# Patient Record
Sex: FEMALE | Race: WHITE | NOT HISPANIC OR LATINO
[De-identification: names, ages, dates, MRNs, and addresses within clinical notes are randomized per-mention and may not be internally consistent; named-entity substitution may affect disease eponyms.]

---

## 2017-01-04 ENCOUNTER — APPOINTMENT (OUTPATIENT)
Dept: OBGYN | Facility: CLINIC | Age: 37
End: 2017-01-04

## 2017-01-09 ENCOUNTER — APPOINTMENT (OUTPATIENT)
Age: 37
End: 2017-01-09

## 2017-01-12 ENCOUNTER — APPOINTMENT (OUTPATIENT)
Dept: OBGYN | Facility: CLINIC | Age: 37
End: 2017-01-12

## 2017-02-01 ENCOUNTER — APPOINTMENT (OUTPATIENT)
Dept: OBGYN | Facility: CLINIC | Age: 37
End: 2017-02-01

## 2017-02-15 ENCOUNTER — APPOINTMENT (OUTPATIENT)
Dept: OBGYN | Facility: CLINIC | Age: 37
End: 2017-02-15

## 2017-02-22 ENCOUNTER — RESULT CHARGE (OUTPATIENT)
Age: 37
End: 2017-02-22

## 2017-02-22 ENCOUNTER — APPOINTMENT (OUTPATIENT)
Dept: OBGYN | Facility: CLINIC | Age: 37
End: 2017-02-22

## 2017-02-22 VITALS — DIASTOLIC BLOOD PRESSURE: 60 MMHG | WEIGHT: 144 LBS | BODY MASS INDEX: 24.72 KG/M2 | SYSTOLIC BLOOD PRESSURE: 90 MMHG

## 2017-02-22 LAB
BILIRUB UR QL STRIP: NORMAL
CLARITY UR: CLEAR
COLLECTION METHOD: NORMAL
GLUCOSE UR-MCNC: NORMAL
HCG UR QL: 0.2 EU/DL
HGB UR QL STRIP.AUTO: NORMAL
KETONES UR-MCNC: NORMAL
LEUKOCYTE ESTERASE UR QL STRIP: NORMAL
NITRITE UR QL STRIP: NORMAL
PH UR STRIP: 6
PROT UR STRIP-MCNC: NORMAL
SP GR UR STRIP: 1.01

## 2017-03-09 ENCOUNTER — APPOINTMENT (OUTPATIENT)
Dept: ANTEPARTUM | Facility: CLINIC | Age: 37
End: 2017-03-09

## 2017-03-27 ENCOUNTER — APPOINTMENT (OUTPATIENT)
Dept: ANTEPARTUM | Facility: CLINIC | Age: 37
End: 2017-03-27

## 2017-03-27 VITALS — DIASTOLIC BLOOD PRESSURE: 68 MMHG | SYSTOLIC BLOOD PRESSURE: 110 MMHG | WEIGHT: 146 LBS | BODY MASS INDEX: 25.06 KG/M2

## 2017-03-27 VITALS — HEART RATE: 92 BPM | OXYGEN SATURATION: 100 % | TEMPERATURE: 97 F

## 2017-03-27 LAB
BILIRUB UR QL STRIP: NEGATIVE
CLARITY UR: CLEAR
COLLECTION METHOD: NORMAL
GLUCOSE UR-MCNC: NEGATIVE
HCG UR QL: 0.2 EU/DL
HGB UR QL STRIP.AUTO: NORMAL
KETONES UR-MCNC: NEGATIVE
LEUKOCYTE ESTERASE UR QL STRIP: 0.2
NITRITE UR QL STRIP: NEGATIVE
PH UR STRIP: 7.5
PROT UR STRIP-MCNC: NEGATIVE
SP GR UR STRIP: 1

## 2017-03-27 RX ORDER — BUPRENORPHINE HYDROCHLORIDE 8 MG/1
8 TABLET SUBLINGUAL
Refills: 0 | Status: ACTIVE | COMMUNITY
Start: 2017-03-27

## 2017-04-04 ENCOUNTER — APPOINTMENT (OUTPATIENT)
Dept: ANTEPARTUM | Facility: CLINIC | Age: 37
End: 2017-04-04

## 2017-04-06 ENCOUNTER — APPOINTMENT (OUTPATIENT)
Dept: OBGYN | Facility: CLINIC | Age: 37
End: 2017-04-06

## 2017-04-24 ENCOUNTER — APPOINTMENT (OUTPATIENT)
Dept: ANTEPARTUM | Facility: CLINIC | Age: 37
End: 2017-04-24

## 2017-04-24 VITALS — OXYGEN SATURATION: 100 % | TEMPERATURE: 98 F | HEART RATE: 98 BPM

## 2017-04-24 VITALS — BODY MASS INDEX: 23 KG/M2 | DIASTOLIC BLOOD PRESSURE: 70 MMHG | SYSTOLIC BLOOD PRESSURE: 114 MMHG | WEIGHT: 134 LBS

## 2017-04-24 DIAGNOSIS — Z33.1 PREGNANT STATE, INCIDENTAL: ICD-10-CM

## 2017-04-24 DIAGNOSIS — O42.92: ICD-10-CM

## 2017-04-24 DIAGNOSIS — F19.10 OTHER PSYCHOACTIVE SUBSTANCE ABUSE, UNCOMPLICATED: ICD-10-CM

## 2017-04-24 LAB
BILIRUB UR QL STRIP: NEGATIVE
CLARITY UR: NORMAL
GLUCOSE UR-MCNC: NEGATIVE
HCG UR QL: 0.2 EU/DL
HGB UR QL STRIP.AUTO: NORMAL
KETONES UR-MCNC: NEGATIVE
LEUKOCYTE ESTERASE UR QL STRIP: NEGATIVE
NITRITE UR QL STRIP: NEGATIVE
PH UR STRIP: 8
PROT UR STRIP-MCNC: NORMAL
SP GR UR STRIP: 1.03

## 2017-05-04 ENCOUNTER — APPOINTMENT (OUTPATIENT)
Dept: OBGYN | Facility: CLINIC | Age: 37
End: 2017-05-04

## 2017-06-07 ENCOUNTER — OTHER (OUTPATIENT)
Age: 37
End: 2017-06-07

## 2017-06-07 ENCOUNTER — OUTPATIENT (OUTPATIENT)
Dept: OUTPATIENT SERVICES | Facility: HOSPITAL | Age: 37
LOS: 1 days | Discharge: HOME | End: 2017-06-07

## 2017-06-07 ENCOUNTER — APPOINTMENT (OUTPATIENT)
Dept: OBGYN | Facility: CLINIC | Age: 37
End: 2017-06-07

## 2017-06-07 DIAGNOSIS — F41.9 ANXIETY DISORDER, UNSPECIFIED: ICD-10-CM

## 2017-06-07 DIAGNOSIS — F31.9 BIPOLAR DISORDER, UNSPECIFIED: ICD-10-CM

## 2017-06-07 DIAGNOSIS — F14.20 COCAINE DEPENDENCE, UNCOMPLICATED: ICD-10-CM

## 2017-06-07 DIAGNOSIS — F17.200 NICOTINE DEPENDENCE, UNSPECIFIED, UNCOMPLICATED: ICD-10-CM

## 2017-06-07 DIAGNOSIS — F11.20 OPIOID DEPENDENCE, UNCOMPLICATED: ICD-10-CM

## 2017-06-07 DIAGNOSIS — Z87.898 PERSONAL HISTORY OF OTHER SPECIFIED CONDITIONS: ICD-10-CM

## 2017-06-07 DIAGNOSIS — F12.10 CANNABIS ABUSE, UNCOMPLICATED: ICD-10-CM

## 2017-06-19 ENCOUNTER — APPOINTMENT (OUTPATIENT)
Dept: OBGYN | Facility: CLINIC | Age: 37
End: 2017-06-19

## 2017-06-28 DIAGNOSIS — Z00.8 ENCOUNTER FOR OTHER GENERAL EXAMINATION: ICD-10-CM

## 2017-07-10 ENCOUNTER — APPOINTMENT (OUTPATIENT)
Dept: OBGYN | Facility: CLINIC | Age: 37
End: 2017-07-10

## 2017-07-20 ENCOUNTER — APPOINTMENT (OUTPATIENT)
Dept: OBGYN | Facility: CLINIC | Age: 37
End: 2017-07-20

## 2017-07-30 ENCOUNTER — EMERGENCY (EMERGENCY)
Facility: HOSPITAL | Age: 37
LOS: 1 days | Discharge: LEFT BEFORE DISCHARGE | End: 2017-07-30

## 2017-07-30 DIAGNOSIS — F14.20 COCAINE DEPENDENCE, UNCOMPLICATED: ICD-10-CM

## 2017-07-30 DIAGNOSIS — F11.20 OPIOID DEPENDENCE, UNCOMPLICATED: ICD-10-CM

## 2017-07-30 DIAGNOSIS — R41.0 DISORIENTATION, UNSPECIFIED: ICD-10-CM

## 2017-07-30 DIAGNOSIS — Z79.899 OTHER LONG TERM (CURRENT) DRUG THERAPY: ICD-10-CM

## 2017-07-30 DIAGNOSIS — F17.200 NICOTINE DEPENDENCE, UNSPECIFIED, UNCOMPLICATED: ICD-10-CM

## 2017-07-30 DIAGNOSIS — F41.9 ANXIETY DISORDER, UNSPECIFIED: ICD-10-CM

## 2017-07-30 DIAGNOSIS — Z87.898 PERSONAL HISTORY OF OTHER SPECIFIED CONDITIONS: ICD-10-CM

## 2017-07-30 DIAGNOSIS — F31.9 BIPOLAR DISORDER, UNSPECIFIED: ICD-10-CM

## 2017-07-30 DIAGNOSIS — F12.10 CANNABIS ABUSE, UNCOMPLICATED: ICD-10-CM

## 2017-07-30 DIAGNOSIS — F32.9 MAJOR DEPRESSIVE DISORDER, SINGLE EPISODE, UNSPECIFIED: ICD-10-CM

## 2017-10-06 ENCOUNTER — EMERGENCY (EMERGENCY)
Facility: HOSPITAL | Age: 37
LOS: 0 days | Discharge: HOME | End: 2017-10-06

## 2017-10-06 DIAGNOSIS — Z87.898 PERSONAL HISTORY OF OTHER SPECIFIED CONDITIONS: ICD-10-CM

## 2017-10-06 DIAGNOSIS — F41.9 ANXIETY DISORDER, UNSPECIFIED: ICD-10-CM

## 2017-10-06 DIAGNOSIS — Z79.899 OTHER LONG TERM (CURRENT) DRUG THERAPY: ICD-10-CM

## 2017-10-06 DIAGNOSIS — F17.200 NICOTINE DEPENDENCE, UNSPECIFIED, UNCOMPLICATED: ICD-10-CM

## 2017-10-06 DIAGNOSIS — F14.20 COCAINE DEPENDENCE, UNCOMPLICATED: ICD-10-CM

## 2017-10-06 DIAGNOSIS — F11.90 OPIOID USE, UNSPECIFIED, UNCOMPLICATED: ICD-10-CM

## 2017-10-06 DIAGNOSIS — L98.9 DISORDER OF THE SKIN AND SUBCUTANEOUS TISSUE, UNSPECIFIED: ICD-10-CM

## 2017-10-06 DIAGNOSIS — F12.10 CANNABIS ABUSE, UNCOMPLICATED: ICD-10-CM

## 2017-10-06 DIAGNOSIS — F11.20 OPIOID DEPENDENCE, UNCOMPLICATED: ICD-10-CM

## 2017-10-06 DIAGNOSIS — F41.8 OTHER SPECIFIED ANXIETY DISORDERS: ICD-10-CM

## 2017-10-06 DIAGNOSIS — F31.9 BIPOLAR DISORDER, UNSPECIFIED: ICD-10-CM

## 2017-10-06 DIAGNOSIS — Z87.891 PERSONAL HISTORY OF NICOTINE DEPENDENCE: ICD-10-CM

## 2017-10-26 ENCOUNTER — INPATIENT (INPATIENT)
Facility: HOSPITAL | Age: 37
LOS: 0 days | Discharge: LEFT BEFORE DISCHARGE | End: 2017-10-26
Attending: INTERNAL MEDICINE

## 2017-10-26 DIAGNOSIS — F41.9 ANXIETY DISORDER, UNSPECIFIED: ICD-10-CM

## 2017-10-26 DIAGNOSIS — F12.10 CANNABIS ABUSE, UNCOMPLICATED: ICD-10-CM

## 2017-10-26 DIAGNOSIS — F10.20 ALCOHOL DEPENDENCE, UNCOMPLICATED: ICD-10-CM

## 2017-10-26 DIAGNOSIS — F11.20 OPIOID DEPENDENCE, UNCOMPLICATED: ICD-10-CM

## 2017-10-26 DIAGNOSIS — Z87.898 PERSONAL HISTORY OF OTHER SPECIFIED CONDITIONS: ICD-10-CM

## 2017-10-26 DIAGNOSIS — F14.20 COCAINE DEPENDENCE, UNCOMPLICATED: ICD-10-CM

## 2017-10-26 DIAGNOSIS — F31.9 BIPOLAR DISORDER, UNSPECIFIED: ICD-10-CM

## 2017-10-26 DIAGNOSIS — F17.200 NICOTINE DEPENDENCE, UNSPECIFIED, UNCOMPLICATED: ICD-10-CM

## 2017-10-30 ENCOUNTER — EMERGENCY (EMERGENCY)
Facility: HOSPITAL | Age: 37
LOS: 0 days | Discharge: LEFT BEFORE DISCHARGE | End: 2017-10-30

## 2017-10-30 DIAGNOSIS — Z79.899 OTHER LONG TERM (CURRENT) DRUG THERAPY: ICD-10-CM

## 2017-10-30 DIAGNOSIS — F41.9 ANXIETY DISORDER, UNSPECIFIED: ICD-10-CM

## 2017-10-30 DIAGNOSIS — R10.33 PERIUMBILICAL PAIN: ICD-10-CM

## 2017-10-30 DIAGNOSIS — F31.9 BIPOLAR DISORDER, UNSPECIFIED: ICD-10-CM

## 2017-10-30 DIAGNOSIS — Z87.891 PERSONAL HISTORY OF NICOTINE DEPENDENCE: ICD-10-CM

## 2017-10-30 DIAGNOSIS — F17.200 NICOTINE DEPENDENCE, UNSPECIFIED, UNCOMPLICATED: ICD-10-CM

## 2017-10-30 DIAGNOSIS — Z87.898 PERSONAL HISTORY OF OTHER SPECIFIED CONDITIONS: ICD-10-CM

## 2017-10-30 DIAGNOSIS — F41.8 OTHER SPECIFIED ANXIETY DISORDERS: ICD-10-CM

## 2017-10-30 DIAGNOSIS — F14.20 COCAINE DEPENDENCE, UNCOMPLICATED: ICD-10-CM

## 2017-10-30 DIAGNOSIS — F12.10 CANNABIS ABUSE, UNCOMPLICATED: ICD-10-CM

## 2017-10-30 DIAGNOSIS — F11.20 OPIOID DEPENDENCE, UNCOMPLICATED: ICD-10-CM

## 2017-11-01 DIAGNOSIS — F11.20 OPIOID DEPENDENCE, UNCOMPLICATED: ICD-10-CM

## 2017-11-01 DIAGNOSIS — F17.200 NICOTINE DEPENDENCE, UNSPECIFIED, UNCOMPLICATED: ICD-10-CM

## 2017-11-01 DIAGNOSIS — F41.9 ANXIETY DISORDER, UNSPECIFIED: ICD-10-CM

## 2017-11-01 DIAGNOSIS — F14.20 COCAINE DEPENDENCE, UNCOMPLICATED: ICD-10-CM

## 2017-11-01 DIAGNOSIS — Z51.89 ENCOUNTER FOR OTHER SPECIFIED AFTERCARE: ICD-10-CM

## 2017-11-01 DIAGNOSIS — F31.9 BIPOLAR DISORDER, UNSPECIFIED: ICD-10-CM

## 2017-11-04 ENCOUNTER — INPATIENT (INPATIENT)
Facility: HOSPITAL | Age: 37
LOS: 3 days | Discharge: HOME | End: 2017-11-08
Attending: PSYCHIATRY & NEUROLOGY

## 2017-11-04 DIAGNOSIS — F31.9 BIPOLAR DISORDER, UNSPECIFIED: ICD-10-CM

## 2017-11-04 DIAGNOSIS — F14.20 COCAINE DEPENDENCE, UNCOMPLICATED: ICD-10-CM

## 2017-11-04 DIAGNOSIS — F12.10 CANNABIS ABUSE, UNCOMPLICATED: ICD-10-CM

## 2017-11-04 DIAGNOSIS — F41.9 ANXIETY DISORDER, UNSPECIFIED: ICD-10-CM

## 2017-11-04 DIAGNOSIS — Z87.898 PERSONAL HISTORY OF OTHER SPECIFIED CONDITIONS: ICD-10-CM

## 2017-11-04 DIAGNOSIS — F17.200 NICOTINE DEPENDENCE, UNSPECIFIED, UNCOMPLICATED: ICD-10-CM

## 2017-11-04 DIAGNOSIS — F11.20 OPIOID DEPENDENCE, UNCOMPLICATED: ICD-10-CM

## 2017-11-10 DIAGNOSIS — Z91.19 PATIENT'S NONCOMPLIANCE WITH OTHER MEDICAL TREATMENT AND REGIMEN: ICD-10-CM

## 2017-11-10 DIAGNOSIS — Z56.0 UNEMPLOYMENT, UNSPECIFIED: ICD-10-CM

## 2017-11-10 DIAGNOSIS — F19.959 OTHER PSYCHOACTIVE SUBSTANCE USE, UNSPECIFIED WITH PSYCHOACTIVE SUBSTANCE-INDUCED PSYCHOTIC DISORDER, UNSPECIFIED: ICD-10-CM

## 2017-11-10 DIAGNOSIS — F42.9 OBSESSIVE-COMPULSIVE DISORDER, UNSPECIFIED: ICD-10-CM

## 2017-11-10 DIAGNOSIS — F12.10 CANNABIS ABUSE, UNCOMPLICATED: ICD-10-CM

## 2017-11-10 DIAGNOSIS — F31.9 BIPOLAR DISORDER, UNSPECIFIED: ICD-10-CM

## 2017-11-10 DIAGNOSIS — F10.10 ALCOHOL ABUSE, UNCOMPLICATED: ICD-10-CM

## 2017-11-10 DIAGNOSIS — F14.10 COCAINE ABUSE, UNCOMPLICATED: ICD-10-CM

## 2017-11-10 DIAGNOSIS — F17.200 NICOTINE DEPENDENCE, UNSPECIFIED, UNCOMPLICATED: ICD-10-CM

## 2017-11-10 DIAGNOSIS — F13.10 SEDATIVE, HYPNOTIC OR ANXIOLYTIC ABUSE, UNCOMPLICATED: ICD-10-CM

## 2017-11-10 DIAGNOSIS — F11.20 OPIOID DEPENDENCE, UNCOMPLICATED: ICD-10-CM

## 2017-11-10 SDOH — ECONOMIC STABILITY - INCOME SECURITY: UNEMPLOYMENT, UNSPECIFIED: Z56.0

## 2017-11-15 ENCOUNTER — EMERGENCY (EMERGENCY)
Facility: HOSPITAL | Age: 37
LOS: 0 days | Discharge: HOME | End: 2017-11-15

## 2017-11-15 DIAGNOSIS — F17.200 NICOTINE DEPENDENCE, UNSPECIFIED, UNCOMPLICATED: ICD-10-CM
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
## 2017-11-15 DIAGNOSIS — F12.10 CANNABIS ABUSE, UNCOMPLICATED: ICD-10-CM

## 2017-11-15 DIAGNOSIS — Z79.899 OTHER LONG TERM (CURRENT) DRUG THERAPY: ICD-10-CM

## 2017-11-15 DIAGNOSIS — T78.40XA ALLERGY, UNSPECIFIED, INITIAL ENCOUNTER: ICD-10-CM
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
## 2017-11-15 DIAGNOSIS — F14.20 COCAINE DEPENDENCE, UNCOMPLICATED: ICD-10-CM

## 2017-11-15 DIAGNOSIS — F11.20 OPIOID DEPENDENCE, UNCOMPLICATED: ICD-10-CM

## 2017-11-15 DIAGNOSIS — L53.9 ERYTHEMATOUS CONDITION, UNSPECIFIED: ICD-10-CM

## 2017-11-15 DIAGNOSIS — F41.9 ANXIETY DISORDER, UNSPECIFIED: ICD-10-CM

## 2017-11-15 DIAGNOSIS — Z87.898 PERSONAL HISTORY OF OTHER SPECIFIED CONDITIONS: ICD-10-CM

## 2017-11-15 DIAGNOSIS — F32.9 MAJOR DEPRESSIVE DISORDER, SINGLE EPISODE, UNSPECIFIED: ICD-10-CM
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
## 2017-11-15 DIAGNOSIS — F31.9 BIPOLAR DISORDER, UNSPECIFIED: ICD-10-CM

## 2017-11-17 ENCOUNTER — OUTPATIENT (OUTPATIENT)
Dept: OUTPATIENT SERVICES | Facility: HOSPITAL | Age: 37
LOS: 1 days | Discharge: HOME | End: 2017-11-17

## 2017-11-17 ENCOUNTER — EMERGENCY (EMERGENCY)
Facility: HOSPITAL | Age: 37
LOS: 0 days | Discharge: HOME | End: 2017-11-17

## 2017-11-17 DIAGNOSIS — F10.20 ALCOHOL DEPENDENCE, UNCOMPLICATED: ICD-10-CM

## 2017-11-17 DIAGNOSIS — F11.20 OPIOID DEPENDENCE, UNCOMPLICATED: ICD-10-CM

## 2017-11-17 DIAGNOSIS — F14.20 COCAINE DEPENDENCE, UNCOMPLICATED: ICD-10-CM

## 2017-11-17 DIAGNOSIS — F31.4 BIPOLAR DISORDER, CURRENT EPISODE DEPRESSED, SEVERE, WITHOUT PSYCHOTIC FEATURES: ICD-10-CM

## 2017-11-17 DIAGNOSIS — G47.8 OTHER SLEEP DISORDERS: ICD-10-CM

## 2017-11-17 DIAGNOSIS — F12.10 CANNABIS ABUSE, UNCOMPLICATED: ICD-10-CM

## 2017-11-17 DIAGNOSIS — Z79.899 OTHER LONG TERM (CURRENT) DRUG THERAPY: ICD-10-CM

## 2017-11-17 DIAGNOSIS — F41.9 ANXIETY DISORDER, UNSPECIFIED: ICD-10-CM

## 2017-11-17 DIAGNOSIS — Z87.891 PERSONAL HISTORY OF NICOTINE DEPENDENCE: ICD-10-CM

## 2017-11-17 DIAGNOSIS — Z87.898 PERSONAL HISTORY OF OTHER SPECIFIED CONDITIONS: ICD-10-CM

## 2017-11-17 DIAGNOSIS — R10.9 UNSPECIFIED ABDOMINAL PAIN: ICD-10-CM

## 2017-11-17 DIAGNOSIS — F17.200 NICOTINE DEPENDENCE, UNSPECIFIED, UNCOMPLICATED: ICD-10-CM

## 2017-11-17 DIAGNOSIS — F31.9 BIPOLAR DISORDER, UNSPECIFIED: ICD-10-CM

## 2017-11-23 ENCOUNTER — EMERGENCY (EMERGENCY)
Facility: HOSPITAL | Age: 37
LOS: 0 days | Discharge: HOME | End: 2017-11-23

## 2017-11-23 DIAGNOSIS — F17.200 NICOTINE DEPENDENCE, UNSPECIFIED, UNCOMPLICATED: ICD-10-CM

## 2017-11-23 DIAGNOSIS — F12.10 CANNABIS ABUSE, UNCOMPLICATED: ICD-10-CM

## 2017-11-23 DIAGNOSIS — Z87.898 PERSONAL HISTORY OF OTHER SPECIFIED CONDITIONS: ICD-10-CM

## 2017-11-23 DIAGNOSIS — F11.20 OPIOID DEPENDENCE, UNCOMPLICATED: ICD-10-CM

## 2017-11-23 DIAGNOSIS — Z87.891 PERSONAL HISTORY OF NICOTINE DEPENDENCE: ICD-10-CM

## 2017-11-23 DIAGNOSIS — F14.20 COCAINE DEPENDENCE, UNCOMPLICATED: ICD-10-CM

## 2017-11-23 DIAGNOSIS — R32 UNSPECIFIED URINARY INCONTINENCE: ICD-10-CM

## 2017-11-23 DIAGNOSIS — Z79.899 OTHER LONG TERM (CURRENT) DRUG THERAPY: ICD-10-CM

## 2017-11-23 DIAGNOSIS — F41.9 ANXIETY DISORDER, UNSPECIFIED: ICD-10-CM

## 2017-11-23 DIAGNOSIS — R42 DIZZINESS AND GIDDINESS: ICD-10-CM

## 2017-11-23 DIAGNOSIS — F31.9 BIPOLAR DISORDER, UNSPECIFIED: ICD-10-CM

## 2017-11-23 DIAGNOSIS — F32.9 MAJOR DEPRESSIVE DISORDER, SINGLE EPISODE, UNSPECIFIED: ICD-10-CM

## 2017-11-28 ENCOUNTER — EMERGENCY (EMERGENCY)
Facility: HOSPITAL | Age: 37
LOS: 0 days | Discharge: LEFT BEFORE DISCHARGE | End: 2017-11-28

## 2017-11-28 DIAGNOSIS — F17.200 NICOTINE DEPENDENCE, UNSPECIFIED, UNCOMPLICATED: ICD-10-CM

## 2017-11-28 DIAGNOSIS — F41.9 ANXIETY DISORDER, UNSPECIFIED: ICD-10-CM

## 2017-11-28 DIAGNOSIS — F12.10 CANNABIS ABUSE, UNCOMPLICATED: ICD-10-CM

## 2017-11-28 DIAGNOSIS — F11.20 OPIOID DEPENDENCE, UNCOMPLICATED: ICD-10-CM

## 2017-11-28 DIAGNOSIS — F43.9 REACTION TO SEVERE STRESS, UNSPECIFIED: ICD-10-CM

## 2017-11-28 DIAGNOSIS — Z87.898 PERSONAL HISTORY OF OTHER SPECIFIED CONDITIONS: ICD-10-CM

## 2017-11-28 DIAGNOSIS — F14.20 COCAINE DEPENDENCE, UNCOMPLICATED: ICD-10-CM

## 2017-11-28 DIAGNOSIS — Z79.891 LONG TERM (CURRENT) USE OF OPIATE ANALGESIC: ICD-10-CM

## 2017-11-28 DIAGNOSIS — Z87.891 PERSONAL HISTORY OF NICOTINE DEPENDENCE: ICD-10-CM

## 2017-11-28 DIAGNOSIS — F31.9 BIPOLAR DISORDER, UNSPECIFIED: ICD-10-CM

## 2017-11-28 DIAGNOSIS — Z79.899 OTHER LONG TERM (CURRENT) DRUG THERAPY: ICD-10-CM

## 2017-12-05 ENCOUNTER — EMERGENCY (EMERGENCY)
Facility: HOSPITAL | Age: 37
LOS: 0 days | Discharge: HOME | End: 2017-12-05

## 2017-12-05 DIAGNOSIS — F11.10 OPIOID ABUSE, UNCOMPLICATED: ICD-10-CM

## 2017-12-05 DIAGNOSIS — F17.200 NICOTINE DEPENDENCE, UNSPECIFIED, UNCOMPLICATED: ICD-10-CM

## 2017-12-05 DIAGNOSIS — F41.8 OTHER SPECIFIED ANXIETY DISORDERS: ICD-10-CM

## 2017-12-05 DIAGNOSIS — F11.20 OPIOID DEPENDENCE, UNCOMPLICATED: ICD-10-CM

## 2017-12-05 DIAGNOSIS — Z87.898 PERSONAL HISTORY OF OTHER SPECIFIED CONDITIONS: ICD-10-CM

## 2017-12-05 DIAGNOSIS — Z79.899 OTHER LONG TERM (CURRENT) DRUG THERAPY: ICD-10-CM

## 2017-12-05 DIAGNOSIS — Z87.891 PERSONAL HISTORY OF NICOTINE DEPENDENCE: ICD-10-CM

## 2017-12-05 DIAGNOSIS — F31.9 BIPOLAR DISORDER, UNSPECIFIED: ICD-10-CM

## 2017-12-05 DIAGNOSIS — F14.20 COCAINE DEPENDENCE, UNCOMPLICATED: ICD-10-CM

## 2017-12-05 DIAGNOSIS — F41.9 ANXIETY DISORDER, UNSPECIFIED: ICD-10-CM

## 2017-12-05 DIAGNOSIS — F12.10 CANNABIS ABUSE, UNCOMPLICATED: ICD-10-CM

## 2017-12-06 ENCOUNTER — INPATIENT (INPATIENT)
Facility: HOSPITAL | Age: 37
LOS: 0 days | Discharge: LEFT BEFORE DISCHARGE | End: 2017-12-06
Attending: INTERNAL MEDICINE

## 2017-12-06 DIAGNOSIS — F41.9 ANXIETY DISORDER, UNSPECIFIED: ICD-10-CM

## 2017-12-06 DIAGNOSIS — Z87.898 PERSONAL HISTORY OF OTHER SPECIFIED CONDITIONS: ICD-10-CM

## 2017-12-06 DIAGNOSIS — F17.200 NICOTINE DEPENDENCE, UNSPECIFIED, UNCOMPLICATED: ICD-10-CM

## 2017-12-06 DIAGNOSIS — F12.10 CANNABIS ABUSE, UNCOMPLICATED: ICD-10-CM

## 2017-12-06 DIAGNOSIS — F14.20 COCAINE DEPENDENCE, UNCOMPLICATED: ICD-10-CM

## 2017-12-06 DIAGNOSIS — F11.20 OPIOID DEPENDENCE, UNCOMPLICATED: ICD-10-CM

## 2017-12-06 DIAGNOSIS — F31.9 BIPOLAR DISORDER, UNSPECIFIED: ICD-10-CM

## 2017-12-06 DIAGNOSIS — F41.1 GENERALIZED ANXIETY DISORDER: ICD-10-CM

## 2017-12-06 DIAGNOSIS — F13.10 SEDATIVE, HYPNOTIC OR ANXIOLYTIC ABUSE, UNCOMPLICATED: ICD-10-CM

## 2017-12-06 DIAGNOSIS — F33.0 MAJOR DEPRESSIVE DISORDER, RECURRENT, MILD: ICD-10-CM

## 2017-12-11 ENCOUNTER — INPATIENT (INPATIENT)
Facility: HOSPITAL | Age: 37
LOS: 0 days | Discharge: LEFT BEFORE DISCHARGE | End: 2017-12-12
Attending: INTERNAL MEDICINE

## 2017-12-11 DIAGNOSIS — F31.9 BIPOLAR DISORDER, UNSPECIFIED: ICD-10-CM

## 2017-12-11 DIAGNOSIS — F12.10 CANNABIS ABUSE, UNCOMPLICATED: ICD-10-CM

## 2017-12-11 DIAGNOSIS — Z87.898 PERSONAL HISTORY OF OTHER SPECIFIED CONDITIONS: ICD-10-CM

## 2017-12-11 DIAGNOSIS — F11.20 OPIOID DEPENDENCE, UNCOMPLICATED: ICD-10-CM

## 2017-12-11 DIAGNOSIS — F17.200 NICOTINE DEPENDENCE, UNSPECIFIED, UNCOMPLICATED: ICD-10-CM

## 2017-12-11 DIAGNOSIS — F14.20 COCAINE DEPENDENCE, UNCOMPLICATED: ICD-10-CM

## 2017-12-11 DIAGNOSIS — F41.9 ANXIETY DISORDER, UNSPECIFIED: ICD-10-CM

## 2017-12-13 ENCOUNTER — EMERGENCY (EMERGENCY)
Facility: HOSPITAL | Age: 37
LOS: 1 days | Discharge: LEFT BEFORE DISCHARGE | End: 2017-12-13

## 2017-12-13 DIAGNOSIS — F41.9 ANXIETY DISORDER, UNSPECIFIED: ICD-10-CM

## 2017-12-13 DIAGNOSIS — F11.20 OPIOID DEPENDENCE, UNCOMPLICATED: ICD-10-CM

## 2017-12-13 DIAGNOSIS — F14.10 COCAINE ABUSE, UNCOMPLICATED: ICD-10-CM

## 2017-12-13 DIAGNOSIS — Z51.89 ENCOUNTER FOR OTHER SPECIFIED AFTERCARE: ICD-10-CM

## 2017-12-13 DIAGNOSIS — Z53.21 PROCEDURE AND TREATMENT NOT CARRIED OUT DUE TO PATIENT LEAVING PRIOR TO BEING SEEN BY HEALTH CARE PROVIDER: ICD-10-CM

## 2017-12-13 DIAGNOSIS — F41.8 OTHER SPECIFIED ANXIETY DISORDERS: ICD-10-CM

## 2017-12-13 DIAGNOSIS — Z79.899 OTHER LONG TERM (CURRENT) DRUG THERAPY: ICD-10-CM

## 2017-12-13 DIAGNOSIS — F17.210 NICOTINE DEPENDENCE, CIGARETTES, UNCOMPLICATED: ICD-10-CM

## 2017-12-13 DIAGNOSIS — F32.9 MAJOR DEPRESSIVE DISORDER, SINGLE EPISODE, UNSPECIFIED: ICD-10-CM

## 2017-12-19 DIAGNOSIS — F41.1 GENERALIZED ANXIETY DISORDER: ICD-10-CM

## 2017-12-19 DIAGNOSIS — F17.210 NICOTINE DEPENDENCE, CIGARETTES, UNCOMPLICATED: ICD-10-CM

## 2017-12-19 DIAGNOSIS — F32.9 MAJOR DEPRESSIVE DISORDER, SINGLE EPISODE, UNSPECIFIED: ICD-10-CM

## 2017-12-19 DIAGNOSIS — F11.20 OPIOID DEPENDENCE, UNCOMPLICATED: ICD-10-CM

## 2018-01-30 DIAGNOSIS — F41.9 ANXIETY DISORDER, UNSPECIFIED: ICD-10-CM

## 2018-01-30 DIAGNOSIS — F14.20 COCAINE DEPENDENCE, UNCOMPLICATED: ICD-10-CM

## 2018-01-30 DIAGNOSIS — F31.9 BIPOLAR DISORDER, UNSPECIFIED: ICD-10-CM

## 2018-01-30 DIAGNOSIS — F11.20 OPIOID DEPENDENCE, UNCOMPLICATED: ICD-10-CM

## 2018-01-30 DIAGNOSIS — Z87.898 PERSONAL HISTORY OF OTHER SPECIFIED CONDITIONS: ICD-10-CM

## 2018-01-30 DIAGNOSIS — F12.10 CANNABIS ABUSE, UNCOMPLICATED: ICD-10-CM

## 2018-01-30 DIAGNOSIS — F17.200 NICOTINE DEPENDENCE, UNSPECIFIED, UNCOMPLICATED: ICD-10-CM

## 2018-01-31 ENCOUNTER — EMERGENCY (EMERGENCY)
Facility: HOSPITAL | Age: 38
LOS: 0 days | Discharge: LEFT BEFORE DISCHARGE | End: 2018-01-31

## 2018-01-31 DIAGNOSIS — F11.20 OPIOID DEPENDENCE, UNCOMPLICATED: ICD-10-CM

## 2018-01-31 DIAGNOSIS — Z87.898 PERSONAL HISTORY OF OTHER SPECIFIED CONDITIONS: ICD-10-CM

## 2018-01-31 DIAGNOSIS — F17.200 NICOTINE DEPENDENCE, UNSPECIFIED, UNCOMPLICATED: ICD-10-CM

## 2018-01-31 DIAGNOSIS — R23.8 OTHER SKIN CHANGES: ICD-10-CM

## 2018-01-31 DIAGNOSIS — Z79.899 OTHER LONG TERM (CURRENT) DRUG THERAPY: ICD-10-CM

## 2018-01-31 DIAGNOSIS — F31.9 BIPOLAR DISORDER, UNSPECIFIED: ICD-10-CM

## 2018-01-31 DIAGNOSIS — F12.10 CANNABIS ABUSE, UNCOMPLICATED: ICD-10-CM

## 2018-01-31 DIAGNOSIS — Z79.891 LONG TERM (CURRENT) USE OF OPIATE ANALGESIC: ICD-10-CM

## 2018-01-31 DIAGNOSIS — F32.9 MAJOR DEPRESSIVE DISORDER, SINGLE EPISODE, UNSPECIFIED: ICD-10-CM

## 2018-01-31 DIAGNOSIS — F14.20 COCAINE DEPENDENCE, UNCOMPLICATED: ICD-10-CM

## 2018-01-31 DIAGNOSIS — F41.9 ANXIETY DISORDER, UNSPECIFIED: ICD-10-CM

## 2018-02-02 ENCOUNTER — EMERGENCY (EMERGENCY)
Facility: HOSPITAL | Age: 38
LOS: 1 days | Discharge: HOME | End: 2018-02-02

## 2018-02-02 DIAGNOSIS — Z87.891 PERSONAL HISTORY OF NICOTINE DEPENDENCE: ICD-10-CM

## 2018-02-02 DIAGNOSIS — Z79.891 LONG TERM (CURRENT) USE OF OPIATE ANALGESIC: ICD-10-CM
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
## 2018-02-02 DIAGNOSIS — R25.1 TREMOR, UNSPECIFIED: ICD-10-CM

## 2018-02-02 DIAGNOSIS — F41.8 OTHER SPECIFIED ANXIETY DISORDERS: ICD-10-CM

## 2018-02-02 DIAGNOSIS — F10.10 ALCOHOL ABUSE, UNCOMPLICATED: ICD-10-CM

## 2018-02-02 DIAGNOSIS — R00.0 TACHYCARDIA, UNSPECIFIED: ICD-10-CM

## 2018-02-02 DIAGNOSIS — F41.9 ANXIETY DISORDER, UNSPECIFIED: ICD-10-CM

## 2018-02-02 DIAGNOSIS — Z79.899 OTHER LONG TERM (CURRENT) DRUG THERAPY: ICD-10-CM

## 2018-02-03 ENCOUNTER — EMERGENCY (EMERGENCY)
Facility: HOSPITAL | Age: 38
LOS: 0 days | Discharge: HOME | End: 2018-02-03

## 2018-02-03 DIAGNOSIS — F10.10 ALCOHOL ABUSE, UNCOMPLICATED: ICD-10-CM

## 2018-02-03 DIAGNOSIS — Z79.899 OTHER LONG TERM (CURRENT) DRUG THERAPY: ICD-10-CM

## 2018-02-03 DIAGNOSIS — F31.9 BIPOLAR DISORDER, UNSPECIFIED: ICD-10-CM

## 2018-02-03 DIAGNOSIS — Z79.810 LONG TERM (CURRENT) USE OF SELECTIVE ESTROGEN RECEPTOR MODULATORS (SERMS): ICD-10-CM

## 2018-02-03 DIAGNOSIS — Z79.891 LONG TERM (CURRENT) USE OF OPIATE ANALGESIC: ICD-10-CM

## 2018-02-04 ENCOUNTER — EMERGENCY (EMERGENCY)
Facility: HOSPITAL | Age: 38
LOS: 1 days | End: 2018-02-04
Attending: EMERGENCY MEDICINE

## 2018-02-04 VITALS
DIASTOLIC BLOOD PRESSURE: 82 MMHG | WEIGHT: 126.99 LBS | RESPIRATION RATE: 24 BRPM | SYSTOLIC BLOOD PRESSURE: 141 MMHG | HEIGHT: 67 IN | TEMPERATURE: 97 F | HEART RATE: 135 BPM | OXYGEN SATURATION: 100 %

## 2018-02-04 VITALS
HEART RATE: 121 BPM | OXYGEN SATURATION: 100 % | DIASTOLIC BLOOD PRESSURE: 71 MMHG | SYSTOLIC BLOOD PRESSURE: 115 MMHG | RESPIRATION RATE: 22 BRPM | TEMPERATURE: 98 F

## 2018-02-04 DIAGNOSIS — F41.9 ANXIETY DISORDER, UNSPECIFIED: ICD-10-CM

## 2018-02-04 DIAGNOSIS — F12.10 CANNABIS ABUSE, UNCOMPLICATED: ICD-10-CM

## 2018-02-04 DIAGNOSIS — F14.20 COCAINE DEPENDENCE, UNCOMPLICATED: ICD-10-CM

## 2018-02-04 DIAGNOSIS — F17.200 NICOTINE DEPENDENCE, UNSPECIFIED, UNCOMPLICATED: ICD-10-CM

## 2018-02-04 DIAGNOSIS — F10.10 ALCOHOL ABUSE, UNCOMPLICATED: ICD-10-CM

## 2018-02-04 DIAGNOSIS — F11.20 OPIOID DEPENDENCE, UNCOMPLICATED: ICD-10-CM

## 2018-02-04 DIAGNOSIS — Z88.5 ALLERGY STATUS TO NARCOTIC AGENT: ICD-10-CM

## 2018-02-04 DIAGNOSIS — Z87.898 PERSONAL HISTORY OF OTHER SPECIFIED CONDITIONS: ICD-10-CM

## 2018-02-04 DIAGNOSIS — F31.9 BIPOLAR DISORDER, UNSPECIFIED: ICD-10-CM

## 2018-02-04 RX ADMIN — Medication 50 MILLIGRAM(S): at 17:33

## 2018-02-04 NOTE — ED PROVIDER NOTE - MEDICAL DECISION MAKING DETAILS
I personally evaluated the patient. I reviewed the Resident’s or Physician Assistant’s note (as assigned above), and agree with the findings and plan except as documented in my note.  36yo F comes to the ED c/o anxiety. Pt states that she has a h/o anxiety and states that today she began to have a panic attack and feels overwhelmed. Pt denies any SI/HI, no fevers, chills, CP, SOB. PT denies any drug use but states that she has been drinking and her last drink was yesterday.  On exam: NCAT. PERRLA, EOMI. OP clear. Lungs CTAB. RRR, S1S2 noted. Radial pulses 2+ and equal, pedal pulses 2+ and equal. Abdomen soft, NT/ND, no rebound or guarding. FROM x4 extremities. No focal neuro deficits. Plan will admit to detox

## 2018-02-04 NOTE — ED PROVIDER NOTE - PHYSICAL EXAMINATION
AOx4, Non toxic appearing, appears anxious. Skin  warm and dry, no acute rash. PERRLA/EOM, conjunctiva and sclera clear. MM moist, no nasal discharge.  Pharynx and TM's unremarkable. Neck supple nt, no meningeal signs. Heart RRR s1s2 nl, no rub/murmur. Lungs- BS equal, CTAB. Abdomen soft ntnd no r/g. Extremities- moves all, +equal distal pulses, sensation wnl, normal ROM. No LE edema, calves nttp b/l.

## 2018-02-04 NOTE — ED ADULT TRIAGE NOTE - CHIEF COMPLAINT QUOTE
pt states "I'm having panic attacks", pt was dropped of by significant other saying "shes been using drugs."

## 2018-02-04 NOTE — ED PROVIDER NOTE - OBJECTIVE STATEMENT
38 yo F admitted yesterday to psych then signed out ama presents for recurrent symptoms of anxiety, depression, and tremors. denies si/hi/ams. last drink yesterday before visit. denies any other interval events or symptoms.

## 2018-02-04 NOTE — ED PROVIDER NOTE - NS ED ROS FT
Pt denied fvr/chills, HA, visual changes, dizziness, light headedness, presyncope, LOC, CP, REARDON, PND, SOB, cough, hemoptysis, abd pain, n/v/d, changes in bowel or bladder habits, LE edema, numbness, weakness, changes in gait.  The pt also denied recent travel outside of the country, recent illnesses, sick contacts, recent airplane trips or periods of immobility/bedbound.

## 2018-02-04 NOTE — ED PROVIDER NOTE - NS_ ATTENDINGSCRIBEDETAILS _ED_A_ED_FT
I personally evaluated the patient. I reviewed the Resident’s or Physician Assistant’s note (as assigned above), and agree with the findings and plan except as documented in my note.  38yo F comes to the ED c/o anxiety. Pt states that she has a h/o anxiety and states that today she began to have a panic attack and feels overwhelmed. Pt denies any SI/HI, no fevers, chills, CP, SOB. PT denies any drug use but states that she has been drinking and her last drink was yesterday.  On exam: NCAT. PERRLA, EOMI. OP clear. Lungs CTAB. RRR, S1S2 noted. Radial pulses 2+ and equal, pedal pulses 2+ and equal. Abdomen soft, NT/ND, no rebound or guarding. FROM x4 extremities. No focal neuro deficits. Plan will admit to detox

## 2018-02-24 ENCOUNTER — EMERGENCY (EMERGENCY)
Facility: HOSPITAL | Age: 38
LOS: 0 days | Discharge: HOME | End: 2018-02-24
Attending: EMERGENCY MEDICINE

## 2018-02-24 VITALS
DIASTOLIC BLOOD PRESSURE: 80 MMHG | HEIGHT: 64 IN | TEMPERATURE: 100 F | RESPIRATION RATE: 20 BRPM | HEART RATE: 129 BPM | WEIGHT: 126.99 LBS | OXYGEN SATURATION: 95 % | SYSTOLIC BLOOD PRESSURE: 151 MMHG

## 2018-02-24 VITALS — HEART RATE: 107 BPM

## 2018-02-24 DIAGNOSIS — F41.9 ANXIETY DISORDER, UNSPECIFIED: ICD-10-CM

## 2018-02-24 DIAGNOSIS — F32.9 MAJOR DEPRESSIVE DISORDER, SINGLE EPISODE, UNSPECIFIED: ICD-10-CM

## 2018-02-24 DIAGNOSIS — R00.0 TACHYCARDIA, UNSPECIFIED: ICD-10-CM
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
## 2018-02-25 ENCOUNTER — EMERGENCY (EMERGENCY)
Facility: HOSPITAL | Age: 38
LOS: 0 days | Discharge: HOME | End: 2018-02-25
Attending: EMERGENCY MEDICINE

## 2018-02-25 VITALS
SYSTOLIC BLOOD PRESSURE: 132 MMHG | RESPIRATION RATE: 17 BRPM | HEART RATE: 67 BPM | DIASTOLIC BLOOD PRESSURE: 67 MMHG | WEIGHT: 134.04 LBS | HEIGHT: 65 IN | TEMPERATURE: 97 F | OXYGEN SATURATION: 98 %

## 2018-02-25 VITALS
OXYGEN SATURATION: 98 % | HEART RATE: 72 BPM | TEMPERATURE: 97 F | DIASTOLIC BLOOD PRESSURE: 64 MMHG | SYSTOLIC BLOOD PRESSURE: 118 MMHG | RESPIRATION RATE: 18 BRPM

## 2018-02-25 DIAGNOSIS — Z79.899 OTHER LONG TERM (CURRENT) DRUG THERAPY: ICD-10-CM

## 2018-02-25 DIAGNOSIS — R00.0 TACHYCARDIA, UNSPECIFIED: ICD-10-CM

## 2018-02-25 DIAGNOSIS — F41.9 ANXIETY DISORDER, UNSPECIFIED: ICD-10-CM

## 2018-02-25 DIAGNOSIS — F43.20 ADJUSTMENT DISORDER, UNSPECIFIED: ICD-10-CM

## 2018-02-25 DIAGNOSIS — F43.22 ADJUSTMENT DISORDER WITH ANXIETY: ICD-10-CM

## 2018-02-25 DIAGNOSIS — F17.210 NICOTINE DEPENDENCE, CIGARETTES, UNCOMPLICATED: ICD-10-CM

## 2018-02-25 DIAGNOSIS — F32.9 MAJOR DEPRESSIVE DISORDER, SINGLE EPISODE, UNSPECIFIED: ICD-10-CM

## 2018-02-25 LAB
ALBUMIN SERPL ELPH-MCNC: 4.1 G/DL — SIGNIFICANT CHANGE UP (ref 3–5.5)
ALP SERPL-CCNC: 58 U/L — SIGNIFICANT CHANGE UP (ref 30–115)
ALT FLD-CCNC: 19 U/L — SIGNIFICANT CHANGE UP (ref 0–41)
ANION GAP SERPL CALC-SCNC: 7 MMOL/L — SIGNIFICANT CHANGE UP (ref 7–14)
APAP SERPL-MCNC: <10 UG/ML — SIGNIFICANT CHANGE UP (ref 10–30)
AST SERPL-CCNC: 19 U/L — SIGNIFICANT CHANGE UP (ref 0–41)
BASOPHILS # BLD AUTO: 0.08 K/UL — SIGNIFICANT CHANGE UP (ref 0–0.2)
BASOPHILS NFR BLD AUTO: 1.2 % — HIGH (ref 0–1)
BILIRUB SERPL-MCNC: 0.4 MG/DL — SIGNIFICANT CHANGE UP (ref 0.2–1.2)
BUN SERPL-MCNC: 5 MG/DL — LOW (ref 10–20)
CALCIUM SERPL-MCNC: 9 MG/DL — SIGNIFICANT CHANGE UP (ref 8.5–10.1)
CHLORIDE SERPL-SCNC: 101 MMOL/L — SIGNIFICANT CHANGE UP (ref 98–110)
CO2 SERPL-SCNC: 26 MMOL/L — SIGNIFICANT CHANGE UP (ref 17–32)
CREAT SERPL-MCNC: 0.7 MG/DL — SIGNIFICANT CHANGE UP (ref 0.7–1.5)
EOSINOPHIL # BLD AUTO: 0.25 K/UL — SIGNIFICANT CHANGE UP (ref 0–0.7)
EOSINOPHIL NFR BLD AUTO: 3.7 % — SIGNIFICANT CHANGE UP (ref 0–8)
ETHANOL SERPL-MCNC: 18 MG/DL — HIGH
GLUCOSE SERPL-MCNC: 89 MG/DL — SIGNIFICANT CHANGE UP (ref 70–110)
HCT VFR BLD CALC: 42.5 % — SIGNIFICANT CHANGE UP (ref 37–47)
HGB BLD-MCNC: 14 G/DL — SIGNIFICANT CHANGE UP (ref 14–18)
IMM GRANULOCYTES NFR BLD AUTO: 0.4 % — HIGH (ref 0.1–0.3)
LYMPHOCYTES # BLD AUTO: 1.6 K/UL — SIGNIFICANT CHANGE UP (ref 1.2–3.4)
LYMPHOCYTES # BLD AUTO: 23.7 % — SIGNIFICANT CHANGE UP (ref 20.5–51.1)
MCHC RBC-ENTMCNC: 29.2 PG — SIGNIFICANT CHANGE UP (ref 27–31)
MCHC RBC-ENTMCNC: 32.9 G/DL — SIGNIFICANT CHANGE UP (ref 32–37)
MCV RBC AUTO: 88.7 FL — SIGNIFICANT CHANGE UP (ref 81–91)
MONOCYTES # BLD AUTO: 0.84 K/UL — HIGH (ref 0.1–0.6)
MONOCYTES NFR BLD AUTO: 12.4 % — HIGH (ref 1.7–9.3)
NEUTROPHILS # BLD AUTO: 3.96 K/UL — SIGNIFICANT CHANGE UP (ref 1.4–6.5)
NEUTROPHILS NFR BLD AUTO: 58.6 % — SIGNIFICANT CHANGE UP (ref 42.2–75.2)
NRBC # BLD: 0 /100 WBCS — SIGNIFICANT CHANGE UP (ref 0–0)
PLATELET # BLD AUTO: 307 K/UL — SIGNIFICANT CHANGE UP (ref 130–400)
POTASSIUM SERPL-MCNC: 4.2 MMOL/L — SIGNIFICANT CHANGE UP (ref 3.5–5)
POTASSIUM SERPL-SCNC: 4.2 MMOL/L — SIGNIFICANT CHANGE UP (ref 3.5–5)
PROT SERPL-MCNC: 7.4 G/DL — SIGNIFICANT CHANGE UP (ref 6–8)
RBC # BLD: 4.79 M/UL — SIGNIFICANT CHANGE UP (ref 4.2–5.4)
RBC # FLD: 13.3 % — SIGNIFICANT CHANGE UP (ref 11.5–14.5)
SALICYLATES SERPL-MCNC: <4 MG/DL — SIGNIFICANT CHANGE UP (ref 4–30)
SODIUM SERPL-SCNC: 134 MMOL/L — LOW (ref 135–146)
VALPROATE SERPL-MCNC: 97.7 UG/ML — SIGNIFICANT CHANGE UP (ref 50–100)
WBC # BLD: 6.76 K/UL — SIGNIFICANT CHANGE UP (ref 4.8–10.8)
WBC # FLD AUTO: 6.76 K/UL — SIGNIFICANT CHANGE UP (ref 4.8–10.8)

## 2018-02-25 NOTE — ED PROVIDER NOTE - ATTENDING CONTRIBUTION TO CARE
A 38 y/o f/ w/ pmhx of anxiety and depressions, reports on Depakote and gabapentin, reports Depakote was prescribed to her here a few weeks ago, presents because eloise brought pt in as she had an anxiety attach and was worried she was suicidal. pt denies any si/hi, visual or auditory hallucinations, and that she feels better not, although anxious appearing in ed. denies any illicit or over the counter drug abuse, no etoh abuse, reports rank one beer today, drinks only occasionally. No fever, chills, n/v, cp, sob, pleuritic cp, palpitations, diaphoresis, cough, ha/lh/dizziness, numbness/tingling, neck pain/ stiffness, abd pain, diarrhea, constipation, melena/brbpr, urinary symptoms, trauma, weakness, edema, calf pain/swelling/erythema, sick contacts, recent travel or rash. Vital Signs: I have reviewed the initial vital signs. Constitutional: Anxious appearing. Integumentary: No rash. ENT: MMM NECK: Supple, non-tender, no meningeal signs. Cardiovascular: Tachycardiac, radial pulses 2/4 b/l. No JVD. Respiratory: BS present b/l, ctabl, no wheezing or crackles, good resp effort and excursion, good air exchange,  no accessory muscle use, no stridor. Gastrointestinal: BS present throughout all 4 quadrants, soft, nd, nt, no rebound tenderness or guarding, no cvat. Musculoskeletal: FROM, no edema, no calf pain/swelling/erythema. Neurologic: AAOx3, motor 5/5 and sensation intact throughout upper and lower ext, CN II-XII intact, No facial droop or slurring of speech. No focal deficits.

## 2018-02-25 NOTE — ED PROVIDER NOTE - OBJECTIVE STATEMENT
Pt comes in c/o anxiety attack but states that it has since resolved. Pt denies any SI/HI. Pt has no medical complaints at this time. Pt states that she was seen yesterday in the ER and was given water and felt better. Pt describes the episode as a dry mouth and clenching.. Pt is on Depakote and Gabapentin and is compliant.

## 2018-02-25 NOTE — ED PROVIDER NOTE - PROGRESS NOTE DETAILS
PSYCH AWARE OF PT, WILL COME EVALUATE. multiple calls to psych as pt reporting she wants to leave, third call still nto call back will try again. psych at bedside. psych at bedside. will dc home . patient cleared by psych

## 2018-02-25 NOTE — ED BEHAVIORAL HEALTH ASSESSMENT NOTE - OTHER
pt had a verbal argument with boyfriend that precipitated anxiety She states that her reason to live is her 10 month old daughter pt's anxiety precipitated by verbal argument with lived-in boyfriend. Return to ER PRN

## 2018-02-25 NOTE — ED BEHAVIORAL HEALTH ASSESSMENT NOTE - RISK ASSESSMENT
Pt denies any suicidal or homicidal ideation, plan or intent to die and denies any suicidal or homicidal risk behavior in the past or present.

## 2018-02-25 NOTE — ED PROVIDER NOTE - MEDICAL DECISION MAKING DETAILS
psych cleared pt to go home, reports no si.hi, no visual or auditory hallucinations, aware of signs and symptoms to return for, friend at bedside vouched that pt ws not sucidial, given number for outpt follow up, reports will follow up.

## 2018-02-25 NOTE — ED BEHAVIORAL HEALTH ASSESSMENT NOTE - OTHER PAST PSYCHIATRIC HISTORY (INCLUDE DETAILS REGARDING ONSET, COURSE OF ILLNESS, INPATIENT/OUTPATIENT TREATMENT)
Pt has history of anxiety with one past psychiatric hospitalization more or less than a year ago at [facility] due to anxiety where she was treated for anxiety with Gabapentin and was discharged improved after a week and referred for followed-up at [facility].
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
## 2018-02-25 NOTE — ED BEHAVIORAL HEALTH ASSESSMENT NOTE - DESCRIPTION
denies any medical problem. pt is single never  currently with a 10 month daughter with live-in boyfriend. She lives with boyfriend and daughter currently unemployed.  She reported that she has no siblings and  parents are both . Pt was seen and evaluated by ER MD and upon medical clearance was referred for psychiatric consultation.  Pt did not require medication and was able to calm down and was cooperative. Pt states that she and her boyfriend has been having increasingly verbal argument due to stress from financial difficulties and having a 10 month old daughter.  Tonight they had a heated verbal argument and her boyfriend brought her to ER and she agrees because she claims that he threatened to call the  and report her suicidal if she doesn't . She was dropped off by her boyfriend who was planning to make a false report that she is suicidal so she became anxious and she decided to come to ER by herself.  Pt has been cooperative and calm since her arrival at ER up to present time.  She denies any severe depression, hopelessness or helplessness and denies any suicidal or homicidal ideation, plan or intent to die.  She reported that her reason to live is her 10 month old daughter.  Pt refused to have her boyfriend called to gather collateral information as per her right and instead she called a close friend who knows her for 20 years, Law Chou tel. no. [phone] who came to ER and has reported that he knows pt for the past 20 years as a very close friend and he could confirmed that pt has never been suicidal and currently not suicidal and she is safe to go home.  Pt feels safe to go home but she would stay with a friend tonight to avoid another argument with her boyfriend.  Pt does not pose any substantial risk of harm to self or others at this time and is psychiatrically cleared for discharge and refer back to [facility] for continuing follow-up care.
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
## 2018-02-25 NOTE — ED BEHAVIORAL HEALTH ASSESSMENT NOTE - AXIS IV
Problems with primary support/Problem related to social environment/Other psychosocial and environmental problems

## 2018-02-25 NOTE — ED BEHAVIORAL HEALTH ASSESSMENT NOTE - PATIENT'S CHIEF COMPLAINT
Pt states, "She feels anxious because her boyfriend is making a false report that she is suicidal but she is not."

## 2018-02-25 NOTE — ED PROVIDER NOTE - CARE PLAN
Assessment and plan of treatment:	Plan: EKG, Depakote level, psych cx, will continue to monitor and reassess. Principal Discharge DX:	Adjustment disorder  Assessment and plan of treatment:	Plan: EKG, Depakote level, psych cx, will continue to monitor and reassess.

## 2018-02-25 NOTE — ED BEHAVIORAL HEALTH ASSESSMENT NOTE - SUICIDE PROTECTIVE FACTORS
Identifies reasons for living/Future oriented/Other/Supportive social network or family/Responsibility to family and others

## 2018-03-03 ENCOUNTER — EMERGENCY (EMERGENCY)
Facility: HOSPITAL | Age: 38
LOS: 0 days | Discharge: HOME | End: 2018-03-03
Attending: EMERGENCY MEDICINE

## 2018-03-03 ENCOUNTER — EMERGENCY (EMERGENCY)
Facility: HOSPITAL | Age: 38
LOS: 0 days | Discharge: LEFT BEFORE DISCHARGE | End: 2018-03-03
Attending: EMERGENCY MEDICINE

## 2018-03-03 VITALS — SYSTOLIC BLOOD PRESSURE: 121 MMHG | RESPIRATION RATE: 18 BRPM | HEART RATE: 89 BPM | DIASTOLIC BLOOD PRESSURE: 92 MMHG

## 2018-03-03 VITALS
DIASTOLIC BLOOD PRESSURE: 80 MMHG | HEIGHT: 64 IN | RESPIRATION RATE: 18 BRPM | HEART RATE: 80 BPM | WEIGHT: 123.02 LBS | OXYGEN SATURATION: 99 % | TEMPERATURE: 96 F | SYSTOLIC BLOOD PRESSURE: 106 MMHG

## 2018-03-03 VITALS
HEIGHT: 64 IN | SYSTOLIC BLOOD PRESSURE: 131 MMHG | TEMPERATURE: 98 F | RESPIRATION RATE: 18 BRPM | OXYGEN SATURATION: 100 % | WEIGHT: 126.99 LBS | DIASTOLIC BLOOD PRESSURE: 73 MMHG | HEART RATE: 80 BPM

## 2018-03-03 DIAGNOSIS — F41.9 ANXIETY DISORDER, UNSPECIFIED: ICD-10-CM

## 2018-03-03 DIAGNOSIS — F10.10 ALCOHOL ABUSE, UNCOMPLICATED: ICD-10-CM

## 2018-03-03 DIAGNOSIS — Z79.899 OTHER LONG TERM (CURRENT) DRUG THERAPY: ICD-10-CM

## 2018-03-03 DIAGNOSIS — F17.200 NICOTINE DEPENDENCE, UNSPECIFIED, UNCOMPLICATED: ICD-10-CM

## 2018-03-03 DIAGNOSIS — F41.8 OTHER SPECIFIED ANXIETY DISORDERS: ICD-10-CM

## 2018-03-03 LAB
ALBUMIN SERPL ELPH-MCNC: 4.6 G/DL — SIGNIFICANT CHANGE UP (ref 3–5.5)
ALP SERPL-CCNC: 66 U/L — SIGNIFICANT CHANGE UP (ref 30–115)
ALT FLD-CCNC: 15 U/L — SIGNIFICANT CHANGE UP (ref 0–41)
ANION GAP SERPL CALC-SCNC: 12 MMOL/L — SIGNIFICANT CHANGE UP (ref 7–14)
APAP SERPL-MCNC: <10 UG/ML — SIGNIFICANT CHANGE UP (ref 10–30)
APPEARANCE UR: CLEAR — SIGNIFICANT CHANGE UP
AST SERPL-CCNC: 19 U/L — SIGNIFICANT CHANGE UP (ref 0–41)
BASOPHILS # BLD AUTO: 0.08 K/UL — SIGNIFICANT CHANGE UP (ref 0–0.2)
BASOPHILS NFR BLD AUTO: 0.7 % — SIGNIFICANT CHANGE UP (ref 0–1)
BILIRUB SERPL-MCNC: 0.9 MG/DL — SIGNIFICANT CHANGE UP (ref 0.2–1.2)
BILIRUB UR-MCNC: NEGATIVE — SIGNIFICANT CHANGE UP
BUN SERPL-MCNC: 8 MG/DL — LOW (ref 10–20)
CALCIUM SERPL-MCNC: 9.1 MG/DL — SIGNIFICANT CHANGE UP (ref 8.5–10.1)
CHLORIDE SERPL-SCNC: 101 MMOL/L — SIGNIFICANT CHANGE UP (ref 98–110)
CO2 SERPL-SCNC: 26 MMOL/L — SIGNIFICANT CHANGE UP (ref 17–32)
COLOR SPEC: YELLOW — SIGNIFICANT CHANGE UP
CREAT SERPL-MCNC: 1.2 MG/DL — SIGNIFICANT CHANGE UP (ref 0.7–1.5)
DIFF PNL FLD: NEGATIVE — SIGNIFICANT CHANGE UP
EOSINOPHIL # BLD AUTO: 0.01 K/UL — SIGNIFICANT CHANGE UP (ref 0–0.7)
EOSINOPHIL NFR BLD AUTO: 0.1 % — SIGNIFICANT CHANGE UP (ref 0–8)
ETHANOL SERPL-MCNC: <5 MG/DL — SIGNIFICANT CHANGE UP
GLUCOSE SERPL-MCNC: 109 MG/DL — SIGNIFICANT CHANGE UP (ref 70–110)
GLUCOSE UR QL: NEGATIVE MG/DL — SIGNIFICANT CHANGE UP
HCT VFR BLD CALC: 45.2 % — SIGNIFICANT CHANGE UP (ref 37–47)
HGB BLD-MCNC: 15.3 G/DL — SIGNIFICANT CHANGE UP (ref 12–16)
IMM GRANULOCYTES NFR BLD AUTO: 0.4 % — HIGH (ref 0.1–0.3)
KETONES UR-MCNC: NEGATIVE — SIGNIFICANT CHANGE UP
LEUKOCYTE ESTERASE UR-ACNC: NEGATIVE — SIGNIFICANT CHANGE UP
LYMPHOCYTES # BLD AUTO: 19.1 % — LOW (ref 20.5–51.1)
LYMPHOCYTES # BLD AUTO: 2.04 K/UL — SIGNIFICANT CHANGE UP (ref 1.2–3.4)
MCHC RBC-ENTMCNC: 29.8 PG — SIGNIFICANT CHANGE UP (ref 27–31)
MCHC RBC-ENTMCNC: 33.8 G/DL — SIGNIFICANT CHANGE UP (ref 32–37)
MCV RBC AUTO: 87.9 FL — SIGNIFICANT CHANGE UP (ref 81–99)
MONOCYTES # BLD AUTO: 0.7 K/UL — HIGH (ref 0.1–0.6)
MONOCYTES NFR BLD AUTO: 6.6 % — SIGNIFICANT CHANGE UP (ref 1.7–9.3)
NEUTROPHILS # BLD AUTO: 7.8 K/UL — HIGH (ref 1.4–6.5)
NEUTROPHILS NFR BLD AUTO: 73.1 % — SIGNIFICANT CHANGE UP (ref 42.2–75.2)
NITRITE UR-MCNC: NEGATIVE — SIGNIFICANT CHANGE UP
NRBC # BLD: 0 /100 WBCS — SIGNIFICANT CHANGE UP (ref 0–0)
PH UR: 6.5 — SIGNIFICANT CHANGE UP (ref 5–8)
PLATELET # BLD AUTO: 409 K/UL — HIGH (ref 130–400)
POTASSIUM SERPL-MCNC: 3.7 MMOL/L — SIGNIFICANT CHANGE UP (ref 3.5–5)
POTASSIUM SERPL-SCNC: 3.7 MMOL/L — SIGNIFICANT CHANGE UP (ref 3.5–5)
PROT SERPL-MCNC: 7.8 G/DL — SIGNIFICANT CHANGE UP (ref 6–8)
PROT UR-MCNC: NEGATIVE MG/DL — SIGNIFICANT CHANGE UP
RBC # BLD: 5.14 M/UL — SIGNIFICANT CHANGE UP (ref 4.2–5.4)
RBC # FLD: 13.3 % — SIGNIFICANT CHANGE UP (ref 11.5–14.5)
SALICYLATES SERPL-MCNC: <4 MG/DL — SIGNIFICANT CHANGE UP (ref 4–30)
SODIUM SERPL-SCNC: 139 MMOL/L — SIGNIFICANT CHANGE UP (ref 135–146)
SP GR SPEC: 1.02 — SIGNIFICANT CHANGE UP (ref 1.01–1.03)
UROBILINOGEN FLD QL: 0.2 MG/DL — SIGNIFICANT CHANGE UP (ref 0.2–0.2)
WBC # BLD: 10.67 K/UL — SIGNIFICANT CHANGE UP (ref 4.8–10.8)
WBC # FLD AUTO: 10.67 K/UL — SIGNIFICANT CHANGE UP (ref 4.8–10.8)

## 2018-03-03 RX ORDER — HYDROXYZINE HCL 10 MG
25 TABLET ORAL ONCE
Qty: 0 | Refills: 0 | Status: COMPLETED | OUTPATIENT
Start: 2018-03-03 | End: 2018-03-03

## 2018-03-03 RX ADMIN — Medication 25 MILLIGRAM(S): at 11:45

## 2018-03-03 RX ADMIN — Medication 2 MILLIGRAM(S): at 13:04

## 2018-03-03 NOTE — ED BEHAVIORAL HEALTH ASSESSMENT NOTE - HPI (INCLUDE ILLNESS QUALITY, SEVERITY, DURATION, TIMING, CONTEXT, MODIFYING FACTORS, ASSOCIATED SIGNS AND SYMPTOMS)
37 year old single  female  who  was recently discharged from IPP at [facility]  , stopped taking her medications , Depakote ,Gabapentin  ,  and never followed up by  the psychiatrist,  has had a few ER visits  since then. Today , pt came to ER by her  self , c/o severe  anxiety associated with  panic attacks.  Pt states that she feels anxious , depress at times , is a poor historian ,  guarded , evasive , denies  hearing  voices ,  denies suicidal /homicidal ideations intent or plan . No overt manic symptoms. denies  sleep appetite problem.   she denies  using drugs  or Alcohol.

## 2018-03-03 NOTE — ED ADULT NURSE NOTE - NSELOPED_ED_ALL_ED
Patient and/or family announced that they are leaving. They were advised to stay, advised to return if worse./Physician notified

## 2018-03-03 NOTE — ED BEHAVIORAL HEALTH ASSESSMENT NOTE - SUMMARY
37 year old  single female with h/o anxiety , recently  d/c form IPP , non compliancy with medications and after care follow up , presents with  anxiety and panic attacks  . No overt psychosis, and stefan .  She denies suicidal  /homicidal ideations . She agreed to  follow up with psychiatrist

## 2018-03-03 NOTE — ED PROVIDER NOTE - CARE PLAN
Principal Discharge DX:	Anxiety  Secondary Diagnosis:	Depression  Secondary Diagnosis:	Substance abuse

## 2018-03-03 NOTE — ED PROVIDER NOTE - OBJECTIVE STATEMENT
38y/o F, h/o anxiety and depression, presents requesting detox. Pt was seen at the ED pta for anxiety. Pt notes relief with ativan given in the ED. Pt reports her last drink was one beer yesterday. pt states she typically drinks 1-2 beers a day. pt reports this is the first time she requesting detox. Denies CP, SOB, HI, SI, hallucinations.

## 2018-03-03 NOTE — ED PROVIDER NOTE - PROGRESS NOTE DETAILS
spoke with Dr. Morris (psych). aware of patient. will come to evaluate. spoke with Dr. Morris (psych). aware of patient. will come to evaluate.feels  better after receiving Atarax Spoke with Dr. Troy. states pt is very anxious-requesting to give 2mg ativan. pt refusing admission, states she will follow up outpatient. 38 yo female, PMH of CHACHO and depression, presents to the ED for anxiety attack. Patient reports she did not have specific trigger for anxiety attack, so decided to come to ED. Reports at this time she is feeling a bit better. Exam: Well appearing, NAD, S1S2, RRR, lungs CTA b/l, abdomen is soft NT/ND, A&Ox3, strength and sensation is intact throughout. Plan: Psych c/s, Vistaril. PA fellow note reviewed and agree, Patient evaluated by psych and cleared

## 2018-03-03 NOTE — ED BEHAVIORAL HEALTH ASSESSMENT NOTE - DESCRIPTION
cooperative. Ativan 2 mg po  given to address the anxiety none single ,  unemployed ,  living with her friend

## 2018-03-03 NOTE — ED PROVIDER NOTE - PROGRESS NOTE DETAILS
38 yo female, PMH of CHACHO, presents to the ED requesting in-patient psychiatry. Patient was seen today in ED for anxiety attack, during that visit she expressed she felt well enough to be discharged. Patient also stating she would want detox from EtOH as well, states she drinks a few beers per day. No other requests at this time. Exam: S1S2, RRR, lungs CTA b/l, abdomen soft NT/ND, A&Ox3. Plan: Psych c/s. spoke with Dr. Troy (psych). requesting blood work

## 2018-03-05 ENCOUNTER — EMERGENCY (EMERGENCY)
Facility: HOSPITAL | Age: 38
LOS: 0 days | Discharge: HOME | End: 2018-03-05
Attending: EMERGENCY MEDICINE

## 2018-03-05 DIAGNOSIS — Z02.9 ENCOUNTER FOR ADMINISTRATIVE EXAMINATIONS, UNSPECIFIED: ICD-10-CM

## 2018-03-06 ENCOUNTER — EMERGENCY (EMERGENCY)
Facility: HOSPITAL | Age: 38
LOS: 1 days | Discharge: HOME | End: 2018-03-06

## 2018-03-06 ENCOUNTER — EMERGENCY (EMERGENCY)
Facility: HOSPITAL | Age: 38
LOS: 0 days | Discharge: HOME | End: 2018-03-07
Attending: EMERGENCY MEDICINE

## 2018-03-06 VITALS
SYSTOLIC BLOOD PRESSURE: 113 MMHG | RESPIRATION RATE: 20 BRPM | TEMPERATURE: 98 F | DIASTOLIC BLOOD PRESSURE: 80 MMHG | HEIGHT: 64 IN | OXYGEN SATURATION: 99 % | WEIGHT: 123.9 LBS | HEART RATE: 110 BPM

## 2018-03-06 VITALS — WEIGHT: 149.91 LBS | HEIGHT: 69 IN

## 2018-03-06 DIAGNOSIS — R45.1 RESTLESSNESS AND AGITATION: ICD-10-CM

## 2018-03-06 DIAGNOSIS — Z79.899 OTHER LONG TERM (CURRENT) DRUG THERAPY: ICD-10-CM

## 2018-03-06 DIAGNOSIS — R46.2 STRANGE AND INEXPLICABLE BEHAVIOR: ICD-10-CM

## 2018-03-06 DIAGNOSIS — F17.200 NICOTINE DEPENDENCE, UNSPECIFIED, UNCOMPLICATED: ICD-10-CM

## 2018-03-06 DIAGNOSIS — F41.9 ANXIETY DISORDER, UNSPECIFIED: ICD-10-CM

## 2018-03-06 DIAGNOSIS — Z02.9 ENCOUNTER FOR ADMINISTRATIVE EXAMINATIONS, UNSPECIFIED: ICD-10-CM

## 2018-03-06 LAB
ANION GAP SERPL CALC-SCNC: 9 MMOL/L — SIGNIFICANT CHANGE UP (ref 7–14)
APAP SERPL-MCNC: <10 UG/ML — SIGNIFICANT CHANGE UP (ref 10–30)
BASOPHILS # BLD AUTO: 0.1 K/UL — SIGNIFICANT CHANGE UP (ref 0–0.2)
BASOPHILS NFR BLD AUTO: 0.9 % — SIGNIFICANT CHANGE UP (ref 0–1)
BUN SERPL-MCNC: 8 MG/DL — LOW (ref 10–20)
CALCIUM SERPL-MCNC: 9.5 MG/DL — SIGNIFICANT CHANGE UP (ref 8.5–10.1)
CHLORIDE SERPL-SCNC: 108 MMOL/L — SIGNIFICANT CHANGE UP (ref 98–110)
CHOLEST SERPL-MCNC: 165 MG/DL — SIGNIFICANT CHANGE UP (ref 100–200)
CO2 SERPL-SCNC: 26 MMOL/L — SIGNIFICANT CHANGE UP (ref 17–32)
CREAT SERPL-MCNC: 0.7 MG/DL — SIGNIFICANT CHANGE UP (ref 0.7–1.5)
EOSINOPHIL # BLD AUTO: 0.3 K/UL — SIGNIFICANT CHANGE UP (ref 0–0.7)
EOSINOPHIL NFR BLD AUTO: 2.7 % — SIGNIFICANT CHANGE UP (ref 0–8)
ETHANOL SERPL-MCNC: 177 MG/DL — HIGH
GLUCOSE SERPL-MCNC: 92 MG/DL — SIGNIFICANT CHANGE UP (ref 70–110)
HCT VFR BLD CALC: 47.3 % — HIGH (ref 37–47)
HDLC SERPL-MCNC: 59 MG/DL — SIGNIFICANT CHANGE UP (ref 40–60)
HGB BLD-MCNC: 15.7 G/DL — SIGNIFICANT CHANGE UP (ref 12–16)
IMM GRANULOCYTES NFR BLD AUTO: 0.6 % — HIGH (ref 0.1–0.3)
LIPID PNL WITH DIRECT LDL SERPL: 85 MG/DL — SIGNIFICANT CHANGE UP (ref 50–100)
LYMPHOCYTES # BLD AUTO: 19.2 % — LOW (ref 20.5–51.1)
LYMPHOCYTES # BLD AUTO: 2.17 K/UL — SIGNIFICANT CHANGE UP (ref 1.2–3.4)
MCHC RBC-ENTMCNC: 29.7 PG — SIGNIFICANT CHANGE UP (ref 27–31)
MCHC RBC-ENTMCNC: 33.2 G/DL — SIGNIFICANT CHANGE UP (ref 32–37)
MCV RBC AUTO: 89.4 FL — SIGNIFICANT CHANGE UP (ref 81–99)
MONOCYTES # BLD AUTO: 1.18 K/UL — HIGH (ref 0.1–0.6)
MONOCYTES NFR BLD AUTO: 10.5 % — HIGH (ref 1.7–9.3)
NEUTROPHILS # BLD AUTO: 7.46 K/UL — HIGH (ref 1.4–6.5)
NEUTROPHILS NFR BLD AUTO: 66.1 % — SIGNIFICANT CHANGE UP (ref 42.2–75.2)
NRBC # BLD: 0 /100 WBCS — SIGNIFICANT CHANGE UP (ref 0–0)
PLATELET # BLD AUTO: 367 K/UL — SIGNIFICANT CHANGE UP (ref 130–400)
POTASSIUM SERPL-MCNC: 4.6 MMOL/L — SIGNIFICANT CHANGE UP (ref 3.5–5)
POTASSIUM SERPL-SCNC: 4.6 MMOL/L — SIGNIFICANT CHANGE UP (ref 3.5–5)
RBC # BLD: 5.29 M/UL — SIGNIFICANT CHANGE UP (ref 4.2–5.4)
RBC # FLD: 14 % — SIGNIFICANT CHANGE UP (ref 11.5–14.5)
SALICYLATES SERPL-MCNC: <4 MG/DL — SIGNIFICANT CHANGE UP (ref 4–30)
SODIUM SERPL-SCNC: 143 MMOL/L — SIGNIFICANT CHANGE UP (ref 135–146)
TOTAL CHOLESTEROL/HDL RATIO MEASUREMENT: 2.8 RATIO — LOW (ref 4–5.5)
TRIGL SERPL-MCNC: 115 MG/DL — SIGNIFICANT CHANGE UP (ref 40–150)
WBC # BLD: 11.28 K/UL — HIGH (ref 4.8–10.8)
WBC # FLD AUTO: 11.28 K/UL — HIGH (ref 4.8–10.8)

## 2018-03-06 RX ORDER — DIPHENHYDRAMINE HCL 50 MG
50 CAPSULE ORAL ONCE
Qty: 0 | Refills: 0 | Status: COMPLETED | OUTPATIENT
Start: 2018-03-06 | End: 2018-03-06

## 2018-03-06 RX ORDER — HALOPERIDOL DECANOATE 100 MG/ML
5 INJECTION INTRAMUSCULAR ONCE
Qty: 0 | Refills: 0 | Status: COMPLETED | OUTPATIENT
Start: 2018-03-06 | End: 2018-03-06

## 2018-03-06 RX ORDER — HALOPERIDOL DECANOATE 100 MG/ML
5 INJECTION INTRAMUSCULAR ONCE
Qty: 0 | Refills: 0 | Status: DISCONTINUED | OUTPATIENT
Start: 2018-03-06 | End: 2018-03-07

## 2018-03-06 RX ADMIN — HALOPERIDOL DECANOATE 5 MILLIGRAM(S): 100 INJECTION INTRAMUSCULAR at 21:10

## 2018-03-06 RX ADMIN — Medication 50 MILLIGRAM(S): at 21:09

## 2018-03-06 RX ADMIN — Medication 2 MILLIGRAM(S): at 21:10

## 2018-03-06 NOTE — ED PROVIDER NOTE - MEDICAL DECISION MAKING DETAILS
Patient seen and evaluated   She was given iv sedation   I will continue to monitor at this time   signed out to Dr. Matta upon shift change

## 2018-03-06 NOTE — ED PROVIDER NOTE - ATTENDING CONTRIBUTION TO CARE
Curlyt known to ED, extensive psychiatric and polysubstance abuse history, here for assessment by RD as an EDP -- patient was found in public intoxicated. Will require sedation in order to obtain labs, complete assessment.

## 2018-03-06 NOTE — ED PROVIDER NOTE - MUSCULOSKELETAL, MLM
Spine appears normal, range of motion is not limited, no muscle or joint tenderness No external signs of trauma. FROM throughout.

## 2018-03-06 NOTE — ED PROVIDER NOTE - PROGRESS NOTE DETAILS
Patient brought in by EMS/[employer] extremely agitated, aggressive, spitting, intoxicated -- will sedate, check labs, when more awake will consult psych Patient seen by psych but is still to intoxicated/sedated, will continue to observe Patient occasionally wakes up but is not ready for psych eval. Of note, labs without significant abnormalities. Dr. Granados again attempted to speak with patient, she is not providing him any information, he did not wish to continue and will sign the case out to AM psych team for reassessment. Case endorsed to Dr. Matta Patient sleeping, awaiting psych eval Patient cleared for discharge by Dr. Delgado. Patient alert, walkign with steady gait. Will discharge

## 2018-03-06 NOTE — ED ADULT NURSE NOTE - CHIEF COMPLAINT QUOTE
mental health eval.  BIBA accompanied by RD.  Pt was screaming in long-term cell and speaking incoherently

## 2018-03-06 NOTE — ED ADULT TRIAGE NOTE - CHIEF COMPLAINT QUOTE
mental health eval.  BIBA accompanied by RD.  Pt was screaming in correction cell and speaking incoherently

## 2018-03-06 NOTE — ED PROVIDER NOTE - CONSTITUTIONAL, MLM
normal... Well appearing, well nourished, awake, alert, oriented to person, place, time/situation and in no apparent distress.  female, aggressive, agitated, AOB.

## 2018-03-07 VITALS — HEART RATE: 86 BPM | DIASTOLIC BLOOD PRESSURE: 81 MMHG | TEMPERATURE: 97 F | SYSTOLIC BLOOD PRESSURE: 127 MMHG

## 2018-03-07 LAB — HCG SERPL-ACNC: <0.6 MIU/ML — SIGNIFICANT CHANGE UP (ref 0–5)

## 2018-03-07 NOTE — CONSULT NOTE ADULT - ASSESSMENT
alcohol intoxication and poly substance dependence     no need for ipp  cleared to be released to [facility] custody upon medical discharge.  recommend f/u by correctional health services for addiction problem.
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
## 2018-03-07 NOTE — ED ADULT NURSE REASSESSMENT NOTE - NS ED NURSE REASSESS COMMENT FT1
Pt asleep at this time w/ intermittent periods of agitation/ aggression/ combativeness. Pt made comfortable; pants/ shoes removed; gown/socks applied. Pt offered bedpan/ juice/ sandwich. Restraints in place to upper/lower extremities; skin intact; adequate spacing provided to restraint placement. 1:1 OBSERVATION and P.O present at bedside. Will continue to monitor.

## 2018-03-08 ENCOUNTER — EMERGENCY (EMERGENCY)
Facility: HOSPITAL | Age: 38
LOS: 0 days | Discharge: HOME | End: 2018-03-08

## 2018-03-08 DIAGNOSIS — Z02.9 ENCOUNTER FOR ADMINISTRATIVE EXAMINATIONS, UNSPECIFIED: ICD-10-CM

## 2018-03-11 ENCOUNTER — EMERGENCY (EMERGENCY)
Facility: HOSPITAL | Age: 38
LOS: 0 days | Discharge: HOME | End: 2018-03-11

## 2018-03-11 VITALS
TEMPERATURE: 97 F | OXYGEN SATURATION: 99 % | HEART RATE: 110 BPM | RESPIRATION RATE: 18 BRPM | DIASTOLIC BLOOD PRESSURE: 78 MMHG | SYSTOLIC BLOOD PRESSURE: 127 MMHG

## 2018-03-11 DIAGNOSIS — M54.5 LOW BACK PAIN: ICD-10-CM

## 2018-03-11 DIAGNOSIS — F41.8 OTHER SPECIFIED ANXIETY DISORDERS: ICD-10-CM

## 2018-03-11 DIAGNOSIS — Z79.899 OTHER LONG TERM (CURRENT) DRUG THERAPY: ICD-10-CM

## 2018-03-11 DIAGNOSIS — R00.0 TACHYCARDIA, UNSPECIFIED: ICD-10-CM

## 2018-03-11 DIAGNOSIS — F11.23 OPIOID DEPENDENCE WITH WITHDRAWAL: ICD-10-CM

## 2018-03-11 DIAGNOSIS — F17.200 NICOTINE DEPENDENCE, UNSPECIFIED, UNCOMPLICATED: ICD-10-CM

## 2018-03-11 LAB
HAV IGM SER-ACNC: SIGNIFICANT CHANGE UP
HBV CORE AB SER-ACNC: SIGNIFICANT CHANGE UP
HBV CORE IGM SER-ACNC: SIGNIFICANT CHANGE UP
HBV SURFACE AB SER-ACNC: <3 MIU/ML — LOW
HBV SURFACE AG SER-ACNC: SIGNIFICANT CHANGE UP
HBV SURFACE AG SER-ACNC: SIGNIFICANT CHANGE UP
HCV AB S/CO SERPL IA: 0.12 S/CO — SIGNIFICANT CHANGE UP
HCV AB SERPL-IMP: SIGNIFICANT CHANGE UP
T PALLIDUM AB TITR SER: NEGATIVE — SIGNIFICANT CHANGE UP

## 2018-03-11 RX ORDER — KETOROLAC TROMETHAMINE 30 MG/ML
30 SYRINGE (ML) INJECTION ONCE
Qty: 0 | Refills: 0 | Status: DISCONTINUED | OUTPATIENT
Start: 2018-03-11 | End: 2018-03-11

## 2018-03-11 RX ORDER — METHOCARBAMOL 500 MG/1
1000 TABLET, FILM COATED ORAL ONCE
Qty: 0 | Refills: 0 | Status: COMPLETED | OUTPATIENT
Start: 2018-03-11 | End: 2018-03-11

## 2018-03-11 RX ADMIN — Medication 30 MILLIGRAM(S): at 04:07

## 2018-03-11 RX ADMIN — METHOCARBAMOL 1000 MILLIGRAM(S): 500 TABLET, FILM COATED ORAL at 04:07

## 2018-03-11 NOTE — ED PROVIDER NOTE - OBJECTIVE STATEMENT
38 yo female hx of substance abuse/Depression and Anxiety present c/o low back pain. reports back pain for few months. felt pain tonight so she comes to ED for evaluation.   Denies radiation pain/incontinence/numbness/saddle parathesia/legs weakness and difficulty on ambulation. denies fever/chill/HA/dizziness/chest pain/sob/abd pain/n/v/d/ urinary sxs   as per patient's record, she was seen in [facility] over the past 10 days for alcohol detox and psych evaluation, didn't mention about back pain. patient also has hx of opioid abuse.

## 2018-03-11 NOTE — ED PROVIDER NOTE - CARDIAC, MLM
+ mild tachycardia. Normal rate, regular rhythm.  Heart sounds S1, S2.  No murmurs, rubs or gallops.

## 2018-03-11 NOTE — ED PROVIDER NOTE - PROGRESS NOTE DETAILS
patient denies drug use and denies withdrawal. request stronger pain med. patient's sxs are likely related to opioid withdrawal.

## 2018-03-11 NOTE — ED PROVIDER NOTE - MUSCULOSKELETAL, MLM
no midline tenderness. Spine appears normal, range of motion is not limited, no muscle or joint tenderness

## 2018-03-12 ENCOUNTER — EMERGENCY (EMERGENCY)
Facility: HOSPITAL | Age: 38
LOS: 0 days | Discharge: HOME | End: 2018-03-12

## 2018-03-12 ENCOUNTER — EMERGENCY (EMERGENCY)
Facility: HOSPITAL | Age: 38
LOS: 0 days | Discharge: HOME | End: 2018-03-12
Attending: EMERGENCY MEDICINE

## 2018-03-12 ENCOUNTER — EMERGENCY (EMERGENCY)
Facility: HOSPITAL | Age: 38
LOS: 0 days | Discharge: LEFT BEFORE DISCHARGE | End: 2018-03-12
Attending: EMERGENCY MEDICINE

## 2018-03-12 VITALS
OXYGEN SATURATION: 100 % | TEMPERATURE: 98 F | DIASTOLIC BLOOD PRESSURE: 84 MMHG | HEART RATE: 84 BPM | SYSTOLIC BLOOD PRESSURE: 128 MMHG | RESPIRATION RATE: 18 BRPM

## 2018-03-12 VITALS
OXYGEN SATURATION: 98 % | SYSTOLIC BLOOD PRESSURE: 137 MMHG | HEIGHT: 64 IN | TEMPERATURE: 98 F | HEART RATE: 106 BPM | DIASTOLIC BLOOD PRESSURE: 85 MMHG | WEIGHT: 123.02 LBS | RESPIRATION RATE: 18 BRPM

## 2018-03-12 VITALS
DIASTOLIC BLOOD PRESSURE: 81 MMHG | TEMPERATURE: 97 F | SYSTOLIC BLOOD PRESSURE: 139 MMHG | HEIGHT: 64 IN | RESPIRATION RATE: 20 BRPM | HEART RATE: 103 BPM | WEIGHT: 123.02 LBS | OXYGEN SATURATION: 99 %

## 2018-03-12 DIAGNOSIS — F32.9 MAJOR DEPRESSIVE DISORDER, SINGLE EPISODE, UNSPECIFIED: ICD-10-CM

## 2018-03-12 DIAGNOSIS — F41.9 ANXIETY DISORDER, UNSPECIFIED: ICD-10-CM

## 2018-03-12 DIAGNOSIS — Z79.899 OTHER LONG TERM (CURRENT) DRUG THERAPY: ICD-10-CM

## 2018-03-12 DIAGNOSIS — F17.200 NICOTINE DEPENDENCE, UNSPECIFIED, UNCOMPLICATED: ICD-10-CM

## 2018-03-12 DIAGNOSIS — M79.672 PAIN IN LEFT FOOT: ICD-10-CM

## 2018-03-12 DIAGNOSIS — B35.3 TINEA PEDIS: ICD-10-CM

## 2018-03-12 DIAGNOSIS — M79.671 PAIN IN RIGHT FOOT: ICD-10-CM

## 2018-03-12 DIAGNOSIS — F10.129 ALCOHOL ABUSE WITH INTOXICATION, UNSPECIFIED: ICD-10-CM

## 2018-03-12 DIAGNOSIS — F41.8 OTHER SPECIFIED ANXIETY DISORDERS: ICD-10-CM

## 2018-03-12 LAB
ALBUMIN SERPL ELPH-MCNC: 4 G/DL — SIGNIFICANT CHANGE UP (ref 3–5.5)
ALP SERPL-CCNC: 72 U/L — SIGNIFICANT CHANGE UP (ref 30–115)
ALT FLD-CCNC: 20 U/L — SIGNIFICANT CHANGE UP (ref 0–41)
AMMONIA BLD-MCNC: 26 MMOL/L — SIGNIFICANT CHANGE UP (ref 11–35)
AMYLASE P1 CFR SERPL: 60 U/L — SIGNIFICANT CHANGE UP (ref 25–115)
ANION GAP SERPL CALC-SCNC: 9 MMOL/L — SIGNIFICANT CHANGE UP (ref 7–14)
APAP SERPL-MCNC: <10 UG/ML — SIGNIFICANT CHANGE UP (ref 10–30)
APTT BLD: 29.3 SEC — SIGNIFICANT CHANGE UP (ref 27–39.2)
AST SERPL-CCNC: 17 U/L — SIGNIFICANT CHANGE UP (ref 0–41)
BILIRUB SERPL-MCNC: 0.5 MG/DL — SIGNIFICANT CHANGE UP (ref 0.2–1.2)
BUN SERPL-MCNC: 10 MG/DL — SIGNIFICANT CHANGE UP (ref 10–20)
CALCIUM SERPL-MCNC: 9.5 MG/DL — SIGNIFICANT CHANGE UP (ref 8.5–10.1)
CHLORIDE SERPL-SCNC: 103 MMOL/L — SIGNIFICANT CHANGE UP (ref 98–110)
CO2 SERPL-SCNC: 25 MMOL/L — SIGNIFICANT CHANGE UP (ref 17–32)
CREAT SERPL-MCNC: 0.7 MG/DL — SIGNIFICANT CHANGE UP (ref 0.7–1.5)
ETHANOL SERPL-MCNC: <5 MG/DL — HIGH
GLUCOSE SERPL-MCNC: 94 MG/DL — SIGNIFICANT CHANGE UP (ref 70–110)
HCT VFR BLD CALC: 45.9 % — SIGNIFICANT CHANGE UP (ref 37–47)
HGB BLD-MCNC: 15.3 G/DL — SIGNIFICANT CHANGE UP (ref 12–16)
INR BLD: 0.91 RATIO — SIGNIFICANT CHANGE UP (ref 0.65–1.3)
MAGNESIUM SERPL-MCNC: 2.3 MG/DL — SIGNIFICANT CHANGE UP (ref 1.8–2.4)
MCHC RBC-ENTMCNC: 30.1 PG — SIGNIFICANT CHANGE UP (ref 27–31)
MCHC RBC-ENTMCNC: 33.3 G/DL — SIGNIFICANT CHANGE UP (ref 32–37)
MCV RBC AUTO: 90.4 FL — SIGNIFICANT CHANGE UP (ref 81–99)
NRBC # BLD: 0 /100 WBCS — SIGNIFICANT CHANGE UP (ref 0–0)
PLATELET # BLD AUTO: 381 K/UL — SIGNIFICANT CHANGE UP (ref 130–400)
POTASSIUM SERPL-MCNC: 4.6 MMOL/L — SIGNIFICANT CHANGE UP (ref 3.5–5)
POTASSIUM SERPL-SCNC: 4.6 MMOL/L — SIGNIFICANT CHANGE UP (ref 3.5–5)
PROT SERPL-MCNC: 7.1 G/DL — SIGNIFICANT CHANGE UP (ref 6–8)
PROTHROM AB SERPL-ACNC: 9.8 SEC — LOW (ref 9.95–12.87)
RBC # BLD: 5.08 M/UL — SIGNIFICANT CHANGE UP (ref 4.2–5.4)
RBC # FLD: 14.1 % — SIGNIFICANT CHANGE UP (ref 11.5–14.5)
SALICYLATES SERPL-MCNC: <4 MG/DL — SIGNIFICANT CHANGE UP (ref 4–30)
SODIUM SERPL-SCNC: 137 MMOL/L — SIGNIFICANT CHANGE UP (ref 135–146)
WBC # BLD: 11.77 K/UL — HIGH (ref 4.8–10.8)
WBC # FLD AUTO: 11.77 K/UL — HIGH (ref 4.8–10.8)

## 2018-03-12 RX ORDER — IBUPROFEN 200 MG
600 TABLET ORAL ONCE
Qty: 0 | Refills: 0 | Status: COMPLETED | OUTPATIENT
Start: 2018-03-12 | End: 2018-03-12

## 2018-03-12 RX ADMIN — Medication 600 MILLIGRAM(S): at 06:21

## 2018-03-12 NOTE — ED PROVIDER NOTE - ATTENDING CONTRIBUTION TO CARE
request detox from alcohol.  co use of Ativan.  c/o anxiety, but denies other medical c/o.  VS noted.  will admit to [facility].

## 2018-03-12 NOTE — ED PROVIDER NOTE - SKIN, MLM
+ scatter erythema noted over the bony prominence of b/l feet. no bleeding and abrasion. + tinea pedis noted.

## 2018-03-12 NOTE — ED PROVIDER NOTE - MEDICAL DECISION MAKING DETAILS
Pt left without being seen.  I went to the patient location multiple times but pt is not in the ED.  I spoke to RN who states pt walked out of the ED.  Pt is NOT in her assigned locations.  Pt left without being seen.

## 2018-03-12 NOTE — ED ADULT NURSE NOTE - NSELOPED_ED_ALL_ED
Patient's chart was referred to charge nurse/supervisor for disposition of prescription and/or discharge instructions./Patient and/or family announced that they are leaving. They were advised to stay, advised to return if worse./Physician notified

## 2018-03-12 NOTE — ED PROVIDER NOTE - OBJECTIVE STATEMENT
36 yo female hx of anxiety/depression and substance abuse 2/2 b/l foot pain. denies injury and trauma. reports she is walking around without socks in her leather boot. walking/movement worsen the pain. denies bleeding.   denies weakness and numbness to feet.

## 2018-03-12 NOTE — ED PROVIDER NOTE - NS ED ROS FT
Review of Systems    Constitutional: (-) fever  Eyes/ENT: (-) blurry vision, (-) epistaxis  Cardiovascular: (-) chest pain, (-) syncope  Respiratory: (-) cough, (-) shortness of breath  Gastrointestinal: (-) vomiting, (-) diarrhea  Musculoskeletal: (-) neck pain, (-) back pain  Integumentary: (-) rash, (-) edema  Neurological: (-) headache, (-) altered mental status  Psychiatric: (-) hallucinations

## 2018-03-12 NOTE — ED PROVIDER NOTE - PHYSICAL EXAMINATION
PHYSICAL EXAM:    GENERAL: Alert, appears stated age, well appearing, non-toxic  SKIN: Warm, pink and dry. MMM.   EYE: Normal lids/conjunctiva  ENT: Normal hearing, patent oropharynx  NECK: +supple. No meningismus  Pulm: Bilateral BS, normal resp effort, no wheezes, stridor, or retractions  CV: RRR, no M/R/G, 2+ pulses throughout  Abd: soft, non-tender, non-distended  Mskel: no erythema, cyanosis, edema  Neuro: AAOx3, no sensory/motor deficits,  No speech slurring, pronator drift, facial asymmetry. b/l. 5/5 strength throughout. normal gait.

## 2018-03-12 NOTE — ED PROVIDER NOTE - PROGRESS NOTE DETAILS
will admit to detox. pt now says she does not want detox. requests to be discharged. Counseled on red flags and to return for them. Counseled on importance of follow up. Patient repeats back instructions. Patient advised that they or their doctor may call [phone] to follow up on the specific results of the tests performed today in the emergency department.   Patient appears well on discharge.

## 2018-03-12 NOTE — ED ADULT TRIAGE NOTE - CHIEF COMPLAINT QUOTE
Patient states "I am having anxiety, I need something to calm down". Patient denies suicidal/homicidal ideations.

## 2018-03-12 NOTE — ED PROVIDER NOTE - CONDUCTED A DETAILED DISCUSSION WITH PATIENT AND/OR GUARDIAN REGARDING, MDM
need to admit return to ED if symptoms worsen, persist or questions arise/lab results/need for outpatient follow-up

## 2018-03-13 LAB
HAV IGM SER-ACNC: SIGNIFICANT CHANGE UP
HBV CORE IGM SER-ACNC: SIGNIFICANT CHANGE UP
HBV SURFACE AG SER-ACNC: SIGNIFICANT CHANGE UP
HCV AB S/CO SERPL IA: 0.13 S/CO — SIGNIFICANT CHANGE UP
HCV AB SERPL-IMP: SIGNIFICANT CHANGE UP
T PALLIDUM AB TITR SER: NEGATIVE — SIGNIFICANT CHANGE UP

## 2018-03-14 ENCOUNTER — EMERGENCY (EMERGENCY)
Facility: HOSPITAL | Age: 38
LOS: 0 days | Discharge: HOME | End: 2018-03-15
Attending: EMERGENCY MEDICINE

## 2018-03-14 VITALS
SYSTOLIC BLOOD PRESSURE: 134 MMHG | WEIGHT: 123.02 LBS | DIASTOLIC BLOOD PRESSURE: 90 MMHG | TEMPERATURE: 98 F | HEIGHT: 64 IN | OXYGEN SATURATION: 98 % | RESPIRATION RATE: 20 BRPM | HEART RATE: 105 BPM

## 2018-03-14 DIAGNOSIS — R53.83 OTHER FATIGUE: ICD-10-CM

## 2018-03-14 DIAGNOSIS — R45.4 IRRITABILITY AND ANGER: ICD-10-CM

## 2018-03-14 DIAGNOSIS — F32.9 MAJOR DEPRESSIVE DISORDER, SINGLE EPISODE, UNSPECIFIED: ICD-10-CM

## 2018-03-14 DIAGNOSIS — Z79.899 OTHER LONG TERM (CURRENT) DRUG THERAPY: ICD-10-CM

## 2018-03-14 DIAGNOSIS — F19.10 OTHER PSYCHOACTIVE SUBSTANCE ABUSE, UNCOMPLICATED: ICD-10-CM

## 2018-03-14 DIAGNOSIS — F17.200 NICOTINE DEPENDENCE, UNSPECIFIED, UNCOMPLICATED: ICD-10-CM

## 2018-03-15 VITALS
HEART RATE: 90 BPM | SYSTOLIC BLOOD PRESSURE: 125 MMHG | OXYGEN SATURATION: 99 % | RESPIRATION RATE: 20 BRPM | TEMPERATURE: 98 F | DIASTOLIC BLOOD PRESSURE: 65 MMHG

## 2018-03-15 NOTE — ED PROVIDER NOTE - NS ED ROS FT
Constitutional: (-) fever  Eyes/ENT: (-) blurry vision, (-) epistaxis  Cardiovascular: (-) chest pain, (-) syncope  Respiratory: (-) cough, (-) shortness of breath  Gastrointestinal: (-) vomiting, (-) diarrhea  Musculoskeletal: (-) neck pain, (-) back pain, (-) joint pain  Integumentary: (-) rash, (-) edema  Neurological: (-) headache, (-) altered mental status  Psychiatric: (-) hallucinations  Allergic/Immunologic: (-) pruritus

## 2018-03-15 NOTE — ED PROVIDER NOTE - ATTENDING CONTRIBUTION TO CARE
I personally evaluated the patient. I reviewed the Resident’s or Physician Assistant’s note (as assigned above), and agree with the findings

## 2018-03-15 NOTE — ED PROVIDER NOTE - OBJECTIVE STATEMENT
37 year old female past medical history of opiod and etoh abuse. patient is homeless. patient states she is very tired and hungry and requesting food and bed to sleep in. patient denies chest pain, shortness of breath, fever, chills, nausea, vomiting, diarrhea, abd pain and SI/HI.

## 2018-03-17 ENCOUNTER — EMERGENCY (EMERGENCY)
Facility: HOSPITAL | Age: 38
LOS: 0 days | Discharge: HOME | End: 2018-03-17
Attending: EMERGENCY MEDICINE

## 2018-03-17 VITALS
SYSTOLIC BLOOD PRESSURE: 147 MMHG | OXYGEN SATURATION: 99 % | RESPIRATION RATE: 18 BRPM | DIASTOLIC BLOOD PRESSURE: 93 MMHG | HEART RATE: 130 BPM | HEIGHT: 64 IN | TEMPERATURE: 96 F | WEIGHT: 123.02 LBS

## 2018-03-17 VITALS — DIASTOLIC BLOOD PRESSURE: 89 MMHG | SYSTOLIC BLOOD PRESSURE: 145 MMHG | HEART RATE: 98 BPM

## 2018-03-17 DIAGNOSIS — F19.10 OTHER PSYCHOACTIVE SUBSTANCE ABUSE, UNCOMPLICATED: ICD-10-CM

## 2018-03-17 DIAGNOSIS — F17.200 NICOTINE DEPENDENCE, UNSPECIFIED, UNCOMPLICATED: ICD-10-CM
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
## 2018-03-17 DIAGNOSIS — R53.81 OTHER MALAISE: ICD-10-CM

## 2018-03-17 DIAGNOSIS — Z79.899 OTHER LONG TERM (CURRENT) DRUG THERAPY: ICD-10-CM

## 2018-03-17 DIAGNOSIS — F41.9 ANXIETY DISORDER, UNSPECIFIED: ICD-10-CM
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
## 2018-03-17 DIAGNOSIS — Z59.0 HOMELESSNESS: ICD-10-CM
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
## 2018-03-17 DIAGNOSIS — T73.0XXA STARVATION, INITIAL ENCOUNTER: ICD-10-CM

## 2018-03-17 SDOH — ECONOMIC STABILITY - HOUSING INSECURITY: HOMELESSNESS: Z59.0

## 2018-03-17 NOTE — ED PROVIDER NOTE - ATTENDING CONTRIBUTION TO CARE
I personally evaluated the patient. I reviewed the Resident’s or Physician Assistant’s note (as assigned above), and agree with the findings and plan except as documented in my note.     39 female here for eval of malaise.     PE: female in no distress. NEURO: AAO 3 no focal deficits. speech gait memory intact. PSYCH: mood normal. SKIN: no diaphoresis    Impression: malaise    Plan: medical screening exam, return & Follow up instructions

## 2018-03-17 NOTE — ED ADULT TRIAGE NOTE - CHIEF COMPLAINT QUOTE
As Per EMS: "She approached us and told us she needs help." Pt. is noted to be very anxious and shaking. Denies suicidal ideations.

## 2018-03-17 NOTE — ED PROVIDER NOTE - OBJECTIVE STATEMENT
Pt states that she is not feeling well. she is homeless and is requesting food and blanket. Pt denies chest pain, shortness of breath, headache, dizziness, nausea/vomiting/diarrhea, head injury, recent trauma. Denies current thoughts of Suicidal and Homicidal Ideations.

## 2018-03-18 ENCOUNTER — EMERGENCY (EMERGENCY)
Facility: HOSPITAL | Age: 38
LOS: 0 days | Discharge: LEFT BEFORE DISCHARGE | End: 2018-03-18
Attending: EMERGENCY MEDICINE

## 2018-03-18 VITALS
HEIGHT: 60 IN | WEIGHT: 123.02 LBS | RESPIRATION RATE: 18 BRPM | OXYGEN SATURATION: 98 % | SYSTOLIC BLOOD PRESSURE: 112 MMHG | HEART RATE: 100 BPM | TEMPERATURE: 98 F | DIASTOLIC BLOOD PRESSURE: 86 MMHG

## 2018-03-18 DIAGNOSIS — Z53.21 PROCEDURE AND TREATMENT NOT CARRIED OUT DUE TO PATIENT LEAVING PRIOR TO BEING SEEN BY HEALTH CARE PROVIDER: ICD-10-CM

## 2018-03-18 DIAGNOSIS — Z79.899 OTHER LONG TERM (CURRENT) DRUG THERAPY: ICD-10-CM

## 2018-03-18 DIAGNOSIS — F41.8 OTHER SPECIFIED ANXIETY DISORDERS: ICD-10-CM

## 2018-03-18 NOTE — ED PROVIDER NOTE - PROGRESS NOTE DETAILS
Attempted to see patient x 3 attempts not in area. as per housekeeping staff she walked out of ER. Walked to waiting room and looked outside ER not found

## 2018-03-19 ENCOUNTER — INPATIENT (INPATIENT)
Facility: HOSPITAL | Age: 38
LOS: 0 days | Discharge: LEFT BEFORE DISCHARGE | End: 2018-03-20
Attending: INTERNAL MEDICINE

## 2018-03-19 VITALS
HEART RATE: 106 BPM | TEMPERATURE: 97 F | WEIGHT: 123.02 LBS | SYSTOLIC BLOOD PRESSURE: 130 MMHG | DIASTOLIC BLOOD PRESSURE: 78 MMHG | OXYGEN SATURATION: 99 % | HEIGHT: 64 IN | RESPIRATION RATE: 18 BRPM

## 2018-03-19 LAB
ALBUMIN SERPL ELPH-MCNC: 4.3 G/DL — SIGNIFICANT CHANGE UP (ref 3.5–5.2)
ALP SERPL-CCNC: 88 U/L — SIGNIFICANT CHANGE UP (ref 30–115)
ALT FLD-CCNC: 14 U/L — SIGNIFICANT CHANGE UP (ref 0–41)
AMMONIA BLD-MCNC: 32 MMOL/L — SIGNIFICANT CHANGE UP (ref 11–55)
APAP SERPL-MCNC: <5 UG/ML — LOW (ref 10–30)
APPEARANCE UR: (no result)
APTT BLD: 30.5 SEC — SIGNIFICANT CHANGE UP (ref 27–39.2)
AST SERPL-CCNC: 16 U/L — SIGNIFICANT CHANGE UP (ref 0–41)
BASOPHILS # BLD AUTO: 0.08 K/UL — SIGNIFICANT CHANGE UP (ref 0–0.2)
BASOPHILS NFR BLD AUTO: 1.1 % — HIGH (ref 0–1)
BILIRUB SERPL-MCNC: <0.2 MG/DL — SIGNIFICANT CHANGE UP (ref 0.2–1.2)
BILIRUB UR-MCNC: NEGATIVE — SIGNIFICANT CHANGE UP
BUN SERPL-MCNC: 9 MG/DL — LOW (ref 10–20)
CALCIUM SERPL-MCNC: 8.3 MG/DL — LOW (ref 8.5–10.1)
CHLORIDE SERPL-SCNC: 103 MMOL/L — SIGNIFICANT CHANGE UP (ref 98–110)
CO2 SERPL-SCNC: 25 MMOL/L — SIGNIFICANT CHANGE UP (ref 17–32)
COLOR SPEC: YELLOW — SIGNIFICANT CHANGE UP
COMMENT - URINE: SIGNIFICANT CHANGE UP
CREAT SERPL-MCNC: 0.8 MG/DL — SIGNIFICANT CHANGE UP (ref 0.7–1.5)
DIFF PNL FLD: (no result)
EOSINOPHIL # BLD AUTO: 0.34 K/UL — SIGNIFICANT CHANGE UP (ref 0–0.7)
EOSINOPHIL NFR BLD AUTO: 4.5 % — SIGNIFICANT CHANGE UP (ref 0–8)
EPI CELLS # UR: (no result) /HPF
ETHANOL SERPL-MCNC: 142 MG/DL — HIGH
GLUCOSE SERPL-MCNC: 86 MG/DL — SIGNIFICANT CHANGE UP (ref 70–110)
GLUCOSE UR QL: NEGATIVE MG/DL — SIGNIFICANT CHANGE UP
HCG UR QL: NEGATIVE — SIGNIFICANT CHANGE UP
HCT VFR BLD CALC: 41.2 % — SIGNIFICANT CHANGE UP (ref 37–47)
HGB BLD-MCNC: 13.7 G/DL — SIGNIFICANT CHANGE UP (ref 12–16)
IMM GRANULOCYTES NFR BLD AUTO: 0.5 % — HIGH (ref 0.1–0.3)
INR BLD: 0.91 RATIO — SIGNIFICANT CHANGE UP (ref 0.65–1.3)
KETONES UR-MCNC: NEGATIVE — SIGNIFICANT CHANGE UP
LEUKOCYTE ESTERASE UR-ACNC: (no result)
LIDOCAIN IGE QN: 68 U/L — HIGH (ref 7–60)
LYMPHOCYTES # BLD AUTO: 1.67 K/UL — SIGNIFICANT CHANGE UP (ref 1.2–3.4)
LYMPHOCYTES # BLD AUTO: 22.2 % — SIGNIFICANT CHANGE UP (ref 20.5–51.1)
MAGNESIUM SERPL-MCNC: 2.3 MG/DL — SIGNIFICANT CHANGE UP (ref 1.8–2.4)
MCHC RBC-ENTMCNC: 30.2 PG — SIGNIFICANT CHANGE UP (ref 27–31)
MCHC RBC-ENTMCNC: 33.3 G/DL — SIGNIFICANT CHANGE UP (ref 32–37)
MCV RBC AUTO: 90.7 FL — SIGNIFICANT CHANGE UP (ref 81–99)
MONOCYTES # BLD AUTO: 0.65 K/UL — HIGH (ref 0.1–0.6)
MONOCYTES NFR BLD AUTO: 8.6 % — SIGNIFICANT CHANGE UP (ref 1.7–9.3)
NEUTROPHILS # BLD AUTO: 4.74 K/UL — SIGNIFICANT CHANGE UP (ref 1.4–6.5)
NEUTROPHILS NFR BLD AUTO: 63.1 % — SIGNIFICANT CHANGE UP (ref 42.2–75.2)
NITRITE UR-MCNC: NEGATIVE — SIGNIFICANT CHANGE UP
NRBC # BLD: 0 /100 WBCS — SIGNIFICANT CHANGE UP (ref 0–0)
PH UR: 7 — SIGNIFICANT CHANGE UP (ref 5–8)
PLATELET # BLD AUTO: 317 K/UL — SIGNIFICANT CHANGE UP (ref 130–400)
POTASSIUM SERPL-MCNC: 4 MMOL/L — SIGNIFICANT CHANGE UP (ref 3.5–5)
POTASSIUM SERPL-SCNC: 4 MMOL/L — SIGNIFICANT CHANGE UP (ref 3.5–5)
PROT SERPL-MCNC: 6.6 G/DL — SIGNIFICANT CHANGE UP (ref 6–8)
PROT UR-MCNC: NEGATIVE MG/DL — SIGNIFICANT CHANGE UP
PROTHROM AB SERPL-ACNC: 9.8 SEC — LOW (ref 9.95–12.87)
RBC # BLD: 4.54 M/UL — SIGNIFICANT CHANGE UP (ref 4.2–5.4)
RBC # FLD: 13.9 % — SIGNIFICANT CHANGE UP (ref 11.5–14.5)
SALICYLATES SERPL-MCNC: <0.3 MG/DL — LOW (ref 4–30)
SODIUM SERPL-SCNC: 142 MMOL/L — SIGNIFICANT CHANGE UP (ref 135–146)
SP GR SPEC: 1.02 — SIGNIFICANT CHANGE UP (ref 1.01–1.03)
T PALLIDUM AB TITR SER: NEGATIVE — SIGNIFICANT CHANGE UP
UROBILINOGEN FLD QL: 0.2 MG/DL — SIGNIFICANT CHANGE UP (ref 0.2–0.2)
WBC # BLD: 7.52 K/UL — SIGNIFICANT CHANGE UP (ref 4.8–10.8)
WBC # FLD AUTO: 7.52 K/UL — SIGNIFICANT CHANGE UP (ref 4.8–10.8)
WBC UR QL: SIGNIFICANT CHANGE UP /HPF

## 2018-03-19 RX ORDER — ACETAMINOPHEN 500 MG
650 TABLET ORAL EVERY 6 HOURS
Qty: 0 | Refills: 0 | Status: DISCONTINUED | OUTPATIENT
Start: 2018-03-19 | End: 2018-03-20

## 2018-03-19 RX ORDER — IBUPROFEN 200 MG
400 TABLET ORAL EVERY 4 HOURS
Qty: 0 | Refills: 0 | Status: DISCONTINUED | OUTPATIENT
Start: 2018-03-19 | End: 2018-03-20

## 2018-03-19 RX ORDER — THIAMINE MONONITRATE (VIT B1) 100 MG
100 TABLET ORAL DAILY
Qty: 0 | Refills: 0 | Status: DISCONTINUED | OUTPATIENT
Start: 2018-03-19 | End: 2018-03-20

## 2018-03-19 RX ORDER — TUBERCULIN PURIFIED PROTEIN DERIVATIVE 5 [IU]/.1ML
5 INJECTION, SOLUTION INTRADERMAL ONCE
Qty: 0 | Refills: 0 | Status: COMPLETED | OUTPATIENT
Start: 2018-03-19 | End: 2018-03-19

## 2018-03-19 RX ORDER — HYDROXYZINE HCL 10 MG
100 TABLET ORAL AT BEDTIME
Qty: 0 | Refills: 0 | Status: DISCONTINUED | OUTPATIENT
Start: 2018-03-19 | End: 2018-03-20

## 2018-03-19 RX ORDER — HYDROXYZINE HCL 10 MG
50 TABLET ORAL EVERY 6 HOURS
Qty: 0 | Refills: 0 | Status: DISCONTINUED | OUTPATIENT
Start: 2018-03-19 | End: 2018-03-20

## 2018-03-19 RX ORDER — NICOTINE POLACRILEX 2 MG
1 GUM BUCCAL DAILY
Qty: 0 | Refills: 0 | Status: DISCONTINUED | OUTPATIENT
Start: 2018-03-19 | End: 2018-03-20

## 2018-03-19 RX ORDER — MAGNESIUM HYDROXIDE 400 MG/1
30 TABLET, CHEWABLE ORAL DAILY
Qty: 0 | Refills: 0 | Status: DISCONTINUED | OUTPATIENT
Start: 2018-03-19 | End: 2018-03-20

## 2018-03-19 RX ORDER — FOLIC ACID 0.8 MG
1 TABLET ORAL DAILY
Qty: 0 | Refills: 0 | Status: DISCONTINUED | OUTPATIENT
Start: 2018-03-19 | End: 2018-03-20

## 2018-03-19 RX ADMIN — Medication 25 MILLIGRAM(S): at 14:03

## 2018-03-19 RX ADMIN — Medication 1 PATCH: at 09:06

## 2018-03-19 RX ADMIN — Medication 100 MILLIGRAM(S): at 09:06

## 2018-03-19 RX ADMIN — Medication 1 TABLET(S): at 09:06

## 2018-03-19 RX ADMIN — Medication 100 MILLIGRAM(S): at 20:41

## 2018-03-19 RX ADMIN — Medication 50 MILLIGRAM(S): at 14:03

## 2018-03-19 RX ADMIN — Medication 1 MILLIGRAM(S): at 09:06

## 2018-03-19 RX ADMIN — Medication 50 MILLIGRAM(S): at 02:46

## 2018-03-19 RX ADMIN — Medication 25 MILLIGRAM(S): at 20:41

## 2018-03-19 RX ADMIN — Medication 30 MILLILITER(S): at 20:41

## 2018-03-19 NOTE — ED ADULT TRIAGE NOTE - CHIEF COMPLAINT QUOTE
pt requesting detox from ETOH, states she drinks a couple of beers per day. last drink was 25 mins ago. pt also states she has pain all over her body.

## 2018-03-19 NOTE — ED PROVIDER NOTE - MEDICAL DECISION MAKING DETAILS
I personally evaluated the patient. I reviewed the Resident’s or Physician Assistant’s note (as assigned above), and agree with the findings and plan except as documented in my note.  Chart reviewed. Requesting detox from alcohol. Exam unremarkable. Will order labs, EKG and admit to detox.

## 2018-03-19 NOTE — ED PROVIDER NOTE - PHYSICAL EXAMINATION
PHYSICAL EXAM:    GENERAL: Alert, appears stated age, well appearing, non-toxic  SKIN: Warm, pink and dry. MMM.   EYE: Normal lids/conjunctiva, PERRL, EOMI  ENT: Normal hearing, patent oropharynx without erythema or exudate  NECK: +supple. No meningismus  Pulm: Bilateral BS, normal resp effort, no wheezes, stridor, or retractions  CV: RRR, no M/R/G, 2+ pulses throughout  Abd: soft, non-tender, non-distended, no hepatosplenomegaly. no CVA tenderness.   Mskel: no erythema, cyanosis, edema. no calf tenderness   Neuro: AAOx3, no sensory/motor deficits. No speech slurring, pronator drift, facial asymmetry. 5/5 strength throughout.

## 2018-03-19 NOTE — ED PROVIDER NOTE - OBJECTIVE STATEMENT
36 y/o F with PMH anxiety, depression, substance abuse including IVDA, ETOH abuse presents for detox evaluation.  She reports last drink was 2 beers 25 min ago. She denies other recent drug use. She reports she drinks 2-3 beers/day and 1/2 pint of different liquors/day. Denies CP, palpitations, SOB, back pain, abdominal pain, n/v/d, black or bloody stools, fevers, sweats, chills, HA, vision changes, sore throat/difficulty swallowing, confusion, trauma, fall, cough, recent travel, recent illness, sick contacts, leg pain/swelling, urinary symptoms, rash. She also denies SI/HI/ hallucinations.

## 2018-03-20 VITALS
DIASTOLIC BLOOD PRESSURE: 53 MMHG | TEMPERATURE: 98 F | HEART RATE: 94 BPM | SYSTOLIC BLOOD PRESSURE: 156 MMHG | RESPIRATION RATE: 16 BRPM

## 2018-03-20 LAB
AMPHET UR-MCNC: NEGATIVE — SIGNIFICANT CHANGE UP
BARBITURATES UR SCN-MCNC: NEGATIVE — SIGNIFICANT CHANGE UP
BENZODIAZ UR-MCNC: POSITIVE
COCAINE METAB.OTHER UR-MCNC: POSITIVE
DRUG SCREEN 1, URINE RESULT: SIGNIFICANT CHANGE UP
METHADONE UR-MCNC: NEGATIVE — SIGNIFICANT CHANGE UP
OPIATES UR-MCNC: NEGATIVE — SIGNIFICANT CHANGE UP
PCP UR-MCNC: NEGATIVE — SIGNIFICANT CHANGE UP
PROPOXYPHENE QUALITATIVE URINE RESULT: NEGATIVE — SIGNIFICANT CHANGE UP
THC UR QL: NEGATIVE — SIGNIFICANT CHANGE UP

## 2018-03-20 RX ORDER — PSEUDOEPHEDRINE HCL 30 MG
30 TABLET ORAL
Qty: 0 | Refills: 0 | Status: DISCONTINUED | OUTPATIENT
Start: 2018-03-20 | End: 2018-03-20

## 2018-03-20 RX ADMIN — Medication 1 PATCH: at 09:04

## 2018-03-20 RX ADMIN — Medication 100 MILLIGRAM(S): at 09:02

## 2018-03-20 RX ADMIN — Medication 1 MILLIGRAM(S): at 09:02

## 2018-03-20 RX ADMIN — Medication 400 MILLIGRAM(S): at 17:13

## 2018-03-20 RX ADMIN — Medication 400 MILLIGRAM(S): at 09:03

## 2018-03-20 RX ADMIN — Medication 1 PATCH: at 09:02

## 2018-03-20 RX ADMIN — Medication 650 MILLIGRAM(S): at 16:34

## 2018-03-20 RX ADMIN — Medication 50 MILLIGRAM(S): at 09:03

## 2018-03-20 RX ADMIN — Medication 50 MILLIGRAM(S): at 17:15

## 2018-03-20 RX ADMIN — Medication 1 TABLET(S): at 09:02

## 2018-03-20 NOTE — CHART NOTE - NSCHARTNOTEFT_GEN_A_CORE
Allergies:  No Known Allergies      Diet: Regular    Activity: as tolerated    Follow up with    1. PMD in 2 weeks    2.     3.    Follow up for abnormal labs/tests    1.    Extra Instructions:      Flu Vaccine given  Yes_____         No______      Diagnosis:  Chemical Dependency   Maintain sobriety  refrain from all use      Patient Signature___________________________________________  Date_________________      Nurse Signature_____________________________________________Date_________________

## 2018-03-22 ENCOUNTER — EMERGENCY (EMERGENCY)
Facility: HOSPITAL | Age: 38
LOS: 0 days | Discharge: HOME | End: 2018-03-22
Attending: EMERGENCY MEDICINE

## 2018-03-22 ENCOUNTER — INPATIENT (INPATIENT)
Facility: HOSPITAL | Age: 38
LOS: 0 days | Discharge: LEFT BEFORE DISCHARGE | End: 2018-03-22
Attending: INTERNAL MEDICINE

## 2018-03-22 VITALS
HEART RATE: 120 BPM | WEIGHT: 123.02 LBS | TEMPERATURE: 98 F | DIASTOLIC BLOOD PRESSURE: 79 MMHG | OXYGEN SATURATION: 99 % | RESPIRATION RATE: 18 BRPM | HEIGHT: 64 IN | SYSTOLIC BLOOD PRESSURE: 141 MMHG

## 2018-03-22 VITALS
DIASTOLIC BLOOD PRESSURE: 66 MMHG | SYSTOLIC BLOOD PRESSURE: 127 MMHG | HEART RATE: 99 BPM | OXYGEN SATURATION: 99 % | HEIGHT: 64 IN | TEMPERATURE: 98 F | RESPIRATION RATE: 18 BRPM | WEIGHT: 123.02 LBS

## 2018-03-22 VITALS
RESPIRATION RATE: 16 BRPM | DIASTOLIC BLOOD PRESSURE: 57 MMHG | WEIGHT: 123.02 LBS | HEIGHT: 64 IN | TEMPERATURE: 96 F | SYSTOLIC BLOOD PRESSURE: 118 MMHG | HEART RATE: 91 BPM

## 2018-03-22 VITALS — DIASTOLIC BLOOD PRESSURE: 55 MMHG | HEART RATE: 115 BPM | SYSTOLIC BLOOD PRESSURE: 107 MMHG

## 2018-03-22 DIAGNOSIS — Z79.899 OTHER LONG TERM (CURRENT) DRUG THERAPY: ICD-10-CM

## 2018-03-22 DIAGNOSIS — F41.9 ANXIETY DISORDER, UNSPECIFIED: ICD-10-CM

## 2018-03-22 DIAGNOSIS — F17.200 NICOTINE DEPENDENCE, UNSPECIFIED, UNCOMPLICATED: ICD-10-CM

## 2018-03-22 DIAGNOSIS — Z02.9 ENCOUNTER FOR ADMINISTRATIVE EXAMINATIONS, UNSPECIFIED: ICD-10-CM

## 2018-03-22 DIAGNOSIS — F10.20 ALCOHOL DEPENDENCE, UNCOMPLICATED: ICD-10-CM

## 2018-03-22 DIAGNOSIS — F10.10 ALCOHOL ABUSE, UNCOMPLICATED: ICD-10-CM

## 2018-03-22 DIAGNOSIS — F19.10 OTHER PSYCHOACTIVE SUBSTANCE ABUSE, UNCOMPLICATED: ICD-10-CM

## 2018-03-22 DIAGNOSIS — R25.1 TREMOR, UNSPECIFIED: ICD-10-CM

## 2018-03-22 DIAGNOSIS — Z79.891 LONG TERM (CURRENT) USE OF OPIATE ANALGESIC: ICD-10-CM

## 2018-03-22 LAB
APPEARANCE UR: CLEAR — SIGNIFICANT CHANGE UP
BACTERIA # UR AUTO: (no result)
BILIRUB UR-MCNC: NEGATIVE — SIGNIFICANT CHANGE UP
COD CRY URNS QL: NEGATIVE — SIGNIFICANT CHANGE UP
COLOR SPEC: YELLOW — SIGNIFICANT CHANGE UP
DIFF PNL FLD: NEGATIVE — SIGNIFICANT CHANGE UP
EPI CELLS # UR: (no result) /HPF
GLUCOSE UR QL: NEGATIVE MG/DL — SIGNIFICANT CHANGE UP
GRAN CASTS # UR COMP ASSIST: NEGATIVE — SIGNIFICANT CHANGE UP
HCG UR QL: NEGATIVE — SIGNIFICANT CHANGE UP
HYALINE CASTS # UR AUTO: NEGATIVE — SIGNIFICANT CHANGE UP
KETONES UR-MCNC: NEGATIVE — SIGNIFICANT CHANGE UP
LEUKOCYTE ESTERASE UR-ACNC: (no result)
NITRITE UR-MCNC: NEGATIVE — SIGNIFICANT CHANGE UP
PH UR: 7 — SIGNIFICANT CHANGE UP (ref 5–8)
PROT UR-MCNC: NEGATIVE MG/DL — SIGNIFICANT CHANGE UP
RBC CASTS # UR COMP ASSIST: (no result) /HPF
SP GR SPEC: 1.01 — SIGNIFICANT CHANGE UP (ref 1.01–1.03)
TRI-PHOS CRY UR QL COMP ASSIST: NEGATIVE — SIGNIFICANT CHANGE UP
URATE CRY FLD QL MICRO: NEGATIVE — SIGNIFICANT CHANGE UP
UROBILINOGEN FLD QL: 0.2 MG/DL — SIGNIFICANT CHANGE UP (ref 0.2–0.2)
WBC UR QL: SIGNIFICANT CHANGE UP /HPF

## 2018-03-22 RX ORDER — HYDROXYZINE HCL 10 MG
50 TABLET ORAL EVERY 6 HOURS
Qty: 0 | Refills: 0 | Status: DISCONTINUED | OUTPATIENT
Start: 2018-03-22 | End: 2018-03-22

## 2018-03-22 RX ORDER — DIVALPROEX SODIUM 500 MG/1
0 TABLET, DELAYED RELEASE ORAL
Qty: 0 | Refills: 0 | COMMUNITY

## 2018-03-22 RX ORDER — IBUPROFEN 200 MG
400 TABLET ORAL EVERY 6 HOURS
Qty: 0 | Refills: 0 | Status: DISCONTINUED | OUTPATIENT
Start: 2018-03-22 | End: 2018-03-22

## 2018-03-22 RX ORDER — MICONAZOLE NITRATE 2 %
1 CREAM (GRAM) TOPICAL
Qty: 0 | Refills: 0 | Status: DISCONTINUED | OUTPATIENT
Start: 2018-03-22 | End: 2018-03-22

## 2018-03-22 RX ORDER — NICOTINE POLACRILEX 2 MG
1 GUM BUCCAL DAILY
Qty: 0 | Refills: 0 | Status: DISCONTINUED | OUTPATIENT
Start: 2018-03-22 | End: 2018-03-22

## 2018-03-22 RX ORDER — CLONAZEPAM 1 MG
1 TABLET ORAL ONCE
Qty: 0 | Refills: 0 | Status: DISCONTINUED | OUTPATIENT
Start: 2018-03-22 | End: 2018-03-22

## 2018-03-22 RX ORDER — ACETAMINOPHEN 500 MG
650 TABLET ORAL EVERY 6 HOURS
Qty: 0 | Refills: 0 | Status: DISCONTINUED | OUTPATIENT
Start: 2018-03-22 | End: 2018-03-22

## 2018-03-22 RX ORDER — GABAPENTIN 400 MG/1
0 CAPSULE ORAL
Qty: 0 | Refills: 0 | COMMUNITY

## 2018-03-22 RX ORDER — TRAZODONE HCL 50 MG
100 TABLET ORAL AT BEDTIME
Qty: 0 | Refills: 0 | Status: DISCONTINUED | OUTPATIENT
Start: 2018-03-22 | End: 2018-03-22

## 2018-03-22 RX ADMIN — Medication 25 MILLIGRAM(S): at 06:53

## 2018-03-22 RX ADMIN — Medication 1 MILLIGRAM(S): at 08:41

## 2018-03-22 RX ADMIN — Medication 50 MILLIGRAM(S): at 18:07

## 2018-03-22 NOTE — CONSULT NOTE ADULT - ASSESSMENT
37 yrs single unemployed W/F admitted for detox/rehab because of alcohol abuse referred for psychiatric evaluation for anxiety or agitation. She denies symptoms suggestive of mood or psychotic disorder; also denies history/ current violent or suicidal behavior. Denies psychiatric history or treatment/hospitalization. During the interview, Pt was pleasant and friendly; she did not appear anxious and she was not agitated.    Dx: Alcohol Induced Anxiety Disorder    Recommendation: Continue Hydroxyzine 50 mg every six hrs prn for anxiety.

## 2018-03-22 NOTE — CONSULT NOTE ADULT - PROBLEM SELECTOR RECOMMENDATION 2
Hydroxyzine 50 mg every six hrs prn for anxiety.   individual therapy to learn/ improve coping skills and manage anxiety symptoms.

## 2018-03-22 NOTE — CONSULT NOTE ADULT - SUBJECTIVE AND OBJECTIVE BOX
Pt was admitted today for alcohol detox/rehab referred for psychiatric evaluation for anxiety. Pt indicated that she does not need to see a psychiatrist. She denies past psychiatric hospitalization or treatment. She admitted that she was discharged AMA yesterday from the detox unit but requested admission to the rehab unit as she believe that she can get the help for her alcohol problem. She reported that she drinks four 12 oz of beer daily for the past two months; last drink was yesterday. She also admit to occasional use of cocaine; last used was two days ago. She denies using other drugs including marijuana. She denies history or current depressive symptoms; no history of suicidal or violent behavior. She also denies signs and symptoms suggestive of psychotic disorder. She denies anxiety during the interview although her record indicates that she prescribed Ativan and Clonazepam in her ED visits.   She denies allergy to medication. Denies medical problem. LMP was three days ago.   She is single but claim that she has eleven month old daughter who is living with her fiancee. She denies family history of mental illness or substance abuse.   She was born and raise in [place]; quit 8th grade but has GED. She is unemployed and claim that she gets financial support from her fiancee. She also claim that she is presently staying with her fiancee prior to her admission. She report no problem in her childhood.   MSE: Pt is 37 yrs old female looks stated age, appropriately groomed and dressed, cooperative with good eye contact. She was pleassant and friendly; her affect is appropriate. Her speech is coherent and goal-directed. No delusion or a/v hallucination. Not suicidal or homicidal. Alert, oriented x 3 with good memory. Her judgment is not impaired however her insight appear poor.

## 2018-03-22 NOTE — ED PROVIDER NOTE - NS ED ROS FT
Constitutional: see HPI  Cardiac: No chest pain, SOB or edema.  Respiratory: No cough or respiratory distress  GI: No nausea, vomiting, diarrhea or abdominal pain.  : No dysuria, frequency, urgency or hematuria  MS: no pain to back or extremities, no loss of ROM, no weakness  Neuro: No headache or weakness. No LOC.  Skin: No skin rash.  Except as documented in the HPI, all other systems are negative.

## 2018-03-22 NOTE — ED PROVIDER NOTE - PHYSICAL EXAMINATION
VITAL SIGNS: I have reviewed nursing notes and confirm.  CONSTITUTIONAL: Well-developed; well-nourished; in no acute distress. no tremor  SKIN: Skin exam is warm and dry, no acute rash.  HEAD: Normocephalic; atraumatic.  EYES: PERRL, EOM intact; conjunctiva and sclera clear.  ENT: No nasal discharge; airway clear.   NECK: Supple; non tender.  CARD: Regular rate and rhythm.  RESP: No wheezes, rales or rhonchi.  ABD: Normal bowel sounds; soft; non-distended; non-tender  EXT: Normal ROM. no tremor, no tongue fasciculations  NEURO: Alert, oriented. Grossly unremarkable. No focal deficits.  PSYCH: Cooperative, appropriate. patient has clear speech, steady gait, no intox

## 2018-03-22 NOTE — ED PROVIDER NOTE - OBJECTIVE STATEMENT
Pt is a 36 y/o female with hx of alcohol abuse, anxiety, with very frequent visits to this ED, AMA yesterday from inpatient detox, also seen and discharged form this ED one hour ago with + alcohol intake since discharge, presents to ED for anxiety. No chest pain, SOB, fever, abd pain, n/v. No SI/HI.

## 2018-03-22 NOTE — ED PROVIDER NOTE - ATTENDING CONTRIBUTION TO CARE
I personally evaluated the patient. I reviewed the Resident’s or Physician Assistant’s note (as assigned above), and agree with the findings and plan except as documented in my note.  alcoholic well known to ED staff just discharged returns anxious, no SI/HI/hallucinations, no detox beds available, tachycardic though not tremulous, will medicate and discharge

## 2018-03-22 NOTE — ED ADULT NURSE NOTE - PMH
Spoke with pt's niece (Nighat) at [phone] who states she will come get him.    Anxiety    Depression    Substance abuse

## 2018-03-22 NOTE — ED PROVIDER NOTE - MEDICAL DECISION MAKING DETAILS
Patient here requesting return to detox after leaving. no beds available, no signs of withdrawal, no acute intox, no SI or HI. Will give dose of librium and dc with outpatient detox follow up.

## 2018-03-22 NOTE — ED PROVIDER NOTE - OBJECTIVE STATEMENT
38 yo F with history of anxiety, alcohol abuse, left AMA from detox yesterday, here requesting repeat admission. States she feels unwell, shaky and anxious but no SI or HI, not hearing voices. No CP or SOB.

## 2018-03-22 NOTE — ED PROVIDER NOTE - NS ED ROS FT
Constitutional: No fever, chills.  Eyes: No visual changes.  ENT: No hearing changes. No sore throat.  Neck: No neck pain or stiffness.  Cardiovascular: No chest pain, palpitations, edema.  Pulmonary: No SOB, cough. No hemoptysis.  Abdominal: No abdominal pain, nausea, vomiting, diarrhea.  Neuro: No headache, syncope, dizziness.  MS: No back pain. No calf pain/swelling.  Psych: + anxiety. No suicidal ideations.

## 2018-03-22 NOTE — ED PROVIDER NOTE - PHYSICAL EXAMINATION
Constitutional: Well developed, well nourished. NAD.  Head: Normocephalic, atraumatic.  Eyes: PERRL. EOMI.  ENT: No nasal discharge. Mucous membranes moist.  Neck: Supple. Painless ROM.  Cardiovascular: Normal S1, S2. Tachycardic rate and regular rhythm. No murmurs, rubs, or gallops.  Pulmonary: Normal respiratory rate and effort. Lungs clear to auscultation bilaterally. No wheezing, rales, or rhonchi.  Abdominal: Soft. Nondistended. Nontender. No rebound, guarding, rigidity.  Extremities. Pelvis stable. No lower extremity edema, symmetric calves.  Skin: No rashes, cyanosis.  Neuro: AAOx3. No focal neurological deficits.  Psych: Normal mood. Normal affect.

## 2018-03-22 NOTE — ED PROVIDER NOTE - PROGRESS NOTE DETAILS
HR improved. Will discharge pt home with psychiatry clinic followup. Patient left without paperwork.

## 2018-03-23 ENCOUNTER — EMERGENCY (EMERGENCY)
Facility: HOSPITAL | Age: 38
LOS: 0 days | Discharge: HOME | End: 2018-03-24
Attending: EMERGENCY MEDICINE

## 2018-03-23 VITALS — HEART RATE: 98 BPM

## 2018-03-23 VITALS
HEIGHT: 64 IN | WEIGHT: 123.02 LBS | TEMPERATURE: 97 F | RESPIRATION RATE: 20 BRPM | OXYGEN SATURATION: 97 % | SYSTOLIC BLOOD PRESSURE: 124 MMHG | HEART RATE: 117 BPM | DIASTOLIC BLOOD PRESSURE: 72 MMHG

## 2018-03-23 DIAGNOSIS — F32.9 MAJOR DEPRESSIVE DISORDER, SINGLE EPISODE, UNSPECIFIED: ICD-10-CM

## 2018-03-23 DIAGNOSIS — Z02.9 ENCOUNTER FOR ADMINISTRATIVE EXAMINATIONS, UNSPECIFIED: ICD-10-CM

## 2018-03-24 NOTE — ED PROVIDER NOTE - PROGRESS NOTE DETAILS
pt dw Dr Chin os psych, knows pt well, here freq for same thing. has outpt psych set up. does not need admission.

## 2018-03-24 NOTE — ED PROVIDER NOTE - PHYSICAL EXAMINATION
VITAL SIGNS: I have reviewed nursing notes and confirm.  CONSTITUTIONAL: Well-developed; well-nourished; in no acute distress.  SKIN: Skin exam is warm and dry, no acute rash.  HEAD: Normocephalic; atraumatic.  EYES: PERRL, EOM intact; conjunctiva and sclera clear.  ENT: No nasal discharge; airway clear.   NECK: Supple; non tender.  CARD:+ S1, S2   RESP: No wheezes, rales or rhonchi.  ABD: Normal bowel sounds; soft; non-distended; non-tender;  EXT: Normal ROM. No cyanosis or edema.  LYMPH: No acute adenopathy.  NEURO: Alert. Grossly unremarkable. No focal deficits.  PSYCH: Cooperative, appropriate.

## 2018-03-24 NOTE — ED PROVIDER NOTE - OBJECTIVE STATEMENT
pt sts shes upset and feeling depressed. has been here before for this.  upset about not being able to see her child. has no SI or plan. no HI. no halluc.

## 2018-03-27 DIAGNOSIS — F10.20 ALCOHOL DEPENDENCE, UNCOMPLICATED: ICD-10-CM

## 2018-03-27 DIAGNOSIS — F17.210 NICOTINE DEPENDENCE, CIGARETTES, UNCOMPLICATED: ICD-10-CM

## 2018-03-30 DIAGNOSIS — F14.10 COCAINE ABUSE, UNCOMPLICATED: ICD-10-CM

## 2018-03-30 DIAGNOSIS — F41.9 ANXIETY DISORDER, UNSPECIFIED: ICD-10-CM

## 2018-03-30 DIAGNOSIS — F12.10 CANNABIS ABUSE, UNCOMPLICATED: ICD-10-CM

## 2018-03-30 DIAGNOSIS — F10.10 ALCOHOL ABUSE, UNCOMPLICATED: ICD-10-CM

## 2018-03-30 DIAGNOSIS — F17.200 NICOTINE DEPENDENCE, UNSPECIFIED, UNCOMPLICATED: ICD-10-CM

## 2018-03-30 DIAGNOSIS — F19.10 OTHER PSYCHOACTIVE SUBSTANCE ABUSE, UNCOMPLICATED: ICD-10-CM

## 2018-03-30 DIAGNOSIS — Z51.89 ENCOUNTER FOR OTHER SPECIFIED AFTERCARE: ICD-10-CM

## 2018-04-06 ENCOUNTER — EMERGENCY (EMERGENCY)
Facility: HOSPITAL | Age: 38
LOS: 0 days | Discharge: HOME | End: 2018-04-07
Attending: EMERGENCY MEDICINE

## 2018-04-06 VITALS
TEMPERATURE: 98 F | DIASTOLIC BLOOD PRESSURE: 83 MMHG | HEART RATE: 98 BPM | OXYGEN SATURATION: 98 % | RESPIRATION RATE: 18 BRPM | SYSTOLIC BLOOD PRESSURE: 104 MMHG

## 2018-04-06 DIAGNOSIS — F10.129 ALCOHOL ABUSE WITH INTOXICATION, UNSPECIFIED: ICD-10-CM

## 2018-04-06 DIAGNOSIS — F19.10 OTHER PSYCHOACTIVE SUBSTANCE ABUSE, UNCOMPLICATED: ICD-10-CM

## 2018-04-06 DIAGNOSIS — R45.1 RESTLESSNESS AND AGITATION: ICD-10-CM

## 2018-04-06 DIAGNOSIS — F32.9 MAJOR DEPRESSIVE DISORDER, SINGLE EPISODE, UNSPECIFIED: ICD-10-CM
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
## 2018-04-06 NOTE — CONSULT NOTE ADULT - ASSESSMENT
alcohol intoxication     doesnot require ipp  release to [facility] custody upon medical discharge.

## 2018-04-06 NOTE — ED PROVIDER NOTE - NS ED ROS FT
Review of Systems:  	•	CONSTITUTIONAL - no fever, no diaphoresis, no chills  	•	SKIN - no rash  	•	HEMATOLOGIC - no bleeding, no bruising  	•	EYES - no eye pain, no blurry vision  	•	RESPIRATORY - no shortness of breath, no cough  	•	CARDIAC - no chest pain, no palpitations  	•	GI - no abd pain, no nausea, no vomiting, no diarrhea  	•	MUSCULOSKELETAL - no joint paint, no swelling, no redness  	•	NEUROLOGIC - no weakness, no headache, no paresthesias, no LOC  	•	PSYCH - no anxiety, non suicidal, non homicidal, no hallucination
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
## 2018-04-06 NOTE — ED PROVIDER NOTE - ATTENDING CONTRIBUTION TO CARE
37 year old female, pmhx of substance abuse, comes in with nypd, + under arrest, + in custody, with complaint of intox, patient denies drug use, patient denies alcohol use, patient was found in the street walking barefoot. Patient under arrest for violating an order of protection. Patient denies cp/sob, no n/v/d. No trauma    CONSTITUTIONAL: Well-developed; well-nourished; in no acute distress. Sitting up and providing appropriate history and physical examination. Patient is mildly agitated and yelling at staff  SKIN: skin exam is warm and dry, no acute rash.  HEAD: Normocephalic; atraumatic.  EYES: PERRL, 3 mm bilateral, no nystagmus, EOM intact; conjunctiva and sclera clear.  ENT: No nasal discharge; airway clear.  NECK: Supple; non tender.+ full passive ROM in all directions. No JVD  CARD: S1, S2 normal; no murmurs, gallops, or rubs. Regular rate and rhythm. + Symmetric Strong Pulses  RESP: No wheezes, rales or rhonchi. Good air movement bilaterally  ABD: soft; non-distended; non-tender. No Rebound, No Gaurding, No signs of peritnitis, No CVA tenderness  EXT: Normal ROM. No clubbing, cyanosis or edema. Dp and Pt Pulses intact. Cap refill less than 3 seconds  NEURO: CN 2-12 intact, normal finger to nose, normal romberg, stable gait, no sensory or motor deficits, Alert, oriented, grossly unremarkable. No Focal deficits. GCS 15. NIH 0  PSYCH: Cooperative, + agitated and yelling at staff, no si or hi, no a/v hallucinations    Patient has been cleared by psychiatry Dr. Ledesma

## 2018-04-06 NOTE — ED PROVIDER NOTE - MEDICAL DECISION MAKING DETAILS
I personally evaluated the patient. I reviewed the Resident’s or Physician Assistant’s note (as assigned above), and agree with the findings and plan except as documented in my note. 37f brought in under police custody admits to EtOH intoxication. Pt observed until clinically sober w stable gait. no evidence of withdrawal at time of d/c into police custody. Pt advised regarding symptomatic/supportive care, importance of PMD & detox f/u, and symptoms to prompt ED return.

## 2018-04-06 NOTE — ED PROVIDER NOTE - PHYSICAL EXAMINATION
VITAL SIGNS: I have reviewed nursing notes and confirm.  CONSTITUTIONAL: Well-developed; well-nourished; in no acute distress.  SKIN: Skin exam is warm and dry, no acute rash.  HEAD: Normocephalic; atraumatic.  EYES: PERRL, EOM intact; conjunctiva and sclera clear.  ENT: No nasal discharge; airway clear.   NECK: Supple; non tender.  CARD: S1, S2 normal; no murmurs, gallops, or rubs. Regular rate and rhythm.  RESP: Clear to auscultation bilaterally. No wheezes, rales or rhonchi.  ABD: Normal bowel sounds; soft; non-distended; non-tender.   EXT: Normal ROM.   NEURO: Alert, oriented. Grossly unremarkable. No focal deficits.  PSYCH: Not cooperative. No suicidal or homicidal ideations. No psychosis. No hallucinations.
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
## 2018-04-06 NOTE — ED PROVIDER NOTE - CARE PLAN
Principal Discharge DX:	Alcohol intoxication  Assessment and plan of treatment:	I personally evaluated the patient. I reviewed the Resident’s or Physician Assistant’s note (as assigned above), and agree with the findings and plan except as documented in my note.  Secondary Diagnosis:	Substance abuse

## 2018-04-07 VITALS
RESPIRATION RATE: 18 BRPM | TEMPERATURE: 97 F | OXYGEN SATURATION: 97 % | SYSTOLIC BLOOD PRESSURE: 122 MMHG | HEART RATE: 95 BPM | DIASTOLIC BLOOD PRESSURE: 67 MMHG

## 2018-04-07 RX ORDER — ACETAMINOPHEN 500 MG
650 TABLET ORAL ONCE
Qty: 0 | Refills: 0 | Status: DISCONTINUED | OUTPATIENT
Start: 2018-04-07 | End: 2018-04-07

## 2018-04-19 ENCOUNTER — EMERGENCY (EMERGENCY)
Facility: HOSPITAL | Age: 38
LOS: 0 days | Discharge: HOME | End: 2018-04-19
Attending: EMERGENCY MEDICINE | Admitting: EMERGENCY MEDICINE

## 2018-04-19 VITALS
RESPIRATION RATE: 20 BRPM | HEIGHT: 70 IN | HEART RATE: 109 BPM | WEIGHT: 110.01 LBS | SYSTOLIC BLOOD PRESSURE: 112 MMHG | TEMPERATURE: 99 F | OXYGEN SATURATION: 98 % | DIASTOLIC BLOOD PRESSURE: 75 MMHG
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
## 2018-04-19 DIAGNOSIS — T74.11XA ADULT PHYSICAL ABUSE, CONFIRMED, INITIAL ENCOUNTER: ICD-10-CM
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
## 2018-04-19 DIAGNOSIS — S09.90XA UNSPECIFIED INJURY OF HEAD, INITIAL ENCOUNTER: ICD-10-CM

## 2018-04-19 DIAGNOSIS — F41.8 OTHER SPECIFIED ANXIETY DISORDERS: ICD-10-CM
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
## 2018-04-19 RX ORDER — ACETAMINOPHEN 500 MG
650 TABLET ORAL ONCE
Qty: 0 | Refills: 0 | Status: DISCONTINUED | OUTPATIENT
Start: 2018-04-19 | End: 2018-04-19

## 2018-04-19 NOTE — ED PROVIDER NOTE - PROGRESS NOTE DETAILS
Counseled on red flags and to return for them. Counseled on importance of follow up. Patient repeats back instructions. Patient advised that they or their doctor may call [phone] to follow up on the specific results of the tests performed today in the emergency department.   Patient appears well on discharge.

## 2018-04-19 NOTE — ED PROVIDER NOTE - NS ED ROS FT
Review of Systems    Constitutional: (-) fever  Eyes/ENT: (-) blurry vision  Cardiovascular: (-) chest pain, (-) syncope  Respiratory: (-) cough, (-) shortness of breath  Gastrointestinal: (-) vomiting, (-) diarrhea  Musculoskeletal: (-) neck pain, (-) back pain  Integumentary: (-) rash, (-) edema  Neurological: (-) headache, (-) altered mental status  Psychiatric: (-) hallucinations

## 2018-04-19 NOTE — ED PROVIDER NOTE - ATTENDING CONTRIBUTION TO CARE
I personally evaluated the patient. I reviewed the Resident’s or Physician Assistant’s note (as assigned above), and agree with the findings and plan except as documented in my note.  ETOH use + CHI - ct  negative  -   concussion intructions discussed

## 2018-04-19 NOTE — ED PROVIDER NOTE - PHYSICAL EXAMINATION
PHYSICAL EXAM:    GENERAL: Alert, appears stated age, well appearing, non-toxic  SKIN: Warm, pink and dry. MMM. above L eyebrow is slight swelling with surrounding ecchymosis. no step offs. no TTP.   HEAD: NC, AT, no step offs.   EYE: Normal lids/conjunctiva, PERRL, EOMI. OU OS OD 20/20  ENT: Normal hearing, patent oropharynx. no hemotympanum no septal hematoma   NECK: +supple. no tenderness/step offs.   Pulm: Bilateral BS, normal resp effort, no wheezes, stridor, or retractions  CV: RRR, no M/R/G, 2+ pulses throughout  Abd: soft, non-tender, non-distended  Mskel: no erythema, cyanosis, edema. no spinal TTP. no step offs. FROM throughout.   Neuro: AAOx3, no sensory/motor deficits, CN 2-12 intact. No speech slurring, pronator drift, facial asymmetry. normal finger-to-nose b/l. 5/5 strength throughout. normal gait. negative romberg.
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
## 2018-04-20 ENCOUNTER — EMERGENCY (EMERGENCY)
Facility: HOSPITAL | Age: 38
LOS: 1 days | End: 2018-04-20
Attending: EMERGENCY MEDICINE | Admitting: EMERGENCY MEDICINE

## 2018-04-20 VITALS
RESPIRATION RATE: 18 BRPM | TEMPERATURE: 97 F | HEART RATE: 78 BPM | DIASTOLIC BLOOD PRESSURE: 86 MMHG | WEIGHT: 119.93 LBS | OXYGEN SATURATION: 99 % | HEIGHT: 64 IN | SYSTOLIC BLOOD PRESSURE: 144 MMHG

## 2018-04-20 DIAGNOSIS — F32.9 MAJOR DEPRESSIVE DISORDER, SINGLE EPISODE, UNSPECIFIED: ICD-10-CM

## 2018-04-20 DIAGNOSIS — F10.129 ALCOHOL ABUSE WITH INTOXICATION, UNSPECIFIED: ICD-10-CM

## 2018-04-20 DIAGNOSIS — F45.1 UNDIFFERENTIATED SOMATOFORM DISORDER: ICD-10-CM

## 2018-04-20 NOTE — ED PROVIDER NOTE - MEDICAL DECISION MAKING DETAILS
37f w EtOH intoxication who did not want any form of eval. No trauma, stable gait. Pt denied SI/HI. Pt eloped from ED

## 2018-04-20 NOTE — ED PEDIATRIC NURSE NOTE - NSELOPED_ED_ALL_ED
Patient's chart was referred to charge nurse/supervisor for disposition of prescription and/or discharge instructions./Physician notified

## 2018-04-20 NOTE — ED PROVIDER NOTE - CHIEF COMPLAINT
The patient is a 37y Female complaining of agiitation. The patient is a 37y Female complaining of agitation.

## 2018-04-20 NOTE — ED PROVIDER NOTE - OBJECTIVE STATEMENT
36 yo F pmh of substance abuse was brought in by ems for intoxication. was found at a [employer] agitated and intoxicated was brought to the ed for evaluation. does not want evaluation at this time. patient A&OX3, ambulating unassisted. refusing a physical exam at this time.

## 2018-04-20 NOTE — ED ADULT NURSE REASSESSMENT NOTE - NS ED NURSE REASSESS COMMENT FT1
pt eloped without accouning she was leaving. pt intended to be on constant observation. adriana pt eloped without announcing she was leaving. pt intended to be on constant observation. prior to elopement pt stated she was going to the bathroom, when nurse went to check on pt, pt was nowhere to be found. Waiting room and ED pt eloped without announcing she was leaving. pt intended to be on constant observation. prior to elopement pt stated she was going to the bathroom, 1:1 to be assigned, when nurse went to check on pt, pt was nowhere to be found. Waiting room and ED was searched. AND Niels And MD Arevalo made aware. pt alert and oriented, ambulating steady, pt speaking clearly.

## 2018-04-20 NOTE — ED PROVIDER NOTE - PHYSICAL EXAMINATION
patient refused physical exam.    CONSTITUTIONAL: Well-developed; well-nourished;   SKIN: warm, dry  HEAD: Normocephalic;   EYES: EOMI, no conjunctival erythema  ENT: MMM  EXT: Normal ROM.    NEURO: Alert, oriented, grossly unremarkable  PSYCH: Cooperative, appropriate.

## 2018-04-20 NOTE — ED PROVIDER NOTE - ATTENDING CONTRIBUTION TO CARE
37f w hx of polysubstance and EtOH abuse well-known to ED. Pt BIB EMS for eval of intoxication and agitation. Pt was drinking EtOH in a [employer] when EMS called. Pt reports that she is feeling well at this time and that she does not want to be in ED or evaluated. Pt denies any trauma, denies any other substance abuse/misuse tonight, denies any self-harm attempt or OD. Pt denies any symptoms at this time.    Review of Systems  Constitutional:  No fever or chills.   Eyes:  Negative.   ENMT:  No nasal congestion, discharge, or throat pain.   Cardiac:  No chest pain, syncope, or edema.  Respiratory:  No dyspnea, orthopnea, stridor, wheezing, or cough. No hemoptysis.  GI:  No vomiting, diarrhea, or abdominal pain. No melena or hematochezia.  :  No dysuria or hematuria.   Musculoskeletal:  No gait abnormality, joint swelling, joint pain, or back pain.  Skin:  No skin rash, pruritis, jaundice, or lesions. No lacerations, abrasions, or ecchymosis.   Neuro:  No headache, loss of sensation, or focal weakness.  +EtOH intoxication.    Physical Exam - Pt belligerent and refusing to examine her.  General: Awake, alert, NAD, WDWN, NCAT  Eyes: PERRL, EOMI, no icterus, lids and conjunctivae are normal  ENT: External inspection normal, pink/moist membranes, pharnyx normal  CV: no JVD, warm/well-perfused  Respiratory: Normal respiratory rate/effort, no respiratory distress, normal voice, speaking full sentences  Musculoskeletal: FROM all 4 extremities, N/V intact, stable gait  Integumentary: Color normal for race, warm and dry, no visible laceration/abrasion/ecchymosis  Neuro: Alert/interactive, intoxicated, belligerent, CN 2-12 grossly intact, normal motor, normal sensory, normal gait  Psych: Intoxicated, belligerent, denies SI/HI, denies AV hallucinations.    37f w EtOH intoxication, belligerent, no trauma, stable gait. --Will check FS and observer until clinically sober.

## 2018-04-20 NOTE — ED PROVIDER NOTE - PROGRESS NOTE DETAILS
notified by RN that patient went to bathroom prior to 1:1 being assigned. and when RN went to check the bathroom, patient not found. waiting room and rest of ED checked. James velarde

## 2018-04-20 NOTE — ED PROVIDER NOTE - CARE PLAN
Principal Discharge DX:	Alcohol intoxication  Secondary Diagnosis:	Alcohol abuse  Secondary Diagnosis:	Substance abuse

## 2018-05-04 ENCOUNTER — EMERGENCY (EMERGENCY)
Facility: HOSPITAL | Age: 38
LOS: 0 days | Discharge: HOME | End: 2018-05-04
Attending: EMERGENCY MEDICINE | Admitting: EMERGENCY MEDICINE

## 2018-05-04 VITALS
HEIGHT: 64 IN | RESPIRATION RATE: 18 BRPM | OXYGEN SATURATION: 99 % | TEMPERATURE: 98 F | DIASTOLIC BLOOD PRESSURE: 68 MMHG | HEART RATE: 83 BPM | SYSTOLIC BLOOD PRESSURE: 128 MMHG | WEIGHT: 123.02 LBS

## 2018-05-04 DIAGNOSIS — F17.200 NICOTINE DEPENDENCE, UNSPECIFIED, UNCOMPLICATED: ICD-10-CM

## 2018-05-04 DIAGNOSIS — F10.129 ALCOHOL ABUSE WITH INTOXICATION, UNSPECIFIED: ICD-10-CM

## 2018-05-04 DIAGNOSIS — F32.9 MAJOR DEPRESSIVE DISORDER, SINGLE EPISODE, UNSPECIFIED: ICD-10-CM

## 2018-05-04 NOTE — ED PROVIDER NOTE - PROGRESS NOTE DETAILS
patient w stable gait, no evidence of withdrawal. being discharged into care of boyfriend who will take her home

## 2018-05-04 NOTE — ED PROVIDER NOTE - CARE PLAN
Principal Discharge DX:	Alcohol intoxication  Secondary Diagnosis:	Alcohol abuse Principal Discharge DX:	Alcohol intoxication  Assessment and plan of treatment:	37f w mild EtOH intoxication, no trauma, n/v intact.  Secondary Diagnosis:	Alcohol abuse

## 2018-05-04 NOTE — ED PROVIDER NOTE - OBJECTIVE STATEMENT
37f w a hx of anxiety, depression, & polysubstance abuse. Pt BIB EMS from [place] for EtOH intoxication. Pt admits to drinking EtOH tonight and not sure why she is in the ED. Pt reports no symptoms at this time. Denies any trauma. Pt denies any SI/HI, denies any self-harm attempt or OD, denies any substance abuse/misuse. 37f w a hx of anxiety, depression, & polysubstance abuse. Pt BIB EMS from bar for EtOH intoxication. Pt admits to drinking EtOH tonight and not sure why she is in the ED. Pt reports no symptoms at this time. Denies any trauma. Pt denies any SI/HI, denies any self-harm attempt or OD, denies any other substance abuse/misuse.

## 2018-05-04 NOTE — ED PROVIDER NOTE - NS ED ROS FT
Review of Systems  Constitutional:  No fever or chills.   Eyes:  Negative.   ENMT:  No nasal congestion, discharge, or throat pain.   Cardiac:  No chest pain, syncope, or edema.  Respiratory:  No dyspnea, wheezing, or cough. No hemoptysis.  GI:  No vomiting, diarrhea, or abdominal pain. No melena or hematochezia.  :  No dysuria or hematuria.  Musculoskeletal:  No gait abnormality, joint swelling, joint pain, or back pain.  Skin:  No lacerations, abrasions, or ecchymosis.   Neuro:  No headache, loss of sensation, or focal weakness. +EtOH intoxication

## 2018-05-04 NOTE — ED PROVIDER NOTE - PHYSICAL EXAMINATION
Physical Exam  General: Awake, alert, NAD, WDWN, NCAT, no skull/facial tender, no step-offs, no raccoon/calles, non-toxic appearing  Eyes: PERRL, EOMI, no icterus, lids and conjunctivae are normal  ENT: External inspection normal, pink/moist membranes, pharynx normal  CV: S1S2, regular rate and rhythm, no murmur/gallops/rubs, no JVD, 2+ pulses b/l, no edema/cords/homans, warm/well-perfused  Respiratory: Normal respiratory rate/effort, no respiratory distress, normal voice, speaking full sentences, lungs clear to auscultation b/l, no wheezing/rales/rhonchi, no retractions, no stridor  Abdomen: Soft abdomen, no tender/distended/guarding/rebound, no CVA tender  Musculoskeletal: FROM all 4 extremities, N/V intact, pelvis stable, no TLS spinal tender/deform/step-offs, no judith tender/deform, stable gait  Neck: FROM neck, supple, no meningismus, trachea midline, no JVD, no cspine tender/step-offs  Integumentary: Color normal for race, warm and dry, no rash. No lacerations, abrasions, or ecchymosis.  Neuro: Oriented x3, CN 2-12 intact, normal motor, normal sensory, normal gait, normal cerebellar, +slurred speech, mild EtOH intoxication  Psych: Oriented x3, mood normal, cooperative, denies SI/HI, denies AV hallucinations

## 2018-05-04 NOTE — ED PROVIDER NOTE - MEDICAL DECISION MAKING DETAILS
37f w mild EtOH intoxication, no trauma, n/v intact. Pt w stable gait and no evidence of withdrawal. Pt discharged into care of boyfriend. Pt advised regarding symptomatic/supportive care, importance of PMD & outpatient Detox f/u, and symptoms to prompt ED return.

## 2018-05-17 ENCOUNTER — EMERGENCY (EMERGENCY)
Facility: HOSPITAL | Age: 38
LOS: 0 days | Discharge: HOME | End: 2018-05-17
Attending: EMERGENCY MEDICINE | Admitting: EMERGENCY MEDICINE

## 2018-05-17 VITALS
OXYGEN SATURATION: 100 % | RESPIRATION RATE: 18 BRPM | SYSTOLIC BLOOD PRESSURE: 140 MMHG | DIASTOLIC BLOOD PRESSURE: 80 MMHG | HEART RATE: 98 BPM | WEIGHT: 139.99 LBS | HEIGHT: 65 IN | TEMPERATURE: 98 F

## 2018-05-17 DIAGNOSIS — Z53.21 PROCEDURE AND TREATMENT NOT CARRIED OUT DUE TO PATIENT LEAVING PRIOR TO BEING SEEN BY HEALTH CARE PROVIDER: ICD-10-CM

## 2018-05-18 DIAGNOSIS — Z02.9 ENCOUNTER FOR ADMINISTRATIVE EXAMINATIONS, UNSPECIFIED: ICD-10-CM

## 2018-05-27 ENCOUNTER — INPATIENT (INPATIENT)
Facility: HOSPITAL | Age: 38
LOS: 0 days | Discharge: HOME | End: 2018-05-28
Attending: INTERNAL MEDICINE | Admitting: INTERNAL MEDICINE

## 2018-05-27 VITALS
HEIGHT: 68 IN | TEMPERATURE: 99 F | OXYGEN SATURATION: 96 % | HEART RATE: 89 BPM | DIASTOLIC BLOOD PRESSURE: 87 MMHG | RESPIRATION RATE: 18 BRPM | SYSTOLIC BLOOD PRESSURE: 133 MMHG | WEIGHT: 154.98 LBS

## 2018-05-27 DIAGNOSIS — F10.230 ALCOHOL DEPENDENCE WITH WITHDRAWAL, UNCOMPLICATED: ICD-10-CM

## 2018-05-27 DIAGNOSIS — F19.10 OTHER PSYCHOACTIVE SUBSTANCE ABUSE, UNCOMPLICATED: ICD-10-CM

## 2018-05-27 DIAGNOSIS — E87.6 HYPOKALEMIA: ICD-10-CM

## 2018-05-27 DIAGNOSIS — F41.9 ANXIETY DISORDER, UNSPECIFIED: ICD-10-CM

## 2018-05-27 DIAGNOSIS — K52.9 NONINFECTIVE GASTROENTERITIS AND COLITIS, UNSPECIFIED: ICD-10-CM

## 2018-05-27 LAB
ALBUMIN SERPL ELPH-MCNC: 4.3 G/DL — SIGNIFICANT CHANGE UP (ref 3.5–5.2)
ALP SERPL-CCNC: 81 U/L — SIGNIFICANT CHANGE UP (ref 30–115)
ALT FLD-CCNC: 17 U/L — SIGNIFICANT CHANGE UP (ref 0–41)
ANION GAP SERPL CALC-SCNC: 14 MMOL/L — SIGNIFICANT CHANGE UP (ref 7–14)
ANION GAP SERPL CALC-SCNC: 14 MMOL/L — SIGNIFICANT CHANGE UP (ref 7–14)
APAP SERPL-MCNC: <5 UG/ML — LOW (ref 10–30)
APPEARANCE UR: CLEAR — SIGNIFICANT CHANGE UP
AST SERPL-CCNC: 18 U/L — SIGNIFICANT CHANGE UP (ref 0–41)
BASOPHILS # BLD AUTO: 0.06 K/UL — SIGNIFICANT CHANGE UP (ref 0–0.2)
BASOPHILS NFR BLD AUTO: 1.2 % — HIGH (ref 0–1)
BILIRUB DIRECT SERPL-MCNC: <0.2 MG/DL — SIGNIFICANT CHANGE UP (ref 0–0.2)
BILIRUB INDIRECT FLD-MCNC: >0.2 MG/DL — SIGNIFICANT CHANGE UP (ref 0.2–1.2)
BILIRUB SERPL-MCNC: 0.4 MG/DL — SIGNIFICANT CHANGE UP (ref 0.2–1.2)
BILIRUB UR-MCNC: NEGATIVE — SIGNIFICANT CHANGE UP
BUN SERPL-MCNC: 5 MG/DL — LOW (ref 10–20)
BUN SERPL-MCNC: 7 MG/DL — LOW (ref 10–20)
CALCIUM SERPL-MCNC: 8.7 MG/DL — SIGNIFICANT CHANGE UP (ref 8.5–10.1)
CALCIUM SERPL-MCNC: 9 MG/DL — SIGNIFICANT CHANGE UP (ref 8.5–10.1)
CHLORIDE SERPL-SCNC: 104 MMOL/L — SIGNIFICANT CHANGE UP (ref 98–110)
CHLORIDE SERPL-SCNC: 105 MMOL/L — SIGNIFICANT CHANGE UP (ref 98–110)
CO2 SERPL-SCNC: 24 MMOL/L — SIGNIFICANT CHANGE UP (ref 17–32)
CO2 SERPL-SCNC: 25 MMOL/L — SIGNIFICANT CHANGE UP (ref 17–32)
COLOR SPEC: YELLOW — SIGNIFICANT CHANGE UP
CREAT SERPL-MCNC: 0.6 MG/DL — LOW (ref 0.7–1.5)
CREAT SERPL-MCNC: 0.7 MG/DL — SIGNIFICANT CHANGE UP (ref 0.7–1.5)
DIFF PNL FLD: NEGATIVE — SIGNIFICANT CHANGE UP
EOSINOPHIL # BLD AUTO: 0.01 K/UL — SIGNIFICANT CHANGE UP (ref 0–0.7)
EOSINOPHIL NFR BLD AUTO: 0.2 % — SIGNIFICANT CHANGE UP (ref 0–8)
ETHANOL SERPL-MCNC: 62 MG/DL — HIGH
GLUCOSE SERPL-MCNC: 110 MG/DL — HIGH (ref 70–99)
GLUCOSE SERPL-MCNC: 91 MG/DL — SIGNIFICANT CHANGE UP (ref 70–99)
GLUCOSE UR QL: NEGATIVE MG/DL — SIGNIFICANT CHANGE UP
HCT VFR BLD CALC: 43.6 % — SIGNIFICANT CHANGE UP (ref 37–47)
HGB BLD-MCNC: 14.7 G/DL — SIGNIFICANT CHANGE UP (ref 12–16)
IMM GRANULOCYTES NFR BLD AUTO: 0.2 % — SIGNIFICANT CHANGE UP (ref 0.1–0.3)
KETONES UR-MCNC: NEGATIVE — SIGNIFICANT CHANGE UP
LEUKOCYTE ESTERASE UR-ACNC: NEGATIVE — SIGNIFICANT CHANGE UP
LYMPHOCYTES # BLD AUTO: 1.23 K/UL — SIGNIFICANT CHANGE UP (ref 1.2–3.4)
LYMPHOCYTES # BLD AUTO: 23.9 % — SIGNIFICANT CHANGE UP (ref 20.5–51.1)
MAGNESIUM SERPL-MCNC: 2 MG/DL — SIGNIFICANT CHANGE UP (ref 1.8–2.4)
MCHC RBC-ENTMCNC: 30.2 PG — SIGNIFICANT CHANGE UP (ref 27–31)
MCHC RBC-ENTMCNC: 33.7 G/DL — SIGNIFICANT CHANGE UP (ref 32–37)
MCV RBC AUTO: 89.7 FL — SIGNIFICANT CHANGE UP (ref 81–99)
MONOCYTES # BLD AUTO: 0.55 K/UL — SIGNIFICANT CHANGE UP (ref 0.1–0.6)
MONOCYTES NFR BLD AUTO: 10.7 % — HIGH (ref 1.7–9.3)
NEUTROPHILS # BLD AUTO: 3.28 K/UL — SIGNIFICANT CHANGE UP (ref 1.4–6.5)
NEUTROPHILS NFR BLD AUTO: 63.8 % — SIGNIFICANT CHANGE UP (ref 42.2–75.2)
NITRITE UR-MCNC: NEGATIVE — SIGNIFICANT CHANGE UP
PH UR: 7.5 — SIGNIFICANT CHANGE UP (ref 5–8)
PHOSPHATE SERPL-MCNC: 3.3 MG/DL — SIGNIFICANT CHANGE UP (ref 2.1–4.9)
PLATELET # BLD AUTO: 282 K/UL — SIGNIFICANT CHANGE UP (ref 130–400)
POTASSIUM SERPL-MCNC: 3.2 MMOL/L — LOW (ref 3.5–5)
POTASSIUM SERPL-MCNC: 4.2 MMOL/L — SIGNIFICANT CHANGE UP (ref 3.5–5)
POTASSIUM SERPL-SCNC: 3.2 MMOL/L — LOW (ref 3.5–5)
POTASSIUM SERPL-SCNC: 4.2 MMOL/L — SIGNIFICANT CHANGE UP (ref 3.5–5)
PROT SERPL-MCNC: 6.7 G/DL — SIGNIFICANT CHANGE UP (ref 6–8)
PROT UR-MCNC: NEGATIVE MG/DL — SIGNIFICANT CHANGE UP
RBC # BLD: 4.86 M/UL — SIGNIFICANT CHANGE UP (ref 4.2–5.4)
RBC # FLD: 12.9 % — SIGNIFICANT CHANGE UP (ref 11.5–14.5)
SALICYLATES SERPL-MCNC: <0.3 MG/DL — LOW (ref 4–30)
SODIUM SERPL-SCNC: 143 MMOL/L — SIGNIFICANT CHANGE UP (ref 135–146)
SODIUM SERPL-SCNC: 143 MMOL/L — SIGNIFICANT CHANGE UP (ref 135–146)
SP GR SPEC: 1.01 — SIGNIFICANT CHANGE UP (ref 1.01–1.03)
UROBILINOGEN FLD QL: 0.2 MG/DL — SIGNIFICANT CHANGE UP (ref 0.2–0.2)
WBC # BLD: 5.14 K/UL — SIGNIFICANT CHANGE UP (ref 4.8–10.8)
WBC # FLD AUTO: 5.14 K/UL — SIGNIFICANT CHANGE UP (ref 4.8–10.8)

## 2018-05-27 RX ORDER — NICOTINE POLACRILEX 2 MG
1 GUM BUCCAL DAILY
Qty: 0 | Refills: 0 | Status: DISCONTINUED | OUTPATIENT
Start: 2018-05-27 | End: 2018-05-28

## 2018-05-27 RX ORDER — HYDROXYZINE HCL 10 MG
10 TABLET ORAL ONCE
Qty: 0 | Refills: 0 | Status: COMPLETED | OUTPATIENT
Start: 2018-05-27 | End: 2018-05-27

## 2018-05-27 RX ORDER — POTASSIUM CHLORIDE 20 MEQ
40 PACKET (EA) ORAL ONCE
Qty: 0 | Refills: 0 | Status: COMPLETED | OUTPATIENT
Start: 2018-05-27 | End: 2018-05-27

## 2018-05-27 RX ORDER — NICOTINE POLACRILEX 2 MG
2 GUM BUCCAL
Qty: 0 | Refills: 0 | Status: DISCONTINUED | OUTPATIENT
Start: 2018-05-27 | End: 2018-05-28

## 2018-05-27 RX ADMIN — Medication 2 MILLIGRAM(S): at 21:15

## 2018-05-27 RX ADMIN — Medication 40 MILLIEQUIVALENT(S): at 17:34

## 2018-05-27 RX ADMIN — Medication 1 MILLIGRAM(S): at 23:12

## 2018-05-27 RX ADMIN — Medication 1 MILLIGRAM(S): at 10:55

## 2018-05-27 RX ADMIN — Medication 1 MILLIGRAM(S): at 17:34

## 2018-05-27 RX ADMIN — Medication 1 MILLIGRAM(S): at 12:13

## 2018-05-27 RX ADMIN — Medication 1 MILLIGRAM(S): at 21:11

## 2018-05-27 RX ADMIN — Medication 1 MILLIGRAM(S): at 16:10

## 2018-05-27 NOTE — H&P ADULT - NSHPLABSRESULTS_GEN_ALL_CORE
T(F): , Max: 98.7 (05-27-18 @ 10:19)  HR: 181 (05-27-18 @ 16:58) (89 - 181)  BP: 137/67 (05-27-18 @ 16:58)  RR: 22 (05-27-18 @ 16:58)  SpO2: 100% (05-27-18 @ 15:27)70.3  General: No apparent distress  Cardiovascular: S1, S2  Gastrointestinal: Soft, Non-tender, Non-distended  Respiratory: Good air entry bilaterally  Musculoskeletal: Moves all extremities  Lymphatic: No edema  Neurologic: No gross motor deficit  Dermatologic: Skin dry                          14.7   5.14  )-----------( 282      ( 27 May 2018 10:48 )             43.6     05-27    143  |  104  |  7<L>  ----------------------------<  110<H>  3.2<L>   |  25  |  0.7    Ca    8.7      27 May 2018 10:48

## 2018-05-27 NOTE — PROVIDER CONTACT NOTE (OTHER) - SITUATION
Pt appears to be in a euphoric state. As per psych recommendation visitors are being restricted. MD Schumacher in agreeance. Visitor passed cigarettes to pt while in room. MD Schumacher aware Pt appears to be in a euphoric state. As per psych recommendation visitors are being restricted. MD Schumacher in agreeance. Visitor passed contraband to pt while in room. MD Schumacher aware Pt appears in euphoric state As per psych recommendation visitors are being restricted MD Schumacher in agreeance Visitor passed contraband to pt while in room MD Schumacher aware AND Bindu aware Security aware

## 2018-05-27 NOTE — ED PROVIDER NOTE - ATTENDING CONTRIBUTION TO CARE
38 y/o F pmh polysubstance abuse, psyche d/o, p/w agitation. pt states needs help, but is poor historian, has pressured speech, needs frequent redirecting.  Pt has no somatic complaints, denies HI, SI, hallucination. Pt denies recent drug use.  Exam is atraumatic and non focal.  Pt calmed w/ ativan.  IMP: psychosis vs delirium.  P: labs, ekg, cxr, ucg, ivf, psyche consult, reassess.

## 2018-05-27 NOTE — ED PROVIDER NOTE - NS ED ROS FT
Eyes:  No visual changes  ENMT:  No neck pain or stiffness.  Cardiac:  No chest pain, SOB  Respiratory:  No cough  GI:  No nausea, vomiting, diarrhea or abdominal pain.  :  No dysuria, frequency or burning.  MS:  No back pain.  Neuro:  No headache  No LOC.  Skin:  No skin rash.   Endocrine: No history of thyroid disease or diabetes.

## 2018-05-27 NOTE — H&P ADULT - HISTORY OF PRESENT ILLNESS
38 Y/O f p/w 1 year or uneasy feeling gradually worsening in last year since her baby was born. Pt states that she occasionally drinks a beer or two a day in order to take the edge off but denies daily drinking. Pt also admitts to 1 month of loose stools. No fevers, abd pain or vomitting

## 2018-05-27 NOTE — ED PROVIDER NOTE - OBJECTIVE STATEMENT
36 yo f ho substance abuse presents to ED agitated. Poor historian, not forth coming with information here in ED. States she did not drink etoh, denies drug use. States she feels unwell here, but unable to elaborate.

## 2018-05-27 NOTE — ED PROVIDER NOTE - MEDICAL DECISION MAKING DETAILS
Pt required addition ativan to calm her.  Labs + hypokalemia.  Pt seen by psyche Dr. Osuna who knows her well, feels pt is off baseline and does not think this is primary psyche, rather, more c/w delirium.  Recc admission to medicine.  Pt admitted for hypoK, concern for impending delirium for electrolyte repletion, close observation and reassessment, specialist consult.

## 2018-05-27 NOTE — ED PROVIDER NOTE - PHYSICAL EXAMINATION
CONSTITUTIONAL: Well-developed; well-nourished.   SKIN: warm, dry  HEAD: Normocephalic; atraumatic.  EYES: no conj injection  ENT: No nasal discharge; airway clear.  NECK: Supple; non tender.  CARD: S1, S2 normal; no murmurs, gallops, or rubs. Regular rate and rhythm.   RESP: No wheezes, rales or rhonchi.  ABD: soft ntnd  EXT: Normal ROM.   NEURO: Alert, oriented, grossly unremarkable  PSYCH: Agitated, but cooperative

## 2018-05-27 NOTE — CHART NOTE - NSCHARTNOTEFT_GEN_A_CORE
Asked to see Pt b/c Pt requested to leave AMA. At 9-31pm Pt presents with labile euphoric affect, likely intoxicated. Pt had visitors on the unit  who could bring Pt contraband. As per unit staff, Pt admitted using "Cecilia" and other unknown drug PTA. In my opinion, Pt is still lacking capacity to sign AMA. Reassess tomorrow in AM. Recommend to restrict visitation.

## 2018-05-27 NOTE — ED BEHAVIORAL HEALTH NOTE - BEHAVIORAL HEALTH NOTE
CC: "anxiety"    HPI: 38yo homeless WW known to me from past tx at [facility], with long h/o polysubstance use, chronically non-compliant with any type of aftercare, frequent ED visits, brought to ED by a "friend", presented extremely agitated with pressured speech, perseverative t/p, -111, received 2 doses of Ativan 1mg at 10-23am and 12-11pm for agitation, and was found to have BAL of 62. When I saw Pt she was extremely poor historian, barely able to communicate, and providing extremely inconsistent unreliable history. She denied any recent use of cocaine or benzodiazepines despite the fact of multiple recent UDSs positive for both, and reported drinking only one can of beer a day despite relatively high recent alcohol levels. She appeared mildly confused, reporting today's date as " or ". She was unable or unwilling to provide much Hx, denying having any collaterals, and stating that her mother  2 moths ago. Pt denied SI/Hi, no overt sx of psychosis.    PPH: h/o multiple ED visits, several detox and at least one IPP admission for "bipolar d/o". Non-compliant with follow-up.    PMH: none    Current meds: none    FH: unknown    SH: homeless, unemployed    MSE: A, Ox2 (not to time), restless in bed, poorly related, poor eye contact, first tries to engage, then turns to the wall, mood anxious, affect anxious, t/p perseverative, t/c no si/hi/ah/vh, no delusions, i/j poor.     A/P 38yo W with opioid, cocaine, sedative, alcohol use d/o, presented agitated and anxious, mildly confuse, with decreased attention span, perseverative t/p, with -111. She provides completely unreliable Hx, and no collaterals can be obtained at this time. In my opinion, Pt should be admitted to medicine for monitoring due to risk of impending substance-induced delirium. Pt should be on 1:1 observation for elopement risk, currently she has no capacity to sign out AMA.   Start on Librium taper, consult detox.  Reevaluate tomorrow in AM    Dx sedative, etoh and cocaine use d/o, substance induced delirium

## 2018-05-27 NOTE — PROVIDER CONTACT NOTE (OTHER) - SITUATION
Pt is anxious and admitted for alcohol withdrawals. Pt is requesting to leave AMA. Pt is on 1:1 observation. Pt has made multiple attempts to flee.

## 2018-05-28 ENCOUNTER — TRANSCRIPTION ENCOUNTER (OUTPATIENT)
Age: 38
End: 2018-05-28

## 2018-05-28 VITALS
TEMPERATURE: 98 F | HEART RATE: 76 BPM | DIASTOLIC BLOOD PRESSURE: 58 MMHG | RESPIRATION RATE: 18 BRPM | SYSTOLIC BLOOD PRESSURE: 101 MMHG

## 2018-05-28 DIAGNOSIS — F19.20 OTHER PSYCHOACTIVE SUBSTANCE DEPENDENCE, UNCOMPLICATED: ICD-10-CM

## 2018-05-28 RX ORDER — NICOTINE POLACRILEX 2 MG
1 GUM BUCCAL
Qty: 0 | Refills: 0 | COMMUNITY
Start: 2018-05-28

## 2018-05-28 RX ADMIN — Medication 1 MILLIGRAM(S): at 02:46

## 2018-05-28 RX ADMIN — Medication 1 MILLIGRAM(S): at 09:05

## 2018-05-28 NOTE — DISCHARGE NOTE ADULT - PATIENT PORTAL LINK FT
You can access the [facility] Patient Portal, offered by [facility], by registering with the following website: [email or web address]

## 2018-05-28 NOTE — DISCHARGE NOTE ADULT - CARE PLAN
Principal Discharge DX:	Substance abuse  Goal:	To prevent further recurrence  Assessment and plan of treatment:	Avoid drugs and alcohol. Quit smoking. F/U Psychiatry as outpatient.

## 2018-05-28 NOTE — PROGRESS NOTE BEHAVIORAL HEALTH - NSBHFUPINTERVALHXFT_PSY_A_CORE
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 Pt presented 5/27/18 with agitation, evidence of intoxication, confusion and admitted to using "cal". Pt adm to medicine given confusion preventing her from having capacity to leave AMA as she was requesting. Pt adm to medicine. This am, pt encountered in bed, resting, eating breakfast, calm and cooperative with assessment, able to engage appropriately. Pt alert but re orientation, initially stated date as June 2, 2018 but oriented re place, person. Pt denies substance use as is typical for presentation, despite past positive UDS. Pt not receptive to info re outpt/inpt substance use treatment options but was polite in declining. Pt expressing desire to be discharged.

## 2018-05-28 NOTE — DISCHARGE NOTE ADULT - MEDICATION SUMMARY - MEDICATIONS TO TAKE
I will START or STAY ON the medications listed below when I get home from the hospital:    nicotine  -- 1  by mouth every 4 hours, As Needed  -- Indication: For Substance abuse

## 2018-05-28 NOTE — PROGRESS NOTE ADULT - SUBJECTIVE AND OBJECTIVE BOX
Pt seen and examined. Pt feels better today. Denies thoughts of hurting herself or others.    T(F): , Max: 98 (05-28-18 @ 06:13)  HR: 76 (05-28-18 @ 06:13) (76 - 181)  BP: 101/58 (05-28-18 @ 06:13)  RR: 18 (05-28-18 @ 06:13)  SpO2: 100% (05-27-18 @ 15:27)  General: No apparent distress  Cardiovascular: S1, S2  Gastrointestinal: Soft, Non-tender, Non-distended  Respiratory: Good air entry bilaterally  Musculoskeletal: Moves all extremities  Lymphatic: No edema  Neurologic: No gross motor deficit  Dermatologic: Skin dry                          14.7   5.14  )-----------( 282      ( 27 May 2018 10:48 )             43.6     05-27    143  |  105  |  5<L>  ----------------------------<  91  4.2   |  24  |  0.6<L>    Ca    9.0      27 May 2018 19:04  Phos  3.3     05-27  Mg     2.0     05-27    TPro  6.7  /  Alb  4.3  /  TBili  0.4  /  DBili  <0.2  /  AST  18  /  ALT  17  /  AlkPhos  81  05-27

## 2018-05-28 NOTE — PROGRESS NOTE BEHAVIORAL HEALTH - CASE SUMMARY
36 yo WF, presented with agitation, likely substance induced, now calm cooperative and denying SI. Cleared for psych dc

## 2018-05-28 NOTE — DISCHARGE NOTE ADULT - HOSPITAL COURSE
Pt admitted with severe agitation. Pt has had numerous visits to hospital with multiple positive drug screens. Pt was found to be hypokalemic. Pt potassium was repleted, was seen by Psychiatry and was recmmended to follow with psychiatry as outpt.

## 2018-06-01 DIAGNOSIS — E87.6 HYPOKALEMIA: ICD-10-CM

## 2018-06-01 DIAGNOSIS — F19.10 OTHER PSYCHOACTIVE SUBSTANCE ABUSE, UNCOMPLICATED: ICD-10-CM

## 2018-06-01 DIAGNOSIS — K52.9 NONINFECTIVE GASTROENTERITIS AND COLITIS, UNSPECIFIED: ICD-10-CM

## 2018-06-01 DIAGNOSIS — F10.239 ALCOHOL DEPENDENCE WITH WITHDRAWAL, UNSPECIFIED: ICD-10-CM

## 2018-06-01 DIAGNOSIS — F41.9 ANXIETY DISORDER, UNSPECIFIED: ICD-10-CM

## 2018-06-01 DIAGNOSIS — Z91.19 PATIENT'S NONCOMPLIANCE WITH OTHER MEDICAL TREATMENT AND REGIMEN: ICD-10-CM

## 2018-06-22 ENCOUNTER — EMERGENCY (EMERGENCY)
Facility: HOSPITAL | Age: 38
LOS: 0 days | Discharge: HOME | End: 2018-06-22
Attending: EMERGENCY MEDICINE | Admitting: EMERGENCY MEDICINE

## 2018-06-22 VITALS
HEART RATE: 100 BPM | RESPIRATION RATE: 19 BRPM | OXYGEN SATURATION: 98 % | TEMPERATURE: 99 F | SYSTOLIC BLOOD PRESSURE: 123 MMHG | DIASTOLIC BLOOD PRESSURE: 81 MMHG

## 2018-06-22 VITALS
RESPIRATION RATE: 20 BRPM | WEIGHT: 134.92 LBS | DIASTOLIC BLOOD PRESSURE: 88 MMHG | TEMPERATURE: 97 F | OXYGEN SATURATION: 99 % | HEART RATE: 96 BPM | SYSTOLIC BLOOD PRESSURE: 125 MMHG | HEIGHT: 64 IN

## 2018-06-22 DIAGNOSIS — R05 COUGH: ICD-10-CM

## 2018-06-22 DIAGNOSIS — F41.9 ANXIETY DISORDER, UNSPECIFIED: ICD-10-CM

## 2018-06-22 DIAGNOSIS — F32.9 MAJOR DEPRESSIVE DISORDER, SINGLE EPISODE, UNSPECIFIED: ICD-10-CM

## 2018-06-22 DIAGNOSIS — R50.9 FEVER, UNSPECIFIED: ICD-10-CM

## 2018-06-22 DIAGNOSIS — Z79.899 OTHER LONG TERM (CURRENT) DRUG THERAPY: ICD-10-CM

## 2018-06-22 RX ORDER — IBUPROFEN 200 MG
600 TABLET ORAL ONCE
Qty: 0 | Refills: 0 | Status: COMPLETED | OUTPATIENT
Start: 2018-06-22 | End: 2018-06-22

## 2018-06-22 RX ADMIN — Medication 600 MILLIGRAM(S): at 07:39

## 2018-06-22 NOTE — ED PROVIDER NOTE - OBJECTIVE STATEMENT
38 yo F with pmhx of anxiety, depression, substance abuse presenting with cough for a few days associated with subjective fever and productive yellow sputum. No cp, sob, fever, chills, abdominal pain, nausea, vomiting, diarrhea, back pain, urinary symptoms, headache, dizziness, paresthesias, or weakness.

## 2018-06-22 NOTE — ED PROVIDER NOTE - NS ED ROS FT
Review of Systems:  	•	CONSTITUTIONAL - no fever, no diaphoresis, no chills  	•	SKIN - no rash  	•	ENT - + congestion  	•	RESPIRATORY - no shortness of breath, + cough  	•	CARDIAC - no chest pain, no palpitations  	•	GI - no abd pain, no nausea, no vomiting, no diarrhea  	•	GENITO-URINARY - no dysuria  	•	MUSCULOSKELETAL - no joint paint, no swelling, no redness  	•	NEUROLOGIC - no weakness, no headache, no paresthesias, no LOC  	•	PSYCH - + anxiety, + depression
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
## 2018-06-23 ENCOUNTER — EMERGENCY (EMERGENCY)
Facility: HOSPITAL | Age: 38
LOS: 0 days | Discharge: HOME | End: 2018-06-23
Admitting: EMERGENCY MEDICINE

## 2018-06-23 VITALS
RESPIRATION RATE: 18 BRPM | HEART RATE: 111 BPM | SYSTOLIC BLOOD PRESSURE: 140 MMHG | TEMPERATURE: 97 F | OXYGEN SATURATION: 97 % | DIASTOLIC BLOOD PRESSURE: 65 MMHG

## 2018-06-23 VITALS — SYSTOLIC BLOOD PRESSURE: 119 MMHG | HEART RATE: 86 BPM | DIASTOLIC BLOOD PRESSURE: 82 MMHG | OXYGEN SATURATION: 98 %

## 2018-06-23 DIAGNOSIS — R05 COUGH: ICD-10-CM

## 2018-06-23 RX ORDER — AZITHROMYCIN 500 MG/1
1 TABLET, FILM COATED ORAL
Qty: 6 | Refills: 0 | OUTPATIENT
Start: 2018-06-23 | End: 2018-06-27

## 2018-06-23 NOTE — ED ADULT NURSE NOTE - OBJECTIVE STATEMENT
pt presents to the ED with c/o cough and fever for 1 week now and night sweats. pt denies cp/sob, n/v/d.

## 2018-06-23 NOTE — ED PROVIDER NOTE - PROGRESS NOTE DETAILS
pt went out to smoke twice in 1 hr  requesting zpak from ER  pt has good aeration. full sentences. looks well.   limits of zpak dw pt who agrees return if worse.    pt agrees to ER if any worse.  options of abx, waiting, inhaler, steroids, OTC rx all considered and dw pt.  dw pt caution advised with medication's that make you drowsy Pt extensively counseled - warning si/sxs d/w pt. Case d/w pt at length including risks vs benefits of different management/treatment options. Pt instructed to return to ed for re-evaluation or f/u with PCP should sxs persist. Pt verbalizes an understanding & agreement with the plan as discussed

## 2018-06-23 NOTE — ED PROVIDER NOTE - OBJECTIVE STATEMENT
pt here again for cough  productive of yellow-green sputum  was seen in ED yesterday, cxr wnl and was not given abx, but presents today insisting she gets this every year and it does not get better without abx  requesting zpak  subjective fever, no abd pain, cp, sob  no recent travel or sick contacts

## 2018-07-04 NOTE — ED ADULT NURSE NOTE - CHIEF COMPLAINT
Patient Seen in: [id number] [facility]    History   Patient presents with:  Rash    Stated Complaint: facial rash    HPI    5year-old female brought in by her mother today with chief complaints of a lesion on the right cheek area that was noti Back: no tenderness to percussion or palpation  Lungs: clear to auscultation bilaterally  Heart: S1, S2 normal, no murmur, click, rub or gallop, regular rate and rhythm  Abdomen: soft, non-tender; bowel sounds normal; no masses,  no organomegaly  Extremiti The patient is a 37y Female complaining of anxiety.

## 2018-07-12 ENCOUNTER — EMERGENCY (EMERGENCY)
Facility: HOSPITAL | Age: 38
LOS: 0 days | Discharge: LEFT BEFORE DISCHARGE | End: 2018-07-13
Attending: EMERGENCY MEDICINE | Admitting: EMERGENCY MEDICINE

## 2018-07-12 VITALS
DIASTOLIC BLOOD PRESSURE: 84 MMHG | HEART RATE: 70 BPM | WEIGHT: 128.09 LBS | RESPIRATION RATE: 20 BRPM | TEMPERATURE: 99 F | OXYGEN SATURATION: 100 % | SYSTOLIC BLOOD PRESSURE: 126 MMHG

## 2018-07-12 DIAGNOSIS — Z79.899 OTHER LONG TERM (CURRENT) DRUG THERAPY: ICD-10-CM

## 2018-07-12 DIAGNOSIS — F41.8 OTHER SPECIFIED ANXIETY DISORDERS: ICD-10-CM

## 2018-07-12 DIAGNOSIS — R22.0 LOCALIZED SWELLING, MASS AND LUMP, HEAD: ICD-10-CM

## 2018-07-12 DIAGNOSIS — R14.0 ABDOMINAL DISTENSION (GASEOUS): ICD-10-CM

## 2018-07-12 DIAGNOSIS — Z79.2 LONG TERM (CURRENT) USE OF ANTIBIOTICS: ICD-10-CM

## 2018-07-12 NOTE — ED PROVIDER NOTE - PROGRESS NOTE DETAILS
Pt not at bedside at this time patient still not at bedside. rest of ER, bathrooms, & waiting room checked. patient eloped prior to eval

## 2018-07-13 ENCOUNTER — EMERGENCY (EMERGENCY)
Facility: HOSPITAL | Age: 38
LOS: 0 days | Discharge: HOME | End: 2018-07-13
Attending: EMERGENCY MEDICINE | Admitting: EMERGENCY MEDICINE

## 2018-07-13 VITALS
OXYGEN SATURATION: 100 % | DIASTOLIC BLOOD PRESSURE: 58 MMHG | TEMPERATURE: 97 F | SYSTOLIC BLOOD PRESSURE: 113 MMHG | RESPIRATION RATE: 18 BRPM | HEART RATE: 80 BPM

## 2018-07-13 VITALS
HEIGHT: 64 IN | HEART RATE: 84 BPM | DIASTOLIC BLOOD PRESSURE: 65 MMHG | SYSTOLIC BLOOD PRESSURE: 126 MMHG | RESPIRATION RATE: 18 BRPM | TEMPERATURE: 98 F | OXYGEN SATURATION: 98 % | WEIGHT: 126.1 LBS

## 2018-07-13 VITALS
OXYGEN SATURATION: 100 % | HEART RATE: 83 BPM | DIASTOLIC BLOOD PRESSURE: 69 MMHG | RESPIRATION RATE: 18 BRPM | SYSTOLIC BLOOD PRESSURE: 115 MMHG

## 2018-07-13 VITALS
HEART RATE: 104 BPM | OXYGEN SATURATION: 100 % | DIASTOLIC BLOOD PRESSURE: 72 MMHG | RESPIRATION RATE: 18 BRPM | TEMPERATURE: 96 F | SYSTOLIC BLOOD PRESSURE: 146 MMHG

## 2018-07-13 DIAGNOSIS — F41.9 ANXIETY DISORDER, UNSPECIFIED: ICD-10-CM

## 2018-07-13 DIAGNOSIS — Z79.899 OTHER LONG TERM (CURRENT) DRUG THERAPY: ICD-10-CM

## 2018-07-13 DIAGNOSIS — F10.129 ALCOHOL ABUSE WITH INTOXICATION, UNSPECIFIED: ICD-10-CM

## 2018-07-13 DIAGNOSIS — Z79.2 LONG TERM (CURRENT) USE OF ANTIBIOTICS: ICD-10-CM

## 2018-07-13 DIAGNOSIS — R30.0 DYSURIA: ICD-10-CM

## 2018-07-13 DIAGNOSIS — F41.8 OTHER SPECIFIED ANXIETY DISORDERS: ICD-10-CM

## 2018-07-13 LAB
APPEARANCE UR: CLEAR — SIGNIFICANT CHANGE UP
BILIRUB UR-MCNC: (no result)
COLOR SPEC: YELLOW — SIGNIFICANT CHANGE UP
DIFF PNL FLD: NEGATIVE — SIGNIFICANT CHANGE UP
GLUCOSE UR QL: NEGATIVE — SIGNIFICANT CHANGE UP
KETONES UR-MCNC: 40 — SIGNIFICANT CHANGE UP
LEUKOCYTE ESTERASE UR-ACNC: NEGATIVE — SIGNIFICANT CHANGE UP
NITRITE UR-MCNC: NEGATIVE — SIGNIFICANT CHANGE UP
PH UR: 6 — SIGNIFICANT CHANGE UP (ref 5–8)
PROT UR-MCNC: NEGATIVE — SIGNIFICANT CHANGE UP
SP GR SPEC: 1.02 — SIGNIFICANT CHANGE UP (ref 1.01–1.03)
UROBILINOGEN FLD QL: 1 (ref 0.2–0.2)

## 2018-07-13 RX ORDER — HYDROXYZINE HCL 10 MG
25 TABLET ORAL ONCE
Qty: 0 | Refills: 0 | Status: COMPLETED | OUTPATIENT
Start: 2018-07-13 | End: 2018-07-13

## 2018-07-13 RX ADMIN — Medication 25 MILLIGRAM(S): at 03:07

## 2018-07-13 NOTE — ED PROVIDER NOTE - PLAN OF CARE
37f w hx of EtOH abuse and anxiety presents reporting anxiety. patient mildly intoxicated and sleeping intermittently during ED observation. --Will give hydroxyzine and advise to f/u Outpatient Mental Health & Detox

## 2018-07-13 NOTE — ED PROVIDER NOTE - OBJECTIVE STATEMENT
37f w a hx of anxiety, depression, & polysubstance abuse. Pt presents reporting drinking EtOH tonight and feeling anxious. Symptoms are constant, moderate, no exacerbating/alleviating. Patient denies SI/HI, denies any self-harm attempt or OD, denies any other substance abuse/misuse, and denies AV hallucinations.

## 2018-07-13 NOTE — ED PROVIDER NOTE - CARE PLAN
Principal Discharge DX:	Anxiety  Assessment and plan of treatment:	37f w hx of EtOH abuse and anxiety presents reporting anxiety. patient mildly intoxicated and sleeping intermittently during ED observation. --Will give hydroxyzine and advise to f/u Outpatient Mental Health & Detox  Secondary Diagnosis:	Alcohol intoxication

## 2018-07-13 NOTE — ED PROVIDER NOTE - MEDICAL DECISION MAKING DETAILS
37f w hx of EtOH abuse and anxiety presents reporting anxiety. patient mildly intoxicated and sleeping intermittently during ED observation. Pt feeling better after hydroxyzine. Pt advised regarding symptomatic/supportive care, importance of PMD/Psych f/u, and symptoms to prompt ED return.

## 2018-07-13 NOTE — ED PROVIDER NOTE - OBJECTIVE STATEMENT
36 y/o F p/w dysuria for one hour, was seen in ED last night for ETOH intox. 38 y/o F p/w dysuria for one hour, was seen in ED twice last night for ETOH intox and other complaints.

## 2018-07-13 NOTE — ED PROVIDER NOTE - PHYSICAL EXAMINATION
AOx4, disheveled appearing, NAD, speaking in full sentences. Skin  warm and dry, no acute rash. Head normocephalic, atraumatic. Conjunctiva and sclera clear. MM moist, no nasal discharge.  Heart RRR s1s2 nl, no rub/murmur. Lungs- No retractions, BS equal, CTAB. Abdomen soft ntnd no r/g. Extremities- moves all.

## 2018-07-13 NOTE — ED PROVIDER NOTE - NS ED ROS FT
Constitutional: See HPI.  Eyes: No visual changes, eye pain or discharge.  ENMT: No hearing changes, pain, discharge or infections. No neck pain or stiffness.  Cardiac: No chest pain, SOB or edema. No chest pain with exertion.  Respiratory: No cough or respiratory distress.   GI: No nausea, vomiting, diarrhea or abdominal pain.  : +dysuria  MS: No myalgia, muscle weakness, joint pain or back pain.  Neuro: No headache or weakness. No LOC.  Skin: No skin rash.

## 2018-07-13 NOTE — ED PROVIDER NOTE - PHYSICAL EXAMINATION
----- Message from Nargis Padron sent at 9/18/2017  2:35 PM CDT -----  Contact: [phone]  Pt is requesting a call back in regards to a letter for a procedure to see if it is medically necessary to [employer] Thanks !  
Is keloid removal medically necessary? Please advise, thank you  
Physical Exam  General: Awake, alert, NAD, WDWN, NCAT, no skull/facial tender, no step-offs, no raccoon/calles, non-toxic appearing  Eyes: PERRL, EOMI, no icterus, lids and conjunctivae are normal  ENT: External inspection normal, pink/moist membranes, pharynx normal  CV: S1S2, regular rate and rhythm, no murmur/gallops/rubs, no JVD, 2+ pulses b/l, no edema/cords/homans, warm/well-perfused  Respiratory: Normal respiratory rate/effort, no respiratory distress, normal voice, speaking full sentences, lungs clear to auscultation b/l, no wheezing/rales/rhonchi, no retractions, no stridor  Abdomen: Soft abdomen, no tender/distended/guarding/rebound, no CVA tender  Musculoskeletal: FROM all 4 extremities, N/V intact, pelvis stable, no TLS spinal tender/deform/step-offs, no judith tender/deform, stable gait  Neck: FROM neck, supple, no meningismus, trachea midline, no JVD, no cspine tender/step-offs  Integumentary: Color normal for race, warm and dry, no rash. No lacerations, abrasions, or ecchymosis.  Neuro: Oriented x3, CN 2-12 intact, normal motor, normal sensory, normal gait, normal cerebellar, +slurred speech, no tremors, mild EtOH intoxication  Psych: Oriented x3, mood normal, cooperative, denies SI/HI, denies AV hallucinations

## 2018-07-14 LAB
CULTURE RESULTS: SIGNIFICANT CHANGE UP
SPECIMEN SOURCE: SIGNIFICANT CHANGE UP

## 2018-07-20 ENCOUNTER — EMERGENCY (EMERGENCY)
Facility: HOSPITAL | Age: 38
LOS: 0 days | Discharge: HOME | End: 2018-07-20
Attending: EMERGENCY MEDICINE | Admitting: EMERGENCY MEDICINE

## 2018-07-20 VITALS
HEIGHT: 64 IN | TEMPERATURE: 97 F | OXYGEN SATURATION: 100 % | WEIGHT: 125 LBS | RESPIRATION RATE: 18 BRPM | DIASTOLIC BLOOD PRESSURE: 75 MMHG | HEART RATE: 94 BPM | SYSTOLIC BLOOD PRESSURE: 139 MMHG

## 2018-07-20 DIAGNOSIS — F10.120 ALCOHOL ABUSE WITH INTOXICATION, UNCOMPLICATED: ICD-10-CM

## 2018-07-20 DIAGNOSIS — F41.9 ANXIETY DISORDER, UNSPECIFIED: ICD-10-CM

## 2018-07-20 DIAGNOSIS — Z79.899 OTHER LONG TERM (CURRENT) DRUG THERAPY: ICD-10-CM

## 2018-07-20 DIAGNOSIS — Z79.2 LONG TERM (CURRENT) USE OF ANTIBIOTICS: ICD-10-CM

## 2018-07-20 DIAGNOSIS — F41.8 OTHER SPECIFIED ANXIETY DISORDERS: ICD-10-CM

## 2018-07-20 DIAGNOSIS — R41.0 DISORIENTATION, UNSPECIFIED: ICD-10-CM

## 2018-07-20 PROBLEM — F19.10 OTHER PSYCHOACTIVE SUBSTANCE ABUSE, UNCOMPLICATED: Chronic | Status: ACTIVE | Noted: 2018-03-11

## 2018-07-20 PROBLEM — F32.9 MAJOR DEPRESSIVE DISORDER, SINGLE EPISODE, UNSPECIFIED: Chronic | Status: ACTIVE | Noted: 2018-02-24
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
## 2018-07-20 NOTE — ED PROVIDER NOTE - OBJECTIVE STATEMENT
36 yo F with PMHx of hx of anxiety, depression, polysubstance and ETOH abuse, brought in bby officers s/p anonymous caller stating patient held a razor toward them demanding money. Upon arrival, officers found patient attempting to walk away from them and confused. Patient appears anxious and confused as to why she is in the hospital. Patient denies drinking or illicit drug abuse, states she was just at the park feeding the birds. 38 yo F with PMHx of hx of anxiety, depression, polysubstance and ETOH abuse, brought in by officers s/p anonymous caller stating patient held a razor toward them demanding money. Upon arrival, officers found patient attempting to walk away from them and confused. Patient appears anxious and confused as to why she is in the hospital. Patient denies drinking or illicit drug abuse, states she was just at the park feeding the birds.

## 2018-07-20 NOTE — ED ADULT TRIAGE NOTE - CHIEF COMPLAINT QUOTE
Pt BIBA and RD from street.  Per RD "We got a call that she is suicidal". Pt BIBA and RD from street.  Per RD "We got a call that she is suicidal".  Pt denies suicidal thoughts.

## 2018-07-20 NOTE — ED PROVIDER NOTE - PHYSICAL EXAMINATION
VITAL SIGNS: I have reviewed nursing notes and confirm.  CONSTITUTIONAL: patient appears anxious and disheveled   SKIN: Skin exam is warm and dry  HEAD: Normocephalic; atraumatic.  CARD: S1, S2 normal; no murmurs, gallops, or rubs. Regular rate and rhythm.  RESP: No wheezes, rales or rhonchi.  NEURO: Alert, oriented x3  PSYCH: anxious but cooperative, appropriate

## 2018-07-20 NOTE — ED PROVIDER NOTE - NS ED ROS FT
Review of Systems:  	•	CONSTITUTIONAL - no fever, no diaphoresis  	•	RESPIRATORY - no shortness of breath, no cough  	•	CARDIAC - no chest pain, no palpitations  	•	PSYCH - no anxiety, non suicidal, non homicidal, no hallucination, no depression

## 2018-07-25 ENCOUNTER — EMERGENCY (EMERGENCY)
Facility: HOSPITAL | Age: 38
LOS: 0 days | Discharge: HOME | End: 2018-07-25
Attending: EMERGENCY MEDICINE | Admitting: EMERGENCY MEDICINE

## 2018-07-25 VITALS
SYSTOLIC BLOOD PRESSURE: 141 MMHG | OXYGEN SATURATION: 100 % | DIASTOLIC BLOOD PRESSURE: 90 MMHG | RESPIRATION RATE: 18 BRPM | TEMPERATURE: 97 F | HEART RATE: 75 BPM

## 2018-07-25 VITALS
HEART RATE: 91 BPM | HEIGHT: 63 IN | DIASTOLIC BLOOD PRESSURE: 95 MMHG | TEMPERATURE: 97 F | SYSTOLIC BLOOD PRESSURE: 126 MMHG | OXYGEN SATURATION: 99 % | RESPIRATION RATE: 20 BRPM

## 2018-07-25 VITALS
SYSTOLIC BLOOD PRESSURE: 133 MMHG | HEIGHT: 64 IN | RESPIRATION RATE: 18 BRPM | OXYGEN SATURATION: 99 % | DIASTOLIC BLOOD PRESSURE: 90 MMHG | WEIGHT: 123.02 LBS | HEART RATE: 86 BPM | TEMPERATURE: 98 F

## 2018-07-25 VITALS
DIASTOLIC BLOOD PRESSURE: 61 MMHG | WEIGHT: 123.02 LBS | OXYGEN SATURATION: 100 % | TEMPERATURE: 96 F | HEIGHT: 64 IN | RESPIRATION RATE: 18 BRPM | HEART RATE: 80 BPM | SYSTOLIC BLOOD PRESSURE: 123 MMHG

## 2018-07-25 DIAGNOSIS — F10.129 ALCOHOL ABUSE WITH INTOXICATION, UNSPECIFIED: ICD-10-CM

## 2018-07-25 DIAGNOSIS — Z79.2 LONG TERM (CURRENT) USE OF ANTIBIOTICS: ICD-10-CM

## 2018-07-25 DIAGNOSIS — F10.10 ALCOHOL ABUSE, UNCOMPLICATED: ICD-10-CM

## 2018-07-25 DIAGNOSIS — F17.200 NICOTINE DEPENDENCE, UNSPECIFIED, UNCOMPLICATED: ICD-10-CM

## 2018-07-25 DIAGNOSIS — F32.9 MAJOR DEPRESSIVE DISORDER, SINGLE EPISODE, UNSPECIFIED: ICD-10-CM

## 2018-07-25 DIAGNOSIS — Z03.89 ENCOUNTER FOR OBSERVATION FOR OTHER SUSPECTED DISEASES AND CONDITIONS RULED OUT: ICD-10-CM

## 2018-07-25 DIAGNOSIS — Z79.899 OTHER LONG TERM (CURRENT) DRUG THERAPY: ICD-10-CM

## 2018-07-25 DIAGNOSIS — F41.9 ANXIETY DISORDER, UNSPECIFIED: ICD-10-CM

## 2018-07-25 LAB
AMPHET UR-MCNC: NEGATIVE — SIGNIFICANT CHANGE UP
APPEARANCE UR: CLEAR — SIGNIFICANT CHANGE UP
BACTERIA # UR AUTO: ABNORMAL
BARBITURATES UR SCN-MCNC: NEGATIVE — SIGNIFICANT CHANGE UP
BENZODIAZ UR-MCNC: NEGATIVE — SIGNIFICANT CHANGE UP
BILIRUB UR-MCNC: NEGATIVE — SIGNIFICANT CHANGE UP
COCAINE METAB.OTHER UR-MCNC: POSITIVE
COLOR SPEC: YELLOW — SIGNIFICANT CHANGE UP
DIFF PNL FLD: NEGATIVE — SIGNIFICANT CHANGE UP
EPI CELLS # UR: ABNORMAL /HPF
GLUCOSE UR QL: NEGATIVE MG/DL — SIGNIFICANT CHANGE UP
KETONES UR-MCNC: NEGATIVE — SIGNIFICANT CHANGE UP
LEUKOCYTE ESTERASE UR-ACNC: ABNORMAL
METHADONE UR-MCNC: NEGATIVE — SIGNIFICANT CHANGE UP
NITRITE UR-MCNC: NEGATIVE — SIGNIFICANT CHANGE UP
OPIATES UR-MCNC: NEGATIVE — SIGNIFICANT CHANGE UP
PCP SPEC-MCNC: SIGNIFICANT CHANGE UP
PH UR: 6.5 — SIGNIFICANT CHANGE UP (ref 5–8)
PROPOXYPHENE QUALITATIVE URINE RESULT: NEGATIVE — SIGNIFICANT CHANGE UP
PROT UR-MCNC: NEGATIVE MG/DL — SIGNIFICANT CHANGE UP
RBC CASTS # UR COMP ASSIST: SIGNIFICANT CHANGE UP /HPF
SP GR SPEC: <=1.005 — SIGNIFICANT CHANGE UP (ref 1.01–1.03)
UROBILINOGEN FLD QL: 0.2 MG/DL — SIGNIFICANT CHANGE UP (ref 0.2–0.2)
WBC UR QL: SIGNIFICANT CHANGE UP /HPF

## 2018-07-25 NOTE — ED PROVIDER NOTE - PHYSICAL EXAMINATION
Vital Signs: I have reviewed the initial vital signs.  Constitutional: well-nourished, appears stated age, no acute distress  Head: atraumatic and normocephalic  Eyes:PERRLA, EOMI, no nystagmus, clear conjunctiva  ENT: TM b/l clear, no nasal congestion, MMM  Cardiovascular: regular rate, regular rhythm, well-perfused extremities  Respiratory: unlabored respiratory effort, clear to auscultation bilaterally  Gastrointestinal: soft, non-tender abdomen, no pulsatile mass  Neuro: awake, alert, follows commands, oriented, no focal deficits, GCS 15  ;

## 2018-07-25 NOTE — ED PROVIDER NOTE - OBJECTIVE STATEMENT
Pt presents today for ETOH and drug detox. Pt history of prior detox. pt has no current complaints. Pt requesting food and blanket. Pt denies chest pain, shortness of breath, headache, dizziness, nausea/vomiting/diarrhea, head injury. Denies current thoughts of Suicidal and Homicidal Ideations.

## 2018-07-25 NOTE — ED PROVIDER NOTE - MEDICAL DECISION MAKING DETAILS
I have personally performed a history and physical exam on this patient and personally directed the management of the patient. Patient presents for evaluation for detox she admits to alcohol use but denies any coingestion, She is not homicidal or suicidal, the patient notes her last drink was several hours prior to arrival she denies any shaking or tremors. ON physical exam she is nc/at perrla eomi oropharynx clear cta b/l, she is able to ambulate well. I will admit to detox for further evaluation

## 2018-07-25 NOTE — ED PROVIDER NOTE - MEDICAL DECISION MAKING DETAILS
I have personally performed a history and physical exam on this patient and personally directed the management of the patient. Patient presents for evaluation of alcohol intoxication she is able to speak in full sentences with no slurred speech gcs is 15, she denies any homicidal or suicidal ideation.  On physical the patient Is nc/at perrla eomi oropharynx clear cta b/l, rrr s12 noted abd-soft nt nd bs +e xtf rom with no focal deficits she is ambulating with a steady gait I will discharge at this time

## 2018-07-25 NOTE — ED PROVIDER NOTE - OBJECTIVE STATEMENT
Pt BIBA after being found on the street. Pt states she was just discharged and was walking home when the ambulance stopped and took her back to the ER. Pt denies any medical complaints. Pt denies any SI/HI/AVH.

## 2018-07-25 NOTE — ED ADULT TRIAGE NOTE - CHIEF COMPLAINT QUOTE
pt's friend states "I want her to go to detox". pt states "I don't need detox. I was sleeping in the street and I shit myself"

## 2018-07-25 NOTE — ED PROVIDER NOTE - PROGRESS NOTE DETAILS
patient refusing to have blood drawn for detox, Explained she cannot be admitted with blood work. patient states she will go home, refusing detox, Patient's speech is clear, mood calm, sober for discharge.

## 2018-07-28 ENCOUNTER — EMERGENCY (EMERGENCY)
Facility: HOSPITAL | Age: 38
LOS: 0 days | Discharge: LEFT BEFORE DISCHARGE | End: 2018-07-29
Attending: EMERGENCY MEDICINE | Admitting: EMERGENCY MEDICINE

## 2018-07-28 VITALS
DIASTOLIC BLOOD PRESSURE: 89 MMHG | OXYGEN SATURATION: 96 % | SYSTOLIC BLOOD PRESSURE: 136 MMHG | TEMPERATURE: 97 F | RESPIRATION RATE: 18 BRPM | HEART RATE: 101 BPM

## 2018-07-28 VITALS — WEIGHT: 123.02 LBS

## 2018-07-28 DIAGNOSIS — F10.129 ALCOHOL ABUSE WITH INTOXICATION, UNSPECIFIED: ICD-10-CM

## 2018-07-28 DIAGNOSIS — F41.8 OTHER SPECIFIED ANXIETY DISORDERS: ICD-10-CM

## 2018-07-28 NOTE — ED ADULT NURSE NOTE - OBJECTIVE STATEMENT
Patient states that everything hurts her. she is anxious. States she drinks daily. last drink 24 hours ago

## 2018-07-28 NOTE — ED ADULT TRIAGE NOTE - CHIEF COMPLAINT QUOTE
Patient presents to ED stating "everything just hurts me, my body, my head." Patient stating she feels anxious but is unable to give any more information. Patient is denying any suicidal ideation or thoughts of harm to self or others. Patient states she drinks daily and last drink was 24 hours ago. Patient presents to ED stating "everything just hurts me, my body, my head." Patient stating she feels anxious but is unable to give any more information. Patient is denying any suicidal ideation or thoughts of harm to self or others. Patient states she drinks daily and last drink was 24 hours ago. Patient denying any drug use.

## 2018-07-28 NOTE — ED ADULT NURSE NOTE - CHIEF COMPLAINT QUOTE
Patient presents to ED stating "everything just hurts me, my body, my head." Patient stating she feels anxious but is unable to give any more information. Patient is denying any suicidal ideation or thoughts of harm to self or others. Patient states she drinks daily and last drink was 24 hours ago. Patient denying any drug use.

## 2018-07-29 ENCOUNTER — EMERGENCY (EMERGENCY)
Facility: HOSPITAL | Age: 38
LOS: 0 days | Discharge: HOME | End: 2018-07-29
Attending: EMERGENCY MEDICINE | Admitting: EMERGENCY MEDICINE

## 2018-07-29 VITALS
SYSTOLIC BLOOD PRESSURE: 135 MMHG | DIASTOLIC BLOOD PRESSURE: 72 MMHG | RESPIRATION RATE: 20 BRPM | TEMPERATURE: 98 F | OXYGEN SATURATION: 99 % | HEART RATE: 89 BPM

## 2018-07-29 VITALS
DIASTOLIC BLOOD PRESSURE: 75 MMHG | SYSTOLIC BLOOD PRESSURE: 132 MMHG | RESPIRATION RATE: 18 BRPM | HEART RATE: 85 BPM | TEMPERATURE: 98 F | OXYGEN SATURATION: 100 %

## 2018-07-29 DIAGNOSIS — F41.9 ANXIETY DISORDER, UNSPECIFIED: ICD-10-CM

## 2018-07-29 RX ADMIN — Medication 0.5 MILLIGRAM(S): at 02:53

## 2018-07-29 NOTE — ED PROVIDER NOTE - PHYSICAL EXAMINATION
CONSTITUTIONAL: WA / WN / NAD  HEAD: NCAT  EYES: PERRL; EOMI;   NECK: Supple; no meningeal signs  CARD: RRR; nl S1/S2; no M/R/G.   RESP: Respiratory rate and effort are normal; breath sounds clear and equal bilaterally.  ABD: Soft, NT ND  MSK/EXT: No gross deformities; full range of motion.  SKIN: Warm and dry;

## 2018-07-29 NOTE — ED PROVIDER NOTE - NS ED ROS FT
Constitutional:  See HPI.  Cardiac:  No chest pain, SOB   Respiratory:  No cough or respiratory distress.   GI:  No nausea, vomiting, diarrhea or abdominal pain.  MS:  No myalgia, muscle weakness, joint pain or back pain.  Skin:  No skin rash.

## 2018-07-29 NOTE — ED PROVIDER NOTE - ATTENDING CONTRIBUTION TO CARE
I have personally performed a history and physical exam on this patient and personally directed the management of the patient. Patient presents for evaluation of increasing anxiety, she denies any homicidal or suicidal ideation.  She denies any fevers or chills.  she denies any back pain.  She denies any headache, visual changes, chest pain, shortness of breath, abdominal pain, back pain exttremity pain. She denies any rashes. On physical exam nc/at perrla eomi oropharynx clear cta b/l, rrr s1s2 noted radial pulses 2 +=, abd-soft nt nd bs +, ext from with no guarding no rebound. patient has no focal deficits. I will continue to monitor at this time.

## 2018-07-29 NOTE — ED ADULT TRIAGE NOTE - CHIEF COMPLAINT QUOTE
Patient recently seen in ED a few hours ago presenting again with complaints of "pain everywhere, all over."  Patient denying any drug or alcohol use. Patient appears anxious, constantly looking over her shoulder, stating she came here with a friend however patient arrived alone.

## 2018-07-29 NOTE — ED PROVIDER NOTE - OBJECTIVE STATEMENT
Patient is a 36 yo f who presents for evaluation of alcohol intoxication, she denies any trauma she is not homicidal or suicidal at this tiem she denies any fevers or chills.  she denies any chest pain or shortness of breath.    On physical exam the patient is nc/at perrla eomi oropharynx clear cta b/l, rrr s1s2 noted abd-soft nt nd no guarding no rebound noted patient is able to ambulate well.

## 2018-07-29 NOTE — ED PROVIDER NOTE - OBJECTIVE STATEMENT
37 year old female with a pmh of anxiety depression presents here c/o anxiety. Patient admits to having 1 penny earlier in the day. She denies any fever chills n/v, abdominal pain si/hi

## 2018-08-01 NOTE — ED ADULT NURSE NOTE - RN DISCHARGE SIGNATURE
Summary:  Chart review. Pt went to ED on 7/29 for SI. Pt was PED'd and discharged to [facility] for continued care.     Intervention:  None    Plan for next encounter:  Follow-up after hospital discharge     19-Apr-2018

## 2018-08-02 ENCOUNTER — EMERGENCY (EMERGENCY)
Facility: HOSPITAL | Age: 38
LOS: 0 days | Discharge: LEFT BEFORE DISCHARGE | End: 2018-08-02
Attending: EMERGENCY MEDICINE | Admitting: EMERGENCY MEDICINE

## 2018-08-02 VITALS
OXYGEN SATURATION: 99 % | TEMPERATURE: 97 F | SYSTOLIC BLOOD PRESSURE: 128 MMHG | WEIGHT: 123.02 LBS | HEIGHT: 64 IN | DIASTOLIC BLOOD PRESSURE: 92 MMHG | HEART RATE: 108 BPM
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
## 2018-08-02 DIAGNOSIS — S90.822A BLISTER (NONTHERMAL), LEFT FOOT, INITIAL ENCOUNTER: ICD-10-CM
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
## 2018-08-02 DIAGNOSIS — M79.673 PAIN IN UNSPECIFIED FOOT: ICD-10-CM

## 2018-08-02 RX ORDER — ACETAMINOPHEN 500 MG
650 TABLET ORAL ONCE
Qty: 0 | Refills: 0 | Status: COMPLETED | OUTPATIENT
Start: 2018-08-02 | End: 2018-08-02

## 2018-08-02 RX ADMIN — Medication 650 MILLIGRAM(S): at 02:50

## 2018-08-02 NOTE — ED PROVIDER NOTE - ATTENDING CONTRIBUTION TO CARE
37y f h/o etoh abuse p/w L foot pain x 1d. States she was walking in sandals all day, formed blister on bottom of L foot. No other complaints. No trauma. NO f/c. PE: young f nad, ncat, neck supple, rrr nl s1s2, ctab, abd soft ntnd, back non-tender, L foot +sm blister to heel, no erythema, no drainage, dpi, cr<2s, remainder of ext exam nl. a/p: SEE MDM

## 2018-08-02 NOTE — ED PROVIDER NOTE - PHYSICAL EXAMINATION
CONSTITUTIONAL: Well-developed; well-nourished; in no acute distress.   SKIN: warm, dry. No erythema, warmth, tenderness. No lymphangitis.  EXT: + blister to the bottom of the left foot. Normal ROM.  No clubbing, cyanosis or edema.   LYMPH: No acute cervical adenopathy.  NEURO: Alert, oriented, grossly unremarkable.  PSYCH: Cooperative.

## 2018-08-02 NOTE — ED PROVIDER NOTE - MEDICAL DECISION MAKING DETAILS
L foot blister from shoe, no signs of cellulitis - pt eloped prior to tylenol & receipt of discharge papers

## 2018-08-02 NOTE — ED ADULT NURSE NOTE - LOCATION
How Severe Is Your Rash?: moderate
Is This A New Presentation, Or A Follow-Up?: Follow Up Rash
Additional History: Pt reports clobetasol helped but then rash recurred; went back to derm MD at [employer] where he works and she was unsure - scraping was negative he believes.  Given ivermectin this week and one more dose to be done next week (permethrin topical given prior) without much improvement thus far though itching less.  Pt here for f/u.
foot

## 2018-08-02 NOTE — ED PROVIDER NOTE - NS ED ROS FT
Constitutional: See HPI.  Cardiac: No chest pain, SOB or edema. No chest pain with exertion.  Respiratory: No cough or respiratory distress.   GI: No nausea, vomiting, diarrhea or abdominal pain.  MS: No myalgia, muscle weakness, joint pain or back pain aside from foot pain as per hpi  Neuro: No headache or weakness. No LOC.  Skin: No skin rash.

## 2018-08-02 NOTE — ED PROVIDER NOTE - OBJECTIVE STATEMENT
36 y/o female with no PMH, history of EtOH abuse who presents to ED for left foot pain. Patient states she was walking in flip flops all day causing a blister to the bottom of her left foot. Pt took no medication for pain PTA. No injury ot the foot. No fever or chills.

## 2018-08-09 ENCOUNTER — EMERGENCY (EMERGENCY)
Facility: HOSPITAL | Age: 38
LOS: 0 days | Discharge: HOME | End: 2018-08-09
Attending: EMERGENCY MEDICINE | Admitting: EMERGENCY MEDICINE

## 2018-08-09 VITALS
DIASTOLIC BLOOD PRESSURE: 66 MMHG | TEMPERATURE: 97 F | RESPIRATION RATE: 18 BRPM | OXYGEN SATURATION: 96 % | HEIGHT: 64 IN | WEIGHT: 123.02 LBS | SYSTOLIC BLOOD PRESSURE: 131 MMHG | HEART RATE: 100 BPM

## 2018-08-09 DIAGNOSIS — H57.13 OCULAR PAIN, BILATERAL: ICD-10-CM

## 2018-08-09 DIAGNOSIS — J02.9 ACUTE PHARYNGITIS, UNSPECIFIED: ICD-10-CM

## 2018-08-09 DIAGNOSIS — F32.9 MAJOR DEPRESSIVE DISORDER, SINGLE EPISODE, UNSPECIFIED: ICD-10-CM

## 2018-08-09 DIAGNOSIS — F17.200 NICOTINE DEPENDENCE, UNSPECIFIED, UNCOMPLICATED: ICD-10-CM

## 2018-08-09 DIAGNOSIS — H10.9 UNSPECIFIED CONJUNCTIVITIS: ICD-10-CM

## 2018-08-09 RX ORDER — CIPROFLOXACIN HCL 0.3 %
2 DROPS OPHTHALMIC (EYE)
Qty: 30 | Refills: 0 | OUTPATIENT
Start: 2018-08-09 | End: 2018-08-13

## 2018-08-09 RX ORDER — IBUPROFEN 200 MG
600 TABLET ORAL ONCE
Qty: 0 | Refills: 0 | Status: COMPLETED | OUTPATIENT
Start: 2018-08-09 | End: 2018-08-09

## 2018-08-09 RX ADMIN — Medication 600 MILLIGRAM(S): at 11:23

## 2018-08-09 NOTE — ED PROVIDER NOTE - OBJECTIVE STATEMENT
Patient is a 36 yo F w/ no relevant PMH, contact lens user p/w conjunctival erythema and discharge bilaterally x 2 days. Patient developed erythema and discharge of the right eye yesterday, today spread to the left eye as well. Endorses sore throat, rhinorrhea, headache. Patient denies changes in vision, fevers, eye pain.

## 2018-08-09 NOTE — ED PROVIDER NOTE - PHYSICAL EXAMINATION
CONSTITUTIONAL: Well-developed; well-nourished; in no acute distress.   SKIN: warm, dry  HEAD: Normocephalic; atraumatic.  EYES: PERRL, EOMI, + conjunctival erythema. discharge in the medial canthi bilaterally.   ENT: No nasal discharge; airway clear. Mild erythema of the oropharynx; no exudates.  NECK: Supple; non tender.  CARD: S1, S2 normal; no murmurs, gallops, or rubs. Regular rate and rhythm.   RESP: No wheezes, rales or rhonchi.  NEURO: Alert, oriented, grossly unremarkable  PSYCH: Cooperative, appropriate. CONSTITUTIONAL: Well-developed; well-nourished; in no acute distress.   SKIN: warm, dry  HEAD: Normocephalic; atraumatic.  EYES: PERRL, EOMI, + conjunctival erythema. discharge in the medial canthi bilaterally.         Visual Acuity: both eyes: 20/20                            left eye: 20/25;    right eye: 20/25.  ENT: No nasal discharge; airway clear. Mild erythema of the oropharynx; no exudates.  NECK: Supple; non tender.  CARD: S1, S2 normal; no murmurs, gallops, or rubs. Regular rate and rhythm.   RESP: No wheezes, rales or rhonchi.  NEURO: Alert, oriented, grossly unremarkable  PSYCH: Cooperative, appropriate.

## 2018-08-09 NOTE — ED ADULT NURSE NOTE - TEMPLATE
EENMT W Plasty Text: The lesion was extirpated to the level of the fat with a #15 scalpel blade.  Given the location of the defect, shape of the defect and the proximity to free margins a W-plasty was deemed most appropriate for repair.  Using a sterile surgical marker, the appropriate transposition arms of the W-plasty were drawn incorporating the defect and placing the expected incisions within the relaxed skin tension lines where possible.    The area thus outlined was incised deep to adipose tissue with a #15 scalpel blade.  The skin margins were undermined to an appropriate distance in all directions utilizing iris scissors.  The opposing transposition arms were then transposed into place in opposite direction and anchored with interrupted buried subcutaneous sutures.

## 2018-08-09 NOTE — ED ADULT NURSE NOTE - NSIMPLEMENTINTERV_GEN_ALL_ED
Implemented All Universal Safety Interventions:  [place] to call system. Call bell, personal items and telephone within reach. Instruct patient to call for assistance. Room bathroom lighting operational. Non-slip footwear when patient is off stretcher. Physically safe environment: no spills, clutter or unnecessary equipment. Stretcher in lowest position, wheels locked, appropriate side rails in place.

## 2018-08-09 NOTE — ED PROVIDER NOTE - MEDICAL DECISION MAKING DETAILS
I have personally performed a history and physical exam on this patient and personally directed the management of the patient. Patient presents for evaluation of b/l redness and itching of her eyes with yellow discharge right greater than left, she denies any fevers or chills but notes increasing congestion, she denies any headache, she denies any visual changes or sore throat.   she denies any chest pain or shortness of breath. On physical exam nc/at perrla eomi with no pain she has b/l injected conjunctiva, visual acuity normal at this time, the patient is a contact lens user, given flouroquinolone eye drops I will discharge with follow up to optho in am we discussed indications to return

## 2018-08-09 NOTE — ED PROVIDER NOTE - NS ED ROS FT
Eyes:  erythema and discharge from eyes bilaterally.   ENMT:  runny nose. sore throat.   Cardiac:  No chest pain  Respiratory:  No cough or respiratory distress.  Neuro:  Headache.   Skin:  No skin rash.   Endocrine: No history of thyroid disease or diabetes.

## 2018-09-03 ENCOUNTER — EMERGENCY (EMERGENCY)
Facility: HOSPITAL | Age: 38
LOS: 1 days | Discharge: LEFT BEFORE DISCHARGE | End: 2018-09-03
Attending: EMERGENCY MEDICINE

## 2018-09-03 VITALS
SYSTOLIC BLOOD PRESSURE: 121 MMHG | HEIGHT: 66 IN | RESPIRATION RATE: 18 BRPM | OXYGEN SATURATION: 100 % | HEART RATE: 60 BPM | WEIGHT: 149.91 LBS | DIASTOLIC BLOOD PRESSURE: 81 MMHG | TEMPERATURE: 98 F

## 2018-09-03 DIAGNOSIS — F10.129 ALCOHOL ABUSE WITH INTOXICATION, UNSPECIFIED: ICD-10-CM

## 2018-09-03 DIAGNOSIS — Z79.899 OTHER LONG TERM (CURRENT) DRUG THERAPY: ICD-10-CM

## 2018-09-03 NOTE — ED ADULT NURSE REASSESSMENT NOTE - NS ED NURSE REASSESS COMMENT FT1
Pt arrived to ED via [employer] went directly to bathroom and left the ED.  ED and hospital checked for pt.

## 2018-09-09 ENCOUNTER — EMERGENCY (EMERGENCY)
Facility: HOSPITAL | Age: 38
LOS: 0 days | Discharge: HOME | End: 2018-09-09
Attending: EMERGENCY MEDICINE | Admitting: EMERGENCY MEDICINE

## 2018-09-09 VITALS
HEIGHT: 66 IN | DIASTOLIC BLOOD PRESSURE: 94 MMHG | SYSTOLIC BLOOD PRESSURE: 126 MMHG | RESPIRATION RATE: 20 BRPM | TEMPERATURE: 96 F | HEART RATE: 84 BPM | WEIGHT: 125 LBS | OXYGEN SATURATION: 98 %

## 2018-09-09 VITALS
SYSTOLIC BLOOD PRESSURE: 118 MMHG | HEART RATE: 88 BPM | RESPIRATION RATE: 20 BRPM | TEMPERATURE: 96 F | DIASTOLIC BLOOD PRESSURE: 80 MMHG | OXYGEN SATURATION: 99 %

## 2018-09-09 DIAGNOSIS — F17.200 NICOTINE DEPENDENCE, UNSPECIFIED, UNCOMPLICATED: ICD-10-CM

## 2018-09-09 DIAGNOSIS — F32.9 MAJOR DEPRESSIVE DISORDER, SINGLE EPISODE, UNSPECIFIED: ICD-10-CM

## 2018-09-09 DIAGNOSIS — F10.129 ALCOHOL ABUSE WITH INTOXICATION, UNSPECIFIED: ICD-10-CM

## 2018-09-09 DIAGNOSIS — Z79.899 OTHER LONG TERM (CURRENT) DRUG THERAPY: ICD-10-CM

## 2018-09-09 DIAGNOSIS — F41.9 ANXIETY DISORDER, UNSPECIFIED: ICD-10-CM

## 2018-09-09 RX ADMIN — Medication 2 MILLIGRAM(S): at 08:19

## 2018-09-09 RX ADMIN — Medication 2 MILLIGRAM(S): at 06:49

## 2018-09-09 NOTE — ED PROVIDER NOTE - PROGRESS NOTE DETAILS
Patient states wants to go to [facility] with PD. She is speaking full sentences, [employer] ready to transfer.

## 2018-09-09 NOTE — ED PROVIDER NOTE - MEDICAL DECISION MAKING DETAILS
I have personally performed a history and physical exam on this patient and personally directed the management of the patient. Patient presents or evaluation of agitation she has been using "crack" as well as drinking she violated an order of protection and is under arrest in police custody at this time.  She denies any headache, neck pain, chest pain, shortness of breath, abdominal pain, back pain ext from.  The The patient does admit to increasing anxiety.  she was given ativan here in the ed she is not homicidal or suicidal at this time.  she is speaking in full sentences caml, and wants to proceed to treatment.  I will discharge to police custody at this time

## 2018-09-09 NOTE — ED ADULT TRIAGE NOTE - CHIEF COMPLAINT QUOTE
BIBA accompanied by RD.  Pt states she drank a "4 loco" drink.  Pt. brought in for intoxication and further evaluation.   She is under arrest at this time for violation of restraining order.

## 2018-09-09 NOTE — ED PROVIDER NOTE - PHYSICAL EXAMINATION
CONSTITUTIONAL: agitated and aggressive behavior   EYES: PERRL; EOM intact. pupils 3mm b/l   CARDIOVASCULAR: Normal S1, S2; no murmurs, rubs, or gallops.   RESPIRATORY: Normal chest excursion with respiration; breath sounds clear and equal bilaterally; no wheezes, rhonchi, or rales.  GI/: Normal bowel sounds; non-distended; non-tender; no palpable organomegaly.   MS: No evidence of trauma or deformity to extremities   SKIN: Normal for age and race; warm; dry; good turgor; no apparent lesions or exudate.   NEURO/PSYCH: A & O x 3; agitated and aggressive behavior

## 2018-09-09 NOTE — ED PROVIDER NOTE - OBJECTIVE STATEMENT
38 yo female hx of anxiety/depression/substance abuse BIBA with [employer] 2/2 intoxication. patient admitted she used crack last night. She is arrested after she violated an order of protection against her 4 months old daughter who is living with her father. As per [place], patient went to her daughter home and was banging on door and window. [employer] brought her to ED because of violent and aggressive behavior. patient denies medical complaints.

## 2018-09-09 NOTE — ED ADULT NURSE REASSESSMENT NOTE - NS ED NURSE REASSESS COMMENT FT1
Pt has been alert, oriented and steady on her feet. Pt has been cooperative and has since been discharged into police custody. Pt has left ED.

## 2018-09-22 ENCOUNTER — INPATIENT (INPATIENT)
Facility: HOSPITAL | Age: 38
LOS: 0 days | Discharge: LEFT BEFORE DISCHARGE | End: 2018-09-23
Attending: INTERNAL MEDICINE | Admitting: INTERNAL MEDICINE

## 2018-09-22 VITALS
HEART RATE: 106 BPM | RESPIRATION RATE: 19 BRPM | HEIGHT: 64 IN | OXYGEN SATURATION: 98 % | DIASTOLIC BLOOD PRESSURE: 93 MMHG | TEMPERATURE: 98 F | SYSTOLIC BLOOD PRESSURE: 119 MMHG | WEIGHT: 151.02 LBS

## 2018-09-22 DIAGNOSIS — F19.10 OTHER PSYCHOACTIVE SUBSTANCE ABUSE, UNCOMPLICATED: ICD-10-CM

## 2018-09-22 DIAGNOSIS — F32.9 MAJOR DEPRESSIVE DISORDER, SINGLE EPISODE, UNSPECIFIED: ICD-10-CM

## 2018-09-22 LAB
ALBUMIN SERPL ELPH-MCNC: 5.1 G/DL — SIGNIFICANT CHANGE UP (ref 3.5–5.2)
ALP SERPL-CCNC: 81 U/L — SIGNIFICANT CHANGE UP (ref 30–115)
ALT FLD-CCNC: 23 U/L — SIGNIFICANT CHANGE UP (ref 0–41)
ANION GAP SERPL CALC-SCNC: 16 MMOL/L — HIGH (ref 7–14)
APAP SERPL-MCNC: <5 UG/ML — LOW (ref 10–30)
APPEARANCE UR: CLEAR — SIGNIFICANT CHANGE UP
APTT BLD: 27.9 SEC — SIGNIFICANT CHANGE UP (ref 27–39.2)
AST SERPL-CCNC: 22 U/L — SIGNIFICANT CHANGE UP (ref 0–41)
BACTERIA # UR AUTO: ABNORMAL
BASOPHILS # BLD AUTO: 0.09 K/UL — SIGNIFICANT CHANGE UP (ref 0–0.2)
BASOPHILS NFR BLD AUTO: 1.3 % — HIGH (ref 0–1)
BILIRUB SERPL-MCNC: <0.2 MG/DL — SIGNIFICANT CHANGE UP (ref 0.2–1.2)
BILIRUB UR-MCNC: NEGATIVE — SIGNIFICANT CHANGE UP
BUN SERPL-MCNC: 4 MG/DL — LOW (ref 10–20)
CALCIUM SERPL-MCNC: 9.4 MG/DL — SIGNIFICANT CHANGE UP (ref 8.5–10.1)
CHLORIDE SERPL-SCNC: 101 MMOL/L — SIGNIFICANT CHANGE UP (ref 98–110)
CO2 SERPL-SCNC: 25 MMOL/L — SIGNIFICANT CHANGE UP (ref 17–32)
COLOR SPEC: YELLOW — SIGNIFICANT CHANGE UP
CREAT SERPL-MCNC: 0.7 MG/DL — SIGNIFICANT CHANGE UP (ref 0.7–1.5)
DIFF PNL FLD: NEGATIVE — SIGNIFICANT CHANGE UP
EOSINOPHIL # BLD AUTO: 0.05 K/UL — SIGNIFICANT CHANGE UP (ref 0–0.7)
EOSINOPHIL NFR BLD AUTO: 0.7 % — SIGNIFICANT CHANGE UP (ref 0–8)
EPI CELLS # UR: ABNORMAL /HPF
ETHANOL SERPL-MCNC: 137 MG/DL — HIGH
GLUCOSE SERPL-MCNC: 111 MG/DL — HIGH (ref 70–99)
GLUCOSE UR QL: NEGATIVE MG/DL — SIGNIFICANT CHANGE UP
HCG UR QL: NEGATIVE — SIGNIFICANT CHANGE UP
HCT VFR BLD CALC: 46.8 % — SIGNIFICANT CHANGE UP (ref 37–47)
HGB BLD-MCNC: 15.9 G/DL — SIGNIFICANT CHANGE UP (ref 12–16)
IMM GRANULOCYTES NFR BLD AUTO: 0.3 % — SIGNIFICANT CHANGE UP (ref 0.1–0.3)
INR BLD: 1.03 RATIO — SIGNIFICANT CHANGE UP (ref 0.65–1.3)
KETONES UR-MCNC: NEGATIVE — SIGNIFICANT CHANGE UP
LEUKOCYTE ESTERASE UR-ACNC: ABNORMAL
LYMPHOCYTES # BLD AUTO: 1.75 K/UL — SIGNIFICANT CHANGE UP (ref 1.2–3.4)
LYMPHOCYTES # BLD AUTO: 25.3 % — SIGNIFICANT CHANGE UP (ref 20.5–51.1)
MCHC RBC-ENTMCNC: 31 PG — SIGNIFICANT CHANGE UP (ref 27–31)
MCHC RBC-ENTMCNC: 34 G/DL — SIGNIFICANT CHANGE UP (ref 32–37)
MCV RBC AUTO: 91.2 FL — SIGNIFICANT CHANGE UP (ref 81–99)
MONOCYTES # BLD AUTO: 0.75 K/UL — HIGH (ref 0.1–0.6)
MONOCYTES NFR BLD AUTO: 10.8 % — HIGH (ref 1.7–9.3)
NEUTROPHILS # BLD AUTO: 4.26 K/UL — SIGNIFICANT CHANGE UP (ref 1.4–6.5)
NEUTROPHILS NFR BLD AUTO: 61.6 % — SIGNIFICANT CHANGE UP (ref 42.2–75.2)
NITRITE UR-MCNC: NEGATIVE — SIGNIFICANT CHANGE UP
NRBC # BLD: 0 /100 WBCS — SIGNIFICANT CHANGE UP (ref 0–0)
PH UR: 7 — SIGNIFICANT CHANGE UP (ref 5–8)
PLATELET # BLD AUTO: 358 K/UL — SIGNIFICANT CHANGE UP (ref 130–400)
POTASSIUM SERPL-MCNC: 3.4 MMOL/L — LOW (ref 3.5–5)
POTASSIUM SERPL-SCNC: 3.4 MMOL/L — LOW (ref 3.5–5)
PROT SERPL-MCNC: 7.7 G/DL — SIGNIFICANT CHANGE UP (ref 6–8)
PROT UR-MCNC: NEGATIVE MG/DL — SIGNIFICANT CHANGE UP
PROTHROM AB SERPL-ACNC: 11.1 SEC — SIGNIFICANT CHANGE UP (ref 9.95–12.87)
RBC # BLD: 5.13 M/UL — SIGNIFICANT CHANGE UP (ref 4.2–5.4)
RBC # FLD: 12.6 % — SIGNIFICANT CHANGE UP (ref 11.5–14.5)
SALICYLATES SERPL-MCNC: <0.3 MG/DL — LOW (ref 4–30)
SODIUM SERPL-SCNC: 142 MMOL/L — SIGNIFICANT CHANGE UP (ref 135–146)
SP GR SPEC: 1.01 — SIGNIFICANT CHANGE UP (ref 1.01–1.03)
UROBILINOGEN FLD QL: 0.2 MG/DL — SIGNIFICANT CHANGE UP (ref 0.2–0.2)
WBC # BLD: 6.92 K/UL — SIGNIFICANT CHANGE UP (ref 4.8–10.8)
WBC # FLD AUTO: 6.92 K/UL — SIGNIFICANT CHANGE UP (ref 4.8–10.8)
WBC UR QL: ABNORMAL /HPF

## 2018-09-22 RX ORDER — ACETAMINOPHEN 500 MG
650 TABLET ORAL EVERY 4 HOURS
Qty: 0 | Refills: 0 | Status: DISCONTINUED | OUTPATIENT
Start: 2018-09-22 | End: 2018-09-23

## 2018-09-22 RX ORDER — NICOTINE POLACRILEX 2 MG
1 GUM BUCCAL DAILY
Qty: 0 | Refills: 0 | Status: DISCONTINUED | OUTPATIENT
Start: 2018-09-22 | End: 2018-09-23

## 2018-09-22 RX ORDER — PSEUDOEPHEDRINE HCL 30 MG
60 TABLET ORAL EVERY 6 HOURS
Qty: 0 | Refills: 0 | Status: DISCONTINUED | OUTPATIENT
Start: 2018-09-22 | End: 2018-09-23

## 2018-09-22 RX ORDER — HYDROXYZINE HCL 10 MG
100 TABLET ORAL AT BEDTIME
Qty: 0 | Refills: 0 | Status: DISCONTINUED | OUTPATIENT
Start: 2018-09-22 | End: 2018-09-23

## 2018-09-22 RX ORDER — METHOCARBAMOL 500 MG/1
500 TABLET, FILM COATED ORAL EVERY 6 HOURS
Qty: 0 | Refills: 0 | Status: DISCONTINUED | OUTPATIENT
Start: 2018-09-22 | End: 2018-09-23

## 2018-09-22 RX ORDER — MAGNESIUM HYDROXIDE 400 MG/1
30 TABLET, CHEWABLE ORAL ONCE
Qty: 0 | Refills: 0 | Status: DISCONTINUED | OUTPATIENT
Start: 2018-09-22 | End: 2018-09-23

## 2018-09-22 RX ORDER — IBUPROFEN 200 MG
400 TABLET ORAL EVERY 4 HOURS
Qty: 0 | Refills: 0 | Status: DISCONTINUED | OUTPATIENT
Start: 2018-09-22 | End: 2018-09-23

## 2018-09-22 RX ORDER — MULTIVIT-MIN/FERROUS GLUCONATE 9 MG/15 ML
1 LIQUID (ML) ORAL DAILY
Qty: 0 | Refills: 0 | Status: DISCONTINUED | OUTPATIENT
Start: 2018-09-22 | End: 2018-09-23

## 2018-09-22 RX ORDER — HYDROXYZINE HCL 10 MG
50 TABLET ORAL EVERY 6 HOURS
Qty: 0 | Refills: 0 | Status: DISCONTINUED | OUTPATIENT
Start: 2018-09-22 | End: 2018-09-23

## 2018-09-22 RX ADMIN — Medication 1 MILLIGRAM(S): at 21:23

## 2018-09-22 RX ADMIN — Medication 100 MILLIGRAM(S): at 23:27

## 2018-09-22 NOTE — H&P ADULT - ATTENDING COMMENTS
Patient interviewed and examined.    Chart reviewed.    PA's H&P noted and modified, as appropriate.    Case discussed on team rounds    Following is my summary of the case.    Admitted for detox: from __x__ED, ___Intake, ____Med/Surg Floor    Alcohol_x___   Opioid_____  Benzo___ Other_____    Substance amount, duration of use, last usage, and prior attempts at detox or rehabs, are outlined above in the H&P and discussed with patient.    Associated withdrawal symptoms presents.  Comorbid conditions noted. Chronic and Stable.    Past Medical Hx, Psych Hx, family Hx, Social Hx from H&P reviewed and NO changes.    Old medical record and medication Hx. Reviewed    Following items reviewed and addressed:  1. labs  2. EKG  3. Imaging from PACs module    Examination: no change from PA's exam.    Place on following protocol  _____Medically Managed  __X__Medically Supervised    Ciwa__x___Librium taper____Ativan taper___Methadone taper___ Phenobarb taper____ Suboxone Induction____MMTP____    Narcan Kit Offered    Psych Consult __X__N/A  ___Ordered    Physical Therapy  ___X N/A   ___  Ordered    Aftercare disposition to be addressed by counselors.    Estimated length of stay 3-5 days.

## 2018-09-22 NOTE — H&P ADULT - HISTORY OF PRESENT ILLNESS
· HPI Objective Statement: 38 yo female hx of Alcohol abuse and substance abuse present request alcohol detox.  patient was in detox few months ago and started drinking again after detox. states she wanted to stop drinking. He admits to drinking liquor  one  pint,  2 x  6 pk beer daily and  crack cocaine  $300 daily. last drink 1 hour PTA. denies fall and injury. Deneis SI/HI.  Denies fever/chill/HA/dizziness/chest pain/palpitation/sob/abd pain/n/v/d/ black stool/bloody stool/urinary sxs	    HIV:    HIV Status:  · Offered: Declined	    PAST MEDICAL/SURGICAL/FAMILY/SOCIAL HISTORY:    Past Medical History:  Anxiety    Depression    Postpartum depression    Substance abuse.     Past Surgical History:  No significant past surgical history.     Family History:  No pertinent family history in first degree relatives.     Substance Use History:  · Substance Use	street drug/inhalant/medication abuse	  · Street Drug/Medication/Inhalant Use	cocaine	     Alcohol Use History:  · Have you ever consumed alcohol	yes...	  · Alcohol Type	beer	  · Alcohol Frequency	daily	     Tobacco Usage:  · Tobacco Usage	Current every day smoker	    ALLERGIES AND HOME MEDICATIONS:   Allergies:        Allergies:  	No Known Allergies:     Home Medications:   * Patient Currently Takes Medications as of 09-Aug-2018 11:03 documented in Structured Notes  · 	Ciloxan 0.3% ophthalmic solution: 2 drop(s) in each affected eye every 4 hours       PHYSICAL EXAM:   · Physical Examination: CONSTITUTIONAL: Well-appearing; well-nourished; in no apparent distress.   EYES: PERRL; EOM intact.   CARDIOVASCULAR: Normal S1, S2; no murmurs, rubs, or gallops.   RESPIRATORY: Normal chest excursion with respiration; breath sounds clear and equal bilaterally; no wheezes, rhonchi, or rales.  GI/: Normal bowel sounds; non-distended; non-tender; no palpable organomegaly.   MS: No evidence of trauma or deformity. Non-tender to palpation. No scoliosis. No CVA tenderness. Normal ROM in all four extremities; non-tender to palpation; distal pulses are normal.   SKIN: Normal for age and race; warm; dry; good turgor; no apparent lesions or exudate.  NEURO/PSYCH: A & O x 4; grossly unremarkable. calmn and cooperative. mood and manner are appropriate. Grooming and personal hygiene are appropriate. No apparent thoughts of harm to self or others.

## 2018-09-22 NOTE — H&P ADULT - NSHPLABSRESULTS_GEN_ALL_CORE
15.9   6.92  )-----------( 358      ( 22 Sep 2018 19:30 )             46.8   09-22    142  |  101  |  4<L>  ----------------------------<  111<H>  3.4<L>   |  25  |  0.7    Ca    9.4      22 Sep 2018 19:30    TPro  7.7  /  Alb  5.1  /  TBili  <0.2  /  DBili  x   /  AST  22  /  ALT  23  /  AlkPhos  81  09-22

## 2018-09-22 NOTE — ED PROVIDER NOTE - OBJECTIVE STATEMENT
36 yo female hx of Alcohol abuse and substance abuse present request alcohol detox.  patient was in detox few months ago and started drinking again after detox. states she wanted to stop drinking. He admits to drinking beer daily. last drink 1 hour PTA. denies fall and injury. Deneis SI/HI.   Denies fever/chill/HA/dizziness/chest pain/palpitation/sob/abd pain/n/v/d/ black stool/bloody stool/urinary sxs

## 2018-09-22 NOTE — ED PROVIDER NOTE - MEDICAL DECISION MAKING DETAILS
I personally evaluated the patient. I reviewed the Resident’s or Physician Assistant’s note (as assigned above), and agree with the findings and plan except as documented in my note.  Chart reviewed. Requesting alcohol detox. +cocaine use. Exam unremarkable. Will order detox protocol and admit.

## 2018-09-22 NOTE — H&P ADULT - NSHPREVIEWOFSYSTEMS_GEN_ALL_CORE
REVIEW OF SYSTEMS:    Review of Systems:  · Review of Systems: Constitutional: no fever, chills, no recent weight loss, change in appetite or malaise  	Eyes: no redness/discharge/pain/vision changes  	ENT: no rhinorrhea/ear pain/sore throat  	Cardiac: No chest pain, SOB or edema.  	Respiratory: No cough or respiratory distress  	GI: No nausea, vomiting, diarrhea or abdominal pain.  	: No dysuria, frequency, urgency or hematuria  	MS: no pain to back or extremities, no loss of ROM, no weakness  	Neuro: No headache or weakness. No LOC.  	Skin: No skin rash.  	Endocrine: No history of thyroid disease or diabetes.  Except as documented in the HPI, all other systems are negative.

## 2018-09-22 NOTE — ED ADULT NURSE NOTE - NSIMPLEMENTINTERV_GEN_ALL_ED
Implemented All Universal Safety Interventions:  [place] to call system. Call bell, personal items and telephone within reach. Instruct patient to call for assistance. Room bathroom lighting operational. Non-slip footwear when patient is off stretcher. Physically safe environment: no spills, clutter or unnecessary equipment. Stretcher in lowest position, wheels locked, appropriate side rails in place.

## 2018-09-22 NOTE — ED PROVIDER NOTE - PHYSICAL EXAMINATION
CONSTITUTIONAL: Well-appearing; well-nourished; in no apparent distress.   EYES: PERRL; EOM intact.   CARDIOVASCULAR: Normal S1, S2; no murmurs, rubs, or gallops.   RESPIRATORY: Normal chest excursion with respiration; breath sounds clear and equal bilaterally; no wheezes, rhonchi, or rales.  GI/: Normal bowel sounds; non-distended; non-tender; no palpable organomegaly.   MS: No evidence of trauma or deformity. Non-tender to palpation. No scoliosis. No CVA tenderness. Normal ROM in all four extremities; non-tender to palpation; distal pulses are normal.   SKIN: Normal for age and race; warm; dry; good turgor; no apparent lesions or exudate.   NEURO/PSYCH: A & O x 4; grossly unremarkable. calmn and cooperative. mood and manner are appropriate. Grooming and personal hygiene are appropriate. No apparent thoughts of harm to self or others.

## 2018-09-23 VITALS
RESPIRATION RATE: 16 BRPM | TEMPERATURE: 98 F | DIASTOLIC BLOOD PRESSURE: 55 MMHG | SYSTOLIC BLOOD PRESSURE: 117 MMHG | HEART RATE: 102 BPM

## 2018-09-23 LAB
HAV IGM SER-ACNC: SIGNIFICANT CHANGE UP
HBV CORE IGM SER-ACNC: SIGNIFICANT CHANGE UP
HBV SURFACE AG SER-ACNC: SIGNIFICANT CHANGE UP
HCV AB S/CO SERPL IA: 0.12 S/CO — SIGNIFICANT CHANGE UP
HCV AB SERPL-IMP: SIGNIFICANT CHANGE UP

## 2018-09-23 RX ADMIN — Medication 25 MILLIGRAM(S): at 03:23

## 2018-09-23 RX ADMIN — METHOCARBAMOL 500 MILLIGRAM(S): 500 TABLET, FILM COATED ORAL at 06:41

## 2018-09-23 RX ADMIN — Medication 50 MILLIGRAM(S): at 03:23

## 2018-09-23 RX ADMIN — Medication 25 MILLIGRAM(S): at 06:42

## 2018-09-23 RX ADMIN — Medication 400 MILLIGRAM(S): at 00:04

## 2018-09-23 RX ADMIN — Medication 400 MILLIGRAM(S): at 00:50

## 2018-09-23 RX ADMIN — Medication 25 MILLIGRAM(S): at 00:04

## 2018-09-23 NOTE — CHART NOTE - NSCHARTNOTEFT_GEN_A_CORE
The patient was admitted to the inpt detox unit CDU, for   ETOH__x__ Opioid___  Benzo____Polysubstance _____ Dependency.    Pt was admitted from ED__x__, Intake____, Med/surg Floor_______.    Details are present in the preceding History & Physical section and follow up chart notes.  patient was evaluated on daily detox team  rounds.  Withdrawal symptoms and signs were reviewed on a daily basis, and the protocols were adjusted accordingly.    Labs and imaging results were reviewed and discussed with the patient.    All questions from the patient were addressed.  The patient was seen by the Chemical dependency counselors, and different options for after care were discussed.  The patient attended groups, meetings and 1:1 sessions with the counselors.  Narcane Kit was offered and instructions given prior to discharge.    Psychiatry consultation reviewed______, N/A__x____    Physical therapy evaluation reviewed_____, N/A_x___    Pt was given copies of labs and imaging reports, if applicable.    Prescriptions if needed, were sent through [facility] system to the pharmacy amnd are noted in the discharge instruction sheet.    After care was arranged by counselors and pt was discharged to:    Home___, Outpt. Program___, Rehab ___, Long term____ [facility] ____ [facility]____ SNF____, AMA_x__, Admin Discharge____    Principal Diagnosis: Alcohol Dependency__x__ Opioid Dependency___ Benzo Dependency____ Polysubstance Dependency____

## 2018-09-24 LAB
AMPHET UR-MCNC: POSITIVE
BARBITURATES UR SCN-MCNC: NEGATIVE — SIGNIFICANT CHANGE UP
BENZODIAZ UR-MCNC: NEGATIVE — SIGNIFICANT CHANGE UP
COCAINE METAB.OTHER UR-MCNC: POSITIVE
DRUG SCREEN 1, URINE RESULT: SIGNIFICANT CHANGE UP
METHADONE UR-MCNC: NEGATIVE — SIGNIFICANT CHANGE UP
OPIATES UR-MCNC: NEGATIVE — SIGNIFICANT CHANGE UP
PCP UR-MCNC: NEGATIVE — SIGNIFICANT CHANGE UP
PROPOXYPHENE QUALITATIVE URINE RESULT: NEGATIVE — SIGNIFICANT CHANGE UP
T PALLIDUM AB TITR SER: NEGATIVE — SIGNIFICANT CHANGE UP
THC UR QL: NEGATIVE — SIGNIFICANT CHANGE UP

## 2018-09-26 DIAGNOSIS — F41.9 ANXIETY DISORDER, UNSPECIFIED: ICD-10-CM

## 2018-09-26 DIAGNOSIS — F10.20 ALCOHOL DEPENDENCE, UNCOMPLICATED: ICD-10-CM

## 2018-09-26 DIAGNOSIS — F14.20 COCAINE DEPENDENCE, UNCOMPLICATED: ICD-10-CM
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
## 2018-09-26 DIAGNOSIS — F32.9 MAJOR DEPRESSIVE DISORDER, SINGLE EPISODE, UNSPECIFIED: ICD-10-CM

## 2018-09-26 DIAGNOSIS — F17.210 NICOTINE DEPENDENCE, CIGARETTES, UNCOMPLICATED: ICD-10-CM

## 2018-09-30 LAB
AMPHET UR-MCNC: SIGNIFICANT CHANGE UP
BARBITURATES, URINE.: NEGATIVE — SIGNIFICANT CHANGE UP
BENZODIAZ UR-MCNC: NEGATIVE — SIGNIFICANT CHANGE UP
COCAINE METAB.OTHER UR-MCNC: SIGNIFICANT CHANGE UP
CREATININE, URINE THERAPEUTIC: 78.8 MG/DL — SIGNIFICANT CHANGE UP
METHADONE UR-MCNC: NEGATIVE — SIGNIFICANT CHANGE UP
METHAQUALONE UR QL: NEGATIVE — SIGNIFICANT CHANGE UP
METHAQUALONE UR-MCNC: NEGATIVE — SIGNIFICANT CHANGE UP
OPIATES UR-MCNC: NEGATIVE — SIGNIFICANT CHANGE UP
PCP UR-MCNC: NEGATIVE — SIGNIFICANT CHANGE UP
PROPOXYPH UR QL: NEGATIVE — SIGNIFICANT CHANGE UP
THC UR QL: NEGATIVE — SIGNIFICANT CHANGE UP

## 2018-10-05 ENCOUNTER — EMERGENCY (EMERGENCY)
Facility: HOSPITAL | Age: 38
LOS: 0 days | Discharge: HOME | End: 2018-10-06
Attending: EMERGENCY MEDICINE | Admitting: EMERGENCY MEDICINE

## 2018-10-05 VITALS
HEART RATE: 85 BPM | TEMPERATURE: 97 F | OXYGEN SATURATION: 97 % | SYSTOLIC BLOOD PRESSURE: 121 MMHG | RESPIRATION RATE: 18 BRPM | DIASTOLIC BLOOD PRESSURE: 75 MMHG | WEIGHT: 154.98 LBS | HEIGHT: 64 IN

## 2018-10-05 DIAGNOSIS — F10.129 ALCOHOL ABUSE WITH INTOXICATION, UNSPECIFIED: ICD-10-CM

## 2018-10-05 DIAGNOSIS — F32.9 MAJOR DEPRESSIVE DISORDER, SINGLE EPISODE, UNSPECIFIED: ICD-10-CM

## 2018-10-05 DIAGNOSIS — R45.1 RESTLESSNESS AND AGITATION: ICD-10-CM

## 2018-10-06 RX ORDER — HALOPERIDOL DECANOATE 100 MG/ML
5 INJECTION INTRAMUSCULAR ONCE
Qty: 0 | Refills: 0 | Status: COMPLETED | OUTPATIENT
Start: 2018-10-06 | End: 2018-10-06

## 2018-10-06 RX ADMIN — HALOPERIDOL DECANOATE 5 MILLIGRAM(S): 100 INJECTION INTRAMUSCULAR at 00:00

## 2018-10-06 RX ADMIN — Medication 2 MILLIGRAM(S): at 00:00

## 2018-10-06 NOTE — ED PROVIDER NOTE - PHYSICAL EXAMINATION
VITAL SIGNS: I have reviewed nursing notes and confirm.  CONSTITUTIONAL: Well-developed; well-nourished; in no acute distress but agitated   SKIN: Skin exam is warm and dry, no acute rash.  HEAD: Normocephalic; atraumatic.  EYES: PERRL, EOM intact; conjunctiva and sclera clear.  ENT: No nasal discharge; airway clear.   NECK: Supple; non tender.  CARD: S1, S2 normal; no murmurs, gallops, or rubs. Regular rate and rhythm.  RESP: No wheezes, rales or rhonchi. Speaking in full sentences.   ABD: Normal bowel sounds; soft; non-distended; non-tender; No rebound or guarding.   EXT: Normal ROM. No clubbing, cyanosis or edema.  NEURO: Alert, intoxicated. Grossly unremarkable. No focal deficits. Gait steady.   PSYCH: Uncooperative, agitated.

## 2018-10-06 NOTE — ED PROVIDER NOTE - OBJECTIVE STATEMENT
37 yo F with PMHx of anxiety, depression and substance abuse presents to the ED BIB EMS after being found agitated and intoxicated. Pt admits to drinking alcohol tonight but otherwise is poor historian 2/2 agitation and intoxication.

## 2018-10-06 NOTE — ED PROVIDER NOTE - CARE PLAN
Principal Discharge DX:	Alcohol intoxication  Secondary Diagnosis:	Alcohol abuse  Secondary Diagnosis:	Agitation

## 2018-10-06 NOTE — ED PROVIDER NOTE - MEDICAL DECISION MAKING DETAILS
37f w EtOH intoxication, agitated, no evidence of trauma. patient required sedation for safety. Pt observed until clinically sober w stable gait. No evidence of withdrawal at time of dc. Pt advised regarding symptomatic/supportive care, importance of PMD/Detox f/u, and symptoms to prompt ED return.

## 2018-10-06 NOTE — ED PROVIDER NOTE - ATTENDING CONTRIBUTION TO CARE
37f w a hx of anxiety, depression, & polysubstance abuse well known to ED. Pt BIB EMS after being found agitated and intoxicated. Pt admits to drinking EtOH tonight. Pt very poor historian due to intoxication and agitation.     Review of Systems  Unable to assess due to agitation    Physical Exam (partially performed after sedation)  General: Awake, alert, agitated, WDWN, NCAT, no skull/facial tender, no step-offs, no raccoon/calles, non-toxic appearing  Eyes: PERRL 3mm, EOMI, no icterus, lids and conjunctivae are normal  ENT: External inspection normal, pink/moist membranes, pharynx normal  CV: S1S2, regular rate and rhythm, no murmur/gallops/rubs, no JVD, 2+ pulses b/l, no edema/cords/homans, warm/well-perfused  Respiratory: Normal respiratory rate/effort, no respiratory distress, normal voice, speaking full sentences, lungs clear to auscultation b/l, no wheezing/rales/rhonchi, no retractions, no stridor  Abdomen: Soft abdomen, no tender/distended/guarding/rebound, no CVA tender  Musculoskeletal: FROM all 4 extremities, N/V intact, pelvis stable, no TLS spinal tender/deform/step-offs, no judith tender/deform, stable gait  Neck: FROM neck, supple, no meningismus, trachea midline, no JVD, no cspine tender/step-offs  Integumentary: Color normal for race, warm and dry, no rash. No lacerations, abrasions, or ecchymosis.   Neuro: Alert/interactive, agitated, intoxicated, CN 2-12 grossly intact, normal motor, normal sensory, normal gait, no clonus/rigidity/hyper-reflexia.  Psych: Intoxicated, agitated    37f w EtOH intoxication, agitated, no evidence of trauma. patient required sedation for safety. --FS, observed, until clinically sober, refer for outpatient detox. 37f w a hx of anxiety, depression, & polysubstance abuse well known to ED. Pt BIB EMS after being found agitated and intoxicated. Pt admits to drinking EtOH tonight. Pt very poor historian due to intoxication and agitation.     Review of Systems  Unable to assess due to agitation    Physical Exam (partially performed after sedation)  General: Awake, alert, agitated, WDWN, NCAT, no skull/facial tender, no step-offs, no raccoon/calles, non-toxic appearing  Eyes: PERRL 3mm, EOMI, no icterus, lids and conjunctivae are normal  ENT: External inspection normal, pink/moist membranes, pharynx normal  CV: S1S2, regular rate and rhythm, no murmur/gallops/rubs, no JVD, 2+ pulses b/l, no edema/cords/homans, warm/well-perfused  Respiratory: Normal respiratory rate/effort, no respiratory distress, normal voice, speaking full sentences, lungs clear to auscultation b/l, no wheezing/rales/rhonchi, no retractions, no stridor  Abdomen: Soft abdomen, no tender/distended/guarding/rebound, no CVA tender  Musculoskeletal: FROM all 4 extremities, N/V intact, pelvis stable, no TLS spinal tender/deform/step-offs, no judith tender/deform, stable gait  Neck: FROM neck, supple, no meningismus, trachea midline, no JVD, no cspine tender/step-offs  Integumentary: Color normal for race, warm and dry, no rash. No lacerations, abrasions, or ecchymosis.   Neuro: Alert/interactive, agitated, intoxicated, CN 2-12 grossly intact, normal motor, normal sensory, normal gait, no clonus/rigidity/hyper-reflexia.  Psych: Intoxicated, agitated    37f w EtOH intoxication, agitated, no evidence of trauma. patient required sedation for safety. --FS, observe until clinically sober, refer for outpatient detox.

## 2018-10-09 ENCOUNTER — EMERGENCY (EMERGENCY)
Facility: HOSPITAL | Age: 38
LOS: 0 days | Discharge: HOME | End: 2018-10-09
Attending: EMERGENCY MEDICINE | Admitting: EMERGENCY MEDICINE
Payer: COMMERCIAL

## 2018-10-09 VITALS
DIASTOLIC BLOOD PRESSURE: 73 MMHG | RESPIRATION RATE: 19 BRPM | SYSTOLIC BLOOD PRESSURE: 143 MMHG | HEART RATE: 85 BPM | OXYGEN SATURATION: 98 % | TEMPERATURE: 99 F

## 2018-10-09 VITALS
TEMPERATURE: 99 F | RESPIRATION RATE: 18 BRPM | DIASTOLIC BLOOD PRESSURE: 63 MMHG | SYSTOLIC BLOOD PRESSURE: 116 MMHG | OXYGEN SATURATION: 98 % | HEART RATE: 88 BPM

## 2018-10-09 DIAGNOSIS — M54.2 CERVICALGIA: ICD-10-CM

## 2018-10-09 DIAGNOSIS — M25.512 PAIN IN LEFT SHOULDER: ICD-10-CM

## 2018-10-09 DIAGNOSIS — S70.212A ABRASION, LEFT HIP, INITIAL ENCOUNTER: ICD-10-CM
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
## 2018-10-09 DIAGNOSIS — R51 HEADACHE: ICD-10-CM
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
## 2018-10-09 DIAGNOSIS — S70.312A ABRASION, LEFT THIGH, INITIAL ENCOUNTER: ICD-10-CM

## 2018-10-09 DIAGNOSIS — F17.200 NICOTINE DEPENDENCE, UNSPECIFIED, UNCOMPLICATED: ICD-10-CM

## 2018-10-09 DIAGNOSIS — F32.9 MAJOR DEPRESSIVE DISORDER, SINGLE EPISODE, UNSPECIFIED: ICD-10-CM
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
## 2018-10-09 LAB
ALBUMIN SERPL ELPH-MCNC: 4.1 G/DL — SIGNIFICANT CHANGE UP (ref 3.5–5.2)
ALP SERPL-CCNC: 84 U/L — SIGNIFICANT CHANGE UP (ref 30–115)
ALT FLD-CCNC: 23 U/L — SIGNIFICANT CHANGE UP (ref 0–41)
ANION GAP SERPL CALC-SCNC: 17 MMOL/L — HIGH (ref 7–14)
APTT BLD: 28.6 SEC — SIGNIFICANT CHANGE UP (ref 27–39.2)
AST SERPL-CCNC: 27 U/L — SIGNIFICANT CHANGE UP (ref 0–41)
BASOPHILS # BLD AUTO: 0.08 K/UL — SIGNIFICANT CHANGE UP (ref 0–0.2)
BASOPHILS NFR BLD AUTO: 0.9 % — SIGNIFICANT CHANGE UP (ref 0–1)
BILIRUB SERPL-MCNC: 0.2 MG/DL — SIGNIFICANT CHANGE UP (ref 0.2–1.2)
BUN SERPL-MCNC: 9 MG/DL — LOW (ref 10–20)
CALCIUM SERPL-MCNC: 8.2 MG/DL — LOW (ref 8.5–10.1)
CHLORIDE SERPL-SCNC: 103 MMOL/L — SIGNIFICANT CHANGE UP (ref 98–110)
CO2 SERPL-SCNC: 21 MMOL/L — SIGNIFICANT CHANGE UP (ref 17–32)
CREAT SERPL-MCNC: 0.7 MG/DL — SIGNIFICANT CHANGE UP (ref 0.7–1.5)
EOSINOPHIL # BLD AUTO: 0.44 K/UL — SIGNIFICANT CHANGE UP (ref 0–0.7)
EOSINOPHIL NFR BLD AUTO: 5 % — SIGNIFICANT CHANGE UP (ref 0–8)
ETHANOL SERPL-MCNC: 165 MG/DL — HIGH
GLUCOSE SERPL-MCNC: 88 MG/DL — SIGNIFICANT CHANGE UP (ref 70–99)
HCG SERPL QL: NEGATIVE — SIGNIFICANT CHANGE UP
HCT VFR BLD CALC: 42 % — SIGNIFICANT CHANGE UP (ref 37–47)
HGB BLD-MCNC: 14.4 G/DL — SIGNIFICANT CHANGE UP (ref 12–16)
IMM GRANULOCYTES NFR BLD AUTO: 0.2 % — SIGNIFICANT CHANGE UP (ref 0.1–0.3)
INR BLD: 0.93 RATIO — SIGNIFICANT CHANGE UP (ref 0.65–1.3)
LACTATE SERPL-SCNC: 1.7 MMOL/L — SIGNIFICANT CHANGE UP (ref 0.5–2.2)
LIDOCAIN IGE QN: 72 U/L — HIGH (ref 7–60)
LYMPHOCYTES # BLD AUTO: 2.75 K/UL — SIGNIFICANT CHANGE UP (ref 1.2–3.4)
LYMPHOCYTES # BLD AUTO: 31.5 % — SIGNIFICANT CHANGE UP (ref 20.5–51.1)
MCHC RBC-ENTMCNC: 31.1 PG — HIGH (ref 27–31)
MCHC RBC-ENTMCNC: 34.3 G/DL — SIGNIFICANT CHANGE UP (ref 32–37)
MCV RBC AUTO: 90.7 FL — SIGNIFICANT CHANGE UP (ref 81–99)
MONOCYTES # BLD AUTO: 0.88 K/UL — HIGH (ref 0.1–0.6)
MONOCYTES NFR BLD AUTO: 10.1 % — HIGH (ref 1.7–9.3)
NEUTROPHILS # BLD AUTO: 4.56 K/UL — SIGNIFICANT CHANGE UP (ref 1.4–6.5)
NEUTROPHILS NFR BLD AUTO: 52.3 % — SIGNIFICANT CHANGE UP (ref 42.2–75.2)
PLATELET # BLD AUTO: 238 K/UL — SIGNIFICANT CHANGE UP (ref 130–400)
POTASSIUM SERPL-MCNC: 3.9 MMOL/L — SIGNIFICANT CHANGE UP (ref 3.5–5)
POTASSIUM SERPL-SCNC: 3.9 MMOL/L — SIGNIFICANT CHANGE UP (ref 3.5–5)
PROT SERPL-MCNC: 6.7 G/DL — SIGNIFICANT CHANGE UP (ref 6–8)
PROTHROM AB SERPL-ACNC: 10 SEC — SIGNIFICANT CHANGE UP (ref 9.95–12.87)
RBC # BLD: 4.63 M/UL — SIGNIFICANT CHANGE UP (ref 4.2–5.4)
RBC # FLD: 12.7 % — SIGNIFICANT CHANGE UP (ref 11.5–14.5)
SODIUM SERPL-SCNC: 141 MMOL/L — SIGNIFICANT CHANGE UP (ref 135–146)
TYPE + AB SCN PNL BLD: SIGNIFICANT CHANGE UP
WBC # BLD: 8.73 K/UL — SIGNIFICANT CHANGE UP (ref 4.8–10.8)
WBC # FLD AUTO: 8.73 K/UL — SIGNIFICANT CHANGE UP (ref 4.8–10.8)

## 2018-10-09 PROCEDURE — 99283 EMERGENCY DEPT VISIT LOW MDM: CPT

## 2018-10-09 RX ORDER — KETOROLAC TROMETHAMINE 30 MG/ML
15 SYRINGE (ML) INJECTION ONCE
Qty: 0 | Refills: 0 | Status: DISCONTINUED | OUTPATIENT
Start: 2018-10-09 | End: 2018-10-09

## 2018-10-09 RX ORDER — SODIUM CHLORIDE 9 MG/ML
1000 INJECTION INTRAMUSCULAR; INTRAVENOUS; SUBCUTANEOUS ONCE
Qty: 0 | Refills: 0 | Status: COMPLETED | OUTPATIENT
Start: 2018-10-09 | End: 2018-10-09

## 2018-10-09 RX ORDER — ACETAMINOPHEN 500 MG
650 TABLET ORAL ONCE
Qty: 0 | Refills: 0 | Status: COMPLETED | OUTPATIENT
Start: 2018-10-09 | End: 2018-10-09

## 2018-10-09 RX ADMIN — Medication 15 MILLIGRAM(S): at 11:13

## 2018-10-09 RX ADMIN — SODIUM CHLORIDE 2000 MILLILITER(S): 9 INJECTION INTRAMUSCULAR; INTRAVENOUS; SUBCUTANEOUS at 06:58

## 2018-10-09 RX ADMIN — Medication 650 MILLIGRAM(S): at 06:57

## 2018-10-09 NOTE — ED PROVIDER NOTE - NSFOLLOWUPCLINICS_GEN_ALL_ED_FT
[facility]  Medicine  [address], [place]   Phone: [phone]  Fax:   Follow Up Time: 1-3 Days
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
## 2018-10-09 NOTE — ED PROVIDER NOTE - PROGRESS NOTE DETAILS
signed out to Dr. Rayo. Serum preg negative. Called CT, will take.  Gave bedside. Spoke with Dr. Hurt patient is cleared from trauma
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
## 2018-10-09 NOTE — ED PROVIDER NOTE - PHYSICAL EXAMINATION
AOx4, Non toxic appearing, NAD, speaking in full sentences. GCS 15.  Skin multple areas of old healing abrasions. Head normocephalic, atraumatic. PERRLA/EOMI, conjunctiva and sclera clear. MM moist, no nasal discharge.  Pharynx and TM's unremarkable.  No mastoid or temporal ttp. Neck supple nt, no meningeal signs. Heart RRR s1s2 nl, no rub/murmur. Lungs- No retractions, BS equal, CTAB. Abdomen soft ntnd no r/g. Extremities- moves all, +equal distal pulses, brisk cap refill, sensation wnl, normal ROM. No LE edema, calves nttp b/l. CN 2-12 grossly intact. +5/5 strength and sensation wnl in all extremities. no ataxia, normal gait.
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
## 2018-10-16 ENCOUNTER — EMERGENCY (EMERGENCY)
Facility: HOSPITAL | Age: 38
LOS: 0 days | Discharge: LEFT BEFORE DISCHARGE | End: 2018-10-16
Attending: EMERGENCY MEDICINE | Admitting: EMERGENCY MEDICINE

## 2018-10-16 VITALS
DIASTOLIC BLOOD PRESSURE: 78 MMHG | WEIGHT: 184.97 LBS | SYSTOLIC BLOOD PRESSURE: 138 MMHG | OXYGEN SATURATION: 99 % | RESPIRATION RATE: 20 BRPM | TEMPERATURE: 96 F | HEART RATE: 91 BPM

## 2018-10-16 DIAGNOSIS — F17.200 NICOTINE DEPENDENCE, UNSPECIFIED, UNCOMPLICATED: ICD-10-CM

## 2018-10-16 DIAGNOSIS — F10.129 ALCOHOL ABUSE WITH INTOXICATION, UNSPECIFIED: ICD-10-CM

## 2018-10-16 DIAGNOSIS — F41.8 OTHER SPECIFIED ANXIETY DISORDERS: ICD-10-CM

## 2018-10-16 NOTE — ED ADULT NURSE NOTE - NS ED NURSE ELOPE COMMENTS
patient without telling anyone, patient a\o x3. patient ambulated with a steady gait and calm prior to leaving.

## 2018-10-16 NOTE — ED ADULT NURSE NOTE - NSIMPLEMENTINTERV_GEN_ALL_ED
Implemented All Fall Risk Interventions:  Tucker to call system. Call bell, personal items and telephone within reach. Instruct patient to call for assistance. Room bathroom lighting operational. Non-slip footwear when patient is off stretcher. Physically safe environment: no spills, clutter or unnecessary equipment. Stretcher in lowest position, wheels locked, appropriate side rails in place. Provide visual cue, wrist band, yellow gown, etc. Monitor gait and stability. Monitor for mental status changes and reorient to person, place, and time. Review medications for side effects contributing to fall risk. Reinforce activity limits and safety measures with patient and family.
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
## 2018-10-16 NOTE — ED PROVIDER NOTE - PHYSICAL EXAMINATION
CONSTITUTIONAL: WA / WN / NAD  HEAD: NCAT  EYES: PERRL; EOMI;   ENT: Normal pharynx; mucous membranes pink/moist, no erythema.  NECK: Supple; no meningeal signs  CARD: RRR;  RESP: Respiratory rate and effort are normal; breath sounds clear and equal bilaterally.  ABD: Soft, NT ND   MSK/EXT: No gross deformities; full range of motion.  SKIN: Warm and dry;   NEURO: AAOx3,
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
## 2018-10-16 NOTE — ED PROVIDER NOTE - MEDICAL DECISION MAKING DETAILS
I personally saw and evaluated patient.  I reviewed prior chart and ED visits if available.  VS reviewed. Patient has no signs of life threatening withdrawal. Patient became clinically sober as evidenced by clear speech and stable gait. She was given juice and walked to the bathroom without issue. Patient denies any homicidal or suicidal ideations and is answering questions appropriately.  Patient does not require emergent psychiatric evaluation. She did not require a 1:1 sit and is not under arrest.  Patient eloped from the ED.

## 2018-10-24 NOTE — ED BEHAVIORAL HEALTH ASSESSMENT NOTE - AXIS IV
Partially impaired: cannot see medication labels or newsprint, but can see obstacles in path, and the surrounding layout; can count fingers at arm's length
Problem related to social environment/Other psychosocial and environmental problems/Problems with primary support

## 2018-12-08 NOTE — ED ADULT NURSE NOTE - EXTENSIONS OF SELF_ADULT
Telephone Encounter by Tita Cox at 06/28/17 09:54 AM     Author:  Tita Cox Service:  (none) Author Type:  Patient      Filed:  06/28/17 09:54 AM Encounter Date:  6/28/2017 Status:  Signed     :  Tita Cox (Patient )              KENISHA TROY    Patient Age: 16 year old   Refill request by:[CP1.1T] Pharmacy phone[CP1.1M]  Refill to be:[CP1.1T] ePrescribed to[CP1.1M]   Pharmacy     [facility]/PHARMACY [facility] [address] [id number] [facility] [address]    Phone: [phone]          Medication requested to be refilled:   Requested Prescriptions     Pending Prescriptions Disp Refills   • benztropine (COGENTIN) 0.5 MG tablet [Pharmacy Med Name: BENZTROPINE MES 0.5 MG TAB] 90 Tab 0     Sig: TAKE 1 TAB BY MOUTH NIGHTLY AS NEEDED.       Next Appointment Scheduled:[CP1.1T] 8/7/17[CP1.1M]      Next and Last Visit with Provider and Department  Next visit with WALTER LAM is on 08/07/2017 at  4:00 PM in [facility]  Next visit with PSYCHIATRY is on 08/07/2017 at  4:00 PM in [facility]   Last visit with WALTER LAM was on 03/31/2017 at  3:30 PM in [facility]  Last visit with PSYCHIATRY was on 03/31/2017 at  3:30 PM in [facility]      WEIGHT AND HEIGHT: As of 03/31/2017 weight is 208 lbs.(94.348 kg). Height is 5' 10\"(1.778 m).   BMI is 29.84 kg/(m^2) calculated from:     Height 5' 10\" (1.778 m) as of 3/31/17     Weight 208 lb (94.348 kg) as of 3/31/17      No Known Allergies  Current outpatient prescriptions       Medication  Sig Dispense Refill   • ziprasidone (GEODON) 20 MG Cap Take 2 Caps by mouth every evening. 180 Cap 0   • sertraline (ZOLOFT) 100 MG tablet Take 1.5 pills by mouth daily 135 Tab 0   • ziprasidone (GEODON) 40 MG Cap Take 1 Cap by mouth every morning and 2 Caps every evening 270 Cap 0   • benztropine (COGENTIN) 0.5 MG tablet Take 1 Tab by mouth nightly as needed. 90 Tab 0   • polyethylene glycol  (MIRALAX) powder Take 17 g by mouth daily. Mix in juice or water as directed.          ROUTING:[CP1.1T] Patient's physician/staff[CP1.1M]        PCP: No primary care provider on file.         INS: Payor: [employer] / Plan: *No Plan* / Product Type: *No Product type* / Note: This is the primary coverage, but no account was found for this location or the patient's primary location.   ADDRESS:  [address] [place] [id number][CP1.1T]           Revision History        User Key Date/Time User Provider Type Action    > CP1.1 06/28/17 09:54 AM Tita Cox Patient  Sign    M - Manual, T - Template             None

## 2018-12-16 NOTE — PATIENT PROFILE ADULT. - NSCAGESTDRUGANNOY_GEN_A_CORE_SD
4yo F with no PMH presented fever, cough and congestion for 2 days, most likely due to viral URI. Patient is well appearing, with benign physical exam. Will discharge home with supportive care. no

## 2018-12-20 ENCOUNTER — INPATIENT (INPATIENT)
Facility: HOSPITAL | Age: 38
LOS: 1 days | Discharge: LEFT BEFORE DISCHARGE | End: 2018-12-22
Attending: INTERNAL MEDICINE | Admitting: INTERNAL MEDICINE

## 2018-12-20 VITALS
OXYGEN SATURATION: 98 % | WEIGHT: 160.06 LBS | SYSTOLIC BLOOD PRESSURE: 134 MMHG | HEIGHT: 64 IN | TEMPERATURE: 97 F | RESPIRATION RATE: 18 BRPM | HEART RATE: 87 BPM | DIASTOLIC BLOOD PRESSURE: 83 MMHG

## 2018-12-20 LAB
ALBUMIN SERPL ELPH-MCNC: 4.5 G/DL — SIGNIFICANT CHANGE UP (ref 3.5–5.2)
ALP SERPL-CCNC: 104 U/L — SIGNIFICANT CHANGE UP (ref 30–115)
ALT FLD-CCNC: 143 U/L — HIGH (ref 0–41)
AMMONIA BLD-MCNC: 27 UMOL/L — SIGNIFICANT CHANGE UP (ref 11–55)
AMYLASE P1 CFR SERPL: 37 U/L — SIGNIFICANT CHANGE UP (ref 25–115)
ANION GAP SERPL CALC-SCNC: 15 MMOL/L — HIGH (ref 7–14)
APAP SERPL-MCNC: 5 UG/ML — LOW (ref 10–30)
APPEARANCE UR: CLEAR — SIGNIFICANT CHANGE UP
AST SERPL-CCNC: 150 U/L — HIGH (ref 0–41)
BASOPHILS # BLD AUTO: 0.06 K/UL — SIGNIFICANT CHANGE UP (ref 0–0.2)
BASOPHILS NFR BLD AUTO: 0.8 % — SIGNIFICANT CHANGE UP (ref 0–1)
BILIRUB SERPL-MCNC: 0.5 MG/DL — SIGNIFICANT CHANGE UP (ref 0.2–1.2)
BILIRUB UR-MCNC: NEGATIVE — SIGNIFICANT CHANGE UP
BUN SERPL-MCNC: 5 MG/DL — LOW (ref 10–20)
CALCIUM SERPL-MCNC: 9.3 MG/DL — SIGNIFICANT CHANGE UP (ref 8.5–10.1)
CHLORIDE SERPL-SCNC: 99 MMOL/L — SIGNIFICANT CHANGE UP (ref 98–110)
CO2 SERPL-SCNC: 26 MMOL/L — SIGNIFICANT CHANGE UP (ref 17–32)
COLOR SPEC: YELLOW — SIGNIFICANT CHANGE UP
CREAT SERPL-MCNC: 0.7 MG/DL — SIGNIFICANT CHANGE UP (ref 0.7–1.5)
DIFF PNL FLD: ABNORMAL
EOSINOPHIL # BLD AUTO: 0.11 K/UL — SIGNIFICANT CHANGE UP (ref 0–0.7)
EOSINOPHIL NFR BLD AUTO: 1.5 % — SIGNIFICANT CHANGE UP (ref 0–8)
EPI CELLS # UR: ABNORMAL /HPF
ETHANOL SERPL-MCNC: <10 MG/DL — HIGH
GLUCOSE SERPL-MCNC: 91 MG/DL — SIGNIFICANT CHANGE UP (ref 70–99)
GLUCOSE UR QL: NEGATIVE MG/DL — SIGNIFICANT CHANGE UP
HCT VFR BLD CALC: 45 % — SIGNIFICANT CHANGE UP (ref 37–47)
HGB BLD-MCNC: 15.2 G/DL — SIGNIFICANT CHANGE UP (ref 12–16)
IMM GRANULOCYTES NFR BLD AUTO: 0.3 % — SIGNIFICANT CHANGE UP (ref 0.1–0.3)
KETONES UR-MCNC: 15
LEUKOCYTE ESTERASE UR-ACNC: NEGATIVE — SIGNIFICANT CHANGE UP
LYMPHOCYTES # BLD AUTO: 1.67 K/UL — SIGNIFICANT CHANGE UP (ref 1.2–3.4)
LYMPHOCYTES # BLD AUTO: 23.5 % — SIGNIFICANT CHANGE UP (ref 20.5–51.1)
MCHC RBC-ENTMCNC: 32.3 PG — HIGH (ref 27–31)
MCHC RBC-ENTMCNC: 33.8 G/DL — SIGNIFICANT CHANGE UP (ref 32–37)
MCV RBC AUTO: 95.7 FL — SIGNIFICANT CHANGE UP (ref 81–99)
MONOCYTES # BLD AUTO: 0.85 K/UL — HIGH (ref 0.1–0.6)
MONOCYTES NFR BLD AUTO: 11.9 % — HIGH (ref 1.7–9.3)
NEUTROPHILS # BLD AUTO: 4.41 K/UL — SIGNIFICANT CHANGE UP (ref 1.4–6.5)
NEUTROPHILS NFR BLD AUTO: 62 % — SIGNIFICANT CHANGE UP (ref 42.2–75.2)
NITRITE UR-MCNC: NEGATIVE — SIGNIFICANT CHANGE UP
NRBC # BLD: 0 /100 WBCS — SIGNIFICANT CHANGE UP (ref 0–0)
PH UR: 7 — SIGNIFICANT CHANGE UP (ref 5–8)
PLATELET # BLD AUTO: 236 K/UL — SIGNIFICANT CHANGE UP (ref 130–400)
POTASSIUM SERPL-MCNC: 4.4 MMOL/L — SIGNIFICANT CHANGE UP (ref 3.5–5)
POTASSIUM SERPL-SCNC: 4.4 MMOL/L — SIGNIFICANT CHANGE UP (ref 3.5–5)
PROT SERPL-MCNC: 7.2 G/DL — SIGNIFICANT CHANGE UP (ref 6–8)
PROT UR-MCNC: NEGATIVE MG/DL — SIGNIFICANT CHANGE UP
RBC # BLD: 4.7 M/UL — SIGNIFICANT CHANGE UP (ref 4.2–5.4)
RBC # FLD: 13.1 % — SIGNIFICANT CHANGE UP (ref 11.5–14.5)
RBC CASTS # UR COMP ASSIST: SIGNIFICANT CHANGE UP /HPF
SALICYLATES SERPL-MCNC: <0.3 MG/DL — LOW (ref 4–30)
SODIUM SERPL-SCNC: 140 MMOL/L — SIGNIFICANT CHANGE UP (ref 135–146)
SP GR SPEC: 1.01 — SIGNIFICANT CHANGE UP (ref 1.01–1.03)
UROBILINOGEN FLD QL: 0.2 MG/DL — SIGNIFICANT CHANGE UP (ref 0.2–0.2)
WBC # BLD: 7.12 K/UL — SIGNIFICANT CHANGE UP (ref 4.8–10.8)
WBC # FLD AUTO: 7.12 K/UL — SIGNIFICANT CHANGE UP (ref 4.8–10.8)
WBC UR QL: SIGNIFICANT CHANGE UP /HPF

## 2018-12-20 RX ORDER — METHADONE HYDROCHLORIDE 40 MG/1
TABLET ORAL
Qty: 0 | Refills: 0 | Status: DISCONTINUED | OUTPATIENT
Start: 2018-12-20 | End: 2018-12-22

## 2018-12-20 RX ORDER — ACETAMINOPHEN 500 MG
650 TABLET ORAL EVERY 6 HOURS
Qty: 0 | Refills: 0 | Status: DISCONTINUED | OUTPATIENT
Start: 2018-12-20 | End: 2018-12-22

## 2018-12-20 RX ORDER — PSEUDOEPHEDRINE HCL 30 MG
60 TABLET ORAL EVERY 6 HOURS
Qty: 0 | Refills: 0 | Status: DISCONTINUED | OUTPATIENT
Start: 2018-12-20 | End: 2018-12-22

## 2018-12-20 RX ORDER — METHADONE HYDROCHLORIDE 40 MG/1
10 TABLET ORAL EVERY 12 HOURS
Qty: 0 | Refills: 0 | Status: DISCONTINUED | OUTPATIENT
Start: 2018-12-20 | End: 2018-12-21

## 2018-12-20 RX ORDER — IBUPROFEN 200 MG
400 TABLET ORAL EVERY 6 HOURS
Qty: 0 | Refills: 0 | Status: DISCONTINUED | OUTPATIENT
Start: 2018-12-20 | End: 2018-12-22

## 2018-12-20 RX ORDER — MULTIVIT-MIN/FERROUS GLUCONATE 9 MG/15 ML
1 LIQUID (ML) ORAL DAILY
Qty: 0 | Refills: 0 | Status: DISCONTINUED | OUTPATIENT
Start: 2018-12-20 | End: 2018-12-22

## 2018-12-20 RX ORDER — TUBERCULIN PURIFIED PROTEIN DERIVATIVE 5 [IU]/.1ML
5 INJECTION, SOLUTION INTRADERMAL ONCE
Qty: 0 | Refills: 0 | Status: COMPLETED | OUTPATIENT
Start: 2018-12-20 | End: 2018-12-21

## 2018-12-20 RX ORDER — THIAMINE MONONITRATE (VIT B1) 100 MG
100 TABLET ORAL DAILY
Qty: 0 | Refills: 0 | Status: DISCONTINUED | OUTPATIENT
Start: 2018-12-20 | End: 2018-12-22

## 2018-12-20 RX ORDER — HYDROXYZINE HCL 10 MG
50 TABLET ORAL EVERY 6 HOURS
Qty: 0 | Refills: 0 | Status: DISCONTINUED | OUTPATIENT
Start: 2018-12-20 | End: 2018-12-22

## 2018-12-20 RX ORDER — MAGNESIUM HYDROXIDE 400 MG/1
30 TABLET, CHEWABLE ORAL ONCE
Qty: 0 | Refills: 0 | Status: DISCONTINUED | OUTPATIENT
Start: 2018-12-20 | End: 2018-12-22

## 2018-12-20 RX ORDER — METHOCARBAMOL 500 MG/1
500 TABLET, FILM COATED ORAL EVERY 6 HOURS
Qty: 0 | Refills: 0 | Status: DISCONTINUED | OUTPATIENT
Start: 2018-12-20 | End: 2018-12-22

## 2018-12-20 RX ORDER — NICOTINE POLACRILEX 2 MG
1 GUM BUCCAL DAILY
Qty: 0 | Refills: 0 | Status: DISCONTINUED | OUTPATIENT
Start: 2018-12-20 | End: 2018-12-22

## 2018-12-20 RX ORDER — METHADONE HYDROCHLORIDE 40 MG/1
5 TABLET ORAL EVERY 12 HOURS
Qty: 0 | Refills: 0 | Status: DISCONTINUED | OUTPATIENT
Start: 2018-12-21 | End: 2018-12-22

## 2018-12-20 RX ORDER — HYDROXYZINE HCL 10 MG
100 TABLET ORAL AT BEDTIME
Qty: 0 | Refills: 0 | Status: DISCONTINUED | OUTPATIENT
Start: 2018-12-20 | End: 2018-12-22

## 2018-12-20 RX ORDER — METHADONE HYDROCHLORIDE 40 MG/1
10 TABLET ORAL ONCE
Qty: 0 | Refills: 0 | Status: COMPLETED | OUTPATIENT
Start: 2018-12-20 | End: 2018-12-20

## 2018-12-20 RX ADMIN — Medication 2 MILLIGRAM(S): at 21:30

## 2018-12-20 RX ADMIN — Medication 2 MILLIGRAM(S): at 23:38

## 2018-12-20 RX ADMIN — METHADONE HYDROCHLORIDE 10 MILLIGRAM(S): 40 TABLET ORAL at 21:29

## 2018-12-20 NOTE — ED PROVIDER NOTE - OBJECTIVE STATEMENT
Patient is a 38 year old female presenting to ED requesting ETOH detox. Patient states that she drank one beer and one shot today. Drinks every day. Pateint states that she feels shakey when she dosent drink, but not feeling shakey at this time. Patient also admits to using percocet the past 2 days. States that she took three 15 mg percocet today. Pateint has no other complaints. Denies other symptoms.

## 2018-12-20 NOTE — ED PROVIDER NOTE - ATTENDING CONTRIBUTION TO CARE
I personally evaluated the patient. I reviewed the Resident’s or Physician Assistant’s note (as assigned above), and agree with the findings and plan except as documented in my note.     38 female here for alcohol detox is a known polysubstance abuser.     PE: female in no distress. HEENT: no fasciculations no ecchymosis. AOB noted. NEURO: awake, no focal deficits. no tremor.     Impression: substance abuse    Plan: medical clearance for detox admission

## 2018-12-20 NOTE — ED PROVIDER NOTE - NS ED ROS FT
Review of Systems  Constitutional:  No fever or chills. No malaise, generalized weakness, or weight changes.  Eyes:  Negative. No visual changes, eye pain, or discharge.  ENMT:  No nasal congestion, discharge, or throat pain. No hearing changes, pain, or discharge. No nasal congestion, discharge, or bleeding. No throat pain, swelling, or difficulty swallowing.  Cardiac:  No chest pain, palpitations, syncope, or edema.  Respiratory:  No dyspnea, orthopnea, stridor, wheezing, or cough. No hemoptysis.  GI:  No vomiting, diarrhea, or abdominal pain. No melena or hematochezia.  :  No dysuria or hematuria. No frequency, or burning. Normal urine output.   Musculoskeletal:  No myalgia, muscle weakness, gait abnormality, joint swelling, joint pain, or back pain.  Skin:  No skin rash, pruritis, jaundice, or lesions.  Neuro:  No headache, dizziness, loss of sensation, or focal weakness.  No change in mental status.

## 2018-12-20 NOTE — H&P ADULT - NSHPPHYSICALEXAM_GEN_ALL_CORE
Vital Signs Last 24 Hrs  T(C): 36.3 (20 Dec 2018 18:59), Max: 36.3 (20 Dec 2018 18:59)  T(F): 97.4 (20 Dec 2018 18:59), Max: 97.4 (20 Dec 2018 18:59)  HR: 99 (20 Dec 2018 18:59) (87 - 99)  BP: 134/80 (20 Dec 2018 18:59) (134/80 - 134/83)  BP(mean): --  RR: 18 (20 Dec 2018 18:59) (18 - 18)  SpO2: 99% (20 Dec 2018 18:59) (98% - 99%)        Constitutional: NAD, A&O x3    Eyes: PERRLA, no conjuctivitis    Neck: no lymphadenopathy    Respiratory: +air entry, no rales, no rhonchi, no wheezes    Cardiovascular: +S1 and S2, regular rate and rhythm    Gastrointestinal: +BS, soft, non-tender, not distended    Extremities:  no edema, no calf tenderness    Vascular: +dorsal pedis and radial pulses, no extremity cyanosis    Neurological: sensation intact, ROM equal B/L, CN II-XII intact    Skin: no rashes, normal turgor    Lymph Nodes: no lymphadenopathy

## 2018-12-20 NOTE — ED PROVIDER NOTE - PHYSICAL EXAMINATION
Physical Exam  General: Awake, alert, NAD, WDWN, non-toxic appearing, NCAT  Eyes: PERRL, EOMI, no icterus, lids and conjunctivae are normal  ENT: External inspection normal, pink/moist membranes, pharynx normal  CV: S1S2, regular rate and rhythm, no murmur/gallops/rubs  Respiratory: Normal respiratory rate/effort, no respiratory distress, normal voice, speaking full sentences, lungs clear to auscultation b/l, no wheezing/rales/rhonchi, no retractions, no stridor  Abdomen: Soft abdomen, no tender/distended/guarding/rebound  Musculoskeletal: FROM all 4 extremities, N/V intact, stable gait  Neck: FROM neck, supple, no meningismus, trachea midline, no JVD  Integumentary: Color normal for race, warm and dry, no rash  Neuro: Oriented x3, CN 2-12 grossly intact, normal motor, normal sensory, normal gait, normal cerebellar  Psych: Oriented x3, mood normal, affect normal

## 2018-12-20 NOTE — H&P ADULT - ATTENDING COMMENTS
Patient interviewed and examined.    Chart reviewed.    PA's H&P noted and modified, as appropriate.    Case discussed on team rounds    Following is my summary of the case.    Admitted for detox: from ____ED, __x_Intake, ____Med/Surg Floor    Alcohol_x___   Opioid___x__  Benzo___ Other_____    Substance amount, duration of use, last usage, and prior attempts at detox or rehabs, are outlined above in the H&P and discussed with patient.    Associated withdrawal symptoms presents.  Comorbid conditions noted. Chronic and Stable.    Past Medical Hx, Psych Hx, family Hx, Social Hx from H&P reviewed and NO changes.    Old medical record and medication Hx. Reviewed    Following items reviewed and addressed:  1. labs  2. EKG  3. Imaging from PACs module    Examination: no change from PA's exam.    Place on following protocol  _____Medically Managed  __X__Medically Supervised    Ciwa__x___Librium taper____Ativan taper___Methadone taper_x__ Phenobarb taper____ Suboxone Induction____MMTP____    Narcan Kit Offered    Psych Consult __X__N/A  ___Ordered    Physical Therapy  ___X N/A   ___  Ordered    Aftercare disposition to be addressed by counselors.    Estimated length of stay 3-5 days.

## 2018-12-20 NOTE — ED ADULT TRIAGE NOTE - CHIEF COMPLAINT QUOTE
pt requesting detox, drinks alcohol everyday, started using percocet last week, today pt had 1 beer and 1 shot of liquor, took 2 15mg percoet.

## 2018-12-20 NOTE — H&P ADULT - NSHPLABSRESULTS_GEN_ALL_CORE
15.2   7.12  )-----------( 236      ( 20 Dec 2018 18:00 )             45.0     12-20    140  |  99  |  5<L>  ----------------------------<  91  4.4   |  26  |  0.7    Ca    9.3      20 Dec 2018 18:00    TPro  7.2  /  Alb  4.5  /  TBili  0.5  /  DBili  x   /  AST  150<H>  /  ALT  143<H>  /  AlkPhos  104  12-20          Urinalysis Basic - ( 20 Dec 2018 18:24 )    Color: Yellow / Appearance: Clear / S.015 / pH: x  Gluc: x / Ketone: 15  / Bili: Negative / Urobili: 0.2 mg/dL   Blood: x / Protein: Negative mg/dL / Nitrite: Negative   Leuk Esterase: Negative / RBC: 1-2 /HPF / WBC 1-2 /HPF   Sq Epi: x / Non Sq Epi: Moderate /HPF / Bacteria: x            CAPILLARY BLOOD GLUCOSE

## 2018-12-20 NOTE — H&P ADULT - HISTORY OF PRESENT ILLNESS
38 year old female presenting to ED requesting for polysubstance detox. Last detox was 9/2018 but could not complete due to personal issues. Denies SI/HI, denies seizures with alcohol withdrawal.     Etoh: 2 to 4 pints of vodka/dayx4 months. Last drink was 4 hrs ago.  Percocet: 75mg po daily x 1 month. Last percocet was this am, (75mg)    HIV (-) 2018  HEP C(-) 2018  PPD (-) 2018

## 2018-12-20 NOTE — H&P ADULT - PROBLEM SELECTOR PLAN 1
-Pain mgmt  -Methadone protocol  -Ativan CIWA  -Atarax 100mg qhs prn insomnia  -Atarax 50mg q6hrs prn anxiety

## 2018-12-21 LAB
AMPHET UR-MCNC: NEGATIVE — SIGNIFICANT CHANGE UP
BARBITURATES UR SCN-MCNC: NEGATIVE — SIGNIFICANT CHANGE UP
BENZODIAZ UR-MCNC: POSITIVE
COCAINE METAB.OTHER UR-MCNC: NEGATIVE — SIGNIFICANT CHANGE UP
METHADONE UR-MCNC: NEGATIVE — SIGNIFICANT CHANGE UP
OPIATES UR-MCNC: NEGATIVE — SIGNIFICANT CHANGE UP
PCP SPEC-MCNC: SIGNIFICANT CHANGE UP
PROPOXYPHENE QUALITATIVE URINE RESULT: NEGATIVE — SIGNIFICANT CHANGE UP
T PALLIDUM AB TITR SER: NEGATIVE — SIGNIFICANT CHANGE UP

## 2018-12-21 RX ADMIN — Medication 1 PATCH: at 09:05

## 2018-12-21 RX ADMIN — Medication 1 TABLET(S): at 09:05

## 2018-12-21 RX ADMIN — Medication 100 MILLIGRAM(S): at 21:05

## 2018-12-21 RX ADMIN — Medication 2 MILLIGRAM(S): at 02:33

## 2018-12-21 RX ADMIN — Medication 2 MILLIGRAM(S): at 11:26

## 2018-12-21 RX ADMIN — Medication 2 MILLIGRAM(S): at 14:10

## 2018-12-21 RX ADMIN — Medication 2 MILLIGRAM(S): at 16:19

## 2018-12-21 RX ADMIN — TUBERCULIN PURIFIED PROTEIN DERIVATIVE 5 UNIT(S): 5 INJECTION, SOLUTION INTRADERMAL at 12:48

## 2018-12-21 RX ADMIN — Medication 2 MILLIGRAM(S): at 17:51

## 2018-12-21 RX ADMIN — Medication 100 MILLIGRAM(S): at 09:05

## 2018-12-21 RX ADMIN — Medication 2 MILLIGRAM(S): at 20:22

## 2018-12-21 RX ADMIN — METHADONE HYDROCHLORIDE 10 MILLIGRAM(S): 40 TABLET ORAL at 09:05

## 2018-12-21 RX ADMIN — METHADONE HYDROCHLORIDE 5 MILLIGRAM(S): 40 TABLET ORAL at 21:04

## 2018-12-21 RX ADMIN — Medication 1 PATCH: at 10:23

## 2018-12-22 VITALS
DIASTOLIC BLOOD PRESSURE: 58 MMHG | RESPIRATION RATE: 14 BRPM | HEART RATE: 90 BPM | TEMPERATURE: 97 F | SYSTOLIC BLOOD PRESSURE: 114 MMHG

## 2018-12-22 RX ORDER — DIAZEPAM 5 MG
1 TABLET ORAL
Qty: 0 | Refills: 0 | COMMUNITY

## 2018-12-22 RX ORDER — OXYCODONE HYDROCHLORIDE 5 MG/1
1 TABLET ORAL
Qty: 0 | Refills: 0 | COMMUNITY

## 2018-12-22 RX ADMIN — Medication 1 TABLET(S): at 09:31

## 2018-12-22 RX ADMIN — Medication 2 MILLIGRAM(S): at 06:42

## 2018-12-22 RX ADMIN — Medication 1 PATCH: at 09:31

## 2018-12-22 RX ADMIN — Medication 1 PATCH: at 10:28

## 2018-12-22 RX ADMIN — Medication 1 PATCH: at 06:19

## 2018-12-22 RX ADMIN — Medication 100 MILLIGRAM(S): at 09:31

## 2018-12-22 RX ADMIN — METHADONE HYDROCHLORIDE 5 MILLIGRAM(S): 40 TABLET ORAL at 09:31

## 2018-12-22 RX ADMIN — Medication 2 MILLIGRAM(S): at 02:02

## 2018-12-22 NOTE — CHART NOTE - NSCHARTNOTEFT_GEN_A_CORE
Pt wants to leave AMA. Would not give reason. Understands all risks including but not limited to Seizure, coma, brain damage and death.    AMA

## 2018-12-22 NOTE — CHART NOTE - NSCHARTNOTEFT_GEN_A_CORE
Subsequent Inpatient Encounter                                       Detox Unit    ADRIAN PRUETT   38y   Female      Chief Complaint:    Follow up for Polysubstance  Dependency    HPI:     I reviewed previous notes. No Change, except if noted below.             Detail:_    ROS:   I reviewed with patient.  No changes from previous notes except if noted below.             Detail: _    PFSH I reviewed with patient. No changes from previous notes except if noted below.             Detail_    Medication reconciliation performed.    MEDICATIONS  (STANDING):  methadone    Tablet 5 milliGRAM(s) Oral every 12 hours  methadone    Tablet   Oral   multivitamin/minerals 1 Tablet(s) Oral daily  nicotine - 21 mG/24Hr(s) Patch 1 Patch Transdermal daily  thiamine 100 milliGRAM(s) Oral daily      MEDICATIONS  (PRN):  acetaminophen   Tablet .. 650 milliGRAM(s) Oral every 6 hours PRN Temp greater or equal to 38C (100.4F), Mild Pain (1 - 3)  aluminum hydroxide/magnesium hydroxide/simethicone Suspension 30 milliLiter(s) Oral every 6 hours PRN Heartburn  bismuth subsalicylate Liquid 30 milliLiter(s) Oral every 6 hours PRN Diarrhea  cloNIDine 0.1 milliGRAM(s) Oral every 6 hours PRN Blood Pressure GREATER THAN 140/90 mmHG  guaiFENesin/dextromethorphan  Syrup 5 milliLiter(s) Oral every 4 hours PRN Cough  hydrOXYzine hydrochloride 50 milliGRAM(s) Oral every 6 hours PRN Anxiety  hydrOXYzine hydrochloride 100 milliGRAM(s) Oral at bedtime PRN insomnia  ibuprofen  Tablet. 400 milliGRAM(s) Oral every 6 hours PRN Mild Pain (1 - 3)  LORazepam     Tablet 2 milliGRAM(s) Oral every 2 hours PRN CIWA-Ar score  8 - 15  LORazepam     Tablet 4 milliGRAM(s) Oral every 1 hour PRN CIWA-Ar score GREATER THAN 15  magnesium hydroxide Suspension 30 milliLiter(s) Oral once PRN Constipation  methocarbamol 500 milliGRAM(s) Oral every 6 hours PRN muscle pain  pseudoephedrine 60 milliGRAM(s) Oral every 6 hours PRN Rhinitis  trimethobenzamide 300 milliGRAM(s) Oral every 6 hours PRN Nausea and/or Vomiting  trimethobenzamide Injectable 200 milliGRAM(s) IntraMuscular every 6 hours PRN Nausea and/or Vomiting      T(C): 35.9 (18 @ 06:40), Max: 36.4 (18 @ 20:00)  HR: 98 (18 @ 06:40) (83 - 113)  BP: 118/57 (18 @ 06:40) (101/56 - 146/56)  RR: 16 (18 @ 06:40) (14 - 18)  SpO2: --    PHYSICAL EXAM:      Constitutional: NAD, A&O x3    Eyes: PERRLA, no conjuctivitis    Neck: no lymphadenopathy    Respiratory: +air entry, no rales, no rhonchi, no wheezes    Cardiovascular: +S1 and S2, regular rate and rhythm    Gastrointestinal: +BS, soft, non-tender, not distended    Extremities:  no edema, no calf tenderness    Skin: no rashes, normal turgor                            15.2   7.12  )-----------( 236      ( 20 Dec 2018 18:00 )             45.0       140  |  99  |  5<L>  ----------------------------<  91  4.4   |  26  |  0.7    Ca    9.3      20 Dec 2018 18:00    TPro  7.2  /  Alb  4.5  /  TBili  0.5  /  DBili  x   /  AST  150<H>  /  ALT  143<H>  /  AlkPhos  104      Ammonia, Serum: 27 umol/L (18 @ 18:00)  Amylase, Serum Total: 37 U/L (18 @ 18:00)  Treponema Pallidum Antibody Interpretation: Negative (18 @ 18:00)        Urinalysis Basic - ( 20 Dec 2018 18:24 )    Color: Yellow / Appearance: Clear / S.015 / pH: x  Gluc: x / Ketone: 15  / Bili: Negative / Urobili: 0.2 mg/dL   Blood: x / Protein: Negative mg/dL / Nitrite: Negative   Leuk Esterase: Negative / RBC: 1-2 /HPF / WBC 1-2 /HPF   Sq Epi: x / Non Sq Epi: Moderate /HPF / Bacteria: x          Impression and Plan:    Primary Diagnosis:  Benzo/Opiate Dependency                                Medication: Pheno/Methadone Protocol    Secondary Diagnosis:                                                                                Medication:    Tertiary Diagnosis:                                                                                     Medication:      Continue Detox Protocols. Use of PRNS as needed for withdrawal and comfort.    Adjustments to protocols: INTENT TO DC IN AM IF STABLE    Labs/ Tests reviewed.    Tests ordered:     Likely Disposition: _x__Home       ___Rehab       ___Outpatient Program    ___Self Help     _____Other    Estimated Length of stay:__4__

## 2018-12-22 NOTE — CHART NOTE - NSCHARTNOTEFT_GEN_A_CORE
Allergies:  No Known Allergies      Diet: Regular    Activity: as tolerated    Follow up with    1. PMD in 2 weeks    2.     3.    Follow up for abnormal labs/tests    1. LFTs    Extra Instructions:      Flu Vaccine given  Yes_____         No______      Diagnosis:  Chemical Dependency   Maintain sobriety  refrain from all use      Patient Signature___________________________________________  Date_________________      Nurse Signature_____________________________________________Date_________________

## 2018-12-24 ENCOUNTER — EMERGENCY (EMERGENCY)
Facility: HOSPITAL | Age: 38
LOS: 0 days | Discharge: LEFT BEFORE DISCHARGE | End: 2018-12-25
Attending: EMERGENCY MEDICINE | Admitting: EMERGENCY MEDICINE

## 2018-12-24 DIAGNOSIS — F17.200 NICOTINE DEPENDENCE, UNSPECIFIED, UNCOMPLICATED: ICD-10-CM

## 2018-12-24 DIAGNOSIS — R11.2 NAUSEA WITH VOMITING, UNSPECIFIED: ICD-10-CM

## 2018-12-24 DIAGNOSIS — R19.7 DIARRHEA, UNSPECIFIED: ICD-10-CM

## 2018-12-25 VITALS
HEART RATE: 99 BPM | OXYGEN SATURATION: 97 % | RESPIRATION RATE: 18 BRPM | DIASTOLIC BLOOD PRESSURE: 81 MMHG | TEMPERATURE: 96 F | SYSTOLIC BLOOD PRESSURE: 133 MMHG

## 2018-12-25 PROBLEM — F10.10 ALCOHOL ABUSE, UNCOMPLICATED: Chronic | Status: ACTIVE | Noted: 2018-12-20

## 2018-12-25 NOTE — ED PROVIDER NOTE - NS ED ROS FT
Review of Systems:  · CONSTITUTIONAL: no fever and no chills.  · EYES: no discharge, no irritation, no pain, no redness, and no visual changes.  · ENMT: Ears: no ear pain and no hearing problems. Nose: no nasal congestion and no nasal drainage. Mouth/Throat: no dysphagia, no hoarseness and no throat pain.  · CARDIOVASCULAR: no chest pain  · RESPIRATORY: no cough, and no shortness of breath.  · GASTROINTESTINAL: no abdominal pain, no diarrhea, + nausea and vomiting.  · GENITOURINARY: no dysuria  · MUSCULOSKELETAL: no back pain, no musculoskeletal pain, no weakness.  · SKIN: no rashes.  · NEURO: no loss of consciousness, no headache.  · ROS STATEMENT: all other ROS negative except as per HPI

## 2018-12-25 NOTE — ED PROVIDER NOTE - MEDICAL DECISION MAKING DETAILS
Pt eloped prior to receiving paperwork.  Was going to give zofran and check uhcg, but Pt refused and left.  Pt aware that she can come back if she sxs worsen.

## 2018-12-27 ENCOUNTER — EMERGENCY (EMERGENCY)
Facility: HOSPITAL | Age: 38
LOS: 0 days | Discharge: LEFT BEFORE DISCHARGE | End: 2018-12-27
Attending: EMERGENCY MEDICINE | Admitting: EMERGENCY MEDICINE

## 2018-12-27 VITALS
RESPIRATION RATE: 20 BRPM | SYSTOLIC BLOOD PRESSURE: 153 MMHG | DIASTOLIC BLOOD PRESSURE: 91 MMHG | OXYGEN SATURATION: 100 % | TEMPERATURE: 97 F | HEART RATE: 115 BPM

## 2018-12-27 VITALS — HEIGHT: 64 IN | WEIGHT: 160.06 LBS

## 2018-12-27 DIAGNOSIS — R42 DIZZINESS AND GIDDINESS: ICD-10-CM

## 2018-12-27 DIAGNOSIS — F11.20 OPIOID DEPENDENCE, UNCOMPLICATED: ICD-10-CM

## 2018-12-27 DIAGNOSIS — F10.10 ALCOHOL ABUSE, UNCOMPLICATED: ICD-10-CM

## 2018-12-27 DIAGNOSIS — F32.9 MAJOR DEPRESSIVE DISORDER, SINGLE EPISODE, UNSPECIFIED: ICD-10-CM

## 2018-12-27 DIAGNOSIS — F17.210 NICOTINE DEPENDENCE, CIGARETTES, UNCOMPLICATED: ICD-10-CM

## 2018-12-27 DIAGNOSIS — F13.20 SEDATIVE, HYPNOTIC OR ANXIOLYTIC DEPENDENCE, UNCOMPLICATED: ICD-10-CM

## 2018-12-27 DIAGNOSIS — F41.9 ANXIETY DISORDER, UNSPECIFIED: ICD-10-CM
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
## 2018-12-27 LAB — DRUG SCREEN, SERUM: SIGNIFICANT CHANGE UP

## 2018-12-27 RX ORDER — HYDROXYZINE HCL 10 MG
50 TABLET ORAL ONCE
Qty: 0 | Refills: 0 | Status: COMPLETED | OUTPATIENT
Start: 2018-12-27 | End: 2018-12-27

## 2018-12-27 RX ORDER — METOCLOPRAMIDE HCL 10 MG
10 TABLET ORAL ONCE
Qty: 0 | Refills: 0 | Status: COMPLETED | OUTPATIENT
Start: 2018-12-27 | End: 2018-12-27

## 2018-12-27 RX ORDER — SODIUM CHLORIDE 9 MG/ML
1000 INJECTION INTRAMUSCULAR; INTRAVENOUS; SUBCUTANEOUS ONCE
Qty: 0 | Refills: 0 | Status: COMPLETED | OUTPATIENT
Start: 2018-12-27 | End: 2018-12-27

## 2018-12-27 RX ADMIN — Medication 104 MILLIGRAM(S): at 19:58

## 2018-12-27 RX ADMIN — SODIUM CHLORIDE 1000 MILLILITER(S): 9 INJECTION INTRAMUSCULAR; INTRAVENOUS; SUBCUTANEOUS at 19:58

## 2018-12-27 RX ADMIN — Medication 50 MILLIGRAM(S): at 20:13

## 2018-12-27 NOTE — ED ADULT NURSE NOTE - OBJECTIVE STATEMENT
patient states has back and general pain with dizziness. states sitting on couch and had bad back pain, states has cough for couple months, denies n/v/d/denies recent travel. states had dizziness in middle of day and was doing nothing at that time. states no recent trauma , patient denies being on menstrual cycle. denies hematuria denies dysuria

## 2018-12-27 NOTE — ED PROVIDER NOTE - PROGRESS NOTE DETAILS
Patient states she is feeling anxious, will order medication Nurse Mirza states patient pulled the IV out, and walked out of the ED

## 2018-12-27 NOTE — ED ADULT NURSE NOTE - CHIEF COMPLAINT QUOTE
dizziness described as room spinning patient reports sore throat and body aches . patient drinks every day last drink 1 hr pta

## 2018-12-27 NOTE — ED PROVIDER NOTE - ATTENDING CONTRIBUTION TO CARE
39 yo female PMH as noted c/o dizziness and feeling  anxious, wants Ativan.  Exam as noted.  Eloped from ED.

## 2018-12-27 NOTE — ED PROVIDER NOTE - EYES, MLM
Clear bilaterally, pupils equal, round and reactive to light. Clear bilaterally, pupils equal, round and reactive to light, EOMI.

## 2018-12-27 NOTE — ED ADULT NURSE NOTE - NS ED NURSE ELOPE COMMENTS
patient pulled out IV access and eloped without telling anyone. Patient stable condition and nad.  Patient A&Ox3 and ambulates with steady gait. Bathrooms chesked and surrounding area checked and patient still not at bedside. MD made aware and Charge

## 2018-12-27 NOTE — ED PROVIDER NOTE - NEURO NEGATIVE STATEMENT, MLM
+ dizziness, no loss of consciousness, no gait abnormality, no headache, no sensory deficits, and no weakness.

## 2018-12-27 NOTE — ED PROVIDER NOTE - NEUROLOGICAL, MLM
Alert and oriented x 3, 5/5 UE and LE strength, 5/5  strength, negative rhomberg, negative pronator drift, able to ambulate without difficulty, no focal deficits, no motor or sensory deficits.

## 2018-12-27 NOTE — ED PROVIDER NOTE - OBJECTIVE STATEMENT
37 yo F with hx of alcohol abuse, presents for evaluation of dizziness, described as feeling off balance, onset today, with no associated symptoms. NO chest pain, no back pain, no abdominal pain, no fever, no chills, no n/v/d, no neck pain, no SOB. Patient states that she last had a drink today, she drank a pint of vodka. She reports that she drinks every day. She does not have any symptoms of withdrawal. Patient denies any other symptoms.

## 2019-01-03 ENCOUNTER — TRANSCRIPTION ENCOUNTER (OUTPATIENT)
Age: 39
End: 2019-01-03

## 2019-02-04 ENCOUNTER — EMERGENCY (EMERGENCY)
Facility: HOSPITAL | Age: 39
LOS: 0 days | Discharge: HOME | End: 2019-02-04
Attending: EMERGENCY MEDICINE | Admitting: EMERGENCY MEDICINE

## 2019-02-04 VITALS
HEART RATE: 81 BPM | RESPIRATION RATE: 17 BRPM | OXYGEN SATURATION: 98 % | SYSTOLIC BLOOD PRESSURE: 116 MMHG | DIASTOLIC BLOOD PRESSURE: 79 MMHG

## 2019-02-04 VITALS
TEMPERATURE: 99 F | RESPIRATION RATE: 18 BRPM | WEIGHT: 149.91 LBS | HEART RATE: 97 BPM | DIASTOLIC BLOOD PRESSURE: 85 MMHG | HEIGHT: 64 IN | OXYGEN SATURATION: 98 % | SYSTOLIC BLOOD PRESSURE: 123 MMHG

## 2019-02-04 DIAGNOSIS — F41.8 OTHER SPECIFIED ANXIETY DISORDERS: ICD-10-CM

## 2019-02-04 DIAGNOSIS — F41.9 ANXIETY DISORDER, UNSPECIFIED: ICD-10-CM

## 2019-02-04 DIAGNOSIS — R25.1 TREMOR, UNSPECIFIED: ICD-10-CM

## 2019-02-04 DIAGNOSIS — R10.9 UNSPECIFIED ABDOMINAL PAIN: ICD-10-CM

## 2019-02-04 DIAGNOSIS — R10.84 GENERALIZED ABDOMINAL PAIN: ICD-10-CM

## 2019-02-04 DIAGNOSIS — F17.210 NICOTINE DEPENDENCE, CIGARETTES, UNCOMPLICATED: ICD-10-CM

## 2019-02-04 DIAGNOSIS — R11.2 NAUSEA WITH VOMITING, UNSPECIFIED: ICD-10-CM

## 2019-02-04 LAB
ALBUMIN SERPL ELPH-MCNC: 4.8 G/DL — SIGNIFICANT CHANGE UP (ref 3.5–5.2)
ALP SERPL-CCNC: 117 U/L — HIGH (ref 30–115)
ALT FLD-CCNC: 132 U/L — HIGH (ref 0–41)
ANION GAP SERPL CALC-SCNC: 19 MMOL/L — HIGH (ref 7–14)
APPEARANCE UR: CLEAR — SIGNIFICANT CHANGE UP
AST SERPL-CCNC: 129 U/L — HIGH (ref 0–41)
BACTERIA # UR AUTO: ABNORMAL
BASE EXCESS BLDV CALC-SCNC: -1.3 MMOL/L — SIGNIFICANT CHANGE UP (ref -2–2)
BASOPHILS # BLD AUTO: 0.09 K/UL — SIGNIFICANT CHANGE UP (ref 0–0.2)
BASOPHILS NFR BLD AUTO: 1 % — SIGNIFICANT CHANGE UP (ref 0–1)
BILIRUB SERPL-MCNC: 1.1 MG/DL — SIGNIFICANT CHANGE UP (ref 0.2–1.2)
BILIRUB UR-MCNC: NEGATIVE — SIGNIFICANT CHANGE UP
BUN SERPL-MCNC: 4 MG/DL — LOW (ref 10–20)
CA-I SERPL-SCNC: 1.17 MMOL/L — SIGNIFICANT CHANGE UP (ref 1.12–1.3)
CALCIUM SERPL-MCNC: 9.7 MG/DL — SIGNIFICANT CHANGE UP (ref 8.5–10.1)
CHLORIDE SERPL-SCNC: 101 MMOL/L — SIGNIFICANT CHANGE UP (ref 98–110)
CO2 SERPL-SCNC: 20 MMOL/L — SIGNIFICANT CHANGE UP (ref 17–32)
COLOR SPEC: YELLOW — SIGNIFICANT CHANGE UP
CREAT SERPL-MCNC: 0.7 MG/DL — SIGNIFICANT CHANGE UP (ref 0.7–1.5)
DIFF PNL FLD: NEGATIVE — SIGNIFICANT CHANGE UP
EOSINOPHIL # BLD AUTO: 0.02 K/UL — SIGNIFICANT CHANGE UP (ref 0–0.7)
EOSINOPHIL NFR BLD AUTO: 0.2 % — SIGNIFICANT CHANGE UP (ref 0–8)
EPI CELLS # UR: ABNORMAL /HPF
GAS PNL BLDV: 139 MMOL/L — SIGNIFICANT CHANGE UP (ref 136–145)
GAS PNL BLDV: SIGNIFICANT CHANGE UP
GLUCOSE SERPL-MCNC: 90 MG/DL — SIGNIFICANT CHANGE UP (ref 70–99)
GLUCOSE UR QL: NEGATIVE MG/DL — SIGNIFICANT CHANGE UP
HCO3 BLDV-SCNC: 23 MMOL/L — SIGNIFICANT CHANGE UP (ref 22–29)
HCT VFR BLD CALC: 46.4 % — SIGNIFICANT CHANGE UP (ref 37–47)
HCT VFR BLDA CALC: 47.2 % — HIGH (ref 34–44)
HGB BLD CALC-MCNC: 15.4 G/DL — SIGNIFICANT CHANGE UP (ref 14–18)
HGB BLD-MCNC: 16.1 G/DL — HIGH (ref 12–16)
HOROWITZ INDEX BLDV+IHG-RTO: 21 — SIGNIFICANT CHANGE UP
IMM GRANULOCYTES NFR BLD AUTO: 0.3 % — SIGNIFICANT CHANGE UP (ref 0.1–0.3)
KETONES UR-MCNC: NEGATIVE — SIGNIFICANT CHANGE UP
LACTATE BLDV-MCNC: 1.3 MMOL/L — SIGNIFICANT CHANGE UP (ref 0.5–1.6)
LACTATE SERPL-SCNC: 2.9 MMOL/L — HIGH (ref 0.5–2.2)
LEUKOCYTE ESTERASE UR-ACNC: ABNORMAL
LIDOCAIN IGE QN: 38 U/L — SIGNIFICANT CHANGE UP (ref 7–60)
LYMPHOCYTES # BLD AUTO: 1.24 K/UL — SIGNIFICANT CHANGE UP (ref 1.2–3.4)
LYMPHOCYTES # BLD AUTO: 14.4 % — LOW (ref 20.5–51.1)
MCHC RBC-ENTMCNC: 32.1 PG — HIGH (ref 27–31)
MCHC RBC-ENTMCNC: 34.7 G/DL — SIGNIFICANT CHANGE UP (ref 32–37)
MCV RBC AUTO: 92.6 FL — SIGNIFICANT CHANGE UP (ref 81–99)
MONOCYTES # BLD AUTO: 0.91 K/UL — HIGH (ref 0.1–0.6)
MONOCYTES NFR BLD AUTO: 10.6 % — HIGH (ref 1.7–9.3)
NEUTROPHILS # BLD AUTO: 6.31 K/UL — SIGNIFICANT CHANGE UP (ref 1.4–6.5)
NEUTROPHILS NFR BLD AUTO: 73.5 % — SIGNIFICANT CHANGE UP (ref 42.2–75.2)
NITRITE UR-MCNC: NEGATIVE — SIGNIFICANT CHANGE UP
NRBC # BLD: 0 /100 WBCS — SIGNIFICANT CHANGE UP (ref 0–0)
PCO2 BLDV: 36 MMHG — LOW (ref 41–51)
PH BLDV: 7.41 — SIGNIFICANT CHANGE UP (ref 7.26–7.43)
PH UR: 7 — SIGNIFICANT CHANGE UP (ref 5–8)
PLATELET # BLD AUTO: 272 K/UL — SIGNIFICANT CHANGE UP (ref 130–400)
PO2 BLDV: 48 MMHG — HIGH (ref 20–40)
POTASSIUM BLDV-SCNC: 3.4 MMOL/L — SIGNIFICANT CHANGE UP (ref 3.3–5.6)
POTASSIUM SERPL-MCNC: 3.8 MMOL/L — SIGNIFICANT CHANGE UP (ref 3.5–5)
POTASSIUM SERPL-SCNC: 3.8 MMOL/L — SIGNIFICANT CHANGE UP (ref 3.5–5)
PROT SERPL-MCNC: 7.6 G/DL — SIGNIFICANT CHANGE UP (ref 6–8)
PROT UR-MCNC: NEGATIVE MG/DL — SIGNIFICANT CHANGE UP
RBC # BLD: 5.01 M/UL — SIGNIFICANT CHANGE UP (ref 4.2–5.4)
RBC # FLD: 12.2 % — SIGNIFICANT CHANGE UP (ref 11.5–14.5)
SAO2 % BLDV: 84 % — SIGNIFICANT CHANGE UP
SODIUM SERPL-SCNC: 140 MMOL/L — SIGNIFICANT CHANGE UP (ref 135–146)
SP GR SPEC: 1.01 — SIGNIFICANT CHANGE UP (ref 1.01–1.03)
UROBILINOGEN FLD QL: 0.2 MG/DL — SIGNIFICANT CHANGE UP (ref 0.2–0.2)
WBC # BLD: 8.6 K/UL — SIGNIFICANT CHANGE UP (ref 4.8–10.8)
WBC # FLD AUTO: 8.6 K/UL — SIGNIFICANT CHANGE UP (ref 4.8–10.8)
WBC UR QL: SIGNIFICANT CHANGE UP /HPF

## 2019-02-04 RX ORDER — SODIUM CHLORIDE 9 MG/ML
1000 INJECTION, SOLUTION INTRAVENOUS ONCE
Qty: 0 | Refills: 0 | Status: DISCONTINUED | OUTPATIENT
Start: 2019-02-04 | End: 2019-02-04

## 2019-02-04 RX ORDER — ONDANSETRON 8 MG/1
4 TABLET, FILM COATED ORAL ONCE
Qty: 0 | Refills: 0 | Status: COMPLETED | OUTPATIENT
Start: 2019-02-04 | End: 2019-02-04

## 2019-02-04 RX ORDER — SODIUM CHLORIDE 9 MG/ML
1000 INJECTION INTRAMUSCULAR; INTRAVENOUS; SUBCUTANEOUS ONCE
Qty: 0 | Refills: 0 | Status: COMPLETED | OUTPATIENT
Start: 2019-02-04 | End: 2019-02-04

## 2019-02-04 RX ORDER — ONDANSETRON 8 MG/1
1 TABLET, FILM COATED ORAL
Qty: 9 | Refills: 0 | OUTPATIENT
Start: 2019-02-04 | End: 2019-02-06

## 2019-02-04 RX ORDER — THIAMINE MONONITRATE (VIT B1) 100 MG
100 TABLET ORAL ONCE
Qty: 0 | Refills: 0 | Status: COMPLETED | OUTPATIENT
Start: 2019-02-04 | End: 2019-02-04

## 2019-02-04 RX ORDER — SODIUM CHLORIDE 9 MG/ML
1000 INJECTION, SOLUTION INTRAVENOUS
Qty: 0 | Refills: 0 | Status: DISCONTINUED | OUTPATIENT
Start: 2019-02-04 | End: 2019-02-04

## 2019-02-04 RX ADMIN — ONDANSETRON 4 MILLIGRAM(S): 8 TABLET, FILM COATED ORAL at 17:01

## 2019-02-04 RX ADMIN — Medication 100 MILLIGRAM(S): at 17:08

## 2019-02-04 RX ADMIN — Medication 1 MILLIGRAM(S): at 19:11

## 2019-02-04 RX ADMIN — SODIUM CHLORIDE 2000 MILLILITER(S): 9 INJECTION INTRAMUSCULAR; INTRAVENOUS; SUBCUTANEOUS at 17:01

## 2019-02-04 NOTE — ED PROVIDER NOTE - OBJECTIVE STATEMENT
37yo F presents c/o epigastric abdominal pain that began yesterday with associated dry heaves and nausea. Pt denies any drug use today but states that she did drank 1 can of beer this morning, pt had a few shots of whiskey last night also. Pt denies any CP, SOB, urinary symptoms, diarrhea. 39yo F with PMH of alcohol abuse, depression, anxiety presents c/o mid-epigastric abdominal pain that began yesterday with associated dry heaves and nausea. Patient states that she did not eat anything today and admits to drinking 1 can of beer this morning and about 5 shots of whiskey last night. Pt denies any fever, chills, CP, SOB, dysuria, diarrhea, tremors, back pain, or SI/HI. 39yo F with PMH of alcohol abuse, depression, anxiety presents c/o mid-epigastric abdominal pain that began yesterday with associated dry heaves and nausea. Patient states that she did not eat anything today and admits to drinking 1 can of beer this morning and about 5 shots of whiskey last night. Pt denies any fever, chills, CP, SOB, dysuria, diarrhea, tremors, recent travel, back pain, or SI/HI.

## 2019-02-04 NOTE — ED PROVIDER NOTE - MEDICAL DECISION MAKING DETAILS
Chart finished.  37 yo woman with anxiety and substance use disorder, initially pretended to have seizures followed by forcing herself to vomit.  Symptoms resolved on their own with time and distraction.  However, we did  treat her.  Stable for discharge and outpatient psych and substance abuse clinic.

## 2019-02-04 NOTE — ED PROVIDER NOTE - NS ED ROS FT
Review of Systems    Constitutional: (-) fever/ chills (-) weight loss  Eyes/ENT: (-) blurry vision, (-) epistaxis (-) sore throat (-) ear pain  Cardiovascular: (-) chest pain, (-) syncope (-) palpitations  Respiratory: (-) cough, (-) shortness of breath  Gastrointestinal: (-) vomiting, (-) diarrhea (+) abdominal pain  Musculoskeletal: (-) neck pain, (-) back pain, (-) joint pain (-) pedal edema   Integumentary: (-) rash, (-) swelling  Neurological: (-) headache, (-) altered mental status  Psychiatric: (-) hallucinations or depression   Allergic/Immunologic: (-) pruritus Constitutional: (-) fever/ chills (-) weight loss  Eyes/ENT: (-) blurry vision, (-) epistaxis (-) sore throat (-) ear pain  Cardiovascular: (-) chest pain, (-) syncope (-) palpitations  Respiratory: (-) cough, (-) shortness of breath  Gastrointestinal: (+) abdominal pain (+) nausea (-) vomiting, (-) diarrhea   Musculoskeletal: (-) neck pain, (-) back pain  Integumentary: (-) rash, (-) swelling  Neurological: (-) headache (-) altered mental status  Psychiatric: (-) hallucinations or depression   Allergic/Immunologic: (-) pruritus  LMP: Jan 5th

## 2019-02-04 NOTE — ED PROVIDER NOTE - PHYSICAL EXAMINATION
VITAL SIGNS: I have reviewed nursing notes and confirm.  CONSTITUTIONAL: Well-developed; well-nourished; in no acute distress.  SKIN: Skin exam is warm and dry, no acute rash.  HEAD: Normocephalic; atraumatic.  EYES: PERRL, EOM intact; conjunctiva and sclera clear.  ENT: No nasal discharge; airway clear. TMs clear.  NECK: Supple; non tender.  CARD: S1, S2 normal; no murmurs, gallops, or rubs. Regular rate and rhythm.  RESP: No wheezes, rales or rhonchi.  ABD: Normal bowel sounds; soft; non-distended; non-tender; no hepatosplenomegaly.  EXT: Normal ROM. No clubbing, cyanosis or edema.  LYMPH: No acute cervical adenopathy.  NEURO: Alert, oriented. Grossly unremarkable. No focal deficits.  PSYCH: Cooperative, appropriate. VITAL SIGNS: I have reviewed nursing notes and confirm.  CONSTITUTIONAL: Well-developed; in no acute distress. Anxious behavior. Dry retching in the ED.  SKIN: Skin exam is warm and dry, no acute rash.  HEAD: Normocephalic; atraumatic.  EYES: PERRL, EOM intact; conjunctiva and sclera clear.  ENT: Dry mucous membranes. No tongue fasciculations. No nasal discharge; airway clear. TMs clear.   NECK: Supple; non tender.  CARD: No reproducible chest wall tenderness. S1, S2 normal; no murmurs, gallops, or rubs. Regular rate and rhythm.  RESP: No use of accessory muscles. Lungs clear to auscultation B/L. No wheezes, rales or rhonchi.  ABD: + diffuse TTP to abdomen. Normal bowel sounds; soft; non-distended; no hepatosplenomegaly. No CVAT B/L.  EXT: Normal ROM. No clubbing, cyanosis or edema.  LYMPH: No acute cervical adenopathy.  NEURO: Alert, oriented. Grossly unremarkable. No focal deficits. No tremors. Speaking in full sentences. No slurring of speech. Gait within normal limits. Sensation and strength intact.  PSYCH: Cooperative, answering questions appropriately. Anxious, emotional behavior.

## 2019-02-04 NOTE — ED PROVIDER NOTE - ATTENDING CONTRIBUTION TO CARE
I personally evaluated the patient. I reviewed the Resident’s or Physician Assistant’s note (as assigned above), and agree with the findings and plan except as documented in my note.  37 yo woman with h/o depression, anxiety and alcohol abuse presents with shaking, vomiting and feeling anxious.  Patient actually was rolled in from outside ED shaking uncontrollably pretending to have a seizure.  In ED, unimpressive examination and all symptoms resolved without intervention especially once she was distracted by the commotion around her.  Sad person who need a lot of psych help but is not suicidal or homicidal.  Referred for psych and substance use disorder.  Stable for discharge.

## 2019-02-04 NOTE — ED ADULT TRIAGE NOTE - CHIEF COMPLAINT QUOTE
c/o abdominal pain since this morning and then started to vomit on arrival in ED. pt is vomiting small amt of sliva

## 2019-02-04 NOTE — ED ADULT NURSE NOTE - NSIMPLEMENTINTERV_GEN_ALL_ED
Implemented All Universal Safety Interventions:  [employer] to call system. Call bell, personal items and telephone within reach. Instruct patient to call for assistance. Room bathroom lighting operational. Non-slip footwear when patient is off stretcher. Physically safe environment: no spills, clutter or unnecessary equipment. Stretcher in lowest position, wheels locked, appropriate side rails in place.

## 2019-02-04 NOTE — ED PROVIDER NOTE - NSFOLLOWUPCLINICS_GEN_ALL_ED_FT
[facility]  Detox Mgmt  [address] [place] [address]  Phone: [phone]  Fax:   Follow Up Time:     [facility]  Medicine  [address] [place], [place]   Phone: [phone]  Fax:   Follow Up Time:

## 2019-02-06 LAB
-  AMIKACIN: SIGNIFICANT CHANGE UP
-  AMPICILLIN/SULBACTAM: SIGNIFICANT CHANGE UP
-  AMPICILLIN: SIGNIFICANT CHANGE UP
-  AZTREONAM: SIGNIFICANT CHANGE UP
-  CEFAZOLIN: SIGNIFICANT CHANGE UP
-  CEFEPIME: SIGNIFICANT CHANGE UP
-  CEFOXITIN: SIGNIFICANT CHANGE UP
-  CEFTRIAXONE: SIGNIFICANT CHANGE UP
-  CIPROFLOXACIN: SIGNIFICANT CHANGE UP
-  ERTAPENEM: SIGNIFICANT CHANGE UP
-  GENTAMICIN: SIGNIFICANT CHANGE UP
-  IMIPENEM: SIGNIFICANT CHANGE UP
-  LEVOFLOXACIN: SIGNIFICANT CHANGE UP
-  MEROPENEM: SIGNIFICANT CHANGE UP
-  NITROFURANTOIN: SIGNIFICANT CHANGE UP
-  PIPERACILLIN/TAZOBACTAM: SIGNIFICANT CHANGE UP
-  TIGECYCLINE: SIGNIFICANT CHANGE UP
-  TOBRAMYCIN: SIGNIFICANT CHANGE UP
-  TRIMETHOPRIM/SULFAMETHOXAZOLE: SIGNIFICANT CHANGE UP
METHOD TYPE: SIGNIFICANT CHANGE UP

## 2019-02-09 LAB
CULTURE RESULTS: SIGNIFICANT CHANGE UP
ORGANISM # SPEC MICROSCOPIC CNT: SIGNIFICANT CHANGE UP
ORGANISM # SPEC MICROSCOPIC CNT: SIGNIFICANT CHANGE UP
SPECIMEN SOURCE: SIGNIFICANT CHANGE UP

## 2019-03-01 ENCOUNTER — OUTPATIENT (OUTPATIENT)
Dept: OUTPATIENT SERVICES | Facility: HOSPITAL | Age: 39
LOS: 1 days | End: 2019-03-01
Payer: MEDICAID

## 2019-03-01 PROCEDURE — G9001: CPT

## 2019-03-04 ENCOUNTER — INPATIENT (INPATIENT)
Facility: HOSPITAL | Age: 39
LOS: 1 days | Discharge: LEFT BEFORE DISCHARGE | End: 2019-03-06
Attending: INTERNAL MEDICINE | Admitting: INTERNAL MEDICINE

## 2019-03-04 ENCOUNTER — EMERGENCY (EMERGENCY)
Facility: HOSPITAL | Age: 39
LOS: 0 days | Discharge: HOME | End: 2019-03-04
Attending: EMERGENCY MEDICINE | Admitting: INTERNAL MEDICINE

## 2019-03-04 VITALS
TEMPERATURE: 97 F | SYSTOLIC BLOOD PRESSURE: 125 MMHG | HEART RATE: 90 BPM | RESPIRATION RATE: 20 BRPM | OXYGEN SATURATION: 98 % | DIASTOLIC BLOOD PRESSURE: 76 MMHG

## 2019-03-04 VITALS
OXYGEN SATURATION: 98 % | HEART RATE: 84 BPM | TEMPERATURE: 96 F | DIASTOLIC BLOOD PRESSURE: 68 MMHG | SYSTOLIC BLOOD PRESSURE: 134 MMHG | RESPIRATION RATE: 20 BRPM | WEIGHT: 154.98 LBS

## 2019-03-04 VITALS — WEIGHT: 153 LBS

## 2019-03-04 DIAGNOSIS — F19.10 OTHER PSYCHOACTIVE SUBSTANCE ABUSE, UNCOMPLICATED: ICD-10-CM

## 2019-03-04 DIAGNOSIS — N39.0 URINARY TRACT INFECTION, SITE NOT SPECIFIED: ICD-10-CM

## 2019-03-04 LAB
ALBUMIN SERPL ELPH-MCNC: 4.2 G/DL — SIGNIFICANT CHANGE UP (ref 3.5–5.2)
ALP SERPL-CCNC: 94 U/L — SIGNIFICANT CHANGE UP (ref 30–115)
ALT FLD-CCNC: 140 U/L — HIGH (ref 0–41)
ANION GAP SERPL CALC-SCNC: 15 MMOL/L — HIGH (ref 7–14)
APAP SERPL-MCNC: 6 UG/ML — LOW (ref 10–30)
APPEARANCE UR: CLEAR — SIGNIFICANT CHANGE UP
APTT BLD: 27.4 SEC — SIGNIFICANT CHANGE UP (ref 27–39.2)
AST SERPL-CCNC: 167 U/L — HIGH (ref 0–41)
BACTERIA # UR AUTO: ABNORMAL /HPF
BASOPHILS # BLD AUTO: 0.07 K/UL — SIGNIFICANT CHANGE UP (ref 0–0.2)
BASOPHILS NFR BLD AUTO: 1.2 % — HIGH (ref 0–1)
BILIRUB SERPL-MCNC: 0.7 MG/DL — SIGNIFICANT CHANGE UP (ref 0.2–1.2)
BILIRUB UR-MCNC: NEGATIVE — SIGNIFICANT CHANGE UP
BLD GP AB SCN SERPL QL: SIGNIFICANT CHANGE UP
BUN SERPL-MCNC: 4 MG/DL — LOW (ref 10–20)
CALCIUM SERPL-MCNC: 8.5 MG/DL — SIGNIFICANT CHANGE UP (ref 8.5–10.1)
CHLORIDE SERPL-SCNC: 105 MMOL/L — SIGNIFICANT CHANGE UP (ref 98–110)
CO2 SERPL-SCNC: 23 MMOL/L — SIGNIFICANT CHANGE UP (ref 17–32)
COLOR SPEC: YELLOW — SIGNIFICANT CHANGE UP
CREAT SERPL-MCNC: 0.6 MG/DL — LOW (ref 0.7–1.5)
DIFF PNL FLD: NEGATIVE — SIGNIFICANT CHANGE UP
EOSINOPHIL # BLD AUTO: 0.09 K/UL — SIGNIFICANT CHANGE UP (ref 0–0.7)
EOSINOPHIL NFR BLD AUTO: 1.6 % — SIGNIFICANT CHANGE UP (ref 0–8)
EPI CELLS # UR: ABNORMAL /HPF
ETHANOL SERPL-MCNC: 336 MG/DL — HIGH
GLUCOSE SERPL-MCNC: 78 MG/DL — SIGNIFICANT CHANGE UP (ref 70–99)
GLUCOSE UR QL: NEGATIVE MG/DL — SIGNIFICANT CHANGE UP
HCG SERPL QL: NEGATIVE — SIGNIFICANT CHANGE UP
HCT VFR BLD CALC: 40.4 % — SIGNIFICANT CHANGE UP (ref 37–47)
HGB BLD-MCNC: 13.9 G/DL — SIGNIFICANT CHANGE UP (ref 12–16)
IMM GRANULOCYTES NFR BLD AUTO: 0.4 % — HIGH (ref 0.1–0.3)
INR BLD: 0.92 RATIO — SIGNIFICANT CHANGE UP (ref 0.65–1.3)
KETONES UR-MCNC: NEGATIVE — SIGNIFICANT CHANGE UP
LEUKOCYTE ESTERASE UR-ACNC: ABNORMAL
LIDOCAIN IGE QN: 88 U/L — HIGH (ref 7–60)
LYMPHOCYTES # BLD AUTO: 1.84 K/UL — SIGNIFICANT CHANGE UP (ref 1.2–3.4)
LYMPHOCYTES # BLD AUTO: 32.7 % — SIGNIFICANT CHANGE UP (ref 20.5–51.1)
MCHC RBC-ENTMCNC: 31.7 PG — HIGH (ref 27–31)
MCHC RBC-ENTMCNC: 34.4 G/DL — SIGNIFICANT CHANGE UP (ref 32–37)
MCV RBC AUTO: 92 FL — SIGNIFICANT CHANGE UP (ref 81–99)
MONOCYTES # BLD AUTO: 0.86 K/UL — HIGH (ref 0.1–0.6)
MONOCYTES NFR BLD AUTO: 15.3 % — HIGH (ref 1.7–9.3)
NEUTROPHILS # BLD AUTO: 2.75 K/UL — SIGNIFICANT CHANGE UP (ref 1.4–6.5)
NEUTROPHILS NFR BLD AUTO: 48.8 % — SIGNIFICANT CHANGE UP (ref 42.2–75.2)
NITRITE UR-MCNC: POSITIVE
NRBC # BLD: 0 /100 WBCS — SIGNIFICANT CHANGE UP (ref 0–0)
PH UR: 7 — SIGNIFICANT CHANGE UP (ref 5–8)
PLATELET # BLD AUTO: 209 K/UL — SIGNIFICANT CHANGE UP (ref 130–400)
POTASSIUM SERPL-MCNC: 3.7 MMOL/L — SIGNIFICANT CHANGE UP (ref 3.5–5)
POTASSIUM SERPL-SCNC: 3.7 MMOL/L — SIGNIFICANT CHANGE UP (ref 3.5–5)
PROT SERPL-MCNC: 6.3 G/DL — SIGNIFICANT CHANGE UP (ref 6–8)
PROT UR-MCNC: 30 MG/DL
PROTHROM AB SERPL-ACNC: 10.6 SEC — SIGNIFICANT CHANGE UP (ref 9.95–12.87)
RBC # BLD: 4.39 M/UL — SIGNIFICANT CHANGE UP (ref 4.2–5.4)
RBC # FLD: 13.2 % — SIGNIFICANT CHANGE UP (ref 11.5–14.5)
SALICYLATES SERPL-MCNC: <0.3 MG/DL — LOW (ref 4–30)
SODIUM SERPL-SCNC: 143 MMOL/L — SIGNIFICANT CHANGE UP (ref 135–146)
SP GR SPEC: 1.02 — SIGNIFICANT CHANGE UP (ref 1.01–1.03)
TYPE + AB SCN PNL BLD: SIGNIFICANT CHANGE UP
UROBILINOGEN FLD QL: 1 MG/DL (ref 0.2–0.2)
WBC # BLD: 5.63 K/UL — SIGNIFICANT CHANGE UP (ref 4.8–10.8)
WBC # FLD AUTO: 5.63 K/UL — SIGNIFICANT CHANGE UP (ref 4.8–10.8)
WBC UR QL: ABNORMAL /HPF

## 2019-03-04 RX ORDER — ACETAMINOPHEN 500 MG
650 TABLET ORAL EVERY 4 HOURS
Qty: 0 | Refills: 0 | Status: DISCONTINUED | OUTPATIENT
Start: 2019-03-04 | End: 2019-03-06

## 2019-03-04 RX ORDER — METHOCARBAMOL 500 MG/1
500 TABLET, FILM COATED ORAL EVERY 6 HOURS
Qty: 0 | Refills: 0 | Status: DISCONTINUED | OUTPATIENT
Start: 2019-03-04 | End: 2019-03-06

## 2019-03-04 RX ORDER — PSEUDOEPHEDRINE HCL 30 MG
60 TABLET ORAL EVERY 6 HOURS
Qty: 0 | Refills: 0 | Status: DISCONTINUED | OUTPATIENT
Start: 2019-03-04 | End: 2019-03-06

## 2019-03-04 RX ORDER — SODIUM CHLORIDE 9 MG/ML
1000 INJECTION, SOLUTION INTRAVENOUS ONCE
Qty: 0 | Refills: 0 | Status: COMPLETED | OUTPATIENT
Start: 2019-03-04 | End: 2019-03-04

## 2019-03-04 RX ORDER — MULTIVIT-MIN/FERROUS GLUCONATE 9 MG/15 ML
1 LIQUID (ML) ORAL DAILY
Qty: 0 | Refills: 0 | Status: DISCONTINUED | OUTPATIENT
Start: 2019-03-04 | End: 2019-03-06

## 2019-03-04 RX ORDER — FOLIC ACID 0.8 MG
1 TABLET ORAL DAILY
Qty: 0 | Refills: 0 | Status: DISCONTINUED | OUTPATIENT
Start: 2019-03-04 | End: 2019-03-06

## 2019-03-04 RX ORDER — THIAMINE MONONITRATE (VIT B1) 100 MG
100 TABLET ORAL DAILY
Qty: 0 | Refills: 0 | Status: DISCONTINUED | OUTPATIENT
Start: 2019-03-04 | End: 2019-03-06

## 2019-03-04 RX ORDER — TUBERCULIN PURIFIED PROTEIN DERIVATIVE 5 [IU]/.1ML
5 INJECTION, SOLUTION INTRADERMAL ONCE
Qty: 0 | Refills: 0 | Status: COMPLETED | OUTPATIENT
Start: 2019-03-04 | End: 2019-03-04

## 2019-03-04 RX ORDER — ALPRAZOLAM 0.25 MG
1 TABLET ORAL ONCE
Qty: 0 | Refills: 0 | Status: DISCONTINUED | OUTPATIENT
Start: 2019-03-04 | End: 2019-03-04

## 2019-03-04 RX ORDER — NITROFURANTOIN MACROCRYSTAL 50 MG
100 CAPSULE ORAL
Qty: 0 | Refills: 0 | Status: DISCONTINUED | OUTPATIENT
Start: 2019-03-04 | End: 2019-03-05

## 2019-03-04 RX ORDER — IBUPROFEN 200 MG
400 TABLET ORAL EVERY 6 HOURS
Qty: 0 | Refills: 0 | Status: DISCONTINUED | OUTPATIENT
Start: 2019-03-04 | End: 2019-03-06

## 2019-03-04 RX ORDER — HYDROXYZINE HCL 10 MG
100 TABLET ORAL AT BEDTIME
Qty: 0 | Refills: 0 | Status: DISCONTINUED | OUTPATIENT
Start: 2019-03-04 | End: 2019-03-06

## 2019-03-04 RX ORDER — MAGNESIUM HYDROXIDE 400 MG/1
30 TABLET, CHEWABLE ORAL ONCE
Qty: 0 | Refills: 0 | Status: DISCONTINUED | OUTPATIENT
Start: 2019-03-04 | End: 2019-03-06

## 2019-03-04 RX ORDER — HYDROXYZINE HCL 10 MG
50 TABLET ORAL EVERY 6 HOURS
Qty: 0 | Refills: 0 | Status: DISCONTINUED | OUTPATIENT
Start: 2019-03-04 | End: 2019-03-06

## 2019-03-04 RX ORDER — NITROFURANTOIN MACROCRYSTAL 50 MG
100 CAPSULE ORAL ONCE
Qty: 0 | Refills: 0 | Status: COMPLETED | OUTPATIENT
Start: 2019-03-04 | End: 2019-03-04

## 2019-03-04 RX ORDER — NICOTINE POLACRILEX 2 MG
1 GUM BUCCAL DAILY
Qty: 0 | Refills: 0 | Status: DISCONTINUED | OUTPATIENT
Start: 2019-03-04 | End: 2019-03-06

## 2019-03-04 RX ORDER — ONDANSETRON 8 MG/1
4 TABLET, FILM COATED ORAL ONCE
Qty: 0 | Refills: 0 | Status: COMPLETED | OUTPATIENT
Start: 2019-03-04 | End: 2019-03-04

## 2019-03-04 RX ADMIN — Medication 30 MILLILITER(S): at 21:51

## 2019-03-04 RX ADMIN — ONDANSETRON 4 MILLIGRAM(S): 8 TABLET, FILM COATED ORAL at 08:40

## 2019-03-04 RX ADMIN — Medication 50 MILLIGRAM(S): at 21:50

## 2019-03-04 RX ADMIN — Medication 650 MILLIGRAM(S): at 22:08

## 2019-03-04 RX ADMIN — Medication 1 MILLIGRAM(S): at 15:16

## 2019-03-04 RX ADMIN — SODIUM CHLORIDE 1000 MILLILITER(S): 9 INJECTION, SOLUTION INTRAVENOUS at 08:41

## 2019-03-04 RX ADMIN — Medication 100 MILLIGRAM(S): at 15:16

## 2019-03-04 NOTE — ED PROVIDER NOTE - CLINICAL SUMMARY MEDICAL DECISION MAKING FREE TEXT BOX
Patient presented with episodes of vomiting s/p drinking ETOH. Otherwise non-tender on exam, ambulatory, HD stable, afebrile. Labs unremarkable except for elevated LFTs (patient with elevations at baseline), mildly elevated lipase. After work up patient with normal Hb, felt better after tx in ED. Tolerates PO without difficulty, wants to go home. Patient's friend Shakila at bedside is patient's caregiver and agrees to drive patient home. Patient given strict return precautions for any new or worsening symptoms.

## 2019-03-04 NOTE — ED ADULT NURSE NOTE - NSIMPLEMENTINTERV_GEN_ALL_ED
Implemented All Fall Risk Interventions:  [place] to call system. Call bell, personal items and telephone within reach. Instruct patient to call for assistance. Room bathroom lighting operational. Non-slip footwear when patient is off stretcher. Physically safe environment: no spills, clutter or unnecessary equipment. Stretcher in lowest position, wheels locked, appropriate side rails in place. Provide visual cue, wrist band, yellow gown, etc. Monitor gait and stability. Monitor for mental status changes and reorient to person, place, and time. Review medications for side effects contributing to fall risk. Reinforce activity limits and safety measures with patient and family.

## 2019-03-04 NOTE — ED PROVIDER NOTE - NSFOLLOWUPINSTRUCTIONS_ED_ALL_ED_FT
Gastritis    Gastritis is soreness and swelling (inflammation) of the lining of the stomach. Gastritis can develop as a sudden onset (acute) or long-term (chronic) condition. If gastritis is not treated, it can lead to stomach bleeding and ulcers. Causes include viral and bacterial infections, excessive alcohol consumption, tobacco use, or certain medications. Symptoms include abdominal pain or burning especially after eating, nausea, vomiting. Avoid foods or drinks that make your symptoms worse such as caffeine, chocolate, spicy foods, acidic foods, alcohol.    SEEK IMMEDIATE MEDICAL CARE IF YOU HAVE THE FOLLOWING SYMPTOMS: Worsening black or bloody stools, worsening or persistent blood or coffee-ground-colored vomitus, worsening abdominal pain, fever, or inability to keep fluids down.

## 2019-03-04 NOTE — ED PROVIDER NOTE - SECONDARY DIAGNOSIS.
Generalized abdominal pain Non-intractable vomiting with nausea, unspecified vomiting type Transaminitis Elevated lipase

## 2019-03-04 NOTE — ED PROVIDER NOTE - OBJECTIVE STATEMENT
39yo F with PMHx substance abuse (ETOH, cocaine), anxiety, depression, comes to ED for detox. Initially visited the [facility] ED today with c/o vomiting with "coffee grounds" for past week. Evaluated, hemodynamically stable, labwork showing slightly elevated LFTs and lipase with Hb 13.9. No vomiting during that ED stay. Patient's friend Shakila who brought her to ED interested in detox for patient [place] Pt initially presented to detox intake but transferred to [facility] ED for psych eval prior to admission. Pt a limited historian 2/2 ETOH intoxication.

## 2019-03-04 NOTE — ED BEHAVIORAL HEALTH NOTE - BEHAVIORAL HEALTH NOTE
pt is accompanied by mother. she is observed to be intoxicated. as per mother she wants to go to detox to stop drinking alcohol and cocaine. she denies any hallucinations or delusional thinking. denies any s/h ideations.  pt reports that she drank last night. but mother reports she drank whiskey and smoked cocaine 1 hr ago and is intoxicated at this time.     h/o alcohol and cocaine dependence, detox and rehab. also has h/o bipolar disorder h/o ipp non compliant with treatment.    pt is currently intoxicated and has medical complaints.    alcohol dependence and intoxication  cocaine dependence    plan: medical w/u and stabilization.  pt can be admitted to inpatient detox after sobering up and psych f/u in the program.  reconsult if necessary.

## 2019-03-04 NOTE — ED PROVIDER NOTE - ATTENDING CONTRIBUTION TO CARE
38 year old female, pmhx ETOH abuse, cocaine abuse, anxiety, presenting with diffuse abdominal pain and multiple episodes of vomiting since this morning. Patient states initially it was just colorless and then it turned to dark brown. Also admits to diarrhea x 3 months intermittently that is non-bloody. Friend at bedside states patient was drinking all night and then again this morning, which she usually does. Last drink was immediately prior to arrival. Patient otherwise denies fevers, headache, vision changes, weakness/numbness, confusion, URI symptoms, neck pain, chest pain, back pain, dyspnea, cough, palpitations, constipation, blood in stool/dark stools, urinary symptoms, vaginal bleeding/discharge, leg swelling, rash, recent travel or sick contacts.    Vital Signs: I have reviewed the initial vital signs.  Constitutional: NAD, well-nourished, appears stated age, no acute distress.  HEENT: Airway patent, moist MM, no erythema/swelling/deformity of oral structures. EOMI, PERRLA.  CV: regular rate, regular rhythm, well-perfused extremities, 2+ b/l DP and radial pulses equal.  Lungs: BCTA, no increased WOB.  ABD: NTND, no guarding or rebound, no pulsatile mass, no hernias.   MSK: Neck supple, nontender, nl ROM, no stepoff. Chest nontender. Back nontender in TLS spine or to b/l bony structures or flanks. Ext nontender, nl rom, no deformity.   INTEG: Skin warm, dry, no rash.  NEURO: A&Ox3, normal strength, nl sensation throughout, normal speech.   PSYCH: Calm, cooperative, normal affect and interaction.    Abdomen completely non-tender. Will check labs, IVF, symptomatic control, re-eval. Patient otherwise HD stable, well appearing, [place] in ED. Will also await clinical sobriety.

## 2019-03-04 NOTE — ED PROVIDER NOTE - PHYSICAL EXAMINATION
Vital signs reviewed  GENERAL: Patient nontoxic appearing, NAD. Sitting in bed, ETOH on breath  HEAD: NCAT  EYES: Anicteric  ENT: MMM  NECK: Supple, non tender  RESPIRATORY: Normal respiratory effort. CTA B/L. No wheezing, rales, rhonchi  CARDIOVASCULAR: Regular rate and rhythm  ABDOMEN: Soft. Nondistended. Nontender. No guarding or rebound. No CVA tenderness.  MUSCULOSKELETAL/EXTREMITIES: Brisk cap refill. 2+ radial pulses. No leg edema.  SKIN:  Warm and dry  NEURO: Awake, alert. Speech coherent. Answering basic questions. Moving all extremities.   PSYCHIATRIC: Intoxicated

## 2019-03-04 NOTE — ED PROVIDER NOTE - NS ED ROS FT
Constitutional: No fever, chills, weight loss.   Eyes:  No visual changes, eye pain or discharge.  ENMT:  No hearing changes, pain, no sore throat or runny nose, no difficulty swallowing  Cardiac:  No chest pain, SOB or edema. No chest pain with exertion.  Respiratory:  No cough or respiratory distress. No hemoptysis. No history of asthma or RAD.  GI:  Nausea. Hematemsis coffee ground colored, last emesis earlier this AM. Diarrhea. Diffuse abdominal pain.   :  No dysuria, frequency or burning.  MS:  No myalgia, muscle weakness, joint pain or back pain.  Neuro:  No headache or weakness.  No LOC.  Skin:  No skin rash.   Endocrine: No history of thyroid disease or diabetes.

## 2019-03-04 NOTE — ED PROVIDER NOTE - NS ED ROS FT
Constitutional: No fever  Eyes:  No visual changes  ENMT:  No neck pain  Cardiac:  No chest pain  Respiratory:  No cough, SOB  GI:  +vomiting. No diarrhea, abdominal pain.  :  No dysuria  MS:  No back pain  Neuro:  No headache or lightheadedness  Skin:  No skin rash  Endocrine: No history of thyroid disease or diabetes.  Except as documented in the HPI,  all other systems are negative.

## 2019-03-04 NOTE — ED PROVIDER NOTE - ATTENDING CONTRIBUTION TO CARE
Pt is requesting detox, Sent from Detox  for psych eval. Pt states that she has been drinking alcohol and was doing cocaine last night. Pt states she is vomiting blood and coffee ground. Pt was seen at the [facility] and was d/c after lab work with stable transaminitis. Will obtain JIMI for melena and psych consult as well as UA. Will clear if workup unremarkable. Pt denies any abdominal pain, no CP, no SOB. On Exam: pt inebriated. Exam atraumatic, moving all extremities, normal tone. Plan: UA, JIMI and Psych

## 2019-03-04 NOTE — ED PROVIDER NOTE - PHYSICAL EXAMINATION
Constitutional: ETOH odor, intoxicated. NAD.   Head: Atraumatic.  Eyes: PERRLA. EOMI without discomfort.   ENT: No nasal discharge. Mucous membranes moist.  Neck: Supple. Painless ROM.  Cardiovascular: Regular rhythm. Regular rate. Normal S1 and S2. No murmurs. 2+ pulses in all extremities.   Pulmonary: Normal respiratory rate and effort. Lungs clear to auscultation bilaterally. No wheezing, rales, or rhonchi. Bilateral, equal lung expansion.   Abdominal: Soft. Nondistended. Nontender. No rebound or guarding.   Extremities. Pelvis stable. No lower extremity edema. Symmetric calves.  Skin: No rashes.   Neuro: AAOx3. No focal neurological deficits.  Psych: Intoxicated, anxious.

## 2019-03-04 NOTE — H&P ADULT - NSHPLABSRESULTS_GEN_ALL_CORE
13.9   5.63  )-----------( 209      ( 04 Mar 2019 07:12 )             40.4   03-04    143  |  105  |  4<L>  ----------------------------<  78  3.7   |  23  |  0.6<L>    Ca    8.5      04 Mar 2019 07:12    TPro  6.3  /  Alb  4.2  /  TBili  0.7  /  DBili  x   /  AST  167<H>  /  ALT  140<H>  /  AlkPhos  94  03-04

## 2019-03-04 NOTE — ED ADULT NURSE NOTE - CHIEF COMPLAINT QUOTE
detox recommended psych evaluation before detox admission.  pt went to [facility] stating she was vomiting blood.

## 2019-03-04 NOTE — ED ADULT NURSE NOTE - NSIMPLEMENTINTERV_GEN_ALL_ED
Implemented All Fall with Harm Risk Interventions:  [place] to call system. Call bell, personal items and telephone within reach. Instruct patient to call for assistance. Room bathroom lighting operational. Non-slip footwear when patient is off stretcher. Physically safe environment: no spills, clutter or unnecessary equipment. Stretcher in lowest position, wheels locked, appropriate side rails in place. Provide visual cue, wrist band, yellow gown, etc. Monitor gait and stability. Monitor for mental status changes and reorient to person, place, and time. Review medications for side effects contributing to fall risk. Reinforce activity limits and safety measures with patient and family. Provide visual clues: red socks.

## 2019-03-04 NOTE — ED PROVIDER NOTE - CARE PLAN
Principal Discharge DX:	Gastritis Principal Discharge DX:	Alcoholic intoxication without complication  Secondary Diagnosis:	Generalized abdominal pain  Secondary Diagnosis:	Non-intractable vomiting with nausea, unspecified vomiting type  Secondary Diagnosis:	Transaminitis  Secondary Diagnosis:	Elevated lipase

## 2019-03-04 NOTE — H&P ADULT - HISTORY OF PRESENT ILLNESS
· HPI Objective Statement: 37yo F with PMHx substance abuse (ETOH, cocaine),drinks whiskey 1-2 pints daily, cocaine ocassionally,  anxiety, depression,admitted  for detox Evaluated, hemodynamically stable, labwork showing slightly elevated LFTs and lipase with Hb 13.9. No vomiting during that ED stay. 	    HIV:    HIV Status:  · Offered: Declined	    PAST MEDICAL/SURGICAL/FAMILY/SOCIAL HISTORY:    Past Medical History:  Anxiety    Depression    ETOH abuse    Postpartum depression    Substance abuse.     Past Surgical History:  No significant past surgical history.     Tobacco Usage:  · Tobacco Usage	Unknown if ever smoked	    ALLERGIES AND HOME MEDICATIONS:   Allergies:        Allergies:  	No Known Allergies:     Home Medications:   * No Current Medications as of 04-Mar-2019 12:22 documented in Structured Notes    REVIEW OF SYSTEMS:    Review of Systems:  · Review of Systems: Constitutional: No fever  Eyes:  No visual changes  ENMT:  No neck pain  Cardiac:  No chest pain  Respiratory:  No cough, SOB  GI:  +vomiting. No diarrhea, abdominal pain.  :  No dysuria  MS:  No back pain  Neuro:  No headache or lightheadedness  Skin:  No skin rash  Endocrine: No history of thyroid disease or diabetes. Except as documented in the HPI,  all other systems are negative.	    PHYSICAL EXAM:   · Physical Examination: Vital signs reviewed  GENERAL: Patient nontoxic appearing, NAD. Sitting in bed, ETOH on breath  HEAD: NCAT  EYES: Anicteric  ENT: MMM  NECK: Supple, non tender  RESPIRATORY: Normal respiratory effort. CTA B/L. No wheezing, rales, rhonchi  CARDIOVASCULAR: Regular rate and rhythm  ABDOMEN: Soft. Nondistended. Nontender. No guarding or rebound. No CVA tenderness.  MUSCULOSKELETAL/EXTREMITIES: Brisk cap refill. 2+ radial pulses. No leg edema.  SKIN:  Warm and dry  NEURO: Awake, alert. Speech coherent. Answering basic questions. Moving all extremities.  PSYCHIATRIC: Intoxicated

## 2019-03-04 NOTE — ED PROVIDER NOTE - OBJECTIVE STATEMENT
39 y/o female h/o ETOH abuse, cocaine abuse, anxiety p/w abdominal pain and hematemesis. Vomiting for last week, 3X per day, states that emesis appears coffee ground. Admits to daily diarrhea for past 3 months. LMP 2/6, has taken multiple home and one serum hcg test which were all negative. Not on any blood thinners, no h/o trauma, has not had any endoscopy or colonoscopy in the past. Last drink was in the parking lot before coming in to the ED.

## 2019-03-04 NOTE — H&P ADULT - ATTENDING COMMENTS
Patient interviewed and examined.    Chart reviewed.    PA's H&P noted and modified, as appropriate.    Case discussed on team rounds    Following is my summary of the case.    Admitted for detox: from _x___ED, ___Intake, ____Med/Surg Floor    Alcohol__x__   Opioid_____  Benzo___ Other_____    Substance amount, duration of use, last usage, and prior attempts at detox or rehabs, are outlined above in the H&P and discussed with patient.    Associated withdrawal symptoms presents.  Comorbid conditions noted. Chronic and Stable.    Past Medical Hx, Psych Hx, family Hx, Social Hx from H&P reviewed and NO changes.    Old medical record and medication Hx. Reviewed    Following items reviewed and addressed:  1. labs  2. EKG  3. Imaging from PACs module    Examination: no change from PA's exam.  UTI (+)  Place on following protocol  _____Medically Managed  __X__Medically Supervised    Ciwa__x___Librium taper____Ativan taper___Methadone taper___ Phenobarb taper____ Suboxone Induction____MMTP____  start PO bactrim,  rocephin 1gm IM x 1  Narcan Kit Offered    Psych Consult __X__N/A  ___Ordered    Physical Therapy  ___X N/A   ___  Ordered    Aftercare disposition to be addressed by counselors.    Estimated length of stay 3-5 days.

## 2019-03-04 NOTE — ED PROVIDER NOTE - PROGRESS NOTE DETAILS
Basic labs, reassess. normal labs, will dc. Friend Shakila at bedside will drive her home. Pt and friend given strict return precautions including worsening s/s, lightheadedness, syncope, inability to tolerate po.

## 2019-03-05 VITALS — HEART RATE: 100 BPM | TEMPERATURE: 96 F | DIASTOLIC BLOOD PRESSURE: 94 MMHG | SYSTOLIC BLOOD PRESSURE: 118 MMHG

## 2019-03-05 DIAGNOSIS — Z71.89 OTHER SPECIFIED COUNSELING: ICD-10-CM

## 2019-03-05 DIAGNOSIS — Z02.9 ENCOUNTER FOR ADMINISTRATIVE EXAMINATIONS, UNSPECIFIED: ICD-10-CM

## 2019-03-05 LAB
AMPHET UR-MCNC: NEGATIVE — SIGNIFICANT CHANGE UP
BARBITURATES UR SCN-MCNC: NEGATIVE — SIGNIFICANT CHANGE UP
BENZODIAZ UR-MCNC: NEGATIVE — SIGNIFICANT CHANGE UP
COCAINE METAB.OTHER UR-MCNC: POSITIVE
DRUG SCREEN 1, URINE RESULT: SIGNIFICANT CHANGE UP
HAV IGM SER-ACNC: SIGNIFICANT CHANGE UP
HBV CORE IGM SER-ACNC: SIGNIFICANT CHANGE UP
HBV SURFACE AG SER-ACNC: SIGNIFICANT CHANGE UP
HCV AB S/CO SERPL IA: 0.11 S/CO — SIGNIFICANT CHANGE UP (ref 0–0.79)
HCV AB SERPL-IMP: SIGNIFICANT CHANGE UP
METHADONE UR-MCNC: NEGATIVE — SIGNIFICANT CHANGE UP
OPIATES UR-MCNC: NEGATIVE — SIGNIFICANT CHANGE UP
PCP UR-MCNC: NEGATIVE — SIGNIFICANT CHANGE UP
PROPOXYPHENE QUALITATIVE URINE RESULT: NEGATIVE — SIGNIFICANT CHANGE UP
THC UR QL: NEGATIVE — SIGNIFICANT CHANGE UP

## 2019-03-05 RX ORDER — CEFTRIAXONE 500 MG/1
1000 INJECTION, POWDER, FOR SOLUTION INTRAMUSCULAR; INTRAVENOUS ONCE
Qty: 0 | Refills: 0 | Status: COMPLETED | OUTPATIENT
Start: 2019-03-05 | End: 2019-03-05

## 2019-03-05 RX ORDER — IBUPROFEN 200 MG
600 TABLET ORAL EVERY 6 HOURS
Qty: 0 | Refills: 0 | Status: DISCONTINUED | OUTPATIENT
Start: 2019-03-05 | End: 2019-03-06

## 2019-03-05 RX ADMIN — Medication 600 MILLIGRAM(S): at 18:05

## 2019-03-05 RX ADMIN — Medication 100 MILLIGRAM(S): at 00:10

## 2019-03-05 RX ADMIN — CEFTRIAXONE 1000 MILLIGRAM(S): 500 INJECTION, POWDER, FOR SOLUTION INTRAMUSCULAR; INTRAVENOUS at 13:38

## 2019-03-05 RX ADMIN — METHOCARBAMOL 500 MILLIGRAM(S): 500 TABLET, FILM COATED ORAL at 18:04

## 2019-03-05 RX ADMIN — Medication 600 MILLIGRAM(S): at 13:40

## 2019-03-05 RX ADMIN — Medication 1 PATCH: at 09:48

## 2019-03-05 RX ADMIN — Medication 1 MILLIGRAM(S): at 09:46

## 2019-03-05 RX ADMIN — Medication 600 MILLIGRAM(S): at 14:12

## 2019-03-05 RX ADMIN — Medication 1 TABLET(S): at 09:49

## 2019-03-05 RX ADMIN — Medication 100 MILLIGRAM(S): at 09:49

## 2019-03-05 RX ADMIN — METHOCARBAMOL 500 MILLIGRAM(S): 500 TABLET, FILM COATED ORAL at 09:47

## 2019-03-05 NOTE — CHART NOTE - NSCHARTNOTEFT_GEN_A_CORE
Subsequent Inpatient Encounter                                       Detox Unit AMA discharge    ADRIAN PRUETT   38y   Female pt understands risk to leave AMA, nevertheless pt is requesting to leave now      Chief Complaint:    Follow up for Alcohol  Dependency    HPI:     I reviewed previous notes. No Change, except if noted below.             Detail:_      Estimated Length of Allergies:  No Known Allergies      Diet: Regular    Activity: as tolerated    Follow up with    1. PMD in 2 weeks    2. Psych in 2 weeks    3.    Follow up for abnormal labs/tests    1.    Extra Instructions:      Flu Vaccine given  Yes_____         No______      Diagnosis:  Chemical Dependency   Maintain sobriety  refrain from all use      Patient Signature___________________________________________  Date_________________      Nurse Signature_____________________________________________Date_________________

## 2019-03-11 ENCOUNTER — TRANSCRIPTION ENCOUNTER (OUTPATIENT)
Age: 39
End: 2019-03-11

## 2019-03-12 DIAGNOSIS — N39.0 URINARY TRACT INFECTION, SITE NOT SPECIFIED: ICD-10-CM

## 2019-03-12 DIAGNOSIS — F14.20 COCAINE DEPENDENCE, UNCOMPLICATED: ICD-10-CM

## 2019-03-12 DIAGNOSIS — F10.239 ALCOHOL DEPENDENCE WITH WITHDRAWAL, UNSPECIFIED: ICD-10-CM

## 2019-03-12 DIAGNOSIS — F41.9 ANXIETY DISORDER, UNSPECIFIED: ICD-10-CM

## 2019-03-12 DIAGNOSIS — F10.229 ALCOHOL DEPENDENCE WITH INTOXICATION, UNSPECIFIED: ICD-10-CM

## 2019-03-12 DIAGNOSIS — F32.9 MAJOR DEPRESSIVE DISORDER, SINGLE EPISODE, UNSPECIFIED: ICD-10-CM

## 2019-03-12 DIAGNOSIS — R31.9 HEMATURIA, UNSPECIFIED: ICD-10-CM

## 2019-04-02 NOTE — ED PROVIDER NOTE - PROGRESS NOTE DETAILS
History of Present Illness:  Patient is a 33 year old female here today for   Chief Complaint   Patient presents with   • Establish Care     Previous patient of Dr. Catia Biggs.   • ADHD     Treated with Adderall XR 30 daily.  Hasn't taken since last July.   She is currently taking care of her children at home. She has 3 children ages 3, 8, 12. Her plan is to go back to work once her youngest is in school  Past Medical History:   Diagnosis Date   • Allergy    • Attention deficit hyperactivity disorder (ADHD) 11/16/2015   • Depressive disorder     as a child   • Smoker        I have reviewed the patient's medications and allergies, past medical, surgical, social and family history with patient, updating these as appropriate.  See Histories section of the EMR for a display of this information.    Review of Systems:  All other ROS negative except as documented in the HPI.    Visit Vitals  /74   Pulse 100   Ht 5' 5.5\" (1.664 m)   Wt 83.9 kg   BMI 30.32 kg/m²       Physical Exam:  She's alert oriented carries on normal conversation gives good history    Assessment and Plan:  1.  ADHD. We'll restart her Adderall  2.  Discussed other nonmedication treatment help with her attention deficit  3.  Encourage in quitting smoking      Saleem Rapp MA  4/1/2019  3:46 PM  Signed  Vaccine Information Statement(s) for was given today. This has been reviewed, questions answered, and verbal consent given by Patient for injection(s) and administration of Influenza (Inactivated).    1. Does the patient have a moderate to severe fever?  No  2. Has the patient had a serious reaction to a flu shot before?   No  3. Has the patient ever had Guillian Cerulean Syndrome within 6 weeks of a previous flu shot?  No  4. Does the patient have a serious allergy to eggs?  No  5. Is the patient less that 6 months of age?  No    Patient is eligible to receive the vaccine based on all questions being answered as 'No'.        Patient  tolerated without incident. See immunization grid for documentation.      Sharon Skinner, RN  4/1/2019  3:14 PM  Signed  Health Maintenance Due   Topic Date Due   • Cervical Cancer Screening HPV CO-Testing  03/02/2018   • Influenza Vaccine (1) 09/01/2018       Patient is due for the topics as listed above and would like flu vaccine today.  Informed she is due for PAP and plans to schedule with with OB/GYN.          Orders Placed This Encounter   • INFLUENZA QUADRIVALENT SPLIT PRES FREE 0.5 ML VACC, IM      Pt evaluated by psych, believes pt likely in etoh withdrawal and recommending medicine admission.

## 2019-04-30 NOTE — ED PROVIDER NOTE - ATTENDING CONTRIBUTION TO CARE
39 y/o F PMH ETOH Abuse and Dependence, Anxiety, currently homeless, brought in as an EDP, [place] [employer] after causing a ruckus in 7/11. Patient well known to ED staff and I have personally cared for Leslee multiple times in the past couple of weeks.     Patient upset that police brought her here and is demanding that we supeona for camera footage from the store.  + AOB. Patient denies SI or HI. Patient in view of Critical Care ED staff and will monitor and allow patient to metabolize.  Will follow up work up and reassess. 39 y/o F PMH ETOH Abuse and Dependence, Anxiety, currently homeless, brought in as an EDP, intoxicated by [employer] after causing a ruckus in 7/11. Patient well known to ED staff and I have personally cared for Leslee multiple times in the past couple of weeks.     Patient upset that police brought her here and is demanding that we supeona for camera footage from the store.  + AOB. Patient denies SI or HI. Patient in view of Critical Care ED staff and will monitor and allow patient to metabolize.  She became more calm when [employer] left and situation was improved. Will follow up work up and reassess. normal...

## 2019-05-04 ENCOUNTER — EMERGENCY (EMERGENCY)
Facility: HOSPITAL | Age: 39
LOS: 0 days | Discharge: LEFT BEFORE DISCHARGE | End: 2019-05-04
Attending: EMERGENCY MEDICINE | Admitting: EMERGENCY MEDICINE
Payer: MEDICAID

## 2019-05-04 VITALS
OXYGEN SATURATION: 100 % | DIASTOLIC BLOOD PRESSURE: 92 MMHG | WEIGHT: 154.1 LBS | RESPIRATION RATE: 20 BRPM | HEIGHT: 64 IN | HEART RATE: 144 BPM | SYSTOLIC BLOOD PRESSURE: 158 MMHG | TEMPERATURE: 97 F

## 2019-05-04 VITALS
OXYGEN SATURATION: 100 % | SYSTOLIC BLOOD PRESSURE: 137 MMHG | HEART RATE: 97 BPM | DIASTOLIC BLOOD PRESSURE: 88 MMHG | RESPIRATION RATE: 18 BRPM

## 2019-05-04 DIAGNOSIS — M79.601 PAIN IN RIGHT ARM: ICD-10-CM

## 2019-05-04 DIAGNOSIS — R00.2 PALPITATIONS: ICD-10-CM

## 2019-05-04 DIAGNOSIS — F41.8 OTHER SPECIFIED ANXIETY DISORDERS: ICD-10-CM

## 2019-05-04 DIAGNOSIS — R07.89 OTHER CHEST PAIN: ICD-10-CM

## 2019-05-04 DIAGNOSIS — Z79.899 OTHER LONG TERM (CURRENT) DRUG THERAPY: ICD-10-CM

## 2019-05-04 DIAGNOSIS — F17.200 NICOTINE DEPENDENCE, UNSPECIFIED, UNCOMPLICATED: ICD-10-CM

## 2019-05-04 DIAGNOSIS — F10.10 ALCOHOL ABUSE, UNCOMPLICATED: ICD-10-CM

## 2019-05-04 DIAGNOSIS — R00.0 TACHYCARDIA, UNSPECIFIED: ICD-10-CM

## 2019-05-04 LAB
ALBUMIN SERPL ELPH-MCNC: 4.3 G/DL — SIGNIFICANT CHANGE UP (ref 3.5–5.2)
ALP SERPL-CCNC: 176 U/L — HIGH (ref 30–115)
ALT FLD-CCNC: 210 U/L — HIGH (ref 0–41)
ANION GAP SERPL CALC-SCNC: 19 MMOL/L — HIGH (ref 7–14)
AST SERPL-CCNC: 272 U/L — HIGH (ref 0–41)
BASOPHILS # BLD AUTO: 0.08 K/UL — SIGNIFICANT CHANGE UP (ref 0–0.2)
BASOPHILS NFR BLD AUTO: 1 % — SIGNIFICANT CHANGE UP (ref 0–1)
BILIRUB SERPL-MCNC: 1 MG/DL — SIGNIFICANT CHANGE UP (ref 0.2–1.2)
BUN SERPL-MCNC: 6 MG/DL — LOW (ref 10–20)
CALCIUM SERPL-MCNC: 9.5 MG/DL — SIGNIFICANT CHANGE UP (ref 8.5–10.1)
CHLORIDE SERPL-SCNC: 97 MMOL/L — LOW (ref 98–110)
CO2 SERPL-SCNC: 21 MMOL/L — SIGNIFICANT CHANGE UP (ref 17–32)
CREAT SERPL-MCNC: 0.7 MG/DL — SIGNIFICANT CHANGE UP (ref 0.7–1.5)
D DIMER BLD IA.RAPID-MCNC: 154 NG/ML DDU — SIGNIFICANT CHANGE UP (ref 0–230)
EOSINOPHIL # BLD AUTO: 0.04 K/UL — SIGNIFICANT CHANGE UP (ref 0–0.7)
EOSINOPHIL NFR BLD AUTO: 0.5 % — SIGNIFICANT CHANGE UP (ref 0–8)
GLUCOSE SERPL-MCNC: 134 MG/DL — HIGH (ref 70–99)
HCG SERPL QL: NEGATIVE — SIGNIFICANT CHANGE UP
HCT VFR BLD CALC: 41.1 % — SIGNIFICANT CHANGE UP (ref 37–47)
HGB BLD-MCNC: 14.2 G/DL — SIGNIFICANT CHANGE UP (ref 12–16)
IMM GRANULOCYTES NFR BLD AUTO: 0.4 % — HIGH (ref 0.1–0.3)
LYMPHOCYTES # BLD AUTO: 0.94 K/UL — LOW (ref 1.2–3.4)
LYMPHOCYTES # BLD AUTO: 11.7 % — LOW (ref 20.5–51.1)
MAGNESIUM SERPL-MCNC: 1.7 MG/DL — LOW (ref 1.8–2.4)
MCHC RBC-ENTMCNC: 33.1 PG — HIGH (ref 27–31)
MCHC RBC-ENTMCNC: 34.5 G/DL — SIGNIFICANT CHANGE UP (ref 32–37)
MCV RBC AUTO: 95.8 FL — SIGNIFICANT CHANGE UP (ref 81–99)
MONOCYTES # BLD AUTO: 0.83 K/UL — HIGH (ref 0.1–0.6)
MONOCYTES NFR BLD AUTO: 10.3 % — HIGH (ref 1.7–9.3)
NEUTROPHILS # BLD AUTO: 6.14 K/UL — SIGNIFICANT CHANGE UP (ref 1.4–6.5)
NEUTROPHILS NFR BLD AUTO: 76.1 % — HIGH (ref 42.2–75.2)
NRBC # BLD: 0 /100 WBCS — SIGNIFICANT CHANGE UP (ref 0–0)
NT-PROBNP SERPL-SCNC: 18 PG/ML — SIGNIFICANT CHANGE UP (ref 0–300)
PLATELET # BLD AUTO: 222 K/UL — SIGNIFICANT CHANGE UP (ref 130–400)
POTASSIUM SERPL-MCNC: 5 MMOL/L — SIGNIFICANT CHANGE UP (ref 3.5–5)
POTASSIUM SERPL-SCNC: 5 MMOL/L — SIGNIFICANT CHANGE UP (ref 3.5–5)
PROT SERPL-MCNC: 7.5 G/DL — SIGNIFICANT CHANGE UP (ref 6–8)
RBC # BLD: 4.29 M/UL — SIGNIFICANT CHANGE UP (ref 4.2–5.4)
RBC # FLD: 13.2 % — SIGNIFICANT CHANGE UP (ref 11.5–14.5)
SODIUM SERPL-SCNC: 137 MMOL/L — SIGNIFICANT CHANGE UP (ref 135–146)
TROPONIN T SERPL-MCNC: <0.01 NG/ML — SIGNIFICANT CHANGE UP
WBC # BLD: 8.06 K/UL — SIGNIFICANT CHANGE UP (ref 4.8–10.8)
WBC # FLD AUTO: 8.06 K/UL — SIGNIFICANT CHANGE UP (ref 4.8–10.8)

## 2019-05-04 PROCEDURE — 71046 X-RAY EXAM CHEST 2 VIEWS: CPT | Mod: 26

## 2019-05-04 PROCEDURE — 99285 EMERGENCY DEPT VISIT HI MDM: CPT | Mod: 25

## 2019-05-04 RX ORDER — HYDROXYZINE HCL 10 MG
50 TABLET ORAL ONCE
Qty: 0 | Refills: 0 | Status: COMPLETED | OUTPATIENT
Start: 2019-05-04 | End: 2019-05-04

## 2019-05-04 RX ORDER — HYDROXYZINE HCL 10 MG
50 TABLET ORAL ONCE
Qty: 0 | Refills: 0 | Status: DISCONTINUED | OUTPATIENT
Start: 2019-05-04 | End: 2019-05-04

## 2019-05-04 RX ORDER — ASPIRIN/CALCIUM CARB/MAGNESIUM 324 MG
324 TABLET ORAL ONCE
Qty: 0 | Refills: 0 | Status: COMPLETED | OUTPATIENT
Start: 2019-05-04 | End: 2019-05-04

## 2019-05-04 RX ORDER — SODIUM CHLORIDE 9 MG/ML
1000 INJECTION INTRAMUSCULAR; INTRAVENOUS; SUBCUTANEOUS ONCE
Qty: 0 | Refills: 0 | Status: COMPLETED | OUTPATIENT
Start: 2019-05-04 | End: 2019-05-04

## 2019-05-04 RX ADMIN — SODIUM CHLORIDE 1000 MILLILITER(S): 9 INJECTION INTRAMUSCULAR; INTRAVENOUS; SUBCUTANEOUS at 02:50

## 2019-05-04 RX ADMIN — Medication 50 MILLIGRAM(S): at 02:48

## 2019-05-04 NOTE — ED PROVIDER NOTE - PHYSICAL EXAMINATION
Constitutional: Well appearing. No acute distress. Non toxic.   Eyes: PERRLA. Extraocular movements intact, no entrapment. Conjunctiva normal.   ENT: No nasal discharge. Moist mucus membranes.  Neck: Supple, non tender, full range of motion.  CV: regular tachycardic no murmurs, rubs, or gallops. +S1S2.   Pulm: Clear to auscultation bilaterally. Normal work of breathing.  Abd: soft NT ND +BS.   Ext: Warm and well perfused x4, moving all extremities, no edema.   Psy: Cooperative, appropriate.   Skin: Warm, dry, no rash  Neuro: CN2-12 grossly intact no sensory or motor deficits throughout, no drift, no ataxia

## 2019-05-04 NOTE — ED ADULT NURSE NOTE - NSIMPLEMENTINTERV_GEN_ALL_ED
Implemented All Universal Safety Interventions:  [place] to call system. Call bell, personal items and telephone within reach. Instruct patient to call for assistance. Room bathroom lighting operational. Non-slip footwear when patient is off stretcher. Physically safe environment: no spills, clutter or unnecessary equipment. Stretcher in lowest position, wheels locked, appropriate side rails in place.

## 2019-05-04 NOTE — ED PROVIDER NOTE - OBJECTIVE STATEMENT
Health Maintenance Summary     Topic Due On Due Status Completed On    IMMUNIZATION - DTaP/Tdap/Td Oct 13, 2021 Not Due Oct 13, 2011    Immunization-Influenza  Completed Sep 27, 2017    Depression Screening Nov 16, 2018 Not Due Nov 16, 2017          Patient is up to date, no discussion needed .             37yo F history of ETOH abuse, anxiety on xanax, presenting with chest pain, sOB, palpitations, RUE pain since this AM getting progressively worse- no trauma, never had sx like this before, doesn't feel like it's 2/2 her anxiety- chest pain R sided non radiating, intermittent, moderate, no other exacerbating or relieving factors. +daily smoker. No dyspnea on exertion, orthopnea, weight gain, lower extremity edema. No history DVT/PE, no lower extremity swelling/pain, no recent travel/trauma, no exogenous hormone use, no known active malignancy.

## 2019-05-04 NOTE — ED PROVIDER NOTE - ATTENDING CONTRIBUTION TO CARE
38 y.o. F, PMH of ETOH abuse, anxiety on xanax, c/o palpitations, SOB, chest discomfort, RUE pain. Symptoms started this morning. No trauma. Pain is intermittent, moderate. No history of DVT/PE, no leg pain/swelling. No fever/chills. On exam, pt in NAD, AAOx3, head NC/AT, CN II-XII intact, lungs CTA B/L, CV S1S2 regular/tachy, abdomen soft/NT/ND/(+)BS, ext (-) edema. Will do labs, CXR, EKG and reevaluate.

## 2019-05-04 NOTE — ED PROVIDER NOTE - CARE PLAN
Principal Discharge DX:	Palpitation  Secondary Diagnosis:	ETOH abuse  Secondary Diagnosis:	Anxiety  Secondary Diagnosis:	Tachycardia

## 2019-05-06 NOTE — ED ADULT TRIAGE NOTE - BSA (M2)
Initial Anesthesia Post-op Note    Patient: Mariela Porras  Procedure(s) Performed: COLONOSCOPY  Anesthesia type: Monitor Anesthesia Care    Vitals Value Taken Time     Last 24 I/O:     Intake/Output Summary (Last 24 hours) at 5/6/2019 1247  Last data filed at 5/6/2019 1242  Gross per 24 hour   Intake 200 ml   Output --   Net 200 ml       PATIENT LOCATION: Day Surgery  POST-OP VITAL SIGNS: stable  LEVEL OF CONSCIOUSNESS: participates in exam, awake, oriented and answers questions appropriately  RESPIRATORY STATUS: spontaneous ventilation  CARDIOVASCULAR: stable  HYDRATION: euvolemic    PAIN MANAGEMENT: adequately controlled  NAUSEA: None  AIRWAY PATENCY: patent  POST-OP ASSESSMENT: no complications, patient tolerated procedure well with no complications and sufficiently recovered from acute administration of anesthesia effects and able to participate in evaluation  COMPLICATIONS: none  HANDOFF:  Handoff to receiving nurse was performed and questions were answered      
1.59

## 2019-05-09 ENCOUNTER — TRANSCRIPTION ENCOUNTER (OUTPATIENT)
Age: 39
End: 2019-05-09

## 2019-05-15 ENCOUNTER — TRANSCRIPTION ENCOUNTER (OUTPATIENT)
Age: 39
End: 2019-05-15

## 2019-06-02 ENCOUNTER — TRANSCRIPTION ENCOUNTER (OUTPATIENT)
Age: 39
End: 2019-06-02

## 2019-06-09 ENCOUNTER — INPATIENT (INPATIENT)
Facility: HOSPITAL | Age: 39
LOS: 0 days | Discharge: LEFT BEFORE DISCHARGE | End: 2019-06-10
Attending: INTERNAL MEDICINE | Admitting: INTERNAL MEDICINE
Payer: MEDICAID

## 2019-06-09 VITALS
WEIGHT: 145.06 LBS | SYSTOLIC BLOOD PRESSURE: 148 MMHG | OXYGEN SATURATION: 98 % | TEMPERATURE: 99 F | HEIGHT: 64 IN | DIASTOLIC BLOOD PRESSURE: 90 MMHG | HEART RATE: 120 BPM | RESPIRATION RATE: 20 BRPM

## 2019-06-09 PROCEDURE — 99291 CRITICAL CARE FIRST HOUR: CPT

## 2019-06-09 PROCEDURE — 99292 CRITICAL CARE ADDL 30 MIN: CPT

## 2019-06-09 RX ORDER — SODIUM CHLORIDE 9 MG/ML
2000 INJECTION, SOLUTION INTRAVENOUS ONCE
Refills: 0 | Status: COMPLETED | OUTPATIENT
Start: 2019-06-09 | End: 2019-06-09

## 2019-06-09 RX ORDER — METOCLOPRAMIDE HCL 10 MG
10 TABLET ORAL ONCE
Refills: 0 | Status: COMPLETED | OUTPATIENT
Start: 2019-06-09 | End: 2019-06-09

## 2019-06-09 RX ORDER — PANTOPRAZOLE SODIUM 20 MG/1
80 TABLET, DELAYED RELEASE ORAL ONCE
Refills: 0 | Status: COMPLETED | OUTPATIENT
Start: 2019-06-09 | End: 2019-06-09

## 2019-06-09 RX ORDER — MORPHINE SULFATE 50 MG/1
4 CAPSULE, EXTENDED RELEASE ORAL ONCE
Refills: 0 | Status: DISCONTINUED | OUTPATIENT
Start: 2019-06-09 | End: 2019-06-09

## 2019-06-09 NOTE — ED PROVIDER NOTE - PROGRESS NOTE DETAILS
Rectal exam: no melena or brbpr. pt vomitied multiple times in ed, blood with small clots, meds being given, 18 guage in place, second IV being placed, being moved to critical care area of the ED. patient upgraded to critical care area. On a monitor. Plan for CT abdomen, labs, GI. discussed with GI fellow who states to keep her NPO and to start a protonix drip. He will see her in the morning. vitals noted, pt repots had drink few hours pta atoh negative x 2, ativan given, icu aware of pt approced for icu, icu resident aware of pt.

## 2019-06-09 NOTE — ED PROVIDER NOTE - PHYSICAL EXAMINATION
VITAL SIGNS: I have reviewed nursing notes and confirm.  CONSTITUTIONAL: Well-developed; well-nourished;  SKIN: Skin exam is warm and dry, <2 sec cap refill  HEAD: Normocephalic; atraumatic.  EYES: PERRL, EOM intact; normal conjunctiva.  ENT: dry MM; airway clear.   NECK: Supple; non tender.  CARD: RRR, 2+ dp pulses  RESP: No wheezes, rales or rhonchi, speaking in full sentences  ABD: soft, TTP to epigastrum  RECTAL: negative   EXT: Normal ROM. No edema.  NEURO: Alert, oriented. Grossly unremarkable. No focal deficits.  PSYCH: anxious on exam

## 2019-06-09 NOTE — ED PROVIDER NOTE - NS ED ROS FT
Review of Systems:  CONSTITUTIONAL: no fever  EYES: no photophobia, no blurred vision  ENT: no ear pain, no nasal discharge  RESPIRATORY: no shortness of breath, no cough  CARDIAC: no chest pain, no palpitations  GI: +abd pain, +hematemesis, no melena  : no dysuria; no hematuria,   MUSCULOSKELETAL: no joint paint  NEUROLOGIC: no headache,   PSYCH: +anxiety, non suicidal, non homicidal, no hallucination, no depression

## 2019-06-09 NOTE — ED PROVIDER NOTE - OBJECTIVE STATEMENT
39 y/o F, h/o tobacco use, alcohol abuse and previous history of cocaine abuse, anxiety (seen in the ED multiple times for anxiety) presents with epigastric pain onset today associated with projectile vomiting with "some" blood in it onset earlier today. pt admits to drinking wine coolers earlier. no history of similar episodes in the past. pt states she has never seen a gastroenterologist before. no anticoagulant use. no known exacerbating symptoms in the ED. denies melena, BRBPR, cp, sob, urinary symptoms, dizziness, syncope, HA 37 y/o F, h/o tobacco use, alcohol abuse and previous history of cocaine abuse, anxiety presents with epigastric pain onset today associated with projectile vomiting with "some" blood in it onset earlier today. pt admits to drinking wine coolers earlier. no history of similar episodes in the past. pt states she has never seen a gastroenterologist before. no anticoagulant use. no known exacerbating symptoms in the ED. denies melena, BRBPR, cp, sob, urinary symptoms, dizziness, syncope, HA

## 2019-06-09 NOTE — ED PROVIDER NOTE - CLINICAL SUMMARY MEDICAL DECISION MAKING FREE TEXT BOX
pt aware of all labs and imaging, treated for potential upper gi, Protonix bolus and rip given, ceftriaxone, anti emetics, results reviewed, ativan given as well, g aware of pt and following, report npo for now, wo 18 gauges in place, pt no longer vomiting, icu team aware of pt, approved for icu.

## 2019-06-09 NOTE — ED PROVIDER NOTE - ATTENDING CONTRIBUTION TO CARE
I personally evaluated the patient. I reviewed the Resident’s or Physician Assistant’s note (as assigned above), and agree with the findings and plan except as documented in my note.  A 37 y/o f w/  pmhx of substance abuse (ETOH, cocaine- reports she has not used any cocaine recently and has only being drinking a few times a week- wine coolers), anxiety, depression, elevated LFTs and lipase oin the past (march) presents for epigastric abd pain that started today associated with nausea and vomiting x 1, nb, reports noticed pieces of blood in it, projectile. pain worsened so came to ed. report last drink was earlier today. (+) smoker, denies ivda. no si/hi, no visual or auditory hallucinations. PT also reports chest pain where she get chest tightness and palpitations, intermittently, non-radiating. No fever, chills, pleuritic cp, sob, palpitations, diaphoresis, cough, ha/lh/dizziness, numbness/tingling, neck pain/ stiffness,  diarrhea, constipation, melena/brbpr, urinary symptoms, trauma, weakness, edema, calf pain/swelling/erythema, sick contacts, recent travel or rash.  Vital Signs: I have reviewed the initial vital signs. Constitutional: Anxious appearing female laying on stretcher speaking full sentences. Integumentary: No rash. Peeling skin - reports laied out in sun. No jaundice. ENT: Dry MM . EYES: No sclera icterus. NECK: Supple, non-tender, no meningeal signs. Cardiovascular: Tachycardiac, radial pulses 2/4 b/l. No JVD. Respiratory: BS present b/l, ctabl, no wheezing or crackles,  no accessory muscle use, no stridor. Gastrointestinal: BS present throughout all 4 quadrants, soft, nd, generalized tenderness to palpation worse to epigastric and llq, no rebound tenderness or guarding, no cvat. Musculoskeletal: FROM, no edema, no calf pain/swelling/erythema. Neurologic: AAOx3, motor 5/5 and sensation intact throughout upper and lower ext, CN II-XII intact, No facial droop or slurring of speech. No focal deficits.

## 2019-06-09 NOTE — ED PROVIDER NOTE - CARE PLAN
Assessment and plan of treatment:	Plan: EKG, CXR, labs, ivf, reglan, u preg, urine, imaging, reassess. Principal Discharge DX:	Hematemesis, presence of nausea not specified  Assessment and plan of treatment:	Plan: EKG, CXR, labs, ivf, reglan, u preg, urine, imaging, reassess.  Secondary Diagnosis:	Elevated liver enzymes  Secondary Diagnosis:	Sinus tachycardia Principal Discharge DX:	Hematemesis, presence of nausea not specified  Assessment and plan of treatment:	Plan: EKG, CXR, labs, ivf, reglan, u preg, urine, imaging, reassess.  Secondary Diagnosis:	Elevated liver enzymes  Secondary Diagnosis:	Sinus tachycardia  Secondary Diagnosis:	Alcohol abuse

## 2019-06-10 VITALS — HEART RATE: 106 BPM | RESPIRATION RATE: 38 BRPM | OXYGEN SATURATION: 98 %

## 2019-06-10 DIAGNOSIS — F10.20 ALCOHOL DEPENDENCE, UNCOMPLICATED: ICD-10-CM

## 2019-06-10 LAB
ALBUMIN SERPL ELPH-MCNC: 4.1 G/DL — SIGNIFICANT CHANGE UP (ref 3.5–5.2)
ALP SERPL-CCNC: 278 U/L — HIGH (ref 30–115)
ALT FLD-CCNC: 218 U/L — HIGH (ref 0–41)
AMPHET UR-MCNC: POSITIVE
ANION GAP SERPL CALC-SCNC: 21 MMOL/L — HIGH (ref 7–14)
APAP SERPL-MCNC: <5 UG/ML — LOW (ref 10–30)
APPEARANCE UR: ABNORMAL
APTT BLD: 27.7 SEC — SIGNIFICANT CHANGE UP (ref 27–39.2)
AST SERPL-CCNC: 465 U/L — HIGH (ref 0–41)
BARBITURATES UR SCN-MCNC: NEGATIVE — SIGNIFICANT CHANGE UP
BASOPHILS # BLD AUTO: 0.13 K/UL — SIGNIFICANT CHANGE UP (ref 0–0.2)
BASOPHILS NFR BLD AUTO: 1.3 % — HIGH (ref 0–1)
BENZODIAZ UR-MCNC: NEGATIVE — SIGNIFICANT CHANGE UP
BILIRUB DIRECT SERPL-MCNC: 2.1 MG/DL — HIGH (ref 0–0.2)
BILIRUB INDIRECT FLD-MCNC: 1 MG/DL — SIGNIFICANT CHANGE UP (ref 0.2–1.2)
BILIRUB SERPL-MCNC: 3.1 MG/DL — HIGH (ref 0.2–1.2)
BILIRUB UR-MCNC: NEGATIVE — SIGNIFICANT CHANGE UP
BLD GP AB SCN SERPL QL: SIGNIFICANT CHANGE UP
BUN SERPL-MCNC: 5 MG/DL — LOW (ref 10–20)
CALCIUM SERPL-MCNC: 9.2 MG/DL — SIGNIFICANT CHANGE UP (ref 8.5–10.1)
CHLORIDE SERPL-SCNC: 94 MMOL/L — LOW (ref 98–110)
CO2 SERPL-SCNC: 22 MMOL/L — SIGNIFICANT CHANGE UP (ref 17–32)
COCAINE METAB.OTHER UR-MCNC: NEGATIVE — SIGNIFICANT CHANGE UP
COLOR SPEC: YELLOW — SIGNIFICANT CHANGE UP
CREAT SERPL-MCNC: 0.8 MG/DL — SIGNIFICANT CHANGE UP (ref 0.7–1.5)
DIFF PNL FLD: NEGATIVE — SIGNIFICANT CHANGE UP
DRUG SCREEN 1, URINE RESULT: SIGNIFICANT CHANGE UP
EOSINOPHIL # BLD AUTO: 0.04 K/UL — SIGNIFICANT CHANGE UP (ref 0–0.7)
EOSINOPHIL NFR BLD AUTO: 0.4 % — SIGNIFICANT CHANGE UP (ref 0–8)
EPI CELLS # UR: ABNORMAL /HPF
ETHANOL SERPL-MCNC: <10 MG/DL — SIGNIFICANT CHANGE UP
ETHANOL SERPL-MCNC: <10 MG/DL — SIGNIFICANT CHANGE UP
GLUCOSE SERPL-MCNC: 117 MG/DL — HIGH (ref 70–99)
GLUCOSE UR QL: NEGATIVE MG/DL — SIGNIFICANT CHANGE UP
HCG SERPL QL: NEGATIVE — SIGNIFICANT CHANGE UP
HCG UR QL: NEGATIVE — SIGNIFICANT CHANGE UP
HCT VFR BLD CALC: 37.2 % — SIGNIFICANT CHANGE UP (ref 37–47)
HCT VFR BLD CALC: 38.9 % — SIGNIFICANT CHANGE UP (ref 37–47)
HGB BLD-MCNC: 12.7 G/DL — SIGNIFICANT CHANGE UP (ref 12–16)
HGB BLD-MCNC: 13.4 G/DL — SIGNIFICANT CHANGE UP (ref 12–16)
IMM GRANULOCYTES NFR BLD AUTO: 0.4 % — HIGH (ref 0.1–0.3)
INR BLD: 0.96 RATIO — SIGNIFICANT CHANGE UP (ref 0.65–1.3)
KETONES UR-MCNC: NEGATIVE — SIGNIFICANT CHANGE UP
LEUKOCYTE ESTERASE UR-ACNC: ABNORMAL
LIDOCAIN IGE QN: 18 U/L — SIGNIFICANT CHANGE UP (ref 7–60)
LIDOCAIN IGE QN: 32 U/L — SIGNIFICANT CHANGE UP (ref 7–60)
LYMPHOCYTES # BLD AUTO: 0.75 K/UL — LOW (ref 1.2–3.4)
LYMPHOCYTES # BLD AUTO: 7.5 % — LOW (ref 20.5–51.1)
MCHC RBC-ENTMCNC: 33.4 PG — HIGH (ref 27–31)
MCHC RBC-ENTMCNC: 33.6 PG — HIGH (ref 27–31)
MCHC RBC-ENTMCNC: 34.1 G/DL — SIGNIFICANT CHANGE UP (ref 32–37)
MCHC RBC-ENTMCNC: 34.4 G/DL — SIGNIFICANT CHANGE UP (ref 32–37)
MCV RBC AUTO: 97 FL — SIGNIFICANT CHANGE UP (ref 81–99)
MCV RBC AUTO: 98.4 FL — SIGNIFICANT CHANGE UP (ref 81–99)
METHADONE UR-MCNC: NEGATIVE — SIGNIFICANT CHANGE UP
MONOCYTES # BLD AUTO: 0.99 K/UL — HIGH (ref 0.1–0.6)
MONOCYTES NFR BLD AUTO: 9.8 % — HIGH (ref 1.7–9.3)
NEUTROPHILS # BLD AUTO: 8.11 K/UL — HIGH (ref 1.4–6.5)
NEUTROPHILS NFR BLD AUTO: 80.6 % — HIGH (ref 42.2–75.2)
NITRITE UR-MCNC: NEGATIVE — SIGNIFICANT CHANGE UP
NRBC # BLD: 0 /100 WBCS — SIGNIFICANT CHANGE UP (ref 0–0)
NRBC # BLD: 0 /100 WBCS — SIGNIFICANT CHANGE UP (ref 0–0)
OPIATES UR-MCNC: POSITIVE
PCP UR-MCNC: NEGATIVE — SIGNIFICANT CHANGE UP
PH UR: 7 — SIGNIFICANT CHANGE UP (ref 5–8)
PLATELET # BLD AUTO: 170 K/UL — SIGNIFICANT CHANGE UP (ref 130–400)
PLATELET # BLD AUTO: 215 K/UL — SIGNIFICANT CHANGE UP (ref 130–400)
POTASSIUM SERPL-MCNC: 3.7 MMOL/L — SIGNIFICANT CHANGE UP (ref 3.5–5)
POTASSIUM SERPL-SCNC: 3.7 MMOL/L — SIGNIFICANT CHANGE UP (ref 3.5–5)
PROPOXYPHENE QUALITATIVE URINE RESULT: NEGATIVE — SIGNIFICANT CHANGE UP
PROT SERPL-MCNC: 7 G/DL — SIGNIFICANT CHANGE UP (ref 6–8)
PROT UR-MCNC: NEGATIVE MG/DL — SIGNIFICANT CHANGE UP
PROTHROM AB SERPL-ACNC: 11.1 SEC — SIGNIFICANT CHANGE UP (ref 9.95–12.87)
RBC # BLD: 3.78 M/UL — LOW (ref 4.2–5.4)
RBC # BLD: 4.01 M/UL — LOW (ref 4.2–5.4)
RBC # FLD: 13 % — SIGNIFICANT CHANGE UP (ref 11.5–14.5)
RBC # FLD: 13.3 % — SIGNIFICANT CHANGE UP (ref 11.5–14.5)
SALICYLATES SERPL-MCNC: <0.3 MG/DL — LOW (ref 4–30)
SODIUM SERPL-SCNC: 137 MMOL/L — SIGNIFICANT CHANGE UP (ref 135–146)
SP GR SPEC: <=1.005 — SIGNIFICANT CHANGE UP (ref 1.01–1.03)
THC UR QL: NEGATIVE — SIGNIFICANT CHANGE UP
TROPONIN T SERPL-MCNC: <0.01 NG/ML — SIGNIFICANT CHANGE UP
UROBILINOGEN FLD QL: 1 MG/DL (ref 0.2–0.2)
WBC # BLD: 10.06 K/UL — SIGNIFICANT CHANGE UP (ref 4.8–10.8)
WBC # BLD: 3.57 K/UL — LOW (ref 4.8–10.8)
WBC # FLD AUTO: 10.06 K/UL — SIGNIFICANT CHANGE UP (ref 4.8–10.8)
WBC # FLD AUTO: 3.57 K/UL — LOW (ref 4.8–10.8)
WBC UR QL: SIGNIFICANT CHANGE UP /HPF

## 2019-06-10 PROCEDURE — 71045 X-RAY EXAM CHEST 1 VIEW: CPT | Mod: 26

## 2019-06-10 PROCEDURE — 74177 CT ABD & PELVIS W/CONTRAST: CPT | Mod: 26

## 2019-06-10 PROCEDURE — 99222 1ST HOSP IP/OBS MODERATE 55: CPT

## 2019-06-10 PROCEDURE — 74018 RADEX ABDOMEN 1 VIEW: CPT | Mod: 26

## 2019-06-10 PROCEDURE — 93010 ELECTROCARDIOGRAM REPORT: CPT | Mod: 76

## 2019-06-10 PROCEDURE — 76705 ECHO EXAM OF ABDOMEN: CPT | Mod: 26

## 2019-06-10 RX ORDER — CEFTRIAXONE 500 MG/1
1 INJECTION, POWDER, FOR SOLUTION INTRAMUSCULAR; INTRAVENOUS ONCE
Refills: 0 | Status: COMPLETED | OUTPATIENT
Start: 2019-06-10 | End: 2019-06-10

## 2019-06-10 RX ORDER — OCTREOTIDE ACETATE 200 UG/ML
50 INJECTION, SOLUTION INTRAVENOUS; SUBCUTANEOUS ONCE
Refills: 0 | Status: COMPLETED | OUTPATIENT
Start: 2019-06-10 | End: 2019-06-10

## 2019-06-10 RX ORDER — PANTOPRAZOLE SODIUM 20 MG/1
8 TABLET, DELAYED RELEASE ORAL
Qty: 80 | Refills: 0 | Status: DISCONTINUED | OUTPATIENT
Start: 2019-06-10 | End: 2019-06-10

## 2019-06-10 RX ORDER — ALPRAZOLAM 0.25 MG
1 TABLET ORAL ONCE
Refills: 0 | Status: DISCONTINUED | OUTPATIENT
Start: 2019-06-10 | End: 2019-06-10

## 2019-06-10 RX ORDER — MORPHINE SULFATE 50 MG/1
1 CAPSULE, EXTENDED RELEASE ORAL ONCE
Refills: 0 | Status: DISCONTINUED | OUTPATIENT
Start: 2019-06-10 | End: 2019-06-10

## 2019-06-10 RX ORDER — FOLIC ACID 0.8 MG
1 TABLET ORAL DAILY
Refills: 0 | Status: DISCONTINUED | OUTPATIENT
Start: 2019-06-10 | End: 2019-06-10

## 2019-06-10 RX ORDER — OCTREOTIDE ACETATE 200 UG/ML
25 INJECTION, SOLUTION INTRAVENOUS; SUBCUTANEOUS
Qty: 500 | Refills: 0 | Status: DISCONTINUED | OUTPATIENT
Start: 2019-06-10 | End: 2019-06-10

## 2019-06-10 RX ORDER — HYDROXYZINE HCL 10 MG
50 TABLET ORAL ONCE
Refills: 0 | Status: DISCONTINUED | OUTPATIENT
Start: 2019-06-10 | End: 2019-06-10

## 2019-06-10 RX ORDER — THIAMINE MONONITRATE (VIT B1) 100 MG
100 TABLET ORAL DAILY
Refills: 0 | Status: DISCONTINUED | OUTPATIENT
Start: 2019-06-10 | End: 2019-06-10

## 2019-06-10 RX ORDER — ONDANSETRON 8 MG/1
4 TABLET, FILM COATED ORAL ONCE
Refills: 0 | Status: COMPLETED | OUTPATIENT
Start: 2019-06-10 | End: 2019-06-10

## 2019-06-10 RX ORDER — SODIUM CHLORIDE 9 MG/ML
1000 INJECTION INTRAMUSCULAR; INTRAVENOUS; SUBCUTANEOUS ONCE
Refills: 0 | Status: COMPLETED | OUTPATIENT
Start: 2019-06-10 | End: 2019-06-10

## 2019-06-10 RX ORDER — CHLORHEXIDINE GLUCONATE 213 G/1000ML
1 SOLUTION TOPICAL
Refills: 0 | Status: DISCONTINUED | OUTPATIENT
Start: 2019-06-10 | End: 2019-06-10

## 2019-06-10 RX ORDER — SODIUM CHLORIDE 9 MG/ML
1000 INJECTION, SOLUTION INTRAVENOUS
Refills: 0 | Status: DISCONTINUED | OUTPATIENT
Start: 2019-06-10 | End: 2019-06-10

## 2019-06-10 RX ORDER — OCTREOTIDE ACETATE 200 UG/ML
50 INJECTION, SOLUTION INTRAVENOUS; SUBCUTANEOUS ONCE
Refills: 0 | Status: DISCONTINUED | OUTPATIENT
Start: 2019-06-10 | End: 2019-06-10

## 2019-06-10 RX ADMIN — OCTREOTIDE ACETATE 50 MICROGRAM(S): 200 INJECTION, SOLUTION INTRAVENOUS; SUBCUTANEOUS at 05:44

## 2019-06-10 RX ADMIN — Medication 0.5 MILLIGRAM(S): at 00:47

## 2019-06-10 RX ADMIN — Medication 1 MILLIGRAM(S): at 05:36

## 2019-06-10 RX ADMIN — Medication 10 MILLIGRAM(S): at 00:34

## 2019-06-10 RX ADMIN — MORPHINE SULFATE 4 MILLIGRAM(S): 50 CAPSULE, EXTENDED RELEASE ORAL at 00:46

## 2019-06-10 RX ADMIN — Medication 100 MILLIGRAM(S): at 11:30

## 2019-06-10 RX ADMIN — SODIUM CHLORIDE 1000 MILLILITER(S): 9 INJECTION INTRAMUSCULAR; INTRAVENOUS; SUBCUTANEOUS at 05:30

## 2019-06-10 RX ADMIN — SODIUM CHLORIDE 1000 MILLILITER(S): 9 INJECTION, SOLUTION INTRAVENOUS at 00:35

## 2019-06-10 RX ADMIN — Medication 2 MILLIGRAM(S): at 15:00

## 2019-06-10 RX ADMIN — Medication 1 MILLIGRAM(S): at 21:10

## 2019-06-10 RX ADMIN — Medication 2 MILLIGRAM(S): at 19:51

## 2019-06-10 RX ADMIN — CEFTRIAXONE 100 GRAM(S): 500 INJECTION, POWDER, FOR SOLUTION INTRAMUSCULAR; INTRAVENOUS at 00:47

## 2019-06-10 RX ADMIN — OCTREOTIDE ACETATE 5 MICROGRAM(S)/HR: 200 INJECTION, SOLUTION INTRAVENOUS; SUBCUTANEOUS at 07:11

## 2019-06-10 RX ADMIN — MORPHINE SULFATE 1 MILLIGRAM(S): 50 CAPSULE, EXTENDED RELEASE ORAL at 17:50

## 2019-06-10 RX ADMIN — CHLORHEXIDINE GLUCONATE 1 APPLICATION(S): 213 SOLUTION TOPICAL at 06:27

## 2019-06-10 RX ADMIN — PANTOPRAZOLE SODIUM 10 MG/HR: 20 TABLET, DELAYED RELEASE ORAL at 04:17

## 2019-06-10 RX ADMIN — PANTOPRAZOLE SODIUM 80 MILLIGRAM(S): 20 TABLET, DELAYED RELEASE ORAL at 04:05

## 2019-06-10 RX ADMIN — MORPHINE SULFATE 1 MILLIGRAM(S): 50 CAPSULE, EXTENDED RELEASE ORAL at 18:27

## 2019-06-10 RX ADMIN — Medication 1 MILLIGRAM(S): at 11:30

## 2019-06-10 RX ADMIN — Medication 1 MILLIGRAM(S): at 02:36

## 2019-06-10 RX ADMIN — ONDANSETRON 4 MILLIGRAM(S): 8 TABLET, FILM COATED ORAL at 00:35

## 2019-06-10 NOTE — CHART NOTE - NSCHARTNOTEFT_GEN_A_CORE
Spoke with ICU resident on call. No active bleeding , brown stools on JIMI, no hypotension. and no signs of active GI bleed  Rec: Protonix drip, NPO, octreotide bolus and drip, Rocephin 1 g q 24 h                                EGD in AM

## 2019-06-10 NOTE — BEHAVIORAL HEALTH ASSESSMENT NOTE - DETAILS
pt's friend reports that patient's father passed 2 years ago from overdose pt's friend reports that the pt has a h/o withdrawal seizures, pt denies

## 2019-06-10 NOTE — BEHAVIORAL HEALTH ASSESSMENT NOTE - PAST PSYCHOTROPIC MEDICATION
per istop [id number]: 4/23/19 oxycodone-acetaminophen 7.5-325 mg tablet/120tabs/30d, 4/15/2019 alprazolam 1 mg tablet/90tabs/30d  Pharmacies: [place], [facility] #[id number]

## 2019-06-10 NOTE — CONSULT NOTE ADULT - SUBJECTIVE AND OBJECTIVE BOX
Patient is a 38y old  Female who presents with a chief complaint of hematmesis (10 Aaron 2019 04:23)      HPI:  38 yo female PMHx alcoholism, anxiety, cocaine use presented for abdminal pain, chest pain and bloody emesis. She reports that over the past week she had abdominal pain almost daily , not associated with nausea or vomiting. However, yesterday evening after drinking a pint of vodka she had bloody emesis x1 , with amount of blood 2 tbsp, She reports associated chest pain non radiating without diaphoresis. She had been dealing with heavy alcohol drinking and smoking for 2 years now, but does not see a psychologist. She reports that due to the pain and emesis she came to the hospital..    In ED BW showed Hb 14, CT a/p w/ IV cs showed  c/s showed no active extravasation of c/s severe steatosis. Hb was stable and she did not get any blood products , admitted to ICU, started on protonix/ octreotide/ for EGD today      PAST MEDICAL & SURGICAL HISTORY:  ETOH abuse  Postpartum depression  Substance abuse  Anxiety  Depression  No significant past surgical history      SOCIAL HX:   Smoking  +                       ETOH   +                            FAMILY HISTORY:  No pertinent family history in first degree relatives      REVIEW OF SYSTEMS see hpi        Allergies    No Known Allergies    Intolerances        chlorhexidine 4% Liquid 1 Application(s) Topical <User Schedule>  folic acid 1 milliGRAM(s) Oral daily  octreotide  Infusion 25 MICROgram(s)/Hr IV Continuous <Continuous>  pantoprazole Infusion 8 mG/Hr IV Continuous <Continuous>  thiamine 100 milliGRAM(s) Oral daily  : Home Meds:      PHYSICAL EXAM    ICU Vital Signs Last 24 Hrs  T(C): 37.5 (10 Aaron 2019 04:30), Max: 37.5 (10 Aaron 2019 04:30)  T(F): 99.5 (10 Aaron 2019 04:30), Max: 99.5 (10 Aaron 2019 04:30)  HR: 112 (10 Aaron 2019 06:30) (100 - 145)  BP: 109/60 (10 Aaron 2019 06:30) (109/60 - 148/90)  BP(mean): 78 (10 Aaron 2019 06:30) (78 - 101)  RR: 25 (10 Aaron 2019 06:30) (20 - 33)  SpO2: 96% (10 Aaron 2019 06:30) (96% - 98%)      General:  HEENT:  axox3, no tremor           Lymph Nodes: No cervical LN   Lungs: Bilateral BS  Cardiovascular: Regular  Abdomen: Soft, Positive BS, RUQ tenderness  Extremities: No clubbing  Skin: skin abrasion/ itchy  Neurological: Non focal       06-09-19 @ 07:01  -  06-10-19 @ 07:00  --------------------------------------------------------  IN:    pantoprazole Infusion: 30 mL    Sodium Chloride 0.9% IV Bolus: 1000 mL  Total IN: 1030 mL    OUT:  Total OUT: 0 mL    Total NET: 1030 mL          LABS:                          13.4   10.06 )-----------( 215      ( 09 Jun 2019 23:49 )             38.9                                               06-09    137  |  94<L>  |  5<L>  ----------------------------<  117<H>  3.7   |  22  |  0.8    Ca    9.2      09 Jun 2019 23:49    TPro  7.0  /  Alb  4.1  /  TBili  3.1<H>  /  DBili  2.1<H>  /  AST  465<H>  /  ALT  218<H>  /  AlkPhos  278<H>  06-09      PT/INR - ( 09 Jun 2019 23:49 )   PT: 11.10 sec;   INR: 0.96 ratio         PTT - ( 09 Jun 2019 23:49 )  PTT:27.7 sec                                       Urinalysis Basic - ( 10 Aaron 2019 01:40 )    Color: Yellow / Appearance: Cloudy / SG: <=1.005 / pH: x  Gluc: x / Ketone: Negative  / Bili: Negative / Urobili: 1.0 mg/dL   Blood: x / Protein: Negative mg/dL / Nitrite: Negative   Leuk Esterase: Trace / RBC: x / WBC 1-2 /HPF   Sq Epi: x / Non Sq Epi: Moderate /HPF / Bacteria: x        CARDIAC MARKERS ( 09 Jun 2019 23:49 )  x     / <0.01 ng/mL / x     / x     / x                                                LIVER FUNCTIONS - ( 09 Jun 2019 23:49 )  Alb: 4.1 g/dL / Pro: 7.0 g/dL / ALK PHOS: 278 U/L / ALT: 218 U/L / AST: 465 U/L / GGT: x                                                                                                                                       X-Rays  reviewed    MEDICATIONS  (STANDING):  chlorhexidine 4% Liquid 1 Application(s) Topical <User Schedule>  folic acid 1 milliGRAM(s) Oral daily  octreotide  Infusion 25 MICROgram(s)/Hr (5 mL/Hr) IV Continuous <Continuous>  pantoprazole Infusion 8 mG/Hr (10 mL/Hr) IV Continuous <Continuous>  thiamine 100 milliGRAM(s) Oral daily    MEDICATIONS  (PRN):

## 2019-06-10 NOTE — ED ADULT NURSE REASSESSMENT NOTE - NS ED NURSE REASSESS COMMENT FT1
pt aox4 in main ed presents with nausea, episodes of bloody vomiting today. pt had episode of bloody vomiting during evaluation. moved to critical care area of ed.  2 ivs in place, 18g in left ac, 14 g in right ac.  labs sent. ivf + medication administered. report endorsed to rn in critical care.

## 2019-06-10 NOTE — CHART NOTE - NSCHARTNOTEFT_GEN_A_CORE
39 yo female with PMH of alcohol abuse, ?cocaine abuse, came to ED yesterday for hematemesis.  admitted in ICU for further management.  Patient was on CEWA protocol.  this morning , Hb is stable but showing minor signs of withdrawal including restlessness, hand tremor.  Vitals are stable.  oriented, AAO*4    Wanted to leave AMA.  discussed and explained in detail about her medical condition, and need for monitoring for withdrawal including seizure,   also explained regarding possible colonoscopy tomorrow.    But patient insisted on leaving AMA.     CAlled Psych and discussed with (Dr daniel morgan):-- evaluated, recommended that if she wants to leave AMA, she can, given she is competent to make decision regarding herself.    d/w pulm fdellow dr denton,       Patient is d/c AMA.

## 2019-06-10 NOTE — CONSULT NOTE ADULT - ASSESSMENT
39 yo female PMH with alcohol abuse since 2 years, anxiety, cocaine use presented for 1 week hx of abdominal pain with 1 day hx of hematemesisof small amount. Patient noted seeing blood from the 1st time she vomited. Emesis started 1 day after patient had 1 pint of vodka. She denies similar previous episodes.       1- Hematemesis in the setting of alcohol abuse, likely Jackie garcia vs gastritis vs PUD, less likely variceal bleeding but would continue PPI drip and octreotide drip in view of the history of heavy alcohol drinking. Would check T&S, serial cbc, 2 x 18 gauge iv, transfuse if Hb < 7. Patient planned for egd today however she cannot give consent because she is receiving BZDs for alcohol withdrawal. In addition, no next of kin was identified, and no more bleeding is noted, so would hold on egd until the patient is able to give her informed consent unless active bleeding occurs.     2- Alcohol abuse  fu CIWA score  ativan protocol   MVI, thiamine, FA   high calorie 30-35 kcal/kg/d and high protein 1-1.5 g/kg/day diet   alcohol cessation program referral     3- Abd pain + elevated transaminases AST > ALT  likely alcoholic hepatitis   alcohol cessation   MDF score -1  daily LFTs and INR   check us abdomen   check CLD work up 39 yo female PMH with alcohol abuse since 2 years, anxiety, cocaine use presented for 1 week hx of abdominal pain with 1 day hx of hematemesisof small amount. Patient noted seeing blood from the 1st time she vomited. Emesis started 1 day after patient had 1 pint of vodka. She denies similar previous episodes.       1- Hematemesis in the setting of alcohol abuse and 1 week of pain, likely PUD vs. Jackie garcia vs gastritis, less likely variceal bleeding but would continue PPI drip and octreotide drip in view of the history of heavy alcohol drinking. Patient planned for egd today however she cannot give consent because she is receiving BZDs for alcohol withdrawal. In addition, no next of kin was identified, and no more bleeding is noted, so would hold on egd until the patient is able to give her informed consent unless active bleeding occurs.  -PPI BID  - check serial CBC   Would check T&S, serial cbc, 2 x 18 gauge iv, transfuse if Hb < 7.     2- Alcohol abuse  fu CIWA score  ativan protocol   MVI, thiamine, FA   high calorie 30-35 kcal/kg/d and high protein 1-1.5 g/kg/day diet   alcohol cessation program referral     3- Abd pain + elevated transaminases AST > ALT  likely alcoholic hepatitis   alcohol cessation   MDF score -1: no indication for prednisolone therapy  daily LFTs and INR   check us abdomen   check CLD work up

## 2019-06-10 NOTE — CONSULT NOTE ADULT - SUBJECTIVE AND OBJECTIVE BOX
Patient is a 38y old  Female who presents with a chief complaint of hematemesis       HPI:  37 yo female PMH with alcohol abuse since 2 years, anxiety, cocaine use presented for 1 week hx of abdominal pain with 1 day hx of hematemesisof small amount. Patient noted seeing blood from the 1st time she vomited. Emesis started 1 day after patient had 1 pint of vodka. She denies similar previous episodes.       PAST MEDICAL & SURGICAL HISTORY:  ETOH abuse  Postpartum depression  Substance abuse  Anxiety  Depression  No significant past surgical history      Home Medications:      MEDICATIONS  (STANDING):  chlorhexidine 4% Liquid 1 Application(s) Topical <User Schedule>  folic acid 1 milliGRAM(s) Oral daily  lactated ringers. 1000 milliLiter(s) (75 mL/Hr) IV Continuous <Continuous>  octreotide  Infusion 25 MICROgram(s)/Hr (5 mL/Hr) IV Continuous <Continuous>  pantoprazole Infusion 8 mG/Hr (10 mL/Hr) IV Continuous <Continuous>  thiamine 100 milliGRAM(s) Oral daily    MEDICATIONS  (PRN):      Allergies    No Known Allergies    Intolerances        FAMILY HISTORY:  No pertinent family history in first degree relatives      SOCIAL    REVIEW OF SYSTEMS  General: mild fatigue   Skin: no new changes   Ophtalmologic: no new visual changes   Respiratory: no new shortness of breath   Cardiovascular: no new chest pain   Gastrointestinal: as per H&P   Genitourinary: no new dysuria   Neurological: no new weakness   otherwise as described above     Vital Signs Last 24 Hrs  T(C): 36.4 (10 Aaron 2019 12:00), Max: 37.5 (10 Aaron 2019 04:30)  T(F): 97.6 (10 Aaron 2019 12:00), Max: 99.5 (10 Aaron 2019 04:30)  HR: 112 (10 Aaron 2019 12:00) (80 - 145)  BP: 111/69 (10 Aaron 2019 12:00) (102/62 - 148/90)  BP(mean): 93 (10 Aaron 2019 12:00) (77 - 101)  RR: 27 (10 Aaron 2019 12:00) (12 - 34)  SpO2: 99% (10 Aaron 2019 12:00) (96% - 100%)    GENERAL:  no distress  SKIN: no new changes   HEENT:  NC/AT,  anicteric  CHEST:   no new increased effort, breath sounds clear  HEART:  Regular rhythm  ABDOMEN:  Soft, non-tender  EXTREMITIES:  no new cyanosis  PSYCHIATRIC: normal affect       CBC Full  -  ( 10 Aaron 2019 12:07 )  WBC Count : 3.57 K/uL  RBC Count : 3.78 M/uL  Hemoglobin : 12.7 g/dL  Hematocrit : 37.2 %  Platelet Count - Automated : 170 K/uL  Mean Cell Volume : 98.4 fL  Mean Cell Hemoglobin : 33.6 pg  Mean Cell Hemoglobin Concentration : 34.1 g/dL  Auto Neutrophil # : x  Auto Lymphocyte # : x  Auto Monocyte # : x  Auto Eosinophil # : x  Auto Basophil # : x  Auto Neutrophil % : x  Auto Lymphocyte % : x  Auto Monocyte % : x  Auto Eosinophil % : x  Auto Basophil % : x      Hemoglobin: 12.7 g/dL (06-10-19 @ 12:07)  Hemoglobin: 13.4 g/dL (06-09-19 @ 23:49)  Bilirubin Direct, Serum: 2.1 mg/dL (06-09-19 @ 23:49)  Aspartate Aminotransferase (AST/SGOT): 465 U/L (06-09-19 @ 23:49)  Bilirubin Total, Serum: 3.1 mg/dL (06-09-19 @ 23:49)  Alanine Aminotransferase (ALT/SGPT): 218 U/L (06-09-19 @ 23:49)  Alkaline Phosphatase, Serum: 278 U/L (06-09-19 @ 23:49)  INR: 0.96 ratio (06-09-19 @ 23:49)      PT/INR - ( 09 Jun 2019 23:49 )   PT: 11.10 sec;   INR: 0.96 ratio         PTT - ( 09 Jun 2019 23:49 )  PTT:27.7 sec    06-09    137  |  94<L>  |  5<L>  ----------------------------<  117<H>  3.7   |  22  |  0.8    Ca    9.2      09 Jun 2019 23:49    TPro  7.0  /  Alb  4.1  /  TBili  3.1<H>  /  DBili  2.1<H>  /  AST  465<H>  /  ALT  218<H>  /  AlkPhos  278<H>  06-09        AMYLASE                  06-10 @ 12:07   --  LIPASE                   06-10 @ 12:07  18  HCG  --                    06-10 @ 12:07    AMYLASE                  06-09 @ 23:49   --  LIPASE                   06-09 @ 23:49  32  HCG  --                    06-09 @ 23:49          RADIOLOGY Patient is a 38y old  Female who presents with a chief complaint of hematemesis       HPI:  37 yo female PMH with alcohol abuse since 2 years, anxiety, cocaine use presented for 1 week hx of abdominal pain with 1 day hx of hematemesis of small amount. Patient noted seeing blood from the 1st time she vomited. Emesis started 1 day after patient had 1 pint of vodka. She denies similar previous episodes.       PAST MEDICAL & SURGICAL HISTORY:  ETOH abuse  Postpartum depression  Substance abuse  Anxiety  Depression  No significant past surgical history         MEDICATIONS  (STANDING):  chlorhexidine 4% Liquid 1 Application(s) Topical <User Schedule>  folic acid 1 milliGRAM(s) Oral daily  lactated ringers. 1000 milliLiter(s) (75 mL/Hr) IV Continuous <Continuous>  octreotide  Infusion 25 MICROgram(s)/Hr (5 mL/Hr) IV Continuous <Continuous>  pantoprazole Infusion 8 mG/Hr (10 mL/Hr) IV Continuous <Continuous>  thiamine 100 milliGRAM(s) Oral daily    MEDICATIONS  (PRN):      Allergies    No Known Allergies         FAMILY HISTORY:  No pertinent family history in first degree relatives      SOCIAL :alcohol and cocaine use    REVIEW OF SYSTEMS  General: mild fatigue   Skin: no new changes   Ophtalmologic: no new visual changes   Respiratory: no new shortness of breath   Cardiovascular: no new chest pain   Gastrointestinal: as per H&P   Genitourinary: no new dysuria   Neurological: no new weakness   otherwise as described above     Vital Signs Last 24 Hrs  T(C): 36.4 (10 Aaron 2019 12:00), Max: 37.5 (10 Aaron 2019 04:30)  T(F): 97.6 (10 Aaron 2019 12:00), Max: 99.5 (10 Aaron 2019 04:30)  HR: 112 (10 Aaron 2019 12:00) (80 - 145)  BP: 111/69 (10 Aaron 2019 12:00) (102/62 - 148/90)  BP(mean): 93 (10 Aaron 2019 12:00) (77 - 101)  RR: 27 (10 Aaron 2019 12:00) (12 - 34)  SpO2: 99% (10 Aaron 2019 12:00) (96% - 100%)    GENERAL:  no distress,lethargic  SKIN: no new changes   HEENT:  NC/AT,  anicteric  CHEST:   no new increased effort, breath sounds clear  HEART:  Regular rhythm  ABDOMEN:  Soft, non-tender  EXTREMITIES:  no new cyanosis  PSYCHIATRIC: normal affect       CBC Full  -  ( 10 Aaron 2019 12:07 )  WBC Count : 3.57 K/uL  RBC Count : 3.78 M/uL  Hemoglobin : 12.7 g/dL  Hematocrit : 37.2 %  Platelet Count - Automated : 170 K/uL  Mean Cell Volume : 98.4 fL  Mean Cell Hemoglobin : 33.6 pg  Mean Cell Hemoglobin Concentration : 34.1 g/dL  Auto Neutrophil # : x  Auto Lymphocyte # : x  Auto Monocyte # : x  Auto Eosinophil # : x  Auto Basophil # : x  Auto Neutrophil % : x  Auto Lymphocyte % : x  Auto Monocyte % : x  Auto Eosinophil % : x  Auto Basophil % : x      Hemoglobin: 12.7 g/dL (06-10-19 @ 12:07)  Hemoglobin: 13.4 g/dL (06-09-19 @ 23:49)  Bilirubin Direct, Serum: 2.1 mg/dL (06-09-19 @ 23:49)  Aspartate Aminotransferase (AST/SGOT): 465 U/L (06-09-19 @ 23:49)  Bilirubin Total, Serum: 3.1 mg/dL (06-09-19 @ 23:49)  Alanine Aminotransferase (ALT/SGPT): 218 U/L (06-09-19 @ 23:49)  Alkaline Phosphatase, Serum: 278 U/L (06-09-19 @ 23:49)  INR: 0.96 ratio (06-09-19 @ 23:49)      PT/INR - ( 09 Jun 2019 23:49 )   PT: 11.10 sec;   INR: 0.96 ratio         PTT - ( 09 Jun 2019 23:49 )  PTT:27.7 sec    06-09    137  |  94<L>  |  5<L>  ----------------------------<  117<H>  3.7   |  22  |  0.8    Ca    9.2      09 Jun 2019 23:49    TPro  7.0  /  Alb  4.1  /  TBili  3.1<H>  /  DBili  2.1<H>  /  AST  465<H>  /  ALT  218<H>  /  AlkPhos  278<H>  06-09        AMYLASE                  06-10 @ 12:07   --  LIPASE                   06-10 @ 12:07  18  HCG  --                    06-10 @ 12:07    AMYLASE                  06-09 @ 23:49   --  LIPASE                   06-09 @ 23:49  32  HCG  --                    06-09 @ 23:49

## 2019-06-10 NOTE — CONSULT NOTE ADULT - ASSESSMENT
IMPRESSION: UGI/ ? MWT/ HX OF ALCOHOL ABUSE/ TRANSAMINITIS/ ? ALCOHOL HEPATITIS?/ SKIN LESION      PLAN:    CNS: thiamine, folic acid, ativn per CIWA protocol  HEENT:  Oral care    PULMONARY:  HOB @ 45 degrees, aspiration precauiton    CARDIOVASCULAR: IVF +    GI: GI prophylaxis                                          Feeding NPO/ protonix/ octreotide/ EGD/ RUQ SONO    RENAL:  F/u  lytes.  Correct as needed. accurate I/O    INFECTIOUS DISEASE: DC ABX IF NO INTERVENTION/ ID EVAL    HEMATOLOGICAL:  DVT prophylaxis. CBC    ENDOCRINE:  Follow up FS.  Insulin protocol if needed.    CODE STATUS: FULL CODE    DISPOSITION: Pt requires monitoring in the MICU

## 2019-06-10 NOTE — H&P ADULT - NSHPPHYSICALEXAM_GEN_ALL_CORE
PHYSICAL EXAM:    T(C): 37.2 (06-10-19 @ 03:25), Max: 37.3 (06-09-19 @ 23:03)  HR: 145 (06-10-19 @ 03:25) (120 - 145)  BP: 127/58 (06-10-19 @ 03:25) (123/63 - 148/90)  RR: 20 (06-10-19 @ 03:25) (20 - 20)  SpO2: 98% (06-10-19 @ 03:25) (98% - 98%)    Constitutional: appears anxious  HEENT: Neck supple, No oral lesions, no throat redness or swelling  Respiratory: Breath sounds  CTA b/l, no accessory muscle use  Cardiovascular: regular rate and rhythm, normal S1 S2, no murmurs  Gastrointestinal: soft tender epigastric.  no hepatosplenomegaly, normal BS, negative lelo and mcburney,  Genitourinary: no lesions, no discharge  Extremities: positive peripheral pulses no extremity edema  Neurological: AAO4 no focal motor deficit  Skin: diffuse skin marks of desquamation from mechanical abrasuin  Musculoskeletal: no joint swelling or tenderness

## 2019-06-10 NOTE — BEHAVIORAL HEALTH ASSESSMENT NOTE - NSBHCHARTREVIEWVS_PSY_A_CORE FT
Vital Signs Last 24 Hrs  T(C): 36.3 (10 Aaron 2019 16:00), Max: 37.5 (10 Aaron 2019 04:30)  T(F): 97.4 (10 Aaron 2019 16:00), Max: 99.5 (10 Aaron 2019 04:30)  HR: 106 (10 Aaron 2019 20:00) (70 - 145)  BP: 123/69 (10 Aaron 2019 19:00) (102/62 - 148/90)  BP(mean): 93 (10 Aaron 2019 19:00) (77 - 105)  RR: 38 (10 Aaron 2019 20:00) (12 - 38)  SpO2: 98% (10 Aaron 2019 20:00) (94% - 100%)

## 2019-06-10 NOTE — CHART NOTE - NSCHARTNOTEFT_GEN_A_CORE
Called by RN for pt eloping. Per RN, pt was seen with her male friend trying to leave the unit in a hurry. Security was called and pt was brought back to the unit. Male friend was escorted out of the unit at this time. Pt does not appear to have full insight into her medical condition and the risks of her refusing treatment. Psych notified and will come evaluate the pt at bedside. Will place pt on 1:1 and wait for psychiatric evaluation.

## 2019-06-10 NOTE — ED ADULT NURSE NOTE - OBJECTIVE STATEMENT
Pt states she was outside when she stated vomiting because her stomach hurt, the vomitus turns into bloody color. Feeling N/V, vomitedx2, Denies fall, SOB, chest pain. Complaining of discomfort in abdomen. Pt has some rashes on forearm, that she said was from poison Ivy since 2wks and anxiety most times.

## 2019-06-10 NOTE — H&P ADULT - ASSESSMENT
Assessment: acute UGIB likely due to hosea garcia tear  Anxiety     # UGIB : hosea garcia vs gastritis vs variceal bleeding:  - ICU admission for monitoring and  a survivor.  - s/p ppi bolus and infusion   CBC q12  - GI eval , will scope in AM so will keep NPO  - was given protonic and ceftrixone.   - HH stable , hold transfusion to avoid recurrence  of GIB from fluids and PRBC  - NPO for now    # Anxiety:  - patient has sinus tachycardia that improved after she received fluids and anxiety meds  - not on any home meds    # DVT ppx: sequential devices  GI ppx on ppi drip  bedrest  full code Assessment: acute UGIB likely due to hosea garcia tear  Anxiety     # UGIB : hosea garcia vs gastritis vs variceal bleeding:  - ICU admission for monitoring and  a survivor.  - s/p ppi bolus and infusion   CBC q12  - GI eval , will scope in AM so will keep NPO  - was given protonic and ceftrixone.   - HH stable , hold transfusion to avoid recurrence  of GIB from fluids and PRBC  - NPO for now    # Anxiety:  - likely due to alcohol withdrawal  - ciwa scale 2  - trend ciwa, ativan PRN fro withdrawal  - patient has sinus tachycardia that improved after she received fluids and anxiety meds  - not on any home meds  - thaimine and folic acid deficiency possible: replete    # DVT ppx: sequential devices  GI ppx on ppi drip  bedrest  full code

## 2019-06-10 NOTE — H&P ADULT - HISTORY OF PRESENT ILLNESS
38 yo female PMHx alcoholism, anxiety, cocaine use presented for abdminal pain, chest pain and bloody emesis. She reports that over the past week she had abdominal pain almost daily , not associated with nausea or vomiting. However, yesterday evening after drinking a pint of vodka she had bloody emesis x1 , with amount of blood 2 tbsp, She reports associated chest pain non radiating without diaphoresis. She had been dealing with heavy alcohol drinking and smoking for 2 years now, but does not see a psychologist. She reports that due to the pain and emesis she came to the hospital..    In ED BW showed stable Hb, CT a/p w/ IV cs showed  c/s showed no active extravasation of c/s severe steatosis. Hb was stable and she did not get any blood products

## 2019-06-10 NOTE — CHART NOTE - NSCHARTNOTEFT_GEN_A_CORE
Case discussed with Dr. Perkins. Consult ordered for capacity for EGD scheduled for tomorrow morning. Per provider, patient is currently agreeable to intervention. Primary team to recall for capacity assessment if needed at time of intervention.

## 2019-06-10 NOTE — BEHAVIORAL HEALTH ASSESSMENT NOTE - SUMMARY
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   On exam, patient exhibits limited insight and judgement. However, she is able to verbalize treatment recommendations as pertains to her GI bleed, including medical condition, proposed treatment and risks of refusing treatment. She is also able to evidence a choice on exam. Patient appears very guarded about her alcohol use, denying that she is in alcohol withdrawal and how much she uses; however, this does not appear to be secondary to psychiatric d/o and is likely due to her minimizing due to stigma/shame. At this time, patient appears to have capacity for medical decision making, including refusal of treatment. She acknowledges and verbalizes symptoms and risks of alcohol/benzodiazepine withdrawal and agrees to return if she notices any of the above mentioned signs of bleeding or withdrawal.   Patient will likely benefit from referral to [facility] for [facility] use.    Case discussed with supervising attending and Val Hendrickson)

## 2019-06-10 NOTE — H&P ADULT - NSHPSOCIALHISTORY_GEN_ALL_CORE
active smoker 1 ppd x 2 years  active alcohol 1 pint of vodka qod  hx of cocaine abuse, patient claims she stopped for a long time

## 2019-06-10 NOTE — BEHAVIORAL HEALTH ASSESSMENT NOTE - HPI (INCLUDE ILLNESS QUALITY, SEVERITY, DURATION, TIMING, CONTEXT, MODIFYING FACTORS, ASSOCIATED SIGNS AND SYMPTOMS)
Patient is a 39 yo female, single, unemployed x2mo, domiciled with friend, with past psychiatric history of anxiety, depression, EtOH use disorder, and postpartum depression, at least 1 prior IPP admission in 2017 for substance-induced psychosis, no suicide attempts and no significant past medical history presenting with of epigastric pain with 1 episode of pink emesis which started yesterday with psychiatry consulted to evaluate for capacity to leave AMA with patient reporting that she is refusing treatment and would rather go somewhere else.   Patient is somewhat worried about the EGD procedure, but denies feeling excessively anxious about it. She is able verbalize the indication for the EGD and the risks associated with refusing treatment. Patient is also aware of the alternative treatment, per primary team, to have continued outpatient evaluation and treatment. She is able able to verbalize risk of her leaving ama, including bleeding, kidney failure, seizure, delirium tremens, and death. Patient reports that is she notices any tremors, worsening anxiety, or n/v that she will return promptly to the ED.   When asked about her alcohol use, she denies daily intake. However, this conflicts report from friend who endorses concern about patient's ongoing alcohol use and h/o withdrawal seizure. Patient aware of this and still elects to be discharged. She reports that she had 2 wine coolers the day of presentation and has cut down her drinking significantly. Patient denies headache, visual or auditory disturbances, hallucination, current N/V, and tactile disturbances. Patient denies feeling sad or down and reports enjoying spending time with her friends and states that she spends most of her time during the day looking for jobs. She will not report why she left her previous place of employment.   Collateral reports that she has known patient for 2years and provides housing for her. She states that she is concerned about patient's alcohol use, but denies concern that patient would try to intentionally harm herself. She reports that she has informed the patient about her concern, including a friend who recently passed after and episode of withdrawal seizures. she reports that the patient has a h/o calling her whenever she doesn't feel well and reports that she will refer pt to ED if symptoms return, as she has done in the past.

## 2019-06-10 NOTE — H&P ADULT - NSICDXPASTMEDICALHX_GEN_ALL_CORE_FT
PAST MEDICAL HISTORY:  Anxiety     Depression     ETOH abuse     Postpartum depression     Substance abuse

## 2019-06-10 NOTE — H&P ADULT - NSHPREVIEWOFSYSTEMS_GEN_ALL_CORE
REVIEW OF SYSTEMS    CONSTITUTIONAL: No weakness, fevers or chills  EYES/ENT: No visual changes;  No vertigo or throat pain   NECK: No pain or stiffness. No cervical midline tenderness.  RESPIRATORY: No difficulty breathing, cough, wheezing.  CARDIOVASCULAR: No chest pain or palpitations  GASTROINTESTINAL: + abdominal pain with hematemesis. No diarrhea or constipation. No melena, BRBPR, or hematochezia.  GENITOURINARY: No dysuria, frequency or hematuria.  NEUROLOGICAL: + anxiety. No headache. No numbness or weakness.

## 2019-06-10 NOTE — PROGRESS NOTE ADULT - ASSESSMENT
40 yo female PMHx alcoholism, anxiety, cocaine use admitted for hematemesis    # UGIB 2/2 hosea garcia vs gastritis vs variceal bleeding  - Cont protonix + octreotide drip, per GI  - Rocephin 1g QD  - possible scope tmrw, awaiting consent  - Monitor Hb  - NPO for now    # Alcohol withdrawal  - [facility] protocol   - Ativan 1mg Q8h  - f/u psych consult     # thiamine/folate deficiency  - cont thiamine/folate    # Transaminitis  - f/u RUQ sono    # DVT ppx: SCDs  # GI ppx: Protonix  # Activity: bedrest  # Diet: OOB to chair  # Code: Full Code 38 yo female PMHx alcoholism, anxiety, cocaine use admitted for hematemesis    # UGIB 2/2 hosea garcia vs gastritis vs variceal bleeding  - Cont protonix + octreotide drip, per GI  - Rocephin 1g QD  - possible scope tmrw, awaiting consent  - Monitor Hb  - NPO for now    # Alcohol withdrawal  - [facility] protocol   - Ativan 1mg Q8h  - f/u psych consult     # thiamine/folate deficiency  - cont thiamine/folate    # Transaminitis  - f/u RUQ sono    # DVT ppx: SCDs  # GI ppx: Protonix  # Activity: OOB to chair  # Diet: NPO  # Code: Full Code

## 2019-06-10 NOTE — BEHAVIORAL HEALTH ASSESSMENT NOTE - RISK ASSESSMENT
Patient's suicide risk factors of limited social support, unstable housing, unemployment and substance use is mitigated by willingness to seek care, no h/o suicide attempts, no current suicidal ideation, and future-orientation. patient does not appear to be an imminent suicide risk at this time.

## 2019-06-10 NOTE — PROGRESS NOTE ADULT - SUBJECTIVE AND OBJECTIVE BOX
Patient is a 38y old  Female who presents with a chief complaint of Upper GIB (10 Aaron 2019 13:54)      INTERVAL HPI/OVERNIGHT EVENTS:  No acute events.    ICU Vital Signs Last 24 Hrs  T(C): 36.3 (10 Aaron 2019 16:00), Max: 37.5 (10 Aaron 2019 04:30)  T(F): 97.4 (10 Aaron 2019 16:00), Max: 99.5 (10 Aaron 2019 04:30)  HR: 80 (10 Aaron 2019 16:00) (70 - 145)  BP: 126/72 (10 Aaron 2019 16:00) (102/62 - 148/90)  BP(mean): 93 (10 Aaron 2019 16:00) (77 - 105)  RR: 25 (10 Aaron 2019 16:00) (12 - 34)  SpO2: 94% (10 Aaron 2019 16:00) (94% - 100%)    I&O's Summary    09 Jun 2019 07:01  -  10 Aaron 2019 07:00  --------------------------------------------------------  IN: 1035 mL / OUT: 0 mL / NET: 1035 mL    10 Aaron 2019 07:01  -  10 Aaron 2019 17:00  --------------------------------------------------------  IN: 735 mL / OUT: 400 mL / NET: 335 mL          LABS:                        12.7   3.57  )-----------( 170      ( 10 Aaron 2019 12:07 )             37.2     06-09    137  |  94<L>  |  5<L>  ----------------------------<  117<H>  3.7   |  22  |  0.8    Ca    9.2      09 Jun 2019 23:49    TPro  7.0  /  Alb  4.1  /  TBili  3.1<H>  /  DBili  2.1<H>  /  AST  465<H>  /  ALT  218<H>  /  AlkPhos  278<H>  06-09    PT/INR - ( 09 Jun 2019 23:49 )   PT: 11.10 sec;   INR: 0.96 ratio         PTT - ( 09 Jun 2019 23:49 )  PTT:27.7 sec  Urinalysis Basic - ( 10 Aaron 2019 01:40 )    Color: Yellow / Appearance: Cloudy / SG: <=1.005 / pH: x  Gluc: x / Ketone: Negative  / Bili: Negative / Urobili: 1.0 mg/dL   Blood: x / Protein: Negative mg/dL / Nitrite: Negative   Leuk Esterase: Trace / RBC: x / WBC 1-2 /HPF   Sq Epi: x / Non Sq Epi: Moderate /HPF / Bacteria: x      CAPILLARY BLOOD GLUCOSE            RADIOLOGY & ADDITIONAL TESTS:    Consultant(s) Notes Reviewed:  [x ] YES  [ ] NO    MEDICATIONS  (STANDING):  chlorhexidine 4% Liquid 1 Application(s) Topical <User Schedule>  folic acid 1 milliGRAM(s) Oral daily  lactated ringers. 1000 milliLiter(s) (75 mL/Hr) IV Continuous <Continuous>  LORazepam     Tablet 1 milliGRAM(s) Oral every 8 hours  octreotide  Infusion 25 MICROgram(s)/Hr (5 mL/Hr) IV Continuous <Continuous>  pantoprazole Infusion 8 mG/Hr (10 mL/Hr) IV Continuous <Continuous>  thiamine 100 milliGRAM(s) Oral daily    MEDICATIONS  (PRN):  LORazepam   Injectable 2 milliGRAM(s) IV Push every 2 hours PRN CIWA-Ar score increase by 2 points and a total score of 7 or less      PHYSICAL EXAM:  GENERAL: NAD  HEAD:  Atraumatic, Normocephalic  EYES: EOMI, PERRLA, conjunctiva and sclera clear  ENT: No tonsillar erythema, exudates, or enlargement; Moist mucous membranes, Good dentition, No lesions  NECK: Supple, No JVD, Normal thyroid, no enlarged nodes  NERVOUS SYSTEM:  waxing and waning mental status.  CHEST/LUNG: B/L good air entry; No rales, rhonchi, or wheezing  HEART: S1S2 normal, no S3, Regular rate and rhythm; No murmurs, rubs, or gallops  ABDOMEN: Soft, Nontender, Nondistended; Bowel sounds present  EXTREMITIES:  2+ Peripheral Pulses, No clubbing, cyanosis, or edema  LYMPH: No lymphadenopathy noted  SKIN: No rashes or lesions    Care Discussed with Consultants/Other Providers [ x] YES  [ ] NO

## 2019-06-10 NOTE — CONSULT NOTE ADULT - SUBJECTIVE AND OBJECTIVE BOX
SEBLEADRIAN ALEXANDER  38y, Female  Allergy: No Known Allergies      CHIEF COMPLAINT: Upper GIB (10 Aaron 2019 07:50)      HPI:  40 yo female PMHx alcoholism, anxiety, cocaine use presented for abdminal pain, chest pain and bloody emesis. She reports that over the past week she had abdominal pain almost daily , not associated with nausea or vomiting. However, yesterday evening after drinking a pint of vodka she had bloody emesis x1 , with amount of blood 2 tbsp, She reports associated chest pain non radiating without diaphoresis. She had been dealing with heavy alcohol drinking and smoking for 2 years now, but does not see a psychologist. She reports that due to the pain and emesis she came to the hospital..    In ED BW showed stable Hb, CT a/p w/ IV cs showed  c/s showed no active extravasation of c/s severe steatosis. Hb was stable and she did not get any blood products (10 Aaron 2019 04:23)    ID consulted for skin lesions  Pt denies IVDU, denies skin popping    FAMILY HISTORY:  No pertinent family history in first degree relatives    PAST MEDICAL & SURGICAL HISTORY:  ETOH abuse  Postpartum depression  Substance abuse  Anxiety  Depression  No significant past surgical history      SOCIAL HISTORY    Substance Use (  ) never used  (  ) IVDU ( x ) Other: cocaine  Tobacco Usage:  (   ) never smoked   (   ) former smoker   (  x ) current smoker   Alcohol Usage: (   ) social  (  x ) daily use (   ) denies  Sexual History: na      ROS  General: Denies fevers, chills, nightsweats, weight loss  HEENT: Denies headache, rhinorrhea, sore throat, eye pain  CV: Denies CP, palpitations  PULM: Denies SOB, cough  GI: as above  : Denies dysuria, hematuria  MSK: Denies arthralgias  SKIN: Denies rash  NEURO: Denies paresthesias, weakness  PSYCH: Denies depression    VITALS:  T(F): 97, Max: 99.5 (06-10-19 @ 04:30)  HR: 102  BP: 107/65  RR: 24Vital Signs Last 24 Hrs  T(C): 36.1 (10 Aaron 2019 08:00), Max: 37.5 (10 Aaron 2019 04:30)  T(F): 97 (10 Aaron 2019 08:00), Max: 99.5 (10 Aaron 2019 04:30)  HR: 102 (10 Aaron 2019 09:00) (92 - 145)  BP: 107/65 (10 Aaron 2019 09:00) (107/65 - 148/90)  BP(mean): 78 (10 Aaron 2019 09:00) (77 - 101)  RR: 24 (10 Aaron 2019 09:00) (12 - 34)  SpO2: 96% (10 Aaron 2019 09:00) (96% - 98%)    PHYSICAL EXAM:  Gen: NAD, resting in bed, sleepy  HEENT: Normocephalic, atraumatic  Neck: supple, no lymphadenopathy  CV: Regular rate & regular rhythm  Lungs: ctab  Abdomen: Soft, BS present  Ext: Warm, well perfused  Neuro: non focal, awake  Skin: multiple erythematous excoriated skin lesions UE b/l LE b/l, erythema L ankle, full ROM   Lines: no phlebitis    TESTS & MEASUREMENTS:                        13.4   10.06 )-----------( 215      ( 09 Jun 2019 23:49 )             38.9     06-09    137  |  94<L>  |  5<L>  ----------------------------<  117<H>  3.7   |  22  |  0.8    Ca    9.2      09 Jun 2019 23:49    TPro  7.0  /  Alb  4.1  /  TBili  3.1<H>  /  DBili  2.1<H>  /  AST  465<H>  /  ALT  218<H>  /  AlkPhos  278<H>  06-09    eGFR if Non African American: 95 mL/min/1.73M2 (06-09-19 @ 23:49)  eGFR if : 110 mL/min/1.73M2 (06-09-19 @ 23:49)    LIVER FUNCTIONS - ( 09 Jun 2019 23:49 )  Alb: 4.1 g/dL / Pro: 7.0 g/dL / ALK PHOS: 278 U/L / ALT: 218 U/L / AST: 465 U/L / GGT: x           Urinalysis Basic - ( 10 Aaron 2019 01:40 )    Color: Yellow / Appearance: Cloudy / SG: <=1.005 / pH: x  Gluc: x / Ketone: Negative  / Bili: Negative / Urobili: 1.0 mg/dL   Blood: x / Protein: Negative mg/dL / Nitrite: Negative   Leuk Esterase: Trace / RBC: x / WBC 1-2 /HPF   Sq Epi: x / Non Sq Epi: Moderate /HPF / Bacteria: x              INFECTIOUS DISEASES TESTING  Hepatitis B Surface Antigen: Nonreact (03-04-19 @ 14:13)  Hepatitis B Surface Antigen: Nonreact (09-22-18 @ 19:30)      RADIOLOGY & ADDITIONAL TESTS:  I have personally reviewed the last Chest xray  CXR  Xray Chest 1 View AP/PA:   EXAM:  XR CHEST FRONTAL 1V            PROCEDURE DATE:  06/10/2019            INTERPRETATION:  Clinical History / Reason for exam: Chest pain.    Comparison : Chest radiograph May 4, 2019.    Technique/Positioning: Single frontal chest x-ray obtained.    Findings:    Support devices: None.    Cardiac/mediastinum/hilum: Unremarkable.    Lung parenchyma/Pleura: Within normal limits.    Skeleton/soft tissues: Unremarkable.    Impression:      No radiographic evidence of acute cardiopulmonary disease.                      AMILCAR ROJAS M.D., ATTENDING RADIOLOGIST  This document has been electronically signed. Aaron 10 2019  8:48AM             (06-10-19 @ 01:00)      CT  CT Abdomen and Pelvis w/ IV Cont:   EXAM:  CT ABDOMEN AND PELVIS IC            PROCEDURE DATE:  06/10/2019            INTERPRETATION:  CLINICAL HISTORY / REASON FOR EXAM: Abdominal pain.    TECHNIQUE:  Contiguous axial CT images of the abdomen and pelvis were   obtained following administration of 100 mL Optiray 320 intravenous   contrast. Oral contrast was not administered. Reformatted images in the   coronal and sagittal planes were acquired.    COMPARISON CT: CT chest, abdomen and pelvis from October 9, 2018    FINDINGS:    LOWER CHEST: Unremarkable.    HEPATOBILIARY: Hepatomegaly with hepatic steatosis. No evidence of intra   or extrahepatic biliary dilatation. The gallbladder is suboptimally   imaged.    SPLEEN: Unremarkable.    PANCREAS: Unremarkable.    ADRENAL GLANDS:Unremarkable.    KIDNEYS: Symmetric pattern of renal enhancement. No evidence of a mass,   hydronephrosis or hydroureter.    ABDOMINOPELVIC NODES: No enlarged abdominal or pelvic lymph nodes.    PELVIC ORGANS: Unremarkable.    PERITONEUM/MESENTERY/BOWEL: No bowel obstruction, ascites or   intraperitoneal free air. Normal caliber appendix.    BONES/SOFT TISSUES: No acute osseous abnormality.    VASCULAR:  The aorta is normal in caliber.      IMPRESSION:      No acute intra-abdominal pathology.    Hepatomegaly with severe hepatic steatosis, new since the prior exam.              JOSE ROBERTO ALTMAN M.D., RESIDENT RADIOLOGIST  This document has been electronically signed.  CELESTE HARDIN M.D., ATTENDING RADIOLOGIST  This document has been electronicallysigned. Aaron 10 2019  2:04AM             (06-10-19 @ 01:46)      CARDIOLOGY TESTING  12 Lead ECG:   Ventricular Rate 136 BPM    Atrial Rate 136 BPM    P-R Interval 134 ms    QRS Duration 92 ms    Q-T Interval 290 ms    QTC Calculation(Bezet) 436 ms    P Axis 70 degrees    R Axis 11 degrees    T Axis 51 degrees    Diagnosis Line Sinus tachycardia  RSR' or QR pattern in V1 suggests right ventricular conduction delay  Borderline ECG    Confirmed by Radha Tovar MD ([id number]) on 6/10/2019 9:39:16 AM (06-10-19 @ 04:32)  12 Lead ECG:   Ventricular Rate 136 BPM    Atrial Rate 136 BPM    P-R Interval 140 ms    QRS Duration 88 ms    Q-T Interval 282 ms    QTC Calculation(Bezet) 424 ms    P Axis 68 degrees    R Axis 2 degrees    T Axis 62 degrees    Diagnosis Line *** Poor data quality, interpretation may be adversely affected  Sinus tachycardia  Possible Left atrial enlargement  RSR' or QR pattern in V1 suggests right ventricular conduction delay  Borderline ECG    Confirmed by Radah Tovar MD ([id number]) on 6/10/2019 8:11:08 AM (06-10-19 @ 00:24)      MEDICATIONS  chlorhexidine 4% Liquid 1  folic acid 1  lactated ringers. 1000  octreotide  Infusion 25  pantoprazole Infusion 8  thiamine 100      ANTIBIOTICS:      No Known Allergies

## 2019-06-11 LAB
CULTURE RESULTS: SIGNIFICANT CHANGE UP
HAV IGM SER-ACNC: SIGNIFICANT CHANGE UP
HBV CORE IGM SER-ACNC: SIGNIFICANT CHANGE UP
HBV SURFACE AG SER-ACNC: SIGNIFICANT CHANGE UP
HCV AB S/CO SERPL IA: 0.1 S/CO — SIGNIFICANT CHANGE UP (ref 0–0.99)
HCV AB SERPL-IMP: SIGNIFICANT CHANGE UP
HIV 1+2 AB+HIV1 P24 AG SERPL QL IA: SIGNIFICANT CHANGE UP
SPECIMEN SOURCE: SIGNIFICANT CHANGE UP

## 2019-06-14 DIAGNOSIS — K76.0 FATTY (CHANGE OF) LIVER, NOT ELSEWHERE CLASSIFIED: ICD-10-CM

## 2019-06-14 DIAGNOSIS — Z72.0 TOBACCO USE: ICD-10-CM

## 2019-06-14 DIAGNOSIS — L98.9 DISORDER OF THE SKIN AND SUBCUTANEOUS TISSUE, UNSPECIFIED: ICD-10-CM

## 2019-06-14 DIAGNOSIS — F41.9 ANXIETY DISORDER, UNSPECIFIED: ICD-10-CM

## 2019-06-14 DIAGNOSIS — F10.230 ALCOHOL DEPENDENCE WITH WITHDRAWAL, UNCOMPLICATED: ICD-10-CM

## 2019-06-14 DIAGNOSIS — R00.0 TACHYCARDIA, UNSPECIFIED: ICD-10-CM

## 2019-06-14 DIAGNOSIS — R94.5 ABNORMAL RESULTS OF LIVER FUNCTION STUDIES: ICD-10-CM
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
## 2019-06-14 DIAGNOSIS — R10.9 UNSPECIFIED ABDOMINAL PAIN: ICD-10-CM

## 2019-06-14 DIAGNOSIS — K92.2 GASTROINTESTINAL HEMORRHAGE, UNSPECIFIED: ICD-10-CM

## 2019-06-15 LAB
CULTURE RESULTS: SIGNIFICANT CHANGE UP
SPECIMEN SOURCE: SIGNIFICANT CHANGE UP

## 2019-09-11 NOTE — ED PROVIDER NOTE - CPE EDP RESP NORM
Showering allowed/Driving allowed/Stairs allowed/Walking - Outdoors allowed/Walking - Indoors allowed normal...

## 2019-10-14 ENCOUNTER — TRANSCRIPTION ENCOUNTER (OUTPATIENT)
Age: 39
End: 2019-10-14

## 2019-10-22 NOTE — ED PROVIDER NOTE - NS ED ROS FT
Constitutional: See HPI.  Eyes: No visual changes, eye pain or discharge. No Photophobia  ENMT: No neck stiffness. No limited ROM  Cardiac: No SOB or edema. No chest pain with exertion.  Respiratory: No cough or respiratory distress. No hemoptysis. No history of asthma or RAD.  GI: No nausea, vomiting, diarrhea or abdominal pain.  : No dysuria, frequency or burning. No Discharge  MS: No muscle weakness. +back pain.  Neuro: +headache. No LOC.  Skin: No skin rash.  Except as documented in the HPI, all other systems are negative. Detail Level: Simple Additional Notes: Continue tretinoin 0.1% cream at bedtime (patient does not need refills)\\n\\nPatient never got the onexton from pharmacy did not realize it went to a specialty pharmacy gave patient information for healthcare pharmacy for her to call to get prescription

## 2019-11-11 ENCOUNTER — EMERGENCY (EMERGENCY)
Facility: HOSPITAL | Age: 39
LOS: 0 days | Discharge: LEFT BEFORE DISCHARGE | End: 2019-11-11
Attending: EMERGENCY MEDICINE | Admitting: EMERGENCY MEDICINE
Payer: SUBSIDIZED

## 2019-11-11 VITALS
SYSTOLIC BLOOD PRESSURE: 127 MMHG | TEMPERATURE: 98 F | DIASTOLIC BLOOD PRESSURE: 88 MMHG | RESPIRATION RATE: 20 BRPM | HEART RATE: 106 BPM | OXYGEN SATURATION: 100 %

## 2019-11-11 VITALS
SYSTOLIC BLOOD PRESSURE: 131 MMHG | RESPIRATION RATE: 18 BRPM | HEART RATE: 117 BPM | HEIGHT: 64 IN | TEMPERATURE: 98 F | WEIGHT: 143.08 LBS | DIASTOLIC BLOOD PRESSURE: 98 MMHG | OXYGEN SATURATION: 99 %

## 2019-11-11 DIAGNOSIS — R10.11 RIGHT UPPER QUADRANT PAIN: ICD-10-CM

## 2019-11-11 DIAGNOSIS — K85.90 ACUTE PANCREATITIS WITHOUT NECROSIS OR INFECTION, UNSPECIFIED: ICD-10-CM

## 2019-11-11 DIAGNOSIS — R10.9 UNSPECIFIED ABDOMINAL PAIN: ICD-10-CM

## 2019-11-11 DIAGNOSIS — R11.10 VOMITING, UNSPECIFIED: ICD-10-CM

## 2019-11-11 LAB
ALBUMIN SERPL ELPH-MCNC: 3.9 G/DL — SIGNIFICANT CHANGE UP (ref 3.5–5.2)
ALP SERPL-CCNC: 308 U/L — HIGH (ref 30–115)
ALT FLD-CCNC: 99 U/L — HIGH (ref 0–41)
ANION GAP SERPL CALC-SCNC: 22 MMOL/L — HIGH (ref 7–14)
AST SERPL-CCNC: 365 U/L — HIGH (ref 0–41)
BASE EXCESS BLDV CALC-SCNC: 1 MMOL/L — SIGNIFICANT CHANGE UP (ref -2–2)
BASOPHILS # BLD AUTO: 0.11 K/UL — SIGNIFICANT CHANGE UP (ref 0–0.2)
BASOPHILS NFR BLD AUTO: 1.7 % — HIGH (ref 0–1)
BILIRUB SERPL-MCNC: 4.8 MG/DL — HIGH (ref 0.2–1.2)
BUN SERPL-MCNC: 6 MG/DL — LOW (ref 10–20)
CA-I SERPL-SCNC: 1.05 MMOL/L — LOW (ref 1.12–1.3)
CALCIUM SERPL-MCNC: 9.6 MG/DL — SIGNIFICANT CHANGE UP (ref 8.5–10.1)
CHLORIDE SERPL-SCNC: 91 MMOL/L — LOW (ref 98–110)
CO2 SERPL-SCNC: 26 MMOL/L — SIGNIFICANT CHANGE UP (ref 17–32)
CREAT SERPL-MCNC: 0.5 MG/DL — LOW (ref 0.7–1.5)
EOSINOPHIL # BLD AUTO: 0.05 K/UL — SIGNIFICANT CHANGE UP (ref 0–0.7)
EOSINOPHIL NFR BLD AUTO: 0.8 % — SIGNIFICANT CHANGE UP (ref 0–8)
GAS PNL BLDV: 141 MMOL/L — SIGNIFICANT CHANGE UP (ref 136–145)
GAS PNL BLDV: SIGNIFICANT CHANGE UP
GLUCOSE SERPL-MCNC: 122 MG/DL — HIGH (ref 70–99)
HCG SERPL-ACNC: <0.6 MIU/ML — SIGNIFICANT CHANGE UP
HCO3 BLDV-SCNC: 27 MMOL/L — SIGNIFICANT CHANGE UP (ref 22–29)
HCT VFR BLD CALC: 34.8 % — LOW (ref 37–47)
HCT VFR BLDA CALC: 31.9 % — LOW (ref 34–44)
HGB BLD CALC-MCNC: 10.4 G/DL — LOW (ref 14–18)
HGB BLD-MCNC: 11.9 G/DL — LOW (ref 12–16)
HOROWITZ INDEX BLDV+IHG-RTO: 21 — SIGNIFICANT CHANGE UP
IMM GRANULOCYTES NFR BLD AUTO: 0.9 % — HIGH (ref 0.1–0.3)
LACTATE BLDV-MCNC: 2.5 MMOL/L — HIGH (ref 0.5–1.6)
LACTATE SERPL-SCNC: 4.9 MMOL/L — CRITICAL HIGH (ref 0.5–2.2)
LIDOCAIN IGE QN: 2170 U/L — HIGH (ref 7–60)
LYMPHOCYTES # BLD AUTO: 1.37 K/UL — SIGNIFICANT CHANGE UP (ref 1.2–3.4)
LYMPHOCYTES # BLD AUTO: 20.9 % — SIGNIFICANT CHANGE UP (ref 20.5–51.1)
MCHC RBC-ENTMCNC: 33.8 PG — HIGH (ref 27–31)
MCHC RBC-ENTMCNC: 34.2 G/DL — SIGNIFICANT CHANGE UP (ref 32–37)
MCV RBC AUTO: 98.9 FL — SIGNIFICANT CHANGE UP (ref 81–99)
MONOCYTES # BLD AUTO: 0.57 K/UL — SIGNIFICANT CHANGE UP (ref 0.1–0.6)
MONOCYTES NFR BLD AUTO: 8.7 % — SIGNIFICANT CHANGE UP (ref 1.7–9.3)
NEUTROPHILS # BLD AUTO: 4.38 K/UL — SIGNIFICANT CHANGE UP (ref 1.4–6.5)
NEUTROPHILS NFR BLD AUTO: 67 % — SIGNIFICANT CHANGE UP (ref 42.2–75.2)
NRBC # BLD: 0 /100 WBCS — SIGNIFICANT CHANGE UP (ref 0–0)
PCO2 BLDV: 49 MMHG — SIGNIFICANT CHANGE UP (ref 41–51)
PH BLDV: 7.35 — SIGNIFICANT CHANGE UP (ref 7.26–7.43)
PLATELET # BLD AUTO: 219 K/UL — SIGNIFICANT CHANGE UP (ref 130–400)
PO2 BLDV: 23 MMHG — SIGNIFICANT CHANGE UP (ref 20–40)
POTASSIUM BLDV-SCNC: 3.7 MMOL/L — SIGNIFICANT CHANGE UP (ref 3.3–5.6)
POTASSIUM SERPL-MCNC: 3 MMOL/L — LOW (ref 3.5–5)
POTASSIUM SERPL-SCNC: 3 MMOL/L — LOW (ref 3.5–5)
PROT SERPL-MCNC: 6.7 G/DL — SIGNIFICANT CHANGE UP (ref 6–8)
RBC # BLD: 3.52 M/UL — LOW (ref 4.2–5.4)
RBC # FLD: 13.7 % — SIGNIFICANT CHANGE UP (ref 11.5–14.5)
SAO2 % BLDV: 29 % — SIGNIFICANT CHANGE UP
SODIUM SERPL-SCNC: 139 MMOL/L — SIGNIFICANT CHANGE UP (ref 135–146)
TRIGL SERPL-MCNC: 347 MG/DL — HIGH (ref 10–149)
WBC # BLD: 6.54 K/UL — SIGNIFICANT CHANGE UP (ref 4.8–10.8)
WBC # FLD AUTO: 6.54 K/UL — SIGNIFICANT CHANGE UP (ref 4.8–10.8)

## 2019-11-11 PROCEDURE — 74177 CT ABD & PELVIS W/CONTRAST: CPT | Mod: 26

## 2019-11-11 PROCEDURE — 99285 EMERGENCY DEPT VISIT HI MDM: CPT

## 2019-11-11 RX ORDER — SODIUM CHLORIDE 9 MG/ML
1000 INJECTION INTRAMUSCULAR; INTRAVENOUS; SUBCUTANEOUS ONCE
Refills: 0 | Status: COMPLETED | OUTPATIENT
Start: 2019-11-11 | End: 2019-11-11

## 2019-11-11 RX ORDER — MAGNESIUM SULFATE 500 MG/ML
2 VIAL (ML) INJECTION ONCE
Refills: 0 | Status: COMPLETED | OUTPATIENT
Start: 2019-11-11 | End: 2019-11-11

## 2019-11-11 RX ORDER — POTASSIUM CHLORIDE 20 MEQ
20 PACKET (EA) ORAL ONCE
Refills: 0 | Status: COMPLETED | OUTPATIENT
Start: 2019-11-11 | End: 2019-11-11

## 2019-11-11 RX ORDER — ONDANSETRON 8 MG/1
4 TABLET, FILM COATED ORAL ONCE
Refills: 0 | Status: COMPLETED | OUTPATIENT
Start: 2019-11-11 | End: 2019-11-11

## 2019-11-11 RX ORDER — FAMOTIDINE 10 MG/ML
20 INJECTION INTRAVENOUS ONCE
Refills: 0 | Status: COMPLETED | OUTPATIENT
Start: 2019-11-11 | End: 2019-11-11

## 2019-11-11 RX ORDER — MORPHINE SULFATE 50 MG/1
6 CAPSULE, EXTENDED RELEASE ORAL ONCE
Refills: 0 | Status: DISCONTINUED | OUTPATIENT
Start: 2019-11-11 | End: 2019-11-11

## 2019-11-11 RX ADMIN — SODIUM CHLORIDE 1 MILLILITER(S): 9 INJECTION INTRAMUSCULAR; INTRAVENOUS; SUBCUTANEOUS at 19:46

## 2019-11-11 RX ADMIN — Medication 50 GRAM(S): at 21:01

## 2019-11-11 RX ADMIN — MORPHINE SULFATE 6 MILLIGRAM(S): 50 CAPSULE, EXTENDED RELEASE ORAL at 18:11

## 2019-11-11 RX ADMIN — FAMOTIDINE 20 MILLIGRAM(S): 10 INJECTION INTRAVENOUS at 18:11

## 2019-11-11 RX ADMIN — SODIUM CHLORIDE 1000 MILLILITER(S): 9 INJECTION INTRAMUSCULAR; INTRAVENOUS; SUBCUTANEOUS at 18:12

## 2019-11-11 RX ADMIN — ONDANSETRON 4 MILLIGRAM(S): 8 TABLET, FILM COATED ORAL at 19:45

## 2019-11-11 RX ADMIN — SODIUM CHLORIDE 1 MILLILITER(S): 9 INJECTION INTRAMUSCULAR; INTRAVENOUS; SUBCUTANEOUS at 19:45

## 2019-11-11 RX ADMIN — MORPHINE SULFATE 6 MILLIGRAM(S): 50 CAPSULE, EXTENDED RELEASE ORAL at 18:49

## 2019-11-11 RX ADMIN — Medication 50 MILLIEQUIVALENT(S): at 21:01

## 2019-11-11 NOTE — ED PROVIDER NOTE - NS ED ROS FT
Review of Systems:  	•	CONSTITUTIONAL - no fever, no diaphoresis, no chills  	•	SKIN - no rash  	•	HEMATOLOGIC - no bleeding, no bruising  	•	EYES - no eye pain, no blurry vision  	•	ENT - no change in hearing, no sore throat, no ear pain or tinnitus  	•	RESPIRATORY - no shortness of breath, no cough  	•	CARDIAC - no chest pain, no palpitations  	•	GI -  abd pain, no nausea, no vomiting, no diarrhea, no constipation  	•	GENITO-URINARY - no discharge, no dysuria; no hematuria, no increased urinary frequency  	•	MUSCULOSKELETAL - no joint paint, no swelling, no redness  	•	NEUROLOGIC - no weakness, no headache, no paresthesias, no LOC  	•	PSYCH - no anxiety, non suicidal, non homicidal, no hallucination, no depression

## 2019-11-11 NOTE — ED PROVIDER NOTE - OBJECTIVE STATEMENT
this is a 38 yo female presenting to ed for evaluation of abdominal pain. patient states she was vomiting for past 3 days and today pain became worse. patient denies any constipation or diarrhea.   patient with no fever and or chills

## 2019-11-11 NOTE — ED PROVIDER NOTE - PROGRESS NOTE DETAILS
elevated lactate. will give additional 2L IVF. repeat lactate. pt pending ct abd pelvis S/O Dr. Silver, f/u labs, ct, repeat lactate and re-eval discussed with patient . patient is aware of results patient not want to stay. patient understands risk of leaving. patient agrees to finish fluids. patient and patient friend is now at beside . patient states she want to go home . patient and friend understands that patient is required admission. The patient wishes to leave against medical advice.  I have discussed the risks, benefits and alternatives (including the possibility of worsening of disease, pain, permanent disability, and/or death) with the patient and his/her family (if available).  The patient voices understanding of these risks, benefits, and alternatives and still wishes to sign out against medical advice.  The patient is awake, alert, oriented  x 3 and has demonstrated capacity to refuse/direct care.  I have advised the patient that they can and should return immediately should they develop any worse/different/additional symptoms, or if they change their mind and want to continue their care. patient ripped her iv heplock out as per nurse.   patient seen getting dressed. patient signed ama form.

## 2019-11-11 NOTE — ED PROVIDER NOTE - ATTENDING CONTRIBUTION TO CARE
39 yr old female  here for eval of sharp, severe epigastric pain X 3 days. associated with nausea, no vomiting, fever, chills. on exam pt screaming in pain, s1s2 ctab, moderate epigastric ttp w/o r/g.

## 2019-11-11 NOTE — ED ADULT NURSE NOTE - EXPLANATION OF PATIENT'S REASON FOR LEAVING
Patient states "I don't want to stay in the hospital". Patient educated on risks of leaving AMA, patient verbalized understanding.

## 2019-11-11 NOTE — ED PROVIDER NOTE - PHYSICAL EXAMINATION
--EXAM--  VITAL SIGNS: I have reviewed vs documented at present.  CONSTITUTIONAL: Well-developed; well-nourished; in no acute distress.   SKIN: Warm and dry, no acute rash.   HEAD: Normocephalic; atraumatic.  EYES: PERRL, EOM intact; conjunctiva and sclera clear. No nystagmus.  ENT: No nasal discharge; airway clear.  NECK: Supple; non tender.  CARD: S1, S2, Regular rate and rhythm.   RESP: No wheezes, rales or rhonchi.  ABD: Normal bowel sounds; soft; non-distended; tender epigastric  EXT: Normal ROM.   NEURO: Alert, oriented, grossly unremarkable. Strength 5/5 in all extremities. Sensation intact throughout.  PSYCH: Cooperative, appropriate.

## 2019-11-29 ENCOUNTER — EMERGENCY (EMERGENCY)
Facility: HOSPITAL | Age: 39
LOS: 0 days | Discharge: HOME | End: 2019-11-30
Attending: EMERGENCY MEDICINE | Admitting: INTERNAL MEDICINE
Payer: MEDICAID

## 2019-11-29 VITALS
HEART RATE: 117 BPM | SYSTOLIC BLOOD PRESSURE: 135 MMHG | OXYGEN SATURATION: 100 % | WEIGHT: 136.03 LBS | HEIGHT: 64 IN | RESPIRATION RATE: 20 BRPM | TEMPERATURE: 97 F | DIASTOLIC BLOOD PRESSURE: 92 MMHG

## 2019-11-29 DIAGNOSIS — K85.20 ALCOHOL INDUCED ACUTE PANCREATITIS WITHOUT NECROSIS OR INFECTION: ICD-10-CM

## 2019-11-29 DIAGNOSIS — F41.9 ANXIETY DISORDER, UNSPECIFIED: ICD-10-CM

## 2019-11-29 DIAGNOSIS — R10.9 UNSPECIFIED ABDOMINAL PAIN: ICD-10-CM

## 2019-11-29 DIAGNOSIS — F17.210 NICOTINE DEPENDENCE, CIGARETTES, UNCOMPLICATED: ICD-10-CM

## 2019-11-29 DIAGNOSIS — R11.2 NAUSEA WITH VOMITING, UNSPECIFIED: ICD-10-CM

## 2019-11-29 LAB
ALBUMIN SERPL ELPH-MCNC: 3.1 G/DL — LOW (ref 3.5–5.2)
ALP SERPL-CCNC: 316 U/L — HIGH (ref 30–115)
ALT FLD-CCNC: 89 U/L — HIGH (ref 0–41)
ANION GAP SERPL CALC-SCNC: 21 MMOL/L — HIGH (ref 7–14)
APPEARANCE UR: ABNORMAL
AST SERPL-CCNC: 457 U/L — HIGH (ref 0–41)
BACTERIA # UR AUTO: ABNORMAL
BASE EXCESS BLDV CALC-SCNC: 4.9 MMOL/L — HIGH (ref -2–2)
BASE EXCESS BLDV CALC-SCNC: 6.2 MMOL/L — HIGH (ref -2–2)
BASOPHILS # BLD AUTO: 0.17 K/UL — SIGNIFICANT CHANGE UP (ref 0–0.2)
BASOPHILS NFR BLD AUTO: 1.8 % — HIGH (ref 0–1)
BILIRUB DIRECT SERPL-MCNC: 1 MG/DL — HIGH (ref 0–0.2)
BILIRUB INDIRECT FLD-MCNC: 1.4 MG/DL — HIGH (ref 0.2–1.2)
BILIRUB SERPL-MCNC: 2.4 MG/DL — HIGH (ref 0.2–1.2)
BILIRUB UR-MCNC: ABNORMAL
BUN SERPL-MCNC: 5 MG/DL — LOW (ref 10–20)
CA-I SERPL-SCNC: 1.1 MMOL/L — LOW (ref 1.12–1.3)
CA-I SERPL-SCNC: 1.13 MMOL/L — SIGNIFICANT CHANGE UP (ref 1.12–1.3)
CALCIUM SERPL-MCNC: 8.7 MG/DL — SIGNIFICANT CHANGE UP (ref 8.5–10.1)
CHLORIDE SERPL-SCNC: 96 MMOL/L — LOW (ref 98–110)
CHOLEST SERPL-MCNC: 292 MG/DL — HIGH (ref 100–200)
CK SERPL-CCNC: 67 U/L — SIGNIFICANT CHANGE UP (ref 0–225)
CO2 SERPL-SCNC: 25 MMOL/L — SIGNIFICANT CHANGE UP (ref 17–32)
COLOR SPEC: ABNORMAL
CREAT SERPL-MCNC: 0.5 MG/DL — LOW (ref 0.7–1.5)
DIFF PNL FLD: NEGATIVE — SIGNIFICANT CHANGE UP
EOSINOPHIL # BLD AUTO: 0.18 K/UL — SIGNIFICANT CHANGE UP (ref 0–0.7)
EOSINOPHIL NFR BLD AUTO: 1.9 % — SIGNIFICANT CHANGE UP (ref 0–8)
EPI CELLS # UR: 27 /HPF — HIGH (ref 0–5)
ETHANOL SERPL-MCNC: 156 MG/DL — HIGH
GAS PNL BLDV: 146 MMOL/L — HIGH (ref 136–145)
GAS PNL BLDV: 147 MMOL/L — HIGH (ref 136–145)
GAS PNL BLDV: SIGNIFICANT CHANGE UP
GAS PNL BLDV: SIGNIFICANT CHANGE UP
GLUCOSE SERPL-MCNC: 109 MG/DL — HIGH (ref 70–99)
GLUCOSE UR QL: NEGATIVE — SIGNIFICANT CHANGE UP
HCO3 BLDV-SCNC: 30 MMOL/L — HIGH (ref 22–29)
HCO3 BLDV-SCNC: 31 MMOL/L — HIGH (ref 22–29)
HCT VFR BLD CALC: 32.5 % — LOW (ref 37–47)
HCT VFR BLDA CALC: 32.7 % — LOW (ref 34–44)
HCT VFR BLDA CALC: 38.1 % — SIGNIFICANT CHANGE UP (ref 34–44)
HDLC SERPL-MCNC: 17 MG/DL — LOW
HGB BLD CALC-MCNC: 10.7 G/DL — LOW (ref 14–18)
HGB BLD CALC-MCNC: 12.4 G/DL — LOW (ref 14–18)
HGB BLD-MCNC: 10.3 G/DL — LOW (ref 12–16)
HYALINE CASTS # UR AUTO: 45 /LPF — HIGH (ref 0–7)
IMM GRANULOCYTES NFR BLD AUTO: 0.3 % — SIGNIFICANT CHANGE UP (ref 0.1–0.3)
KETONES UR-MCNC: SIGNIFICANT CHANGE UP
LACTATE BLDV-MCNC: 5.1 MMOL/L — HIGH (ref 0.5–1.6)
LACTATE BLDV-MCNC: 5.7 MMOL/L — HIGH (ref 0.5–1.6)
LEUKOCYTE ESTERASE UR-ACNC: NEGATIVE — SIGNIFICANT CHANGE UP
LIDOCAIN IGE QN: 370 U/L — HIGH (ref 7–60)
LIPID PNL WITH DIRECT LDL SERPL: 178 MG/DL — HIGH (ref 4–129)
LYMPHOCYTES # BLD AUTO: 1.32 K/UL — SIGNIFICANT CHANGE UP (ref 1.2–3.4)
LYMPHOCYTES # BLD AUTO: 13.9 % — LOW (ref 20.5–51.1)
MAGNESIUM SERPL-MCNC: 2 MG/DL — SIGNIFICANT CHANGE UP (ref 1.8–2.4)
MCHC RBC-ENTMCNC: 31.7 G/DL — LOW (ref 32–37)
MCHC RBC-ENTMCNC: 33.1 PG — HIGH (ref 27–31)
MCV RBC AUTO: 104.5 FL — HIGH (ref 81–99)
MONOCYTES # BLD AUTO: 0.84 K/UL — HIGH (ref 0.1–0.6)
MONOCYTES NFR BLD AUTO: 8.9 % — SIGNIFICANT CHANGE UP (ref 1.7–9.3)
NEUTROPHILS # BLD AUTO: 6.94 K/UL — HIGH (ref 1.4–6.5)
NEUTROPHILS NFR BLD AUTO: 73.2 % — SIGNIFICANT CHANGE UP (ref 42.2–75.2)
NITRITE UR-MCNC: NEGATIVE — SIGNIFICANT CHANGE UP
NRBC # BLD: 0 /100 WBCS — SIGNIFICANT CHANGE UP (ref 0–0)
PCO2 BLDV: 44 MMHG — SIGNIFICANT CHANGE UP (ref 41–51)
PCO2 BLDV: 48 MMHG — SIGNIFICANT CHANGE UP (ref 41–51)
PH BLDV: 7.41 — SIGNIFICANT CHANGE UP (ref 7.26–7.43)
PH BLDV: 7.46 — HIGH (ref 7.26–7.43)
PH UR: 6.5 — SIGNIFICANT CHANGE UP (ref 5–8)
PLATELET # BLD AUTO: 371 K/UL — SIGNIFICANT CHANGE UP (ref 130–400)
PO2 BLDV: 11 MMHG — LOW (ref 20–40)
PO2 BLDV: 22 MMHG — SIGNIFICANT CHANGE UP (ref 20–40)
POTASSIUM BLDV-SCNC: 3 MMOL/L — LOW (ref 3.3–5.6)
POTASSIUM BLDV-SCNC: 3.4 MMOL/L — SIGNIFICANT CHANGE UP (ref 3.3–5.6)
POTASSIUM SERPL-MCNC: 5.2 MMOL/L — HIGH (ref 3.5–5)
POTASSIUM SERPL-SCNC: 5.2 MMOL/L — HIGH (ref 3.5–5)
PROT SERPL-MCNC: 6.6 G/DL — SIGNIFICANT CHANGE UP (ref 6–8)
PROT UR-MCNC: ABNORMAL
RBC # BLD: 3.11 M/UL — LOW (ref 4.2–5.4)
RBC # FLD: 16.8 % — HIGH (ref 11.5–14.5)
RBC CASTS # UR COMP ASSIST: 14 /HPF — HIGH (ref 0–4)
SAO2 % BLDV: 11 % — SIGNIFICANT CHANGE UP
SAO2 % BLDV: 30 % — SIGNIFICANT CHANGE UP
SODIUM SERPL-SCNC: 142 MMOL/L — SIGNIFICANT CHANGE UP (ref 135–146)
SP GR SPEC: 1.03 — HIGH (ref 1.01–1.02)
TOTAL CHOLESTEROL/HDL RATIO MEASUREMENT: 17.2 RATIO — HIGH (ref 4–5.5)
TRIGL SERPL-MCNC: 266 MG/DL — HIGH (ref 10–149)
UROBILINOGEN FLD QL: ABNORMAL
WBC # BLD: 9.48 K/UL — SIGNIFICANT CHANGE UP (ref 4.8–10.8)
WBC # FLD AUTO: 9.48 K/UL — SIGNIFICANT CHANGE UP (ref 4.8–10.8)
WBC UR QL: 8 /HPF — HIGH (ref 0–5)

## 2019-11-29 PROCEDURE — 74177 CT ABD & PELVIS W/CONTRAST: CPT | Mod: 26

## 2019-11-29 PROCEDURE — 93010 ELECTROCARDIOGRAM REPORT: CPT

## 2019-11-29 PROCEDURE — 99221 1ST HOSP IP/OBS SF/LOW 40: CPT

## 2019-11-29 PROCEDURE — 71046 X-RAY EXAM CHEST 2 VIEWS: CPT | Mod: 26

## 2019-11-29 PROCEDURE — 99285 EMERGENCY DEPT VISIT HI MDM: CPT

## 2019-11-29 RX ORDER — DIPHENHYDRAMINE HYDROCHLORIDE AND LIDOCAINE HYDROCHLORIDE AND ALUMINUM HYDROXIDE AND MAGNESIUM HYDRO
30 KIT ONCE
Refills: 0 | Status: COMPLETED | OUTPATIENT
Start: 2019-11-29 | End: 2019-11-29

## 2019-11-29 RX ORDER — MORPHINE SULFATE 50 MG/1
6 CAPSULE, EXTENDED RELEASE ORAL ONCE
Refills: 0 | Status: DISCONTINUED | OUTPATIENT
Start: 2019-11-29 | End: 2019-11-29

## 2019-11-29 RX ORDER — SODIUM CHLORIDE 9 MG/ML
1000 INJECTION, SOLUTION INTRAVENOUS
Refills: 0 | Status: DISCONTINUED | OUTPATIENT
Start: 2019-11-29 | End: 2019-11-30

## 2019-11-29 RX ORDER — SODIUM CHLORIDE 9 MG/ML
1000 INJECTION, SOLUTION INTRAVENOUS ONCE
Refills: 0 | Status: COMPLETED | OUTPATIENT
Start: 2019-11-29 | End: 2019-11-29

## 2019-11-29 RX ORDER — SODIUM CHLORIDE 9 MG/ML
2000 INJECTION, SOLUTION INTRAVENOUS ONCE
Refills: 0 | Status: COMPLETED | OUTPATIENT
Start: 2019-11-29 | End: 2019-11-29

## 2019-11-29 RX ORDER — ONDANSETRON 8 MG/1
4 TABLET, FILM COATED ORAL ONCE
Refills: 0 | Status: COMPLETED | OUTPATIENT
Start: 2019-11-29 | End: 2019-11-29

## 2019-11-29 RX ORDER — MORPHINE SULFATE 50 MG/1
4 CAPSULE, EXTENDED RELEASE ORAL ONCE
Refills: 0 | Status: DISCONTINUED | OUTPATIENT
Start: 2019-11-29 | End: 2019-11-29

## 2019-11-29 RX ORDER — PANTOPRAZOLE SODIUM 20 MG/1
80 TABLET, DELAYED RELEASE ORAL ONCE
Refills: 0 | Status: COMPLETED | OUTPATIENT
Start: 2019-11-29 | End: 2019-11-29

## 2019-11-29 RX ADMIN — DIPHENHYDRAMINE HYDROCHLORIDE AND LIDOCAINE HYDROCHLORIDE AND ALUMINUM HYDROXIDE AND MAGNESIUM HYDRO 30 MILLILITER(S): KIT at 22:52

## 2019-11-29 RX ADMIN — MORPHINE SULFATE 6 MILLIGRAM(S): 50 CAPSULE, EXTENDED RELEASE ORAL at 21:00

## 2019-11-29 RX ADMIN — SODIUM CHLORIDE 4000 MILLILITER(S): 9 INJECTION, SOLUTION INTRAVENOUS at 20:03

## 2019-11-29 RX ADMIN — PANTOPRAZOLE SODIUM 80 MILLIGRAM(S): 20 TABLET, DELAYED RELEASE ORAL at 21:00

## 2019-11-29 RX ADMIN — ONDANSETRON 4 MILLIGRAM(S): 8 TABLET, FILM COATED ORAL at 20:02

## 2019-11-29 RX ADMIN — MORPHINE SULFATE 4 MILLIGRAM(S): 50 CAPSULE, EXTENDED RELEASE ORAL at 20:02

## 2019-11-29 NOTE — ED PROVIDER NOTE - OBJECTIVE STATEMENT
40 y/o female with PMH of alcoholism, anxiety, cocaine abuse who presents to ED c/o 1 day of increased abdominal distention, nausea, NBNB vomiting. Pt was recently in ED and as found to have elevated lipase however signed out AMA. Pt presents to ED intoxicated and is a poor historian. Per record review pt with hx of hepatic steatosis with upper GI bleed in the past.

## 2019-11-29 NOTE — ED PROVIDER NOTE - ATTENDING CONTRIBUTION TO CARE
38 yo f with pmh of etoh abuse, pancreatitis, presents with c/o abd pain.  pt says has been /o female with PMH of alcoholism, anxiety, cocaine abuse who presents to ED c/o 1 day of increased abdominal distention, nausea, NBNB vomiting. Pt was recently in ED and as found to have elevated lipase however signed out AMA. Pt presents to ED intoxicated and is a poor historian. Per record review pt with hx of hepatic steatosis with upper GI bleed in the past. 38 yo f with pmh of etoh abuse, pancreatitis, presents with c/o abd pain.  pt says has been having abd pain and distention for months and was diagnosed with pancreatitis on earlier this month, but AMA'd.  pt continued to drink etoh and says the pain became severe today.  says has had no BM x 3 weeks.  no abd surgeries in the past.  no n/v.  exam: nad, ncat, perrl, eomi, mmm, rrr, ctab, abd soft, mildly distended, diffusely ttp imp: pt with abd pain, recent pancreatitis who AMA'd before treatment, will check labs, CT a/p, likely admission

## 2019-11-29 NOTE — ED PROVIDER NOTE - CLINICAL SUMMARY MEDICAL DECISION MAKING FREE TEXT BOX
Pt with etoh abuse, here for recurrence of epigastric pain, had recent pancreatitis.  imaging and labs still show pancreatitis, less severe than a few days ago.  will admit for further management

## 2019-11-29 NOTE — H&P ADULT - ASSESSMENT
40 yo F with PMH of alcohol abuse, cocaine use, anxiety, and pancreatitis presented to ED complaining of abdominal pain. In ED, lipase was elevated at (370), CT findings consistent with pancreatitis, blood alcohol level=156.    #) Pancreatitis likely 2/2 alcohol abuse  - lipase = 370, CT findings = pancreatitis  - LFTs also elevated - AST>ALT suggesting alcohol related though mixed hepatocellular + cholestatic picture   - no gallstones on CT  - will order RUQ US  - NPO for now, LR @ 200 cc/hr  - pain control + anti-emetic PRN  - consider GI eval    #) Alcohol abuse - will keep on CIWA protocol for now + start thiamine + folate + MVI    DVT ppx: Lovenox subQ  Diet: NPO for now  Activity: AAT  Code status: FULL  Dispo: anticipate home when pain improves

## 2019-11-29 NOTE — H&P ADULT - NSHPPHYSICALEXAM_GEN_ALL_CORE
GEN: NAD, comfortable  CARDIO: RRR, no m/r/g  RESP: CTAB, no w/r/r  ABD: diffusely tender to palpation, no guarding or rigidity  EXT: no edema, pp b/l  NEURO: AAOx3, grossly normal

## 2019-11-29 NOTE — H&P ADULT - HISTORY OF PRESENT ILLNESS
38 yo F with PMH of alcohol abuse, cocaine use, anxiety, and pancreatitis presented to ED complaining of abdominal pain. Patient was recently seen in [facility] ED 2 weeks PTP, was diagnosed with pancreatitis, but left AMA. Now coming in complaining of persistent abdominal pain as well as abdominal distension, nausea, and multiple episodes of NBNB emesis. At time of exam, patient is intoxicated and a poor historian.    In ED, lipase was elevated at (370).      : 38 yo f with pmh of etoh abuse, pancreatitis, presents with c/o abd pain.  pt says has been having abd pain and distention for months and was diagnosed with pancreatitis on earlier this month, but AMA'd.  pt continued to drink etoh and says the pain became severe today.  says has had no BM x 3 weeks.  no abd surgeries in the past.  no n/v.  exam: nad, ncat, perrl, eomi, mmm, rrr, ctab, abd soft, mildly distended, diffusely ttp imp: pt with abd pain, recent pancreatitis who AMA'd before treatment, will check labs, CT a/p, likely admission. 40 yo F with PMH of alcohol abuse, cocaine use, anxiety, and pancreatitis presented to ED complaining of abdominal pain. Patient was recently seen in [facility] ED 2 weeks PTP, was diagnosed with pancreatitis, but left AMA. Now coming in complaining of persistent abdominal pain as well as abdominal distension, nausea, and multiple episodes of NBNB emesis. At time of exam, patient is intoxicated and a poor historian.    In ED, lipase was elevated at (370), CT findings consistent with pancreatitis, blood alcohol level=156. 38 yo F with PMH of alcohol abuse, cocaine use, anxiety, and pancreatitis presented to ED complaining of abdominal pain. Patient was recently seen in [facility] ED 2 weeks PTP, was diagnosed with pancreatitis, but left AMA. Now coming in complaining of persistent abdominal pain as well as abdominal distension, nausea, and multiple episodes of NBNB emesis.    In ED, lipase was elevated at (370), CT findings consistent with pancreatitis, blood alcohol level=156.

## 2019-11-29 NOTE — ED ADULT NURSE NOTE - OBJECTIVE STATEMENT
patient complaints of extreme abdominal pain. PAtient denies n/v. Patient deneis chest pain and no SOB noted.

## 2019-11-29 NOTE — ED PROVIDER NOTE - PHYSICAL EXAMINATION
CONSTITUTIONAL: Well-developed; well-nourished;   SKIN: warm, dry  HEAD: Normocephalic; atraumatic.  EYES: no conjunctival injection. PERRL.   ENT: No nasal discharge; airway clear.  NECK: Supple; non tender.  CARD: S1, S2 normal; no murmurs, gallops, or rubs. Regular rate and rhythm.   RESP: No wheezes, rales or rhonchi.  ABD: soft moderate diffuse ttp  EXT: Normal ROM.  No clubbing, cyanosis or edema.   LYMPH: No acute cervical adenopathy.  NEURO: Alert, oriented, grossly unremarkable  PSYCH: Cooperative, appropriate.

## 2019-11-29 NOTE — H&P ADULT - ATTENDING COMMENTS
40 yo F with PMH of alcohol abuse, cocaine use, anxiety, and pancreatitis presented to ED complaining of abdominal pain. Patient was recently seen in [facility] ED 2 weeks PTP, was diagnosed with pancreatitis, but left AMA. Now coming in complaining of persistent abdominal pain as well as abdominal distension, nausea, and multiple episodes of NBNB emesis.    In ED, lipase was elevated at (370), CT findings consistent with pancreatitis, blood alcohol level=156.    REVIEW OF SYSTEMS: see cc/HPI  CONSTITUTIONAL: No weakness, fevers or chills  EYES/ENT: No visual changes;  No vertigo or throat pain   NECK: No pain or stiffness  RESPIRATORY: No cough, wheezing, hemoptysis; No shortness of breath  CARDIOVASCULAR: No chest pain or palpitations  GASTROINTESTINAL: (+) abdominal pain worse in the epigastric area, No nausea, vomiting, or hematemesis; No diarrhea or constipation. No melena or hematochezia.  GENITOURINARY: No dysuria, frequency or hematuria  NEUROLOGICAL: No numbness or weakness  SKIN: No itching, rashes    Physical Exam:  General: WN/WD NAD  Neurology: A&Ox3, nonfocal, follows commands  Eyes: PERRLA/ EOMI  ENT/Neck: Neck supple, trachea midline, No JVD  Respiratory: CTA B/L, No wheezing, rales, rhonchi  CV: Normal rate regular rhythm, S1S2, no murmurs, rubs or gallops  Abdominal: Soft, NT, ND +BS,   Extremities: No edema, + peripheral pulses  Skin: No Rashes, Hematoma, Ecchymosis  Incisions: n/a  Tubes: n/a    A/P  Acute pancreatitis 2/2 alcohol abuse  -NPO and aggressive IV hydration   -PRN pain Rx  -PPI   -if clinically worsening will get GI     Alcohol abuse   -Ativan . [facility] protocol  -EtOH level     Cocaine abuse  -monitor     DVT prophylaxis 38 yo F with PMH of alcohol abuse, cocaine use, anxiety, and pancreatitis presented to ED complaining of abdominal pain. Patient was recently seen in [facility] ED 2 weeks PTP, was diagnosed with pancreatitis, but left AMA. Now coming in complaining of persistent abdominal pain as well as abdominal distension, nausea, and multiple episodes of NBNB emesis.    In ED, lipase was elevated at (370), CT findings consistent with pancreatitis, blood alcohol level=156.    REVIEW OF SYSTEMS: see cc/HPI  CONSTITUTIONAL: No weakness, fevers or chills  EYES/ENT: No visual changes;  No vertigo or throat pain   NECK: No pain or stiffness  RESPIRATORY: No cough, wheezing, hemoptysis; No shortness of breath  CARDIOVASCULAR: No chest pain or palpitations  GASTROINTESTINAL: (+) abdominal pain worse in the epigastric area, No nausea, vomiting, or hematemesis; No diarrhea or constipation. No melena or hematochezia.  GENITOURINARY: No dysuria, frequency or hematuria  NEUROLOGICAL: No numbness or weakness  SKIN: No itching, rashes    Physical Exam:  General: WN/WD NAD  Neurology: A&Ox3, nonfocal, follows commands  Eyes: PERRLA/ EOMI  ENT/Neck: Neck supple, trachea midline, No JVD  Respiratory: CTA B/L, No wheezing, rales, rhonchi  CV: Normal rate regular rhythm, S1S2, no murmurs, rubs or gallops  Abdominal: Soft, NT, ND +BS,   Extremities: No edema, + peripheral pulses  Skin: No Rashes, Hematoma, Ecchymosis  Incisions: n/a  Tubes: n/a    A/P  Acute pancreatitis 2/2 alcohol abuse  -NPO and aggressive IV hydration   -PRN pain Rx  -PPI   -if clinically worsening will get GI     Alcohol abuse   -Ativan . [facility] protocol  -EtOH level     Cocaine abuse  -monitor / detox referral    DVT prophylaxis 40 yo F with PMH of alcohol abuse, cocaine use, anxiety, and pancreatitis presented to ED complaining of abdominal pain. Patient was recently seen in [facility] ED 2 weeks PTP, was diagnosed with pancreatitis, but left AMA. Now coming in complaining of persistent abdominal pain as well as abdominal distension, nausea, and multiple episodes of NBNB emesis.    In ED, lipase was elevated at (370), CT findings consistent with pancreatitis, blood alcohol level=156.    REVIEW OF SYSTEMS: see cc/HPI  CONSTITUTIONAL: No weakness, fevers or chills  EYES/ENT: No visual changes;  No vertigo or throat pain   NECK: No pain or stiffness  RESPIRATORY: No cough, wheezing, hemoptysis; No shortness of breath  CARDIOVASCULAR: No chest pain or palpitations  GASTROINTESTINAL: (+) abdominal pain worse in the epigastric area, No nausea, vomiting, or hematemesis; No diarrhea or constipation. No melena or hematochezia.  GENITOURINARY: No dysuria, frequency or hematuria  NEUROLOGICAL: No numbness or weakness  SKIN: No itching, rashes    Physical Exam:  General: WN/WD NAD  Neurology: A&Ox3, nonfocal, follows commands  Eyes: PERRLA/ EOMI  ENT/Neck: Neck supple, trachea midline, No JVD  Respiratory: CTA B/L, No wheezing, rales, rhonchi  CV: Normal rate regular rhythm, S1S2, no murmurs, rubs or gallops  Abdominal: c/o diffuse tenderness and guarding, ND +BS,   Extremities: No edema, + peripheral pulses  Skin: No Rashes, Hematoma, Ecchymosis  Incisions: n/a  Tubes: n/a    A/P  Acute pancreatitis 2/2 alcohol abuse  -NPO and aggressive IV hydration   -PRN pain Rx  -PPI   -if clinically worsening will get GI     Alcohol abuse   -Ativan . [facility] protocol  -EtOH level     Cocaine abuse  -monitor / detox referral    Patient seen prior to eloping - she was seen in the [facility] arteaga position before midnight .    DVT prophylaxis

## 2019-11-29 NOTE — H&P ADULT - NSHPREVIEWOFSYSTEMS_GEN_ALL_CORE
40 yo female PMHx alcoholism, anxiety, cocaine use presented for abdminal pain, chest pain and bloody emesis. She reports that over the past week she had abdominal pain almost daily , not associated with nausea or vomiting. However, yesterday evening after drinking a pint of vodka she had bloody emesis x1 , with amount of blood 2 tbsp, She reports associated chest pain non radiating without diaphoresis. She had been dealing with heavy alcohol drinking and smoking for 2 years now, but does not see a psychologist. She reports that due to the pain and emesis she came to the hospital..    In ED BW showed stable Hb, CT a/p w/ IV cs showed  c/s showed no active extravasation of c/s severe steatosis. Hb was stable and she did not get any blood products      38 y/o female with PMH of alcoholism, anxiety, cocaine abuse who presents to ED c/o 1 day of increased abdominal distention, nausea, NBNB vomiting. Pt was recently in ED and as found to have elevated lipase however signed out AMA. Pt presents to ED intoxicated and is a poor historian. Per record review pt with hx of hepatic steatosis with upper GI bleed in the past.

## 2019-11-29 NOTE — ED ADULT NURSE NOTE - CHPI ED NUR SYMPTOMS NEG
no abdominal distension/no fever/no nausea/no dysuria/no hematuria/no diarrhea/no burning urination/no chills/no blood in stool/no vomiting

## 2019-11-29 NOTE — ED PROVIDER NOTE - NS ED ROS FT
Review of Systems:  •	CONSTITUTIONAL - No fever, No diaphoresis, No weight change  •	SKIN - No rash  •	HEMATOLOGIC - No abnormal bleeding or bruising  •	EYES - No eye pain, No blurred vision  •	ENT - No change in hearing, No sore throat, No neck pain, No rhinorrhea, No ear pain  •	RESPIRATORY - No shortness of breath, No cough  •	CARDIAC -No chest pain, No palpitations  •	GI - + abdominal pain, + nausea, + vomiting, No diarrhea, No constipation, No bright red blood per rectum or melena. No flank pain  •                 - No dysuria, frequency, hematuria.   •	ENDO - No polydypsia, No polyuria, No heat/cold intolerance  •	MUSCULOSKELETAL - No joint paint, No swelling, No back pain  •	NEUROLOGIC - No numbness, No focal weakness, No headache, No dizziness  All other systems negative, unless specified in HPI

## 2019-11-29 NOTE — ED ADULT NURSE NOTE - NS ED NURSE ELOPE COMMENTS
Patient A&Ox4, ambulates with steady gait. Patient eloped with out telling anyone at 1200mid, Patient had IV access of 20G to left AC. md and charge RN made aware. Police called came to [facility] [id number].

## 2019-11-30 NOTE — CHART NOTE - NSCHARTNOTEFT_GEN_A_CORE
Patient has been missing from ED for >1.5 hours. Assumed to have eloped. Patient had IV line in place. Nursing staff is going to reach out to police station to inform them of incident.

## 2019-12-10 ENCOUNTER — INPATIENT (INPATIENT)
Facility: HOSPITAL | Age: 39
LOS: 7 days | Discharge: HOME | End: 2019-12-18
Attending: HOSPITALIST | Admitting: HOSPITALIST
Payer: MEDICAID

## 2019-12-10 VITALS
SYSTOLIC BLOOD PRESSURE: 142 MMHG | HEART RATE: 102 BPM | OXYGEN SATURATION: 99 % | TEMPERATURE: 98 F | DIASTOLIC BLOOD PRESSURE: 86 MMHG | RESPIRATION RATE: 18 BRPM | HEIGHT: 64 IN

## 2019-12-10 DIAGNOSIS — E51.9 THIAMINE DEFICIENCY, UNSPECIFIED: ICD-10-CM

## 2019-12-10 DIAGNOSIS — E53.8 DEFICIENCY OF OTHER SPECIFIED B GROUP VITAMINS: ICD-10-CM

## 2019-12-10 DIAGNOSIS — Z71.6 TOBACCO ABUSE COUNSELING: ICD-10-CM

## 2019-12-10 DIAGNOSIS — K85.20 ALCOHOL INDUCED ACUTE PANCREATITIS WITHOUT NECROSIS OR INFECTION: ICD-10-CM

## 2019-12-10 DIAGNOSIS — R94.31 ABNORMAL ELECTROCARDIOGRAM [ECG] [EKG]: ICD-10-CM

## 2019-12-10 DIAGNOSIS — F10.10 ALCOHOL ABUSE, UNCOMPLICATED: ICD-10-CM

## 2019-12-10 DIAGNOSIS — E87.6 HYPOKALEMIA: ICD-10-CM

## 2019-12-10 LAB
ALBUMIN SERPL ELPH-MCNC: 2.9 G/DL — LOW (ref 3.5–5.2)
ALBUMIN SERPL ELPH-MCNC: 3.8 G/DL — SIGNIFICANT CHANGE UP (ref 3.5–5.2)
ALP SERPL-CCNC: 212 U/L — HIGH (ref 30–115)
ALP SERPL-CCNC: 279 U/L — HIGH (ref 30–115)
ALT FLD-CCNC: 39 U/L — SIGNIFICANT CHANGE UP (ref 0–41)
ALT FLD-CCNC: 52 U/L — HIGH (ref 0–41)
ANION GAP SERPL CALC-SCNC: 12 MMOL/L — SIGNIFICANT CHANGE UP (ref 7–14)
ANION GAP SERPL CALC-SCNC: 14 MMOL/L — SIGNIFICANT CHANGE UP (ref 7–14)
ANION GAP SERPL CALC-SCNC: 24 MMOL/L — HIGH (ref 7–14)
AST SERPL-CCNC: 134 U/L — HIGH (ref 0–41)
AST SERPL-CCNC: 173 U/L — HIGH (ref 0–41)
BASOPHILS # BLD AUTO: 0.06 K/UL — SIGNIFICANT CHANGE UP (ref 0–0.2)
BASOPHILS NFR BLD AUTO: 0.8 % — SIGNIFICANT CHANGE UP (ref 0–1)
BILIRUB SERPL-MCNC: 2 MG/DL — HIGH (ref 0.2–1.2)
BILIRUB SERPL-MCNC: 2.5 MG/DL — HIGH (ref 0.2–1.2)
BUN SERPL-MCNC: 11 MG/DL — SIGNIFICANT CHANGE UP (ref 10–20)
BUN SERPL-MCNC: 7 MG/DL — LOW (ref 10–20)
BUN SERPL-MCNC: 8 MG/DL — LOW (ref 10–20)
CALCIUM SERPL-MCNC: 10.1 MG/DL — SIGNIFICANT CHANGE UP (ref 8.5–10.1)
CALCIUM SERPL-MCNC: 8.2 MG/DL — LOW (ref 8.5–10.1)
CALCIUM SERPL-MCNC: 8.4 MG/DL — LOW (ref 8.5–10.1)
CHLORIDE SERPL-SCNC: 83 MMOL/L — LOW (ref 98–110)
CHLORIDE SERPL-SCNC: 93 MMOL/L — LOW (ref 98–110)
CHLORIDE SERPL-SCNC: 95 MMOL/L — LOW (ref 98–110)
CO2 SERPL-SCNC: 31 MMOL/L — SIGNIFICANT CHANGE UP (ref 17–32)
CO2 SERPL-SCNC: 31 MMOL/L — SIGNIFICANT CHANGE UP (ref 17–32)
CO2 SERPL-SCNC: 34 MMOL/L — HIGH (ref 17–32)
CREAT SERPL-MCNC: 0.5 MG/DL — LOW (ref 0.7–1.5)
CREAT SERPL-MCNC: 0.5 MG/DL — LOW (ref 0.7–1.5)
CREAT SERPL-MCNC: 0.6 MG/DL — LOW (ref 0.7–1.5)
EOSINOPHIL # BLD AUTO: 0.03 K/UL — SIGNIFICANT CHANGE UP (ref 0–0.7)
EOSINOPHIL NFR BLD AUTO: 0.4 % — SIGNIFICANT CHANGE UP (ref 0–8)
ETHANOL SERPL-MCNC: 23 MG/DL — HIGH
GLUCOSE SERPL-MCNC: 109 MG/DL — HIGH (ref 70–99)
GLUCOSE SERPL-MCNC: 80 MG/DL — SIGNIFICANT CHANGE UP (ref 70–99)
GLUCOSE SERPL-MCNC: 87 MG/DL — SIGNIFICANT CHANGE UP (ref 70–99)
HCG SERPL QL: NEGATIVE — SIGNIFICANT CHANGE UP
HCT VFR BLD CALC: 29.4 % — LOW (ref 37–47)
HCT VFR BLD CALC: 36.4 % — LOW (ref 37–47)
HGB BLD-MCNC: 11.9 G/DL — LOW (ref 12–16)
HGB BLD-MCNC: 9.5 G/DL — LOW (ref 12–16)
IMM GRANULOCYTES NFR BLD AUTO: 0.5 % — HIGH (ref 0.1–0.3)
LACTATE SERPL-SCNC: 1.4 MMOL/L — SIGNIFICANT CHANGE UP (ref 0.7–2)
LACTATE SERPL-SCNC: 6.4 MMOL/L — CRITICAL HIGH (ref 0.7–2)
LIDOCAIN IGE QN: 258 U/L — HIGH (ref 7–60)
LIDOCAIN IGE QN: 265 U/L — HIGH (ref 7–60)
LYMPHOCYTES # BLD AUTO: 1.2 K/UL — SIGNIFICANT CHANGE UP (ref 1.2–3.4)
LYMPHOCYTES # BLD AUTO: 15.6 % — LOW (ref 20.5–51.1)
MAGNESIUM SERPL-MCNC: 2.3 MG/DL — SIGNIFICANT CHANGE UP (ref 1.8–2.4)
MAGNESIUM SERPL-MCNC: 2.6 MG/DL — HIGH (ref 1.8–2.4)
MCHC RBC-ENTMCNC: 32.3 G/DL — SIGNIFICANT CHANGE UP (ref 32–37)
MCHC RBC-ENTMCNC: 32.5 PG — HIGH (ref 27–31)
MCHC RBC-ENTMCNC: 32.7 G/DL — SIGNIFICANT CHANGE UP (ref 32–37)
MCHC RBC-ENTMCNC: 32.9 PG — HIGH (ref 27–31)
MCV RBC AUTO: 101.7 FL — HIGH (ref 81–99)
MCV RBC AUTO: 99.5 FL — HIGH (ref 81–99)
MONOCYTES # BLD AUTO: 0.95 K/UL — HIGH (ref 0.1–0.6)
MONOCYTES NFR BLD AUTO: 12.4 % — HIGH (ref 1.7–9.3)
NEUTROPHILS # BLD AUTO: 5.4 K/UL — SIGNIFICANT CHANGE UP (ref 1.4–6.5)
NEUTROPHILS NFR BLD AUTO: 70.3 % — SIGNIFICANT CHANGE UP (ref 42.2–75.2)
NRBC # BLD: 0 /100 WBCS — SIGNIFICANT CHANGE UP (ref 0–0)
NRBC # BLD: 0 /100 WBCS — SIGNIFICANT CHANGE UP (ref 0–0)
PHOSPHATE SERPL-MCNC: 3.2 MG/DL — SIGNIFICANT CHANGE UP (ref 2.1–4.9)
PLATELET # BLD AUTO: 318 K/UL — SIGNIFICANT CHANGE UP (ref 130–400)
PLATELET # BLD AUTO: 380 K/UL — SIGNIFICANT CHANGE UP (ref 130–400)
POTASSIUM SERPL-MCNC: 2.3 MMOL/L — CRITICAL LOW (ref 3.5–5)
POTASSIUM SERPL-MCNC: 2.6 MMOL/L — CRITICAL LOW (ref 3.5–5)
POTASSIUM SERPL-MCNC: 3.9 MMOL/L — SIGNIFICANT CHANGE UP (ref 3.5–5)
POTASSIUM SERPL-SCNC: 2.3 MMOL/L — CRITICAL LOW (ref 3.5–5)
POTASSIUM SERPL-SCNC: 2.6 MMOL/L — CRITICAL LOW (ref 3.5–5)
POTASSIUM SERPL-SCNC: 3.9 MMOL/L — SIGNIFICANT CHANGE UP (ref 3.5–5)
PROT SERPL-MCNC: 5.6 G/DL — LOW (ref 6–8)
PROT SERPL-MCNC: 7 G/DL — SIGNIFICANT CHANGE UP (ref 6–8)
RBC # BLD: 2.89 M/UL — LOW (ref 4.2–5.4)
RBC # BLD: 3.66 M/UL — LOW (ref 4.2–5.4)
RBC # FLD: 15.9 % — HIGH (ref 11.5–14.5)
RBC # FLD: 16.2 % — HIGH (ref 11.5–14.5)
SODIUM SERPL-SCNC: 138 MMOL/L — SIGNIFICANT CHANGE UP (ref 135–146)
SODIUM SERPL-SCNC: 138 MMOL/L — SIGNIFICANT CHANGE UP (ref 135–146)
SODIUM SERPL-SCNC: 141 MMOL/L — SIGNIFICANT CHANGE UP (ref 135–146)
WBC # BLD: 6.63 K/UL — SIGNIFICANT CHANGE UP (ref 4.8–10.8)
WBC # BLD: 7.68 K/UL — SIGNIFICANT CHANGE UP (ref 4.8–10.8)
WBC # FLD AUTO: 6.63 K/UL — SIGNIFICANT CHANGE UP (ref 4.8–10.8)
WBC # FLD AUTO: 7.68 K/UL — SIGNIFICANT CHANGE UP (ref 4.8–10.8)

## 2019-12-10 PROCEDURE — 99285 EMERGENCY DEPT VISIT HI MDM: CPT

## 2019-12-10 PROCEDURE — 74177 CT ABD & PELVIS W/CONTRAST: CPT | Mod: 26

## 2019-12-10 PROCEDURE — 99222 1ST HOSP IP/OBS MODERATE 55: CPT | Mod: AI

## 2019-12-10 RX ORDER — POTASSIUM CHLORIDE 20 MEQ
20 PACKET (EA) ORAL
Refills: 0 | Status: COMPLETED | OUTPATIENT
Start: 2019-12-10 | End: 2019-12-10

## 2019-12-10 RX ORDER — POTASSIUM CHLORIDE 20 MEQ
20 PACKET (EA) ORAL ONCE
Refills: 0 | Status: COMPLETED | OUTPATIENT
Start: 2019-12-10 | End: 2019-12-10

## 2019-12-10 RX ORDER — POTASSIUM CHLORIDE 20 MEQ
40 PACKET (EA) ORAL ONCE
Refills: 0 | Status: DISCONTINUED | OUTPATIENT
Start: 2019-12-10 | End: 2019-12-10

## 2019-12-10 RX ORDER — SODIUM CHLORIDE 9 MG/ML
2000 INJECTION INTRAMUSCULAR; INTRAVENOUS; SUBCUTANEOUS ONCE
Refills: 0 | Status: COMPLETED | OUTPATIENT
Start: 2019-12-10 | End: 2019-12-10

## 2019-12-10 RX ORDER — MAGNESIUM SULFATE 500 MG/ML
2 VIAL (ML) INJECTION ONCE
Refills: 0 | Status: COMPLETED | OUTPATIENT
Start: 2019-12-10 | End: 2019-12-10

## 2019-12-10 RX ORDER — POTASSIUM CHLORIDE 20 MEQ
40 PACKET (EA) ORAL EVERY 4 HOURS
Refills: 0 | Status: COMPLETED | OUTPATIENT
Start: 2019-12-10 | End: 2019-12-10

## 2019-12-10 RX ORDER — NICOTINE POLACRILEX 2 MG
1 GUM BUCCAL DAILY
Refills: 0 | Status: DISCONTINUED | OUTPATIENT
Start: 2019-12-10 | End: 2019-12-18

## 2019-12-10 RX ORDER — MORPHINE SULFATE 50 MG/1
4 CAPSULE, EXTENDED RELEASE ORAL EVERY 4 HOURS
Refills: 0 | Status: DISCONTINUED | OUTPATIENT
Start: 2019-12-10 | End: 2019-12-11

## 2019-12-10 RX ORDER — SODIUM CHLORIDE 9 MG/ML
1000 INJECTION, SOLUTION INTRAVENOUS
Refills: 0 | Status: DISCONTINUED | OUTPATIENT
Start: 2019-12-10 | End: 2019-12-11

## 2019-12-10 RX ORDER — MORPHINE SULFATE 50 MG/1
4 CAPSULE, EXTENDED RELEASE ORAL ONCE
Refills: 0 | Status: DISCONTINUED | OUTPATIENT
Start: 2019-12-10 | End: 2019-12-10

## 2019-12-10 RX ORDER — THIAMINE MONONITRATE (VIT B1) 100 MG
100 TABLET ORAL ONCE
Refills: 0 | Status: COMPLETED | OUTPATIENT
Start: 2019-12-10 | End: 2019-12-10

## 2019-12-10 RX ORDER — SODIUM CHLORIDE 9 MG/ML
1000 INJECTION, SOLUTION INTRAVENOUS
Refills: 0 | Status: DISCONTINUED | OUTPATIENT
Start: 2019-12-10 | End: 2019-12-12

## 2019-12-10 RX ORDER — THIAMINE MONONITRATE (VIT B1) 100 MG
100 TABLET ORAL DAILY
Refills: 0 | Status: DISCONTINUED | OUTPATIENT
Start: 2019-12-10 | End: 2019-12-18

## 2019-12-10 RX ORDER — FOLIC ACID 0.8 MG
1 TABLET ORAL DAILY
Refills: 0 | Status: DISCONTINUED | OUTPATIENT
Start: 2019-12-10 | End: 2019-12-18

## 2019-12-10 RX ORDER — SODIUM CHLORIDE 9 MG/ML
1000 INJECTION INTRAMUSCULAR; INTRAVENOUS; SUBCUTANEOUS
Refills: 0 | Status: DISCONTINUED | OUTPATIENT
Start: 2019-12-10 | End: 2019-12-11

## 2019-12-10 RX ORDER — ONDANSETRON 8 MG/1
4 TABLET, FILM COATED ORAL ONCE
Refills: 0 | Status: COMPLETED | OUTPATIENT
Start: 2019-12-10 | End: 2019-12-10

## 2019-12-10 RX ADMIN — Medication 50 MILLIEQUIVALENT(S): at 04:34

## 2019-12-10 RX ADMIN — Medication 40 MILLIEQUIVALENT(S): at 13:54

## 2019-12-10 RX ADMIN — SODIUM CHLORIDE 125 MILLILITER(S): 9 INJECTION, SOLUTION INTRAVENOUS at 13:55

## 2019-12-10 RX ADMIN — Medication 40 MILLIEQUIVALENT(S): at 17:38

## 2019-12-10 RX ADMIN — SODIUM CHLORIDE 125 MILLILITER(S): 9 INJECTION, SOLUTION INTRAVENOUS at 21:37

## 2019-12-10 RX ADMIN — Medication 25 MILLIGRAM(S): at 21:37

## 2019-12-10 RX ADMIN — ONDANSETRON 4 MILLIGRAM(S): 8 TABLET, FILM COATED ORAL at 02:40

## 2019-12-10 RX ADMIN — SODIUM CHLORIDE 1000 MILLILITER(S): 9 INJECTION, SOLUTION INTRAVENOUS at 06:22

## 2019-12-10 RX ADMIN — MORPHINE SULFATE 4 MILLIGRAM(S): 50 CAPSULE, EXTENDED RELEASE ORAL at 23:40

## 2019-12-10 RX ADMIN — Medication 50 MILLIEQUIVALENT(S): at 14:59

## 2019-12-10 RX ADMIN — MORPHINE SULFATE 4 MILLIGRAM(S): 50 CAPSULE, EXTENDED RELEASE ORAL at 15:01

## 2019-12-10 RX ADMIN — SODIUM CHLORIDE 125 MILLILITER(S): 9 INJECTION, SOLUTION INTRAVENOUS at 06:45

## 2019-12-10 RX ADMIN — MORPHINE SULFATE 4 MILLIGRAM(S): 50 CAPSULE, EXTENDED RELEASE ORAL at 15:16

## 2019-12-10 RX ADMIN — Medication 100 MILLIGRAM(S): at 04:34

## 2019-12-10 RX ADMIN — MORPHINE SULFATE 4 MILLIGRAM(S): 50 CAPSULE, EXTENDED RELEASE ORAL at 02:41

## 2019-12-10 RX ADMIN — Medication 100 MILLIGRAM(S): at 17:39

## 2019-12-10 RX ADMIN — SODIUM CHLORIDE 250 MILLILITER(S): 9 INJECTION INTRAMUSCULAR; INTRAVENOUS; SUBCUTANEOUS at 07:00

## 2019-12-10 RX ADMIN — Medication 1 MILLIGRAM(S): at 05:38

## 2019-12-10 RX ADMIN — SODIUM CHLORIDE 2000 MILLILITER(S): 9 INJECTION INTRAMUSCULAR; INTRAVENOUS; SUBCUTANEOUS at 02:36

## 2019-12-10 RX ADMIN — Medication 25 MILLIGRAM(S): at 17:41

## 2019-12-10 RX ADMIN — SODIUM CHLORIDE 1000 MILLILITER(S): 9 INJECTION, SOLUTION INTRAVENOUS at 04:34

## 2019-12-10 RX ADMIN — Medication 1 PATCH: at 18:33

## 2019-12-10 RX ADMIN — Medication 1 TABLET(S): at 13:53

## 2019-12-10 RX ADMIN — Medication 50 MILLIEQUIVALENT(S): at 06:45

## 2019-12-10 RX ADMIN — MORPHINE SULFATE 4 MILLIGRAM(S): 50 CAPSULE, EXTENDED RELEASE ORAL at 18:59

## 2019-12-10 RX ADMIN — Medication 1 PATCH: at 11:37

## 2019-12-10 RX ADMIN — MORPHINE SULFATE 4 MILLIGRAM(S): 50 CAPSULE, EXTENDED RELEASE ORAL at 23:04

## 2019-12-10 RX ADMIN — SODIUM CHLORIDE 2000 MILLILITER(S): 9 INJECTION INTRAMUSCULAR; INTRAVENOUS; SUBCUTANEOUS at 06:22

## 2019-12-10 RX ADMIN — Medication 50 MILLIEQUIVALENT(S): at 17:39

## 2019-12-10 RX ADMIN — Medication 50 GRAM(S): at 04:35

## 2019-12-10 RX ADMIN — Medication 1 MILLIGRAM(S): at 17:39

## 2019-12-10 NOTE — H&P ADULT - NSHPLABSRESULTS_GEN_ALL_CORE
< from: CT Abdomen and Pelvis w/ IV Cont (12.10.19 @ 04:14) >      EXAM:  CT ABDOMEN AND PELVIS IC            PROCEDURE DATE:  12/10/2019          < end of copied text >    < from: CT Abdomen and Pelvis w/ IV Cont (12.10.19 @ 04:14) >      IMPRESSION:    Since 11/29/2019,    Mild inflammatory change/fluid surrounding the pancreatic body/tail,   overall improved from prior study.    Trace residual fluid/small fluid collection anterior to the right iliac   artery spanning approximately 2.7 x 1.3 cm.    Redemonstration of hepatomegaly and hepatic steatosis      TONY BEAL M.D., RESIDENT RADIOLOGIST  This document has been electronically signed.  ANA MATHEWS M.D., ATTENDING RADIOLOGIST  This document has been electronically signed. Dec 10 2019  6:52AM        < end of copied text >                          11.9   7.68  )-----------( 380      ( 10 Dec 2019 02:35 )             36.4     12-10    138  |  83<L>  |  11  ----------------------------<  109<H>  2.3<LL>   |  31  |  0.6<L>    Ca    10.1      10 Dec 2019 02:35  Mg     2.3     12-10    TPro  7.0  /  Alb  3.8  /  TBili  2.5<H>  /  DBili  x   /  AST  173<H>  /  ALT  52<H>  /  AlkPhos  279<H>  12-10        Lactate Trend  12-10 @ 02:35 Lactate:6.4     CAPILLARY BLOOD GLUCOSE

## 2019-12-10 NOTE — ED PROVIDER NOTE - NS ED ROS FT
Review of Systems  Constitutional:  No fever, chills.  Eyes:  No visual changes, eye pain, or discharge.  ENMT:  No hearing changes, pain, or discharge. No nasal congestion, discharge, or bleeding. No throat pain, swelling, or difficulty swallowing.  Cardiac:  No chest pain, palpitations, syncope.  Respiratory:  No dyspnea, cough. No hemoptysis.  GI:  No diarrhea. No melena or hematochezia. (+) nausea, vomiting, abdominal pain  :  No dysuria, hematuria, frequency, or burning.   MS:  No back pain.  Skin:  No skin rash, pruritis, jaundice, or lesions.  Neuro:  No headache, dizziness, loss of sensation, or focal weakness.  No change in mental status.   Endocrine: No history of thyroid disease or diabetes.

## 2019-12-10 NOTE — H&P ADULT - HISTORY OF PRESENT ILLNESS
38yo female whose PMH includes alcohol abuse (last drink was yesterday) presented to the ER with epigastric pain and vomiting. Reports that she had been diagnosed with pancreatitis.

## 2019-12-10 NOTE — ED PROVIDER NOTE - PHYSICAL EXAMINATION
VITAL SIGNS: I have reviewed nursing notes and confirm.  CONSTITUTIONAL: Well-developed; well-nourished; in no acute distress.  SKIN: Skin exam is warm and dry, no acute rash.  HEAD: Normocephalic; atraumatic.  EYES: Conjunctiva and sclera clear.  ENT: No nasal discharge; airway clear.   NECK: Supple; non tender.  CARD: S1, S2 normal; no murmurs, gallops, or rubs. Regular rate and rhythm.  RESP: No wheezes, rales or rhonchi. Speaking in full sentences.   ABD: Normal bowel sounds; soft; non-distended; (+) epigastric TTP; No rebound or guarding. No CVA tenderness.  EXT: Normal ROM. No clubbing, cyanosis or edema.  NEURO: Alert, oriented. Grossly unremarkable. No focal deficits.

## 2019-12-10 NOTE — CONSULT NOTE ADULT - SUBJECTIVE AND OBJECTIVE BOX
HPI: 38yo F w/ PMH of ETOH abuse and depression p/w N/V that started yesterday afternoon. Pt reports having nausea and nonbilious nonbloody vomiting x 3 times yesterday afternoon. + mild epigastric pain. In ED, pt noted to have acute pancreatitis confirmed by elevated lipase. K+ 2.3 and Lactate 6.4 noted. Aggressive IVF started, K supplement given. Pt reports having moderate improvement. No other complaints noted. Pt denies any fever/chills, CP, SOB, cough, diarrhea, dysuria, dizziness, numbness/tingling. Pt denies having pancreatitis before. Pt drinks 2-4 alcoholic beverages 5-6 days / week. Last drink was yesterday around 1pm.       PAST MEDICAL & SURGICAL HISTORY:  ETOH abuse  Postpartum depression  Substance abuse  Anxiety  Depression  No significant past surgical history      Allergies  No Known Allergies      FAMILY HISTORY:  No pertinent family history in first degree relatives      Home Medications:      Occupation:  Alochol: 2-4 beer or hard liquor 5-6 days / week  Smokin pack / day x 10 yrs  Drug Use: none  Marital Status:       ROS: as in HPI; All other systems reviewed are negative    ICU Vital Signs Last 24 Hrs  T(C): 36.6 (10 Dec 2019 02:02), Max: 36.6 (10 Dec 2019 02:02)  T(F): 97.8 (10 Dec 2019 02:02), Max: 97.8 (10 Dec 2019 02:02)  HR: 102 (10 Dec 2019 02:02) (102 - 102)  BP: 142/86 (10 Dec 2019 02:02) (142/86 - 142/86)  BP(mean): --  ABP: --  ABP(mean): --  RR: 18 (10 Dec 2019 02:02) (18 - 18)  SpO2: 99% (10 Dec 2019 02:02) (99% - 99%)        Physical Examination:    General: No acute distress.  Alert, oriented, interactive, nonfocal    HEENT: Pupils equal, reactive to light.  Symmetric.    PULM: Clear to auscultation bilaterally, no significant sputum production    CVS: Regular rate and rhythm, no murmurs, rubs, or gallops    ABD: + Mild tenderness upon tenderness on epigastric region. Soft, nondistended, normoactive bowel sounds, no masses    EXT: No edema, nontender    SKIN: Warm and well perfused, no rashes noted.    Neuro: AAO x 4          I&O's Detail        LABS:                        11.9   7.68  )-----------( 380      ( 10 Dec 2019 02:35 )             36.4     10 Dec 2019 02:35    138    |  83     |  11     ----------------------------<  109    2.3     |  31     |  0.6      Ca    10.1       10 Dec 2019 02:35  Mg     2.3       10 Dec 2019 02:35    TPro  7.0    /  Alb  3.8    /  TBili  2.5    /  DBili  x      /  AST  173    /  ALT  52     /  AlkPhos  279    10 Dec 2019 02:35  Amylase x     lipase 265            CAPILLARY BLOOD GLUCOSE            Culture    Lactate, Blood: 6.4 mmol/L (12-10-19 @ 02:35)      MEDICATIONS  (STANDING):  lactated ringers. 1000 milliLiter(s) (1000 mL/Hr) IV Continuous <Continuous>  potassium chloride  20 mEq/100 mL IVPB 20 milliEquivalent(s) IV Intermittent once    MEDICATIONS  (PRN):        RADIOLOGY:     < from: CT Abdomen and Pelvis w/ IV Cont (12.10.19 @ 04:14) >    Since 2019,    Trace amount of fat stranding surrounding the pancreatic body and tail,   improved since prior study.    No drainable focal peripancreatic fluid collections.    < end of copied text >

## 2019-12-10 NOTE — CHART NOTE - NSCHARTNOTEFT_GEN_A_CORE
Called by lab earlier for K: 2.6 - relayed this information to Dr. Gore who has ordered medications for repletion.

## 2019-12-10 NOTE — CONSULT NOTE ADULT - ASSESSMENT
40yo F w/ PMH of ETOH abuse and depression p/w nausea and nonbilious nonbloody vomiting x 3 times yesterday afternoon. + mild epigastric pain. In ED, pt noted to have acute pancreatitis confirmed by elevated lipase. K+ 2.3 and Lactate 6.4 noted. Aggressive IVF started, K supplement given. Pt reports having moderate improvement. No other complaints noted. Pt denies having pancreatitis before. Pt drinks 2-4 alcoholic beverages 5-6 days / week. Last drink was yesterday around 1pm.     Discussed w/ Dr. Patterson. Admit to medical floor      Acute Pancreatitis  - likely 2/2 ETOH abuse  - IVF  - NPO  - Repeat lactate level  - zofran PRN for nausea  - GI consult    Hypokalemia  - K+ 2.3 noted. Likely from vomiting  - K supplement  - repeat BMP, f/u lytes    ETOH abuse  - Last drink 1pm yesterday  - serum alcohol level 23  - [facility] protocol for possible withdrawal  - thiamine, folate, multivitamin

## 2019-12-10 NOTE — ED PROVIDER NOTE - OBJECTIVE STATEMENT
38 yo F with PMHx of anxiety, depression, alcohol abuse, cocaine abuse and pancreatitis presents to the ED c/o nausea, NBNB vomiting and upper abdominal pain x 3 weeks. Pt states she was at another hospital and was diagnosed with pancreatitis but left AMA. Today symptoms worsened so she came to ED. She has been able to tolerate minimal PO intake since onset of symptoms. She denies other complaints. Pt denies fever, chills, diarrhea, headache, dizziness, weakness, chest pain, SOB, back pain, LOC, trauma, urinary symptoms, cough, calf pain/swelling, recent travel, recent surgery.

## 2019-12-10 NOTE — ED PROVIDER NOTE - CLINICAL SUMMARY MEDICAL DECISION MAKING FREE TEXT BOX
PT with  hx of etoh abuse  pw 3 weeks of vomiting - dxd outside hospital with pancreatitis per patient and friend - left AMA -  today with  epigastric pain and vomiting.  nonbloody no fever - CT with mild pancreatic inflammation    no necrosis,  K 2.3, qt 596 - Mg and K replaced EKG repeated : 397 qtc -  ,  etoh  23 -  ativan 1 mg given -  pt denies being in withdrawal - no tremor  +  sinus tachycardia - Pt  evaluated by ICU given  alcoholic pancreatitis-  admitted to chest pain telemetry for electrolyte  disorders

## 2019-12-10 NOTE — CHART NOTE - NSCHARTNOTEFT_GEN_A_CORE
Detox consult open for this patient 2/2 daily ETOH use. Discussed case with ANIKET Liang - will place patient on librium taper protocol

## 2019-12-10 NOTE — ED PROVIDER NOTE - CARE PLAN
Principal Discharge DX:	Prolonged QT interval  Secondary Diagnosis:	Hypokalemia  Secondary Diagnosis:	Pancreatitis  Secondary Diagnosis:	Nausea & vomiting  Secondary Diagnosis:	Alcohol abuse

## 2019-12-10 NOTE — H&P ADULT - PROBLEM/PLAN-2
Occupational Therapy   Treatment & Discharge Summary    Name: Jian Arrieta  MRN: [id number]  Admitting Diagnosis:  Symptomatic anemia       Recommendations:     Discharge Recommendations: home  Discharge Equipment Recommendations:  (pt requesting a motorized scooter)      Assessment:     Jian Arrieta is a 66 y.o. male with a medical diagnosis of Symptomatic anemia.  He presents with greatly improved overall mobility with ADL's & functional mobility to the point that pt no longer requires skilled OT services on acute.         Plan:     Discharge OT on acute.  · Plan of Care Reviewed with: patient    Subjective     Pain/Comfort:  · Pain Rating 1: 0/10  · Pain Rating Post-Intervention 1: 0/10    Objective:     Communicated with: Rn prior to session.  Patient found supine with telemetry upon OT entry to room.    General Precautions: Standard, fall     Occupational Performance:     Bed Mobility:    · Patient completed Supine to Sit with modified independence  · Patient completed Sit to Supine with modified independence   · Scooting up HOB while supine with Modified independence.    Functional Mobility/Transfers:  · Patient completed Sit <> Stand Transfer with modified independence  with  no assistive device   · Patient completed Bed <> Chair Transfer using Stand Pivot technique with modified independence with no assistive device  · Patient completed Toilet Transfer Stand Pivot technique with modified independence with  no AD  · Functional Mobility: pt mobilizing around room to bathroom & back holding walker up rather than using correctly stating that he does not need the walker.    Activities of Daily Living:  · Upper Body Dressing: modified independence donning button down shirt while seated EOB      [facility] 6 Click ADL: 20    Treatment & Education:  Pt provided with red theraband upon pt request.  Pt able to correctly demonstrate theraband exercises for BUE & BLE.  Provided demonstration for 2 more exercises that pt  could perform with theraband with BUE with pt demonstrating understanding.  Pt had no further questions & when asked whether there were any concerns pt reported none.      Patient left supine with all lines intact, call button in reach, RN notified and white board updated.Education:      GOALS:   Multidisciplinary Problems     Occupational Therapy Goals     Not on file          Multidisciplinary Problems (Resolved)        Problem: Occupational Therapy Goal    Goal Priority Disciplines Outcome Interventions   Occupational Therapy Goal   (Resolved)     OT, PT/OT Outcome(s) achieved    Description:  Goals to be met by: 6/5/19     Patient will increase functional independence with ADLs by performing:    UE Dressing with Contact Guard Assistance. - met  LE Dressing with Maximum Assistance using AE as needed. - met  Grooming while standing with Minimal Assistance. - met  Toileting from bedside commode with Moderate Assistance for hygiene and clothing management. - met  Rolling to Bilateral with Stand-by Assistance. - met  Supine to sit with Set-up Assistance. - met  Step transfer with Stand-by Assistance - met  Toilet transfer to bedside commode with Stand-by Assistance. - met                       Time Tracking:     OT Date of Treatment: 05/27/19  OT Start Time: 1406  OT Stop Time: 1439  OT Total Time (min): 33 min   Pt seen in partial co-treat with PT.    Billable Minutes:Self Care/Home Management 10  Therapeutic Exercise 13    IDRIS Marie  5/27/2019     DISPLAY PLAN FREE TEXT

## 2019-12-11 LAB
ALBUMIN SERPL ELPH-MCNC: 2.6 G/DL — LOW (ref 3.5–5.2)
ALBUMIN SERPL ELPH-MCNC: 2.8 G/DL — LOW (ref 3.5–5.2)
ALP SERPL-CCNC: 204 U/L — HIGH (ref 30–115)
ALP SERPL-CCNC: 212 U/L — HIGH (ref 30–115)
ALT FLD-CCNC: 51 U/L — HIGH (ref 0–41)
ALT FLD-CCNC: 55 U/L — HIGH (ref 0–41)
ANION GAP SERPL CALC-SCNC: 9 MMOL/L — SIGNIFICANT CHANGE UP (ref 7–14)
ANION GAP SERPL CALC-SCNC: 9 MMOL/L — SIGNIFICANT CHANGE UP (ref 7–14)
AST SERPL-CCNC: 224 U/L — HIGH (ref 0–41)
AST SERPL-CCNC: 272 U/L — HIGH (ref 0–41)
BILIRUB DIRECT SERPL-MCNC: 1.4 MG/DL — HIGH (ref 0–0.2)
BILIRUB INDIRECT FLD-MCNC: 0.6 MG/DL — SIGNIFICANT CHANGE UP (ref 0.2–1.2)
BILIRUB SERPL-MCNC: 2 MG/DL — HIGH (ref 0.2–1.2)
BILIRUB SERPL-MCNC: 2.4 MG/DL — HIGH (ref 0.2–1.2)
BUN SERPL-MCNC: 4 MG/DL — LOW (ref 10–20)
BUN SERPL-MCNC: 5 MG/DL — LOW (ref 10–20)
CALCIUM SERPL-MCNC: 8 MG/DL — LOW (ref 8.5–10.1)
CALCIUM SERPL-MCNC: 8 MG/DL — LOW (ref 8.5–10.1)
CHLORIDE SERPL-SCNC: 101 MMOL/L — SIGNIFICANT CHANGE UP (ref 98–110)
CHLORIDE SERPL-SCNC: 103 MMOL/L — SIGNIFICANT CHANGE UP (ref 98–110)
CO2 SERPL-SCNC: 30 MMOL/L — SIGNIFICANT CHANGE UP (ref 17–32)
CO2 SERPL-SCNC: 30 MMOL/L — SIGNIFICANT CHANGE UP (ref 17–32)
CREAT SERPL-MCNC: <0.5 MG/DL — LOW (ref 0.7–1.5)
CREAT SERPL-MCNC: <0.5 MG/DL — LOW (ref 0.7–1.5)
GLUCOSE SERPL-MCNC: 91 MG/DL — SIGNIFICANT CHANGE UP (ref 70–99)
GLUCOSE SERPL-MCNC: 93 MG/DL — SIGNIFICANT CHANGE UP (ref 70–99)
HCT VFR BLD CALC: 27.3 % — LOW (ref 37–47)
HGB BLD-MCNC: 8.6 G/DL — LOW (ref 12–16)
MAGNESIUM SERPL-MCNC: 2.1 MG/DL — SIGNIFICANT CHANGE UP (ref 1.8–2.4)
MCHC RBC-ENTMCNC: 31.5 G/DL — LOW (ref 32–37)
MCHC RBC-ENTMCNC: 33.1 PG — HIGH (ref 27–31)
MCV RBC AUTO: 105 FL — HIGH (ref 81–99)
NRBC # BLD: 0 /100 WBCS — SIGNIFICANT CHANGE UP (ref 0–0)
PHOSPHATE SERPL-MCNC: 3.1 MG/DL — SIGNIFICANT CHANGE UP (ref 2.1–4.9)
PLATELET # BLD AUTO: 305 K/UL — SIGNIFICANT CHANGE UP (ref 130–400)
POTASSIUM SERPL-MCNC: 3.4 MMOL/L — LOW (ref 3.5–5)
POTASSIUM SERPL-MCNC: 3.4 MMOL/L — LOW (ref 3.5–5)
POTASSIUM SERPL-SCNC: 3.4 MMOL/L — LOW (ref 3.5–5)
POTASSIUM SERPL-SCNC: 3.4 MMOL/L — LOW (ref 3.5–5)
PROT SERPL-MCNC: 4.8 G/DL — LOW (ref 6–8)
PROT SERPL-MCNC: 5.1 G/DL — LOW (ref 6–8)
RBC # BLD: 2.6 M/UL — LOW (ref 4.2–5.4)
RBC # FLD: 16.6 % — HIGH (ref 11.5–14.5)
SODIUM SERPL-SCNC: 140 MMOL/L — SIGNIFICANT CHANGE UP (ref 135–146)
SODIUM SERPL-SCNC: 142 MMOL/L — SIGNIFICANT CHANGE UP (ref 135–146)
WBC # BLD: 5.38 K/UL — SIGNIFICANT CHANGE UP (ref 4.8–10.8)
WBC # FLD AUTO: 5.38 K/UL — SIGNIFICANT CHANGE UP (ref 4.8–10.8)

## 2019-12-11 PROCEDURE — 99233 SBSQ HOSP IP/OBS HIGH 50: CPT

## 2019-12-11 RX ORDER — HEPARIN SODIUM 5000 [USP'U]/ML
5000 INJECTION INTRAVENOUS; SUBCUTANEOUS EVERY 8 HOURS
Refills: 0 | Status: DISCONTINUED | OUTPATIENT
Start: 2019-12-11 | End: 2019-12-18

## 2019-12-11 RX ORDER — OXYCODONE HYDROCHLORIDE 5 MG/1
5 TABLET ORAL EVERY 4 HOURS
Refills: 0 | Status: DISCONTINUED | OUTPATIENT
Start: 2019-12-11 | End: 2019-12-18

## 2019-12-11 RX ORDER — HYDROXYZINE HCL 10 MG
50 TABLET ORAL ONCE
Refills: 0 | Status: COMPLETED | OUTPATIENT
Start: 2019-12-11 | End: 2019-12-11

## 2019-12-11 RX ORDER — OXYCODONE HYDROCHLORIDE 5 MG/1
5 TABLET ORAL EVERY 6 HOURS
Refills: 0 | Status: DISCONTINUED | OUTPATIENT
Start: 2019-12-11 | End: 2019-12-11

## 2019-12-11 RX ADMIN — OXYCODONE HYDROCHLORIDE 5 MILLIGRAM(S): 5 TABLET ORAL at 19:43

## 2019-12-11 RX ADMIN — OXYCODONE HYDROCHLORIDE 5 MILLIGRAM(S): 5 TABLET ORAL at 10:17

## 2019-12-11 RX ADMIN — SODIUM CHLORIDE 175 MILLILITER(S): 9 INJECTION, SOLUTION INTRAVENOUS at 20:58

## 2019-12-11 RX ADMIN — Medication 25 MILLIGRAM(S): at 09:35

## 2019-12-11 RX ADMIN — MORPHINE SULFATE 4 MILLIGRAM(S): 50 CAPSULE, EXTENDED RELEASE ORAL at 03:10

## 2019-12-11 RX ADMIN — OXYCODONE HYDROCHLORIDE 5 MILLIGRAM(S): 5 TABLET ORAL at 15:22

## 2019-12-11 RX ADMIN — Medication 1 PATCH: at 12:46

## 2019-12-11 RX ADMIN — Medication 25 MILLIGRAM(S): at 01:19

## 2019-12-11 RX ADMIN — Medication 100 MILLIGRAM(S): at 12:46

## 2019-12-11 RX ADMIN — Medication 1 MILLIGRAM(S): at 12:46

## 2019-12-11 RX ADMIN — OXYCODONE HYDROCHLORIDE 5 MILLIGRAM(S): 5 TABLET ORAL at 15:16

## 2019-12-11 RX ADMIN — SODIUM CHLORIDE 175 MILLILITER(S): 9 INJECTION, SOLUTION INTRAVENOUS at 10:17

## 2019-12-11 RX ADMIN — OXYCODONE HYDROCHLORIDE 5 MILLIGRAM(S): 5 TABLET ORAL at 20:58

## 2019-12-11 RX ADMIN — Medication 1 PATCH: at 19:43

## 2019-12-11 RX ADMIN — Medication 50 MILLIGRAM(S): at 01:20

## 2019-12-11 RX ADMIN — Medication 25 MILLIGRAM(S): at 05:39

## 2019-12-11 RX ADMIN — SODIUM CHLORIDE 175 MILLILITER(S): 9 INJECTION, SOLUTION INTRAVENOUS at 12:44

## 2019-12-11 RX ADMIN — Medication 1 PATCH: at 12:00

## 2019-12-11 RX ADMIN — Medication 1 TABLET(S): at 12:46

## 2019-12-11 RX ADMIN — SODIUM CHLORIDE 125 MILLILITER(S): 9 INJECTION, SOLUTION INTRAVENOUS at 05:39

## 2019-12-11 RX ADMIN — Medication 1 PATCH: at 07:34

## 2019-12-11 NOTE — CONSULT NOTE ADULT - SUBJECTIVE AND OBJECTIVE BOX
DETOX   CONSULT    Pt admitted for pancreatitis  and ETOH hx      Pt interviewed, examined and EMR chart reviewed.  Hx of ETOH abuse, has been drinking for __few___ years/months  variable periods of sobriety in the past.  Has been in detox before __x___yes,   _____No    SOCIAL HISTORY: etoh    REVIEW OF SYSTEMS:    Constitutional: No fever, weight loss or fatigue  ENT:  No difficulty hearing, tinnitus, vertigo; No sinus or throat pain  Neck: No pain or stiffness  Respiratory: No cough, wheezing, chills or hemoptysis  Cardiovascular: No chest pain, palpitations, shortness of breath, dizziness or leg swelling  Gastrointestinal:    (++) abdominal or epigastric pain. No nausea, vomiting or hematemesis; No diarrhea or constipation. No melena or hematochezia.  Neurological: No headaches, memory loss, loss of strength, numbness or tremors  Musculoskeletal: No joint pain or swelling; No muscle, back or extremity pain  Psychiatric: No depression, anxiety, mood swings or difficulty sleeping    MEDICATIONS  (STANDING):  folic acid 1 milliGRAM(s) Oral daily  heparin  Injectable 5000 Unit(s) SubCutaneous every 8 hours  lactated ringers. 1000 milliLiter(s) (175 mL/Hr) IV Continuous <Continuous>  multivitamin 1 Tablet(s) Oral daily  nicotine - 21 mG/24Hr(s) Patch 1 patch Transdermal daily  thiamine 100 milliGRAM(s) Oral daily    MEDICATIONS  (PRN):  LORazepam     Tablet 2 milliGRAM(s) Oral every 2 hours PRN CIWA-Ar score increase by 2 points and a total score of 7 or less  LORazepam     Tablet 2 milliGRAM(s) Oral every 1 hour PRN CIWA-Ar score 8 or greater  oxyCODONE    IR 5 milliGRAM(s) Oral every 6 hours PRN Severe Pain (7 - 10)      Vital Signs Last 24 Hrs  T(C): 36.9 (11 Dec 2019 05:00), Max: 36.9 (11 Dec 2019 05:00)  T(F): 98.4 (11 Dec 2019 05:00), Max: 98.4 (11 Dec 2019 05:00)  HR: 102 (11 Dec 2019 05:00) (101 - 113)  BP: 118/72 (11 Dec 2019 05:00) (99/56 - 128/-)  BP(mean): --  RR: 16 (11 Dec 2019 05:00) (16 - 16)  SpO2: 97% (10 Dec 2019 11:25) (97% - 97%)    PHYSICAL EXAM:    Constitutional: NAD, well-groomed, well-developed  HEENT: PERRLA, EOMI, Normal Hearing, MMM  Neck: No LAD, No JVD  Back: Normal spine flexure, No CVA tenderness  Respiratory: CTAB/L  Cardiovascular: S1 and S2, RRR, no M/G/R  Gastrointestinal: BS+, soft, NT/ND  Extremities: No peripheral edema  Vascular: 2+ peripheral pulses  Neurological: A/O x 3, no focal deficits    LABS:                        8.6    5.38  )-----------( 305      ( 11 Dec 2019 07:08 )             27.3     12-11    140  |  101  |  5<L>  ----------------------------<  91  3.4<L>   |  30  |  <0.5<L>    Ca    8.0<L>      11 Dec 2019 07:08  Phos  3.2     12-10  Mg     2.6     12-10    TPro  5.1<L>  /  Alb  2.8<L>  /  TBili  2.4<H>  /  DBili  x   /  AST  272<H>  /  ALT  55<H>  /  AlkPhos  212<H>  12-11        Drug Screen Urine:  Alcohol Level  Alcohol, Blood: 23 mg/dL (12-10-19 @ 02:35)        RADIOLOGY & ADDITIONAL STUDIES: noted in PACS

## 2019-12-11 NOTE — CONSULT NOTE ADULT - ASSESSMENT
ETOH dependency  Pancreatitis          Ativan 2mg q4 prn is ok  Thiamine supplements  keep lytes and Mg level in normal range  She syas will return to her programs

## 2019-12-11 NOTE — PROGRESS NOTE ADULT - ASSESSMENT
39F PMHx alcohol abuse, cocaine abuse, h/o pancreatitis here with acute pancreatitis.    #Acute pancreatitis  LR increase to 175 cc/hr  advance diet as tolerated  pain control; notably pt with recent admission and eloped with iv line, police was informed  #Alcohol abuse  last drink two days prior  change librium taper to symptom triggered ciwa  folic acid 1  multivit  thiamine 100  #Cholestasis  ?intrahepatic in setting of presumed steatohepatitis  trend, needs outpt gi f/u  #DVT ppx  subq hep 39F PMHx alcohol abuse, cocaine abuse, h/o pancreatitis here with acute pancreatitis.    #Acute pancreatitis  LR increase to 175 cc/hr  advance diet as tolerated  pain control; notably pt with recent admission and eloped with iv line, police was informed  #Alcohol abuse  last drink two days prior  change librium taper to symptom triggered ciwa  folic acid 1  multivit  thiamine 100  #Cholestasis  ?intrahepatic in setting of presumed steatohepatitis  trend, needs outpt gi f/u  alcohol cessation counseling  #DVT ppx  subq hep    #Progress Note Handoff:  Pending (specify):  Consults_________, Tests________, Test Results_______, Other____advance diet_____  Family discussion:d/w pt at bedside re: treatment plan, primary dx  Disposition: Home__x_/SNF___/Other________/Unknown at this time________

## 2019-12-11 NOTE — PROGRESS NOTE ADULT - SUBJECTIVE AND OBJECTIVE BOX
INTERVAL HPI/OVERNIGHT EVENTS:    SUBJECTIVE: Patient seen and examined at bedside.     no cp, sob, fever  +abd pain, diffuse, nonradiating, crampy    OBJECTIVE:    VITAL SIGNS:  ICU Vital Signs Last 24 Hrs  T(C): 36.9 (11 Dec 2019 05:00), Max: 36.9 (11 Dec 2019 05:00)  T(F): 98.4 (11 Dec 2019 05:00), Max: 98.4 (11 Dec 2019 05:00)  HR: 102 (11 Dec 2019 05:00) (101 - 113)  BP: 118/72 (11 Dec 2019 05:00) (99/56 - 128/-)  BP(mean): --  ABP: --  ABP(mean): --  RR: 16 (11 Dec 2019 05:00) (16 - 16)  SpO2: 97% (10 Dec 2019 11:25) (97% - 97%)        CAPILLARY BLOOD GLUCOSE          PHYSICAL EXAM:    General: NAD  HEENT: NC/AT; PERRL, clear conjunctiva  Neck: supple  Respiratory: CTA b/l  Cardiovascular: +S1/S2; RRR  Abdomen: soft, sl tender to palpation diffusely, nd; +BS x4  Extremities: WWP, 2+ peripheral pulses b/l; no LE edema  Skin: normal color and turgor; no rash  Neurological:    MEDICATIONS:  MEDICATIONS  (STANDING):  chlordiazePOXIDE 25 milliGRAM(s) Oral every 4 hours  chlordiazePOXIDE   Oral   chlordiazePOXIDE 20 milliGRAM(s) Oral every 4 hours  folic acid 1 milliGRAM(s) Oral daily  heparin  Injectable 5000 Unit(s) SubCutaneous every 8 hours  lactated ringers. 1000 milliLiter(s) (175 mL/Hr) IV Continuous <Continuous>  multivitamin 1 Tablet(s) Oral daily  nicotine - 21 mG/24Hr(s) Patch 1 patch Transdermal daily  thiamine 100 milliGRAM(s) Oral daily    MEDICATIONS  (PRN):  LORazepam   Injectable 2 milliGRAM(s) IV Push every 4 hours PRN withdrawal  morphine  - Injectable 4 milliGRAM(s) IV Push every 4 hours PRN Severe Pain (7 - 10)      ALLERGIES:  Allergies    No Known Allergies    Intolerances        LABS:                        8.6    5.38  )-----------( 305      ( 11 Dec 2019 07:08 )             27.3     Hemoglobin: 8.6 g/dL (12-11 @ 07:08)  Hemoglobin: 9.5 g/dL (12-10 @ 11:15)  Hemoglobin: 11.9 g/dL (12-10 @ 02:35)    CBC Full  -  ( 11 Dec 2019 07:08 )  WBC Count : 5.38 K/uL  RBC Count : 2.60 M/uL  Hemoglobin : 8.6 g/dL  Hematocrit : 27.3 %  Platelet Count - Automated : 305 K/uL  Mean Cell Volume : 105.0 fL  Mean Cell Hemoglobin : 33.1 pg  Mean Cell Hemoglobin Concentration : 31.5 g/dL  Auto Neutrophil # : x  Auto Lymphocyte # : x  Auto Monocyte # : x  Auto Eosinophil # : x  Auto Basophil # : x  Auto Neutrophil % : x  Auto Lymphocyte % : x  Auto Monocyte % : x  Auto Eosinophil % : x  Auto Basophil % : x    12-11    140  |  101  |  5<L>  ----------------------------<  91  3.4<L>   |  30  |  <0.5<L>    Ca    8.0<L>      11 Dec 2019 07:08  Phos  3.2     12-10  Mg     2.6     12-10    TPro  5.1<L>  /  Alb  2.8<L>  /  TBili  2.4<H>  /  DBili  x   /  AST  272<H>  /  ALT  55<H>  /  AlkPhos  212<H>  12-11    Creatinine Trend: <0.5<--, 0.5<--, 0.5<--, 0.6<--, 0.5<--, 0.5<--  LIVER FUNCTIONS - ( 11 Dec 2019 07:08 )  Alb: 2.8 g/dL / Pro: 5.1 g/dL / ALK PHOS: 212 U/L / ALT: 55 U/L / AST: 272 U/L / GGT: x               hs Troponin:              CSF:                      EKG:   MICROBIOLOGY:    IMAGING:      Labs, imaging, EKG personally reviewed    RADIOLOGY & ADDITIONAL TESTS: Reviewed.

## 2019-12-12 LAB
ANION GAP SERPL CALC-SCNC: 11 MMOL/L — SIGNIFICANT CHANGE UP (ref 7–14)
BUN SERPL-MCNC: <3 MG/DL — LOW (ref 10–20)
CALCIUM SERPL-MCNC: 8.3 MG/DL — LOW (ref 8.5–10.1)
CHLORIDE SERPL-SCNC: 103 MMOL/L — SIGNIFICANT CHANGE UP (ref 98–110)
CO2 SERPL-SCNC: 26 MMOL/L — SIGNIFICANT CHANGE UP (ref 17–32)
CREAT SERPL-MCNC: 0.5 MG/DL — LOW (ref 0.7–1.5)
GLUCOSE SERPL-MCNC: 94 MG/DL — SIGNIFICANT CHANGE UP (ref 70–99)
HCT VFR BLD CALC: 31.3 % — LOW (ref 37–47)
HGB BLD-MCNC: 9.7 G/DL — LOW (ref 12–16)
MAGNESIUM SERPL-MCNC: 2 MG/DL — SIGNIFICANT CHANGE UP (ref 1.8–2.4)
MCHC RBC-ENTMCNC: 31 G/DL — LOW (ref 32–37)
MCHC RBC-ENTMCNC: 33.1 PG — HIGH (ref 27–31)
MCV RBC AUTO: 106.8 FL — HIGH (ref 81–99)
NRBC # BLD: 0 /100 WBCS — SIGNIFICANT CHANGE UP (ref 0–0)
PHOSPHATE SERPL-MCNC: 3.1 MG/DL — SIGNIFICANT CHANGE UP (ref 2.1–4.9)
PLATELET # BLD AUTO: 353 K/UL — SIGNIFICANT CHANGE UP (ref 130–400)
POTASSIUM SERPL-MCNC: 3.5 MMOL/L — SIGNIFICANT CHANGE UP (ref 3.5–5)
POTASSIUM SERPL-SCNC: 3.5 MMOL/L — SIGNIFICANT CHANGE UP (ref 3.5–5)
RBC # BLD: 2.93 M/UL — LOW (ref 4.2–5.4)
RBC # FLD: 16.8 % — HIGH (ref 11.5–14.5)
SODIUM SERPL-SCNC: 140 MMOL/L — SIGNIFICANT CHANGE UP (ref 135–146)
WBC # BLD: 5.56 K/UL — SIGNIFICANT CHANGE UP (ref 4.8–10.8)
WBC # FLD AUTO: 5.56 K/UL — SIGNIFICANT CHANGE UP (ref 4.8–10.8)

## 2019-12-12 PROCEDURE — 99233 SBSQ HOSP IP/OBS HIGH 50: CPT

## 2019-12-12 RX ORDER — SENNA PLUS 8.6 MG/1
2 TABLET ORAL AT BEDTIME
Refills: 0 | Status: DISCONTINUED | OUTPATIENT
Start: 2019-12-12 | End: 2019-12-18

## 2019-12-12 RX ORDER — OXYCODONE HYDROCHLORIDE 5 MG/1
5 TABLET ORAL ONCE
Refills: 0 | Status: DISCONTINUED | OUTPATIENT
Start: 2019-12-12 | End: 2019-12-12

## 2019-12-12 RX ORDER — POLYETHYLENE GLYCOL 3350 17 G/17G
17 POWDER, FOR SOLUTION ORAL DAILY
Refills: 0 | Status: DISCONTINUED | OUTPATIENT
Start: 2019-12-12 | End: 2019-12-18

## 2019-12-12 RX ORDER — IBUPROFEN 200 MG
400 TABLET ORAL ONCE
Refills: 0 | Status: COMPLETED | OUTPATIENT
Start: 2019-12-12 | End: 2019-12-12

## 2019-12-12 RX ADMIN — OXYCODONE HYDROCHLORIDE 5 MILLIGRAM(S): 5 TABLET ORAL at 01:30

## 2019-12-12 RX ADMIN — Medication 1 TABLET(S): at 11:49

## 2019-12-12 RX ADMIN — Medication 400 MILLIGRAM(S): at 22:23

## 2019-12-12 RX ADMIN — OXYCODONE HYDROCHLORIDE 5 MILLIGRAM(S): 5 TABLET ORAL at 05:44

## 2019-12-12 RX ADMIN — OXYCODONE HYDROCHLORIDE 5 MILLIGRAM(S): 5 TABLET ORAL at 01:58

## 2019-12-12 RX ADMIN — Medication 1 PATCH: at 11:49

## 2019-12-12 RX ADMIN — Medication 1 PATCH: at 08:55

## 2019-12-12 RX ADMIN — OXYCODONE HYDROCHLORIDE 5 MILLIGRAM(S): 5 TABLET ORAL at 04:53

## 2019-12-12 RX ADMIN — Medication 1 PATCH: at 12:00

## 2019-12-12 RX ADMIN — OXYCODONE HYDROCHLORIDE 5 MILLIGRAM(S): 5 TABLET ORAL at 11:51

## 2019-12-12 RX ADMIN — OXYCODONE HYDROCHLORIDE 5 MILLIGRAM(S): 5 TABLET ORAL at 16:45

## 2019-12-12 RX ADMIN — SENNA PLUS 2 TABLET(S): 8.6 TABLET ORAL at 20:45

## 2019-12-12 RX ADMIN — Medication 100 MILLIGRAM(S): at 11:49

## 2019-12-12 RX ADMIN — OXYCODONE HYDROCHLORIDE 5 MILLIGRAM(S): 5 TABLET ORAL at 20:45

## 2019-12-12 RX ADMIN — POLYETHYLENE GLYCOL 3350 17 GRAM(S): 17 POWDER, FOR SOLUTION ORAL at 11:50

## 2019-12-12 RX ADMIN — OXYCODONE HYDROCHLORIDE 5 MILLIGRAM(S): 5 TABLET ORAL at 21:15

## 2019-12-12 RX ADMIN — Medication 1 MILLIGRAM(S): at 11:49

## 2019-12-12 RX ADMIN — Medication 1 PATCH: at 19:26

## 2019-12-12 RX ADMIN — OXYCODONE HYDROCHLORIDE 5 MILLIGRAM(S): 5 TABLET ORAL at 02:46

## 2019-12-12 RX ADMIN — OXYCODONE HYDROCHLORIDE 5 MILLIGRAM(S): 5 TABLET ORAL at 18:02

## 2019-12-12 RX ADMIN — OXYCODONE HYDROCHLORIDE 5 MILLIGRAM(S): 5 TABLET ORAL at 00:02

## 2019-12-12 RX ADMIN — OXYCODONE HYDROCHLORIDE 5 MILLIGRAM(S): 5 TABLET ORAL at 10:21

## 2019-12-12 NOTE — PROGRESS NOTE ADULT - SUBJECTIVE AND OBJECTIVE BOX
INTERVAL HPI/OVERNIGHT EVENTS:    SUBJECTIVE: Patient seen and examined at bedside.     no cp, sob, fever  abd pain, diffuse, crampy, distention  OBJECTIVE:    VITAL SIGNS:  ICU Vital Signs Last 24 Hrs  T(C): 35.9 (12 Dec 2019 06:35), Max: 37.3 (11 Dec 2019 22:10)  T(F): 96.6 (12 Dec 2019 06:35), Max: 99.1 (11 Dec 2019 22:10)  HR: 104 (12 Dec 2019 06:35) (102 - 104)  BP: 120/78 (12 Dec 2019 06:35) (104/66 - 120/78)  BP(mean): --  ABP: --  ABP(mean): --  RR: 16 (12 Dec 2019 06:35) (16 - 16)  SpO2: --        12-11 @ 07:01  -  12-12 @ 07:00  --------------------------------------------------------  IN: 1000 mL / OUT: 0 mL / NET: 1000 mL      CAPILLARY BLOOD GLUCOSE          PHYSICAL EXAM:    General: NAD  HEENT: NC/AT; PERRL, clear conjunctiva  Neck: supple  Respiratory: CTA b/l  Cardiovascular: +S1/S2; RRR  Abdomen: soft, sl ttp diffuse, abdominal distention; +BS x4  Extremities: WWP, 2+ peripheral pulses b/l; no LE edema  Skin: normal color and turgor; no rash  Neurological:    MEDICATIONS:  MEDICATIONS  (STANDING):  folic acid 1 milliGRAM(s) Oral daily  heparin  Injectable 5000 Unit(s) SubCutaneous every 8 hours  multivitamin 1 Tablet(s) Oral daily  nicotine - 21 mG/24Hr(s) Patch 1 patch Transdermal daily  thiamine 100 milliGRAM(s) Oral daily    MEDICATIONS  (PRN):  LORazepam     Tablet 2 milliGRAM(s) Oral every 2 hours PRN CIWA-Ar score increase by 2 points and a total score of 7 or less  LORazepam     Tablet 2 milliGRAM(s) Oral every 1 hour PRN CIWA-Ar score 8 or greater  oxyCODONE    IR 5 milliGRAM(s) Oral every 4 hours PRN Severe Pain (7 - 10)      ALLERGIES:  Allergies    No Known Allergies    Intolerances        LABS:                        8.6    5.38  )-----------( 305      ( 11 Dec 2019 07:08 )             27.3     Hemoglobin: 8.6 g/dL (12-11 @ 07:08)  Hemoglobin: 9.5 g/dL (12-10 @ 11:15)  Hemoglobin: 11.9 g/dL (12-10 @ 02:35)    CBC Full  -  ( 11 Dec 2019 07:08 )  WBC Count : 5.38 K/uL  RBC Count : 2.60 M/uL  Hemoglobin : 8.6 g/dL  Hematocrit : 27.3 %  Platelet Count - Automated : 305 K/uL  Mean Cell Volume : 105.0 fL  Mean Cell Hemoglobin : 33.1 pg  Mean Cell Hemoglobin Concentration : 31.5 g/dL  Auto Neutrophil # : x  Auto Lymphocyte # : x  Auto Monocyte # : x  Auto Eosinophil # : x  Auto Basophil # : x  Auto Neutrophil % : x  Auto Lymphocyte % : x  Auto Monocyte % : x  Auto Eosinophil % : x  Auto Basophil % : x    12-11    142  |  103  |  4<L>  ----------------------------<  93  3.4<L>   |  30  |  <0.5<L>    Ca    8.0<L>      11 Dec 2019 11:29  Phos  3.1     12-11  Mg     2.1     12-11    TPro  4.8<L>  /  Alb  2.6<L>  /  TBili  2.0<H>  /  DBili  1.4<H>  /  AST  224<H>  /  ALT  51<H>  /  AlkPhos  204<H>  12-11    Creatinine Trend: <0.5<--, <0.5<--, 0.5<--, 0.5<--, 0.6<--, 0.5<--  LIVER FUNCTIONS - ( 11 Dec 2019 11:29 )  Alb: 2.6 g/dL / Pro: 4.8 g/dL / ALK PHOS: 204 U/L / ALT: 51 U/L / AST: 224 U/L / GGT: x               hs Troponin:              CSF:                      EKG:   MICROBIOLOGY:    IMAGING:      Labs, imaging, EKG personally reviewed    RADIOLOGY & ADDITIONAL TESTS: Reviewed.

## 2019-12-12 NOTE — PROGRESS NOTE ADULT - ASSESSMENT
39F PMHx alcohol abuse, cocaine abuse, h/o pancreatitis here with acute pancreatitis.    #Acute pancreatitis, presumed due to alcohol  tolerating low fat diet but pain persisting  d/c LR  po pain control  triglycerides noted  #Alcohol abuse  symptom triggered ciwa  ciwas have been 0  folic acid 1  multivit  thiamine 100  #Cholestasis  ?intrahepatic in setting of presumed steatohepatitis  trend, needs outpt gi f/u  #DVT ppx  subq hep    #Progress Note Handoff:  Pending (specify):  Consults_________, Tests________, Test Results_______, Other____advance diet_____  Family discussion:d/w pt at bedside re: treatment plan, primary dx  Disposition: Home__x_/SNF___/Other________/Unknown at this time________ 39F PMHx alcohol abuse, cocaine abuse, h/o pancreatitis here with acute pancreatitis.    #Acute pancreatitis, presumed due to alcohol  tolerating low fat diet but pain persisting  d/c LR  po pain control  triglycerides noted  #Alcohol abuse  ciwa 0  cont symptom triggered ciwa  folic acid 1  multivit  thiamine 100  #Cholestasis, suspect intrahepatic in setting of steatohepatitis  trend cmp  needs outpt gi f/u  #DVT ppx  subq hep    #Progress Note Handoff:  Pending (specify):  Consults_________, Tests________, Test Results_______, Other____pain control____  Family discussion:d/w pt at bedside re: treatment plan, primary dx  Disposition: Home__x_/SNF___/Other________/Unknown at this time________

## 2019-12-13 LAB
ALBUMIN SERPL ELPH-MCNC: 2.7 G/DL — LOW (ref 3.5–5.2)
ALP SERPL-CCNC: 219 U/L — HIGH (ref 30–115)
ALT FLD-CCNC: 57 U/L — HIGH (ref 0–41)
ANION GAP SERPL CALC-SCNC: 11 MMOL/L — SIGNIFICANT CHANGE UP (ref 7–14)
APTT BLD: 32.1 SEC — SIGNIFICANT CHANGE UP (ref 27–39.2)
AST SERPL-CCNC: 174 U/L — HIGH (ref 0–41)
BILIRUB SERPL-MCNC: 1.6 MG/DL — HIGH (ref 0.2–1.2)
BUN SERPL-MCNC: 5 MG/DL — LOW (ref 10–20)
CALCIUM SERPL-MCNC: 8.4 MG/DL — LOW (ref 8.5–10.1)
CHLORIDE SERPL-SCNC: 104 MMOL/L — SIGNIFICANT CHANGE UP (ref 98–110)
CO2 SERPL-SCNC: 25 MMOL/L — SIGNIFICANT CHANGE UP (ref 17–32)
CREAT SERPL-MCNC: 0.5 MG/DL — LOW (ref 0.7–1.5)
FOLATE SERPL-MCNC: 6.2 NG/ML — SIGNIFICANT CHANGE UP
GLUCOSE SERPL-MCNC: 92 MG/DL — SIGNIFICANT CHANGE UP (ref 70–99)
HCT VFR BLD CALC: 30.9 % — LOW (ref 37–47)
HGB BLD-MCNC: 9.8 G/DL — LOW (ref 12–16)
INR BLD: 0.96 RATIO — SIGNIFICANT CHANGE UP (ref 0.65–1.3)
MCHC RBC-ENTMCNC: 31.7 G/DL — LOW (ref 32–37)
MCHC RBC-ENTMCNC: 33.1 PG — HIGH (ref 27–31)
MCV RBC AUTO: 104.4 FL — HIGH (ref 81–99)
NRBC # BLD: 0 /100 WBCS — SIGNIFICANT CHANGE UP (ref 0–0)
PLATELET # BLD AUTO: 378 K/UL — SIGNIFICANT CHANGE UP (ref 130–400)
POTASSIUM SERPL-MCNC: 3.8 MMOL/L — SIGNIFICANT CHANGE UP (ref 3.5–5)
POTASSIUM SERPL-SCNC: 3.8 MMOL/L — SIGNIFICANT CHANGE UP (ref 3.5–5)
PROT SERPL-MCNC: 5.4 G/DL — LOW (ref 6–8)
PROTHROM AB SERPL-ACNC: 11.1 SEC — SIGNIFICANT CHANGE UP (ref 9.95–12.87)
RBC # BLD: 2.96 M/UL — LOW (ref 4.2–5.4)
RBC # FLD: 17 % — HIGH (ref 11.5–14.5)
SODIUM SERPL-SCNC: 140 MMOL/L — SIGNIFICANT CHANGE UP (ref 135–146)
VIT B12 SERPL-MCNC: 1098 PG/ML — SIGNIFICANT CHANGE UP (ref 232–1245)
WBC # BLD: 6.11 K/UL — SIGNIFICANT CHANGE UP (ref 4.8–10.8)
WBC # FLD AUTO: 6.11 K/UL — SIGNIFICANT CHANGE UP (ref 4.8–10.8)

## 2019-12-13 PROCEDURE — 99223 1ST HOSP IP/OBS HIGH 75: CPT

## 2019-12-13 PROCEDURE — 76705 ECHO EXAM OF ABDOMEN: CPT | Mod: 26

## 2019-12-13 PROCEDURE — 99233 SBSQ HOSP IP/OBS HIGH 50: CPT

## 2019-12-13 RX ORDER — SODIUM CHLORIDE 9 MG/ML
1000 INJECTION, SOLUTION INTRAVENOUS
Refills: 0 | Status: DISCONTINUED | OUTPATIENT
Start: 2019-12-13 | End: 2019-12-14

## 2019-12-13 RX ORDER — MORPHINE SULFATE 50 MG/1
2 CAPSULE, EXTENDED RELEASE ORAL EVERY 6 HOURS
Refills: 0 | Status: DISCONTINUED | OUTPATIENT
Start: 2019-12-13 | End: 2019-12-15

## 2019-12-13 RX ORDER — IBUPROFEN 200 MG
400 TABLET ORAL ONCE
Refills: 0 | Status: COMPLETED | OUTPATIENT
Start: 2019-12-13 | End: 2019-12-13

## 2019-12-13 RX ORDER — MORPHINE SULFATE 50 MG/1
4 CAPSULE, EXTENDED RELEASE ORAL EVERY 6 HOURS
Refills: 0 | Status: DISCONTINUED | OUTPATIENT
Start: 2019-12-13 | End: 2019-12-15

## 2019-12-13 RX ADMIN — MORPHINE SULFATE 2 MILLIGRAM(S): 50 CAPSULE, EXTENDED RELEASE ORAL at 19:42

## 2019-12-13 RX ADMIN — SODIUM CHLORIDE 200 MILLILITER(S): 9 INJECTION, SOLUTION INTRAVENOUS at 23:37

## 2019-12-13 RX ADMIN — POLYETHYLENE GLYCOL 3350 17 GRAM(S): 17 POWDER, FOR SOLUTION ORAL at 12:03

## 2019-12-13 RX ADMIN — Medication 100 MILLIGRAM(S): at 12:02

## 2019-12-13 RX ADMIN — Medication 1 PATCH: at 19:35

## 2019-12-13 RX ADMIN — OXYCODONE HYDROCHLORIDE 5 MILLIGRAM(S): 5 TABLET ORAL at 00:45

## 2019-12-13 RX ADMIN — OXYCODONE HYDROCHLORIDE 5 MILLIGRAM(S): 5 TABLET ORAL at 12:14

## 2019-12-13 RX ADMIN — MORPHINE SULFATE 4 MILLIGRAM(S): 50 CAPSULE, EXTENDED RELEASE ORAL at 15:41

## 2019-12-13 RX ADMIN — Medication 1 PATCH: at 11:53

## 2019-12-13 RX ADMIN — Medication 1 MILLIGRAM(S): at 12:03

## 2019-12-13 RX ADMIN — Medication 1 PATCH: at 06:24

## 2019-12-13 RX ADMIN — MORPHINE SULFATE 4 MILLIGRAM(S): 50 CAPSULE, EXTENDED RELEASE ORAL at 21:57

## 2019-12-13 RX ADMIN — Medication 1 PATCH: at 12:03

## 2019-12-13 RX ADMIN — MORPHINE SULFATE 4 MILLIGRAM(S): 50 CAPSULE, EXTENDED RELEASE ORAL at 08:24

## 2019-12-13 RX ADMIN — MORPHINE SULFATE 4 MILLIGRAM(S): 50 CAPSULE, EXTENDED RELEASE ORAL at 17:19

## 2019-12-13 RX ADMIN — MORPHINE SULFATE 4 MILLIGRAM(S): 50 CAPSULE, EXTENDED RELEASE ORAL at 08:39

## 2019-12-13 RX ADMIN — SODIUM CHLORIDE 200 MILLILITER(S): 9 INJECTION, SOLUTION INTRAVENOUS at 07:38

## 2019-12-13 RX ADMIN — Medication 400 MILLIGRAM(S): at 03:34

## 2019-12-13 RX ADMIN — MORPHINE SULFATE 4 MILLIGRAM(S): 50 CAPSULE, EXTENDED RELEASE ORAL at 22:20

## 2019-12-13 RX ADMIN — Medication 400 MILLIGRAM(S): at 03:04

## 2019-12-13 RX ADMIN — OXYCODONE HYDROCHLORIDE 5 MILLIGRAM(S): 5 TABLET ORAL at 01:15

## 2019-12-13 RX ADMIN — OXYCODONE HYDROCHLORIDE 5 MILLIGRAM(S): 5 TABLET ORAL at 13:40

## 2019-12-13 RX ADMIN — Medication 1 TABLET(S): at 12:02

## 2019-12-13 RX ADMIN — SENNA PLUS 2 TABLET(S): 8.6 TABLET ORAL at 21:57

## 2019-12-13 NOTE — CONSULT NOTE ADULT - ASSESSMENT
39yFemale pmh ETOH abuse pint of vodka daily, anxiety, depression presents with epigastric pain radiating to the back progressing for 3 weeks until patient couldn't take it anymore and prompted her to come to the ER. Epigastric pain 10/10 radiating to the back. Patient reports this is the first time she has ever had pancreatitis       Problem 1-Acute Pancreatitis  Rec  -Keep Patient NPO  -RUQ Abdominal Ultrasound  -Fasting Lipid Panel  -Aggressive IV hydration with lactated ringers, check daily weights   -Strict Is and Os  -Check BUN and HCT daily goal is that both should go down  -IF BUN or HCT rise, please increase fluid  -Tobacco and Alcohol Cessation Strongly Advised and Encouraged   -MRI with and without gadolinium as an outpatient to rule out Pancreas divisum and Pancreatic neoplasm    problem 2- Acute alcoholic hepatitis 39yFemale pmh ETOH abuse pint of vodka daily, anxiety, depression presents with epigastric pain radiating to the back progressing for 3 weeks until patient couldn't take it anymore and prompted her to come to the ER. Epigastric pain 10/10 radiating to the back. Patient reports this is the first time she has ever had pancreatitis       Problem 1-Acute Pancreatitis (3/3 elevated lipase, radiological imaging, and clinical picture)  Rec  -Keep Patient NPO  -RUQ Abdominal Ultrasound  -Fasting Lipid Panel  -Aggressive IV hydration with lactated ringers, check daily weights   -Strict Is and Os  -Check BUN and HCT daily goal is that both should go down  -IF BUN or HCT rise, please increase fluid  -Tobacco and Alcohol Cessation Strongly Advised and Encouraged   -MRI with and without gadolinium as an outpatient to rule out Pancreas divisum and Pancreatic neoplasm    problem 2- Acute alcoholic hepatitis with hepatomegaly and hepatic steatosis   Rec  -Trend LFTs daily  -Will calculate maddreys and meld 39yFemale pmh ETOH abuse pint of vodka daily, anxiety, depression presents with epigastric pain radiating to the back progressing for 3 weeks until patient couldn't take it anymore and prompted her to come to the ER. Epigastric pain 10/10 radiating to the back. Patient reports this is the first time she has ever had pancreatitis     Problem 1-Acute Pancreatitis (3/3 elevated lipase, radiological imaging, and clinical picture)  Rec  -Keep Patient NPO  -RUQ Abdominal Ultrasound  -Fasting Lipid Panel  -Aggressive IV hydration with lactated ringers, check daily weights   -Strict Is and Os  -Check BUN and HCT daily goal is that both should go down  -IF BUN or HCT rise, please increase fluid  -Tobacco and Alcohol Cessation Strongly Advised and Encouraged   -MRI with and without gadolinium as an outpatient to rule out Pancreas divisum and Pancreatic neoplasm    problem 2- Acute alcoholic hepatitis with hepatomegaly and hepatic steatosis   Rec  -Tobacco and Alcohol Cessation Strongly Advised and Encouraged   -Trend LFTs daily  -Will calculate Maddrey and meld based off PT and INR 39yFemale pmh ETOH abuse pint of vodka daily, anxiety, depression presents with epigastric pain radiating to the back progressing for 3 weeks until patient couldn't take it anymore and prompted her to come to the ER. Epigastric pain 10/10 radiating to the back. Patient reports this is the first time she has ever had pancreatitis     Problem 1-Acute Pancreatitis (3/3 elevated lipase, radiological imaging, and clinical picture)  Rec  -Keep Patient NPO  -RUQ Abdominal Ultrasound  -Fasting Lipid Panel  -Aggressive IV hydration with lactated ringers, check daily weights   -Strict Is and Os  -Check BUN and HCT daily goal is that both should go down  -IF BUN or HCT rise, please increase fluid  -Tobacco and Alcohol Cessation Strongly Advised and Encouraged   -MRI with and without gadolinium as an outpatient to rule out Pancreas divisum and Pancreatic neoplasm    problem 2- Acute alcoholic hepatitis with hepatomegaly and hepatic steatosis   Rec  -Tobacco and Alcohol Cessation Strongly Advised and Encouraged   -Trend LFTs daily and INR daily  -Will calculate Maddrey and meld based off PT and INR 39yFemale pmh ETOH abuse pint of vodka daily, anxiety, depression presents with epigastric pain radiating to the back progressing for 3 weeks until patient couldn't take it anymore and prompted her to come to the ER. Epigastric pain 10/10 radiating to the back. Patient reports this is the first time she has ever had pancreatitis     Problem 1-Acute Pancreatitis (3/3 elevated lipase, radiological imaging, and clinical picture)  Rec  -Keep Patient NPO  -RUQ Abdominal Ultrasound  -Fasting Lipid Panel  -Aggressive IV hydration with lactated ringers, check daily weights   -Strict Is and Os  -Check BUN and HCT daily goal is that both should go down  -IF BUN or HCT rise, please increase fluid  -Tobacco and Alcohol Cessation Strongly Advised and Encouraged   -MRI with and without gadolinium as an outpatient to rule out Pancreas divisum and Pancreatic neoplasm    problem 2- Acute alcoholic hepatitis with hepatomegaly and hepatic steatosis   Rec  -Tobacco and Alcohol Cessation Strongly Advised and Encouraged   -Trend LFTs daily and INR daily  -Will calculate Maddrey and meld based off PT and INR    problem 3-Trace residual fluid/small fluid collection anterior to the right iliac artery spanning approximately 2.7 x 1.3 cm.  Rec  - Care as per primary team 39yFemale pmh ETOH abuse pint of vodka daily, anxiety, depression presents with epigastric pain radiating to the back progressing for 3 weeks until patient couldn't take it anymore and prompted her to come to the ER. Epigastric pain 10/10 radiating to the back. Patient reports this is the first time she has ever had pancreatitis     Problem 1-Acute Pancreatitis (3/3 elevated lipase, radiological imaging, and clinical picture)  Rec  -Clear liquids   -RUQ Abdominal Ultrasound  -patient eating food from outside without any pain, be careful with pain management   -Fasting Lipid Panel  -Aggressive IV hydration with lactated ringers, check daily weights   -Strict Is and Os  -Check BUN and HCT daily goal is that both should go down  -IF BUN or HCT rise, please increase fluid  -Tobacco and Alcohol Cessation Strongly Advised and Encouraged   -MRI with and without gadolinium as an outpatient to rule out Pancreas divisum and Pancreatic neoplasm    problem 2- Acute alcoholic hepatitis with hepatomegaly and hepatic steatosis   Rec  -Tobacco and Alcohol Cessation Strongly Advised and Encouraged   -Trend LFTs daily and INR daily  -Will calculate Maddrey and meld based off PT and INR    problem 3-Trace residual fluid/small fluid collection anterior to the right iliac artery spanning approximately 2.7 x 1.3 cm.  Rec  - Care as per primary team 39yFemale pmh ETOH abuse pint of vodka daily, anxiety, depression presents with epigastric pain radiating to the back progressing for 3 weeks until patient couldn't take it anymore and prompted her to come to the ER. Epigastric pain 10/10 radiating to the back. Patient reports this is the first time she has ever had pancreatitis     Problem 1-Acute Pancreatitis (3/3 elevated lipase, radiological imaging, and clinical picture)  Rec  -Clear liquids   -RUQ Abdominal Ultrasound  -patient eating food from outside without any pain, be careful with pain management   -Fasting Lipid Panel  -Aggressive IV hydration with lactated ringers, check daily weights   -Strict Is and Os  -Check BUN and HCT daily goal is that both should go down  -IF BUN or HCT rise, please increase fluid  -Tobacco and Alcohol Cessation Strongly Advised and Encouraged   -MRI with and without gadolinium as an outpatient to rule out Pancreas divisum and Pancreatic neoplasm    problem 2- Acute alcoholic hepatitis with hepatomegaly and hepatic steatosis   Rec  -Tobacco and Alcohol Cessation Strongly Advised and Encouraged   -Trend LFTs daily and INR daily  -Maddrey's discriminant function 2.1 no steroids indicated   -MELD Score 8    problem 3-Trace residual fluid/small fluid collection anterior to the right iliac artery spanning approximately 2.7 x 1.3 cm.  Rec  - Care as per primary team 39yFemale pmh ETOH abuse pint of vodka daily, anxiety, depression presents with epigastric pain radiating to the back progressing for 3 weeks until patient couldn't take it anymore and prompted her to come to the ER. Epigastric pain 10/10 radiating to the back. Patient reports this is the first time she has ever had pancreatitis     Problem 1-Acute Pancreatitis (3/3 elevated lipase, radiological imaging, and clinical picture)  Rec  -Clear liquids and gently advance diet as tolerated when pain free  -RUQ Abdominal Ultrasound if positive for gallstones consider surgical consult for lap choley  -patient eating food from outside without any pain, be careful with pain management   -Fasting Lipid Panel  -Aggressive IV hydration with lactated ringers, check daily weights   -Strict Is and Os  -Check BUN and HCT daily goal is that both should go down  -IF BUN or HCT rise, please increase fluid  -Tobacco and Alcohol Cessation Strongly Advised and Encouraged   -MRI with and without gadolinium as an outpatient to rule out Pancreas divisum and Pancreatic neoplasm if pancreatitis recurs     problem 2- Acute alcoholic hepatitis with hepatomegaly and hepatic steatosis   Rec  -Tobacco and Alcohol Cessation Strongly Advised and Encouraged   -Trend LFTs daily and INR daily  -Maddrey's discriminant function 2.1 no steroids indicated   -MELD Score 8    problem 3-Trace residual fluid/small fluid collection anterior to the right iliac artery spanning approximately 2.7 x 1.3 cm.  Rec  - Care as per primary team     Recall GI if needed

## 2019-12-13 NOTE — CONSULT NOTE ADULT - ATTENDING COMMENTS
as above
I personally obtained history and examined the patient in ER. Looked at her CT report and all the lab work. Advised admission to the floor. In no distress and no tenderness.
38 yo f with pancreatitis. Plan IV hydration, advance diet as tolerated. see above

## 2019-12-13 NOTE — PROGRESS NOTE ADULT - SUBJECTIVE AND OBJECTIVE BOX
ADRIAN PRUETT  39y  Female    Patient is a 39y old  Female who presents with a chief complaint of Abdominal Pain      INTERVAL HPI/OVERNIGHT EVENTS:  Patient seen earlier today complaining of Abdominal pain.  Soft diet discontinued & Resume IV fluids.  No nausea or vomiting.  No fever, chills.      REVIEW OF SYSTEMS:  At least 10 systems were reviewed in ROS.   All systems reviewed are within normal limits except for that listed above.      VITALS:  T(F): 96.5 (12-13-19 @ 13:58), Max: 98.4 (12-12-19 @ 21:24)  HR: 99 (12-13-19 @ 13:58) (96 - 107)  BP: 118/83 (12-13-19 @ 13:58) (111/68 - 121/85)  RR: 16 (12-13-19 @ 13:58) (16 - 16)  SpO2: --      PHYSICAL EXAM:  GENERAL: NAD, well-developed  HEAD:  Atraumatic, Normocephalic  EYES: conjunctiva and sclera clear  ENMT: Moist mucous membranes  NECK: Supple, Normal thyroid  NERVOUS SYSTEM:  Alert & Oriented X3, Good concentration; Motor Strength 5/5 B/L upper and lower extremities.  CHEST/LUNG: Clear to auscultation bilaterally; No rales, rhonchi, wheezing, or rubs  HEART: Regular rate and rhythm; No murmurs, rubs, or gallops  ABDOMEN: Soft, Nontender, Nondistended; Bowel sounds present. Hepatomegaly+  EXTREMITIES:  2+ Peripheral Pulses, No clubbing, cyanosis, or edema  LYMPH: No lymphadenopathy noted  SKIN: No rashes or lesions    Consultant(s) Notes Reviewed:  [x ] YES  [ ] NO  Care Discussed with Consultants/Other Providers [ x] YES  [ ] NO    LABS:                        9.8    6.11  )-----------( 378      ( 13 Dec 2019 07:18 )             30.9       12-13    140  |  104  |  5<L>  ----------------------------<  92  3.8   |  25  |  0.5<L>    Ca    8.4<L>      13 Dec 2019 07:18  Phos  3.1     12-12  Mg     2.0     12-12    TPro  5.4<L>  /  Alb  2.7<L>  /  TBili  1.6<H>  /  DBili  x   /  AST  174<H>  /  ALT  57<H>  /  AlkPhos  219<H>  12-13        MICROBIOLOGY: None      RADIOLOGY & ADDITIONAL TESTS:  CT Abdomen and Pelvis w/ IV Cont (12.10.19 @ 04:14)     Since 11/29/2019,    Mild inflammatory change/fluid surrounding the pancreatic body/tail,   overall improved from prior study.    Trace residual fluid/small fluid collection anterior to the right iliac   artery spanning approximately 2.7 x 1.3 cm.    Re demonstration of hepatomegaly and hepatic steatosis        Imaging Personally Reviewed:  [x] YES  [ ] NO    MEDICATIONS  (STANDING):  folic acid 1 milliGRAM(s) Oral daily  heparin  Injectable 5000 Unit(s) SubCutaneous every 8 hours  lactated ringers. 1000 milliLiter(s) (200 mL/Hr) IV Continuous <Continuous>  multivitamin 1 Tablet(s) Oral daily  nicotine - 21 mG/24Hr(s) Patch 1 patch Transdermal daily  polyethylene glycol 3350 17 Gram(s) Oral daily  senna 2 Tablet(s) Oral at bedtime  thiamine 100 milliGRAM(s) Oral daily    MEDICATIONS  (PRN):  LORazepam     Tablet 2 milliGRAM(s) Oral every 2 hours PRN CIWA-Ar score increase by 2 points and a total score of 7 or less  LORazepam     Tablet 2 milliGRAM(s) Oral every 1 hour PRN CIWA-Ar score 8 or greater  morphine  - Injectable 4 milliGRAM(s) IV Push every 6 hours PRN Severe Pain (7 - 10)  morphine  - Injectable 2 milliGRAM(s) IV Push every 6 hours PRN Moderate Pain (4 - 6)  oxyCODONE    IR 5 milliGRAM(s) Oral every 4 hours PRN Severe Pain (7 - 10)          HEALTH ISSUES - PROBLEM Dx:  Tobacco abuse counseling  Folic acid deficiency  Thiamine deficiency  Hypokalemia  ETOH abuse  Prolonged QT interval  Alcohol-induced acute pancreatitis without infection or necrosis

## 2019-12-13 NOTE — CONSULT NOTE ADULT - SUBJECTIVE AND OBJECTIVE BOX
Chief complaint/Reason for consult: Acute Pancreatitis     HPI: 39yFemale pmh ETOH abuse pint of vodka daily, anxiety, depression presents with epigastric pain radiating to the back progressing for 3 weeks until patient couldn't take it anymore and prompted her to come to the ER. Patient denies vomiting hematemesis, melena, blood in stool, diarrhea, constipation. +epigastric pain radiating to the back 10/10, +nausea       PAST MEDICAL & SURGICAL HISTORY:  ETOH abuse  Postpartum depression  Substance abuse  Anxiety  Depression  No significant past surgical history        Family history:  FAMILY HISTORY:  No pertinent family history in first degree relatives    No GI cancers in first or second degree relatives    Social History: +half a pack a day cigarette smoking since age 14. +pint of vodka a day for 2 years alcohol. No illegal drug use.    Allergies:     No Known Allergies      MEDICATIONS: Home Medications: Patient not on any medications at home          MEDICATIONS  (STANDING):  folic acid 1 milliGRAM(s) Oral daily  heparin  Injectable 5000 Unit(s) SubCutaneous every 8 hours  lactated ringers. 1000 milliLiter(s) (200 mL/Hr) IV Continuous <Continuous>  multivitamin 1 Tablet(s) Oral daily  nicotine - 21 mG/24Hr(s) Patch 1 patch Transdermal daily  polyethylene glycol 3350 17 Gram(s) Oral daily  senna 2 Tablet(s) Oral at bedtime  thiamine 100 milliGRAM(s) Oral daily    MEDICATIONS  (PRN):  LORazepam     Tablet 2 milliGRAM(s) Oral every 2 hours PRN CIWA-Ar score increase by 2 points and a total score of 7 or less  LORazepam     Tablet 2 milliGRAM(s) Oral every 1 hour PRN CIWA-Ar score 8 or greater  morphine  - Injectable 4 milliGRAM(s) IV Push every 6 hours PRN Severe Pain (7 - 10)  morphine  - Injectable 2 milliGRAM(s) IV Push every 6 hours PRN Moderate Pain (4 - 6)  oxyCODONE    IR 5 milliGRAM(s) Oral every 4 hours PRN Severe Pain (7 - 10)        REVIEW OF SYSTEMS  General:  No weight loss, fevers, or chills.  Eyes:  No reported pain or visual changes  ENT:  No sore throat or runny nose.  NECK: No stiffness or lymphadenopathy  CV:  No chest pain or palpitations.  Resp:  No shortness of breath, cough, wheezing or hemoptysis  GI:  +epigastric abdominal pain, +nausea, No vomiting, dysphagia, diarrhea or constipation. No rectal bleeding, melena, or hematemesis.  Muscle:  No aches or weakness  Neuro:  No tingling, numbness       VITALS:   T(F): 96.9 (12-13-19 @ 05:24), Max: 98.4 (12-12-19 @ 21:24)  HR: 101 (12-13-19 @ 09:50) (96 - 109)  BP: 111/68 (12-13-19 @ 09:50) (106/69 - 121/85)  RR: 16 (12-13-19 @ 05:24) (16 - 16)  SpO2: --    PHYSICAL EXAM:  GENERAL: AAOx3, no acute distress.  HEAD:  Atraumatic, Normocephalic  EYES: conjunctiva and sclera clear  NECK: Supple, No thyromegaly   CHEST/LUNG: Clear to auscultation bilaterally; No wheeze, rhonchi, or rales  HEART: Regular rate and rhythm; normal S1, S2, No murmurs.  ABDOMEN: Soft, +epigastric tenderness, nondistended; Bowel sounds present, no abdominal bruit, masses, or hepatosplenomegaly   NEUROLOGY: No asterixis or tremor  SKIN: Intact, no jaundice          LABS:  12-13    140  |  104  |  5<L>  ----------------------------<  92  3.8   |  25  |  0.5<L>    Ca    8.4<L>      13 Dec 2019 07:18  Phos  3.1     12-12  Mg     2.0     12-12    TPro  5.4<L>  /  Alb  2.7<L>  /  TBili  1.6<H>  /  DBili  x   /  AST  174<H>  /  ALT  57<H>  /  AlkPhos  219<H>  12-13                          9.8    6.11  )-----------( 378      ( 13 Dec 2019 07:18 )             30.9     LIVER FUNCTIONS - ( 13 Dec 2019 07:18 )  Alb: 2.7 g/dL / Pro: 5.4 g/dL / ALK PHOS: 219 U/L / ALT: 57 U/L / AST: 174 U/L / GGT: x               IMAGING:      < from: CT Abdomen and Pelvis w/ IV Cont (12.10.19 @ 04:14) >    EXAM:  CT ABDOMEN AND PELVIS IC            PROCEDURE DATE:  12/10/2019            INTERPRETATION:  CLINICAL STATEMENT: Abdominal pain.      TECHNIQUE: Contiguous axial CT images were obtained from the lower chest   to the pubic symphysis following administration of 100cc Optiray 320   intravenous contrast.  Oral contrast was not administered.  Reformatted   images in the coronal and sagittal planes were acquired.    COMPARISON CT: 11/29/2019.      FINDINGS:    LOWER CHEST: Unremarkable.    HEPATOBILIARY: Stable hepatomegaly and diffuse hepatic steatosis.    SPLEEN: Unremarkable.    PANCREAS: Mild inflammatory change/fluid surrounding the pancreatic   body/tail overall improved compared to prior study. No evidence for main   duct dilatation or pancreatic necrosis..    ADRENAL GLANDS: Unremarkable.    KIDNEYS: Symmetric enhancement bilaterally. No hydronephrosis.    ABDOMINOPELVIC NODES: Unremarkable.    PELVIC ORGANS: Unremarkable.    PERITONEUM/MESENTERY/BOWEL: No evidence of bowel obstruction or   pneumoperitoneum. Underdistention limits evaluation of the colon. Small   residual fluid collection anterior to the right iliac artery has   decreased in size approximately 2.7 x 1.3 cm on coronal imaging   (previously 4.0 x 1.4 cm).    BONES/SOFT TISSUES: No acute osseous abnormality.    IMPRESSION:    Since 11/29/2019,    Mild inflammatory change/fluid surrounding the pancreatic body/tail,   overall improved from prior study.    Trace residual fluid/small fluid collection anterior to the right iliac   artery spanning approximately 2.7 x 1.3 cm.    Redemonstration of hepatomegaly and hepatic steatosis              TONY BEAL M.D., RESIDENT RADIOLOGIST  This document has been electronically signed.  ANA MATHEWS M.D., ATTENDING RADIOLOGIST  This document has been electronically signed. Dec 10 2019  6:52AM              < end of copied text >

## 2019-12-13 NOTE — PROGRESS NOTE ADULT - ASSESSMENT
39yFemale pmh ETOH abuse pint of vodka daily, anxiety, depression presents with epigastric pain radiating to the back progressing for 3 weeks until patient couldn't take it anymore and prompted her to come to the ER. Patient denied vomiting hematemesis, melena, blood in stool, diarrhea, constipation. + epigastric pain radiating to the back 10/10, +nausea. CT done showed Mild inflammatory change/fluid surrounding the pancreatic body/tail.   In ED, lipase was elevated at 265, CT findings consistent with pancreatitis, blood alcohol level 23.    Assessment & Plan:    1. Acute Pancreatitis: ? Alcohol induced.  Clear liquids and gently advance diet as tolerated when pain free.  Follow up RUQ Abdominal Ultrasound & Fasting Lipid Panel.  Continue IV Fluids & Pain control.  GI following: MRI with and without gadolinium as an outpatient to rule out Pancreas divisum and Pancreatic neoplasm if pancreatitis recurs         2. Acute alcoholic hepatitis with hepatomegaly and hepatic steatosis:  Trend LFTs daily and INR daily.  [facility] discriminant function 2.1.  No steroids indicated.      3. Alcohol abuse:  Alcohol Cessation.  DETOX consulted: Ativan 2mg q4 prn.  Out Patient follow up at her Program.      4. Current smoker:  Smoking cessation.  On Nicotine Patch.        5. Suspected Folate & Thiamine Deficiency:  Continue supplements.          Prophylaxis: Heparin  Code status: Full code    Progress Note Handoff:  Pending consults: None  Pending Tests: RUQ US, Lipid Panel.  Family/Patient discussion: Plan of care discussed with Patient.  Disposition: Home when stable.      Attending: Dr. Meryl Hare. [employer] [id number].

## 2019-12-14 LAB
ALBUMIN SERPL ELPH-MCNC: 2.7 G/DL — LOW (ref 3.5–5.2)
ALP SERPL-CCNC: 212 U/L — HIGH (ref 30–115)
ALT FLD-CCNC: 53 U/L — HIGH (ref 0–41)
ANION GAP SERPL CALC-SCNC: 12 MMOL/L — SIGNIFICANT CHANGE UP (ref 7–14)
AST SERPL-CCNC: 148 U/L — HIGH (ref 0–41)
BILIRUB SERPL-MCNC: 1.5 MG/DL — HIGH (ref 0.2–1.2)
BUN SERPL-MCNC: 3 MG/DL — LOW (ref 10–20)
CALCIUM SERPL-MCNC: 8.7 MG/DL — SIGNIFICANT CHANGE UP (ref 8.5–10.1)
CHLORIDE SERPL-SCNC: 106 MMOL/L — SIGNIFICANT CHANGE UP (ref 98–110)
CHOLEST SERPL-MCNC: 213 MG/DL — HIGH (ref 100–200)
CO2 SERPL-SCNC: 23 MMOL/L — SIGNIFICANT CHANGE UP (ref 17–32)
CREAT SERPL-MCNC: 0.5 MG/DL — LOW (ref 0.7–1.5)
GLUCOSE SERPL-MCNC: 91 MG/DL — SIGNIFICANT CHANGE UP (ref 70–99)
HDLC SERPL-MCNC: 21 MG/DL — LOW
LIPID PNL WITH DIRECT LDL SERPL: 130 MG/DL — HIGH (ref 4–129)
POTASSIUM SERPL-MCNC: 3.9 MMOL/L — SIGNIFICANT CHANGE UP (ref 3.5–5)
POTASSIUM SERPL-SCNC: 3.9 MMOL/L — SIGNIFICANT CHANGE UP (ref 3.5–5)
PROT SERPL-MCNC: 5.5 G/DL — LOW (ref 6–8)
SODIUM SERPL-SCNC: 141 MMOL/L — SIGNIFICANT CHANGE UP (ref 135–146)
TOTAL CHOLESTEROL/HDL RATIO MEASUREMENT: 10.1 RATIO — HIGH (ref 4–5.5)
TRIGL SERPL-MCNC: 167 MG/DL — HIGH (ref 10–149)

## 2019-12-14 PROCEDURE — 99233 SBSQ HOSP IP/OBS HIGH 50: CPT

## 2019-12-14 RX ORDER — SODIUM CHLORIDE 9 MG/ML
1000 INJECTION, SOLUTION INTRAVENOUS
Refills: 0 | Status: DISCONTINUED | OUTPATIENT
Start: 2019-12-14 | End: 2019-12-15

## 2019-12-14 RX ADMIN — SODIUM CHLORIDE 200 MILLILITER(S): 9 INJECTION, SOLUTION INTRAVENOUS at 19:50

## 2019-12-14 RX ADMIN — MORPHINE SULFATE 2 MILLIGRAM(S): 50 CAPSULE, EXTENDED RELEASE ORAL at 21:45

## 2019-12-14 RX ADMIN — SENNA PLUS 2 TABLET(S): 8.6 TABLET ORAL at 21:14

## 2019-12-14 RX ADMIN — Medication 100 MILLIGRAM(S): at 11:50

## 2019-12-14 RX ADMIN — MORPHINE SULFATE 2 MILLIGRAM(S): 50 CAPSULE, EXTENDED RELEASE ORAL at 01:48

## 2019-12-14 RX ADMIN — MORPHINE SULFATE 4 MILLIGRAM(S): 50 CAPSULE, EXTENDED RELEASE ORAL at 11:53

## 2019-12-14 RX ADMIN — MORPHINE SULFATE 2 MILLIGRAM(S): 50 CAPSULE, EXTENDED RELEASE ORAL at 08:50

## 2019-12-14 RX ADMIN — SODIUM CHLORIDE 200 MILLILITER(S): 9 INJECTION, SOLUTION INTRAVENOUS at 04:30

## 2019-12-14 RX ADMIN — MORPHINE SULFATE 2 MILLIGRAM(S): 50 CAPSULE, EXTENDED RELEASE ORAL at 21:13

## 2019-12-14 RX ADMIN — MORPHINE SULFATE 4 MILLIGRAM(S): 50 CAPSULE, EXTENDED RELEASE ORAL at 18:30

## 2019-12-14 RX ADMIN — Medication 1 TABLET(S): at 11:50

## 2019-12-14 RX ADMIN — POLYETHYLENE GLYCOL 3350 17 GRAM(S): 17 POWDER, FOR SOLUTION ORAL at 11:50

## 2019-12-14 RX ADMIN — MORPHINE SULFATE 2 MILLIGRAM(S): 50 CAPSULE, EXTENDED RELEASE ORAL at 02:20

## 2019-12-14 RX ADMIN — MORPHINE SULFATE 4 MILLIGRAM(S): 50 CAPSULE, EXTENDED RELEASE ORAL at 12:25

## 2019-12-14 RX ADMIN — MORPHINE SULFATE 4 MILLIGRAM(S): 50 CAPSULE, EXTENDED RELEASE ORAL at 17:53

## 2019-12-14 RX ADMIN — MORPHINE SULFATE 2 MILLIGRAM(S): 50 CAPSULE, EXTENDED RELEASE ORAL at 15:00

## 2019-12-14 RX ADMIN — Medication 1 MILLIGRAM(S): at 11:50

## 2019-12-14 RX ADMIN — MORPHINE SULFATE 4 MILLIGRAM(S): 50 CAPSULE, EXTENDED RELEASE ORAL at 04:14

## 2019-12-14 RX ADMIN — MORPHINE SULFATE 2 MILLIGRAM(S): 50 CAPSULE, EXTENDED RELEASE ORAL at 14:30

## 2019-12-14 RX ADMIN — MORPHINE SULFATE 2 MILLIGRAM(S): 50 CAPSULE, EXTENDED RELEASE ORAL at 08:20

## 2019-12-14 RX ADMIN — MORPHINE SULFATE 4 MILLIGRAM(S): 50 CAPSULE, EXTENDED RELEASE ORAL at 04:45

## 2019-12-14 NOTE — PROGRESS NOTE ADULT - SUBJECTIVE AND OBJECTIVE BOX
SEBLEADRIAN  39y, Female  Allergy: No Known Allergies  Hospital Day: 4d      Patient was seen and examined at bedside. continues to endorse diffuse 10/10 epigastric pain states that her pain is not controlled since titrate down her pain medications         VITALS:  T(F): 96.8 (12-14-19 @ 14:24), Max: 97.8 (12-14-19 @ 05:06)  HR: 111 (12-14-19 @ 14:24)  BP: 142/85 (12-14-19 @ 14:24) (124/81 - 142/85)  RR: 16 (12-14-19 @ 14:24)  SpO2: --    PHYSICAL EXAM:  GENERAL: well developed well nourished in no acute distress  NECK: No evidence of swelling no palpable lymphadenopathy  CHEST/LUNG: clear to ausculation bilaterally no wheezes, rales, or rhonchi   HEART/VASC: RRR, No murmurs, rubs, or gallops appreciated, 2+ equal bilateral radial pulse  ABDOMEN: guarding, tender to palpation epigastric region and RUQ  non tender non distended +bowel sounds in all quadrants, no palpable organomegaly   EXTREMITIES:  No clubbing and range of motion intact     TESTS & MEASUREMENTS:  Weight (Kg):                             9.8    6.11  )-----------( 378      ( 13 Dec 2019 07:18 )             30.9     PT/INR - ( 13 Dec 2019 11:06 )   PT: 11.10 sec;   INR: 0.96 ratio         PTT - ( 13 Dec 2019 11:06 )  PTT:32.1 sec  12-14    141  |  106  |  3<L>  ----------------------------<  91  3.9   |  23  |  0.5<L>    Ca    8.7      14 Dec 2019 07:44    TPro  5.5<L>  /  Alb  2.7<L>  /  TBili  1.5<H>  /  DBili  x   /  AST  148<H>  /  ALT  53<H>  /  AlkPhos  212<H>  12-14    LIVER FUNCTIONS - ( 14 Dec 2019 07:44 )  Alb: 2.7 g/dL / Pro: 5.5 g/dL / ALK PHOS: 212 U/L / ALT: 53 U/L / AST: 148 U/L / GGT: x             MEDICATIONS:  MEDICATIONS  (STANDING):  folic acid 1 milliGRAM(s) Oral daily  heparin  Injectable 5000 Unit(s) SubCutaneous every 8 hours  lactated ringers. 1000 milliLiter(s) (200 mL/Hr) IV Continuous <Continuous>  multivitamin 1 Tablet(s) Oral daily  nicotine - 21 mG/24Hr(s) Patch 1 patch Transdermal daily  polyethylene glycol 3350 17 Gram(s) Oral daily  senna 2 Tablet(s) Oral at bedtime  thiamine 100 milliGRAM(s) Oral daily    MEDICATIONS  (PRN):  LORazepam     Tablet 2 milliGRAM(s) Oral every 2 hours PRN CIWA-Ar score increase by 2 points and a total score of 7 or less  LORazepam     Tablet 2 milliGRAM(s) Oral every 1 hour PRN CIWA-Ar score 8 or greater  morphine  - Injectable 4 milliGRAM(s) IV Push every 6 hours PRN Severe Pain (7 - 10)  morphine  - Injectable 2 milliGRAM(s) IV Push every 6 hours PRN Moderate Pain (4 - 6)  oxyCODONE    IR 5 milliGRAM(s) Oral every 4 hours PRN Severe Pain (7 - 10)

## 2019-12-14 NOTE — PROGRESS NOTE ADULT - ASSESSMENT
39F PMHx alcohol abuse, cocaine abuse, h/o pancreatitis here with acute pancreatitis.    #Acute pancreatitis, presumed due to alcohol  tolerating low fat diet but pain persisting  cont IV pain control  triglycerides noted  GI eval appreciated  RUQ no gallstones only significant for gallstones   during evaluation pt continuously asking for pain medication regardless of what questions are being asked; suspect a degree of drug seeking behavior. Pt was seen talking on the phone sitting comfortably from hallway      #Alcohol abuse  ciwa 0  -cont CIWA   -folic acid 1  -multivit  -thiamine 100    #Cholestasis, suspect intrahepatic in setting of steatohepatitis  -cont to trend LFTs & INR daily per GI  -will need outpt gi f/u    #DVT ppx  subq hep    Progress Note Handoff:  Pending: pain control and advancement of diet   Family/Patient discussion: Plan of care discussed with Patient. pt continues to ask for more intravenous pain medications   Disposition: Home when stable.

## 2019-12-15 LAB
ALBUMIN SERPL ELPH-MCNC: 3.1 G/DL — LOW (ref 3.5–5.2)
ALP SERPL-CCNC: 219 U/L — HIGH (ref 30–115)
ALT FLD-CCNC: 52 U/L — HIGH (ref 0–41)
ANION GAP SERPL CALC-SCNC: 12 MMOL/L — SIGNIFICANT CHANGE UP (ref 7–14)
AST SERPL-CCNC: 135 U/L — HIGH (ref 0–41)
BILIRUB SERPL-MCNC: 1.6 MG/DL — HIGH (ref 0.2–1.2)
BUN SERPL-MCNC: <3 MG/DL — LOW (ref 10–20)
CALCIUM SERPL-MCNC: 9 MG/DL — SIGNIFICANT CHANGE UP (ref 8.5–10.1)
CHLORIDE SERPL-SCNC: 104 MMOL/L — SIGNIFICANT CHANGE UP (ref 98–110)
CO2 SERPL-SCNC: 25 MMOL/L — SIGNIFICANT CHANGE UP (ref 17–32)
CREAT SERPL-MCNC: <0.5 MG/DL — LOW (ref 0.7–1.5)
GLUCOSE SERPL-MCNC: 90 MG/DL — SIGNIFICANT CHANGE UP (ref 70–99)
POTASSIUM SERPL-MCNC: 4.4 MMOL/L — SIGNIFICANT CHANGE UP (ref 3.5–5)
POTASSIUM SERPL-SCNC: 4.4 MMOL/L — SIGNIFICANT CHANGE UP (ref 3.5–5)
PROT SERPL-MCNC: 6 G/DL — SIGNIFICANT CHANGE UP (ref 6–8)
SODIUM SERPL-SCNC: 141 MMOL/L — SIGNIFICANT CHANGE UP (ref 135–146)

## 2019-12-15 PROCEDURE — ZZZZZ: CPT

## 2019-12-15 PROCEDURE — 99233 SBSQ HOSP IP/OBS HIGH 50: CPT

## 2019-12-15 RX ADMIN — MORPHINE SULFATE 4 MILLIGRAM(S): 50 CAPSULE, EXTENDED RELEASE ORAL at 00:00

## 2019-12-15 RX ADMIN — SODIUM CHLORIDE 200 MILLILITER(S): 9 INJECTION, SOLUTION INTRAVENOUS at 06:20

## 2019-12-15 RX ADMIN — OXYCODONE HYDROCHLORIDE 5 MILLIGRAM(S): 5 TABLET ORAL at 15:16

## 2019-12-15 RX ADMIN — Medication 1 MILLIGRAM(S): at 11:14

## 2019-12-15 RX ADMIN — Medication 1 TABLET(S): at 11:14

## 2019-12-15 RX ADMIN — OXYCODONE HYDROCHLORIDE 5 MILLIGRAM(S): 5 TABLET ORAL at 11:50

## 2019-12-15 RX ADMIN — MORPHINE SULFATE 2 MILLIGRAM(S): 50 CAPSULE, EXTENDED RELEASE ORAL at 04:00

## 2019-12-15 RX ADMIN — SODIUM CHLORIDE 200 MILLILITER(S): 9 INJECTION, SOLUTION INTRAVENOUS at 01:28

## 2019-12-15 RX ADMIN — Medication 2 MILLIGRAM(S): at 06:51

## 2019-12-15 RX ADMIN — OXYCODONE HYDROCHLORIDE 5 MILLIGRAM(S): 5 TABLET ORAL at 15:50

## 2019-12-15 RX ADMIN — MORPHINE SULFATE 4 MILLIGRAM(S): 50 CAPSULE, EXTENDED RELEASE ORAL at 06:40

## 2019-12-15 RX ADMIN — Medication 100 MILLIGRAM(S): at 11:14

## 2019-12-15 RX ADMIN — MORPHINE SULFATE 2 MILLIGRAM(S): 50 CAPSULE, EXTENDED RELEASE ORAL at 03:21

## 2019-12-15 RX ADMIN — OXYCODONE HYDROCHLORIDE 5 MILLIGRAM(S): 5 TABLET ORAL at 21:58

## 2019-12-15 RX ADMIN — OXYCODONE HYDROCHLORIDE 5 MILLIGRAM(S): 5 TABLET ORAL at 22:30

## 2019-12-15 RX ADMIN — MORPHINE SULFATE 4 MILLIGRAM(S): 50 CAPSULE, EXTENDED RELEASE ORAL at 00:20

## 2019-12-15 RX ADMIN — MORPHINE SULFATE 4 MILLIGRAM(S): 50 CAPSULE, EXTENDED RELEASE ORAL at 06:12

## 2019-12-15 RX ADMIN — OXYCODONE HYDROCHLORIDE 5 MILLIGRAM(S): 5 TABLET ORAL at 11:16

## 2019-12-15 NOTE — PROGRESS NOTE ADULT - ASSESSMENT
39F PMHx alcohol abuse, cocaine abuse, h/o pancreatitis here with acute pancreatitis.    #Acute pancreatitis, presumed due to alcohol  ongoing abd pain, abd exam benign  d/c LR  advance to reg diet  po pain control  #Cholestasis, in setting of hepatic steatosis, gb sludge  no gallstones visualized  abd exam benign, no coagulopathy  needs outpt gi f/u  cont trend cmp  #Alcohol abuse  ciwa 0  cont symptom triggered ciwa  folic acid 1  multivit  thiamine 100  #DVT ppx  subq hep    #Progress Note Handoff:  Pending (specify):  Consults_________, Tests________, Test Results_______, Other____pain control____  Family discussion:d/w pt at bedside re: treatment plan, primary dx  Disposition: Home__x_/SNF___/Other________/Unknown at this time________

## 2019-12-15 NOTE — PROGRESS NOTE ADULT - SUBJECTIVE AND OBJECTIVE BOX
INTERVAL HPI/OVERNIGHT EVENTS:    SUBJECTIVE: Patient seen and examined at bedside.     no cp, sob, fever  abd pain, 7/10, nonradiating, crampy    OBJECTIVE:    VITAL SIGNS:  ICU Vital Signs Last 24 Hrs  T(C): 36.4 (15 Dec 2019 05:24), Max: 36.8 (14 Dec 2019 20:56)  T(F): 97.6 (15 Dec 2019 05:24), Max: 98.2 (14 Dec 2019 20:56)  HR: 108 (15 Dec 2019 05:24) (108 - 111)  BP: 141/75 (15 Dec 2019 05:24) (141/75 - 142/85)  BP(mean): --  ABP: --  ABP(mean): --  RR: 16 (15 Dec 2019 05:24) (16 - 16)  SpO2: --        CAPILLARY BLOOD GLUCOSE          PHYSICAL EXAM:    General: NAD  HEENT: NC/AT; PERRL, clear conjunctiva  Neck: supple  Respiratory: CTA b/l  Cardiovascular: +S1/S2; RRR  Abdomen: soft, NT/ND; +BS x4  Extremities: WWP, 2+ peripheral pulses b/l; no LE edema  Skin: normal color and turgor; no rash  Neurological:    MEDICATIONS:  MEDICATIONS  (STANDING):  folic acid 1 milliGRAM(s) Oral daily  heparin  Injectable 5000 Unit(s) SubCutaneous every 8 hours  multivitamin 1 Tablet(s) Oral daily  nicotine - 21 mG/24Hr(s) Patch 1 patch Transdermal daily  polyethylene glycol 3350 17 Gram(s) Oral daily  senna 2 Tablet(s) Oral at bedtime  thiamine 100 milliGRAM(s) Oral daily    MEDICATIONS  (PRN):  LORazepam     Tablet 2 milliGRAM(s) Oral every 2 hours PRN CIWA-Ar score increase by 2 points and a total score of 7 or less  LORazepam     Tablet 2 milliGRAM(s) Oral every 1 hour PRN CIWA-Ar score 8 or greater  oxyCODONE    IR 5 milliGRAM(s) Oral every 4 hours PRN Severe Pain (7 - 10)      ALLERGIES:  Allergies    No Known Allergies    Intolerances        LABS:    Hemoglobin: 9.8 g/dL (12-13 @ 07:18)  Hemoglobin: 9.7 g/dL (12-12 @ 10:42)  Hemoglobin: 8.6 g/dL (12-11 @ 07:08)  Hemoglobin: 9.5 g/dL (12-10 @ 11:15)      12-14    141  |  106  |  3<L>  ----------------------------<  91  3.9   |  23  |  0.5<L>    Ca    8.7      14 Dec 2019 07:44    TPro  5.5<L>  /  Alb  2.7<L>  /  TBili  1.5<H>  /  DBili  x   /  AST  148<H>  /  ALT  53<H>  /  AlkPhos  212<H>  12-14    Creatinine Trend: 0.5<--, 0.5<--, 0.5<--, <0.5<--, <0.5<--, 0.5<--  LIVER FUNCTIONS - ( 14 Dec 2019 07:44 )  Alb: 2.7 g/dL / Pro: 5.5 g/dL / ALK PHOS: 212 U/L / ALT: 53 U/L / AST: 148 U/L / GGT: x           PT/INR - ( 13 Dec 2019 11:06 )   PT: 11.10 sec;   INR: 0.96 ratio         PTT - ( 13 Dec 2019 11:06 )  PTT:32.1 sec    hs Troponin:              CSF:                      EKG:   MICROBIOLOGY:    IMAGING:      Labs, imaging, EKG personally reviewed    RADIOLOGY & ADDITIONAL TESTS: Reviewed.

## 2019-12-16 LAB
ALBUMIN SERPL ELPH-MCNC: 3 G/DL — LOW (ref 3.5–5.2)
ALP SERPL-CCNC: 195 U/L — HIGH (ref 30–115)
ALT FLD-CCNC: 43 U/L — HIGH (ref 0–41)
ANION GAP SERPL CALC-SCNC: 10 MMOL/L — SIGNIFICANT CHANGE UP (ref 7–14)
AST SERPL-CCNC: 109 U/L — HIGH (ref 0–41)
BILIRUB SERPL-MCNC: 1.3 MG/DL — HIGH (ref 0.2–1.2)
BUN SERPL-MCNC: 4 MG/DL — LOW (ref 10–20)
CALCIUM SERPL-MCNC: 8.7 MG/DL — SIGNIFICANT CHANGE UP (ref 8.5–10.1)
CHLORIDE SERPL-SCNC: 104 MMOL/L — SIGNIFICANT CHANGE UP (ref 98–110)
CO2 SERPL-SCNC: 22 MMOL/L — SIGNIFICANT CHANGE UP (ref 17–32)
CREAT SERPL-MCNC: <0.5 MG/DL — LOW (ref 0.7–1.5)
GLUCOSE SERPL-MCNC: 122 MG/DL — HIGH (ref 70–99)
HCT VFR BLD CALC: 32.7 % — LOW (ref 37–47)
HGB BLD-MCNC: 10.3 G/DL — LOW (ref 12–16)
MCHC RBC-ENTMCNC: 31.5 G/DL — LOW (ref 32–37)
MCHC RBC-ENTMCNC: 32.4 PG — HIGH (ref 27–31)
MCV RBC AUTO: 102.8 FL — HIGH (ref 81–99)
NRBC # BLD: 0 /100 WBCS — SIGNIFICANT CHANGE UP (ref 0–0)
PLATELET # BLD AUTO: 393 K/UL — SIGNIFICANT CHANGE UP (ref 130–400)
POTASSIUM SERPL-MCNC: 4 MMOL/L — SIGNIFICANT CHANGE UP (ref 3.5–5)
POTASSIUM SERPL-SCNC: 4 MMOL/L — SIGNIFICANT CHANGE UP (ref 3.5–5)
PROT SERPL-MCNC: 5.9 G/DL — LOW (ref 6–8)
RBC # BLD: 3.18 M/UL — LOW (ref 4.2–5.4)
RBC # FLD: 15.9 % — HIGH (ref 11.5–14.5)
SODIUM SERPL-SCNC: 136 MMOL/L — SIGNIFICANT CHANGE UP (ref 135–146)
WBC # BLD: 6.13 K/UL — SIGNIFICANT CHANGE UP (ref 4.8–10.8)
WBC # FLD AUTO: 6.13 K/UL — SIGNIFICANT CHANGE UP (ref 4.8–10.8)

## 2019-12-16 PROCEDURE — 99233 SBSQ HOSP IP/OBS HIGH 50: CPT

## 2019-12-16 PROCEDURE — 74177 CT ABD & PELVIS W/CONTRAST: CPT | Mod: 26

## 2019-12-16 RX ORDER — SODIUM CHLORIDE 9 MG/ML
1000 INJECTION, SOLUTION INTRAVENOUS
Refills: 0 | Status: DISCONTINUED | OUTPATIENT
Start: 2019-12-16 | End: 2019-12-18

## 2019-12-16 RX ORDER — FAMOTIDINE 10 MG/ML
20 INJECTION INTRAVENOUS DAILY
Refills: 0 | Status: DISCONTINUED | OUTPATIENT
Start: 2019-12-16 | End: 2019-12-18

## 2019-12-16 RX ADMIN — OXYCODONE HYDROCHLORIDE 5 MILLIGRAM(S): 5 TABLET ORAL at 05:53

## 2019-12-16 RX ADMIN — OXYCODONE HYDROCHLORIDE 5 MILLIGRAM(S): 5 TABLET ORAL at 06:24

## 2019-12-16 RX ADMIN — OXYCODONE HYDROCHLORIDE 5 MILLIGRAM(S): 5 TABLET ORAL at 11:00

## 2019-12-16 RX ADMIN — Medication 100 MILLIGRAM(S): at 11:12

## 2019-12-16 RX ADMIN — SODIUM CHLORIDE 100 MILLILITER(S): 9 INJECTION, SOLUTION INTRAVENOUS at 09:49

## 2019-12-16 RX ADMIN — OXYCODONE HYDROCHLORIDE 5 MILLIGRAM(S): 5 TABLET ORAL at 14:38

## 2019-12-16 RX ADMIN — Medication 1 TABLET(S): at 11:12

## 2019-12-16 RX ADMIN — OXYCODONE HYDROCHLORIDE 5 MILLIGRAM(S): 5 TABLET ORAL at 18:13

## 2019-12-16 RX ADMIN — FAMOTIDINE 20 MILLIGRAM(S): 10 INJECTION INTRAVENOUS at 11:12

## 2019-12-16 RX ADMIN — SODIUM CHLORIDE 150 MILLILITER(S): 9 INJECTION, SOLUTION INTRAVENOUS at 17:46

## 2019-12-16 RX ADMIN — POLYETHYLENE GLYCOL 3350 17 GRAM(S): 17 POWDER, FOR SOLUTION ORAL at 11:13

## 2019-12-16 RX ADMIN — OXYCODONE HYDROCHLORIDE 5 MILLIGRAM(S): 5 TABLET ORAL at 23:04

## 2019-12-16 RX ADMIN — Medication 1 MILLIGRAM(S): at 11:12

## 2019-12-16 RX ADMIN — OXYCODONE HYDROCHLORIDE 5 MILLIGRAM(S): 5 TABLET ORAL at 10:04

## 2019-12-16 RX ADMIN — OXYCODONE HYDROCHLORIDE 5 MILLIGRAM(S): 5 TABLET ORAL at 22:48

## 2019-12-16 RX ADMIN — OXYCODONE HYDROCHLORIDE 5 MILLIGRAM(S): 5 TABLET ORAL at 18:52

## 2019-12-16 RX ADMIN — Medication 1 PATCH: at 11:12

## 2019-12-16 RX ADMIN — OXYCODONE HYDROCHLORIDE 5 MILLIGRAM(S): 5 TABLET ORAL at 15:30

## 2019-12-16 NOTE — PROGRESS NOTE ADULT - SUBJECTIVE AND OBJECTIVE BOX
INTERVAL HPI/OVERNIGHT EVENTS:    SUBJECTIVE: Patient seen and examined at bedside.     no cp, sob, fever  abd pain, crampy, diffuse, nonradiating, worsen with movement    OBJECTIVE:    VITAL SIGNS:  ICU Vital Signs Last 24 Hrs  T(C): 37.1 (15 Dec 2019 21:59), Max: 37.1 (15 Dec 2019 21:59)  T(F): 98.7 (15 Dec 2019 21:59), Max: 98.7 (15 Dec 2019 21:59)  HR: 102 (15 Dec 2019 21:59) (102 - 122)  BP: 115/68 (15 Dec 2019 21:59) (115/68 - 125/82)  BP(mean): --  ABP: --  ABP(mean): --  RR: 16 (15 Dec 2019 21:59) (16 - 16)  SpO2: --        12-15 @ 07:01  -  12-16 @ 07:00  --------------------------------------------------------  IN: 0 mL / OUT: 0 mL / NET: 0 mL      CAPILLARY BLOOD GLUCOSE          PHYSICAL EXAM:    General: NAD  HEENT: NC/AT; PERRL, clear conjunctiva  Neck: supple  Respiratory: CTA b/l  Cardiovascular: +S1/S2; RRR  Abdomen: soft, sl ttp to deep palpation; +BS x4  Extremities: WWP, 2+ peripheral pulses b/l; no LE edema  Skin: normal color and turgor; no rash  Neurological:    MEDICATIONS:  MEDICATIONS  (STANDING):  folic acid 1 milliGRAM(s) Oral daily  heparin  Injectable 5000 Unit(s) SubCutaneous every 8 hours  lactated ringers. 1000 milliLiter(s) (100 mL/Hr) IV Continuous <Continuous>  multivitamin 1 Tablet(s) Oral daily  nicotine - 21 mG/24Hr(s) Patch 1 patch Transdermal daily  polyethylene glycol 3350 17 Gram(s) Oral daily  senna 2 Tablet(s) Oral at bedtime  thiamine 100 milliGRAM(s) Oral daily    MEDICATIONS  (PRN):  LORazepam     Tablet 2 milliGRAM(s) Oral every 2 hours PRN CIWA-Ar score increase by 2 points and a total score of 7 or less  LORazepam     Tablet 2 milliGRAM(s) Oral every 1 hour PRN CIWA-Ar score 8 or greater  oxyCODONE    IR 5 milliGRAM(s) Oral every 4 hours PRN Severe Pain (7 - 10)      ALLERGIES:  Allergies    No Known Allergies    Intolerances        LABS:    Hemoglobin: 9.8 g/dL (12-13 @ 07:18)  Hemoglobin: 9.7 g/dL (12-12 @ 10:42)      12-15    141  |  104  |  <3<L>  ----------------------------<  90  4.4   |  25  |  <0.5<L>    Ca    9.0      15 Dec 2019 07:13    TPro  6.0  /  Alb  3.1<L>  /  TBili  1.6<H>  /  DBili  x   /  AST  135<H>  /  ALT  52<H>  /  AlkPhos  219<H>  12-15    Creatinine Trend: <0.5<--, 0.5<--, 0.5<--, 0.5<--, <0.5<--, <0.5<--  LIVER FUNCTIONS - ( 15 Dec 2019 07:13 )  Alb: 3.1 g/dL / Pro: 6.0 g/dL / ALK PHOS: 219 U/L / ALT: 52 U/L / AST: 135 U/L / GGT: x               hs Troponin:              CSF:                      EKG:   MICROBIOLOGY:    IMAGING:      Labs, imaging, EKG personally reviewed    RADIOLOGY & ADDITIONAL TESTS: Reviewed.

## 2019-12-16 NOTE — PROGRESS NOTE ADULT - ASSESSMENT
39F PMHx alcohol abuse, cocaine abuse, h/o pancreatitis here with acute pancreatitis.    #Acute pancreatitis, presumed due to alcohol  ongoing abd pain, abd exam benign  low fat diet  po pain control  sinus tach noted, start  cc/hr  #Cholestasis, in setting of hepatic steatosis, gb sludge  no gallstones visualized  abd exam benign, no coagulopathy  needs outpt gi f/u  cont trend cmp  #Alcohol abuse  ciwa 0  cont symptom triggered ciwa  folic acid 1  multivit  thiamine 100  discussed with HCP friend, to d/w pt re: detox, but currently not interested  #DVT ppx  subq hep    #Progress Note Handoff:  Pending (specify):  Consults_________, Tests________, Test Results_______, Other____pain control____  Family discussion:d/w pt at bedside re: treatment plan, primary dx  Disposition: Home__x_/SNF___/Other________/Unknown at this time________ 39F PMHx alcohol abuse, cocaine abuse, h/o pancreatitis here with acute pancreatitis.    #Acute pancreatitis, presumed due to alcohol  ongoing abd pain, abd exam benign  low fat diet  po pain control  sinus tach noted, start  cc/hr  #Cholestasis, in setting of hepatic steatosis, gb sludge  no gallstones visualized  abd exam benign, no coagulopathy  needs outpt gi f/u  cont trend cmp  #Alcohol abuse  symptom triggered ciwa  folic acid 1  multivit  thiamine 100  discussed with HCP friend, to d/w pt re: detox, but currently not interested  #DVT ppx  subq hep    #Progress Note Handoff:  Pending (specify):  Consults_________, Tests________, Test Results_______, Other____pain control____  Family discussion:d/w pt at bedside re: treatment plan, primary dx  Disposition: Home__x_/SNF___/Other________/Unknown at this time________

## 2019-12-17 ENCOUNTER — TRANSCRIPTION ENCOUNTER (OUTPATIENT)
Age: 39
End: 2019-12-17

## 2019-12-17 LAB
ALBUMIN SERPL ELPH-MCNC: 3 G/DL — LOW (ref 3.5–5.2)
ALP SERPL-CCNC: 183 U/L — HIGH (ref 30–115)
ALT FLD-CCNC: 39 U/L — SIGNIFICANT CHANGE UP (ref 0–41)
ANION GAP SERPL CALC-SCNC: 10 MMOL/L — SIGNIFICANT CHANGE UP (ref 7–14)
AST SERPL-CCNC: 108 U/L — HIGH (ref 0–41)
BILIRUB SERPL-MCNC: 1.2 MG/DL — SIGNIFICANT CHANGE UP (ref 0.2–1.2)
BUN SERPL-MCNC: 4 MG/DL — LOW (ref 10–20)
CALCIUM SERPL-MCNC: 8.9 MG/DL — SIGNIFICANT CHANGE UP (ref 8.5–10.1)
CHLORIDE SERPL-SCNC: 104 MMOL/L — SIGNIFICANT CHANGE UP (ref 98–110)
CO2 SERPL-SCNC: 23 MMOL/L — SIGNIFICANT CHANGE UP (ref 17–32)
CREAT SERPL-MCNC: <0.5 MG/DL — LOW (ref 0.7–1.5)
GLUCOSE SERPL-MCNC: 110 MG/DL — HIGH (ref 70–99)
HCT VFR BLD CALC: 31.9 % — LOW (ref 37–47)
HGB BLD-MCNC: 10.2 G/DL — LOW (ref 12–16)
MAGNESIUM SERPL-MCNC: 2.1 MG/DL — SIGNIFICANT CHANGE UP (ref 1.8–2.4)
MCHC RBC-ENTMCNC: 32 G/DL — SIGNIFICANT CHANGE UP (ref 32–37)
MCHC RBC-ENTMCNC: 32 PG — HIGH (ref 27–31)
MCV RBC AUTO: 100 FL — HIGH (ref 81–99)
NRBC # BLD: 0 /100 WBCS — SIGNIFICANT CHANGE UP (ref 0–0)
PHOSPHATE SERPL-MCNC: 4.6 MG/DL — SIGNIFICANT CHANGE UP (ref 2.1–4.9)
PLATELET # BLD AUTO: 372 K/UL — SIGNIFICANT CHANGE UP (ref 130–400)
POTASSIUM SERPL-MCNC: 3.7 MMOL/L — SIGNIFICANT CHANGE UP (ref 3.5–5)
POTASSIUM SERPL-SCNC: 3.7 MMOL/L — SIGNIFICANT CHANGE UP (ref 3.5–5)
PROT SERPL-MCNC: 5.8 G/DL — LOW (ref 6–8)
RBC # BLD: 3.19 M/UL — LOW (ref 4.2–5.4)
RBC # FLD: 15.5 % — HIGH (ref 11.5–14.5)
SODIUM SERPL-SCNC: 137 MMOL/L — SIGNIFICANT CHANGE UP (ref 135–146)
WBC # BLD: 5.47 K/UL — SIGNIFICANT CHANGE UP (ref 4.8–10.8)
WBC # FLD AUTO: 5.47 K/UL — SIGNIFICANT CHANGE UP (ref 4.8–10.8)

## 2019-12-17 PROCEDURE — 99233 SBSQ HOSP IP/OBS HIGH 50: CPT

## 2019-12-17 RX ORDER — MORPHINE SULFATE 50 MG/1
4 CAPSULE, EXTENDED RELEASE ORAL ONCE
Refills: 0 | Status: DISCONTINUED | OUTPATIENT
Start: 2019-12-17 | End: 2019-12-17

## 2019-12-17 RX ORDER — DIPHENHYDRAMINE HCL 50 MG
50 CAPSULE ORAL EVERY 4 HOURS
Refills: 0 | Status: DISCONTINUED | OUTPATIENT
Start: 2019-12-17 | End: 2019-12-18

## 2019-12-17 RX ADMIN — POLYETHYLENE GLYCOL 3350 17 GRAM(S): 17 POWDER, FOR SOLUTION ORAL at 12:03

## 2019-12-17 RX ADMIN — OXYCODONE HYDROCHLORIDE 5 MILLIGRAM(S): 5 TABLET ORAL at 08:52

## 2019-12-17 RX ADMIN — Medication 1 TABLET(S): at 12:03

## 2019-12-17 RX ADMIN — OXYCODONE HYDROCHLORIDE 5 MILLIGRAM(S): 5 TABLET ORAL at 12:03

## 2019-12-17 RX ADMIN — Medication 2 MILLIGRAM(S): at 22:56

## 2019-12-17 RX ADMIN — Medication 1 PATCH: at 12:01

## 2019-12-17 RX ADMIN — OXYCODONE HYDROCHLORIDE 5 MILLIGRAM(S): 5 TABLET ORAL at 08:15

## 2019-12-17 RX ADMIN — Medication 50 MILLIGRAM(S): at 02:45

## 2019-12-17 RX ADMIN — Medication 2 MILLIGRAM(S): at 18:36

## 2019-12-17 RX ADMIN — OXYCODONE HYDROCHLORIDE 5 MILLIGRAM(S): 5 TABLET ORAL at 18:50

## 2019-12-17 RX ADMIN — MORPHINE SULFATE 4 MILLIGRAM(S): 50 CAPSULE, EXTENDED RELEASE ORAL at 00:41

## 2019-12-17 RX ADMIN — MORPHINE SULFATE 4 MILLIGRAM(S): 50 CAPSULE, EXTENDED RELEASE ORAL at 01:03

## 2019-12-17 RX ADMIN — Medication 100 MILLIGRAM(S): at 12:03

## 2019-12-17 RX ADMIN — OXYCODONE HYDROCHLORIDE 5 MILLIGRAM(S): 5 TABLET ORAL at 18:52

## 2019-12-17 RX ADMIN — OXYCODONE HYDROCHLORIDE 5 MILLIGRAM(S): 5 TABLET ORAL at 12:10

## 2019-12-17 RX ADMIN — Medication 1 PATCH: at 08:17

## 2019-12-17 RX ADMIN — Medication 1 MILLIGRAM(S): at 12:03

## 2019-12-17 RX ADMIN — FAMOTIDINE 20 MILLIGRAM(S): 10 INJECTION INTRAVENOUS at 12:03

## 2019-12-17 NOTE — DISCHARGE NOTE PROVIDER - NSDCCPCAREPLAN_GEN_ALL_CORE_FT
PRINCIPAL DISCHARGE DIAGNOSIS  Diagnosis: Prolonged QT interval  Assessment and Plan of Treatment: resolved.  Outpatient follow up with your doctor      SECONDARY DISCHARGE DIAGNOSES  Diagnosis: Alcohol abuse  Assessment and Plan of Treatment: resolved.  Refused inpatient dex.  Already set up with outpatient detox    Diagnosis: Nausea & vomiting  Assessment and Plan of Treatment: Resolved    Diagnosis: Pancreatitis  Assessment and Plan of Treatment: Outpatient follow up with Gastroenterology.  Need Outpatient MRI of the avdm    Diagnosis: Hypokalemia  Assessment and Plan of Treatment:

## 2019-12-17 NOTE — PROGRESS NOTE ADULT - SUBJECTIVE AND OBJECTIVE BOX
INTERVAL HPI/OVERNIGHT EVENTS:    SUBJECTIVE: Patient seen and examined at bedside.     no cp, sob, fever  abd pain, diffuse, crampy, nonradiating, worsen to touch  OBJECTIVE:    VITAL SIGNS:  ICU Vital Signs Last 24 Hrs  T(C): 36.1 (17 Dec 2019 06:46), Max: 36.6 (16 Dec 2019 13:29)  T(F): 97 (17 Dec 2019 06:46), Max: 97.8 (16 Dec 2019 13:29)  HR: 117 (17 Dec 2019 06:46) (117 - 130)  BP: 129/81 (17 Dec 2019 06:46) (124/80 - 129/81)  BP(mean): --  ABP: --  ABP(mean): --  RR: 16 (17 Dec 2019 06:46) (16 - 16)  SpO2: 100% (16 Dec 2019 18:51) (100% - 100%)        12-16 @ 07:01  -  12-17 @ 07:00  --------------------------------------------------------  IN: 0 mL / OUT: 0 mL / NET: 0 mL      CAPILLARY BLOOD GLUCOSE          PHYSICAL EXAM:    General: NAD  HEENT: NC/AT; PERRL, clear conjunctiva  Neck: supple  Respiratory: CTA b/l  Cardiovascular: +S1/S2; RRR  Abdomen: soft, nt/ND; +BS x4  Extremities: WWP, 2+ peripheral pulses b/l; no LE edema  Skin: normal color and turgor; no rash  Neurological:    MEDICATIONS:  MEDICATIONS  (STANDING):  famotidine    Tablet 20 milliGRAM(s) Oral daily  folic acid 1 milliGRAM(s) Oral daily  heparin  Injectable 5000 Unit(s) SubCutaneous every 8 hours  lactated ringers. 1000 milliLiter(s) (150 mL/Hr) IV Continuous <Continuous>  multivitamin 1 Tablet(s) Oral daily  nicotine - 21 mG/24Hr(s) Patch 1 patch Transdermal daily  polyethylene glycol 3350 17 Gram(s) Oral daily  senna 2 Tablet(s) Oral at bedtime  thiamine 100 milliGRAM(s) Oral daily    MEDICATIONS  (PRN):  aluminum hydroxide/magnesium hydroxide/simethicone Suspension 30 milliLiter(s) Oral every 4 hours PRN Dyspepsia  diphenhydrAMINE 50 milliGRAM(s) Oral every 4 hours PRN Rash and/or Itching  LORazepam     Tablet 2 milliGRAM(s) Oral every 2 hours PRN CIWA-Ar score increase by 2 points and a total score of 7 or less  LORazepam     Tablet 2 milliGRAM(s) Oral every 1 hour PRN CIWA-Ar score 8 or greater  oxyCODONE    IR 5 milliGRAM(s) Oral every 4 hours PRN Severe Pain (7 - 10)      ALLERGIES:  Allergies    No Known Allergies    Intolerances        LABS:                        10.2   5.47  )-----------( 372      ( 17 Dec 2019 07:17 )             31.9     Hemoglobin: 10.2 g/dL (12-17 @ 07:17)  Hemoglobin: 10.3 g/dL (12-16 @ 12:20)  Hemoglobin: 9.8 g/dL (12-13 @ 07:18)  Hemoglobin: 9.7 g/dL (12-12 @ 10:42)    CBC Full  -  ( 17 Dec 2019 07:17 )  WBC Count : 5.47 K/uL  RBC Count : 3.19 M/uL  Hemoglobin : 10.2 g/dL  Hematocrit : 31.9 %  Platelet Count - Automated : 372 K/uL  Mean Cell Volume : 100.0 fL  Mean Cell Hemoglobin : 32.0 pg  Mean Cell Hemoglobin Concentration : 32.0 g/dL  Auto Neutrophil # : x  Auto Lymphocyte # : x  Auto Monocyte # : x  Auto Eosinophil # : x  Auto Basophil # : x  Auto Neutrophil % : x  Auto Lymphocyte % : x  Auto Monocyte % : x  Auto Eosinophil % : x  Auto Basophil % : x    12-17    137  |  104  |  4<L>  ----------------------------<  110<H>  3.7   |  23  |  <0.5<L>    Ca    8.9      17 Dec 2019 07:17  Phos  4.6     12-17  Mg     2.1     12-17    TPro  5.8<L>  /  Alb  3.0<L>  /  TBili  1.2  /  DBili  x   /  AST  108<H>  /  ALT  39  /  AlkPhos  183<H>  12-17    Creatinine Trend: <0.5<--, <0.5<--, <0.5<--, 0.5<--, 0.5<--, 0.5<--  LIVER FUNCTIONS - ( 17 Dec 2019 07:17 )  Alb: 3.0 g/dL / Pro: 5.8 g/dL / ALK PHOS: 183 U/L / ALT: 39 U/L / AST: 108 U/L / GGT: x               hs Troponin:              CSF:                      EKG:   MICROBIOLOGY:    IMAGING:      Labs, imaging, EKG personally reviewed    RADIOLOGY & ADDITIONAL TESTS: Reviewed.

## 2019-12-17 NOTE — DISCHARGE NOTE PROVIDER - HOSPITAL COURSE
39F PMHx alcohol abuse, cocaine abuse, h/o pancreatitis here with acute pancreatitis.    CT c/w pancreatitis; she was kept npo, pain control, started on LR with good response. Notably she had cholestasis in setting of alcoholic steatosis, gb sludge. 39F PMHx alcohol abuse, cocaine abuse, h/o pancreatitis here with acute pancreatitis.        #Acute pancreatitis, presumed due to alcohol    abdm pain resolved.  tolerating po intake well    s/p repeat ct which shows no changes from previous CT scan    Outpatient follow up with GI, and MRI    MRI with and without gadolinium as an outpatient to rule out Pancreas divisum and Pancreatic neoplasm if pancreatitis recurs     Discussed case with GI who recommended outpatient follow up    #Cholestasis, in setting of hepatic steatosis, gb sludge    no gallstones visualized    Resolved    Outpatient follow up with GI        #Alcohol abuse    Resolved.  CIWA score zero    Continue with folic acid,  MVA, and Thiamine    Discussed case with psych who evaluated the patient, no need for inpatient, outpatient follow up with the intake team    Patient is cleared for discharge as per psych, and GI        Patient was seen and examined today.  feels better.  abdm pain resolved.  Denies N/V/D, SI, HI, abdm pain, Visual or auditory hallucination    Constitutional: No fever, fatigue or weight loss.    Skin: No rash.    Eyes: No recent vision problems or eye pain.    ENT: No congestion, ear pain, or sore throat.    Endocrine: No thyroid problems.    Cardiovascular: No chest pain or palpation.    Respiratory: No cough, shortness of breath, congestion, or wheezing.    Gastrointestinal: No abdominal pain, nausea, vomiting, or diarrhea.    Genitourinary: No dysuria.    Musculoskeletal: No joint swelling.    Neurologic: No headache.    Vital Signs Last 24 Hrs    T(C): 36.8 (18 Dec 2019 13:56), Max: 36.8 (17 Dec 2019 21:56)    T(F): 98.2 (18 Dec 2019 13:56), Max: 98.3 (17 Dec 2019 21:56)    HR: 106 (18 Dec 2019 13:56) (106 - 144)    BP: 121/76 (18 Dec 2019 13:56) (121/76 - 131/81)    BP(mean): --    RR: 18 (18 Dec 2019 13:56) (16 - 18)    SpO2: --        PHYSICAL EXAM-    GENERAL: NAD, chronic ill appearing female    HEAD:  Atraumatic, Normocephalic    EYES: EOMI, PERRLA, conjunctiva and sclera clear    NECK: Supple, No JVD, Normal thyroid    NERVOUS SYSTEM:  Alert & Oriented X3, Moving all extremities    CHEST/LUNG: Clear to percussion bilaterally; No rales, rhonchi, wheezing, or rubs    HEART: Regular rate and rhythm; No murmurs, rubs, or gallops    ABDOMEN: Soft, Nontender, Nondistended; Bowel sounds present    EXTREMITIES:    No clubbing, cyanosis, or edema    SKIN: No rashes or lesions        Hospital course, and discharge planning were discussed with patient in details, and in regard to outpatient follow up in details.  seems to understand, and in agreement    time 70 min

## 2019-12-17 NOTE — DISCHARGE NOTE PROVIDER - NSDCMRMEDTOKEN_GEN_ALL_CORE_FT
famotidine 20 mg oral tablet: 1 tab(s) orally once a day  folic acid 1 mg oral tablet: 1 tab(s) orally once a day  Multiple Vitamins oral tablet: 1 tab(s) orally once a day  nicotine 21 mg/24 hr transdermal film, extended release: 1 patch transdermal once a day   polyethylene glycol 3350 oral powder for reconstitution: 17 gram(s) orally once a day  senna oral tablet: 2 tab(s) orally once a day (at bedtime)  thiamine 100 mg oral tablet: 1 tab(s) orally once a day

## 2019-12-17 NOTE — PROGRESS NOTE ADULT - ASSESSMENT
39F PMHx alcohol abuse, cocaine abuse, h/o pancreatitis here with acute pancreatitis.    #Acute pancreatitis, presumed due to alcohol  ongoing abd pain, abd exam benign  low fat diet  po pain control  s/p repeat ct, f/u read  low suspicion for alcoholic hepatitis; even if so, low maddreys DF  sinus tach noted,  cc/hr  #Cholestasis, in setting of hepatic steatosis, gb sludge  no gallstones visualized  improving, cont to trend  needs outpt gi f/u  #Alcohol abuse  symptom triggered ciwa  folic acid 1  multivit  thiamine 100  discussed with HCP friend, to d/w pt re: detox, but currently not interested  #DVT ppx  subq hep    #Progress Note Handoff:  Pending (specify):  Consults_________, Tests________, Test Results_______, Other____pain control____  Family discussion:d/w pt at bedside re: treatment plan, primary dx  Disposition: Home__x_/SNF___/Other________/Unknown at this time________

## 2019-12-17 NOTE — DISCHARGE NOTE PROVIDER - CARE PROVIDER_API CALL
ifeoma benson  Phone: (   )    -  Fax: (   )    -  Follow Up Time:     adi aguillon Stephen M (MD)      Address: [address] [place] [address]          Phone: [phone]  Phone: (   )    -  Fax: (   )    -  Follow Up Time:

## 2019-12-17 NOTE — DISCHARGE NOTE PROVIDER - NSDCFUADDAPPT_GEN_ALL_CORE_FT
Please follow up with [facility], [address], [phone] for outpatient psych referral  Please follow up with your doctor in one week  please follow up with gastroenterology in 4-5 days  Please come back to the hospital if you experience any abdominal pain, vomiting, fever, or any new symptoms or concerns  please follow up with outpatient alcohol dextox

## 2019-12-18 ENCOUNTER — TRANSCRIPTION ENCOUNTER (OUTPATIENT)
Age: 39
End: 2019-12-18

## 2019-12-18 VITALS
DIASTOLIC BLOOD PRESSURE: 76 MMHG | SYSTOLIC BLOOD PRESSURE: 121 MMHG | TEMPERATURE: 98 F | HEART RATE: 106 BPM | RESPIRATION RATE: 18 BRPM

## 2019-12-18 LAB
ALBUMIN SERPL ELPH-MCNC: 3.2 G/DL — LOW (ref 3.5–5.2)
ALP SERPL-CCNC: 188 U/L — HIGH (ref 30–115)
ALT FLD-CCNC: 40 U/L — SIGNIFICANT CHANGE UP (ref 0–41)
ANION GAP SERPL CALC-SCNC: 11 MMOL/L — SIGNIFICANT CHANGE UP (ref 7–14)
AST SERPL-CCNC: 111 U/L — HIGH (ref 0–41)
BILIRUB SERPL-MCNC: 1.4 MG/DL — HIGH (ref 0.2–1.2)
BUN SERPL-MCNC: 5 MG/DL — LOW (ref 10–20)
CALCIUM SERPL-MCNC: 9.4 MG/DL — SIGNIFICANT CHANGE UP (ref 8.5–10.1)
CHLORIDE SERPL-SCNC: 105 MMOL/L — SIGNIFICANT CHANGE UP (ref 98–110)
CO2 SERPL-SCNC: 23 MMOL/L — SIGNIFICANT CHANGE UP (ref 17–32)
CREAT SERPL-MCNC: <0.5 MG/DL — LOW (ref 0.7–1.5)
GLUCOSE SERPL-MCNC: 89 MG/DL — SIGNIFICANT CHANGE UP (ref 70–99)
MAGNESIUM SERPL-MCNC: 2.4 MG/DL — SIGNIFICANT CHANGE UP (ref 1.8–2.4)
PHOSPHATE SERPL-MCNC: 4.8 MG/DL — SIGNIFICANT CHANGE UP (ref 2.1–4.9)
POTASSIUM SERPL-MCNC: 4.2 MMOL/L — SIGNIFICANT CHANGE UP (ref 3.5–5)
POTASSIUM SERPL-SCNC: 4.2 MMOL/L — SIGNIFICANT CHANGE UP (ref 3.5–5)
PROT SERPL-MCNC: 6.2 G/DL — SIGNIFICANT CHANGE UP (ref 6–8)
SODIUM SERPL-SCNC: 139 MMOL/L — SIGNIFICANT CHANGE UP (ref 135–146)

## 2019-12-18 PROCEDURE — 99233 SBSQ HOSP IP/OBS HIGH 50: CPT

## 2019-12-18 RX ORDER — POLYETHYLENE GLYCOL 3350 17 G/17G
17 POWDER, FOR SOLUTION ORAL
Qty: 0 | Refills: 0 | DISCHARGE
Start: 2019-12-18

## 2019-12-18 RX ORDER — NICOTINE POLACRILEX 2 MG
1 GUM BUCCAL
Qty: 30 | Refills: 0
Start: 2019-12-18 | End: 2020-01-16

## 2019-12-18 RX ORDER — THIAMINE MONONITRATE (VIT B1) 100 MG
1 TABLET ORAL
Qty: 30 | Refills: 0
Start: 2019-12-18 | End: 2020-01-16

## 2019-12-18 RX ORDER — FOLIC ACID 0.8 MG
1 TABLET ORAL
Qty: 30 | Refills: 0
Start: 2019-12-18 | End: 2020-01-16

## 2019-12-18 RX ORDER — FAMOTIDINE 10 MG/ML
1 INJECTION INTRAVENOUS
Qty: 30 | Refills: 0
Start: 2019-12-18 | End: 2020-01-16

## 2019-12-18 RX ORDER — SENNA PLUS 8.6 MG/1
2 TABLET ORAL
Qty: 0 | Refills: 0 | DISCHARGE
Start: 2019-12-18

## 2019-12-18 RX ADMIN — Medication 2 MILLIGRAM(S): at 03:35

## 2019-12-18 RX ADMIN — OXYCODONE HYDROCHLORIDE 5 MILLIGRAM(S): 5 TABLET ORAL at 15:56

## 2019-12-18 RX ADMIN — Medication 2 MILLIGRAM(S): at 04:02

## 2019-12-18 NOTE — DISCHARGE NOTE NURSING/CASE MANAGEMENT/SOCIAL WORK - NSDCPEEMAIL_GEN_ALL_CORE
[employer] for Tobacco Control email [email or web address]

## 2019-12-18 NOTE — BEHAVIORAL HEALTH ASSESSMENT NOTE - NSBHCONSULTFOLLOWAFTERCARE_PSY_A_CORE FT
Please refer patient to [facility], [address], [phone]

## 2019-12-18 NOTE — DISCHARGE NOTE NURSING/CASE MANAGEMENT/SOCIAL WORK - NSDCFUADDAPPT_GEN_ALL_CORE_FT
Please follow up with [facility], [address], [phone] for outpatient psych referral  Please follow up with your doctor in one week  please follow up with gastroenterology in 4-5 days  Please come back to the hospital if you experience any abdominal pain, vomiting, fever, or any new symptoms or concerns  please follow up with outpatient alcohol dextox

## 2019-12-18 NOTE — BEHAVIORAL HEALTH ASSESSMENT NOTE - NSBHCHARTREVIEWVS_PSY_A_CORE FT
Vital Signs Last 24 Hrs  T(C): 36.8 (18 Dec 2019 13:56), Max: 36.8 (17 Dec 2019 21:56)  T(F): 98.2 (18 Dec 2019 13:56), Max: 98.3 (17 Dec 2019 21:56)  HR: 106 (18 Dec 2019 13:56) (106 - 144)  BP: 121/76 (18 Dec 2019 13:56) (121/76 - 131/81)  BP(mean): --  RR: 18 (18 Dec 2019 13:56) (16 - 18)  SpO2: --

## 2019-12-18 NOTE — CHART NOTE - NSCHARTNOTEFT_GEN_A_CORE
Patient seen and examined throughout the course of the day.    Results of Labs/Imaging discussed as well as patient's plan.      repeat CT 12/16- No significant interval change since 12/10/2019.  No change in mild peripancreatic fat stranding around pancreatic tail. No   peripancreatic fluid collections or evidence of pancreatic necrosis.  No change in the loculated fluid collection measuring approximately 1.5 x   1.0 cm anterior to the right common iliac artery.  -follow up psych consult   All patient's/family questions answered.  Patient and family encouraged to contact PA with any further issues.

## 2019-12-18 NOTE — CHART NOTE - NSCHARTNOTEFT_GEN_A_CORE
Asked to see patient because she is trying to leave the floor to get a package downstairs. Staff tells me that patient is saying she is hearing patients and babies screaming. On my arrival patient seems calm and is surprised to be told she was hearing things. Separately patient is wearing street clothes and has a valid visitors pass attached to her shirt (it is now removed). Chart review indicates patient has previously left a hospital with IV in place.  Also apparently due to alcohol abuse in past she has had ativan/librium orders since arrival more then 1 week ago. Since patient has had visitors will order UDS and close observation. Also order psych consult (due to length of time in hospital doubt DT's but patient will get ativan as already ordered.

## 2019-12-18 NOTE — BEHAVIORAL HEALTH ASSESSMENT NOTE - HPI (INCLUDE ILLNESS QUALITY, SEVERITY, DURATION, TIMING, CONTEXT, MODIFYING FACTORS, ASSOCIATED SIGNS AND SYMPTOMS)
Patient is a 40 yo female, single, unemployed, domiciled with sister with past psychiatric history of anxiety, depression, EtOH use disorder, and postpartum depression, at least 1 prior IPP admission in 2017 for substance-induced psychosis, no suicide attempts who was initially admitted on 12/10/19 to medical floor for alcohol detox further complicated by pancreatitis. Psychiatry was consulted after patient appeared to experience auditory/visual hallucination last night.     Upon approach, pt was dressed street clothes, calm and cooperative laying in bed watching television. Pt states that she does not recall experiencing any AVH last night or recently, further stating "some times I joke around." Currently patient is A/O x 4, she claims mood "is okay, getting better every day." States that she slept better last night and appetite has been improving since hospitalization. Pt denies any SI/HI/AVH. Pt denies any paranoia, delusions or any mood symptoms indicative of acute stefan or psychosis. Pt stated that her last drink may have been 8-9 days ago, denies any other illicit substances.    Pt reports previous psychiatrist was Dr. Haque; however, states she has not had any outpatient psychiatric services after his psychiatrist . Stated that she has a therapist, Lavonne Clark. However, pt does not provide consent for further collateral.

## 2019-12-18 NOTE — BEHAVIORAL HEALTH ASSESSMENT NOTE - REMOTE MEMORY
"Progress Notes by Tiana Izaguirre MD at 08/20/18 09:52 AM     Author:  Tiana Izaguirre MD Service:  (none) Author Type:  Physician     Filed:  08/20/18 07:00 PM Encounter Date:  8/20/2018 Status:  Signed     :  Tiana Izaguirre MD (Physician)            Written/documented by[NZ1.1C] Chel Dodson[NZ1.1M], acting as a scribe in Dr. Teresa Cox presence. Jose Wilcox is a[NZ31C] 45year old[NZ1.1T]  male who presents for[NZ1.1C] 6 month[NZ1.1M] follow-up;[NZ1.1C]     Mood is stable. The patient's life is busy. He has a crazy schedule due to his new job as a youth counselor at the [employer] and caring for his three children. He is going through a divorce with his wife who is seeing another man. He is occasionally sexually active with his wife. The kids do not know about the divorce. The patient is keeping a journal. 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 He plays basketball with them. ""I am the security,\"" but notes that the administration is aware and accepting of his limitations due to failed back syndrome. The patient has lost 13lbs since joining several gyms and starting to work out again. He plans to start a 12 week workout plan today. He needs to eat at the care home while working (vegetables, fruits, salads, chicken breast) or stops somewhere on the way home.[NZ1.2M]    Hips are doing great s/p sequential replacement in 2017. [NZ1.1C] Back pain is unchanged. Notes intermittent difficulty getting out of bed due to pain. For pain management, he is taking Norco 2-3 times daily. Asthma is stable. Notes congestion with the humidity. Rescue inhaler use 3x weekly. No need to pre-treat prior to exercise. SOB does not wake him at night. He is using Astelin but \""not as much as I should. \""    The patient notes a gout flare 2 weeks ago after drinking an alcohol beverage. He is sleeping well at night without CPAP use. Mild snoring.     The patient notes a " "scar on his left medial knee after a bug bite 16 months ago. Passes bowels daily. Denies diarrhea, hematochezia, and melena. Occasional constipation, controlled using miralax/metamucil.[NZ1.2M]      PMH:  Asthma - no admits, no er, steroids po   - cats/hayfever/molds  Allergic rhinitis. YUE on CPAP  GOUT  L LEONIDAS - 08/2017  R LEONIDAS - 03/2017  Lumbar microdiscectomy 9/08  Lumbar 5/s1 laminotomy/discectomy 8/16/11, Dr. Geralene Spatz   - L5/s1 microdisc 4.22.13 Geralene Spatz   - ""failed back surgery syndrome\"" -planned spinal stimulator - medtronic, MRI pricilla  Atypical migraines, 9/08, L leg/arm numbness/weakness, gargled speech,s/p extensive wkup including nl aliza, bubble study and cerebral angiogram  Retroverted hip, acetabular impingement - R side -> sciatica, R knee  Elevated lipids[NZ1.1C]      Current Outpatient Prescriptions     Medication  Sig   â¢ hydrocodone-acetaminophen (NORCO)  MG per tablet Take 2 Tabs by mouth every 6 (six) hours as needed. â¢ trazodone (DESYREL) 100 MG tablet Take 1 Tab by mouth every evening. â¢ mometasone (NASONEX) 50 MCG/ACT nasal spray SHAKE LIQUID AND USE 2 SPRAYS IN EACH NOSTRIL DAILY   â¢ allopurinol (ZYLOPRIM) 100 MG tablet Take 2 Tabs by mouth daily. â¢ pregabalin (LYRICA) 150 MG Cap Take 1 Cap by mouth 2 (two) times daily. â¢ budesonide-formoterol (SYMBICORT) 160-4.5 MCG/ACT inhaler Inhale 2 Puffs by mouth 2 (two) times daily. â¢ allopurinol (ZYLOPRIM) 100 MG tablet Take 1 Tab by mouth daily. â¢ azelastine (ASTELIN) 0.1 % nasal spray Use 2 Sprays in each nostril 2 (two) times daily. â¢ albuterol (PROAIR HFA) 108 (90 BASE) MCG/ACT inhaler Inhale 2 Puffs by mouth every 4 (four) hours as needed. â¢ Diclofenac Sodium 1 % GEL Place 1 g onto the skin 2 (two) times daily as needed. â¢ clindamycin (CLINDAGEL) 1 % gel Apply  topically 2 (two) times daily. Apply To Affected Area   â¢ Loratadine (CLARITIN OR) Take  by mouth.    â¢ Diclofenac Epolamine (FLECTOR) 1.3 % PTCH Place 1 Patch onto the " "skin 2 (two) times daily. â¢ clobetasol (TEMOVATE) 0.05 % cream Apply to hands/feet twice daily   â¢ Casanthranol-Docusate Sodium (LAXATIVE-STOOL SOFTNER OR) Take  by mouth daily. Allergies      Allergen   Reactions   â¢ Penicillin G       unkown[NZ1.1T]      SH: ,  for [employer]; financial services - owns own buisiness. No tobacco.    Former shot- college. 3 drinks/month. No illicits. 4 sleep./day. No exercise. Recent cruise to [place]. Plans to restart weight watchers/gym. FH:  M - 62, heavy tob  F - 50, , MI; sudden cardiac death  PGF - , MI, ""cardiac disease - progressive\""  PGM - MI @71  MGF - throat cancer, asbestos   MGM - dm, asthma  30yo sister - well  18yo brother - well  7yo daughter - well  3.6yo - adeleine - multifunctional sensory processing disorder  1yo - RJ    HM:  Immunization History     Administered  Date(s) Administered   â¢ Influenza 2008, 2014, 2016   â¢ Tb Skin Test - Negative (ppd) 02/15/2016   â¢ Tetanus, Diphtheria, Pertussis-a 2008     ROS:  GEN: no fevers/chills  Neuro: no headache. Eye: no vision changes  Ear: no tinnitus  Nose: no nasal bleeding. Throat: no dysphagia. CV: no chest pain  Pulm: no SOB  GI: no vomiting, diarrhea. :no dysuria. Derm: no rashes. Endo: no hot/cold intolerance. PE:[NZ1.1C]  /74  Pulse 86  Wt 291 lb (132 kg)  BMI 36.37 kg/m2[NZ1.1T]   Gen: pleasant, comfortable, no apparent distress. EYE: PERRL, EOMI, anicteric. Conjunctiva pink. H: normocephalic, atraumatic. N: patent, no erythema,  + bilateral edema. OP: OP clear, without erythema, or exudate. Neck: supple, no LAD, nl thyroid, no carotid bruit. No JVD. CV: RRR nl s1 and s2 no MRG. Pulm: CTA bilaterally, no wheezes/rales/rhonchi. Abd: Soft, NTND, nabs, no HSM. Ext: no edema, 2+ pedal pulses.    Derm: no rashes noted on limited exam.[NZ1.1C] Post-inflammatory changes on L medial " knee. [place] spot on R upper chest.[NZ1.2M]  Neuro: A+Ox3, CN2-12 intact, reflexes 2+/symmetric throughout. Sensation grossly intact, gait normal.        A/P:  1) failed back syndrome - implanted stimulator no longer working[NZ1.1C]. Patient no[NZ1.2M]t[KB1.1M] interested in replacement at this time.[NZ1.2M]   -[NZ1.1C]prior referral[NZ1.2M] to PM Dr. Kaushal Jackson. - norco 2 tabs BID scheduled; Norco Refilled. - [id number] mg bid  2) gout -[NZ1.1C] stable.[KB1.1M]    -[NZ1.1C] continue[NZ1.2M] allopurinol 200 mg daily[NZ1.1C]. [NZ1.2M]   - advised low purine diet.[NZ1.1C] Dietician referral given prior.   - advised 2 16oz glasses of water if drinking alcoholic DDWHAYAJ.[RX7.3P]  3) depression - empathy given. Continue seeing counselor.    - trazodone for insomnia. 4) atypical migraines - in remission, continue fioricet per rescue. 5) asthma - continue Symbicort, Flonase, Astelin, albuterol per rescue. 6) hip OA -[NZ1.1C] per ortho[NZ1.2M]   - improved s/p R LEONIDAS 03/2017, L LEONIDAS 08/2017.   7) hyperlipidemia -[NZ1.1C] , HDL 42 (08.18). [NZ1.3M] Reviewed diet/exercise. 8) constipation - likely narcotic related. Controlled with miralax/metamucil[NZ1.1C]. [NZ1.2M]  9) YUE -[NZ1.1C]patient not using CPAP. [NZ1.2M]  10) allergic rhinitis - continue Claritin. 11) obesity -[NZ1.1C] continue diet and exercise.[NZ1.2M]  12) ED - Cialis prn. Counseled on administration and side effects.[NZ1.1C]   13) acne -continue clindagel.[NZ1.2M]   13) HM - Labs[NZ1.1C] reviewed. Labs ordered for 6 months (lipid profile, CMP, CBC, uric acid). [NZ1.2M] Medications reordered. Follow-up 6 months\prn[NZ1.1C]. [NZ1.2M]      Scribe: Electronically signed: Keyshawn Molina has scribed for Dr. Odalys Vanegas  8/20/2018  I have reviewed and edited the progress note and agree with what has been scribed. [NZ1.1T]  Electronically signed by:[KB1.1M] Lisa Joshi MD 8/20/2018[KB1.1T]          Revision History        User Key Date/Time User Provider Type Action    > KB1.1 08/20/18 07:00 PM Lisa Mcdaniel MD Physician Sign     NZ1.2 08/20/18 10:27 AM Keyshawn Quirogaibvic Sign at close encounter     NZ1.3 08/20/18 09:56 AM Valencia Filler      NZ1.1 08/20/18 09:52 AM Justyn Dodson     C - Copied, M - Manual, T - Template Normal

## 2019-12-18 NOTE — DISCHARGE NOTE NURSING/CASE MANAGEMENT/SOCIAL WORK - PATIENT PORTAL LINK FT
You can access the FollowMyHealth Patient Portal offered by [facility] by registering at the following website: [email or web address]. By joining [facility]’s FollowMyHealth portal, you will also be able to view your health information using other applications (apps) compatible with our system.

## 2019-12-18 NOTE — BEHAVIORAL HEALTH ASSESSMENT NOTE - SUMMARY
Patient is a 40 yo female, single, unemployed, domiciled with sister with past psychiatric history of anxiety, depression, EtOH use disorder, and postpartum depression, at least 1 prior IPP admission in 2017 for substance-induced psychosis, no suicide attempts who was initially admitted on 12/10/19 to medical floor for alcohol detox further complicated by pancreatitis. Psychiatry was consulted after patient appeared to experience auditory/visual hallucination last night.     Upon evaluation, pt does not recall experiencing any AVH last night or recently. Currently patient is A/O x 4, denies any SI/HI/AVH, paranoia, delusions or any mood symptoms indicative of acute stefan or psychosis. If patient in fact experienced AVH last night, etiology is unclear. However, it is not likely secondary to any underlying psychiatric condition. It possible that there's underlying delirium 2/2 pancreatitis responsible of patient's waxing/waning level of consciousness. Recommend current treatment plan treating for pancreatitis and alcohol detox with CIWA protocol. Recommend patient for rehab upon discharge and subsequent follow up with outpatient psychiatry.

## 2019-12-18 NOTE — BEHAVIORAL HEALTH ASSESSMENT NOTE - RISK ASSESSMENT
Low Acute Suicide Risk Risk factors include limited social support, unstable housing, unemployment and substance use, which are mitigated by willingness to seek medical care, no h/o suicide attempts, no current suicidal ideation, and future-orientation. Patient does not appear to be an imminent suicide risk at this time.

## 2019-12-18 NOTE — DISCHARGE NOTE NURSING/CASE MANAGEMENT/SOCIAL WORK - NSDCPEWEB_GEN_ALL_CORE
[place] website --- [email or web address] for Tobacco Control website --- [email or web address]

## 2019-12-19 LAB
AMPHET UR QL SCN: 1404 NG/ML — SIGNIFICANT CHANGE UP
AMPHET UR QL SCN: POSITIVE
AMPHET UR-MCNC: POSITIVE
AMPHETAMINE IN-HOUSE INTERPRETATION: POSITIVE
AMPHETAMINE, UR RESULT: 1404 NG/ML — SIGNIFICANT CHANGE UP
BARBITURATES UR SCN-MCNC: NEGATIVE — SIGNIFICANT CHANGE UP
BENZODIAZ UR-MCNC: POSITIVE
COCAINE METAB.OTHER UR-MCNC: NEGATIVE — SIGNIFICANT CHANGE UP
DRUG SCREEN 1, URINE RESULT: SIGNIFICANT CHANGE UP
MDA UR QL SCN: NEGATIVE NG/ML — SIGNIFICANT CHANGE UP
MDA, UR RESULT: NEGATIVE NG/ML — SIGNIFICANT CHANGE UP
MDEA, UR RESULT: NEGATIVE NG/ML — SIGNIFICANT CHANGE UP
MDMA UR QL SCN: NEGATIVE NG/ML — SIGNIFICANT CHANGE UP
MDMA, UR RESULT: NEGATIVE NG/ML — SIGNIFICANT CHANGE UP
METHADONE UR-MCNC: NEGATIVE — SIGNIFICANT CHANGE UP
METHAMPHET UR QL SCN: NEGATIVE NG/ML — SIGNIFICANT CHANGE UP
METHAMPHETAMINE, UR RESULT: NEGATIVE NG/ML — SIGNIFICANT CHANGE UP
OPIATES UR-MCNC: NEGATIVE — SIGNIFICANT CHANGE UP
PCP UR-MCNC: NEGATIVE — SIGNIFICANT CHANGE UP
PROPOXYPHENE QUALITATIVE URINE RESULT: NEGATIVE — SIGNIFICANT CHANGE UP
THC UR QL: NEGATIVE — SIGNIFICANT CHANGE UP
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
## 2019-12-24 DIAGNOSIS — R94.31 ABNORMAL ELECTROCARDIOGRAM [ECG] [EKG]: ICD-10-CM

## 2019-12-24 DIAGNOSIS — K76.0 FATTY (CHANGE OF) LIVER, NOT ELSEWHERE CLASSIFIED: ICD-10-CM
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
## 2019-12-24 DIAGNOSIS — K83.1 OBSTRUCTION OF BILE DUCT: ICD-10-CM

## 2019-12-24 DIAGNOSIS — F32.9 MAJOR DEPRESSIVE DISORDER, SINGLE EPISODE, UNSPECIFIED: ICD-10-CM

## 2019-12-24 DIAGNOSIS — K85.90 ACUTE PANCREATITIS WITHOUT NECROSIS OR INFECTION, UNSPECIFIED: ICD-10-CM

## 2019-12-24 DIAGNOSIS — E87.6 HYPOKALEMIA: ICD-10-CM

## 2019-12-24 DIAGNOSIS — F41.9 ANXIETY DISORDER, UNSPECIFIED: ICD-10-CM

## 2019-12-24 DIAGNOSIS — F17.210 NICOTINE DEPENDENCE, CIGARETTES, UNCOMPLICATED: ICD-10-CM

## 2019-12-24 DIAGNOSIS — E53.8 DEFICIENCY OF OTHER SPECIFIED B GROUP VITAMINS: ICD-10-CM

## 2019-12-24 DIAGNOSIS — K85.20 ALCOHOL INDUCED ACUTE PANCREATITIS WITHOUT NECROSIS OR INFECTION: ICD-10-CM

## 2019-12-24 DIAGNOSIS — E51.9 THIAMINE DEFICIENCY, UNSPECIFIED: ICD-10-CM

## 2019-12-24 DIAGNOSIS — R00.0 TACHYCARDIA, UNSPECIFIED: ICD-10-CM

## 2019-12-24 DIAGNOSIS — F10.20 ALCOHOL DEPENDENCE, UNCOMPLICATED: ICD-10-CM

## 2020-02-21 ENCOUNTER — EMERGENCY (EMERGENCY)
Facility: HOSPITAL | Age: 40
LOS: 0 days | Discharge: HOME | End: 2020-02-21
Attending: EMERGENCY MEDICINE | Admitting: EMERGENCY MEDICINE
Payer: MEDICAID

## 2020-02-21 VITALS
RESPIRATION RATE: 19 BRPM | SYSTOLIC BLOOD PRESSURE: 114 MMHG | DIASTOLIC BLOOD PRESSURE: 63 MMHG | OXYGEN SATURATION: 100 % | HEART RATE: 78 BPM

## 2020-02-21 VITALS — TEMPERATURE: 98 F

## 2020-02-21 DIAGNOSIS — F17.200 NICOTINE DEPENDENCE, UNSPECIFIED, UNCOMPLICATED: ICD-10-CM

## 2020-02-21 DIAGNOSIS — R06.02 SHORTNESS OF BREATH: ICD-10-CM

## 2020-02-21 DIAGNOSIS — F14.90 COCAINE USE, UNSPECIFIED, UNCOMPLICATED: ICD-10-CM

## 2020-02-21 DIAGNOSIS — F41.9 ANXIETY DISORDER, UNSPECIFIED: ICD-10-CM

## 2020-02-21 DIAGNOSIS — F10.10 ALCOHOL ABUSE, UNCOMPLICATED: ICD-10-CM

## 2020-02-21 PROCEDURE — 99283 EMERGENCY DEPT VISIT LOW MDM: CPT

## 2020-02-21 RX ADMIN — Medication 1 MILLIGRAM(S): at 12:04

## 2020-02-21 NOTE — ED PROVIDER NOTE - PROGRESS NOTE DETAILS
Attending Note: I personally evaluated the patient. I reviewed the Resident’s note (as assigned above), and agree with the findings and plan except as documented in my note. 38 y/o F with PMHx of pancreatitis, smoker, presents for facial swelling and SOB that started about an hour ago. Pt notes she “feels like suffocating.” Pt making inappropriate sexual remarks. No fevers or chills. Denies any injuries. No CP. Exam: Pt non-toxic, well appearing. Pt is speaking full sentences, no stridor. Pink conjunctiva, anicteric. PERRL, EOMI. MMM, no exudates, uvula midline. Neck supple, no crepitus. RRR, no murmur. Lungs CTAB. Abdomen soft, NT/ND, no rash. No calf tenderness or edema. No focal neuro deficits. pt states her symptoms have resolved and she would like to go home. tolerating po. no longer endorsing sob. Attending Note: I personally evaluated the patient. I reviewed the Resident’s note (as assigned above), and agree with the findings and plan except as documented in my note. 38 y/o F with PMHx of pancreatitis, smoker, + cocaine use, + etoh use, presents for feeling of facial swelling and SOB that started about an hour ago. Pt notes she “feels like suffocating.” + anxiety. Pt making inappropriate sexual remarks. No fevers or chills. Denies any injuries. No CP. Exam: Pt non-toxic, + anxious, well appearing. Pt is speaking full sentences, no stridor. Pink conjunctiva, anicteric. PERRL, EOMI. MMM, no exudates, uvula midline. Neck supple, no crepitus. RRR, no murmur. Lungs CTAB. Abdomen soft, NT/ND, no rash. No calf tenderness or edema. No focal neuro deficits.

## 2020-02-21 NOTE — ED PROVIDER NOTE - NS ED ROS FT
Eyes:  No visual changes, eye pain or discharge.  ENMT:  No hearing changes, pain, no sore throat or runny nose, no difficulty swallowing  Cardiac:  No chest pain  Respiratory:  No cough. +SOB  GI:  No nausea, vomiting, diarrhea or abdominal pain.  :  No dysuria, frequency or burning.  MS:  No myalgia, muscle weakness, joint pain or back pain.  Neuro:  No headache or weakness.  No LOC.  Skin:  No skin rash.   Endocrine: No history of thyroid disease or diabetes.

## 2020-02-21 NOTE — ED PROVIDER NOTE - PATIENT PORTAL LINK FT
You can access the FollowMyHealth Patient Portal offered by [facility] by registering at the following website: [email or web address]. By joining [employer]’s FollowMyHealth portal, you will also be able to view your health information using other applications (apps) compatible with our system.

## 2020-02-21 NOTE — ED ADULT NURSE NOTE - CHIEF COMPLAINT QUOTE
patient walked into triage stating that she cannot breath, was walked to critical care for assessment

## 2020-02-21 NOTE — ED ADULT NURSE NOTE - OBJECTIVE STATEMENT
Patient present to ED with complains of acute SOB, patient brought in from triage ambulatory. States that she had SOB since this morning, noted ETOH scent on arrival, found a bottle of vodka in bag, patient denies drug use today, and denies other symptoms. Patient endorsed to the main ED as per MD.

## 2020-02-21 NOTE — SBIRT NOTE ADULT - NSSBIRTUNABLESCROTHER_GEN_A_CORE
Meeting with patient attempted however Full Screen Not Performed as patient already completed visit/discharged

## 2020-02-21 NOTE — ED PROVIDER NOTE - NSFOLLOWUPCLINICS_GEN_ALL_ED_FT
[facility]  OP Mental Health  [address] [place] [id number]  Phone: [phone]  Fax:   Follow Up Time: Routine

## 2020-02-21 NOTE — ED PROVIDER NOTE - OBJECTIVE STATEMENT
39yF with PMH alcohol, substance use, depression, anxiety, pancreatitis, p/w anxiety, facial tingling, sensation of fullness and swelling gradual onset this am. pt endorsing etoh and cigarette use this am and cocaine last night. +subjective sob, throat tightness. no cp, abd pain fever chills. no facial trauma. no n/v HA or vision changes.

## 2020-02-21 NOTE — ED PROVIDER NOTE - PHYSICAL EXAMINATION
Vital Signs: I have reviewed the initial vital signs.  Constitutional: NAD, well-nourished, appears stated age, no acute distress.  HEENT: Airway patent, moist MM, no erythema/swelling/deformity of oral structures. EOMI, PERRLA.  CV: regular rate, regular rhythm, well-perfused extremities, 2+ b/l DP and radial pulses equal.  Lungs: BCTA, no increased WOB.  ABD: NTND, no guarding or rebound, no pulsatile mass, no hernias.   MSK: Neck supple, nontender, nl ROM, no stepoff. Chest nontender. Back nontender in TLS spine or to b/l bony structures or flanks. Ext nontender, nl rom, no deformity.   INTEG: Skin warm, dry, no rash.  NEURO: A&Ox3, moving all extremities  PSYCH: anxious appearing but cooperative. pressured speech. decreased eye contact

## 2020-02-21 NOTE — ED PROVIDER NOTE - NSFOLLOWUPINSTRUCTIONS_ED_ALL_ED_FT
Anxiety    Generalized anxiety disorder (CHACHO) is a mental disorder. It is defined as anxiety that is not necessarily related to specific events or activities or is out of proportion to said events. Symptoms include restlessness, fatigue, difficulty concentrations, irritability and difficulty concentrating. It may interfere with life functions, including relationships, work, and school. If you were started on a medication, make sure to take exactly as prescribed and follow up with a psychiatrist.    SEEK IMMEDIATE MEDICAL CARE IF YOU HAVE ANY OF THE FOLLOWING SYMPTOMS: thoughts about hurting killing yourself, thoughts about hurting or killing somebody else, hallucinations, or worsening depression.

## 2020-03-16 NOTE — ED PROVIDER NOTE - PSYCHIATRIC [+], MLM
Individual Psychotherapy (PhD/LCSW)    3/16/2020    Site: [facility]    Therapeutic Intervention: Met with patient alone.  Outpatient - Insight oriented psychotherapy 45 min - CPT code 72192    Chief complaint/reason for encounter: depression, distractibility     Interval history and content of current session: Pt doing well. She drove to [place] to see doctor for study she is in, visited a friend. Since returning to  she is connecting with friends online, cooking, and working on straightening her house. Pt taking precautions for Covid19, but currently her spirits are good. She has decided not to seek work at this time. Discuss ways current city restrictions actually work well for her.    Treatment plan:  · Target symptoms: depression, distractibility  · Why chosen therapy is appropriate versus another modality: relevant to diagnosis  · Outcome monitoring methods: self-report  · Therapeutic intervention type: insight oriented psychotherapy    Risk parameters:  Patient reports no suicidal ideation  Patient reports no homicidal ideation  Patient reports no self-injurious behavior  Patient reports no violent behavior    Verbal deficits: None    Patient's response to intervention:  The patient's response to intervention is motivated    Progress toward goals and other mental status changes:  The patient's progress toward goals is good    Diagnosis: Major depression, recurrent, in partial remission,  ADD in adult     Plan:  individual psychotherapy    Return to clinic: f/u as scheduled  
ANXIETY

## 2020-03-18 ENCOUNTER — EMERGENCY (EMERGENCY)
Facility: HOSPITAL | Age: 40
LOS: 0 days | Discharge: HOME | End: 2020-03-19
Attending: EMERGENCY MEDICINE | Admitting: EMERGENCY MEDICINE
Payer: MEDICAID

## 2020-03-18 VITALS
DIASTOLIC BLOOD PRESSURE: 77 MMHG | SYSTOLIC BLOOD PRESSURE: 135 MMHG | RESPIRATION RATE: 18 BRPM | HEART RATE: 135 BPM | TEMPERATURE: 98 F | OXYGEN SATURATION: 98 %

## 2020-03-18 DIAGNOSIS — F41.9 ANXIETY DISORDER, UNSPECIFIED: ICD-10-CM
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
## 2020-03-18 DIAGNOSIS — T78.40XA ALLERGY, UNSPECIFIED, INITIAL ENCOUNTER: ICD-10-CM

## 2020-03-18 DIAGNOSIS — Z79.899 OTHER LONG TERM (CURRENT) DRUG THERAPY: ICD-10-CM

## 2020-03-18 LAB
ALBUMIN SERPL ELPH-MCNC: 4.1 G/DL — SIGNIFICANT CHANGE UP (ref 3.5–5.2)
ALP SERPL-CCNC: 298 U/L — HIGH (ref 30–115)
ALT FLD-CCNC: 42 U/L — HIGH (ref 0–41)
ANION GAP SERPL CALC-SCNC: 15 MMOL/L — HIGH (ref 7–14)
AST SERPL-CCNC: 351 U/L — HIGH (ref 0–41)
BASOPHILS # BLD AUTO: 0.09 K/UL — SIGNIFICANT CHANGE UP (ref 0–0.2)
BASOPHILS NFR BLD AUTO: 1.1 % — HIGH (ref 0–1)
BILIRUB SERPL-MCNC: 1.7 MG/DL — HIGH (ref 0.2–1.2)
BUN SERPL-MCNC: 5 MG/DL — LOW (ref 10–20)
CALCIUM SERPL-MCNC: 9.5 MG/DL — SIGNIFICANT CHANGE UP (ref 8.5–10.1)
CHLORIDE SERPL-SCNC: 94 MMOL/L — LOW (ref 98–110)
CO2 SERPL-SCNC: 28 MMOL/L — SIGNIFICANT CHANGE UP (ref 17–32)
CREAT SERPL-MCNC: 0.5 MG/DL — LOW (ref 0.7–1.5)
EOSINOPHIL # BLD AUTO: 0.1 K/UL — SIGNIFICANT CHANGE UP (ref 0–0.7)
EOSINOPHIL NFR BLD AUTO: 1.2 % — SIGNIFICANT CHANGE UP (ref 0–8)
GLUCOSE SERPL-MCNC: 112 MG/DL — HIGH (ref 70–99)
HCG SERPL QL: NEGATIVE — SIGNIFICANT CHANGE UP
HCT VFR BLD CALC: 38.8 % — SIGNIFICANT CHANGE UP (ref 37–47)
HGB BLD-MCNC: 13.8 G/DL — SIGNIFICANT CHANGE UP (ref 12–16)
IMM GRANULOCYTES NFR BLD AUTO: 0.4 % — HIGH (ref 0.1–0.3)
LYMPHOCYTES # BLD AUTO: 1.57 K/UL — SIGNIFICANT CHANGE UP (ref 1.2–3.4)
LYMPHOCYTES # BLD AUTO: 18.9 % — LOW (ref 20.5–51.1)
MCHC RBC-ENTMCNC: 35.6 G/DL — SIGNIFICANT CHANGE UP (ref 32–37)
MCHC RBC-ENTMCNC: 35.6 PG — HIGH (ref 27–31)
MCV RBC AUTO: 100 FL — HIGH (ref 81–99)
MONOCYTES # BLD AUTO: 0.72 K/UL — HIGH (ref 0.1–0.6)
MONOCYTES NFR BLD AUTO: 8.7 % — SIGNIFICANT CHANGE UP (ref 1.7–9.3)
NEUTROPHILS # BLD AUTO: 5.81 K/UL — SIGNIFICANT CHANGE UP (ref 1.4–6.5)
NEUTROPHILS NFR BLD AUTO: 69.7 % — SIGNIFICANT CHANGE UP (ref 42.2–75.2)
NRBC # BLD: 0 /100 WBCS — SIGNIFICANT CHANGE UP (ref 0–0)
PLATELET # BLD AUTO: 171 K/UL — SIGNIFICANT CHANGE UP (ref 130–400)
POTASSIUM SERPL-MCNC: 5.7 MMOL/L — HIGH (ref 3.5–5)
POTASSIUM SERPL-SCNC: 5.7 MMOL/L — HIGH (ref 3.5–5)
PROT SERPL-MCNC: 8 G/DL — SIGNIFICANT CHANGE UP (ref 6–8)
RBC # BLD: 3.88 M/UL — LOW (ref 4.2–5.4)
RBC # FLD: 13.8 % — SIGNIFICANT CHANGE UP (ref 11.5–14.5)
SODIUM SERPL-SCNC: 137 MMOL/L — SIGNIFICANT CHANGE UP (ref 135–146)
TROPONIN T SERPL-MCNC: <0.01 NG/ML — SIGNIFICANT CHANGE UP
WBC # BLD: 8.32 K/UL — SIGNIFICANT CHANGE UP (ref 4.8–10.8)
WBC # FLD AUTO: 8.32 K/UL — SIGNIFICANT CHANGE UP (ref 4.8–10.8)

## 2020-03-18 PROCEDURE — 71045 X-RAY EXAM CHEST 1 VIEW: CPT | Mod: 26

## 2020-03-18 PROCEDURE — 93010 ELECTROCARDIOGRAM REPORT: CPT

## 2020-03-18 PROCEDURE — 99285 EMERGENCY DEPT VISIT HI MDM: CPT

## 2020-03-18 RX ORDER — FAMOTIDINE 10 MG/ML
20 INJECTION INTRAVENOUS ONCE
Refills: 0 | Status: COMPLETED | OUTPATIENT
Start: 2020-03-18 | End: 2020-03-18

## 2020-03-18 RX ORDER — EPINEPHRINE 0.3 MG/.3ML
0.3 INJECTION INTRAMUSCULAR; SUBCUTANEOUS ONCE
Refills: 0 | Status: COMPLETED | OUTPATIENT
Start: 2020-03-18 | End: 2020-03-18

## 2020-03-18 RX ORDER — DEXAMETHASONE 0.5 MG/5ML
10 ELIXIR ORAL ONCE
Refills: 0 | Status: COMPLETED | OUTPATIENT
Start: 2020-03-18 | End: 2020-03-18

## 2020-03-18 RX ADMIN — Medication 102 MILLIGRAM(S): at 20:47

## 2020-03-18 RX ADMIN — Medication 10 MILLIGRAM(S): at 20:52

## 2020-03-18 RX ADMIN — FAMOTIDINE 20 MILLIGRAM(S): 10 INJECTION INTRAVENOUS at 20:51

## 2020-03-18 RX ADMIN — EPINEPHRINE 0.3 MILLIGRAM(S): 0.3 INJECTION INTRAMUSCULAR; SUBCUTANEOUS at 20:51

## 2020-03-18 NOTE — ED PROVIDER NOTE - OBJECTIVE STATEMENT
39F with pmh of alcohol, substance use, depression, anxiety, pancreatitis, presents with orofacial swelling beginning this morning, getting pr 39F with pmh of alcohol, substance use, depression, anxiety, pancreatitis, presents with orofacial swelling beginning this morning, getting progressively worse. PT denies exposure to any new foods, use of ACE inhibitors, n/v/d, abd pain, or rash. Denies travel, cough, sick contacts.

## 2020-03-18 NOTE — ED PROVIDER NOTE - ATTENDING CONTRIBUTION TO CARE
39 year old female, comes in with complaint of facial swelling, started prior to arrival, + rash, unknown history of allergy, no cp, no sob, no loc, no fever    CONSTITUTIONAL: Well-developed; well-nourished; in no acute distress. Sitting up and providing appropriate history and physical examination  SKIN: skin exam is warm and dry, no acute rash.  HEAD: Normocephalic; atraumatic.  EYES: PERRL, 3 mm bilateral, no nystagmus, EOM intact; conjunctiva and sclera clear.  ENT: + mild bilateral cheek and face swelling, No nasal discharge; airway clear.  NECK: Supple; non tender. + full passive ROM in all directions. No JVD  CARD: S1, S2 normal; no murmurs, gallops, or rubs. Regular rate and rhythm. + Symmetric Strong Pulses  RESP: No wheezes, rales or rhonchi. Good air movement bilaterally  ABD: soft; non-distended; non-tender. No Rebound, No Guarding, No signs of peritonitis, No CVA tenderness. No pulsatile abdominal mass. + Strong and Symmetric Pulses  EXT: Normal ROM. No clubbing, cyanosis or edema. Dp and Pt Pulses intact. Cap refill less than 3 seconds  NEURO: Alert, oriented, grossly unremarkable. No Focal deficits. GCS 15. NIH 0  PSYCH: Cooperative, appropriate.

## 2020-03-18 NOTE — ED PROVIDER NOTE - PHYSICAL EXAMINATION
CONSTITUTIONAL: Well-developed; well-nourished; in no acute distress.   SKIN: warm, dry  HEAD: Normocephalic.  EYES: PERRL, EOMI.  ENT: Airway clear. No oropharyngeal swelling.  NECK: Supple.  LYMPH: No acute cervical adenopathy.  CARD: No murmurs, rubs or gallops. Regular rate and rhythm.   RESP: No wheezing, rales or rhonchi.  ABD: soft ntnd  EXT: No clubbing, cyanosis. No rash.   NEURO: Alert, oriented.  PSYCH: Cooperative, appropriate.

## 2020-03-18 NOTE — ED PROVIDER NOTE - PATIENT PORTAL LINK FT
You can access the FollowMyHealth Patient Portal offered by [facility] by registering at the following website: [email or web address]. By joining [employer]’s FollowMyHealth portal, you will also be able to view your health information using other applications (apps) compatible with our system.

## 2020-03-18 NOTE — ED PROVIDER NOTE - CLINICAL SUMMARY MEDICAL DECISION MAKING FREE TEXT BOX
I personally evaluated the patient. I reviewed the Resident’s or Physician Assistant’s note (as assigned above), and agree with the findings and plan except as documented in my note. Patient much improved, tolerated PO, epi given to patient. I have fully discussed the medical management and delivery of care with the patient. I have discussed any available labs, imaging and treatment options with the patient. Patient confirms understanding and has been given detailed return precautions. Patient instructed to return to the ED should symptoms persist or worsen. Patient has demonstrated capacity and has verbalized understanding. Patient is well appearing upon discharge.

## 2020-03-18 NOTE — ED ADULT TRIAGE NOTE - CHIEF COMPLAINT QUOTE
Pt reports swelling of face and throat this evening. Pt states "It feels like my throat is closing".

## 2020-03-18 NOTE — ED PROVIDER NOTE - CARE PROVIDER_API CALL
Priya Walker)  Allergy and Immunology; Internal Medicine  [address] [place] [address]  Phone: [phone]  Fax: [phone]  Follow Up Time:

## 2020-03-19 VITALS
SYSTOLIC BLOOD PRESSURE: 128 MMHG | OXYGEN SATURATION: 98 % | DIASTOLIC BLOOD PRESSURE: 76 MMHG | RESPIRATION RATE: 18 BRPM | HEART RATE: 96 BPM

## 2020-03-19 RX ORDER — EPINEPHRINE 0.3 MG/.3ML
0.3 INJECTION INTRAMUSCULAR; SUBCUTANEOUS
Qty: 0.3 | Refills: 0
Start: 2020-03-19 | End: 2020-03-19

## 2020-04-04 ENCOUNTER — EMERGENCY (EMERGENCY)
Facility: HOSPITAL | Age: 40
LOS: 0 days | Discharge: LEFT BEFORE DISCHARGE | End: 2020-04-04
Attending: EMERGENCY MEDICINE | Admitting: EMERGENCY MEDICINE
Payer: MEDICAID

## 2020-04-04 VITALS
RESPIRATION RATE: 18 BRPM | HEART RATE: 94 BPM | DIASTOLIC BLOOD PRESSURE: 68 MMHG | SYSTOLIC BLOOD PRESSURE: 127 MMHG | OXYGEN SATURATION: 97 %

## 2020-04-04 VITALS
SYSTOLIC BLOOD PRESSURE: 130 MMHG | RESPIRATION RATE: 18 BRPM | HEART RATE: 106 BPM | DIASTOLIC BLOOD PRESSURE: 70 MMHG | TEMPERATURE: 99 F | OXYGEN SATURATION: 95 %

## 2020-04-04 DIAGNOSIS — R41.89 OTHER SYMPTOMS AND SIGNS INVOLVING COGNITIVE FUNCTIONS AND AWARENESS: ICD-10-CM

## 2020-04-04 DIAGNOSIS — F41.9 ANXIETY DISORDER, UNSPECIFIED: ICD-10-CM

## 2020-04-04 DIAGNOSIS — E87.6 HYPOKALEMIA: ICD-10-CM

## 2020-04-04 DIAGNOSIS — F10.129 ALCOHOL ABUSE WITH INTOXICATION, UNSPECIFIED: ICD-10-CM

## 2020-04-04 LAB
ALBUMIN SERPL ELPH-MCNC: 3.8 G/DL — SIGNIFICANT CHANGE UP (ref 3.5–5.2)
ALP SERPL-CCNC: 369 U/L — HIGH (ref 30–115)
ALT FLD-CCNC: 37 U/L — SIGNIFICANT CHANGE UP (ref 0–41)
ANION GAP SERPL CALC-SCNC: 19 MMOL/L — HIGH (ref 7–14)
APAP SERPL-MCNC: <5 UG/ML — LOW (ref 10–30)
AST SERPL-CCNC: 326 U/L — HIGH (ref 0–41)
BASOPHILS # BLD AUTO: 0.15 K/UL — SIGNIFICANT CHANGE UP (ref 0–0.2)
BASOPHILS NFR BLD AUTO: 1.5 % — HIGH (ref 0–1)
BILIRUB SERPL-MCNC: 1.7 MG/DL — HIGH (ref 0.2–1.2)
BUN SERPL-MCNC: 5 MG/DL — LOW (ref 10–20)
CALCIUM SERPL-MCNC: 9.2 MG/DL — SIGNIFICANT CHANGE UP (ref 8.5–10.1)
CHLORIDE SERPL-SCNC: 92 MMOL/L — LOW (ref 98–110)
CO2 SERPL-SCNC: 28 MMOL/L — SIGNIFICANT CHANGE UP (ref 17–32)
CREAT SERPL-MCNC: 0.5 MG/DL — LOW (ref 0.7–1.5)
EOSINOPHIL # BLD AUTO: 0.13 K/UL — SIGNIFICANT CHANGE UP (ref 0–0.7)
EOSINOPHIL NFR BLD AUTO: 1.3 % — SIGNIFICANT CHANGE UP (ref 0–8)
ETHANOL SERPL-MCNC: 261 MG/DL — HIGH
GLUCOSE SERPL-MCNC: 118 MG/DL — HIGH (ref 70–99)
HCT VFR BLD CALC: 38.4 % — SIGNIFICANT CHANGE UP (ref 37–47)
HGB BLD-MCNC: 13.6 G/DL — SIGNIFICANT CHANGE UP (ref 12–16)
IMM GRANULOCYTES NFR BLD AUTO: 0.4 % — HIGH (ref 0.1–0.3)
LIDOCAIN IGE QN: 149 U/L — HIGH (ref 7–60)
LYMPHOCYTES # BLD AUTO: 2.24 K/UL — SIGNIFICANT CHANGE UP (ref 1.2–3.4)
LYMPHOCYTES # BLD AUTO: 22.1 % — SIGNIFICANT CHANGE UP (ref 20.5–51.1)
MCHC RBC-ENTMCNC: 35.4 G/DL — SIGNIFICANT CHANGE UP (ref 32–37)
MCHC RBC-ENTMCNC: 36.1 PG — HIGH (ref 27–31)
MCV RBC AUTO: 101.9 FL — HIGH (ref 81–99)
MONOCYTES # BLD AUTO: 1 K/UL — HIGH (ref 0.1–0.6)
MONOCYTES NFR BLD AUTO: 9.9 % — HIGH (ref 1.7–9.3)
NEUTROPHILS # BLD AUTO: 6.57 K/UL — HIGH (ref 1.4–6.5)
NEUTROPHILS NFR BLD AUTO: 64.8 % — SIGNIFICANT CHANGE UP (ref 42.2–75.2)
NRBC # BLD: 0 /100 WBCS — SIGNIFICANT CHANGE UP (ref 0–0)
PLATELET # BLD AUTO: 197 K/UL — SIGNIFICANT CHANGE UP (ref 130–400)
POTASSIUM SERPL-MCNC: 2.6 MMOL/L — CRITICAL LOW (ref 3.5–5)
POTASSIUM SERPL-SCNC: 2.6 MMOL/L — CRITICAL LOW (ref 3.5–5)
PROT SERPL-MCNC: 7.3 G/DL — SIGNIFICANT CHANGE UP (ref 6–8)
RBC # BLD: 3.77 M/UL — LOW (ref 4.2–5.4)
RBC # FLD: 13.3 % — SIGNIFICANT CHANGE UP (ref 11.5–14.5)
SALICYLATES SERPL-MCNC: <0.3 MG/DL — LOW (ref 4–30)
SODIUM SERPL-SCNC: 139 MMOL/L — SIGNIFICANT CHANGE UP (ref 135–146)
WBC # BLD: 10.13 K/UL — SIGNIFICANT CHANGE UP (ref 4.8–10.8)
WBC # FLD AUTO: 10.13 K/UL — SIGNIFICANT CHANGE UP (ref 4.8–10.8)

## 2020-04-04 PROCEDURE — 93010 ELECTROCARDIOGRAM REPORT: CPT

## 2020-04-04 PROCEDURE — 99285 EMERGENCY DEPT VISIT HI MDM: CPT

## 2020-04-04 RX ORDER — ACETAMINOPHEN 500 MG
650 TABLET ORAL ONCE
Refills: 0 | Status: COMPLETED | OUTPATIENT
Start: 2020-04-04 | End: 2020-04-04

## 2020-04-04 RX ORDER — POTASSIUM CHLORIDE 20 MEQ
40 PACKET (EA) ORAL ONCE
Refills: 0 | Status: COMPLETED | OUTPATIENT
Start: 2020-04-04 | End: 2020-04-04

## 2020-04-04 RX ORDER — POTASSIUM CHLORIDE 20 MEQ
40 PACKET (EA) ORAL ONCE
Refills: 0 | Status: DISCONTINUED | OUTPATIENT
Start: 2020-04-04 | End: 2020-04-04

## 2020-04-04 RX ORDER — SODIUM CHLORIDE 9 MG/ML
1000 INJECTION INTRAMUSCULAR; INTRAVENOUS; SUBCUTANEOUS ONCE
Refills: 0 | Status: COMPLETED | OUTPATIENT
Start: 2020-04-04 | End: 2020-04-04

## 2020-04-04 RX ORDER — KETOROLAC TROMETHAMINE 30 MG/ML
30 SYRINGE (ML) INJECTION ONCE
Refills: 0 | Status: DISCONTINUED | OUTPATIENT
Start: 2020-04-04 | End: 2020-04-04

## 2020-04-04 RX ORDER — POTASSIUM CHLORIDE 20 MEQ
20 PACKET (EA) ORAL ONCE
Refills: 0 | Status: COMPLETED | OUTPATIENT
Start: 2020-04-04 | End: 2020-04-04

## 2020-04-04 RX ADMIN — SODIUM CHLORIDE 1000 MILLILITER(S): 9 INJECTION INTRAMUSCULAR; INTRAVENOUS; SUBCUTANEOUS at 03:28

## 2020-04-04 RX ADMIN — Medication 40 MILLIEQUIVALENT(S): at 05:14

## 2020-04-04 RX ADMIN — Medication 650 MILLIGRAM(S): at 02:57

## 2020-04-04 RX ADMIN — Medication 30 MILLIGRAM(S): at 02:57

## 2020-04-04 NOTE — ED ADULT NURSE NOTE - NSIMPLEMENTINTERV_GEN_ALL_ED
Implemented All Fall Risk Interventions:  [place] to call system. Call bell, personal items and telephone within reach. Instruct patient to call for assistance. Room bathroom lighting operational. Non-slip footwear when patient is off stretcher. Physically safe environment: no spills, clutter or unnecessary equipment. Stretcher in lowest position, wheels locked, appropriate side rails in place. Provide visual cue, wrist band, yellow gown, etc. Monitor gait and stability. Monitor for mental status changes and reorient to person, place, and time. Review medications for side effects contributing to fall risk. Reinforce activity limits and safety measures with patient and family.

## 2020-04-04 NOTE — ED PROVIDER NOTE - NSFOLLOWUPCLINICS_GEN_ALL_ED_FT
[facility]  Detox Mgmt  [address] [place] [address]  Phone: [phone]  Fax:   Follow Up Time: Urgent    [facility]  Medicine  [address] [place], [place]   Phone: [phone]  Fax:   Follow Up Time: Urgent

## 2020-04-04 NOTE — ED PROVIDER NOTE - PROGRESS NOTE DETAILS
BI: Pt clinically sober. Ambulating without difficulty. Demonstrating decision making capacity. Will treat hypokalemia. BI: pt refusing IV K rider and doesn't want to be retested for repeat potassium. Pt demonstrating adequate capacity. She completed the oral potassium. The patient has decided to leave against medical advice.  The patient is AAOx3, clinically sober, and displays normal decision making ability. We discussed all risks, benefits, and alternatives to the progression of treatment and the potential dangers of leaving including but not limited to permanent disability, injury, and death.  The patient was instructed that they are welcome to change their decision to leave against medical advice and return to the emergency department at any time and for any reason in order to allow us to render care.

## 2020-04-04 NOTE — ED PROVIDER NOTE - NSFOLLOWUPINSTRUCTIONS_ED_ALL_ED_FT
Alcohol Use Disorder  Alcohol use disorder is when your drinking disrupts your daily life. When you have this condition, you drink too much alcohol and you cannot control your drinking.    Alcohol use disorder can cause serious problems with your physical health. It can affect your brain, heart, liver, pancreas, immune system, stomach, and intestines. Alcohol use disorder can increase your risk for certain cancers and cause problems with your mental health, such as depression, anxiety, psychosis, delirium, and dementia. People with this disorder risk hurting themselves and others.    What are the causes?  This condition is caused by drinking too much alcohol over time. It is not caused by drinking too much alcohol only one or two times. Some people with this condition drink alcohol to cope with or escape from negative life events. Others drink to relieve pain or symptoms of mental illness.    What increases the risk?  You are more likely to develop this condition if:    You have a family history of alcohol use disorder.  Your culture encourages drinking to the point of intoxication, or makes alcohol easy to get.  You had a mood or conduct disorder in childhood.  You have been a victim of abuse.  You are an adolescent and:    You have poor grades or difficulties in school.  Your caregivers do not talk to you about saying no to alcohol, or supervise your activities.  You are impulsive or you have trouble with self-control.      What are the signs or symptoms?  Symptoms of this condition include:    Drinking more than you want to.  Drinking for longer than you want to.  Trying several times to drink less or to control your drinking.  Spending a lot of time getting alcohol, drinking, or recovering from drinking.  Craving alcohol.  Having problems at work, at school, or at home due to drinking.  Having problems in relationships due to drinking.  Drinking when it is dangerous to drink, such as before driving a car.  Continuing to drink even though you know you might have a physical or mental problem related to drinking.  Needing more and more alcohol to get the same effect you want from the alcohol (building up tolerance).  Having symptoms of withdrawal when you stop drinking. Symptoms of withdrawal include:    Fatigue.  Nightmares.  Trouble sleeping.  Depression.  Anxiety.  Fever.  Seizures.  Severe confusion.  Feeling or seeing things that are not there (hallucinations).  Tremors.  Rapid heart rate.  Rapid breathing.  High blood pressure.    Drinking to avoid symptoms of withdrawal.    How is this diagnosed?  This condition is diagnosed with an assessment. Your health care provider may start the assessment by asking three or four questions about your drinking.    Your health care provider may perform a physical exam or do lab tests to see if you have physical problems resulting from alcohol use. She or he may refer you to a mental health professional for evaluation.    How is this treated?  Some people with alcohol use disorder are able to reduce their alcohol use to low-risk levels. Others need to completely quit drinking alcohol. When necessary, mental health professionals with specialized training in substance use treatment can help. Your health care provider can help you decide how severe your alcohol use disorder is and what type of treatment you need. The following forms of treatment are available:    Detoxification. Detoxification involves quitting drinking and using prescription medicines within the first week to help lessen withdrawal symptoms. This treatment is important for people who have had withdrawal symptoms before and for heavy drinkers who are likely to have withdrawal symptoms. Alcohol withdrawal can be dangerous, and in severe cases, it can cause death. Detoxification may be provided in a home, community, or primary care setting, or in a hospital or substance use treatment facility.  Counseling. This treatment is also called talk therapy. It is provided by substance use treatment counselors. A counselor can address the reasons you use alcohol and suggest ways to keep you from drinking again or to prevent problem drinking. The goals of talk therapy are to:    Find healthy activities and ways for you to cope with stress.  Identify and avoid the things that trigger your alcohol use.  Help you learn how to handle cravings.    Medicines. Medicines can help treat alcohol use disorder by:    Decreasing alcohol cravings.  Decreasing the positive feeling you have when you drink alcohol.  Causing an uncomfortable physical reaction when you drink alcohol (aversion therapy).    Support groups. Support groups are led by people who have quit drinking. They provide emotional support, advice, and guidance.    ImageThese forms of treatment are often combined. Some people with this condition benefit from a combination of treatments provided by specialized substance use treatment centers.    Follow these instructions at home:  Take over-the-counter and prescription medicines only as told by your health care provider.  Check with your health care provider before starting any new medicines.  Ask friends and family members not to offer you alcohol.  Avoid situations where alcohol is served, including gatherings where others are drinking alcohol.  Create a plan for what to do when you are tempted to use alcohol.  Find hobbies or activities that you enjoy that do not include alcohol.  Keep all follow-up visits as told by your health care provider. This is important.  How is this prevented?  If you drink, limit alcohol intake to no more than 1 drink a day for nonpregnant women and 2 drinks a day for men. One drink equals 12 oz of beer, 5 oz of wine, or 1½ oz of hard liquor.  If you have a mental health condition, get treatment and support.  Do not give alcohol to adolescents.  If you are an adolescent:    Do not drink alcohol.  Do not be afraid to say no if someone offers you alcohol. Speak up about why you do not want to drink. You can be a positive role model for your friends and set a good example for those around you by not drinking alcohol.  If your friends drink, spend time with others who do not drink alcohol. Make new friends who do not use alcohol.  Find healthy ways to manage stress and emotions, such as meditation or deep breathing, exercise, spending time in nature, listening to music, or talking with a trusted friend or family member.    Contact a health care provider if:  You are not able to take your medicines as told.  Your symptoms get worse.  You return to drinking alcohol (relapse) and your symptoms get worse.  Get help right away if:  You have thoughts about hurting yourself or others.  If you ever feel like you may hurt yourself or others, or have thoughts about taking your own life, get help right away. You can go to your nearest emergency department or call:     Your local emergency services (911 in the [place]   A suicide crisis helpline, such as the National Suicide Prevention Lifeline at 1-115.621.2948. This is open 24 hours a day.     Summary  Alcohol use disorder is when your drinking disrupts your daily life. When you have this condition, you drink too much alcohol and you cannot control your drinking.  Treatment may include detoxification, counseling, medicine, and support groups.  Ask friends and family members not to offer you alcohol. Avoid situations where alcohol is served.  Get help right away if you have thoughts about hurting yourself or others.  This information is not intended to replace advice given to you by your health care provider. Make sure you discuss any questions you have with your health care provider.    Hypokalemia    WHAT YOU NEED TO KNOW:  Hypokalemia is a low level of potassium in your blood. Potassium helps control how your muscles, heart, and digestive system work. Hypokalemia occurs when your body loses too much potassium or does not absorb enough from food.     DISCHARGE INSTRUCTIONS:  Return to the emergency department if:   You cannot move your arm or leg.  You have a fast or irregular heartbeat.  You are too tired or weak to stand up.    Contact your healthcare provider if:   You are vomiting, or you have diarrhea.  You have numbness or tingling in your arms or legs.  Your symptoms do not go away or they get worse.  You have questions or concerns about your condition or care.    Medicines:   Potassium will be given to bring your potassium levels back to normal.      Take your medicine as directed. Contact your healthcare provider if you think your medicine is not helping or if you have side effects. Tell him of her if you are allergic to any medicine. Keep a list of the medicines, vitamins, and herbs you take. Include the amounts, and when and why you take them. Bring the list or the pill bottles to follow-up visits. Carry your medicine list with you in case of an emergency.    Eat foods that are high in potassium: Foods that are high in potassium include bananas, oranges, tomatoes, potatoes, and avocado. Pang beans, turkey, salmon, lean beef, yogurt, and milk are also high in potassium. Ask your healthcare provider or dietitian for more information about foods that are high in potassium.     Follow up with your healthcare provider as directed: Write down your questions so you remember to ask them during your visits.

## 2020-04-04 NOTE — ED ADULT NURSE REASSESSMENT NOTE - NS ED NURSE REASSESS COMMENT FT1
patient awake, a&ox4, ambulating steadily. refusing IV potassium and wishes to leave AMA. explained the risks of leaving against medical advice. despite education, patient to sign forms.

## 2020-04-04 NOTE — ED ADULT NURSE NOTE - OBJECTIVE STATEMENT
patient brought in from waiting room in wheelchair after trying to force herself to pass out, holding her breath, not answering questions. when placed in bed and assessed, screamed, opened her eyes, became aggressive with staff. answering all questions correctly. VSS and o2 saturation 95%+. denies any use of alcohol or drugs tonight. denies cp and sob. continues to hold her breath and try to pass out.

## 2020-04-04 NOTE — ED PROVIDER NOTE - PHYSICAL EXAMINATION
CONSTITUTIONAL: in NAD, intoxicated.  SKIN: Warm dry, normal skin turgor  HEAD: NCAT  EYES: PERRLA, no scleral icterus, conjunctiva pink  ENT: normal pharynx with no erythema or exudates  NECK: Supple; non tender.  CARD: tachy, no murmurs.  RESP: clear to ausculation b/l. No crackles or wheezing.  ABD: soft, non-tender, non-distended, no rebound or guarding.  EXT: Full ROM, no pedal edema, no calf tenderness  NEURO: A&O x1, pt selectively answering questions, unsure where she is.  PSYCH: Uncooperative, inappropriate.

## 2020-04-04 NOTE — ED PROVIDER NOTE - OBJECTIVE STATEMENT
39 y.o F w/ pmhx subtance abuse and etoh abuse was found slouched in a wheelchair. Unable to acquire any history from pt.

## 2020-04-04 NOTE — ED PROVIDER NOTE - PATIENT PORTAL LINK FT
You can access the FollowMyHealth Patient Portal offered by [facility] by registering at the following website: [email or web address]. By joining [employer]’s FollowMyHealth portal, you will also be able to view your health information using other applications (apps) compatible with our system.

## 2020-04-30 NOTE — ED ADULT TRIAGE NOTE - BP NONINVASIVE DIASTOLIC (MM HG)
Jon referral submitted, approved for 2 visits, effective 4/30/20 - 4/29/21, authorization number [id number].  Jeanmarie 70

## 2020-05-01 ENCOUNTER — OUTPATIENT (OUTPATIENT)
Dept: OUTPATIENT SERVICES | Facility: HOSPITAL | Age: 40
LOS: 1 days | End: 2020-05-01
Payer: MEDICAID

## 2020-05-01 PROCEDURE — G9001: CPT

## 2020-05-02 ENCOUNTER — EMERGENCY (EMERGENCY)
Facility: HOSPITAL | Age: 40
LOS: 0 days | Discharge: HOME | End: 2020-05-02
Attending: EMERGENCY MEDICINE | Admitting: EMERGENCY MEDICINE
Payer: MEDICAID

## 2020-05-02 VITALS
TEMPERATURE: 98 F | OXYGEN SATURATION: 98 % | WEIGHT: 149.91 LBS | RESPIRATION RATE: 24 BRPM | SYSTOLIC BLOOD PRESSURE: 138 MMHG | DIASTOLIC BLOOD PRESSURE: 70 MMHG | HEART RATE: 118 BPM | HEIGHT: 63 IN

## 2020-05-02 DIAGNOSIS — Z79.899 OTHER LONG TERM (CURRENT) DRUG THERAPY: ICD-10-CM

## 2020-05-02 DIAGNOSIS — F41.9 ANXIETY DISORDER, UNSPECIFIED: ICD-10-CM

## 2020-05-02 DIAGNOSIS — R06.02 SHORTNESS OF BREATH: ICD-10-CM

## 2020-05-02 DIAGNOSIS — R10.13 EPIGASTRIC PAIN: ICD-10-CM

## 2020-05-02 LAB
ALBUMIN SERPL ELPH-MCNC: 3.5 G/DL — SIGNIFICANT CHANGE UP (ref 3.5–5.2)
ALP SERPL-CCNC: 400 U/L — HIGH (ref 30–115)
ALT FLD-CCNC: 33 U/L — SIGNIFICANT CHANGE UP (ref 0–41)
ANION GAP SERPL CALC-SCNC: 21 MMOL/L — HIGH (ref 7–14)
APAP SERPL-MCNC: <5 UG/ML — LOW (ref 10–30)
AST SERPL-CCNC: 268 U/L — HIGH (ref 0–41)
BASOPHILS # BLD AUTO: 0.13 K/UL — SIGNIFICANT CHANGE UP (ref 0–0.2)
BASOPHILS NFR BLD AUTO: 1.1 % — HIGH (ref 0–1)
BILIRUB DIRECT SERPL-MCNC: 1.2 MG/DL — HIGH (ref 0–0.2)
BILIRUB INDIRECT FLD-MCNC: 0.8 MG/DL — SIGNIFICANT CHANGE UP (ref 0.2–1.2)
BILIRUB SERPL-MCNC: 2 MG/DL — HIGH (ref 0.2–1.2)
BUN SERPL-MCNC: 4 MG/DL — LOW (ref 10–20)
CALCIUM SERPL-MCNC: 8.6 MG/DL — SIGNIFICANT CHANGE UP (ref 8.5–10.1)
CHLORIDE SERPL-SCNC: 98 MMOL/L — SIGNIFICANT CHANGE UP (ref 98–110)
CO2 SERPL-SCNC: 26 MMOL/L — SIGNIFICANT CHANGE UP (ref 17–32)
CREAT SERPL-MCNC: <0.5 MG/DL — LOW (ref 0.7–1.5)
EOSINOPHIL # BLD AUTO: 0.05 K/UL — SIGNIFICANT CHANGE UP (ref 0–0.7)
EOSINOPHIL NFR BLD AUTO: 0.4 % — SIGNIFICANT CHANGE UP (ref 0–8)
ETHANOL SERPL-MCNC: 230 MG/DL — HIGH
GLUCOSE SERPL-MCNC: 95 MG/DL — SIGNIFICANT CHANGE UP (ref 70–99)
HCG SERPL QL: NEGATIVE — SIGNIFICANT CHANGE UP
HCT VFR BLD CALC: 39.8 % — SIGNIFICANT CHANGE UP (ref 37–47)
HGB BLD-MCNC: 13.3 G/DL — SIGNIFICANT CHANGE UP (ref 12–16)
IMM GRANULOCYTES NFR BLD AUTO: 0.3 % — SIGNIFICANT CHANGE UP (ref 0.1–0.3)
LACTATE SERPL-SCNC: 3.8 MMOL/L — HIGH (ref 0.7–2)
LIDOCAIN IGE QN: >300 U/L — HIGH (ref 7–60)
LYMPHOCYTES # BLD AUTO: 1.36 K/UL — SIGNIFICANT CHANGE UP (ref 1.2–3.4)
LYMPHOCYTES # BLD AUTO: 11.5 % — LOW (ref 20.5–51.1)
MCHC RBC-ENTMCNC: 33.4 G/DL — SIGNIFICANT CHANGE UP (ref 32–37)
MCHC RBC-ENTMCNC: 34.3 PG — HIGH (ref 27–31)
MCV RBC AUTO: 102.6 FL — HIGH (ref 81–99)
MONOCYTES # BLD AUTO: 1.11 K/UL — HIGH (ref 0.1–0.6)
MONOCYTES NFR BLD AUTO: 9.4 % — HIGH (ref 1.7–9.3)
NEUTROPHILS # BLD AUTO: 9.1 K/UL — HIGH (ref 1.4–6.5)
NEUTROPHILS NFR BLD AUTO: 77.3 % — HIGH (ref 42.2–75.2)
NRBC # BLD: 0 /100 WBCS — SIGNIFICANT CHANGE UP (ref 0–0)
PLATELET # BLD AUTO: 271 K/UL — SIGNIFICANT CHANGE UP (ref 130–400)
POTASSIUM SERPL-MCNC: 3 MMOL/L — LOW (ref 3.5–5)
POTASSIUM SERPL-SCNC: 3 MMOL/L — LOW (ref 3.5–5)
PROT SERPL-MCNC: 6.8 G/DL — SIGNIFICANT CHANGE UP (ref 6–8)
RBC # BLD: 3.88 M/UL — LOW (ref 4.2–5.4)
RBC # FLD: 12.5 % — SIGNIFICANT CHANGE UP (ref 11.5–14.5)
SALICYLATES SERPL-MCNC: 2.4 MG/DL — LOW (ref 4–30)
SODIUM SERPL-SCNC: 145 MMOL/L — SIGNIFICANT CHANGE UP (ref 135–146)
WBC # BLD: 11.79 K/UL — HIGH (ref 4.8–10.8)
WBC # FLD AUTO: 11.79 K/UL — HIGH (ref 4.8–10.8)

## 2020-05-02 PROCEDURE — 36000 PLACE NEEDLE IN VEIN: CPT

## 2020-05-02 PROCEDURE — 99285 EMERGENCY DEPT VISIT HI MDM: CPT | Mod: 25

## 2020-05-02 RX ORDER — SODIUM CHLORIDE 9 MG/ML
1000 INJECTION, SOLUTION INTRAVENOUS ONCE
Refills: 0 | Status: DISCONTINUED | OUTPATIENT
Start: 2020-05-02 | End: 2020-05-02

## 2020-05-02 RX ORDER — SODIUM CHLORIDE 9 MG/ML
1000 INJECTION, SOLUTION INTRAVENOUS ONCE
Refills: 0 | Status: COMPLETED | OUTPATIENT
Start: 2020-05-02 | End: 2020-05-02

## 2020-05-02 RX ORDER — MORPHINE SULFATE 50 MG/1
4 CAPSULE, EXTENDED RELEASE ORAL ONCE
Refills: 0 | Status: DISCONTINUED | OUTPATIENT
Start: 2020-05-02 | End: 2020-05-02

## 2020-05-02 RX ORDER — MORPHINE SULFATE 50 MG/1
4 CAPSULE, EXTENDED RELEASE ORAL ONCE
Refills: 0 | Status: COMPLETED | OUTPATIENT
Start: 2020-05-02 | End: 2020-05-02

## 2020-05-02 RX ADMIN — SODIUM CHLORIDE 1000 MILLILITER(S): 9 INJECTION, SOLUTION INTRAVENOUS at 19:45

## 2020-05-02 RX ADMIN — MORPHINE SULFATE 4 MILLIGRAM(S): 50 CAPSULE, EXTENDED RELEASE ORAL at 19:14

## 2020-05-02 NOTE — ED PROVIDER NOTE - OBJECTIVE STATEMENT
38 yo female, pmh of yen, substance abuse, mdd, eoth abuse, presents to ed for abd pain. started today, epigastric, mild, aching, no radiation. Denies fever, chills, cp, sob, le swelling, back pain, neck pain, nvd, dysuria, hematuria.

## 2020-05-02 NOTE — ED PROVIDER NOTE - PHYSICAL EXAMINATION
Physical Exam    Vital Signs: I have reviewed the initial vital signs.  Constitutional: well-nourished, appears stated age, no acute distress  Eyes: Conjunctiva pink, Sclera clear  Cardiovascular: S1 and S2, regular rate, regular rhythm, well-perfused extremities, radial pulses equal and 2+  Respiratory: unlabored respiratory effort, clear to auscultation bilaterally no wheezing, rales and rhonchi  Gastrointestinal: soft, epigastric mild ttp, no cva ttp no pulsatile mass, normal bowl sounds  Musculoskeletal: supple neck, no lower extremity edema, no midline tenderness  Integumentary: warm, dry, no rash  Neurologic: awake, alert, nvi

## 2020-05-02 NOTE — ED PROVIDER NOTE - NS ED ROS FT
Constitutional: (-) fever, (-) chills  Eyes: (-) visual changes  ENT: (-) nasal congestions  Cardiovascular: (-) chest pain, (-) syncope  Respiratory: (-) cough, (-) shortness of breath, (-) dyspnea,   Gastrointestinal: (-) vomiting, (-) diarrhea, (-)nausea, (+) abd pain  Musculoskeletal: (-) neck pain, (-) back pain, (-) joint pain,  Integumentary: (-) rash, (-) edema, (-) bruises  Neurological: (-) headache, (-) loc, (-) dizziness, (-) tingling, (-)numbness,  Peripheral Vascular: (-) leg swelling  :  (-)dysuria,  (-) hematuria  Allergic/Immunologic: (-) pruritus

## 2020-05-02 NOTE — ED ADULT NURSE REASSESSMENT NOTE - NS ED NURSE REASSESS COMMENT FT1
pt Iv infiltrated, taken out, dressing applied, applied warm compress. pt refused to stay and eloped.

## 2020-05-02 NOTE — ED ADULT NURSE NOTE - NS ED NURSE ELOPE DATE TIME
66 Y F with documented hx of HTN not on meds because she doesn't want to take medications says that this morning she woke up and was very dizzy. She was triaged as abdominal pain but says that she is having no pain just has been vomiting all day because she is dizzy. She says that it feels like the room is spinning and it gets somewhat better if she stays still. She says that she has never had anything like this before. She denies hx of smoking. She denies focal, numbness, tingling or weakness. She admits to mild HA. She says that there is no blood in her vomit. She admits to tinnitus and feels like it is bilateral.
02-May-2020 20:11

## 2020-05-02 NOTE — ED ADULT NURSE NOTE - NSIMPLEMENTINTERV_GEN_ALL_ED
Implemented All Universal Safety Interventions:  [place] to call system. Call bell, personal items and telephone within reach. Instruct patient to call for assistance. Room bathroom lighting operational. Non-slip footwear when patient is off stretcher. Physically safe environment: no spills, clutter or unnecessary equipment. Stretcher in lowest position, wheels locked, appropriate side rails in place. none

## 2020-05-02 NOTE — ED PROVIDER NOTE - ATTENDING CONTRIBUTION TO CARE
38 yo F PMHx noted including alcohol abuse, depression presents with c/o abd pain, started today.  Pt admits to drinking, no BM x few days, + vomiting, no fevers.  On exam pt AAO x 3, abd soft nd, + tender epigastric, no edema, + scaling to ankles and arms. no edema

## 2020-05-03 NOTE — ED ADULT NURSE NOTE - NS ED NURSE RECORD ANOTHER HT AND WT
70 y/o M w/ h/o NICM s/p HM2 s/p OHT 2/23/18 with coronary fistula, HCV + s/p Rx, prior b/l subclavian DVT s/p Rx, prior AMR s/p IVIG/plasmapharesis/Rituximab, CKD (b/l Cr 1.4) who presented w/ atypical chest pain and abdominal tightness. Brought in for concerns of COVID infection which returned negative. 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  Treated w/ high dose steroids w/ clinical improvement.   Remains euvolemic on exam. Yes

## 2020-05-04 ENCOUNTER — EMERGENCY (EMERGENCY)
Facility: HOSPITAL | Age: 40
LOS: 0 days | Discharge: LEFT BEFORE DISCHARGE | End: 2020-05-04
Attending: EMERGENCY MEDICINE | Admitting: EMERGENCY MEDICINE
Payer: MEDICAID

## 2020-05-04 VITALS
HEIGHT: 63 IN | RESPIRATION RATE: 20 BRPM | WEIGHT: 134.92 LBS | SYSTOLIC BLOOD PRESSURE: 183 MMHG | OXYGEN SATURATION: 100 % | HEART RATE: 116 BPM | DIASTOLIC BLOOD PRESSURE: 79 MMHG

## 2020-05-04 DIAGNOSIS — R07.9 CHEST PAIN, UNSPECIFIED: ICD-10-CM

## 2020-05-04 DIAGNOSIS — R74.0 NONSPECIFIC ELEVATION OF LEVELS OF TRANSAMINASE AND LACTIC ACID DEHYDROGENASE [LDH]: ICD-10-CM

## 2020-05-04 DIAGNOSIS — E87.6 HYPOKALEMIA: ICD-10-CM

## 2020-05-04 DIAGNOSIS — E80.6 OTHER DISORDERS OF BILIRUBIN METABOLISM: ICD-10-CM

## 2020-05-04 DIAGNOSIS — R07.2 PRECORDIAL PAIN: ICD-10-CM

## 2020-05-04 DIAGNOSIS — F41.9 ANXIETY DISORDER, UNSPECIFIED: ICD-10-CM

## 2020-05-04 LAB
ALBUMIN SERPL ELPH-MCNC: 3.7 G/DL — SIGNIFICANT CHANGE UP (ref 3.5–5.2)
ALP SERPL-CCNC: 413 U/L — HIGH (ref 30–115)
ALT FLD-CCNC: 34 U/L — SIGNIFICANT CHANGE UP (ref 0–41)
ANION GAP SERPL CALC-SCNC: 25 MMOL/L — HIGH (ref 7–14)
AST SERPL-CCNC: 271 U/L — HIGH (ref 0–41)
BASOPHILS # BLD AUTO: 0.12 K/UL — SIGNIFICANT CHANGE UP (ref 0–0.2)
BASOPHILS NFR BLD AUTO: 0.9 % — SIGNIFICANT CHANGE UP (ref 0–1)
BILIRUB SERPL-MCNC: 2.9 MG/DL — HIGH (ref 0.2–1.2)
BUN SERPL-MCNC: 4 MG/DL — LOW (ref 10–20)
CALCIUM SERPL-MCNC: 9 MG/DL — SIGNIFICANT CHANGE UP (ref 8.5–10.1)
CHLORIDE SERPL-SCNC: 91 MMOL/L — LOW (ref 98–110)
CO2 SERPL-SCNC: 24 MMOL/L — SIGNIFICANT CHANGE UP (ref 17–32)
CREAT SERPL-MCNC: 0.5 MG/DL — LOW (ref 0.7–1.5)
EOSINOPHIL # BLD AUTO: 0.06 K/UL — SIGNIFICANT CHANGE UP (ref 0–0.7)
EOSINOPHIL NFR BLD AUTO: 0.5 % — SIGNIFICANT CHANGE UP (ref 0–8)
GLUCOSE SERPL-MCNC: 106 MG/DL — HIGH (ref 70–99)
HCG SERPL QL: NEGATIVE — SIGNIFICANT CHANGE UP
HCT VFR BLD CALC: 38.2 % — SIGNIFICANT CHANGE UP (ref 37–47)
HGB BLD-MCNC: 13 G/DL — SIGNIFICANT CHANGE UP (ref 12–16)
IMM GRANULOCYTES NFR BLD AUTO: 0.4 % — HIGH (ref 0.1–0.3)
LACTATE SERPL-SCNC: 4.5 MMOL/L — CRITICAL HIGH (ref 0.7–2)
LIDOCAIN IGE QN: >600 U/L — HIGH (ref 7–60)
LYMPHOCYTES # BLD AUTO: 0.99 K/UL — LOW (ref 1.2–3.4)
LYMPHOCYTES # BLD AUTO: 7.6 % — LOW (ref 20.5–51.1)
MAGNESIUM SERPL-MCNC: 1.6 MG/DL — LOW (ref 1.8–2.4)
MCHC RBC-ENTMCNC: 34 G/DL — SIGNIFICANT CHANGE UP (ref 32–37)
MCHC RBC-ENTMCNC: 34.5 PG — HIGH (ref 27–31)
MCV RBC AUTO: 101.3 FL — HIGH (ref 81–99)
MONOCYTES # BLD AUTO: 0.93 K/UL — HIGH (ref 0.1–0.6)
MONOCYTES NFR BLD AUTO: 7.1 % — SIGNIFICANT CHANGE UP (ref 1.7–9.3)
NEUTROPHILS # BLD AUTO: 10.86 K/UL — HIGH (ref 1.4–6.5)
NEUTROPHILS NFR BLD AUTO: 83.5 % — HIGH (ref 42.2–75.2)
NRBC # BLD: 0 /100 WBCS — SIGNIFICANT CHANGE UP (ref 0–0)
PLATELET # BLD AUTO: 262 K/UL — SIGNIFICANT CHANGE UP (ref 130–400)
POTASSIUM SERPL-MCNC: 2.8 MMOL/L — LOW (ref 3.5–5)
POTASSIUM SERPL-SCNC: 2.8 MMOL/L — LOW (ref 3.5–5)
PROT SERPL-MCNC: 7.4 G/DL — SIGNIFICANT CHANGE UP (ref 6–8)
RBC # BLD: 3.77 M/UL — LOW (ref 4.2–5.4)
RBC # FLD: 12.2 % — SIGNIFICANT CHANGE UP (ref 11.5–14.5)
SODIUM SERPL-SCNC: 140 MMOL/L — SIGNIFICANT CHANGE UP (ref 135–146)
TROPONIN T SERPL-MCNC: <0.01 NG/ML — SIGNIFICANT CHANGE UP
WBC # BLD: 13.01 K/UL — HIGH (ref 4.8–10.8)
WBC # FLD AUTO: 13.01 K/UL — HIGH (ref 4.8–10.8)

## 2020-05-04 PROCEDURE — 99285 EMERGENCY DEPT VISIT HI MDM: CPT

## 2020-05-04 PROCEDURE — 71045 X-RAY EXAM CHEST 1 VIEW: CPT | Mod: 26

## 2020-05-04 RX ORDER — SODIUM CHLORIDE 9 MG/ML
1000 INJECTION INTRAMUSCULAR; INTRAVENOUS; SUBCUTANEOUS ONCE
Refills: 0 | Status: COMPLETED | OUTPATIENT
Start: 2020-05-04 | End: 2020-05-04

## 2020-05-04 RX ORDER — ONDANSETRON 8 MG/1
4 TABLET, FILM COATED ORAL ONCE
Refills: 0 | Status: COMPLETED | OUTPATIENT
Start: 2020-05-04 | End: 2020-05-04

## 2020-05-04 RX ORDER — FAMOTIDINE 10 MG/ML
20 INJECTION INTRAVENOUS ONCE
Refills: 0 | Status: COMPLETED | OUTPATIENT
Start: 2020-05-04 | End: 2020-05-04

## 2020-05-04 RX ADMIN — FAMOTIDINE 20 MILLIGRAM(S): 10 INJECTION INTRAVENOUS at 15:17

## 2020-05-04 RX ADMIN — Medication 30 MILLILITER(S): at 15:16

## 2020-05-04 RX ADMIN — SODIUM CHLORIDE 1000 MILLILITER(S): 9 INJECTION INTRAMUSCULAR; INTRAVENOUS; SUBCUTANEOUS at 15:16

## 2020-05-04 NOTE — ED PROVIDER NOTE - PHYSICAL EXAMINATION
GEN: Alert & Oriented x 3, No acute distress. Calm, appropriate.  Head and Neck: Normocephalic, atraumatic.   Eyes: PERRL. No conjunctival injection. No scleral icterus.   RESP: Lungs clear to auscult bilat. no wheezes, rhonchi or rales. No retractions. Equal air entry.  CARDIO: tachycardic rate and regular rhythm, no murmurs, rubs or gallops. Normal S1, S2. Radial pulses 2+ bilaterally. No lower extremity edema.  ABD: Soft, Nondistended. No rebound tenderness/guarding. No pulsatile mass. No tenderness with palpation x 4 quadrants.  MS: moving all extremities.   SKIN: no rashes/lesions, no petechiae, no ecchymosis.  NEURO: CN II-XII grossly intact. Speech and cognition normal.

## 2020-05-04 NOTE — ED PROVIDER NOTE - OBJECTIVE STATEMENT
The pt is a 39y Female with PMH ETOH abuse, substance abuse, pancreatitis is presenting to ED with chest pain x 1hr. Pt endorsing substernal, non-radiating, intermittent severe chest pain. no aggravating or relieving factors. associated with 3 episodes of vomiting and some dizziness. pt denies syncope, palpitations, The pt is a 39y Female with PMH ETOH abuse, substance abuse, pancreatitis is presenting to ED with chest pain x 1hr. Pt endorsing substernal, non-radiating, intermittent severe chest pain. no aggravating or relieving factors. associated with 3 episodes of vomiting, epigastric abd pain and some dizziness. pt denies syncope, palpitations, sob, tremors, recent IVDU, recent trauma, lower ext swelling. pt last drank yesterday.

## 2020-05-04 NOTE — ED PROVIDER NOTE - ATTENDING CONTRIBUTION TO CARE
39F h/o polysub abuse incl etoh abuse, pancreatitis, anxiety/depression p/w chest pain x 2d. Mid-sternal constant 10/10, non-exertional, no specific aggrav/allev factors, states it feels different from her normal pancreatitis. Denies f/c, sob, cough, nvd, abd pain, flank pain, urinary sx, melena, brbpr, rash. No sick contacts, recent travel or abx. Pt yelling/agitated on arrival demanding pain medication and refusing all non-opiate medications offered. Was seen in ED yest for abd pain, eloped prior to any testing. PMD Maci.    PE:  young f, yelling, agitated  skin warm, dry  ncat  neck supple  tachy 100s reg rhythm nl s1s2 no mrg  ctab no wrr  abd soft ntnd no palpable masses no rgr  back non-tender no cvat  ext no cce dpi  neuro aaox3 grossly nf exam 39F h/o polysub abuse incl etoh abuse, pancreatitis, anxiety/depression p/w chest pain x 2d. Mid-sternal constant 10/10, non-exertional, no specific aggrav/allev factors, states it feels different from her normal pancreatitis. Denies f/c, sob, cough, nvd, abd pain, flank pain, urinary sx, melena, brbpr, rash. No sick contacts, recent travel or abx. Pt yelling/agitated on arrival demanding pain medication and refusing all non-opiate medications offered. Was seen in ED yest for abd pain, eloped. PMD Maci.    PE:  young f, yelling, agitated  skin warm, dry  ncat  neck supple  tachy 100s reg rhythm nl s1s2 no mrg  ctab no wrr  abd soft ntnd no palpable masses no rgr  back non-tender no cvat  ext no cce dpi  neuro aaox3 grossly nf exam

## 2020-05-04 NOTE — ED PROVIDER NOTE - CARE PLAN
Principal Discharge DX:	Chest pain  Secondary Diagnosis:	Hypokalemia  Secondary Diagnosis:	Lactate blood increase  Secondary Diagnosis:	Transaminitis  Secondary Diagnosis:	Hyperbilirubinemia

## 2020-05-04 NOTE — ED PROVIDER NOTE - CLINICAL SUMMARY MEDICAL DECISION MAKING FREE TEXT BOX
chest pain, h/o etoh abuse & pancreatitis - sinus tachy on ekg, however pt yelling/agitated - iv placed & labs drawn, pt offered various analgesia options however refused all non-opiate options offered stating that they don't work for her - eloped from ED 2d ago after presenting for abd pain - pt eventually agreeable to first mouth wash, labs results w/sev abnormalities incl hypokalemia, AG 25, hyperbilirubinemia, transaminitis, elevated LA - went to pt's bedside to advise of need for add'l testing & tx (lyte repletion, IVF, CT AP) however notified by RN that pt requested to leave, IV was removed and she eloped - called pt's telephone # of record [phone], call went straight to msg no voicemail set up, rec'd call back from pt's friend Jeovanny Gaitanbert who has pt's cell phone but is not with her, states he will tell her when she comes home

## 2020-05-04 NOTE — ED PROVIDER NOTE - PROGRESS NOTE DETAILS
pt assessed @ bedside, is screaming at staff that she has chest pain and needs pain medication, offered various options incl pepcid, first mouth wash and toradol all of which pt refused saying they do not work for her, after which refusing to answer any further questions Pt eloped without telling pt about results and need for CT and admission. pt threatened to elope at the beginning of the visit and spoke with patient about risk of leaving without results, including death. pt states she understands, but still wants to leave.

## 2020-05-04 NOTE — ED PROVIDER NOTE - NS ED ROS FT
GEN: (-) fever, (-) chills, (-) malaise  HEENT: (-) vision changes, (-) HA, (-) sore throat  CV: (+) chest pain, (-) palpitations, (-) edema  PULM: (-) cough, (-) wheezing, (-) dyspnea  GI: (-) abdominal pain,(+) Nausea, (+) Vomiting, (-) Diarrhea  NEURO: (-) weakness, (-) paresthesias, (-) syncope, (-) seizure  : (-) dysuria, (-) frequency, (-) urgency  MS: (-) back pain, (-) joint pain, (-)myalgias  SKIN: (-) rashes, (-) new lesions  HEME: (-) bleeding, (-) ecchymosis

## 2020-05-06 DIAGNOSIS — Z71.89 OTHER SPECIFIED COUNSELING: ICD-10-CM

## 2020-05-28 ENCOUNTER — INPATIENT (INPATIENT)
Facility: HOSPITAL | Age: 40
LOS: 14 days | Discharge: HOME | End: 2020-06-12
Attending: INTERNAL MEDICINE | Admitting: INTERNAL MEDICINE
Payer: MEDICAID

## 2020-05-28 VITALS
OXYGEN SATURATION: 100 % | RESPIRATION RATE: 24 BRPM | HEART RATE: 130 BPM | SYSTOLIC BLOOD PRESSURE: 131 MMHG | DIASTOLIC BLOOD PRESSURE: 77 MMHG

## 2020-05-28 DIAGNOSIS — D64.9 ANEMIA, UNSPECIFIED: ICD-10-CM

## 2020-05-28 LAB
ALBUMIN SERPL ELPH-MCNC: 2.6 G/DL — LOW (ref 3.5–5.2)
ALP SERPL-CCNC: 474 U/L — HIGH (ref 30–115)
ALT FLD-CCNC: 24 U/L — SIGNIFICANT CHANGE UP (ref 0–41)
ANION GAP SERPL CALC-SCNC: 15 MMOL/L — HIGH (ref 7–14)
APAP SERPL-MCNC: <5 UG/ML — LOW (ref 10–30)
APPEARANCE UR: CLEAR — SIGNIFICANT CHANGE UP
APTT BLD: 32.4 SEC — SIGNIFICANT CHANGE UP (ref 27–39.2)
AST SERPL-CCNC: 260 U/L — HIGH (ref 0–41)
BACTERIA # UR AUTO: ABNORMAL
BASE EXCESS BLDV CALC-SCNC: 8.1 MMOL/L — HIGH (ref -2–2)
BASOPHILS # BLD AUTO: 0.14 K/UL — SIGNIFICANT CHANGE UP (ref 0–0.2)
BASOPHILS NFR BLD AUTO: 0.8 % — SIGNIFICANT CHANGE UP (ref 0–1)
BILIRUB DIRECT SERPL-MCNC: 3.6 MG/DL — HIGH (ref 0–0.2)
BILIRUB INDIRECT FLD-MCNC: 1.2 MG/DL — SIGNIFICANT CHANGE UP (ref 0.2–1.2)
BILIRUB SERPL-MCNC: 4.8 MG/DL — HIGH (ref 0.2–1.2)
BILIRUB UR-MCNC: ABNORMAL
BUN SERPL-MCNC: <3 MG/DL — LOW (ref 10–20)
CA-I SERPL-SCNC: 0.93 MMOL/L — LOW (ref 1.12–1.3)
CALCIUM SERPL-MCNC: 7.9 MG/DL — LOW (ref 8.5–10.1)
CHLORIDE SERPL-SCNC: 82 MMOL/L — LOW (ref 98–110)
CO2 SERPL-SCNC: 33 MMOL/L — HIGH (ref 17–32)
COD CRY URNS QL: NEGATIVE — SIGNIFICANT CHANGE UP
COLOR SPEC: YELLOW — SIGNIFICANT CHANGE UP
CREAT SERPL-MCNC: <0.5 MG/DL — LOW (ref 0.7–1.5)
DIFF PNL FLD: NEGATIVE — SIGNIFICANT CHANGE UP
EOSINOPHIL # BLD AUTO: 0.04 K/UL — SIGNIFICANT CHANGE UP (ref 0–0.7)
EOSINOPHIL NFR BLD AUTO: 0.2 % — SIGNIFICANT CHANGE UP (ref 0–8)
EPI CELLS # UR: ABNORMAL /HPF
GAS PNL BLDV: 137 MMOL/L — SIGNIFICANT CHANGE UP (ref 136–145)
GAS PNL BLDV: SIGNIFICANT CHANGE UP
GLUCOSE BLDC GLUCOMTR-MCNC: 116 MG/DL — HIGH (ref 70–99)
GLUCOSE SERPL-MCNC: 126 MG/DL — HIGH (ref 70–99)
GLUCOSE UR QL: NEGATIVE MG/DL — SIGNIFICANT CHANGE UP
GRAN CASTS # UR COMP ASSIST: NEGATIVE — SIGNIFICANT CHANGE UP
HCG SERPL-ACNC: <0.6 MIU/ML — SIGNIFICANT CHANGE UP
HCO3 BLDV-SCNC: 33 MMOL/L — HIGH (ref 22–29)
HCT VFR BLD CALC: 26 % — LOW (ref 37–47)
HCT VFR BLDA CALC: 32.8 % — LOW (ref 34–44)
HGB BLD CALC-MCNC: 10.7 G/DL — LOW (ref 14–18)
HGB BLD-MCNC: 8.3 G/DL — LOW (ref 12–16)
HOROWITZ INDEX BLDV+IHG-RTO: 21 — SIGNIFICANT CHANGE UP
HYALINE CASTS # UR AUTO: NEGATIVE — SIGNIFICANT CHANGE UP
IMM GRANULOCYTES NFR BLD AUTO: 1.3 % — HIGH (ref 0.1–0.3)
INR BLD: 1.27 RATIO — SIGNIFICANT CHANGE UP (ref 0.65–1.3)
KETONES UR-MCNC: NEGATIVE — SIGNIFICANT CHANGE UP
LACTATE BLDV-MCNC: 3.4 MMOL/L — HIGH (ref 0.5–1.6)
LACTATE SERPL-SCNC: 6.3 MMOL/L — CRITICAL HIGH (ref 0.7–2)
LEUKOCYTE ESTERASE UR-ACNC: NEGATIVE — SIGNIFICANT CHANGE UP
LIDOCAIN IGE QN: 115 U/L — HIGH (ref 7–60)
LYMPHOCYTES # BLD AUTO: 11.1 % — LOW (ref 20.5–51.1)
LYMPHOCYTES # BLD AUTO: 2.04 K/UL — SIGNIFICANT CHANGE UP (ref 1.2–3.4)
MAGNESIUM SERPL-MCNC: 1.9 MG/DL — SIGNIFICANT CHANGE UP (ref 1.8–2.4)
MCHC RBC-ENTMCNC: 31.9 G/DL — LOW (ref 32–37)
MCHC RBC-ENTMCNC: 31.9 PG — HIGH (ref 27–31)
MCV RBC AUTO: 100 FL — HIGH (ref 81–99)
MONOCYTES # BLD AUTO: 1.34 K/UL — HIGH (ref 0.1–0.6)
MONOCYTES NFR BLD AUTO: 7.3 % — SIGNIFICANT CHANGE UP (ref 1.7–9.3)
NEUTROPHILS # BLD AUTO: 14.53 K/UL — HIGH (ref 1.4–6.5)
NEUTROPHILS NFR BLD AUTO: 79.3 % — HIGH (ref 42.2–75.2)
NITRITE UR-MCNC: POSITIVE
NRBC # BLD: 0 /100 WBCS — SIGNIFICANT CHANGE UP (ref 0–0)
PCO2 BLDV: 49 MMHG — SIGNIFICANT CHANGE UP (ref 41–51)
PH BLDV: 7.44 — HIGH (ref 7.26–7.43)
PH UR: 7 — SIGNIFICANT CHANGE UP (ref 5–8)
PLATELET # BLD AUTO: 233 K/UL — SIGNIFICANT CHANGE UP (ref 130–400)
PO2 BLDV: 28 MMHG — SIGNIFICANT CHANGE UP (ref 20–40)
POTASSIUM BLDV-SCNC: 2.6 MMOL/L — LOW (ref 3.3–5.6)
POTASSIUM SERPL-MCNC: 2.6 MMOL/L — CRITICAL LOW (ref 3.5–5)
POTASSIUM SERPL-SCNC: 2.6 MMOL/L — CRITICAL LOW (ref 3.5–5)
PROT SERPL-MCNC: 6.2 G/DL — SIGNIFICANT CHANGE UP (ref 6–8)
PROT UR-MCNC: 30 MG/DL
PROTHROM AB SERPL-ACNC: 14.6 SEC — HIGH (ref 9.95–12.87)
RBC # BLD: 2.6 M/UL — LOW (ref 4.2–5.4)
RBC # FLD: 16.8 % — HIGH (ref 11.5–14.5)
RBC CASTS # UR COMP ASSIST: NEGATIVE — SIGNIFICANT CHANGE UP
SALICYLATES SERPL-MCNC: <0.3 MG/DL — LOW (ref 4–30)
SAO2 % BLDV: 34 % — SIGNIFICANT CHANGE UP
SARS-COV-2 RNA SPEC QL NAA+PROBE: SIGNIFICANT CHANGE UP
SODIUM SERPL-SCNC: 130 MMOL/L — LOW (ref 135–146)
SP GR SPEC: 1.01 — SIGNIFICANT CHANGE UP (ref 1.01–1.03)
TRI-PHOS CRY UR QL COMP ASSIST: NEGATIVE — SIGNIFICANT CHANGE UP
TROPONIN T SERPL-MCNC: <0.01 NG/ML — SIGNIFICANT CHANGE UP
URATE CRY FLD QL MICRO: NEGATIVE — SIGNIFICANT CHANGE UP
UROBILINOGEN FLD QL: 4 MG/DL (ref 0.2–0.2)
WBC # BLD: 18.33 K/UL — HIGH (ref 4.8–10.8)
WBC # FLD AUTO: 18.33 K/UL — HIGH (ref 4.8–10.8)
WBC UR QL: SIGNIFICANT CHANGE UP /HPF

## 2020-05-28 PROCEDURE — 71045 X-RAY EXAM CHEST 1 VIEW: CPT | Mod: 26

## 2020-05-28 PROCEDURE — ZZZZZ: CPT

## 2020-05-28 PROCEDURE — 99223 1ST HOSP IP/OBS HIGH 75: CPT

## 2020-05-28 PROCEDURE — 99291 CRITICAL CARE FIRST HOUR: CPT

## 2020-05-28 PROCEDURE — 74177 CT ABD & PELVIS W/CONTRAST: CPT | Mod: 26

## 2020-05-28 RX ORDER — POTASSIUM CHLORIDE 20 MEQ
20 PACKET (EA) ORAL ONCE
Refills: 0 | Status: COMPLETED | OUTPATIENT
Start: 2020-05-28 | End: 2020-05-28

## 2020-05-28 RX ORDER — SODIUM CHLORIDE 9 MG/ML
2200 INJECTION INTRAMUSCULAR; INTRAVENOUS; SUBCUTANEOUS ONCE
Refills: 0 | Status: COMPLETED | OUTPATIENT
Start: 2020-05-28 | End: 2020-05-28

## 2020-05-28 RX ORDER — PANTOPRAZOLE SODIUM 20 MG/1
40 TABLET, DELAYED RELEASE ORAL DAILY
Refills: 0 | Status: DISCONTINUED | OUTPATIENT
Start: 2020-05-28 | End: 2020-06-12

## 2020-05-28 RX ORDER — SODIUM CHLORIDE 9 MG/ML
1000 INJECTION, SOLUTION INTRAVENOUS
Refills: 0 | Status: DISCONTINUED | OUTPATIENT
Start: 2020-05-28 | End: 2020-05-29

## 2020-05-28 RX ORDER — ALPRAZOLAM 0.25 MG
0.25 TABLET ORAL ONCE
Refills: 0 | Status: DISCONTINUED | OUTPATIENT
Start: 2020-05-28 | End: 2020-05-29

## 2020-05-28 RX ORDER — ONDANSETRON 8 MG/1
4 TABLET, FILM COATED ORAL ONCE
Refills: 0 | Status: COMPLETED | OUTPATIENT
Start: 2020-05-28 | End: 2020-05-28

## 2020-05-28 RX ORDER — CHLORHEXIDINE GLUCONATE 213 G/1000ML
1 SOLUTION TOPICAL
Refills: 0 | Status: DISCONTINUED | OUTPATIENT
Start: 2020-05-28 | End: 2020-06-12

## 2020-05-28 RX ORDER — MORPHINE SULFATE 50 MG/1
4 CAPSULE, EXTENDED RELEASE ORAL ONCE
Refills: 0 | Status: DISCONTINUED | OUTPATIENT
Start: 2020-05-28 | End: 2020-05-28

## 2020-05-28 RX ORDER — ENOXAPARIN SODIUM 100 MG/ML
40 INJECTION SUBCUTANEOUS DAILY
Refills: 0 | Status: DISCONTINUED | OUTPATIENT
Start: 2020-05-28 | End: 2020-05-29

## 2020-05-28 RX ORDER — MORPHINE SULFATE 50 MG/1
2 CAPSULE, EXTENDED RELEASE ORAL EVERY 6 HOURS
Refills: 0 | Status: DISCONTINUED | OUTPATIENT
Start: 2020-05-28 | End: 2020-05-29

## 2020-05-28 RX ORDER — MEROPENEM 1 G/30ML
1000 INJECTION INTRAVENOUS EVERY 8 HOURS
Refills: 0 | Status: DISCONTINUED | OUTPATIENT
Start: 2020-05-28 | End: 2020-06-04

## 2020-05-28 RX ORDER — SODIUM CHLORIDE 9 MG/ML
1000 INJECTION INTRAMUSCULAR; INTRAVENOUS; SUBCUTANEOUS ONCE
Refills: 0 | Status: COMPLETED | OUTPATIENT
Start: 2020-05-28 | End: 2020-05-28

## 2020-05-28 RX ORDER — CEFEPIME 1 G/1
2000 INJECTION, POWDER, FOR SOLUTION INTRAMUSCULAR; INTRAVENOUS ONCE
Refills: 0 | Status: COMPLETED | OUTPATIENT
Start: 2020-05-28 | End: 2020-05-28

## 2020-05-28 RX ORDER — KETOROLAC TROMETHAMINE 30 MG/ML
15 SYRINGE (ML) INJECTION ONCE
Refills: 0 | Status: DISCONTINUED | OUTPATIENT
Start: 2020-05-28 | End: 2020-05-28

## 2020-05-28 RX ORDER — METRONIDAZOLE 500 MG
500 TABLET ORAL ONCE
Refills: 0 | Status: COMPLETED | OUTPATIENT
Start: 2020-05-28 | End: 2020-05-28

## 2020-05-28 RX ADMIN — SODIUM CHLORIDE 2200 MILLILITER(S): 9 INJECTION INTRAMUSCULAR; INTRAVENOUS; SUBCUTANEOUS at 16:56

## 2020-05-28 RX ADMIN — Medication 50 MILLIEQUIVALENT(S): at 16:56

## 2020-05-28 RX ADMIN — Medication 50 MILLIEQUIVALENT(S): at 20:57

## 2020-05-28 RX ADMIN — MORPHINE SULFATE 2 MILLIGRAM(S): 50 CAPSULE, EXTENDED RELEASE ORAL at 20:52

## 2020-05-28 RX ADMIN — ONDANSETRON 104 MILLIGRAM(S): 8 TABLET, FILM COATED ORAL at 16:57

## 2020-05-28 RX ADMIN — MORPHINE SULFATE 4 MILLIGRAM(S): 50 CAPSULE, EXTENDED RELEASE ORAL at 16:57

## 2020-05-28 RX ADMIN — Medication 100 MILLIGRAM(S): at 17:59

## 2020-05-28 RX ADMIN — MORPHINE SULFATE 4 MILLIGRAM(S): 50 CAPSULE, EXTENDED RELEASE ORAL at 22:36

## 2020-05-28 RX ADMIN — CEFEPIME 100 MILLIGRAM(S): 1 INJECTION, POWDER, FOR SOLUTION INTRAMUSCULAR; INTRAVENOUS at 18:30

## 2020-05-28 RX ADMIN — Medication 15 MILLIGRAM(S): at 15:06

## 2020-05-28 RX ADMIN — SODIUM CHLORIDE 1000 MILLILITER(S): 9 INJECTION INTRAMUSCULAR; INTRAVENOUS; SUBCUTANEOUS at 15:05

## 2020-05-28 NOTE — ED ADULT NURSE NOTE - NSIMPLEMENTINTERV_GEN_ALL_ED
Implemented All Universal Safety Interventions:  [place] to call system. Call bell, personal items and telephone within reach. Instruct patient to call for assistance. Room bathroom lighting operational. Non-slip footwear when patient is off stretcher. Physically safe environment: no spills, clutter or unnecessary equipment. Stretcher in lowest position, wheels locked, appropriate side rails in place.

## 2020-05-28 NOTE — ED PROVIDER NOTE - NS ED ROS FT
Eyes:  No visual changes, eye pain or discharge.  ENMT:  No hearing changes, pain, discharge or infections. No neck pain or stiffness.  Cardiac:  + chest pain. No chest pain with exertion.  Respiratory:  No cough or respiratory distress. No hemoptysis. No history of asthma or RAD.  GI:  + nausea, + abdominal pain.  :  No dysuria, frequency or burning.  MS:  No myalgia, muscle weakness, joint pain or back pain.  Neuro:  No headache or weakness.  No LOC.  Skin:  No skin rash.   Endocrine: No history of thyroid disease or diabetes.

## 2020-05-28 NOTE — ED PROVIDER NOTE - PHYSICAL EXAMINATION
CONSTITUTIONAL: pt screaming "I'm in pain"  SKIN: +superficial abrasions on right elbow and back  HEAD: Normocephalic; atraumatic.  EYES: scleral icterus b/l, pupils equal and reactive to light.   ENT: No nasal discharge; airway clear.  NECK: Supple; non tender.  CARD: S1, S2 normal; no murmurs, gallops, or rubs. tachycardic; regular reg rhythm    RESP: No wheezes, rales or rhonchi.  ABD: distended, epigastric ttp, no cva ttp, no RLQ ttp.   EXT: Normal ROM.    LYMPH: No acute cervical adenopathy.  NEURO: Alert, oriented, grossly unremarkable  PSYCH:  AxO x 3. agitated.

## 2020-05-28 NOTE — ED PROVIDER NOTE - CLINICAL SUMMARY MEDICAL DECISION MAKING FREE TEXT BOX
39 y.o. female, PMH of anxiety, depression, substance abuse, ETOH abuse- last drink today, presents to ED for evaluation of abdominal pain which started months ago and is getting worse, associated with vomiting. Pain is mostly in the epigastric area, aching, no radiation. No fever/chills, CP/SOB, diarrhea. No urinary symptoms. On exam, pt appears uncomfortable, MM dry, head NC/AT, CN II-XII intact, lungs CTA B/L, CV S1S2 regular, abdomen soft/TTP over upper abdomen/distended/(+)BS, ext (-) edema, motor 5/5x4, sensation intact. Pt with pancreatitis. Will admit to ICU.

## 2020-05-28 NOTE — H&P ADULT - ASSESSMENT
1. Acute pancreatitis   - start morphine 2 mg q6   - IV fluids  - serial cbc as now anemic  - CT abdomen reviwed personally - developing psudocyts  - GI eval in am   - surgical fu in am   - c/w antibiotics  - fu all cx  - send Hep C panel  - nicotine patch for smoking cessation   - replete all electrolytes   - hypokalemia - replete   - monitor calcium level  - possible sepsis - monitor blood pressure , may need low dose pressors   - DVT ppx    Pt needs the highest level of monitoring in MICU as pt is high risk of decompensationand sudden deathm, needs frequent hemodymanic monitoring

## 2020-05-28 NOTE — ED ADULT NURSE REASSESSMENT NOTE - NS ED NURSE REASSESS COMMENT FT1
Spoke to MD, patient allowed to elope if necessary. Patient still pestering staff members about wanting to smoke. IV taken out and patient left unit.

## 2020-05-28 NOTE — H&P ADULT - NSHPPHYSICALEXAM_GEN_ALL_CORE
PHYSICAL EXAM:  GENERAL: NAD, well-groomed, well-developed  HEAD:  Atraumatic, Normocephalic  EYES: EOMI, PERRLA, conjunctiva and sclera clear  ENMT: No tonsillar erythema, exudates, or enlargement; Moist mucous membranes, Good dentition, No lesions  NECK: Supple, No JVD, Normal thyroid  NERVOUS SYSTEM:  Alert & Oriented X3, Good concentration; Motor Strength 5/5 B/L upper and lower extremities; DTRs 2+ intact and symmetric  CHEST/LUNG: Clear to percussion bilaterally; No rales, rhonchi, wheezing, or rubs  HEART: Regular rate and rhythm; No murmurs, rubs, or gallops  ABDOMEN: Soft, + diffuse tenderness   EXTREMITIES:  2+ Peripheral Pulses, No clubbing, cyanosis, or edema  LYMPH: No lymphadenopathy noted  SKIN: No rashes or lesions    labs  05-28    130<L>  |  82<L>  |  <3<L>  ----------------------------<  126<H>  2.6<LL>   |  33<H>  |  <0.5<L>    Ca    7.9<L>      28 May 2020 15:22  Mg     1.9     05-28    TPro  6.2  /  Alb  2.6<L>  /  TBili  4.8<H>  /  DBili  3.6<H>  /  AST  260<H>  /  ALT  24  /  AlkPhos  474<H>  05-28                          8.3    18.33 )-----------( 233      ( 28 May 2020 15:22 )             26.0         PT/INR - ( 28 May 2020 15:22 )   PT: 14.60 sec;   INR: 1.27 ratio         PTT - ( 28 May 2020 15:22 )  PTT:32.4 sec

## 2020-05-28 NOTE — H&P ADULT - NSHPLABSRESULTS_GEN_ALL_CORE
PHYSICAL EXAM:  GENERAL: NAD, well-groomed, well-developed  HEAD:  Atraumatic, Normocephalic  EYES: EOMI, PERRLA, conjunctiva and sclera clear  ENMT: No tonsillar erythema, exudates, or enlargement; Moist mucous membranes, Good dentition, No lesions  NECK: Supple, No JVD, Normal thyroid  NERVOUS SYSTEM:  Alert & Oriented X3, Good concentration; Motor Strength 5/5 B/L upper and lower extremities; DTRs 2+ intact and symmetric  CHEST/LUNG: Clear to percussion bilaterally; No rales, rhonchi, wheezing, or rubs  HEART: Regular rate and rhythm; No murmurs, rubs, or gallops  ABDOMEN: Soft, Nontender, Nondistended; Bowel sounds present  EXTREMITIES:  2+ Peripheral Pulses, No clubbing, cyanosis, or edema  LYMPH: No lymphadenopathy noted  SKIN: No rashes or lesions    labs  05-28    130<L>  |  82<L>  |  <3<L>  ----------------------------<  126<H>  2.6<LL>   |  33<H>  |  <0.5<L>    Ca    7.9<L>      28 May 2020 15:22  Mg     1.9     05-28    TPro  6.2  /  Alb  2.6<L>  /  TBili  4.8<H>  /  DBili  3.6<H>  /  AST  260<H>  /  ALT  24  /  AlkPhos  474<H>  05-28                          8.3    18.33 )-----------( 233      ( 28 May 2020 15:22 )             26.0         PT/INR - ( 28 May 2020 15:22 )   PT: 14.60 sec;   INR: 1.27 ratio         PTT - ( 28 May 2020 15:22 )  PTT:32.4 sec

## 2020-05-28 NOTE — ED PROVIDER NOTE - OBJECTIVE STATEMENT
40 yo female, pmh of anxiety, depression, substance abuse, eoth abuse, presents to ED for "I'm in a lot of pain." States she has been having pain for several months now. Located in epigastrium, mild, aching, no radiation. Denies fever, chills, cp, sob, le swelling, back pain, neck pain, nvd, dysuria, hematuria.

## 2020-05-28 NOTE — ED ADULT NURSE REASSESSMENT NOTE - NS ED NURSE REASSESS COMMENT FT1
Patient returned to ED post admitting MD approval to leave ED for cigarette despite RNs education and offering of nictone patch after ICU admission, new IVs placed, patient educated that she is not to leave ED patient reiterated that she is not to leave ED. Patient in bed placed on monitor and resting comfortably. No signs of distress noted.

## 2020-05-28 NOTE — ED PROVIDER NOTE - CARE PLAN
Principal Discharge DX:	Pancreatitis Principal Discharge DX:	Pancreatitis  Secondary Diagnosis:	Substance abuse  Secondary Diagnosis:	ETOH abuse  Secondary Diagnosis:	Hypokalemia  Secondary Diagnosis:	Other elevated white blood cell (WBC) count

## 2020-05-28 NOTE — ED ADULT NURSE REASSESSMENT NOTE - NS ED NURSE REASSESS COMMENT FT1
Patient refusing to be admitted to ICU despite poor diagnosis. Patient adamant about smoking cigaretts, spoke to ICU dr and ER attending. Patient offered nicotine patch. ICU MD told patient that patient is allowed to smoke. Patient educated about dangers about going outside without staff members. Patient pestering staff members and patients about wanting to go smoke a cigarette.

## 2020-05-28 NOTE — ED PROVIDER NOTE - ATTENDING CONTRIBUTION TO CARE
39 y.o. female, PMH of anxiety, depression, substance abuse, ETOH abuse- last drink today, presents to ED for evaluation of abdominal pain which started months ago and is getting worse, associated with vomiting. Pain is mostly in the epigastric area, aching, no radiation. No fever/chills, CP/SOB, diarrhea. No urinary symptoms. On exam, pt appears uncomfortable, MM dry, head NC/AT, CN II-XII intact, lungs CTA B/L, CV S1S2 regular, abdomen soft/TTP over upper abdomen/distended/(+)BS, ext (-) edema, motor 5/5x4, sensation intact. Will do labs, CT, IVF, pain meds and reevaluate.

## 2020-05-28 NOTE — ED ADULT TRIAGE NOTE - CHIEF COMPLAINT QUOTE
c/o abdominal pain for a "long time" pt states she has pancreatitis but does not have pain meds. Pts friend called ems because pt was drinking and acting irrationally. Pt crying and yelling on arrival in ED

## 2020-05-28 NOTE — H&P ADULT - NSHPREVIEWOFSYSTEMS_GEN_ALL_CORE
REVIEW OF SYSTEMS  General:	abdominal pain   Skin/Breast: no rash   Ophthalmologic: no blurry vision 	  ENMT:	no thrush   Respiratory and Thorax: no sob 	  Cardiovascular:	no chest pain   Gastrointestinal:	severe epigastric abdominal pian   Genitourinary:	no dysuria   Musculoskeletal:	no weakness  Neurological:	no defecits

## 2020-05-28 NOTE — H&P ADULT - NSICDXPASTSURGICALHX_GEN_ALL_CORE_FT
"Cristine Caballero is a 31 year old female presents today for new weight management nutrition consultation.  Patient referred by Dr Strickland.    Estimated body mass index is 39.47 kg/m  as calculated from the following:    Height as of an earlier encounter on 1/21/19: 1.651 m (5' 5\").    Weight as of an earlier encounter on 1/21/19: 107.6 kg (237 lb 3.2 oz).     Nutrition history  See MD note for details.  [place] intolerance  History of SIBO so tries to choose low sugar foods    Breakfast: banana and PB or bella bar with apple coffee with heavy cream or eggs with onions kale yogurt  Lunch: protein(chicken, salmon, turkey occ beef) veg and grain vegan/veg meal lentils bean salads tofu(everyother week)  Snack: bella bar or protein shake(unable to remember the brand) with almond milk - chewy bar or trail mix, apple, yogurt  Dinner: protein veg and grain vegan/veg meal lentils bean salads tofu(everyother week)  Beverages: water with every meal, coffee, diet pop at lunch, alcholic beverage whiskey or wine 2-3 times per week(1-2 drinks per sitting), protein shake (twice per week), kombucha(twice per week)  Out to eat: 1-2 times per week    Exercise: cardio classes previously three times per week recently started going to yoga four times per week    Nutrition Prescription  volumetrics (per MD)    Nutrition Diagnosis  Food and nutrition related knowledge deficit r/t lack of prior exposure to volumetrics and nutrition education aeb pt unable to verbalize understanding of volumetric diet for weight loss.    Nutrition Intervention  Materials/education provided on Volumetric eating to help satiety level on fewer calories; portion control and healthy food choices (Volumetrics handouts), 100 calorie snack choices, meal and snack planning and websites, sample meal plans    Patient Understanding: good  Expected Compliance: good    Nutrition Goals  1) Pt to follow volumetric diet  2) limit snacking - 100 calorie snack list, choose " protein   3) Limit/avoid calorie containing beverages  4) Exercise 4-5 times per week    Follow-Up:  PRN    Time spent with patient: 30 minutes.  Pat Adams, RD, LD         PAST SURGICAL HISTORY:  No significant past surgical history

## 2020-05-28 NOTE — H&P ADULT - HISTORY OF PRESENT ILLNESS
38 yo female, pmh of anxiety, depression, substance abuse, eoth abuse, presents to ED for "I'm in a lot of pain." States she has been having pain for several months now. Located in epigastrium, severe 10/10 aching, no radiation. Denies fever, chills, cp, sob, le swelling, back pain, neck pain, nvd, dysuria, hematuria.

## 2020-05-29 DIAGNOSIS — K85.90 ACUTE PANCREATITIS WITHOUT NECROSIS OR INFECTION, UNSPECIFIED: ICD-10-CM

## 2020-05-29 DIAGNOSIS — K85.20 ALCOHOL INDUCED ACUTE PANCREATITIS WITHOUT NECROSIS OR INFECTION: ICD-10-CM

## 2020-05-29 DIAGNOSIS — R21 RASH AND OTHER NONSPECIFIC SKIN ERUPTION: ICD-10-CM

## 2020-05-29 DIAGNOSIS — F10.10 ALCOHOL ABUSE, UNCOMPLICATED: ICD-10-CM

## 2020-05-29 LAB
A1C WITH ESTIMATED AVERAGE GLUCOSE RESULT: 4.4 % — SIGNIFICANT CHANGE UP (ref 4–5.6)
ALBUMIN SERPL ELPH-MCNC: 2.2 G/DL — LOW (ref 3.5–5.2)
ALP SERPL-CCNC: 400 U/L — HIGH (ref 30–115)
ALT FLD-CCNC: 22 U/L — SIGNIFICANT CHANGE UP (ref 0–41)
AMPHET UR-MCNC: NEGATIVE — SIGNIFICANT CHANGE UP
ANION GAP SERPL CALC-SCNC: 10 MMOL/L — SIGNIFICANT CHANGE UP (ref 7–14)
ANION GAP SERPL CALC-SCNC: 11 MMOL/L — SIGNIFICANT CHANGE UP (ref 7–14)
ANION GAP SERPL CALC-SCNC: 12 MMOL/L — SIGNIFICANT CHANGE UP (ref 7–14)
ANION GAP SERPL CALC-SCNC: 9 MMOL/L — SIGNIFICANT CHANGE UP (ref 7–14)
APTT BLD: 31.9 SEC — SIGNIFICANT CHANGE UP (ref 27–39.2)
AST SERPL-CCNC: 197 U/L — HIGH (ref 0–41)
BARBITURATES UR SCN-MCNC: NEGATIVE — SIGNIFICANT CHANGE UP
BASOPHILS # BLD AUTO: 0.11 K/UL — SIGNIFICANT CHANGE UP (ref 0–0.2)
BASOPHILS # BLD AUTO: 0.14 K/UL — SIGNIFICANT CHANGE UP (ref 0–0.2)
BASOPHILS # BLD AUTO: 0.17 K/UL — SIGNIFICANT CHANGE UP (ref 0–0.2)
BASOPHILS NFR BLD AUTO: 0.8 % — SIGNIFICANT CHANGE UP (ref 0–1)
BASOPHILS NFR BLD AUTO: 0.8 % — SIGNIFICANT CHANGE UP (ref 0–1)
BASOPHILS NFR BLD AUTO: 1.2 % — HIGH (ref 0–1)
BENZODIAZ UR-MCNC: NEGATIVE — SIGNIFICANT CHANGE UP
BILIRUB DIRECT SERPL-MCNC: 3.7 MG/DL — HIGH (ref 0–0.2)
BILIRUB INDIRECT FLD-MCNC: 1 MG/DL — SIGNIFICANT CHANGE UP (ref 0.2–1.2)
BILIRUB SERPL-MCNC: 4.7 MG/DL — HIGH (ref 0.2–1.2)
BLD GP AB SCN SERPL QL: SIGNIFICANT CHANGE UP
BUN SERPL-MCNC: <3 MG/DL — LOW (ref 10–20)
CALCIUM SERPL-MCNC: 6.4 MG/DL — LOW (ref 8.5–10.1)
CALCIUM SERPL-MCNC: 6.6 MG/DL — LOW (ref 8.5–10.1)
CALCIUM SERPL-MCNC: 7 MG/DL — LOW (ref 8.5–10.1)
CALCIUM SERPL-MCNC: 7.1 MG/DL — LOW (ref 8.5–10.1)
CHLORIDE SERPL-SCNC: 100 MMOL/L — SIGNIFICANT CHANGE UP (ref 98–110)
CHLORIDE SERPL-SCNC: 96 MMOL/L — LOW (ref 98–110)
CO2 SERPL-SCNC: 28 MMOL/L — SIGNIFICANT CHANGE UP (ref 17–32)
CO2 SERPL-SCNC: 29 MMOL/L — SIGNIFICANT CHANGE UP (ref 17–32)
CO2 SERPL-SCNC: 29 MMOL/L — SIGNIFICANT CHANGE UP (ref 17–32)
CO2 SERPL-SCNC: 30 MMOL/L — SIGNIFICANT CHANGE UP (ref 17–32)
COCAINE METAB.OTHER UR-MCNC: POSITIVE
CREAT SERPL-MCNC: <0.5 MG/DL — LOW (ref 0.7–1.5)
EOSINOPHIL # BLD AUTO: 0.08 K/UL — SIGNIFICANT CHANGE UP (ref 0–0.7)
EOSINOPHIL # BLD AUTO: 0.16 K/UL — SIGNIFICANT CHANGE UP (ref 0–0.7)
EOSINOPHIL # BLD AUTO: 0.29 K/UL — SIGNIFICANT CHANGE UP (ref 0–0.7)
EOSINOPHIL NFR BLD AUTO: 0.5 % — SIGNIFICANT CHANGE UP (ref 0–8)
EOSINOPHIL NFR BLD AUTO: 1.1 % — SIGNIFICANT CHANGE UP (ref 0–8)
EOSINOPHIL NFR BLD AUTO: 2.1 % — SIGNIFICANT CHANGE UP (ref 0–8)
ESTIMATED AVERAGE GLUCOSE: 80 MG/DL — SIGNIFICANT CHANGE UP (ref 68–114)
FERRITIN SERPL-MCNC: 1229 NG/ML — HIGH (ref 15–150)
GLUCOSE SERPL-MCNC: 114 MG/DL — HIGH (ref 70–99)
GLUCOSE SERPL-MCNC: 92 MG/DL — SIGNIFICANT CHANGE UP (ref 70–99)
GLUCOSE SERPL-MCNC: 95 MG/DL — SIGNIFICANT CHANGE UP (ref 70–99)
GLUCOSE SERPL-MCNC: 99 MG/DL — SIGNIFICANT CHANGE UP (ref 70–99)
HCT VFR BLD CALC: 21.2 % — LOW (ref 37–47)
HCT VFR BLD CALC: 21.4 % — LOW (ref 37–47)
HCT VFR BLD CALC: 24.6 % — LOW (ref 37–47)
HCT VFR BLD CALC: 25.8 % — LOW (ref 37–47)
HCV AB S/CO SERPL IA: 0.04 COI — SIGNIFICANT CHANGE UP
HCV AB SERPL-IMP: SIGNIFICANT CHANGE UP
HGB BLD-MCNC: 6.7 G/DL — CRITICAL LOW (ref 12–16)
HGB BLD-MCNC: 6.7 G/DL — CRITICAL LOW (ref 12–16)
HGB BLD-MCNC: 7.7 G/DL — LOW (ref 12–16)
HGB BLD-MCNC: 8.1 G/DL — LOW (ref 12–16)
IMM GRANULOCYTES NFR BLD AUTO: 0.7 % — HIGH (ref 0.1–0.3)
IMM GRANULOCYTES NFR BLD AUTO: 0.9 % — HIGH (ref 0.1–0.3)
IMM GRANULOCYTES NFR BLD AUTO: 1 % — HIGH (ref 0.1–0.3)
INR BLD: 1.32 RATIO — HIGH (ref 0.65–1.3)
IRON SATN MFR SERPL: 115 UG/DL — SIGNIFICANT CHANGE UP (ref 35–150)
LACTATE SERPL-SCNC: 1.3 MMOL/L — SIGNIFICANT CHANGE UP (ref 0.7–2)
LACTATE SERPL-SCNC: 1.9 MMOL/L — SIGNIFICANT CHANGE UP (ref 0.7–2)
LDH SERPL L TO P-CCNC: 403 — HIGH (ref 50–242)
LYMPHOCYTES # BLD AUTO: 1.53 K/UL — SIGNIFICANT CHANGE UP (ref 1.2–3.4)
LYMPHOCYTES # BLD AUTO: 1.67 K/UL — SIGNIFICANT CHANGE UP (ref 1.2–3.4)
LYMPHOCYTES # BLD AUTO: 11 % — LOW (ref 20.5–51.1)
LYMPHOCYTES # BLD AUTO: 12.1 % — LOW (ref 20.5–51.1)
LYMPHOCYTES # BLD AUTO: 12.8 % — LOW (ref 20.5–51.1)
LYMPHOCYTES # BLD AUTO: 2.18 K/UL — SIGNIFICANT CHANGE UP (ref 1.2–3.4)
MAGNESIUM SERPL-MCNC: 1.6 MG/DL — LOW (ref 1.8–2.4)
MAGNESIUM SERPL-MCNC: 2.2 MG/DL — SIGNIFICANT CHANGE UP (ref 1.8–2.4)
MCHC RBC-ENTMCNC: 31.3 G/DL — LOW (ref 32–37)
MCHC RBC-ENTMCNC: 31.3 G/DL — LOW (ref 32–37)
MCHC RBC-ENTMCNC: 31.4 G/DL — LOW (ref 32–37)
MCHC RBC-ENTMCNC: 31.6 G/DL — LOW (ref 32–37)
MCHC RBC-ENTMCNC: 32.2 PG — HIGH (ref 27–31)
MCHC RBC-ENTMCNC: 32.5 PG — HIGH (ref 27–31)
MCHC RBC-ENTMCNC: 32.5 PG — HIGH (ref 27–31)
MCHC RBC-ENTMCNC: 32.8 PG — HIGH (ref 27–31)
MCV RBC AUTO: 101.9 FL — HIGH (ref 81–99)
MCV RBC AUTO: 103.6 FL — HIGH (ref 81–99)
MCV RBC AUTO: 103.9 FL — HIGH (ref 81–99)
MCV RBC AUTO: 104.7 FL — HIGH (ref 81–99)
METHADONE UR-MCNC: NEGATIVE — SIGNIFICANT CHANGE UP
MONOCYTES # BLD AUTO: 0.78 K/UL — HIGH (ref 0.1–0.6)
MONOCYTES # BLD AUTO: 1.01 K/UL — HIGH (ref 0.1–0.6)
MONOCYTES # BLD AUTO: 1.18 K/UL — HIGH (ref 0.1–0.6)
MONOCYTES NFR BLD AUTO: 5.6 % — SIGNIFICANT CHANGE UP (ref 1.7–9.3)
MONOCYTES NFR BLD AUTO: 6.9 % — SIGNIFICANT CHANGE UP (ref 1.7–9.3)
MONOCYTES NFR BLD AUTO: 7.3 % — SIGNIFICANT CHANGE UP (ref 1.7–9.3)
NEUTROPHILS # BLD AUTO: 10.83 K/UL — HIGH (ref 1.4–6.5)
NEUTROPHILS # BLD AUTO: 10.97 K/UL — HIGH (ref 1.4–6.5)
NEUTROPHILS # BLD AUTO: 13.27 K/UL — HIGH (ref 1.4–6.5)
NEUTROPHILS NFR BLD AUTO: 78.1 % — HIGH (ref 42.2–75.2)
NEUTROPHILS NFR BLD AUTO: 78.3 % — HIGH (ref 42.2–75.2)
NEUTROPHILS NFR BLD AUTO: 78.8 % — HIGH (ref 42.2–75.2)
NRBC # BLD: 0 /100 WBCS — SIGNIFICANT CHANGE UP (ref 0–0)
OPIATES UR-MCNC: POSITIVE
PCP SPEC-MCNC: SIGNIFICANT CHANGE UP
PHOSPHATE SERPL-MCNC: 1.4 MG/DL — LOW (ref 2.1–4.9)
PLATELET # BLD AUTO: 212 K/UL — SIGNIFICANT CHANGE UP (ref 130–400)
PLATELET # BLD AUTO: 231 K/UL — SIGNIFICANT CHANGE UP (ref 130–400)
PLATELET # BLD AUTO: 232 K/UL — SIGNIFICANT CHANGE UP (ref 130–400)
PLATELET # BLD AUTO: 247 K/UL — SIGNIFICANT CHANGE UP (ref 130–400)
POTASSIUM SERPL-MCNC: 3.2 MMOL/L — LOW (ref 3.5–5)
POTASSIUM SERPL-MCNC: 3.3 MMOL/L — LOW (ref 3.5–5)
POTASSIUM SERPL-MCNC: 3.5 MMOL/L — SIGNIFICANT CHANGE UP (ref 3.5–5)
POTASSIUM SERPL-MCNC: 3.6 MMOL/L — SIGNIFICANT CHANGE UP (ref 3.5–5)
POTASSIUM SERPL-SCNC: 3.2 MMOL/L — LOW (ref 3.5–5)
POTASSIUM SERPL-SCNC: 3.3 MMOL/L — LOW (ref 3.5–5)
POTASSIUM SERPL-SCNC: 3.5 MMOL/L — SIGNIFICANT CHANGE UP (ref 3.5–5)
POTASSIUM SERPL-SCNC: 3.6 MMOL/L — SIGNIFICANT CHANGE UP (ref 3.5–5)
PROPOXYPHENE QUALITATIVE URINE RESULT: NEGATIVE — SIGNIFICANT CHANGE UP
PROT SERPL-MCNC: 5.2 G/DL — LOW (ref 6–8)
PROTHROM AB SERPL-ACNC: 15.2 SEC — HIGH (ref 9.95–12.87)
RBC # BLD: 2.06 M/UL — LOW (ref 4.2–5.4)
RBC # BLD: 2.08 M/UL — LOW (ref 4.2–5.4)
RBC # BLD: 2.35 M/UL — LOW (ref 4.2–5.4)
RBC # BLD: 2.35 M/UL — LOW (ref 4.2–5.4)
RBC # BLD: 2.49 M/UL — LOW (ref 4.2–5.4)
RBC # FLD: 17.7 % — HIGH (ref 11.5–14.5)
RBC # FLD: 18.2 % — HIGH (ref 11.5–14.5)
RBC # FLD: 18.3 % — HIGH (ref 11.5–14.5)
RBC # FLD: 18.5 % — HIGH (ref 11.5–14.5)
RETICS #: 144.8 K/UL — HIGH (ref 25–125)
RETICS/RBC NFR: 6.2 % — HIGH (ref 0.5–1.5)
SODIUM SERPL-SCNC: 136 MMOL/L — SIGNIFICANT CHANGE UP (ref 135–146)
SODIUM SERPL-SCNC: 136 MMOL/L — SIGNIFICANT CHANGE UP (ref 135–146)
SODIUM SERPL-SCNC: 137 MMOL/L — SIGNIFICANT CHANGE UP (ref 135–146)
SODIUM SERPL-SCNC: 137 MMOL/L — SIGNIFICANT CHANGE UP (ref 135–146)
TIBC SERPL-MCNC: <132 UG/DL — LOW (ref 220–430)
UIBC SERPL-MCNC: <17 UG/DL — LOW (ref 110–370)
VIT B12 SERPL-MCNC: 1122 PG/ML — SIGNIFICANT CHANGE UP (ref 232–1245)
WBC # BLD: 12.94 K/UL — HIGH (ref 4.8–10.8)
WBC # BLD: 13.84 K/UL — HIGH (ref 4.8–10.8)
WBC # BLD: 13.92 K/UL — HIGH (ref 4.8–10.8)
WBC # BLD: 17 K/UL — HIGH (ref 4.8–10.8)
WBC # FLD AUTO: 12.94 K/UL — HIGH (ref 4.8–10.8)
WBC # FLD AUTO: 13.84 K/UL — HIGH (ref 4.8–10.8)
WBC # FLD AUTO: 13.92 K/UL — HIGH (ref 4.8–10.8)
WBC # FLD AUTO: 17 K/UL — HIGH (ref 4.8–10.8)

## 2020-05-29 PROCEDURE — 71045 X-RAY EXAM CHEST 1 VIEW: CPT | Mod: 26

## 2020-05-29 PROCEDURE — 99232 SBSQ HOSP IP/OBS MODERATE 35: CPT

## 2020-05-29 PROCEDURE — 74174 CTA ABD&PLVS W/CONTRAST: CPT | Mod: 26

## 2020-05-29 PROCEDURE — 99233 SBSQ HOSP IP/OBS HIGH 50: CPT

## 2020-05-29 PROCEDURE — 76705 ECHO EXAM OF ABDOMEN: CPT | Mod: 26

## 2020-05-29 RX ORDER — MAGNESIUM SULFATE 500 MG/ML
2 VIAL (ML) INJECTION ONCE
Refills: 0 | Status: COMPLETED | OUTPATIENT
Start: 2020-05-29 | End: 2020-05-29

## 2020-05-29 RX ORDER — NICOTINE POLACRILEX 2 MG
1 GUM BUCCAL DAILY
Refills: 0 | Status: DISCONTINUED | OUTPATIENT
Start: 2020-05-29 | End: 2020-06-08

## 2020-05-29 RX ORDER — THIAMINE MONONITRATE (VIT B1) 100 MG
100 TABLET ORAL DAILY
Refills: 0 | Status: DISCONTINUED | OUTPATIENT
Start: 2020-05-29 | End: 2020-06-12

## 2020-05-29 RX ORDER — FOLIC ACID 0.8 MG
1 TABLET ORAL DAILY
Refills: 0 | Status: DISCONTINUED | OUTPATIENT
Start: 2020-05-29 | End: 2020-06-12

## 2020-05-29 RX ORDER — POTASSIUM CHLORIDE 20 MEQ
10 PACKET (EA) ORAL
Refills: 0 | Status: DISCONTINUED | OUTPATIENT
Start: 2020-05-29 | End: 2020-05-29

## 2020-05-29 RX ORDER — MORPHINE SULFATE 50 MG/1
2 CAPSULE, EXTENDED RELEASE ORAL EVERY 4 HOURS
Refills: 0 | Status: DISCONTINUED | OUTPATIENT
Start: 2020-05-29 | End: 2020-05-31

## 2020-05-29 RX ORDER — POTASSIUM CHLORIDE 20 MEQ
20 PACKET (EA) ORAL EVERY 4 HOURS
Refills: 0 | Status: COMPLETED | OUTPATIENT
Start: 2020-05-29 | End: 2020-05-29

## 2020-05-29 RX ORDER — MORPHINE SULFATE 50 MG/1
2 CAPSULE, EXTENDED RELEASE ORAL ONCE
Refills: 0 | Status: DISCONTINUED | OUTPATIENT
Start: 2020-05-29 | End: 2020-05-29

## 2020-05-29 RX ORDER — POTASSIUM CHLORIDE 20 MEQ
20 PACKET (EA) ORAL ONCE
Refills: 0 | Status: COMPLETED | OUTPATIENT
Start: 2020-05-29 | End: 2020-05-29

## 2020-05-29 RX ORDER — SODIUM CHLORIDE 9 MG/ML
1000 INJECTION INTRAMUSCULAR; INTRAVENOUS; SUBCUTANEOUS
Refills: 0 | Status: DISCONTINUED | OUTPATIENT
Start: 2020-05-29 | End: 2020-06-01

## 2020-05-29 RX ORDER — NALOXONE HYDROCHLORIDE 4 MG/.1ML
0.2 SPRAY NASAL ONCE
Refills: 0 | Status: DISCONTINUED | OUTPATIENT
Start: 2020-05-29 | End: 2020-05-29

## 2020-05-29 RX ADMIN — Medication 100 MILLIGRAM(S): at 05:24

## 2020-05-29 RX ADMIN — Medication 50 MILLIEQUIVALENT(S): at 13:18

## 2020-05-29 RX ADMIN — PANTOPRAZOLE SODIUM 40 MILLIGRAM(S): 20 TABLET, DELAYED RELEASE ORAL at 11:29

## 2020-05-29 RX ADMIN — Medication 50 MILLIEQUIVALENT(S): at 02:42

## 2020-05-29 RX ADMIN — MORPHINE SULFATE 2 MILLIGRAM(S): 50 CAPSULE, EXTENDED RELEASE ORAL at 04:27

## 2020-05-29 RX ADMIN — Medication 2 MILLIGRAM(S): at 06:50

## 2020-05-29 RX ADMIN — SODIUM CHLORIDE 100 MILLILITER(S): 9 INJECTION, SOLUTION INTRAVENOUS at 02:44

## 2020-05-29 RX ADMIN — MEROPENEM 100 MILLIGRAM(S): 1 INJECTION INTRAVENOUS at 06:02

## 2020-05-29 RX ADMIN — Medication 50 MILLIEQUIVALENT(S): at 10:49

## 2020-05-29 RX ADMIN — SODIUM CHLORIDE 200 MILLILITER(S): 9 INJECTION INTRAMUSCULAR; INTRAVENOUS; SUBCUTANEOUS at 16:18

## 2020-05-29 RX ADMIN — MORPHINE SULFATE 2 MILLIGRAM(S): 50 CAPSULE, EXTENDED RELEASE ORAL at 17:36

## 2020-05-29 RX ADMIN — MORPHINE SULFATE 2 MILLIGRAM(S): 50 CAPSULE, EXTENDED RELEASE ORAL at 00:40

## 2020-05-29 RX ADMIN — SODIUM CHLORIDE 200 MILLILITER(S): 9 INJECTION INTRAMUSCULAR; INTRAVENOUS; SUBCUTANEOUS at 10:50

## 2020-05-29 RX ADMIN — MORPHINE SULFATE 2 MILLIGRAM(S): 50 CAPSULE, EXTENDED RELEASE ORAL at 08:30

## 2020-05-29 RX ADMIN — Medication 1 PATCH: at 06:43

## 2020-05-29 RX ADMIN — MEROPENEM 100 MILLIGRAM(S): 1 INJECTION INTRAVENOUS at 22:39

## 2020-05-29 RX ADMIN — MORPHINE SULFATE 2 MILLIGRAM(S): 50 CAPSULE, EXTENDED RELEASE ORAL at 21:48

## 2020-05-29 RX ADMIN — Medication 1 PATCH: at 10:44

## 2020-05-29 RX ADMIN — Medication 50 GRAM(S): at 10:49

## 2020-05-29 RX ADMIN — Medication 1 TABLET(S): at 11:29

## 2020-05-29 RX ADMIN — Medication 2 MILLIGRAM(S): at 09:00

## 2020-05-29 RX ADMIN — Medication 1 MILLIGRAM(S): at 05:24

## 2020-05-29 RX ADMIN — CHLORHEXIDINE GLUCONATE 1 APPLICATION(S): 213 SOLUTION TOPICAL at 17:37

## 2020-05-29 RX ADMIN — SODIUM CHLORIDE 125 MILLILITER(S): 9 INJECTION, SOLUTION INTRAVENOUS at 07:30

## 2020-05-29 RX ADMIN — MEROPENEM 100 MILLIGRAM(S): 1 INJECTION INTRAVENOUS at 00:25

## 2020-05-29 RX ADMIN — MEROPENEM 100 MILLIGRAM(S): 1 INJECTION INTRAVENOUS at 15:41

## 2020-05-29 RX ADMIN — CHLORHEXIDINE GLUCONATE 1 APPLICATION(S): 213 SOLUTION TOPICAL at 05:24

## 2020-05-29 RX ADMIN — Medication 2 MILLIGRAM(S): at 20:30

## 2020-05-29 RX ADMIN — MORPHINE SULFATE 2 MILLIGRAM(S): 50 CAPSULE, EXTENDED RELEASE ORAL at 13:25

## 2020-05-29 RX ADMIN — Medication 0.25 MILLIGRAM(S): at 00:40

## 2020-05-29 RX ADMIN — Medication 1 PATCH: at 20:10

## 2020-05-29 NOTE — PROGRESS NOTE ADULT - ASSESSMENT
IMPRESSION:  acute/chronic pancreatitis  phlegmon development possible infected  acute/chronic anemia  DTs  active ETOH abuse  r/o biliary obstruction    SUGGEST:  Gi/Surg eval  IV fluids  analgesia  PRN benzo per CIWA  US RUQ  empiric abx  NPO  repeat labs  B12/folate, retic ct  supplement lytes      monitor in ICU

## 2020-05-29 NOTE — CONSULT NOTE ADULT - ASSESSMENT
38 yo female, pmh of anxiety, depression, polysubstance abuse, ETOH abuse (drinks 1 to 2 pints of vodka a day x 2 months). Pt presents to ED for evaluation of abdominal pain for several months. Pt states, pain was on and off but got worse yesterday, mostly at the epigastrium, severe 10/10 on scale, aching, non radiating. Pt also c/o constipation. Last BM was about 1 week ago. Denies fever, chills, cp, sob, le swelling, back pain, neck pain, n/v/d, dysuria, hematuria.    WBC= 17, LA= 6.3, TB=4.8, DB=3.6, ALK-P=474, NGL=407, ALT=24, Lipase=115  CT A/P showed: 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  In addition to the changes pancreatitis, mild inflammatory changes are seen within the mesentery in the upper abdomen, which appears to extend to the region of the colon 38 yo female, pmh of anxiety, pancreatitis, depression, polysubstance abuse, ETOH abuse (drinks 1 to 2 pints of vodka a day x 2 months). Pt presents to ED for evaluation of abdominal pain for several months. Pt states, pain was on and off but got worse yesterday, mostly at the epigastrium, severe 10/10 on scale, aching, non radiating. Pt also c/o constipation. Last BM was about 1 week ago. Denies fever, chills, cp, sob, le swelling, back pain, neck pain, n/v/d, dysuria, hematuria.    WBC= 17, LA= 6.3, TB=4.8, DB=3.6, ALK-P=474, HTI=694, ALT=24, Lipase=115  CT A/P showed: 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  In addition to the changes pancreatitis, mild inflammatory changes are seen within the mesentery in the upper abdomen, which appears to extend to the region of the colon 40 yo female, pmh of anxiety, pancreatitis, depression, polysubstance abuse, ETOH abuse (drinks 1 to 2 pints of vodka a day x 2 months). Pt presents to ED for evaluation of abdominal pain for several months. Pt states, pain was on and off but got worse yesterday, mostly at the epigastrium, severe 10/10 on scale, aching, non radiating. Pt also c/o constipation. Last BM was about 1 week ago. Denies fever, chills, cp, sob, le swelling, back pain, neck pain, n/v/d, dysuria, hematuria.    WBC= 17, LA= 6.3, TB=4.8, DB=3.6, ALK-P=474, NUE=020, ALT=24, Lipase=115  CT A/P showed: 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  In addition to the changes pancreatitis, mild inflammatory changes are seen within the mesentery in the upper abdomen, which appears to extend to the region of the colon    Surgical Resident Addendum  Patient seen and evaluated bedside. Extensive history of alcohol abuse, multiple admissions for such, known episodes of acute pancreatitis, last documented admission of acute pancreatitis December 2019.	Patient drowsy after administration of pain med. Scleral icterus noted. Abdomen soft, nontender.     - known EtOH pancreatitis  - however, recommend RUQ u/s to r/o stones, not previously seen  - recommend serial LFTs  - needs GI consult, previously seen by MOISES Michel on last admission for pancreatitis  - no surgical intervention  - 40 yo female, pmh of anxiety, pancreatitis, depression, polysubstance abuse, ETOH abuse (drinks 1 to 2 pints of vodka a day x 2 months). Pt presents to ED for evaluation of abdominal pain for several months. Pt states, pain was on and off but got worse yesterday, mostly at the epigastrium, severe 10/10 on scale, aching, non radiating. Pt also c/o constipation. Last BM was about 1 week ago. Denies fever, chills, cp, sob, le swelling, back pain, neck pain, n/v/d, dysuria, hematuria.    WBC= 17, LA= 6.3, TB=4.8, DB=3.6, ALK-P=474, WUS=809, ALT=24, Lipase=115  CT A/P showed: 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  In addition to the changes pancreatitis, mild inflammatory changes are seen within the mesentery in the upper abdomen, which appears to extend to the region of the colon    Surgical Resident Addendum  Patient seen and evaluated bedside. Extensive history of alcohol abuse, multiple admissions for such, known episodes of acute pancreatitis, last documented admission of acute pancreatitis December 2019.	Patient drowsy after administration of pain med. Scleral icterus noted. Abdomen soft, grossly distended, nontender.     - known EtOH pancreatitis  - however, recommend RUQ u/s to r/o stones, not previously seen  - recommend serial LFTs, close monitoring of respiratory status  - needs GI consult, previously seen by MOISES Michel on last admission for pancreatitis  - no surgical intervention  -  patient seen and discussed with Dr. Chavez, surgical team

## 2020-05-29 NOTE — PROGRESS NOTE ADULT - SUBJECTIVE AND OBJECTIVE BOX
SUBJECTIVE:    Patient is a 39y old Female who presents with a chief complaint of severe abdominal pain (29 May 2020 08:35)    Currently admitted to medicine with the primary diagnosis of Pancreatitis     Today is hospital day 1d. This morning pt is agitated and is complaining of abd pain.    PAST MEDICAL & SURGICAL HISTORY  ETOH abuse  Postpartum depression  Substance abuse  Anxiety  Depression  No significant past surgical history    SOCIAL HISTORY:  Negative for smoking/alcohol/drug use.     Home Medications:  Home Medications:  polyethylene glycol 3350 oral powder for reconstitution: 17 gram(s) orally once a day (02 May 2020 18:21)  senna oral tablet: 2 tab(s) orally once a day (at bedtime) (02 May 2020 18:21)      ALLERGIES:  No Known Allergies    MEDICATIONS:  STANDING MEDICATIONS  chlorhexidine 4% Liquid 1 Application(s) Topical two times a day  folic acid 1 milliGRAM(s) Oral daily  meropenem  IVPB 1000 milliGRAM(s) IV Intermittent every 8 hours  multivitamin 1 Tablet(s) Oral daily  nicotine -  14 mG/24Hr(s) Patch 1 patch Transdermal daily  pantoprazole  Injectable 40 milliGRAM(s) IV Push daily  potassium chloride  20 mEq/100 mL IVPB 20 milliEquivalent(s) IV Intermittent every 4 hours  sodium chloride 0.9%. 1000 milliLiter(s) IV Continuous <Continuous>  thiamine 100 milliGRAM(s) Oral daily    PRN MEDICATIONS  LORazepam   Injectable 2 milliGRAM(s) IV Push every 2 hours PRN  morphine  - Injectable 2 milliGRAM(s) IV Push every 4 hours PRN    VITALS:   Vital Signs Last 24 Hrs  T(C): 36.1 (29 May 2020 07:01), Max: 37.3 (28 May 2020 23:26)  T(F): 97 (29 May 2020 07:01), Max: 99.1 (28 May 2020 23:26)  HR: 116 (29 May 2020 08:02) (108 - 130)  BP: 102/58 (29 May 2020 08:02) (92/51 - 131/77)  BP(mean): 73 (29 May 2020 08:02) (72 - 73)  RR: 24 (29 May 2020 08:02) (16 - 25)  SpO2: 97% (29 May 2020 08:02) (95% - 100%)  CAPILLARY BLOOD GLUCOSE      POCT Blood Glucose.: 116 mg/dL (28 May 2020 20:27)      LABS:                        6.7    13.92 )-----------( 231      ( 29 May 2020 07:21 )             21.2         136  |  96<L>  |  <3<L>  ----------------------------<  99  3.3<L>   |  30  |  <0.5<L>    Ca    6.6<L>      29 May 2020 07:21  Phos  1.4       Mg     1.6         TPro  5.2<L>  /  Alb  2.2<L>  /  TBili  4.7<H>  /  DBili  3.7<H>  /  AST  197<H>  /  ALT  22  /  AlkPhos  400<H>      PT/INR - ( 29 May 2020 07:21 )   PT: 15.20 sec;   INR: 1.32 ratio         PTT - ( 29 May 2020 07:21 )  PTT:31.9 sec  Urinalysis Basic - ( 28 May 2020 22:10 )    Color: Yellow / Appearance: Clear / S.010 / pH: x  Gluc: x / Ketone: Negative  / Bili: Large / Urobili: 4.0 mg/dL   Blood: x / Protein: 30 mg/dL / Nitrite: Positive   Leuk Esterase: Negative / RBC: Negative / WBC 3-5 /HPF   Sq Epi: x / Non Sq Epi: Few /HPF / Bacteria: Moderate        Lactate, Blood: 1.3 mmol/L (20 @ 07:21)  Troponin T, Serum: <0.01 ng/mL (20 @ 15:22)  Lactate, Blood: 6.3 mmol/L <HH> (20 @ 15:22)      CARDIAC MARKERS ( 28 May 2020 15:22 )  x     / <0.01 ng/mL / x     / x     / x          RADIOLOGY:  < from: CT Abdomen and Pelvis w/ IV Cont (20 @ 17:41) >  IMPRESSION: 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    In addition to the changes pancreatitis, mild inflammatory changes are seen within the mesentery in the upper abdomen, which appears to extend to the region of the colon.     < end of copied text >    PHYSICAL EXAM:  GEN: agitated   LUNGS: Clear to auscultation bilaterally   HEART: S1/S2 present. tachycardic, regular rhythm  ABD: distended, active bowel sounds, guarding and epigastric and RUQ tenderness with light palpation  EXT: NC/NC/NE/2+PP/ARMAS  NEURO: AAOX3

## 2020-05-29 NOTE — CONSULT NOTE ADULT - SUBJECTIVE AND OBJECTIVE BOX
SEBLEADRIAN  39y, Female  Allergy: No Known Allergies      CHIEF COMPLAINT: severe abdominal pain (29 May 2020 05:25)      HPI:  40 yo female, pmh of anxiety, depression, substance abuse, eoth abuse, presents to ED for "I'm in a lot of pain." States she has been having pain for several months now. Located in epigastrium, severe 10/10 aching, no radiation. Denies fever, chills, cp, sob, le swelling, back pain, neck pain, nvd, dysuria, hematuria. (28 May 2020 20:48)    FAMILY HISTORY:  No pertinent family history in first degree relatives    PAST MEDICAL & SURGICAL HISTORY:  ETOH abuse  Postpartum depression  Substance abuse  Anxiety  Depression  No significant past surgical history    Social History:  + drinking  + smoking (28 May 2020 20:48)        ROS  10 system review - abdominal apin     VITALS:  T(F): 97, Max: 99.1 (20 @ 23:26)  HR: 111  BP: 98/56  RR: 20Vital Signs Last 24 Hrs  T(C): 36.1 (29 May 2020 07:01), Max: 37.3 (28 May 2020 23:26)  T(F): 97 (29 May 2020 07:01), Max: 99.1 (28 May 2020 23:26)  HR: 111 (29 May 2020 06:16) (108 - 130)  BP: 98/56 (29 May 2020 06:16) (92/51 - 131/77)  BP(mean): 73 (29 May 2020 06:16) (72 - 73)  RR: 20 (29 May 2020 07:01) (16 - 25)  SpO2: 96% (29 May 2020 06:16) (96% - 100%)    PHYSICAL EXAM:  Gen: NAD, resting in bed  HEENT: Normocephalic, atraumatic  Neck: supple, no lymphadenopathy  CV: s1s2 +   Lungs: decreased BS   Abdomen:  full, soft , tender++  Ext: Warm, well perfused. bilat LE and UE dermatitis   Neuro: non focal, awake  Skin: no rash, no erythema. spider naevi ++    TESTS & MEASUREMENTS:                        8.1    17.00 )-----------( 247      ( 29 May 2020 00:10 )             25.8     05-    137  |  96<L>  |  <3<L>  ----------------------------<  114<H>  3.2<L>   |  29  |  <0.5<L>    Ca    7.1<L>      29 May 2020 00:10  Mg     1.9         TPro  6.2  /  Alb  2.6<L>  /  TBili  4.8<H>  /  DBili  3.6<H>  /  AST  260<H>  /  ALT  24  /  AlkPhos  474<H>      eGFR if Non African American: 133 mL/min/1.73M2 (20 @ 00:10)  eGFR if : 154 mL/min/1.73M2 (20 @ 00:10)  eGFR if Non African American: 133 mL/min/1.73M2 (20 @ 15:22)  eGFR if : 154 mL/min/1.73M2 (20 @ 15:22)    LIVER FUNCTIONS - ( 28 May 2020 15:22 )  Alb: 2.6 g/dL / Pro: 6.2 g/dL / ALK PHOS: 474 U/L / ALT: 24 U/L / AST: 260 U/L / GGT: x           Urinalysis Basic - ( 28 May 2020 22:10 )    Color: Yellow / Appearance: Clear / S.010 / pH: x  Gluc: x / Ketone: Negative  / Bili: Large / Urobili: 4.0 mg/dL   Blood: x / Protein: 30 mg/dL / Nitrite: Positive   Leuk Esterase: Negative / RBC: Negative / WBC 3-5 /HPF   Sq Epi: x / Non Sq Epi: Few /HPF / Bacteria: Moderate          Blood Gas Venous - Lactate: 3.4 mmoL/L (20 @ 20:01)  Lactate, Blood: 6.3 mmol/L (20 @ 15:22)      INFECTIOUS DISEASES TESTING  HIV-1/2 Combo Result: Nonreact (06-10-19 @ 17:08)  Hepatitis B Surface Antigen: Nonreact (06-10-19 @ 17:08)      RADIOLOGY & ADDITIONAL TESTS:        CT  CT Abdomen and Pelvis w/ IV Cont:   EXAM:  CT ABDOMEN AND PELVIS IC            PROCEDURE DATE:  2020            INTERPRETATION:  REASON FOR EXAM / CLINICAL STATEMENT: Abdominal pain    TECHNIQUE: Contiguous axial CT images were obtained from the lower chest to the pubic symphysis with intravenous contrast.   Reformatted images in the coronal and sagittal planes were acquired.    COMPARISON CT: CT scan of the abdomen pelvis dated 2019.    OTHER STUDIES USED FOR CORRELATION: None.         FINDINGS    LOWER CHEST: Stable8 mm solid nodule is again noted at the right lung base. Otherwise the lung bases are clear. No pleural or pericardial effusion.    HEPATIC: Hepatomegaly with hepatic steatosis is noted, greater in the right lobe. There is no evidence of mass or bileduct dilatation. Portal vein is patent.    BILIARY: No calcified gallstones are noted.    SPLEEN: Unremarkable.        PANCREAS: The pancreas is inhomogeneous, with ill-defined margins. Diffuse peripancreatic stranding is noted in the adjacent fat. Three fluid collections are identified within the pancreas: 2 cm lesion in the pancreatic head; two 1 cm lesions are noted in the region of the body and proximal tail of the pancreas. There is a 1.7 cm fluid collection in the region between the pancreatic body and the stomach. These fluid collections appear to be encapsulated and are compatible with developing small pseudocysts.    ADRENAL GLANDS: Unremarkable.        KIDNEYS: There is symmetric bilateral renal enhancement. No evidence of hydronephrosis, calcified stones, or solid mass.     ABDOMINOPELVIC NODES: Unremarkable.    PELVIC ORGANS: No evidence of pelvic mass or lymphadenopathy.      PERITONEUM/MESENTERY/BOWEL: In addition to the changes pancreatitis, inflammatory changes are seen within the mesentery in the upper abdomen which appears to extend to the region of the colon. There is a small amount of fluid adjacent to the liver and within the  pelvis. No evidence of bowel obstruction. The appendix is not identified.    BONES/SOFT TISSUES: Unremarkable.    OTHER: No vascular abnormalities.      IMPRESSION: 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    In addition to the changes pancreatitis, mild inflammatory changes are seen within the mesentery in the upper abdomen, which appears to extend to the region of the colon.                     SENDY SUE M.D., ATTENDING RADIOLOGIST  This document has been electronically signed. May 28 2020  6:25PM             (20 @ 17:41)      CARDIOLOGY TESTING      MEDICATIONS  chlorhexidine 4% Liquid 1  enoxaparin Injectable 40  folic acid 1  lactated ringers. 1000  meropenem  IVPB 1000  multivitamin 1  nicotine -  14 mG/24Hr(s) Patch 1  pantoprazole  Injectable 40  thiamine 100      ANTIBIOTICS:  meropenem  IVPB 1000 milliGRAM(s) IV Intermittent every 8 hours

## 2020-05-29 NOTE — CONSULT NOTE ADULT - SUBJECTIVE AND OBJECTIVE BOX
40 yo female, pmh of anxiety, depression, polysubstance abuse, ETOH abuse (drinks 1 to 2 pints of vodka a day x 2 months). Pt presents to ED for evaluation of abdominal pain for several months. Pt states, pain was on and off but got worse yesterday, mostly at the epigastrium, severe 10/10 on scale, aching, non radiating. Pt also c/o constipation. Last BM was about 1 week ago. Denies fever, chills, cp, sob, le swelling, back pain, neck pain, nvd, dysuria, hematuria.    Vital Signs Last 24 Hrs  T(C): 37 (29 May 2020 03:00), Max: 37.3 (28 May 2020 23:26)  T(F): 98.6 (29 May 2020 03:00), Max: 99.1 (28 May 2020 23:26)  HR: 114 (29 May 2020 05:12) (108 - 130)  BP: 97/54 (29 May 2020 05:12) (92/51 - 131/77)  BP(mean): 73 (29 May 2020 05:12) (72 - 73)  RR: 25 (29 May 2020 05:12) (16 - 25)  SpO2: 97% (29 May 2020 05:12) (97% - 100%)    PHYSICAL EXAM:      Constitutional: NAD, A&O x3    Eyes: PERRLA, no conjuctivitis    Neck: no lymphadenopathy    Respiratory: +air entry, no rales, no rhonchi, no wheezes    Cardiovascular: +S1 and S2, regular rate and rhythm    Gastrointestinal: +BS, soft, (+) TTP at epigastric area and RUQ, mildly distended    Extremities:  no edema, no calf tenderness      Neurological: sensation intact, ROM equal B/L, CN II-XII intact    Skin: no rashes, normal turgor                              8.1    17.00 )-----------( 247      ( 29 May 2020 00:10 )             25.8     05-29    137  |  96<L>  |  <3<L>  ----------------------------<  114<H>  3.2<L>   |  29  |  <0.5<L>    Ca    7.1<L>      29 May 2020 00:10  Mg     1.9         TPro  6.2  /  Alb  2.6<L>  /  TBili  4.8<H>  /  DBili  3.6<H>  /  AST  260<H>  /  ALT  24  /  AlkPhos  474<H>            Urinalysis Basic - ( 28 May 2020 22:10 )    Color: Yellow / Appearance: Clear / S.010 / pH: x  Gluc: x / Ketone: Negative  / Bili: Large / Urobili: 4.0 mg/dL   Blood: x / Protein: 30 mg/dL / Nitrite: Positive   Leuk Esterase: Negative / RBC: Negative / WBC 3-5 /HPF   Sq Epi: x / Non Sq Epi: Few /HPF / Bacteria: Moderate      PT/INR - ( 28 May 2020 15:22 )   PT: 14.60 sec;   INR: 1.27 ratio      < from: CT Abdomen and Pelvis w/ IV Cont (20 @ 17:41) >    IMPRESSION: 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    In addition to the changes pancreatitis, mild inflammatory changes are seen within the mesentery in the upper abdomen, which appears to extend to the region of the colon.     < end of copied text >     PTT - ( 28 May 2020 15:22 )  PTT:32.4 sec  CARDIAC MARKERS ( 28 May 2020 15:22 )  x     / <0.01 ng/mL / x     / x     / x          CAPILLARY BLOOD GLUCOSE      POCT Blood Glucose.: 116 mg/dL (28 May 2020 20:27) 40 yo female, pmh of anxiety, depression, polysubstance abuse, ETOH abuse (drinks 1 to 2 pints of vodka a day x 2 months). Pt presents to ED for evaluation of abdominal pain for several months. Pt states, pain was on and off but got worse yesterday, mostly at the epigastrium, severe 10/10 on scale, aching, non radiating. Pt also c/o constipation. Last BM was about 1 week ago. Denies fever, chills, cp, sob, le swelling, back pain, neck pain, nvd, dysuria, hematuria.    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  In addition to the changes pancreatitis, mild inflammatory changes are seen within the mesentery in the upper abdomen, which appears to extend to the region of the colon    Vital Signs Last 24 Hrs  T(C): 37 (29 May 2020 03:00), Max: 37.3 (28 May 2020 23:26)  T(F): 98.6 (29 May 2020 03:00), Max: 99.1 (28 May 2020 23:26)  HR: 114 (29 May 2020 05:12) (108 - 130)  BP: 97/54 (29 May 2020 05:12) (92/51 - 131/77)  BP(mean): 73 (29 May 2020 05:12) (72 - 73)  RR: 25 (29 May 2020 05:12) (16 - 25)  SpO2: 97% (29 May 2020 05:12) (97% - 100%)    PHYSICAL EXAM:      Constitutional: NAD, A&O x3    Eyes: PERRLA, no conjuctivitis    Neck: no lymphadenopathy    Respiratory: +air entry, no rales, no rhonchi, no wheezes    Cardiovascular: +S1 and S2, regular rate and rhythm    Gastrointestinal: +BS, soft, (+) diffuse tenderness, mildly distended    Extremities:  no edema, no calf tenderness      Neurological: sensation intact, ROM equal B/L, CN II-XII intact    Skin: no rashes, normal turgor                              8.1    17.00 )-----------( 247      ( 29 May 2020 00:10 )             25.8         137  |  96<L>  |  <3<L>  ----------------------------<  114<H>  3.2<L>   |  29  |  <0.5<L>    Ca    7.1<L>      29 May 2020 00:10  Mg     1.9         TPro  6.2  /  Alb  2.6<L>  /  TBili  4.8<H>  /  DBili  3.6<H>  /  AST  260<H>  /  ALT  24  /  AlkPhos  474<H>            Urinalysis Basic - ( 28 May 2020 22:10 )    Color: Yellow / Appearance: Clear / S.010 / pH: x  Gluc: x / Ketone: Negative  / Bili: Large / Urobili: 4.0 mg/dL   Blood: x / Protein: 30 mg/dL / Nitrite: Positive   Leuk Esterase: Negative / RBC: Negative / WBC 3-5 /HPF   Sq Epi: x / Non Sq Epi: Few /HPF / Bacteria: Moderate      PT/INR - ( 28 May 2020 15:22 )   PT: 14.60 sec;   INR: 1.27 ratio      < from: CT Abdomen and Pelvis w/ IV Cont (20 @ 17:41) >    IMPRESSION: 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    In addition to the changes pancreatitis, mild inflammatory changes are seen within the mesentery in the upper abdomen, which appears to extend to the region of the colon.     < end of copied text >     PTT - ( 28 May 2020 15:22 )  PTT:32.4 sec  CARDIAC MARKERS ( 28 May 2020 15:22 )  x     / <0.01 ng/mL / x     / x     / x          CAPILLARY BLOOD GLUCOSE      POCT Blood Glucose.: 116 mg/dL (28 May 2020 20:27) 40 yo female, pmh of anxiety, depression, polysubstance abuse, ETOH abuse (drinks 1 to 2 pints of vodka a day x 2 months). Pt presents to ED for evaluation of abdominal pain for several months. Pt states, pain was on and off but got worse yesterday, mostly at the epigastrium, severe 10/10 on scale, aching, non radiating. Pt also c/o constipation. Last BM was about 1 week ago. Denies fever, chills, cp, sob, le swelling, back pain, neck pain, n/v/d, dysuria, hematuria.    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  In addition to the changes pancreatitis, mild inflammatory changes are seen within the mesentery in the upper abdomen, which appears to extend to the region of the colon    PAST MEDICAL HISTORY:  Anxiety     Depression     ETOH abuse     Postpartum depression     Substance abuse.     PAST SURGICAL HISTORY:  No significant past surgical history.       Vital Signs Last 24 Hrs  T(C): 37 (29 May 2020 03:00), Max: 37.3 (28 May 2020 23:26)  T(F): 98.6 (29 May 2020 03:00), Max: 99.1 (28 May 2020 23:26)  HR: 114 (29 May 2020 05:12) (108 - 130)  BP: 97/54 (29 May 2020 05:12) (92/51 - 131/77)  BP(mean): 73 (29 May 2020 05:12) (72 - 73)  RR: 25 (29 May 2020 05:12) (16 - 25)  SpO2: 97% (29 May 2020 05:12) (97% - 100%)    PHYSICAL EXAM:      Constitutional: NAD, A&O x3    Eyes: PERRLA, no conjuctivitis    Neck: no lymphadenopathy    Respiratory: +air entry, no rales, no rhonchi, no wheezes    Cardiovascular: +S1 and S2, regular rate and rhythm    Gastrointestinal: +BS, soft, (+) diffuse tenderness, mildly distended    Extremities:  no edema, no calf tenderness      Neurological: sensation intact, ROM equal B/L, CN II-XII intact    Skin: no rashes, normal turgor                              8.1    17.00 )-----------( 247      ( 29 May 2020 00:10 )             25.8         137  |  96<L>  |  <3<L>  ----------------------------<  114<H>  3.2<L>   |  29  |  <0.5<L>    Ca    7.1<L>      29 May 2020 00:10  Mg     1.9         TPro  6.2  /  Alb  2.6<L>  /  TBili  4.8<H>  /  DBili  3.6<H>  /  AST  260<H>  /  ALT  24  /  AlkPhos  474<H>            Urinalysis Basic - ( 28 May 2020 22:10 )    Color: Yellow / Appearance: Clear / S.010 / pH: x  Gluc: x / Ketone: Negative  / Bili: Large / Urobili: 4.0 mg/dL   Blood: x / Protein: 30 mg/dL / Nitrite: Positive   Leuk Esterase: Negative / RBC: Negative / WBC 3-5 /HPF   Sq Epi: x / Non Sq Epi: Few /HPF / Bacteria: Moderate      PT/INR - ( 28 May 2020 15:22 )   PT: 14.60 sec;   INR: 1.27 ratio      < from: CT Abdomen and Pelvis w/ IV Cont (20 @ 17:41) >    IMPRESSION: 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    In addition to the changes pancreatitis, mild inflammatory changes are seen within the mesentery in the upper abdomen, which appears to extend to the region of the colon.     < end of copied text >     PTT - ( 28 May 2020 15:22 )  PTT:32.4 sec  CARDIAC MARKERS ( 28 May 2020 15:22 )  x     / <0.01 ng/mL / x     / x     / x          CAPILLARY BLOOD GLUCOSE      POCT Blood Glucose.: 116 mg/dL (28 May 2020 20:27) 38 yo female, pmh of anxiety, pancreatitis, depression, polysubstance abuse, ETOH abuse (drinks 1 to 2 pints of vodka a day x 2 months). Pt presents to ED for evaluation of abdominal pain for several months. Pt states, pain was on and off but got worse yesterday, mostly at the epigastrium, severe 10/10 on scale, aching, non radiating. Pt also c/o constipation. Last BM was about 1 week ago. Denies fever, chills, cp, sob, le swelling, back pain, neck pain, n/v/d, dysuria, hematuria.    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  In addition to the changes pancreatitis, mild inflammatory changes are seen within the mesentery in the upper abdomen, which appears to extend to the region of the colon    PAST MEDICAL HISTORY:  Anxiety     Depression     ETOH abuse   Pancreatitis     Postpartum depression     Substance abuse.     PAST SURGICAL HISTORY:  No significant past surgical history.       Vital Signs Last 24 Hrs  T(C): 37 (29 May 2020 03:00), Max: 37.3 (28 May 2020 23:26)  T(F): 98.6 (29 May 2020 03:00), Max: 99.1 (28 May 2020 23:26)  HR: 114 (29 May 2020 05:12) (108 - 130)  BP: 97/54 (29 May 2020 05:12) (92/51 - 131/77)  BP(mean): 73 (29 May 2020 05:12) (72 - 73)  RR: 25 (29 May 2020 05:12) (16 - 25)  SpO2: 97% (29 May 2020 05:12) (97% - 100%)    PHYSICAL EXAM:      Constitutional: NAD, A&O x3    Eyes: PERRLA, no conjuctivitis    Neck: no lymphadenopathy    Respiratory: +air entry, no rales, no rhonchi, no wheezes    Cardiovascular: +S1 and S2, regular rate and rhythm    Gastrointestinal: +BS, soft, (+) diffuse tenderness, mildly distended    Extremities:  no edema, no calf tenderness      Neurological: sensation intact, ROM equal B/L, CN II-XII intact    Skin: no rashes, normal turgor                              8.1    17.00 )-----------( 247      ( 29 May 2020 00:10 )             25.8         137  |  96<L>  |  <3<L>  ----------------------------<  114<H>  3.2<L>   |  29  |  <0.5<L>    Ca    7.1<L>      29 May 2020 00:10  Mg     1.9         TPro  6.2  /  Alb  2.6<L>  /  TBili  4.8<H>  /  DBili  3.6<H>  /  AST  260<H>  /  ALT  24  /  AlkPhos  474<H>            Urinalysis Basic - ( 28 May 2020 22:10 )    Color: Yellow / Appearance: Clear / S.010 / pH: x  Gluc: x / Ketone: Negative  / Bili: Large / Urobili: 4.0 mg/dL   Blood: x / Protein: 30 mg/dL / Nitrite: Positive   Leuk Esterase: Negative / RBC: Negative / WBC 3-5 /HPF   Sq Epi: x / Non Sq Epi: Few /HPF / Bacteria: Moderate      PT/INR - ( 28 May 2020 15:22 )   PT: 14.60 sec;   INR: 1.27 ratio      < from: CT Abdomen and Pelvis w/ IV Cont (20 @ 17:41) >    IMPRESSION: 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    In addition to the changes pancreatitis, mild inflammatory changes are seen within the mesentery in the upper abdomen, which appears to extend to the region of the colon.     < end of copied text >     PTT - ( 28 May 2020 15:22 )  PTT:32.4 sec  CARDIAC MARKERS ( 28 May 2020 15:22 )  x     / <0.01 ng/mL / x     / x     / x          CAPILLARY BLOOD GLUCOSE      POCT Blood Glucose.: 116 mg/dL (28 May 2020 20:27)

## 2020-05-29 NOTE — PROGRESS NOTE ADULT - SUBJECTIVE AND OBJECTIVE BOX
Patient is a 39y old  Female who presents with a chief complaint of severe abdominal pain (29 May 2020 07:33)        HPI:  40 yo female, pmh of anxiety, depression, substance abuse, eoth abuse, presents to ED for "I'm in a lot of pain." States she has been having pain for several months now. Located in epigastrium, severe 10/10 aching, no radiation. Denies fever, chills, cp, sob, le swelling, back pain, neck pain, nvd, dysuria, hematuria. (28 May 2020 20:48)      Interval Events: Pt readmitted for worsening abdominal pains.    REVIEW OF SYSTEMS:     · CONSTITUTIONAL:   +fever   +chills.  no weight gain   no weight loss    · EYES:   no discharge,   no irritation,   no pain,   no redness,   no visual changes.    · ENMT:   Ears: no ear pain and no hearing problems.  Nose: no nasal congestion and no nasal drainage.  Mouth/Throat: no dysphagia,  no hoarseness and no throat pain.  Neck: no lumps, no pain, no stiffness and no swollen glands.    · CARDIOVASCULAR:   no chest pain,   no swelling  no palpitations  no syncope    · RESPIRATORY:  no SOB,  no wheezing ,  no respiratory difficulty  no sputum production    · GASTROINTESTINAL:   +abdominal pain,   +nausea   =vomiting.    · GENITOURINARY:  no dysuria,   no frequency,   no urgency  no hematuria.    · MUSCULOSKELETAL:   =back pain,   no neck pain,   no weakness.    · SKIN:   no pruritis,   no rashes.    · NEURO:   no loss of consciousness,   no headache,   no weakness.        ALLERGIC/IMMUNOLOGIC:   No active allergic or immunologic issues    all other systems are negative      OBJECTIVE:  ICU Vital Signs Last 24 Hrs  T(C): 36.1 (29 May 2020 07:01), Max: 37.3 (28 May 2020 23:26)  T(F): 97 (29 May 2020 07:01), Max: 99.1 (28 May 2020 23:26)  HR: 116 (29 May 2020 08:02) (108 - 130)  BP: 102/58 (29 May 2020 08:02) (92/51 - 131/77)  BP(mean): 73 (29 May 2020 08:02) (72 - 73)  RR: 24 (29 May 2020 08:02) (16 - 25)  SpO2: 97% (29 May 2020 08:02) (95% - 100%)        05-28 @ 07:01  -   @ 07:00  --------------------------------------------------------  IN: 875 mL / OUT: 0 mL / NET: 875 mL      CAPILLARY BLOOD GLUCOSE      POCT Blood Glucose.: 116 mg/dL (28 May 2020 20:27)        PHYSICAL EXAM:     · CONSTITUTIONAL:   Ill appearing,   well nourished,       · ENMT:   Airway patent,   Nasal mucosa clear.  Mouth with normal mucosa.   No thrush    · EYES:   Clear bilaterally,   pupils equal,   round and reactive to light.    · CARDIAC:   Normal rate,   regular rhythm.    Heart sounds S1, S2.   No murmurs, no rubs or gallops on auscultation  no edema        CAROTID:   normal systolic impulse  no bruits    · RESPIRATORY:   no w/r/r/,   normal chest expansion  no tachypnea,  no retractions or use of accessory muscles    · GASTROINTESTINAL:  Abdomen soft,   +-tender mid abdomen  + BS  liver normal size  spleen not palpable    · MUSCULOSKELETAL:   no clubbing, cyanosis      · NEUROLOGICAL:   Alert and oriented   no obvious focal deficits in cranial nerve areas  no motor or sensory deficits.      · SKIN:   Skin normal color for race,   warm, dry   No evidence of rash.    · PSYCHIATRIC:   Alert and oriented to person,   place, time/situation.       · HEME LYMPH:   no splenomegaly.  No cervical  lymphadenopathy.  no inguinal lymphadenopathy    HOSPITAL MEDICATIONS:  MEDICATIONS  (STANDING):  chlorhexidine 4% Liquid 1 Application(s) Topical two times a day  enoxaparin Injectable 40 milliGRAM(s) SubCutaneous daily  folic acid 1 milliGRAM(s) Oral daily  lactated ringers. 1000 milliLiter(s) (125 mL/Hr) IV Continuous <Continuous>  meropenem  IVPB 1000 milliGRAM(s) IV Intermittent every 8 hours  multivitamin 1 Tablet(s) Oral daily  nicotine -  14 mG/24Hr(s) Patch 1 patch Transdermal daily  pantoprazole  Injectable 40 milliGRAM(s) IV Push daily  thiamine 100 milliGRAM(s) Oral daily    MEDICATIONS  (PRN):  LORazepam   Injectable 2 milliGRAM(s) IV Push every 2 hours PRN Symptom-triggered: 2 point increase in CIWA -Ar score and a total score of 7 or LESS  morphine  - Injectable 2 milliGRAM(s) IV Push every 4 hours PRN Severe Pain (7 - 10)    sodium chloride 0.9% Bolus:   1000 milliLiter(s), IV Bolus, once, infuse over 1 Hour(s), Stop After 1 Doses  sodium chloride 0.9% Bolus:   2200 milliLiter(s), IV Bolus, once, infuse over 60 Minute(s), Stop After 1 Doses     (Calc Info: 31 milliLiter(s)/Kg/DOSE x 70 Kg = 2,200 milliLiter(s)/Dose     (Requested dose was 31 milliLiter(s) per Kg)  lactated ringers.: Solution, 1000 milliLiter(s) infuse at 125 mL/Hr  Provider's Contact #: [phone]      LABS:                        8.1    17.00 )-----------( 247      ( 29 May 2020 00:10 )             25.8         137  |  96<L>  |  <3<L>  ----------------------------<  114<H>  3.2<L>   |  29  |  <0.5<L>    Ca    7.1<L>      29 May 2020 00:10  Mg     1.9         TPro  6.2  /  Alb  2.6<L>  /  TBili  4.8<H>  /  DBili  3.6<H>  /  AST  260<H>  /  ALT  24  /  AlkPhos  474<H>      PT/INR - ( 28 May 2020 15:22 )   PT: 14.60 sec;   INR: 1.27 ratio         PTT - ( 28 May 2020 15:22 )  PTT:32.4 sec  Urinalysis Basic - ( 28 May 2020 22:10 )    Color: Yellow / Appearance: Clear / S.010 / pH: x  Gluc: x / Ketone: Negative  / Bili: Large / Urobili: 4.0 mg/dL   Blood: x / Protein: 30 mg/dL / Nitrite: Positive   Leuk Esterase: Negative / RBC: Negative / WBC 3-5 /HPF   Sq Epi: x / Non Sq Epi: Few /HPF / Bacteria: Moderate        Venous Blood Gas:   @ 20:01  7.44/49/28/33/34  VBG Lactate: 3.4              RADIOLOGY: I personally reviewed latest CXR and other pertinent films. Patient is a 39y old  Female who presents with a chief complaint of severe abdominal pain (29 May 2020 07:33)        HPI:  38 yo female, pmh of anxiety, depression, substance abuse, eoth abuse, presents to ED for "I'm in a lot of pain." States she has been having pain for several months now. Located in epigastrium, severe 10/10 aching, no radiation. Denies fever, chills, cp, sob, le swelling, back pain, neck pain, nvd, dysuria, hematuria. (28 May 2020 20:48)      Interval Events: Pt readmitted for worsening abdominal pains.    REVIEW OF SYSTEMS:     · CONSTITUTIONAL:   +fever   +chills.  no weight gain   no weight loss    · EYES:   no discharge,   no irritation,   no pain,   no redness,   no visual changes.    · ENMT:   Ears: no ear pain and no hearing problems.  Nose: no nasal congestion and no nasal drainage.  Mouth/Throat: no dysphagia,  no hoarseness and no throat pain.  Neck: no lumps, no pain, no stiffness and no swollen glands.    · CARDIOVASCULAR:   no chest pain,   no swelling  no palpitations  no syncope    · RESPIRATORY:  no SOB,  no wheezing ,  no respiratory difficulty  no sputum production    · GASTROINTESTINAL:   +abdominal pain,   +nausea   =vomiting.    · GENITOURINARY:  no dysuria,   no frequency,   no urgency  no hematuria.    · MUSCULOSKELETAL:   =back pain,   no neck pain,   no weakness.    · SKIN:   no pruritis,   no rashes.    · NEURO:   no loss of consciousness,   no headache,   no weakness.        ALLERGIC/IMMUNOLOGIC:   No active allergic or immunologic issues    all other systems are negative      OBJECTIVE:  ICU Vital Signs Last 24 Hrs  T(C): 36.1 (29 May 2020 07:01), Max: 37.3 (28 May 2020 23:26)  T(F): 97 (29 May 2020 07:01), Max: 99.1 (28 May 2020 23:26)  HR: 116 (29 May 2020 08:02) (108 - 130)  BP: 102/58 (29 May 2020 08:02) (92/51 - 131/77)  BP(mean): 73 (29 May 2020 08:02) (72 - 73)  RR: 24 (29 May 2020 08:02) (16 - 25)  SpO2: 97% (29 May 2020 08:02) (95% - 100%)        05-28 @ 07:01  -   @ 07:00  --------------------------------------------------------  IN: 875 mL / OUT: 0 mL / NET: 875 mL      CAPILLARY BLOOD GLUCOSE      POCT Blood Glucose.: 116 mg/dL (28 May 2020 20:27)        PHYSICAL EXAM:     · CONSTITUTIONAL:   Ill appearing,   well nourished,       · ENMT:   Airway patent,   Nasal mucosa clear.  Mouth with normal mucosa.   No thrush    · EYES:   Clear bilaterally,   pupils equal,   round and reactive to light.    · CARDIAC:   Normal rate,   regular rhythm.    Heart sounds S1, S2.   No murmurs, no rubs or gallops on auscultation  no edema        CAROTID:   normal systolic impulse  no bruits    · RESPIRATORY:   no w/r/r/,   normal chest expansion  no tachypnea,  no retractions or use of accessory muscles    · GASTROINTESTINAL:  Abdomen soft,   +-tender mid abdomen  + BS  liver normal size  spleen not palpable    · MUSCULOSKELETAL:   no clubbing, cyanosis      · NEUROLOGICAL:   Alert and oriented   no obvious focal deficits in cranial nerve areas  no motor or sensory deficits.      · SKIN:   Skin normal color for race,   warm, dry   No evidence of rash.    · PSYCHIATRIC:   Alert and oriented to person,   place, time/situation.       · HEME LYMPH:   no splenomegaly.  No cervical  lymphadenopathy.  no inguinal lymphadenopathy    HOSPITAL MEDICATIONS:  MEDICATIONS  (STANDING):  chlorhexidine 4% Liquid 1 Application(s) Topical two times a day  enoxaparin Injectable 40 milliGRAM(s) SubCutaneous daily  folic acid 1 milliGRAM(s) Oral daily  lactated ringers. 1000 milliLiter(s) (125 mL/Hr) IV Continuous <Continuous>  meropenem  IVPB 1000 milliGRAM(s) IV Intermittent every 8 hours  multivitamin 1 Tablet(s) Oral daily  nicotine -  14 mG/24Hr(s) Patch 1 patch Transdermal daily  pantoprazole  Injectable 40 milliGRAM(s) IV Push daily  thiamine 100 milliGRAM(s) Oral daily    MEDICATIONS  (PRN):  LORazepam   Injectable 2 milliGRAM(s) IV Push every 2 hours PRN Symptom-triggered: 2 point increase in CIWA -Ar score and a total score of 7 or LESS  morphine  - Injectable 2 milliGRAM(s) IV Push every 4 hours PRN Severe Pain (7 - 10)    sodium chloride 0.9% Bolus:   1000 milliLiter(s), IV Bolus, once, infuse over 1 Hour(s), Stop After 1 Doses  sodium chloride 0.9% Bolus:   2200 milliLiter(s), IV Bolus, once, infuse over 60 Minute(s), Stop After 1 Doses     (Calc Info: 31 milliLiter(s)/Kg/DOSE x 70 Kg = 2,200 milliLiter(s)/Dose     (Requested dose was 31 milliLiter(s) per Kg)  lactated ringers.: Solution, 1000 milliLiter(s) infuse at 125 mL/Hr  Provider's Contact #: [phone]      LABS:                        8.1    17.00 )-----------( 247      ( 29 May 2020 00:10 ) HGb dropped 6.7 AM labs down from 13 LAST ADMIT             25.8         137  |  96<L>  |  <3<L>  ----------------------------<  114<H>  3.2<L>   |  29  |  <0.5<L>    Ca    7.1<L>      29 May 2020 00:10  Mg     1.9         TPro  6.2  /  Alb  2.6<L>  /  TBili  4.8<H>  /  DBili  3.6<H>  /  AST  260<H>  /  ALT  24  /  AlkPhos  474<H>      PT/INR - ( 28 May 2020 15:22 )   PT: 14.60 sec;   INR: 1.27 ratio         PTT - ( 28 May 2020 15:22 )  PTT:32.4 sec  Urinalysis Basic - ( 28 May 2020 22:10 )    Color: Yellow / Appearance: Clear / S.010 / pH: x  Gluc: x / Ketone: Negative  / Bili: Large / Urobili: 4.0 mg/dL   Blood: x / Protein: 30 mg/dL / Nitrite: Positive   Leuk Esterase: Negative / RBC: Negative / WBC 3-5 /HPF   Sq Epi: x / Non Sq Epi: Few /HPF / Bacteria: Moderate        Venous Blood Gas:   @ 20:01  7.44/49/28/33/34  VB Lactate: 3.4              RADIOLOGY: I personally reviewed latest CXR and other pertinent films.

## 2020-05-29 NOTE — CONSULT NOTE ADULT - SUBJECTIVE AND OBJECTIVE BOX
THIS NOTE IS IN COMPLETE    Gastroenterology Consultation:    Patient is a 39y old  Female who presents with a chief complaint of severe abdominal pain (29 May 2020 11:08)      Admitted on: 05-28-20  HPI:  "38 yo female, pmh of anxiety, depression, substance abuse, eoth abuse, presents to ED for "I'm in a lot of pain." States she has been having pain for several months now. Located in epigastrium, severe 10/10 aching, no radiation. Denies fever, chills, cp, sob, le swelling, back pain, neck pain, nvd, dysuria, hematuria. (28 May 2020 20:48)"    GI HPI  39 year old female with a history of acute alcoholic pancreatitis,  substance abuse and alcohol abuse presented to [facility] for epigastric pain x several months. Severe.      Prior records Reviewed (Y/N): Y  History obtained from person other than patient (Y/N): N         PAST MEDICAL & SURGICAL HISTORY:  ETOH abuse  Postpartum depression  Substance abuse  Anxiety  Depression  No significant past surgical history      FAMILY HISTORY:  No pertinent family history in first degree relatives      Social History:    Alcohol: +  Drugs: +    Home Medications:  polyethylene glycol 3350 oral powder for reconstitution: 17 gram(s) orally once a day (02 May 2020 18:21)  senna oral tablet: 2 tab(s) orally once a day (at bedtime) (02 May 2020 18:21)    MEDICATIONS  (STANDING):  chlorhexidine 4% Liquid 1 Application(s) Topical two times a day  folic acid 1 milliGRAM(s) Oral daily  meropenem  IVPB 1000 milliGRAM(s) IV Intermittent every 8 hours  multivitamin 1 Tablet(s) Oral daily  nicotine -  14 mG/24Hr(s) Patch 1 patch Transdermal daily  pantoprazole  Injectable 40 milliGRAM(s) IV Push daily  potassium chloride  20 mEq/100 mL IVPB 20 milliEquivalent(s) IV Intermittent every 4 hours  sodium chloride 0.9%. 1000 milliLiter(s) (200 mL/Hr) IV Continuous <Continuous>  thiamine 100 milliGRAM(s) Oral daily    MEDICATIONS  (PRN):  LORazepam   Injectable 2 milliGRAM(s) IV Push every 2 hours PRN Symptom-triggered: 2 point increase in CIWA -Ar score and a total score of 7 or LESS  morphine  - Injectable 2 milliGRAM(s) IV Push every 4 hours PRN Severe Pain (7 - 10)      Allergies  No Known Allergies      Review of Systems:   Constitutional:  No Fever, No Chills  ENT/Mouth:  No Hearing Changes,  No Difficulty Swallowing  Eyes:  No Eye Pain, No Vision Changes  Cardiovascular:  No Chest Pain, No Palpitations  Respiratory:  No Cough, No Dyspnea  Gastrointestinal:  As described in HPI  Musculoskeletal:  No Joint Swelling, No Back Pain  Skin:  No Skin Lesions, No Jaundice  Neuro:  No Syncope, No Dizziness  Heme/Lymph:  No Bruising, No Bleeding.          Physical Examination:  T(C): 36.1 (05-29-20 @ 07:01), Max: 37.3 (05-28-20 @ 23:26)  HR: 104 (05-29-20 @ 11:02) (104 - 130)  BP: 94/60 (05-29-20 @ 11:02) (89/53 - 131/77)  RR: 26 (05-29-20 @ 11:02) (16 - 26)  SpO2: 98% (05-29-20 @ 11:02) (94% - 100%)  Height (cm): 162.6 (05-28-20 @ 23:26)  Weight (kg): 60.7 (05-28-20 @ 23:26)    05-28-20 @ 07:01  -  05-29-20 @ 07:00  --------------------------------------------------------  IN: 1000 mL / OUT: 0 mL / NET: 1000 mL    05-29-20 @ 07:01  -  05-29-20 @ 13:03  --------------------------------------------------------  IN: 925 mL / OUT: 0 mL / NET: 925 mL        Constitutional: No acute distress.  Eyes:. Conjunctivae are clear, Sclera is non-icteric.  Ears Nose and Throat: The external ears are normal appearing,  Oral mucosa is pink and moist.  Respiratory:  No signs of respiratory distress. Lung sounds are clear bilaterally.  Cardiovascular:  S1 S2, Regular rate and rhythm.  GI: Abdomen is soft, symmetric, and  tender without distention. There are no visible lesions. Bowel sounds are present and normoactive in all four quadrants. No masses, hepatomegaly, or splenomegaly are noted.   Neuro: No Tremor, No involuntary movements  Skin: No rashes, No Jaundice.          Data: (reviewed by attending)                        7.7    13.84 )-----------( 212      ( 29 May 2020 11:35 )             24.6     Hgb Trend:  7.7  05-29-20 @ 11:35  6.7  05-29-20 @ 07:21  8.1  05-29-20 @ 00:10  8.3  05-28-20 @ 15:22        05-29    136  |  96<L>  |  <3<L>  ----------------------------<  92  3.5   |  29  |  <0.5<L>    Ca    7.0<L>      29 May 2020 11:35  Phos  1.4     05-29  Mg     1.6     05-29    TPro  5.2<L>  /  Alb  2.2<L>  /  TBili  4.7<H>  /  DBili  3.7<H>  /  AST  197<H>  /  ALT  22  /  AlkPhos  400<H>  05-29    Liver panel trend:  TBili 4.7   /      /   ALT 22   /   AlkP 400   /   Tptn 5.2   /   Alb 2.2    /   DBili 3.7      05-29  TBili 4.8   /      /   ALT 24   /   AlkP 474   /   Tptn 6.2   /   Alb 2.6    /   DBili 3.6      05-28      PT/INR - ( 29 May 2020 07:21 )   PT: 15.20 sec;   INR: 1.32 ratio         PTT - ( 29 May 2020 07:21 )  PTT:31.9 sec        Radiology:(reviewed by attending)  CT Abdomen and Pelvis w/ IV Cont:   EXAM:  CT ABDOMEN AND PELVIS IC            PROCEDURE DATE:  05/28/2020            INTERPRETATION:  REASON FOR EXAM / CLINICAL STATEMENT: Abdominal pain    TECHNIQUE: Contiguous axial CT images were obtained from the lower chest to the pubic symphysis with intravenous contrast.   Reformatted images in the coronal and sagittal planes were acquired.    COMPARISON CT: CT scan of the abdomen pelvis dated 12/16/2019.    OTHER STUDIES USED FOR CORRELATION: None.         FINDINGS    LOWER CHEST: Stable8 mm solid nodule is again noted at the right lung base. Otherwise the lung bases are clear. No pleural or pericardial effusion.    HEPATIC: Hepatomegaly with hepatic steatosis is noted, greater in the right lobe. There is no evidence of mass or bileduct dilatation. Portal vein is patent.    BILIARY: No calcified gallstones are noted.    SPLEEN: Unremarkable.        PANCREAS: The pancreas is inhomogeneous, with ill-defined margins. Diffuse peripancreatic stranding is noted in the adjacent fat. Three fluid collections are identified within the pancreas: 2 cm lesion in the pancreatic head; two 1 cm lesions are noted in the region of the body and proximal tail of the pancreas. There is a 1.7 cm fluid collection in the region between the pancreatic body and the stomach. These fluid collections appear to be encapsulated and are compatible with developing small pseudocysts.    ADRENAL GLANDS: Unremarkable.        KIDNEYS: There is symmetric bilateral renal enhancement. No evidence of hydronephrosis, calcified stones, or solid mass.     ABDOMINOPELVIC NODES: Unremarkable.    PELVIC ORGANS: No evidence of pelvic mass or lymphadenopathy.      PERITONEUM/MESENTERY/BOWEL: In addition to the changes pancreatitis, inflammatory changes are seen within the mesentery in the upper abdomen which appears to extend to the region of the colon. There is a small amount of fluid adjacent to the liver and within the  pelvis. No evidence of bowel obstruction. The appendix is not identified.    BONES/SOFT TISSUES: Unremarkable.    OTHER: No vascular abnormalities.      IMPRESSION: 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    In addition to the changes pancreatitis, mild inflammatory changes are seen within the mesentery in the upper abdomen, which appears to extend to the region of the colon.                     SENDY SUE M.D., ATTENDING RADIOLOGIST  This document has been electronically signed. May 28 2020  6:25PM             (05-28-20 @ 17:41)    US Abdomen Limited:   EXAM:  US ABDOMEN LIMITED            PROCEDURE DATE:  05/29/2020            INTERPRETATION:  CLINICAL HISTORY: Transaminitis..    COMPARISON: CT abdomen pelvis 5/28/2020..    PROCEDURE: Limited portable ultrasound of the right upper quadrant was performed.    FINDINGS:    LIVER: Diffusely increased echogenicity of liver. Hepatomegaly measuring 20.9 cm in length.     GALLBLADDER/BILIARY TREE:  Gallbladder sludge, no calculi. Gallbladder wall thickening measuring 0.7 cm. No pericholecystic fluid.Reportedly negative sonographic Patton's sign. No intrahepatic biliary ductal dilatation. The common bile duct measures 4.8 mm, which is normal.     PANCREAS: Pancreas is obscured by bowel gas.    KIDNEY:  Right kidney measures 9.5 cm in length. No hydronephrosis, calculi or solid mass.    AORTA/IVC:  Visualized proximal portions unremarkable.    ASCITES:  None.    IMPRESSION:    Hepatic steatosis and hepatomegaly.    Gallbladder sludge with gallbladder wall thickening 0.7 cm. Reportedly negativesonographic Patton's sign.    CBD 4.8 mm.                  TIFFANY VAZQUEZ M.D., ATTENDING RADIOLOGIST  This document has been electronically signed. May 29 2020 11:21AM             (05-29-20 @ 11:03) Gastroenterology Consultation:    Patient is a 39y old  Female who presents with a chief complaint of severe abdominal pain (29 May 2020 11:08)      Admitted on: 05-28-20  HPI:  "40 yo female, pmh of anxiety, depression, substance abuse, eoth abuse, presents to ED for "I'm in a lot of pain." States she has been having pain for several months now. Located in epigastrium, severe 10/10 aching, no radiation. Denies fever, chills, cp, sob, le swelling, back pain, neck pain, nvd, dysuria, hematuria. (28 May 2020 20:48)"    GI HPI  39 year old female with a history of acute alcoholic pancreatitis,  substance abuse and alcohol abuse presented to [facility] for epigastric pain x several months. Severe. Her pain improves only with opiates and is slightly improved from admission. + appetite. no nausea or vomiting now.      Prior records Reviewed (Y/N): Y  History obtained from person other than patient (Y/N): N         PAST MEDICAL & SURGICAL HISTORY:  ETOH abuse  Postpartum depression  Substance abuse  Anxiety  Depression  No significant past surgical history      FAMILY HISTORY:  No pertinent family history in first degree relatives      Social History:    Alcohol: +  Drugs: +    Home Medications:  polyethylene glycol 3350 oral powder for reconstitution: 17 gram(s) orally once a day (02 May 2020 18:21)  senna oral tablet: 2 tab(s) orally once a day (at bedtime) (02 May 2020 18:21)    MEDICATIONS  (STANDING):  chlorhexidine 4% Liquid 1 Application(s) Topical two times a day  folic acid 1 milliGRAM(s) Oral daily  meropenem  IVPB 1000 milliGRAM(s) IV Intermittent every 8 hours  multivitamin 1 Tablet(s) Oral daily  nicotine -  14 mG/24Hr(s) Patch 1 patch Transdermal daily  pantoprazole  Injectable 40 milliGRAM(s) IV Push daily  potassium chloride  20 mEq/100 mL IVPB 20 milliEquivalent(s) IV Intermittent every 4 hours  sodium chloride 0.9%. 1000 milliLiter(s) (200 mL/Hr) IV Continuous <Continuous>  thiamine 100 milliGRAM(s) Oral daily    MEDICATIONS  (PRN):  LORazepam   Injectable 2 milliGRAM(s) IV Push every 2 hours PRN Symptom-triggered: 2 point increase in CIWA -Ar score and a total score of 7 or LESS  morphine  - Injectable 2 milliGRAM(s) IV Push every 4 hours PRN Severe Pain (7 - 10)      Allergies  No Known Allergies      Review of Systems:   Constitutional:  No Fever, No Chills  ENT/Mouth:  No Hearing Changes,  No Difficulty Swallowing  Eyes:  No Eye Pain, No Vision Changes  Cardiovascular:  No Chest Pain, No Palpitations  Respiratory:  No Cough, No Dyspnea  Gastrointestinal:  As described in HPI  Musculoskeletal:  No Joint Swelling, No Back Pain  Skin:  No Skin Lesions, No Jaundice  Neuro:  No Syncope, No Dizziness  Heme/Lymph:  No Bruising, No Bleeding.          Physical Examination:  T(C): 36.1 (05-29-20 @ 07:01), Max: 37.3 (05-28-20 @ 23:26)  HR: 104 (05-29-20 @ 11:02) (104 - 130)  BP: 94/60 (05-29-20 @ 11:02) (89/53 - 131/77)  RR: 26 (05-29-20 @ 11:02) (16 - 26)  SpO2: 98% (05-29-20 @ 11:02) (94% - 100%)  Height (cm): 162.6 (05-28-20 @ 23:26)  Weight (kg): 60.7 (05-28-20 @ 23:26)    05-28-20 @ 07:01  -  05-29-20 @ 07:00  --------------------------------------------------------  IN: 1000 mL / OUT: 0 mL / NET: 1000 mL    05-29-20 @ 07:01  -  05-29-20 @ 13:03  --------------------------------------------------------  IN: 925 mL / OUT: 0 mL / NET: 925 mL        Constitutional: No acute distress.  Eyes:. Conjunctivae are clear, Sclera is non-icteric.  Ears Nose and Throat: The external ears are normal appearing,  Oral mucosa is pink and moist.  Respiratory:  No signs of respiratory distress. Lung sounds are clear bilaterally.  Cardiovascular:  S1 S2, Regular rate and rhythm.  GI: Abdomen is soft, symmetric, and  tender without distention. There are no visible lesions. Bowel sounds are present and normoactive in all four quadrants. No masses, hepatomegaly, or splenomegaly are noted.   Neuro: No Tremor, No involuntary movements  Skin: No rashes, No Jaundice.          Data: (reviewed by attending)                        7.7    13.84 )-----------( 212      ( 29 May 2020 11:35 )             24.6     Hgb Trend:  7.7  05-29-20 @ 11:35  6.7  05-29-20 @ 07:21  8.1  05-29-20 @ 00:10  8.3  05-28-20 @ 15:22        05-29    136  |  96<L>  |  <3<L>  ----------------------------<  92  3.5   |  29  |  <0.5<L>    Ca    7.0<L>      29 May 2020 11:35  Phos  1.4     05-29  Mg     1.6     05-29    TPro  5.2<L>  /  Alb  2.2<L>  /  TBili  4.7<H>  /  DBili  3.7<H>  /  AST  197<H>  /  ALT  22  /  AlkPhos  400<H>  05-29    Liver panel trend:  TBili 4.7   /      /   ALT 22   /   AlkP 400   /   Tptn 5.2   /   Alb 2.2    /   DBili 3.7      05-29  TBili 4.8   /      /   ALT 24   /   AlkP 474   /   Tptn 6.2   /   Alb 2.6    /   DBili 3.6      05-28      PT/INR - ( 29 May 2020 07:21 )   PT: 15.20 sec;   INR: 1.32 ratio         PTT - ( 29 May 2020 07:21 )  PTT:31.9 sec        Radiology:(reviewed by attending)  CT Abdomen and Pelvis w/ IV Cont:   EXAM:  CT ABDOMEN AND PELVIS IC            PROCEDURE DATE:  05/28/2020            INTERPRETATION:  REASON FOR EXAM / CLINICAL STATEMENT: Abdominal pain    TECHNIQUE: Contiguous axial CT images were obtained from the lower chest to the pubic symphysis with intravenous contrast.   Reformatted images in the coronal and sagittal planes were acquired.    COMPARISON CT: CT scan of the abdomen pelvis dated 12/16/2019.    OTHER STUDIES USED FOR CORRELATION: None.         FINDINGS    LOWER CHEST: Stable8 mm solid nodule is again noted at the right lung base. Otherwise the lung bases are clear. No pleural or pericardial effusion.    HEPATIC: Hepatomegaly with hepatic steatosis is noted, greater in the right lobe. There is no evidence of mass or bileduct dilatation. Portal vein is patent.    BILIARY: No calcified gallstones are noted.    SPLEEN: Unremarkable.        PANCREAS: The pancreas is inhomogeneous, with ill-defined margins. Diffuse peripancreatic stranding is noted in the adjacent fat. Three fluid collections are identified within the pancreas: 2 cm lesion in the pancreatic head; two 1 cm lesions are noted in the region of the body and proximal tail of the pancreas. There is a 1.7 cm fluid collection in the region between the pancreatic body and the stomach. These fluid collections appear to be encapsulated and are compatible with developing small pseudocysts.    ADRENAL GLANDS: Unremarkable.        KIDNEYS: There is symmetric bilateral renal enhancement. No evidence of hydronephrosis, calcified stones, or solid mass.     ABDOMINOPELVIC NODES: Unremarkable.    PELVIC ORGANS: No evidence of pelvic mass or lymphadenopathy.      PERITONEUM/MESENTERY/BOWEL: In addition to the changes pancreatitis, inflammatory changes are seen within the mesentery in the upper abdomen which appears to extend to the region of the colon. There is a small amount of fluid adjacent to the liver and within the  pelvis. No evidence of bowel obstruction. The appendix is not identified.    BONES/SOFT TISSUES: Unremarkable.    OTHER: No vascular abnormalities.      IMPRESSION: 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    In addition to the changes pancreatitis, mild inflammatory changes are seen within the mesentery in the upper abdomen, which appears to extend to the region of the colon.                     SENDY SUE M.D., ATTENDING RADIOLOGIST  This document has been electronically signed. May 28 2020  6:25PM             (05-28-20 @ 17:41)    US Abdomen Limited:   EXAM:  US ABDOMEN LIMITED            PROCEDURE DATE:  05/29/2020            INTERPRETATION:  CLINICAL HISTORY: Transaminitis..    COMPARISON: CT abdomen pelvis 5/28/2020..    PROCEDURE: Limited portable ultrasound of the right upper quadrant was performed.    FINDINGS:    LIVER: Diffusely increased echogenicity of liver. Hepatomegaly measuring 20.9 cm in length.     GALLBLADDER/BILIARY TREE:  Gallbladder sludge, no calculi. Gallbladder wall thickening measuring 0.7 cm. No pericholecystic fluid.Reportedly negative sonographic Patton's sign. No intrahepatic biliary ductal dilatation. The common bile duct measures 4.8 mm, which is normal.     PANCREAS: Pancreas is obscured by bowel gas.    KIDNEY:  Right kidney measures 9.5 cm in length. No hydronephrosis, calculi or solid mass.    AORTA/IVC:  Visualized proximal portions unremarkable.    ASCITES:  None.    IMPRESSION:    Hepatic steatosis and hepatomegaly.    Gallbladder sludge with gallbladder wall thickening 0.7 cm. Reportedly negativesonographic Patton's sign.    CBD 4.8 mm.                  TIFFANY VAZQUEZ M.D., ATTENDING RADIOLOGIST  This document has been electronically signed. May 29 2020 11:21AM             (05-29-20 @ 11:03)

## 2020-05-29 NOTE — PROGRESS NOTE ADULT - ASSESSMENT
38 yo female, pmh of anxiety, depression, substance abuse, eoth abuse, presents to ED for epigastric pain    #) Epigastric pain likely acute pancreatitis   - Admit to ICU  - Lipase 115 on presentation and CT a/p shows evidence of pancreatitis  - IVF NS @ 200cc/hr  - Monitor Urine output  - NPO for now  - Will need RUQ sono to r/o choledocholithiasis    #) Elevated lactate  - Lactate 6.3, with WBC 18  - Possible Hypoperfusion and acute phase reaction vs possible cholangitis  -     #) Alcoholic hepatitis  #) ETOH abuse 38 yo female, pmh of anxiety, depression, substance abuse, eoth abuse, presents to ED for epigastric pain    #) Epigastric pain likely acute pancreatitis   - Admit to ICU  - Lipase 115 on presentation and CT a/p shows evidence of pancreatitis  - IVF NS @ 200cc/hr  - Monitor Urine output  - NPO for now  - Will need RUQ sono to r/o choledocholithiasis    #) Elevated lactate  - Lactate 6.3, with WBC 18  - Possible Hypoperfusion and acute phase reaction vs possible cholangitis  - Cont hydration and start Meropenem 1grm    #) Acute Anemia  - Hgb 13 3 weeks ago now 8  - No sign of active GI bleed and CT shows no retroperitoneal bleed  - F/U occult stool  - Anemia and hemolytic workup  - active type and screen 05/29  - Keep Hgb > 7    #) Alcoholic hepatitis  - Transaminitis with ETOh hepatitis pattern  - T bili elevated  - INR/PT/PTT within normal  - Cont to monitor enzyme and coagulation daily  - F/U GI consult    #) ETOH abuse  - Last drink 05/28  - Drinks 1-2 pints daily  - CIWA protocol    VTE ppx SCD given possible active bleed'  NPO for now  Full code

## 2020-05-29 NOTE — CONSULT NOTE ADULT - ASSESSMENT
39 year old female with a history of acute alcoholic pancreatitis, alcohol abuse,  presents with abdominal pain, leukocytosis, acute pancreatitis with developing pseudocysts, thickened gallbladder wall, sludge in the gallbladderelevated LFTs, acute anemia without overt GI bleeding. Ultrasound shows gallbladder sludge but a normal CBD.    I spoke to Dr. Palacios regarding the patient's CT scan: no evidence of hemorrhagic pancreatitis, can't exclude infected fluid collection.     - Doubt cholangitis given normal bile ducts on CT and ultrasound  - Would recommend MRI with contrast and MRCP to rule it out (and to assess for infected fluid collections)  - Elevated LFTs are likely secondary to alcoholic hepatitis (>2:1 ratio between AST: ALT) but would not recommend prednisolone   (DF<32)  - NPO  - antibiotics  - IVF (LR @250)  - Check bid BUN and HCT (if either rises, increase fluids)  - bid cbc  - if overt bleeding or significant drop in HGB, call GI stat.  -surgery consult for 39 year old female with a history of acute alcoholic pancreatitis, alcohol abuse,  presents with abdominal pain, leukocytosis, acute pancreatitis with developing pseudocysts, thickened gallbladder wall, sludge in the gallbladderelevated LFTs, acute anemia without overt GI bleeding. Ultrasound shows gallbladder sludge but a normal CBD.    I spoke to Dr. Palacios regarding the patient's CT scan: no evidence of hemorrhagic pancreatitis, can't exclude infected fluid collection.     - Doubt cholangitis given normal bile ducts on CT and ultrasound  - Would recommend MRI with contrast and MRCP to rule it out (and to assess for infected fluid collections)  - Elevated LFTs are likely secondary to alcoholic hepatitis (>2:1 ratio between AST: ALT) but would not recommend prednisolone   (DF<32)  - ok to give patient water. if tolerated, advance to clear liquids.  - antibiotics  - IVF (LR @250)  - Check bid BUN and HCT (if either rises, increase fluids)  - bid cbc  - if overt bleeding or significant drop in HGB, call GI stat.  -surgery consult for thickened gallbladder called 39 year old female with a history of acute alcoholic pancreatitis, alcohol abuse,  presents with abdominal pain, leukocytosis, acute pancreatitis with developing pseudocysts, thickened gallbladder wall, sludge in the gallbladderelevated LFTs, acute anemia without overt GI bleeding. Ultrasound shows gallbladder sludge but a normal CBD.    I spoke to Dr. Palacios regarding the patient's CT scan: no evidence of hemorrhagic pancreatitis, can't exclude infected fluid collection.     - Doubt cholangitis given normal bile ducts on CT and ultrasound  - Would recommend MRI with contrast and MRCP to rule it out (and to assess for infected fluid collections)  - Elevated LFTs are likely secondary to alcoholic hepatitis (>2:1 ratio between AST: ALT) but would not recommend prednisolone   (DF<32)  - ok to give patient water. if tolerated, advance to clear liquids.  - antibiotics  - IVF (LR @250)  - Check bid BUN and HCT (if either rises, increase fluids)  - bid cbc  -resident will perform rectal exam today  - if overt bleeding or significant drop in HGB, call GI stat.  -surgery consult for thickened gallbladder called

## 2020-05-30 LAB
ALBUMIN SERPL ELPH-MCNC: 2.2 G/DL — LOW (ref 3.5–5.2)
ALP SERPL-CCNC: 386 U/L — HIGH (ref 30–115)
ALT FLD-CCNC: 19 U/L — SIGNIFICANT CHANGE UP (ref 0–41)
ANION GAP SERPL CALC-SCNC: 9 MMOL/L — SIGNIFICANT CHANGE UP (ref 7–14)
APTT BLD: 31.8 SEC — SIGNIFICANT CHANGE UP (ref 27–39.2)
AST SERPL-CCNC: 175 U/L — HIGH (ref 0–41)
BASOPHILS # BLD AUTO: 0.1 K/UL — SIGNIFICANT CHANGE UP (ref 0–0.2)
BASOPHILS NFR BLD AUTO: 0.9 % — SIGNIFICANT CHANGE UP (ref 0–1)
BILIRUB DIRECT SERPL-MCNC: 2.9 MG/DL — HIGH (ref 0–0.2)
BILIRUB INDIRECT FLD-MCNC: 0.7 MG/DL — SIGNIFICANT CHANGE UP (ref 0.2–1.2)
BILIRUB SERPL-MCNC: 3.6 MG/DL — HIGH (ref 0.2–1.2)
BUN SERPL-MCNC: <3 MG/DL — LOW (ref 10–20)
CALCIUM SERPL-MCNC: 6.7 MG/DL — LOW (ref 8.5–10.1)
CHLORIDE SERPL-SCNC: 102 MMOL/L — SIGNIFICANT CHANGE UP (ref 98–110)
CO2 SERPL-SCNC: 27 MMOL/L — SIGNIFICANT CHANGE UP (ref 17–32)
CREAT SERPL-MCNC: <0.5 MG/DL — LOW (ref 0.7–1.5)
CULTURE RESULTS: SIGNIFICANT CHANGE UP
EOSINOPHIL # BLD AUTO: 0.25 K/UL — SIGNIFICANT CHANGE UP (ref 0–0.7)
EOSINOPHIL NFR BLD AUTO: 2.1 % — SIGNIFICANT CHANGE UP (ref 0–8)
GLUCOSE SERPL-MCNC: 78 MG/DL — SIGNIFICANT CHANGE UP (ref 70–99)
HAPTOGLOB SERPL-MCNC: 215 MG/DL — HIGH (ref 34–200)
HCT VFR BLD CALC: 26.1 % — LOW (ref 37–47)
HCT VFR BLD CALC: 26.4 % — LOW (ref 34.5–45)
HGB BLD-MCNC: 8.3 G/DL — LOW (ref 12–16)
IMM GRANULOCYTES NFR BLD AUTO: 1 % — HIGH (ref 0.1–0.3)
INR BLD: 1.31 RATIO — HIGH (ref 0.65–1.3)
LACTATE SERPL-SCNC: 0.9 MMOL/L — SIGNIFICANT CHANGE UP (ref 0.7–2)
LYMPHOCYTES # BLD AUTO: 1.47 K/UL — SIGNIFICANT CHANGE UP (ref 1.2–3.4)
LYMPHOCYTES # BLD AUTO: 12.6 % — LOW (ref 20.5–51.1)
MAGNESIUM SERPL-MCNC: 2.1 MG/DL — SIGNIFICANT CHANGE UP (ref 1.8–2.4)
MCHC RBC-ENTMCNC: 31.8 G/DL — LOW (ref 32–37)
MCHC RBC-ENTMCNC: 32.3 PG — HIGH (ref 27–31)
MCV RBC AUTO: 101.6 FL — HIGH (ref 81–99)
MONOCYTES # BLD AUTO: 0.83 K/UL — HIGH (ref 0.1–0.6)
MONOCYTES NFR BLD AUTO: 7.1 % — SIGNIFICANT CHANGE UP (ref 1.7–9.3)
NEUTROPHILS # BLD AUTO: 8.94 K/UL — HIGH (ref 1.4–6.5)
NEUTROPHILS NFR BLD AUTO: 76.3 % — HIGH (ref 42.2–75.2)
NRBC # BLD: 0 /100 WBCS — SIGNIFICANT CHANGE UP (ref 0–0)
PLATELET # BLD AUTO: 216 K/UL — SIGNIFICANT CHANGE UP (ref 130–400)
POTASSIUM SERPL-MCNC: 3.9 MMOL/L — SIGNIFICANT CHANGE UP (ref 3.5–5)
POTASSIUM SERPL-SCNC: 3.9 MMOL/L — SIGNIFICANT CHANGE UP (ref 3.5–5)
PROT SERPL-MCNC: 4.9 G/DL — LOW (ref 6–8)
PROTHROM AB SERPL-ACNC: 15.1 SEC — HIGH (ref 9.95–12.87)
RBC # BLD: 2.57 M/UL — LOW (ref 4.2–5.4)
RBC # FLD: 20.8 % — HIGH (ref 11.5–14.5)
SODIUM SERPL-SCNC: 138 MMOL/L — SIGNIFICANT CHANGE UP (ref 135–146)
SPECIMEN SOURCE: SIGNIFICANT CHANGE UP
WBC # BLD: 11.71 K/UL — HIGH (ref 4.8–10.8)
WBC # FLD AUTO: 11.71 K/UL — HIGH (ref 4.8–10.8)

## 2020-05-30 PROCEDURE — 99232 SBSQ HOSP IP/OBS MODERATE 35: CPT

## 2020-05-30 PROCEDURE — 99222 1ST HOSP IP/OBS MODERATE 55: CPT

## 2020-05-30 PROCEDURE — 99233 SBSQ HOSP IP/OBS HIGH 50: CPT

## 2020-05-30 RX ADMIN — MEROPENEM 100 MILLIGRAM(S): 1 INJECTION INTRAVENOUS at 07:34

## 2020-05-30 RX ADMIN — MEROPENEM 100 MILLIGRAM(S): 1 INJECTION INTRAVENOUS at 22:22

## 2020-05-30 RX ADMIN — Medication 1 PATCH: at 19:30

## 2020-05-30 RX ADMIN — SODIUM CHLORIDE 200 MILLILITER(S): 9 INJECTION INTRAMUSCULAR; INTRAVENOUS; SUBCUTANEOUS at 07:35

## 2020-05-30 RX ADMIN — Medication 100 MILLIGRAM(S): at 11:28

## 2020-05-30 RX ADMIN — Medication 2 MILLIGRAM(S): at 22:21

## 2020-05-30 RX ADMIN — PANTOPRAZOLE SODIUM 40 MILLIGRAM(S): 20 TABLET, DELAYED RELEASE ORAL at 11:28

## 2020-05-30 RX ADMIN — MEROPENEM 100 MILLIGRAM(S): 1 INJECTION INTRAVENOUS at 15:15

## 2020-05-30 RX ADMIN — MORPHINE SULFATE 2 MILLIGRAM(S): 50 CAPSULE, EXTENDED RELEASE ORAL at 05:41

## 2020-05-30 RX ADMIN — CHLORHEXIDINE GLUCONATE 1 APPLICATION(S): 213 SOLUTION TOPICAL at 17:20

## 2020-05-30 RX ADMIN — MORPHINE SULFATE 2 MILLIGRAM(S): 50 CAPSULE, EXTENDED RELEASE ORAL at 14:34

## 2020-05-30 RX ADMIN — Medication 1 PATCH: at 05:42

## 2020-05-30 RX ADMIN — MORPHINE SULFATE 2 MILLIGRAM(S): 50 CAPSULE, EXTENDED RELEASE ORAL at 00:43

## 2020-05-30 RX ADMIN — Medication 2 MILLIGRAM(S): at 00:36

## 2020-05-30 RX ADMIN — CHLORHEXIDINE GLUCONATE 1 APPLICATION(S): 213 SOLUTION TOPICAL at 05:42

## 2020-05-30 RX ADMIN — Medication 1 PATCH: at 11:28

## 2020-05-30 RX ADMIN — Medication 1 MILLIGRAM(S): at 11:28

## 2020-05-30 RX ADMIN — SODIUM CHLORIDE 100 MILLILITER(S): 9 INJECTION INTRAMUSCULAR; INTRAVENOUS; SUBCUTANEOUS at 19:25

## 2020-05-30 RX ADMIN — Medication 2 MILLIGRAM(S): at 10:29

## 2020-05-30 RX ADMIN — MORPHINE SULFATE 2 MILLIGRAM(S): 50 CAPSULE, EXTENDED RELEASE ORAL at 10:29

## 2020-05-30 RX ADMIN — SODIUM CHLORIDE 100 MILLILITER(S): 9 INJECTION INTRAMUSCULAR; INTRAVENOUS; SUBCUTANEOUS at 10:05

## 2020-05-30 RX ADMIN — MORPHINE SULFATE 2 MILLIGRAM(S): 50 CAPSULE, EXTENDED RELEASE ORAL at 20:45

## 2020-05-30 RX ADMIN — Medication 1 TABLET(S): at 11:28

## 2020-05-30 RX ADMIN — Medication 2 MILLIGRAM(S): at 14:28

## 2020-05-30 NOTE — PROGRESS NOTE ADULT - SUBJECTIVE AND OBJECTIVE BOX
Patient is a 39y old  Female who presents with a chief complaint of severe abdominal pain (30 May 2020 07:24)        HPI:  38 yo female, pmh of anxiety, depression, substance abuse, eoth abuse, presents to ED for "I'm in a lot of pain." States she has been having pain for several months now. Located in epigastrium, severe 10/10 aching, no radiation. Denies fever, chills, cp, sob, le swelling, back pain, neck pain, nvd, dysuria, hematuria. (28 May 2020 20:48)      Interval Events: No overnight events.    REVIEW OF SYSTEMS:   see HPi      OBJECTIVE:  ICU Vital Signs Last 24 Hrs  T(C): 36.9 (30 May 2020 07:01), Max: 37.3 (29 May 2020 21:30)  T(F): 98.5 (30 May 2020 07:01), Max: 99.1 (29 May 2020 21:30)  HR: 112 (30 May 2020 07:05) (104 - 123)  BP: 108/63 (30 May 2020 07:05) (72/42 - 108/63)  BP(mean): 79 (30 May 2020 07:05) (52 - 82)-  RR: 20 (30 May 2020 07:05) (16 - 40)  SpO2: 97% (30 May 2020 07:05) (94% - 100%)        05-29 @ 07:01  -  05-30 @ 07:00  --------------------------------------------------------  IN: 4947 mL / OUT: 1350 mL / NET: 3597 mL      CAPILLARY BLOOD GLUCOSE      POCT Blood Glucose.: 116 mg/dL (28 May 2020 20:27)        PHYSICAL EXAM:     · CONSTITUTIONAL:   not Ill appearing,   well nourished,   NAD    · ENMT:   Airway patent,   Nasal mucosa clear.  Mouth with normal mucosa.   No thrush    · EYES:   Clear bilaterally,   pupils equal,   round and reactive to light.    · CARDIAC:   Normal rate,   regular rhythm.    Heart sounds S1, S2.   No murmurs, no rubs or gallops on auscultation  no edema        CAROTID:   normal systolic impulse  no bruits    · RESPIRATORY:   no w/r/r/,   normal chest expansion  no tachypnea,  no retractions or use of accessory muscles  palpation of chest is normal with no fremitus  percussion of chest demonstrates no hyperresonance or dullness    · GASTROINTESTINAL:  Abdomen soft,   +-tender,   + BS   loose BMs  liver normal size  spleen not palpable    · MUSCULOSKELETAL:   no clubbing, cyanosis      · NEUROLOGICAL:   Alert and oriented   no obvious focal deficits in cranial nerve areas  no motor or sensory deficits.      · SKIN:   Skin normal color for race,   warm, dry   No evidence of rash.        · HEME LYMPH:   no splenomegaly.  No cervical  lymphadenopathy.  no inguinal lymphadenopathy    HOSPITAL MEDICATIONS:  MEDICATIONS  (STANDING):  chlorhexidine 4% Liquid 1 Application(s) Topical two times a day  folic acid 1 milliGRAM(s) Oral daily  meropenem  IVPB 1000 milliGRAM(s) IV Intermittent every 8 hours  multivitamin 1 Tablet(s) Oral daily  nicotine -  14 mG/24Hr(s) Patch 1 patch Transdermal daily  pantoprazole  Injectable 40 milliGRAM(s) IV Push daily  sodium chloride 0.9%. 1000 milliLiter(s) (200 mL/Hr) IV Continuous <Continuous>  thiamine 100 milliGRAM(s) Oral daily    MEDICATIONS  (PRN):  LORazepam   Injectable 2 milliGRAM(s) IV Push every 2 hours PRN Symptom-triggered: 2 point increase in CIWA -Ar score and a total score of 7 or LESS  morphine  - Injectable 2 milliGRAM(s) IV Push every 4 hours PRN Severe Pain (7 - 10)    sodium chloride 0.9% Bolus:   1000 milliLiter(s), IV Bolus, once, infuse over 1 Hour(s), Stop After 1 Doses  sodium chloride 0.9% Bolus:   2200 milliLiter(s), IV Bolus, once, infuse over 60 Minute(s), Stop After 1 Doses     (Calc Info: 31 milliLiter(s)/Kg/DOSE x 70 Kg = 2,200 milliLiter(s)/Dose     (Requested dose was 31 milliLiter(s) per Kg)  lactated ringers.: Solution, 1000 milliLiter(s) infuse at 125 mL/Hr  Provider's Contact #: [phone]  sodium chloride 0.9%.: Solution, 1000 milliLiter(s) infuse at 200 mL/Hr  Provider's Contact #: [phone]      LABS:                        8.3    11.71 )-----------( 216      ( 30 May 2020 05:25 )             26.1         138  |  102  |  <3<L>  ----------------------------<  78  3.9   |  27  |  <0.5<L>    Ca    6.7<L>      30 May 2020 05:25  Phos  1.4       Mg     2.1         TPro  4.9<L>  /  Alb  2.2<L>  /  TBili  3.6<H>  /  DBili  2.9<H>  /  AST  175<H>  /  ALT  19  /  AlkPhos  386<H>      PT/INR - ( 30 May 2020 05:25 )   PT: 15.10 sec;   INR: 1.31 ratio         PTT - ( 30 May 2020 05:25 )  PTT:31.8 sec  Urinalysis Basic - ( 28 May 2020 22:10 )    Color: Yellow / Appearance: Clear / S.010 / pH: x  Gluc: x / Ketone: Negative  / Bili: Large / Urobili: 4.0 mg/dL   Blood: x / Protein: 30 mg/dL / Nitrite: Positive   Leuk Esterase: Negative / RBC: Negative / WBC 3-5 /HPF   Sq Epi: x / Non Sq Epi: Few /HPF / Bacteria: Moderate        Venous Blood Gas:   @ 20:01  7.44/49/28/33/34  VBG Lactate: 3.4              RADIOLOGY: I personally reviewed latest CXR and other pertinent films. Patient is a 39y old  Female who presents with a chief complaint of severe abdominal pain (30 May 2020 07:24)        HPI:  40 yo female, pmh of anxiety, depression, substance abuse, eoth abuse, presents to ED for "I'm in a lot of pain." States she has been having pain for several months now. Located in epigastrium, severe 10/10 aching, no radiation. Denies fever, chills, cp, sob, le swelling, back pain, neck pain, nvd, dysuria, hematuria. (28 May 2020 20:48)      Interval Events: Transfused 1 unit PRBSovernight .    REVIEW OF SYSTEMS:   see HPi      OBJECTIVE:  ICU Vital Signs Last 24 Hrs  T(C): 36.9 (30 May 2020 07:01), Max: 37.3 (29 May 2020 21:30)  T(F): 98.5 (30 May 2020 07:01), Max: 99.1 (29 May 2020 21:30)  HR: 112 (30 May 2020 07:05) (104 - 123)  BP: 108/63 (30 May 2020 07:05) (72/42 - 108/63)  BP(mean): 79 (30 May 2020 07:05) (52 - 82)-  RR: 20 (30 May 2020 07:05) (16 - 40)  SpO2: 97% (30 May 2020 07:05) (94% - 100%)        -29 @ 07:01  -  05-30 @ 07:00  --------------------------------------------------------  IN: 4947 mL / OUT: 1350 mL / NET: 3597 mL      CAPILLARY BLOOD GLUCOSE      POCT Blood Glucose.: 116 mg/dL (28 May 2020 20:27)        PHYSICAL EXAM:     · CONSTITUTIONAL:   not Ill appearing,   well nourished,   NAD    · ENMT:   Airway patent,   Nasal mucosa clear.  Mouth with normal mucosa.   No thrush    · EYES:   Clear bilaterally,   pupils equal,   round and reactive to light.    · CARDIAC:   Normal rate,   regular rhythm.    Heart sounds S1, S2.   No murmurs, no rubs or gallops on auscultation  no edema        CAROTID:   normal systolic impulse  no bruits    · RESPIRATORY:   no w/r/r/,   normal chest expansion  no tachypnea,  no retractions or use of accessory muscles  palpation of chest is normal with no fremitus  percussion of chest demonstrates no hyperresonance or dullness    · GASTROINTESTINAL:  Abdomen soft,   +-tender,   + BS   loose BMs  liver normal size  spleen not palpable    · MUSCULOSKELETAL:   no clubbing, cyanosis      · NEUROLOGICAL:   Alert and oriented   no obvious focal deficits in cranial nerve areas  no motor or sensory deficits.      · SKIN:   Skin normal color for race,   warm, dry   No evidence of rash.        · HEME LYMPH:   no splenomegaly.  No cervical  lymphadenopathy.  no inguinal lymphadenopathy    HOSPITAL MEDICATIONS:  MEDICATIONS  (STANDING):  chlorhexidine 4% Liquid 1 Application(s) Topical two times a day  folic acid 1 milliGRAM(s) Oral daily  meropenem  IVPB 1000 milliGRAM(s) IV Intermittent every 8 hours  multivitamin 1 Tablet(s) Oral daily  nicotine -  14 mG/24Hr(s) Patch 1 patch Transdermal daily  pantoprazole  Injectable 40 milliGRAM(s) IV Push daily  sodium chloride 0.9%. 1000 milliLiter(s) (200 mL/Hr) IV Continuous <Continuous>  thiamine 100 milliGRAM(s) Oral daily    MEDICATIONS  (PRN):  LORazepam   Injectable 2 milliGRAM(s) IV Push every 2 hours PRN Symptom-triggered: 2 point increase in CIWA -Ar score and a total score of 7 or LESS  morphine  - Injectable 2 milliGRAM(s) IV Push every 4 hours PRN Severe Pain (7 - 10)    sodium chloride 0.9% Bolus:   1000 milliLiter(s), IV Bolus, once, infuse over 1 Hour(s), Stop After 1 Doses  sodium chloride 0.9% Bolus:   2200 milliLiter(s), IV Bolus, once, infuse over 60 Minute(s), Stop After 1 Doses     (Calc Info: 31 milliLiter(s)/Kg/DOSE x 70 Kg = 2,200 milliLiter(s)/Dose     (Requested dose was 31 milliLiter(s) per Kg)  lactated ringers.: Solution, 1000 milliLiter(s) infuse at 125 mL/Hr  Provider's Contact #: [phone]  sodium chloride 0.9%.: Solution, 1000 milliLiter(s) infuse at 200 mL/Hr  Provider's Contact #: [phone]      LABS:                        8.3    11.71 )-----------( 216      ( 30 May 2020 05:25 )             26.1         138  |  102  |  <3<L>  ----------------------------<  78  3.9   |  27  |  <0.5<L>    Ca    6.7<L>      30 May 2020 05:25  Phos  1.4       Mg     2.1         TPro  4.9<L>  /  Alb  2.2<L>  /  TBili  3.6<H>  /  DBili  2.9<H>  /  AST  175<H>  /  ALT  19  /  AlkPhos  386<H>      PT/INR - ( 30 May 2020 05:25 )   PT: 15.10 sec;   INR: 1.31 ratio         PTT - ( 30 May 2020 05:25 )  PTT:31.8 sec  Urinalysis Basic - ( 28 May 2020 22:10 )    Color: Yellow / Appearance: Clear / S.010 / pH: x  Gluc: x / Ketone: Negative  / Bili: Large / Urobili: 4.0 mg/dL   Blood: x / Protein: 30 mg/dL / Nitrite: Positive   Leuk Esterase: Negative / RBC: Negative / WBC 3-5 /HPF   Sq Epi: x / Non Sq Epi: Few /HPF / Bacteria: Moderate        Venous Blood Gas:   @ 20:01  7.44/49/28/33/34  VBG Lactate: 3.4              RADIOLOGY: I personally reviewed latest CXR and other pertinent films.

## 2020-05-30 NOTE — PROGRESS NOTE ADULT - SUBJECTIVE AND OBJECTIVE BOX
GENERAL SURGERY PROGRESS NOTE     ADRIAN PRUETT  [address] [place] [address] day :2d  Surgical Attending: Kaia  Yesterday during day patient noted to have hgb drop with no overt clinical signs of active bleed, transfused 1u pRBC with good response. RUQ u/s unchanged from previous with no stones. Continues to have abdominal pain, distention.     T(F): 98.5 (20 @ 07:01), Max: 99.1 (20 @ 21:30)  HR: 112 (20 @ 05:38) (104 - 123)  BP: 93/51 (20 @ 05:38) (72/42 - 105/53)  ABP: --  ABP(mean): --  RR: 28 (20 @ 07:01) (16 - 40)  SpO2: 98% (20 @ 05:38) (94% - 100%)      20 @ 07:01  -  20 @ 07:00  --------------------------------------------------------  IN:    IV PiggyBack: 300 mL    lactated ringers.: 375 mL    Packed Red Blood Cells: 272 mL    sodium chloride 0.9%.: 4000 mL  Total IN: 4947 mL    OUT:    Voided: 1350 mL  Total OUT: 1350 mL    Total NET: 3597 mL        DIET/FLUIDS: folic acid 1 milliGRAM(s) Oral daily  multivitamin 1 Tablet(s) Oral daily  sodium chloride 0.9%. 1000 milliLiter(s) IV Continuous <Continuous>  thiamine 100 milliGRAM(s) Oral daily       GI proph:  pantoprazole  Injectable 40 milliGRAM(s) IV Push daily    AC/ proph:   ABx: meropenem  IVPB 1000 milliGRAM(s) IV Intermittent every 8 hours      PHYSICAL EXAM:  GENERAL: lethargic, scleral icterus  HEART: Regular rate and rhythm  ABDOMEN: Soft, distended         LABS  Labs:  CAPILLARY BLOOD GLUCOSE                              8.3    11.71 )-----------( 216      ( 30 May 2020 05:25 )             26.1       Auto Neutrophil %: 76.3 % (20 @ 05:25)  Auto Immature Granulocyte %: 1.0 % (20 @ 05:25)  Auto Immature Granulocyte %: 0.7 % (20 @ 11:35)  Auto Neutrophil %: 78.3 % (20 @ 11:35)        137  |  100  |  <3<L>  ----------------------------<  95  3.6   |  28  |  <0.5<L>      Calcium, Total Serum: 6.4 mg/dL (20 @ 19:54)      LFTs:             5.2  | 4.7  | 197      ------------------[400     ( 29 May 2020 07:21 )  2.2  | 3.7  | 22          Lipase:x      Amylase:x         Lactate, Blood: 0.9 mmol/L (20 @ 05:25)  Lactate, Blood: 1.9 mmol/L (20 @ 11:35)  Lactate, Blood: 1.3 mmol/L (20 @ 07:21)  Blood Gas Venous - Lactate: 3.4 mmoL/L (20 @ 20:01)  Lactate, Blood: 6.3 mmol/L (20 @ 15:22)      Coags:     15.10  ----< 1.31    ( 30 May 2020 05:25 )     31.8        CARDIAC MARKERS ( 28 May 2020 15:22 )  x     / <0.01 ng/mL / x     / x     / x              Urinalysis Basic - ( 28 May 2020 22:10 )    Color: Yellow / Appearance: Clear / S.010 / pH: x  Gluc: x / Ketone: Negative  / Bili: Large / Urobili: 4.0 mg/dL   Blood: x / Protein: 30 mg/dL / Nitrite: Positive   Leuk Esterase: Negative / RBC: Negative / WBC 3-5 /HPF   Sq Epi: x / Non Sq Epi: Few /HPF / Bacteria: Moderate      RADIOLOGY & ADDITIONAL TESTS:  < from: CT Angio Abdomen and Pelvis w/ IV Cont (20 @ 22:24) >  PANCREAS: Heterogeneous, ill-defined pancreas with peripancreatic fat stranding. Grossly unchanged collections within the pancreatic head, body and tail. Pancreatic head collection measures approximately 2 cm (series 9, image 159), pancreatic body collection measures approximately 1 cm (series 9, image 139), and pancreatic tail lesion measures approximately 1.2 cm (series 9, image 132). Additional collection adjacent to the lesser curvature of the stomach measuring approximately 1.3 cm (series 9, image 140). These collections appear to be forming a wall.HEPATOBILIARY: Markedly enlarged hypodense liver. Gallbladder sludge.IMPRESSION:    Since May 28, 2020;    No CTA evidence of acute intra-abdominal hemorrhage.    Essentially unchanged findings of pancreatitis with formation of multiple pockets of fluid in the pancreatic head, body and tail as described above.    Multiple findings likely related to the sequelae of pancreatitis including bilateral pleural effusions, abdominopelvic ascites and soft tissue edema.    < end of copied text > GENERAL SURGERY PROGRESS NOTE     ADRIAN PRUETT  [address] [place] [address] day :2d  Surgical Attending: Kaia  Yesterday during day patient noted to have hgb drop with no overt clinical signs of active bleed, transfused 1u pRBC with good response. RUQ u/s unchanged from previous with no stones. Continues to have abdominal pain, distention.     T(F): 98.5 (20 @ 07:01), Max: 99.1 (20 @ 21:30)  HR: 112 (20 @ 05:38) (104 - 123)  BP: 93/51 (20 @ 05:38) (72/42 - 105/53)  ABP: --  ABP(mean): --  RR: 28 (20 @ 07:01) (16 - 40)  SpO2: 98% (20 @ 05:38) (94% - 100%)      20 @ 07:01  -  20 @ 07:00  --------------------------------------------------------  IN:    IV PiggyBack: 300 mL    lactated ringers.: 375 mL    Packed Red Blood Cells: 272 mL    sodium chloride 0.9%.: 4000 mL  Total IN: 4947 mL    OUT:    Voided: 1350 mL  Total OUT: 1350 mL    Total NET: 3597 mL        DIET/FLUIDS: folic acid 1 milliGRAM(s) Oral daily  multivitamin 1 Tablet(s) Oral daily  sodium chloride 0.9%. 1000 milliLiter(s) IV Continuous <Continuous>  thiamine 100 milliGRAM(s) Oral daily       GI proph:  pantoprazole  Injectable 40 milliGRAM(s) IV Push daily    AC/ proph:   ABx: meropenem  IVPB 1000 milliGRAM(s) IV Intermittent every 8 hours      PHYSICAL EXAM:  GENERAL: lethargic, scleral icterus, grossly edematous  HEART: Regular rate and rhythm  ABDOMEN: Soft, distended, tender        LABS  Labs:  CAPILLARY BLOOD GLUCOSE                              8.3    11.71 )-----------( 216      ( 30 May 2020 05:25 )             26.1       Auto Neutrophil %: 76.3 % (20 @ 05:25)  Auto Immature Granulocyte %: 1.0 % (20 @ 05:25)  Auto Immature Granulocyte %: 0.7 % (20 @ 11:35)  Auto Neutrophil %: 78.3 % (20 @ 11:35)        137  |  100  |  <3<L>  ----------------------------<  95  3.6   |  28  |  <0.5<L>      Calcium, Total Serum: 6.4 mg/dL (20 @ 19:54)      LFTs:             5.2  | 4.7  | 197      ------------------[400     ( 29 May 2020 07:21 )  2.2  | 3.7  | 22          Lipase:x      Amylase:x         Lactate, Blood: 0.9 mmol/L (20 @ 05:25)  Lactate, Blood: 1.9 mmol/L (20 @ 11:35)  Lactate, Blood: 1.3 mmol/L (20 @ 07:21)  Blood Gas Venous - Lactate: 3.4 mmoL/L (20 @ 20:01)  Lactate, Blood: 6.3 mmol/L (20 @ 15:22)      Coags:     15.10  ----< 1.31    ( 30 May 2020 05:25 )     31.8        CARDIAC MARKERS ( 28 May 2020 15:22 )  x     / <0.01 ng/mL / x     / x     / x              Urinalysis Basic - ( 28 May 2020 22:10 )    Color: Yellow / Appearance: Clear / S.010 / pH: x  Gluc: x / Ketone: Negative  / Bili: Large / Urobili: 4.0 mg/dL   Blood: x / Protein: 30 mg/dL / Nitrite: Positive   Leuk Esterase: Negative / RBC: Negative / WBC 3-5 /HPF   Sq Epi: x / Non Sq Epi: Few /HPF / Bacteria: Moderate      RADIOLOGY & ADDITIONAL TESTS:  < from: CT Angio Abdomen and Pelvis w/ IV Cont (20 @ 22:24) >  PANCREAS: Heterogeneous, ill-defined pancreas with peripancreatic fat stranding. Grossly unchanged collections within the pancreatic head, body and tail. Pancreatic head collection measures approximately 2 cm (series 9, image 159), pancreatic body collection measures approximately 1 cm (series 9, image 139), and pancreatic tail lesion measures approximately 1.2 cm (series 9, image 132). Additional collection adjacent to the lesser curvature of the stomach measuring approximately 1.3 cm (series 9, image 140). These collections appear to be forming a wall.HEPATOBILIARY: Markedly enlarged hypodense liver. Gallbladder sludge.IMPRESSION:    Since May 28, 2020;    No CTA evidence of acute intra-abdominal hemorrhage.    Essentially unchanged findings of pancreatitis with formation of multiple pockets of fluid in the pancreatic head, body and tail as described above.    Multiple findings likely related to the sequelae of pancreatitis including bilateral pleural effusions, abdominopelvic ascites and soft tissue edema.    < end of copied text >

## 2020-05-30 NOTE — PROGRESS NOTE ADULT - ASSESSMENT
38 yo female, pmh of anxiety, depression, substance abuse, eoth abuse, presents to ED for epigastric pain    #) acute pancreatitis   - management as per ICU  - Lipase 115 on presentation and CT a/p shows evidence of pancreatitis  - continue IVF and IV Meropenem   - Monitor I&O's  - diet advanced to clears on 5/30  -CT angio and ruq sono reviewed   -GI following  -pain control     #) Elevated lactate on admission - resolved   - Lactate 6.3, with WBC 18  - Possible Hypoperfusion and acute phase reaction vs possible cholangitis  - Cont IVF and IV Meropenem    #) Acute Anemia  - Hgb 13 3 weeks ago   - transfused 1 unit prbc on 5/29  - No sign of active GI bleed and CT shows no retroperitoneal bleed  - Anemia and hemolytic workup  - active type and screen 05/29  - Keep Hgb > 7    #) Alcoholic hepatitis  - Transaminitis with ETOH hepatitis pattern  - T bili improving  - INR/PT/PTT within normal  - Cont to monitor enzyme and coagulation daily  - GI following    #) ETOH abuse, continuous  - Last drink 05/28  - Drinks 1-2 pints daily  - continue on CIWA protocol    VTE ppx SCD given possible active bleed'  NPO for now  Full code    Progress Note Handoff  Pending Consults: none  Pending Tests: labs  Pending Results: labs  Family Discussion: discussed pain control and treatment plan for pancreatitis with pt - in agreement with the plan  Disposition: Home_____/SNF______/Other_____/Unknown at this time_x____    Please call me with any questions at extension [phone]

## 2020-05-30 NOTE — PROGRESS NOTE ADULT - ASSESSMENT
A/P:  ADRIAN PRUETT is a 39yFemale HD2 admitted with recurrent alcoholic pancreatitis with developing pseudocysts. Surgery consulted for management recommendations.    Plan:   - RUQ u/s reviewed, no signs of surgical gallbladder pathology noted, no surgical intervention indicated for this presentation  - continue serial LFTs, close monitoring of respiratory status  - primary managmement  - appreciate GI recommendations  - no surgical intervention  -  patient seen and discussed with Dr. Chavez, surgical team A/P:  ADRIAN PRUETT is a 39yFemale HD2 admitted with recurrent alcoholic pancreatitis with developing pseudocysts. Surgery consulted for management recommendations.    Plan:   - RUQ u/s reviewed, no signs of surgical gallbladder pathology noted, no surgical intervention indicated for this presentation  - continue serial LFTs, close monitoring of respiratory status  - therapeutic management of pseudocyst deferred to GI vs IR unless technically difficult  - appreciate GI recommendations  - no surgical intervention A/P:  ADRIAN PRUETT is a 39yFemale HD2 admitted with recurrent alcoholic pancreatitis with developing pseudocysts. Surgery consulted for management recommendations.    Plan:   - RUQ u/s reviewed, no signs of surgical gallbladder pathology noted, no surgical intervention indicated for this presentation  - continue serial LFTs, close monitoring of respiratory status  - continue aggressive fluid resuscitation, recommend walsh for strict I/Os  - therapeutic management of pseudocyst deferred to GI vs IR unless technically difficult  - appreciate GI recommendations  - no surgical intervention, please recall as needed  - patient seen and evaluated w/ Dr. Marti

## 2020-05-30 NOTE — PROGRESS NOTE ADULT - SUBJECTIVE AND OBJECTIVE BOX
ADRIAN PRUETT  39y  Female      Patient is a 39y old  Female who presents with a chief complaint of severe abdominal pain (30 May 2020 07:49)      INTERVAL HPI/OVERNIGHT EVENTS:  Patient seen and examined earlier this morning.  Lying in bed and complaining of abdominal pain. Asking for pain meds.  S/P ruq sono and cta yesterday.       REVIEW OF SYSTEMS:  CONSTITUTIONAL: No fever, weight loss, or fatigue  EYES: No eye pain, visual disturbances, or discharge  ENMT:  No difficulty hearing, tinnitus, vertigo; No sinus or throat pain  NECK: No pain or stiffness  RESPIRATORY: No cough, wheezing, chills or hemoptysis; No shortness of breath  CARDIOVASCULAR: No chest pain, palpitations, dizziness, or leg swelling  GASTROINTESTINAL: + abdominal pain, bloating  GENITOURINARY: No dysuria, frequency, hematuria, or incontinence  NEUROLOGICAL: No headaches, memory loss, loss of strength, numbness, or tremors  SKIN: No itching, burning, rashes, or lesions   LYMPH NODES: No enlarged glands  ENDOCRINE: No heat or cold intolerance; No hair loss  MUSCULOSKELETAL: No joint pain or swelling; No muscle, back, or extremity pain  PSYCHIATRIC: No depression, anxiety, mood swings, or difficulty sleeping  HEME/LYMPH: No easy bruising, or bleeding gums  ALLERY AND IMMUNOLOGIC: No hives or eczema    Vital Signs Last 24 Hrs  T(C): 36.9 (30 May 2020 07:01), Max: 37.3 (29 May 2020 21:30)  T(F): 98.5 (30 May 2020 07:01), Max: 99.1 (29 May 2020 21:30)  HR: 107 (30 May 2020 08:52) (104 - 123)  BP: 106/61 (30 May 2020 08:52) (72/42 - 108/63)  BP(mean): 76 (30 May 2020 08:52) (52 - 82)  RR: 30 (30 May 2020 09:00) (16 - 40)  SpO2: 96% (30 May 2020 09:00) (95% - 100%) on ra      PHYSICAL EXAM:  GENERAL: NAD, well-groomed, well-developed  HEAD:  Atraumatic, Normocephalic  EYES: EOMI, PERRLA, conjunctiva and sclera clear  ENMT: No tonsillar erythema, exudates, or enlargement; Moist mucous membranes, Good dentition, No lesions  NECK: Supple, No JVD, Normal thyroid  NERVOUS SYSTEM:  Alert & Oriented X3, Good concentration; Motor Strength 5/5 B/L upper and lower extremities; DTRs 2+ intact and symmetric  CHEST/LUNG: Clear to percussion bilaterally; No rales, rhonchi, wheezing, or rubs  HEART: Regular rate and rhythm; No murmurs, rubs, or gallops  ABDOMEN: Soft, distended and tender to palpation  EXTREMITIES:  2+ Peripheral Pulses, No clubbing, cyanosis, or edema  LYMPH: No lymphadenopathy noted  SKIN: dark skin on b/l feet    Consultant(s) Notes Reviewed:  [x ] YES  [ ] NO  Care Discussed with Consultants/Other Providers [ x] YES  [ ] NO    LAB:                        8.3    11.71 )-----------( 216      ( 30 May 2020 05:25 )             26.1     05-30    138  |  102  |  <3<L>  ----------------------------<  78  3.9   |  27  |  <0.5<L>    Ca    6.7<L>      30 May 2020 05:25  Phos  1.4     05-29  Mg     2.1     05-30    TPro  4.9<L>  /  Alb  2.2<L>  /  TBili  3.6<H>  /  DBili  2.9<H>  /  AST  175<H>  /  ALT  19  /  AlkPhos  386<H>  05-30    LIVER FUNCTIONS - ( 30 May 2020 05:25 )  Alb: 2.2 g/dL / Pro: 4.9 g/dL / ALK PHOS: 386 U/L / ALT: 19 U/L / AST: 175 U/L / GGT: x           CARDIAC MARKERS ( 28 May 2020 15:22 )  x     / <0.01 ng/mL / x     / x     / x            Drug Dosing Weight  Height (cm): 162.6 (28 May 2020 23:26)  Weight (kg): 60.7 (28 May 2020 23:26)  BMI (kg/m2): 23 (28 May 2020 23:26)  BSA (m2): 1.65 (28 May 2020 23:26)          I&O's Summary    29 May 2020 07:01  -  30 May 2020 07:00  --------------------------------------------------------  IN: 4947 mL / OUT: 1350 mL / NET: 3597 mL    30 May 2020 07:  -  30 May 2020 10:28  --------------------------------------------------------  IN: 850 mL / OUT: 100 mL / NET: 750 mL      Urinalysis Basic - ( 28 May 2020 22:10 )    Color: Yellow / Appearance: Clear / S.010 / pH: x  Gluc: x / Ketone: Negative  / Bili: Large / Urobili: 4.0 mg/dL   Blood: x / Protein: 30 mg/dL / Nitrite: Positive   Leuk Esterase: Negative / RBC: Negative / WBC 3-5 /HPF   Sq Epi: x / Non Sq Epi: Few /HPF / Bacteria: Moderate        RADIOLOGY & ADDITIONAL TESTS:  Imaging Personally Reviewed:  [x] YES  [ ] NO    HEALTH ISSUES - PROBLEM Dx:  Rash: Rash  ETOH abuse: ETOH abuse  Alcohol-induced acute pancreatitis, unspecified complication status: Alcohol-induced acute pancreatitis, unspecified complication status  Pancreatitis: Pancreatitis          MEDS:  chlorhexidine 4% Liquid 1 Application(s) Topical two times a day  folic acid 1 milliGRAM(s) Oral daily  LORazepam   Injectable 2 milliGRAM(s) IV Push every 2 hours PRN  meropenem  IVPB 1000 milliGRAM(s) IV Intermittent every 8 hours  morphine  - Injectable 2 milliGRAM(s) IV Push every 4 hours PRN  multivitamin 1 Tablet(s) Oral daily  nicotine -  14 mG/24Hr(s) Patch 1 patch Transdermal daily  pantoprazole  Injectable 40 milliGRAM(s) IV Push daily  sodium chloride 0.9%. 1000 milliLiter(s) IV Continuous <Continuous>  thiamine 100 milliGRAM(s) Oral daily

## 2020-05-30 NOTE — PROGRESS NOTE ADULT - ASSESSMENT
IMPRESSION:  acute/chronic pancreatitis slowly improving  phlegmon development possible infected  acute/chronic anemia  DTs  active ETOH abuse  r/o biliary obstruction    SUGGEST:  Gi/Surg f/u  MRI/MRCP abdomen  IV fluids  analgesia  PRN benzo per CIWA  US RUQ  empiric abx Meropenem  clears as tolerated  repeat labs  B12/folate, retic ct  supplement lytes      monitor in ICU IMPRESSION:  acute/chronic pancreatitis slowly improving  phlegmon development possible infected  acute/chronic anemia   DTs  active ETOH abuse  r/o biliary obstruction    SUGGEST:  Gi/Surg f/u  keep HGB >8  serial HH  MRI/MRCP abdomen  decrease IV fluids  analgesia  PRN benzo per CIWA  US RUQ  empiric abx Meropenem  clears as tolerated  repeat labs  B12/folate, retic ct  supplement lytes as needed      monitor in ICU

## 2020-05-31 LAB
ALBUMIN SERPL ELPH-MCNC: 2 G/DL — LOW (ref 3.5–5.2)
ALP SERPL-CCNC: 356 U/L — HIGH (ref 30–115)
ALT FLD-CCNC: 19 U/L — SIGNIFICANT CHANGE UP (ref 0–41)
ANION GAP SERPL CALC-SCNC: 7 MMOL/L — SIGNIFICANT CHANGE UP (ref 7–14)
AST SERPL-CCNC: 176 U/L — HIGH (ref 0–41)
BILIRUB SERPL-MCNC: 3.1 MG/DL — HIGH (ref 0.2–1.2)
BUN SERPL-MCNC: <3 MG/DL — LOW (ref 10–20)
CALCIUM SERPL-MCNC: 7 MG/DL — LOW (ref 8.5–10.1)
CHLORIDE SERPL-SCNC: 106 MMOL/L — SIGNIFICANT CHANGE UP (ref 98–110)
CO2 SERPL-SCNC: 27 MMOL/L — SIGNIFICANT CHANGE UP (ref 17–32)
CREAT SERPL-MCNC: <0.5 MG/DL — LOW (ref 0.7–1.5)
FOLATE RBC-MCNC: 1117 NG/ML — SIGNIFICANT CHANGE UP (ref 499–1504)
GLUCOSE SERPL-MCNC: 86 MG/DL — SIGNIFICANT CHANGE UP (ref 70–99)
HCT VFR BLD CALC: 26 % — LOW (ref 37–47)
HGB BLD-MCNC: 8.1 G/DL — LOW (ref 12–16)
MCHC RBC-ENTMCNC: 31.2 G/DL — LOW (ref 32–37)
MCHC RBC-ENTMCNC: 32.3 PG — HIGH (ref 27–31)
MCV RBC AUTO: 103.6 FL — HIGH (ref 81–99)
NRBC # BLD: 0 /100 WBCS — SIGNIFICANT CHANGE UP (ref 0–0)
PLATELET # BLD AUTO: 243 K/UL — SIGNIFICANT CHANGE UP (ref 130–400)
POTASSIUM SERPL-MCNC: 4 MMOL/L — SIGNIFICANT CHANGE UP (ref 3.5–5)
POTASSIUM SERPL-SCNC: 4 MMOL/L — SIGNIFICANT CHANGE UP (ref 3.5–5)
PROT SERPL-MCNC: 5 G/DL — LOW (ref 6–8)
RBC # BLD: 2.51 M/UL — LOW (ref 4.2–5.4)
RBC # FLD: 21.6 % — HIGH (ref 11.5–14.5)
SODIUM SERPL-SCNC: 140 MMOL/L — SIGNIFICANT CHANGE UP (ref 135–146)
WBC # BLD: 10.88 K/UL — HIGH (ref 4.8–10.8)
WBC # FLD AUTO: 10.88 K/UL — HIGH (ref 4.8–10.8)

## 2020-05-31 PROCEDURE — 99233 SBSQ HOSP IP/OBS HIGH 50: CPT

## 2020-05-31 PROCEDURE — 74181 MRI ABDOMEN W/O CONTRAST: CPT | Mod: 26

## 2020-05-31 PROCEDURE — 71045 X-RAY EXAM CHEST 1 VIEW: CPT | Mod: 26

## 2020-05-31 PROCEDURE — 99232 SBSQ HOSP IP/OBS MODERATE 35: CPT

## 2020-05-31 RX ORDER — ONDANSETRON 8 MG/1
4 TABLET, FILM COATED ORAL EVERY 8 HOURS
Refills: 0 | Status: DISCONTINUED | OUTPATIENT
Start: 2020-05-31 | End: 2020-06-12

## 2020-05-31 RX ORDER — MORPHINE SULFATE 50 MG/1
2 CAPSULE, EXTENDED RELEASE ORAL
Refills: 0 | Status: DISCONTINUED | OUTPATIENT
Start: 2020-05-31 | End: 2020-06-01

## 2020-05-31 RX ORDER — MORPHINE SULFATE 50 MG/1
4 CAPSULE, EXTENDED RELEASE ORAL EVERY 4 HOURS
Refills: 0 | Status: DISCONTINUED | OUTPATIENT
Start: 2020-05-31 | End: 2020-06-01

## 2020-05-31 RX ADMIN — MORPHINE SULFATE 4 MILLIGRAM(S): 50 CAPSULE, EXTENDED RELEASE ORAL at 20:21

## 2020-05-31 RX ADMIN — PANTOPRAZOLE SODIUM 40 MILLIGRAM(S): 20 TABLET, DELAYED RELEASE ORAL at 12:13

## 2020-05-31 RX ADMIN — MORPHINE SULFATE 2 MILLIGRAM(S): 50 CAPSULE, EXTENDED RELEASE ORAL at 16:06

## 2020-05-31 RX ADMIN — MORPHINE SULFATE 2 MILLIGRAM(S): 50 CAPSULE, EXTENDED RELEASE ORAL at 01:27

## 2020-05-31 RX ADMIN — SODIUM CHLORIDE 100 MILLILITER(S): 9 INJECTION INTRAMUSCULAR; INTRAVENOUS; SUBCUTANEOUS at 20:21

## 2020-05-31 RX ADMIN — Medication 1 PATCH: at 07:34

## 2020-05-31 RX ADMIN — Medication 1 PATCH: at 19:34

## 2020-05-31 RX ADMIN — Medication 1 PATCH: at 10:00

## 2020-05-31 RX ADMIN — Medication 1 MILLIGRAM(S): at 12:13

## 2020-05-31 RX ADMIN — MORPHINE SULFATE 2 MILLIGRAM(S): 50 CAPSULE, EXTENDED RELEASE ORAL at 23:17

## 2020-05-31 RX ADMIN — MEROPENEM 100 MILLIGRAM(S): 1 INJECTION INTRAVENOUS at 23:15

## 2020-05-31 RX ADMIN — Medication 1 PATCH: at 12:15

## 2020-05-31 RX ADMIN — SODIUM CHLORIDE 100 MILLILITER(S): 9 INJECTION INTRAMUSCULAR; INTRAVENOUS; SUBCUTANEOUS at 06:03

## 2020-05-31 RX ADMIN — CHLORHEXIDINE GLUCONATE 1 APPLICATION(S): 213 SOLUTION TOPICAL at 12:13

## 2020-05-31 RX ADMIN — MEROPENEM 100 MILLIGRAM(S): 1 INJECTION INTRAVENOUS at 14:47

## 2020-05-31 RX ADMIN — MEROPENEM 100 MILLIGRAM(S): 1 INJECTION INTRAVENOUS at 06:03

## 2020-05-31 RX ADMIN — MORPHINE SULFATE 2 MILLIGRAM(S): 50 CAPSULE, EXTENDED RELEASE ORAL at 06:08

## 2020-05-31 RX ADMIN — Medication 1 TABLET(S): at 12:13

## 2020-05-31 RX ADMIN — Medication 100 MILLIGRAM(S): at 12:13

## 2020-05-31 RX ADMIN — MORPHINE SULFATE 2 MILLIGRAM(S): 50 CAPSULE, EXTENDED RELEASE ORAL at 12:13

## 2020-05-31 NOTE — PROGRESS NOTE ADULT - SUBJECTIVE AND OBJECTIVE BOX
SEBLE ADRIAN  39y  Female      Patient is a 39y old  Female who presents with a chief complaint of severe abdominal pain (30 May 2020 07:49)      INTERVAL HPI/OVERNIGHT EVENTS:  Patient seen and examined earlier this morning.  Lying in bed and complaining of abdominal pain. Asking for pain meds.  S/P ruq sono and cta on . Await MRI. Tolerating clears      REVIEW OF SYSTEMS:  CONSTITUTIONAL: No fever, weight loss, or fatigue  EYES: No eye pain, visual disturbances, or discharge  ENMT:  No difficulty hearing, tinnitus, vertigo; No sinus or throat pain  NECK: No pain or stiffness  RESPIRATORY: No cough, wheezing, chills or hemoptysis; No shortness of breath  CARDIOVASCULAR: No chest pain, palpitations, dizziness, or leg swelling  GASTROINTESTINAL: + abdominal pain, bloating  GENITOURINARY: No dysuria, frequency, hematuria, or incontinence  NEUROLOGICAL: No headaches, memory loss, loss of strength, numbness, or tremors  SKIN: No itching, burning, rashes, or lesions   LYMPH NODES: No enlarged glands  ENDOCRINE: No heat or cold intolerance; No hair loss  MUSCULOSKELETAL: No joint pain or swelling; No muscle, back, or extremity pain  PSYCHIATRIC: No depression, anxiety, mood swings, or difficulty sleeping  HEME/LYMPH: No easy bruising, or bleeding gums  ALLERY AND IMMUNOLOGIC: No hives or eczema      Vital Signs Last 24 Hrs  T(C): 37.4 (31 May 2020 09:00), Max: 37.6 (30 May 2020 17:00)  T(F): 99.4 (31 May 2020 09:00), Max: 99.6 (30 May 2020 17:00)  HR: 112 (31 May 2020 09:00) (106 - 125)  BP: 102/60 (31 May 2020 09:00) (95/53 - 112/63)  BP(mean): 74 (31 May 2020 09:00) (71 - 88)  RR: 28 (31 May 2020 09:00) (8 - 34)  SpO2: 99% (31 May 2020 09:00) (94% - 99%) on ra      PHYSICAL EXAM:  GENERAL: NAD, well-groomed, well-developed  HEAD:  Atraumatic, Normocephalic  EYES: EOMI, PERRLA, conjunctiva and sclera clear  ENMT: No tonsillar erythema, exudates, or enlargement; Moist mucous membranes, Good dentition, No lesions  NECK: Supple, No JVD, Normal thyroid  NERVOUS SYSTEM:  Alert & Oriented X3, Good concentration; Motor Strength 5/5 B/L upper and lower extremities; DTRs 2+ intact and symmetric  CHEST/LUNG: Clear to percussion bilaterally; No rales, rhonchi, wheezing, or rubs  HEART: Regular rate and rhythm; No murmurs, rubs, or gallops  ABDOMEN: Soft, distended and tender to palpation  EXTREMITIES:  2+ Peripheral Pulses, No clubbing, cyanosis, or edema  LYMPH: No lymphadenopathy noted  SKIN: dark skin on b/l feet    Consultant(s) Notes Reviewed:  [x ] YES  [ ] NO  Care Discussed with Consultants/Other Providers [ x] YES  [ ] NO    LAB:                                   8.1    10.88 )-----------( 243      ( 31 May 2020 05:29 )             26.0     05-31    140  |  106  |  <3<L>  ----------------------------<  86  4.0   |  27  |  <0.5<L>    Ca    7.0<L>      31 May 2020 05:29  Mg     2.1     05-30    TPro  5.0<L>  /  Alb  2.0<L>  /  TBili  3.1<H>  /  DBili  x   /  AST  176<H>  /  ALT  19  /  AlkPhos  356<H>  05-31        Drug Dosing Weight  Height (cm): 162.6 (28 May 2020 23:26)  Weight (kg): 60.7 (28 May 2020 23:26)  BMI (kg/m2): 23 (28 May 2020 23:26)  BSA (m2): 1.65 (28 May 2020 23:26)        I&O's Summary    30 May 2020 07:01  -  31 May 2020 07:00  --------------------------------------------------------  IN: 2870 mL / OUT: 350 mL / NET: 2520 mL        Urinalysis Basic - ( 28 May 2020 22:10 )    Color: Yellow / Appearance: Clear / S.010 / pH: x  Gluc: x / Ketone: Negative  / Bili: Large / Urobili: 4.0 mg/dL   Blood: x / Protein: 30 mg/dL / Nitrite: Positive   Leuk Esterase: Negative / RBC: Negative / WBC 3-5 /HPF   Sq Epi: x / Non Sq Epi: Few /HPF / Bacteria: Moderate        RADIOLOGY & ADDITIONAL TESTS:  Imaging Personally Reviewed:  [x] YES  [ ] NO    HEALTH ISSUES - PROBLEM Dx:  Rash: Rash  ETOH abuse: ETOH abuse  Alcohol-induced acute pancreatitis, unspecified complication status: Alcohol-induced acute pancreatitis, unspecified complication status  Pancreatitis: Pancreatitis          MEDICATIONS  (STANDING):  chlorhexidine 4% Liquid 1 Application(s) Topical two times a day  folic acid 1 milliGRAM(s) Oral daily  meropenem  IVPB 1000 milliGRAM(s) IV Intermittent every 8 hours  multivitamin 1 Tablet(s) Oral daily  nicotine -  14 mG/24Hr(s) Patch 1 patch Transdermal daily  pantoprazole  Injectable 40 milliGRAM(s) IV Push daily  sodium chloride 0.9%. 1000 milliLiter(s) (100 mL/Hr) IV Continuous <Continuous>  thiamine 100 milliGRAM(s) Oral daily    MEDICATIONS  (PRN):  LORazepam   Injectable 2 milliGRAM(s) IV Push every 2 hours PRN Symptom-triggered: 2 point increase in CIWA -Ar score and a total score of 7 or LESS  morphine  - Injectable 2 milliGRAM(s) IV Push every 4 hours PRN Severe Pain (7 - 10)

## 2020-05-31 NOTE — PROGRESS NOTE ADULT - ASSESSMENT
39 year old female with a history of acute alcoholic pancreatitis, alcohol abuse,  presents with abdominal pain, leukocytosis, acute pancreatitis with developing pseudocysts, thickened gallbladder wall, sludge in the gallbladder elevated LFTs, acute anemia without overt GI bleeding. Ultrasound shows gallbladder sludge but a normal CBD.    I spoke to Dr. Palacios regarding the patient's CT scan: no evidence of hemorrhagic pancreatitis, can't exclude infected fluid collection.   Repeat CT showed no hemorrhagic pancreatitis    HGb stable after drop and transfusion. No overt bleeding    - Doubt cholangitis given normal bile ducts on CT and ultrasound  - Would recommend MRI with contrast and MRCP to rule it out (and to assess for infected fluid collections)  - Elevated LFTs are likely secondary to alcoholic hepatitis (>2:1 ratio between AST: ALT) but would not recommend prednisolone   (DF<32)  - Advanced to low fat diet as tolerated  - antibiotics  - continue IVF  - Check bid BUN and HCT (if either rises, increase fluids)  - bid cbc  - if overt bleeding or significant drop in HGB, call GI stat.  -surgery consult appreciated (no intervention planned)  - alcohol abstinence 39 year old female with a history of acute alcoholic pancreatitis, alcohol abuse,  presents with abdominal pain, leukocytosis, acute pancreatitis with developing pseudocysts, thickened gallbladder wall, sludge in the gallbladder elevated LFTs, acute anemia without overt GI bleeding. Ultrasound shows gallbladder sludge but a normal CBD.    I spoke to Dr. Palacios regarding the patient's CT scan: no evidence of hemorrhagic pancreatitis, can't exclude infected fluid collection.   Repeat CT showed no hemorrhagic pancreatitis    HGb stable after drop and transfusion. No overt bleeding    - Doubt cholangitis given normal bile ducts on CT and ultrasound  - Would recommend MRI with contrast and MRCP to rule it out (and to assess for infected fluid collections)  - Elevated LFTs are likely secondary to alcoholic hepatitis (>2:1 ratio between AST: ALT) but would not recommend prednisolone   (DF<32)  - Advanced to low fat diet as tolerated  - antibiotics  - continue IVF  - Check bid BUN and HCT (if either rises, increase fluids)  - if overt bleeding or significant drop in HGB, call GI stat.  -surgery consult appreciated (no intervention planned)  - alcohol abstinence  - pain control

## 2020-05-31 NOTE — PROGRESS NOTE ADULT - SUBJECTIVE AND OBJECTIVE BOX
Patient is a 39y old  Female who presents with a chief complaint of severe abdominal pain (30 May 2020 10:28)        HPI:  38 yo female, pmh of anxiety, depression, substance abuse, eoth abuse, presents to ED for "I'm in a lot of pain." States she has been having pain for several months now. Located in epigastrium, severe 10/10 aching, no radiation. Denies fever, chills, cp, sob, le swelling, back pain, neck pain, nvd, dysuria, hematuria. (28 May 2020 20:48)      Interval Events: No overnight events.    REVIEW OF SYSTEMS:     see HPi      OBJECTIVE:  ICU Vital Signs Last 24 Hrs  T(C): 36.1 (31 May 2020 07:00), Max: 37.6 (30 May 2020 17:00)  T(F): 96.9 (31 May 2020 07:00), Max: 99.6 (30 May 2020 17:00)  HR: 114 (31 May 2020 05:08) (107 - 125)  BP: 102/63 (31 May 2020 05:08) (95/53 - 112/63)  BP(mean): 75 (31 May 2020 01:22) (71 - 88)    RR: 22 (31 May 2020 07:00) (8 - 34)  SpO2: 97% (30 May 2020 20:00) (94% - 99%)        05-30 @ 07:01  -  05-31 @ 07:00  --------------------------------------------------------  IN: 2870 mL / OUT: 350 mL / NET: 2520 mL      CAPILLARY BLOOD GLUCOSE            PHYSICAL EXAM:     · CONSTITUTIONAL:   Ill appearing,   well nourished,   NAD    · ENMT:   Airway patent,   Nasal mucosa clear.  Mouth with normal mucosa.   No thrush    · EYES:   Clear bilaterally,   pupils equal,   round and reactive to light.    · CARDIAC:   Normal rate,   regular rhythm.    Heart sounds S1, S2.   No murmurs, no rubs or gallops on auscultation  no edema        CAROTID:   normal systolic impulse  no bruits    · RESPIRATORY:   rales bases  normal chest expansion  no tachypnea,  no retractions or use of accessory muscles  ss    · GASTROINTESTINAL:  Abdomen soft,   tender,   + BS      · MUSCULOSKELETAL:   no clubbing, cyanosis      · NEUROLOGICAL:   Alert and oriented   no obvious focal deficits in cranial nerve areas  no motor or sensory deficits.      · SKIN:   Skin normal color for race,   warm, dry   No evidence of rash.      · HEME LYMPH:   no splenomegaly.  No cervical  lymphadenopathy.  no inguinal lymphadenopathy    HOSPITAL MEDICATIONS:  MEDICATIONS  (STANDING):  chlorhexidine 4% Liquid 1 Application(s) Topical two times a day  folic acid 1 milliGRAM(s) Oral daily  meropenem  IVPB 1000 milliGRAM(s) IV Intermittent every 8 hours  multivitamin 1 Tablet(s) Oral daily  nicotine -  14 mG/24Hr(s) Patch 1 patch Transdermal daily  pantoprazole  Injectable 40 milliGRAM(s) IV Push daily  sodium chloride 0.9%. 1000 milliLiter(s) (100 mL/Hr) IV Continuous <Continuous>  thiamine 100 milliGRAM(s) Oral daily    MEDICATIONS  (PRN):  LORazepam   Injectable 2 milliGRAM(s) IV Push every 2 hours PRN Symptom-triggered: 2 point increase in CIWA -Ar score and a total score of 7 or LESS  morphine  - Injectable 2 milliGRAM(s) IV Push every 4 hours PRN Severe Pain (7 - 10)    sodium chloride 0.9% Bolus:   1000 milliLiter(s), IV Bolus, once, infuse over 1 Hour(s), Stop After 1 Doses  sodium chloride 0.9% Bolus:   2200 milliLiter(s), IV Bolus, once, infuse over 60 Minute(s), Stop After 1 Doses     (Calc Info: 31 milliLiter(s)/Kg/DOSE x 70 Kg = 2,200 milliLiter(s)/Dose     (Requested dose was 31 milliLiter(s) per Kg)  lactated ringers.: Solution, 1000 milliLiter(s) infuse at 125 mL/Hr  Provider's Contact #: [phone]  sodium chloride 0.9%.: Solution, 1000 milliLiter(s) infuse at 100 mL/Hr  Provider's Contact #: [phone]      LABS:                        8.1    10.88 )-----------( 243      ( 31 May 2020 05:29 )             26.0     05-31    140  |  106  |  <3<L>  ----------------------------<  86  4.0   |  27  |  <0.5<L>    Ca    7.0<L>      31 May 2020 05:29  Mg     2.1     05-30    TPro  5.0<L>  /  Alb  2.0<L>  /  TBili  3.1<H>  /  DBili  x   /  AST  176<H>  /  ALT  19  /  AlkPhos  356<H>  05-31    PT/INR - ( 30 May 2020 05:25 )   PT: 15.10 sec;   INR: 1.31 ratio         PTT - ( 30 May 2020 05:25 )  PTT:31.8 sec                  RADIOLOGY: I personally reviewed latest CXR and other pertinent films.

## 2020-05-31 NOTE — PROGRESS NOTE ADULT - ASSESSMENT
IMPRESSION:  acute/chronic pancreatitis slowly improving  phlegmon development possible infected  acute/chronic anemia   DTs  active ETOH abuse  r/o biliary obstruction    SUGGEST:  Gi/Surg f/u  keep HGB >8  serial HH  MRI/MRCP abdomen pending  cont decrease IV fluids  analgesia  PRN benzo per CIWA  US RUQ  empiric abx Meropenem  advance diet as tolerated after MRI  repeat labs  supplement lytes as needed      monitor in ICU

## 2020-05-31 NOTE — PROGRESS NOTE ADULT - SUBJECTIVE AND OBJECTIVE BOX
Patient is a 39y old  Female who presents with a chief complaint of severe abdominal pain (31 May 2020 10:20)        REVIEW OF SYSTEMS:      ICU Vital Signs Last 24 Hrs  T(C): 36.4 (31 May 2020 15:00), Max: 37.6 (30 May 2020 17:00)  T(F): 97.5 (31 May 2020 15:00), Max: 99.6 (30 May 2020 17:00)  HR: 112 (31 May 2020 15:00) (106 - 125)  BP: 111/63 (31 May 2020 15:00) (95/53 - 112/57)  BP(mean): 81 (31 May 2020 15:00) (71 - 84)  ABP: --  ABP(mean): --  RR: 24 (31 May 2020 15:00) (8 - 34)  SpO2: 98% (31 May 2020 14:00) (95% - 99%)        PHYSICAL EXAM:  GENERAL: NAD  CHEST/LUNG: Clear to percussion bilaterally; No rales, rhonchi, wheezing, or rubs  HEART: Regular rate and rhythm; No murmurs, rubs, or gallops  ABDOMEN: Soft, Nontender, Nondistended; Bowel sounds present  EXTREMITIES:  2+ Peripheral Pulses, No clubbing, cyanosis, or edema  LYMPH: No lymphadenopathy noted  SKIN: No rashes or lesions      MEDICATIONS  (STANDING):  chlorhexidine 4% Liquid 1 Application(s) Topical two times a day  folic acid 1 milliGRAM(s) Oral daily  meropenem  IVPB 1000 milliGRAM(s) IV Intermittent every 8 hours  multivitamin 1 Tablet(s) Oral daily  nicotine -  14 mG/24Hr(s) Patch 1 patch Transdermal daily  pantoprazole  Injectable 40 milliGRAM(s) IV Push daily  sodium chloride 0.9%. 1000 milliLiter(s) (100 mL/Hr) IV Continuous <Continuous>  thiamine 100 milliGRAM(s) Oral daily    MEDICATIONS  (PRN):  LORazepam   Injectable 2 milliGRAM(s) IV Push every 2 hours PRN Symptom-triggered: 2 point increase in CIWA -Ar score and a total score of 7 or LESS  morphine  - Injectable 2 milliGRAM(s) IV Push every 2 hours PRN Severe Pain (7 - 10)        Allergies    No Known Allergies        LABS:                        8.1    10.88 )-----------( 243      ( 31 May 2020 05:29 )             26.0       05-31    140  |  106  |  <3<L>  ----------------------------<  86  4.0   |  27  |  <0.5<L>    Ca    7.0<L>      31 May 2020 05:29  Mg     2.1     05-30    TPro  5.0<L>  /  Alb  2.0<L>  /  TBili  3.1<H>  /  DBili  x   /  AST  176<H>  /  ALT  19  /  AlkPhos  356<H>  05-31    PT/INR - ( 30 May 2020 05:25 )   PT: 15.10 sec;   INR: 1.31 ratio     PTT - ( 30 May 2020 05:25 )  PTT:31.8 sec        RADIOLOGY & ADDITIONAL TESTS:    < from: Xray Chest 1 View- PORTABLE-Routine (05.31.20 @ 04:55) >  New bibasilar opacity/effusions, left greater than right.    < end of copied text >    < from: CT Angio Abdomen and Pelvis w/ IV Cont (05.29.20 @ 22:24) >    No CTA evidence of acute intra-abdominal hemorrhage.    Essentially unchanged findings of pancreatitis with formation of multiple pockets of fluid in the pancreatic head, body and tail as described above.    < end of copied text >      MICROBIOLOGY DATA:      Culture - Blood (05.28.20 @ 22:15)    Specimen Source: .Blood Blood    Culture Results:   No growth to date.      Culture - Blood (05.28.20 @ 22:15)    Specimen Source: .Blood Blood    Culture Results:   No growth to date.      COVID-19 PCR . (05.28.20 @ 20:10)    COVID-19 PCR: NotDetec: This test has been validated by [facility] to be accurate;  though it has not been FDA cleared/approved by the usual pathway  As with all laboratory test, results should be correlated with clinical  findings.  https://www.fda.gov/media/803317/download  https://www.fda.gov/media/169379/download Patient is seen and examined at the bed side, is afebrile.  She is c/o abdominal pain.         REVIEW OF SYSTEMS: All other review systems are negative         ICU Vital Signs Last 24 Hrs  T(C): 36.4 (31 May 2020 15:00), Max: 37.6 (30 May 2020 17:00)  T(F): 97.5 (31 May 2020 15:00), Max: 99.6 (30 May 2020 17:00)  HR: 112 (31 May 2020 15:00) (106 - 125)  BP: 111/63 (31 May 2020 15:00) (95/53 - 112/57)  BP(mean): 81 (31 May 2020 15:00) (71 - 84)  ABP: --  ABP(mean): --  RR: 24 (31 May 2020 15:00) (8 - 34)  SpO2: 98% (31 May 2020 14:00) (95% - 99%)        PHYSICAL EXAM:  GENERAL:  Crying due to abdominal pain   CHEST/LUNG: not Using accessory muscle  HEART:  s1 and s2 present   ABDOMEN: Epigastric and RUQ tenderness   EXTREMITIES:  No pedal edema  CNS: Awake and alert          MEDICATIONS  (STANDING):  chlorhexidine 4% Liquid 1 Application(s) Topical two times a day  folic acid 1 milliGRAM(s) Oral daily  meropenem  IVPB 1000 milliGRAM(s) IV Intermittent every 8 hours  multivitamin 1 Tablet(s) Oral daily  nicotine -  14 mG/24Hr(s) Patch 1 patch Transdermal daily  pantoprazole  Injectable 40 milliGRAM(s) IV Push daily  sodium chloride 0.9%. 1000 milliLiter(s) (100 mL/Hr) IV Continuous <Continuous>  thiamine 100 milliGRAM(s) Oral daily    MEDICATIONS  (PRN):  LORazepam   Injectable 2 milliGRAM(s) IV Push every 2 hours PRN Symptom-triggered: 2 point increase in CIWA -Ar score and a total score of 7 or LESS  morphine  - Injectable 2 milliGRAM(s) IV Push every 2 hours PRN Severe Pain (7 - 10)        Allergies    No Known Allergies        LABS:                        8.1    10.88 )-----------( 243      ( 31 May 2020 05:29 )             26.0       05-31    140  |  106  |  <3<L>  ----------------------------<  86  4.0   |  27  |  <0.5<L>    Ca    7.0<L>      31 May 2020 05:29  Mg     2.1     05-30    TPro  5.0<L>  /  Alb  2.0<L>  /  TBili  3.1<H>  /  DBili  x   /  AST  176<H>  /  ALT  19  /  AlkPhos  356<H>  05-31    PT/INR - ( 30 May 2020 05:25 )   PT: 15.10 sec;   INR: 1.31 ratio     PTT - ( 30 May 2020 05:25 )  PTT:31.8 sec        RADIOLOGY & ADDITIONAL TESTS:    < from: Xray Chest 1 View- PORTABLE-Routine (05.31.20 @ 04:55) >  New bibasilar opacity/effusions, left greater than right.    < end of copied text >    < from: CT Angio Abdomen and Pelvis w/ IV Cont (05.29.20 @ 22:24) >    No CTA evidence of acute intra-abdominal hemorrhage.    Essentially unchanged findings of pancreatitis with formation of multiple pockets of fluid in the pancreatic head, body and tail as described above.    < end of copied text >      MICROBIOLOGY DATA:      Culture - Blood (05.28.20 @ 22:15)    Specimen Source: .Blood Blood    Culture Results:   No growth to date.      Culture - Blood (05.28.20 @ 22:15)    Specimen Source: .Blood Blood    Culture Results:   No growth to date.      COVID-19 PCR . (05.28.20 @ 20:10)    COVID-19 PCR: NotDetec: This test has been validated by [facility] to be accurate;  though it has not been FDA cleared/approved by the usual pathway  As with all laboratory test, results should be correlated with clinical  findings.  https://www.fda.gov/media/577394/download  https://www.fda.gov/media/911472/download

## 2020-05-31 NOTE — PROGRESS NOTE ADULT - SUBJECTIVE AND OBJECTIVE BOX
THIS NOTE IS INCOMPLETE  Gastroenterology progress note:     Patient is a 39y old  Female who presents with a chief complaint of severe abdominal pain (31 May 2020 07:38)       Admitted on: 05-28-20    We are following the patient for: Acute pancreatitis     Interval History: Received one unit PRBC     Patient's medical problems are improving/stable/not improving/unstable/   Prior records reviewed (Y/N):Y  History obtained from someone other than patient (Y/N): N      PAST MEDICAL & SURGICAL HISTORY:  ETOH abuse  Postpartum depression  Substance abuse  Anxiety  Depression  No significant past surgical history      MEDICATIONS  (STANDING):  chlorhexidine 4% Liquid 1 Application(s) Topical two times a day  folic acid 1 milliGRAM(s) Oral daily  meropenem  IVPB 1000 milliGRAM(s) IV Intermittent every 8 hours  multivitamin 1 Tablet(s) Oral daily  nicotine -  14 mG/24Hr(s) Patch 1 patch Transdermal daily  pantoprazole  Injectable 40 milliGRAM(s) IV Push daily  sodium chloride 0.9%. 1000 milliLiter(s) (100 mL/Hr) IV Continuous <Continuous>  thiamine 100 milliGRAM(s) Oral daily    MEDICATIONS  (PRN):  LORazepam   Injectable 2 milliGRAM(s) IV Push every 2 hours PRN Symptom-triggered: 2 point increase in CIWA -Ar score and a total score of 7 or LESS  morphine  - Injectable 2 milliGRAM(s) IV Push every 4 hours PRN Severe Pain (7 - 10)      Allergies  No Known Allergies      Review of Systems:   Cardiovascular:  No Chest Pain, No Palpitations  Respiratory:  No Cough, No Dyspnea  Gastrointestinal:  As described in HPI    Physical Examination:  T(C): 36.1 (05-31-20 @ 07:00), Max: 37.6 (05-30-20 @ 17:00)  HR: 106 (05-31-20 @ 07:00) (106 - 125)  BP: 107/58 (05-31-20 @ 07:00) (95/53 - 112/63)  RR: 22 (05-31-20 @ 07:00) (8 - 34)  SpO2: 97% (05-31-20 @ 07:00) (94% - 99%)      05-30-20 @ 07:01  -  05-31-20 @ 07:00  --------------------------------------------------------  IN: 2870 mL / OUT: 350 mL / NET: 2520 mL      Constitutional: No acute distress.  Respiratory:  No signs of respiratory distress. Lung sounds are clear bilaterally.  Cardiovascular:  S1 S2, Regular rate and rhythm.  Abdominal: Abdomen is soft, symmetric, and  tender without distention. There are no visible lesions. Bowel sounds are present and normoactive in all four quadrants. No masses, hepatomegaly, or splenomegaly are noted.   Skin: No rashes, No Jaundice.        Data: (reviewed by attending)                        8.1    10.88 )-----------( 243      ( 31 May 2020 05:29 )             26.0     Hgb trend:  8.1  05-31-20 @ 05:29  8.3  05-30-20 @ 05:25  6.7  05-29-20 @ 19:54  7.7  05-29-20 @ 11:35  6.7  05-29-20 @ 07:21  8.1  05-29-20 @ 00:10  8.3  05-28-20 @ 15:22      05-29-20 @ 07:01  -  05-30-20 @ 07:00  --------------------------------------------------------  IN: 272 mL      05-31    140  |  106  |  <3<L>  ----------------------------<  86  4.0   |  27  |  <0.5<L>    Ca    7.0<L>      31 May 2020 05:29  Mg     2.1     05-30    TPro  5.0<L>  /  Alb  2.0<L>  /  TBili  3.1<H>  /  DBili  x   /  AST  176<H>  /  ALT  19  /  AlkPhos  356<H>  05-31    Liver panel trend:  TBili 3.1   /      /   ALT 19   /   AlkP 356   /   Tptn 5.0   /   Alb 2.0    /   DBili --      05-31  TBili 3.6   /      /   ALT 19   /   AlkP 386   /   Tptn 4.9   /   Alb 2.2    /   DBili 2.9      05-30  TBili 4.7   /      /   ALT 22   /   AlkP 400   /   Tptn 5.2   /   Alb 2.2    /   DBili 3.7      05-29  TBili 4.8   /      /   ALT 24   /   AlkP 474   /   Tptn 6.2   /   Alb 2.6    /   DBili 3.6      05-28      PT/INR - ( 30 May 2020 05:25 )   PT: 15.10 sec;   INR: 1.31 ratio         PTT - ( 30 May 2020 05:25 )  PTT:31.8 sec    Culture - Blood (collected 28 May 2020 22:15)  Source: .Blood Blood  Preliminary Report (30 May 2020 17:01):    No growth to date.    Culture - Blood (collected 28 May 2020 22:15)  Source: .Blood Blood  Preliminary Report (30 May 2020 17:01):    No growth to date.    Culture - Urine (collected 28 May 2020 22:10)  Source: .Urine Clean Catch (Midstream)  Final Report (30 May 2020 12:40):    <10,000 CFU/mL Normal Urogenital Nika         Radiology: (reviewed by attending)    US Abdomen Limited:   EXAM:  US ABDOMEN LIMITED            PROCEDURE DATE:  05/29/2020            INTERPRETATION:  CLINICAL HISTORY: Transaminitis..    COMPARISON: CT abdomen pelvis 5/28/2020..    PROCEDURE: Limited portable ultrasound of the right upper quadrant was performed.    FINDINGS:    LIVER: Diffusely increased echogenicity of liver. Hepatomegaly measuring 20.9 cm in length.     GALLBLADDER/BILIARY TREE:  Gallbladder sludge, no calculi. Gallbladder wall thickening measuring 0.7 cm. No pericholecystic fluid.Reportedly negative sonographic Patton's sign. No intrahepatic biliary ductal dilatation. The common bile duct measures 4.8 mm, which is normal.     PANCREAS: Pancreas is obscured by bowel gas.    KIDNEY:  Right kidney measures 9.5 cm in length. No hydronephrosis, calculi or solid mass.    AORTA/IVC:  Visualized proximal portions unremarkable.    ASCITES:  None.    IMPRESSION:    Hepatic steatosis and hepatomegaly.    Gallbladder sludge with gallbladder wall thickening 0.7 cm. Reportedly negativesonographic Patton's sign.    CBD 4.8 mm.                  TIFFANY VAZQUEZ M.D., ATTENDING RADIOLOGIST  This document has been electronically signed. May 29 2020 11:21AM             (05-29-20 @ 11:03) THIS NOTE IS INCOMPLETE  Gastroenterology progress note:     Patient is a 39y old  Female who presents with a chief complaint of severe abdominal pain (31 May 2020 07:38)       Admitted on: 05-28-20    We are following the patient for: Acute pancreatitis     Interval History: Received one unit PRBC. Reports severe abdominal pain and asking for pain meds. tolerating clear liquids       Prior records reviewed (Y/N):Y  History obtained from someone other than patient (Y/N): N      PAST MEDICAL & SURGICAL HISTORY:  ETOH abuse  Postpartum depression  Substance abuse  Anxiety  Depression  No significant past surgical history      MEDICATIONS  (STANDING):  chlorhexidine 4% Liquid 1 Application(s) Topical two times a day  folic acid 1 milliGRAM(s) Oral daily  meropenem  IVPB 1000 milliGRAM(s) IV Intermittent every 8 hours  multivitamin 1 Tablet(s) Oral daily  nicotine -  14 mG/24Hr(s) Patch 1 patch Transdermal daily  pantoprazole  Injectable 40 milliGRAM(s) IV Push daily  sodium chloride 0.9%. 1000 milliLiter(s) (100 mL/Hr) IV Continuous <Continuous>  thiamine 100 milliGRAM(s) Oral daily    MEDICATIONS  (PRN):  LORazepam   Injectable 2 milliGRAM(s) IV Push every 2 hours PRN Symptom-triggered: 2 point increase in CIWA -Ar score and a total score of 7 or LESS  morphine  - Injectable 2 milliGRAM(s) IV Push every 4 hours PRN Severe Pain (7 - 10)      Allergies  No Known Allergies      Review of Systems:   Cardiovascular:  No Chest Pain, No Palpitations  Respiratory:  No Cough, No Dyspnea  Gastrointestinal:  As described in HPI    Physical Examination:  T(C): 36.1 (05-31-20 @ 07:00), Max: 37.6 (05-30-20 @ 17:00)  HR: 106 (05-31-20 @ 07:00) (106 - 125)  BP: 107/58 (05-31-20 @ 07:00) (95/53 - 112/63)  RR: 22 (05-31-20 @ 07:00) (8 - 34)  SpO2: 97% (05-31-20 @ 07:00) (94% - 99%)      05-30-20 @ 07:01  -  05-31-20 @ 07:00  --------------------------------------------------------  IN: 2870 mL / OUT: 350 mL / NET: 2520 mL      Constitutional: No acute distress.  Respiratory:  No signs of respiratory distress. Lung sounds are clear bilaterally.  Cardiovascular:  S1 S2, Regular rate and rhythm.  Abdominal: Abdomen is soft, symmetric, and mildly tender with distention. There are no visible lesions. Bowel sounds are present and normoactive in all four quadrants. No masses, hepatomegaly, or splenomegaly are noted.   Skin: No rashes, No Jaundice.        Data: (reviewed by attending)                        8.1    10.88 )-----------( 243      ( 31 May 2020 05:29 )             26.0     Hgb trend:  8.1  05-31-20 @ 05:29  8.3  05-30-20 @ 05:25  6.7  05-29-20 @ 19:54  7.7  05-29-20 @ 11:35  6.7  05-29-20 @ 07:21  8.1  05-29-20 @ 00:10  8.3  05-28-20 @ 15:22      05-29-20 @ 07:01  -  05-30-20 @ 07:00  --------------------------------------------------------  IN: 272 mL      05-31    140  |  106  |  <3<L>  ----------------------------<  86  4.0   |  27  |  <0.5<L>    Ca    7.0<L>      31 May 2020 05:29  Mg     2.1     05-30    TPro  5.0<L>  /  Alb  2.0<L>  /  TBili  3.1<H>  /  DBili  x   /  AST  176<H>  /  ALT  19  /  AlkPhos  356<H>  05-31    Liver panel trend:  TBili 3.1   /      /   ALT 19   /   AlkP 356   /   Tptn 5.0   /   Alb 2.0    /   DBili --      05-31  TBili 3.6   /      /   ALT 19   /   AlkP 386   /   Tptn 4.9   /   Alb 2.2    /   DBili 2.9      05-30  TBili 4.7   /      /   ALT 22   /   AlkP 400   /   Tptn 5.2   /   Alb 2.2    /   DBili 3.7      05-29  TBili 4.8   /      /   ALT 24   /   AlkP 474   /   Tptn 6.2   /   Alb 2.6    /   DBili 3.6      05-28      PT/INR - ( 30 May 2020 05:25 )   PT: 15.10 sec;   INR: 1.31 ratio         PTT - ( 30 May 2020 05:25 )  PTT:31.8 sec    Culture - Blood (collected 28 May 2020 22:15)  Source: .Blood Blood  Preliminary Report (30 May 2020 17:01):    No growth to date.    Culture - Blood (collected 28 May 2020 22:15)  Source: .Blood Blood  Preliminary Report (30 May 2020 17:01):    No growth to date.    Culture - Urine (collected 28 May 2020 22:10)  Source: .Urine Clean Catch (Midstream)  Final Report (30 May 2020 12:40):    <10,000 CFU/mL Normal Urogenital Nika         Radiology: (reviewed by attending)    US Abdomen Limited:   EXAM:  US ABDOMEN LIMITED            PROCEDURE DATE:  05/29/2020            INTERPRETATION:  CLINICAL HISTORY: Transaminitis..    COMPARISON: CT abdomen pelvis 5/28/2020..    PROCEDURE: Limited portable ultrasound of the right upper quadrant was performed.    FINDINGS:    LIVER: Diffusely increased echogenicity of liver. Hepatomegaly measuring 20.9 cm in length.     GALLBLADDER/BILIARY TREE:  Gallbladder sludge, no calculi. Gallbladder wall thickening measuring 0.7 cm. No pericholecystic fluid.Reportedly negative sonographic Patton's sign. No intrahepatic biliary ductal dilatation. The common bile duct measures 4.8 mm, which is normal.     PANCREAS: Pancreas is obscured by bowel gas.    KIDNEY:  Right kidney measures 9.5 cm in length. No hydronephrosis, calculi or solid mass.    AORTA/IVC:  Visualized proximal portions unremarkable.    ASCITES:  None.    IMPRESSION:    Hepatic steatosis and hepatomegaly.    Gallbladder sludge with gallbladder wall thickening 0.7 cm. Reportedly negativesonographic Patton's sign.    CBD 4.8 mm.                  TIFFANY VAZQUEZ M.D., ATTENDING RADIOLOGIST  This document has been electronically signed. May 29 2020 11:21AM             (05-29-20 @ 11:03) Gastroenterology progress note:     Patient is a 39y old  Female who presents with a chief complaint of severe abdominal pain (31 May 2020 07:38)       Admitted on: 05-28-20    We are following the patient for: Acute pancreatitis     Interval History: Received one unit PRBC. Reports severe abdominal pain and asking for pain meds. tolerating clear liquids       Prior records reviewed (Y/N):Y  History obtained from someone other than patient (Y/N): N      PAST MEDICAL & SURGICAL HISTORY:  ETOH abuse  Postpartum depression  Substance abuse  Anxiety  Depression  No significant past surgical history      MEDICATIONS  (STANDING):  chlorhexidine 4% Liquid 1 Application(s) Topical two times a day  folic acid 1 milliGRAM(s) Oral daily  meropenem  IVPB 1000 milliGRAM(s) IV Intermittent every 8 hours  multivitamin 1 Tablet(s) Oral daily  nicotine -  14 mG/24Hr(s) Patch 1 patch Transdermal daily  pantoprazole  Injectable 40 milliGRAM(s) IV Push daily  sodium chloride 0.9%. 1000 milliLiter(s) (100 mL/Hr) IV Continuous <Continuous>  thiamine 100 milliGRAM(s) Oral daily    MEDICATIONS  (PRN):  LORazepam   Injectable 2 milliGRAM(s) IV Push every 2 hours PRN Symptom-triggered: 2 point increase in CIWA -Ar score and a total score of 7 or LESS  morphine  - Injectable 2 milliGRAM(s) IV Push every 4 hours PRN Severe Pain (7 - 10)      Allergies  No Known Allergies      Review of Systems:   Cardiovascular:  No Chest Pain, No Palpitations  Respiratory:  No Cough, No Dyspnea  Gastrointestinal:  As described in HPI    Physical Examination:  T(C): 36.1 (05-31-20 @ 07:00), Max: 37.6 (05-30-20 @ 17:00)  HR: 106 (05-31-20 @ 07:00) (106 - 125)  BP: 107/58 (05-31-20 @ 07:00) (95/53 - 112/63)  RR: 22 (05-31-20 @ 07:00) (8 - 34)  SpO2: 97% (05-31-20 @ 07:00) (94% - 99%)      05-30-20 @ 07:01  -  05-31-20 @ 07:00  --------------------------------------------------------  IN: 2870 mL / OUT: 350 mL / NET: 2520 mL      Constitutional: No acute distress.  Respiratory:  No signs of respiratory distress. Lung sounds are clear bilaterally.  Cardiovascular:  S1 S2, Regular rate and rhythm.  Abdominal: Abdomen is soft, symmetric, and mildly tender with distention. There are no visible lesions. Bowel sounds are present and normoactive in all four quadrants. No masses, hepatomegaly, or splenomegaly are noted.   Skin: No rashes, No Jaundice.        Data: (reviewed by attending)                        8.1    10.88 )-----------( 243      ( 31 May 2020 05:29 )             26.0     Hgb trend:  8.1  05-31-20 @ 05:29  8.3  05-30-20 @ 05:25  6.7  05-29-20 @ 19:54  7.7  05-29-20 @ 11:35  6.7  05-29-20 @ 07:21  8.1  05-29-20 @ 00:10  8.3  05-28-20 @ 15:22      05-29-20 @ 07:01  -  05-30-20 @ 07:00  --------------------------------------------------------  IN: 272 mL      05-31    140  |  106  |  <3<L>  ----------------------------<  86  4.0   |  27  |  <0.5<L>    Ca    7.0<L>      31 May 2020 05:29  Mg     2.1     05-30    TPro  5.0<L>  /  Alb  2.0<L>  /  TBili  3.1<H>  /  DBili  x   /  AST  176<H>  /  ALT  19  /  AlkPhos  356<H>  05-31    Liver panel trend:  TBili 3.1   /      /   ALT 19   /   AlkP 356   /   Tptn 5.0   /   Alb 2.0    /   DBili --      05-31  TBili 3.6   /      /   ALT 19   /   AlkP 386   /   Tptn 4.9   /   Alb 2.2    /   DBili 2.9      05-30  TBili 4.7   /      /   ALT 22   /   AlkP 400   /   Tptn 5.2   /   Alb 2.2    /   DBili 3.7      05-29  TBili 4.8   /      /   ALT 24   /   AlkP 474   /   Tptn 6.2   /   Alb 2.6    /   DBili 3.6      05-28      PT/INR - ( 30 May 2020 05:25 )   PT: 15.10 sec;   INR: 1.31 ratio         PTT - ( 30 May 2020 05:25 )  PTT:31.8 sec    Culture - Blood (collected 28 May 2020 22:15)  Source: .Blood Blood  Preliminary Report (30 May 2020 17:01):    No growth to date.    Culture - Blood (collected 28 May 2020 22:15)  Source: .Blood Blood  Preliminary Report (30 May 2020 17:01):    No growth to date.    Culture - Urine (collected 28 May 2020 22:10)  Source: .Urine Clean Catch (Midstream)  Final Report (30 May 2020 12:40):    <10,000 CFU/mL Normal Urogenital Nika         Radiology: (reviewed by attending)    US Abdomen Limited:   EXAM:  US ABDOMEN LIMITED            PROCEDURE DATE:  05/29/2020            INTERPRETATION:  CLINICAL HISTORY: Transaminitis..    COMPARISON: CT abdomen pelvis 5/28/2020..    PROCEDURE: Limited portable ultrasound of the right upper quadrant was performed.    FINDINGS:    LIVER: Diffusely increased echogenicity of liver. Hepatomegaly measuring 20.9 cm in length.     GALLBLADDER/BILIARY TREE:  Gallbladder sludge, no calculi. Gallbladder wall thickening measuring 0.7 cm. No pericholecystic fluid.Reportedly negative sonographic Patton's sign. No intrahepatic biliary ductal dilatation. The common bile duct measures 4.8 mm, which is normal.     PANCREAS: Pancreas is obscured by bowel gas.    KIDNEY:  Right kidney measures 9.5 cm in length. No hydronephrosis, calculi or solid mass.    AORTA/IVC:  Visualized proximal portions unremarkable.    ASCITES:  None.    IMPRESSION:    Hepatic steatosis and hepatomegaly.    Gallbladder sludge with gallbladder wall thickening 0.7 cm. Reportedly negativesonographic Patton's sign.    CBD 4.8 mm.                  TIFFANY VAZQUEZ M.D., ATTENDING RADIOLOGIST  This document has been electronically signed. May 29 2020 11:21AM             (05-29-20 @ 11:03)

## 2020-05-31 NOTE — PROGRESS NOTE ADULT - ASSESSMENT
38 yo female, pmh of anxiety, depression, substance abuse, eoth abuse, presents to ED for epigastric pain    #) acute pancreatitis   - management as per ICU  - Lipase 115 on presentation and CT a/p shows evidence of pancreatitis  - continue IVF and IV Meropenem   - Monitor I&O's  - diet advanced to clears on 5/30 - advance as tolerated  - CT angio and ruq sono reviewed   - await MRI  - GI following  - pain control     #) Elevated lactate on admission - resolved   - Lactate 6.3, with WBC 18  - Possible Hypoperfusion and acute phase reaction vs possible cholangitis  - Cont IVF and IV Meropenem    #) Acute Anemia - watch closely   - Hgb 13 3 weeks ago   - transfused 1 unit prbc on 5/29  - No sign of active GI bleed and CT shows no retroperitoneal bleed  - Anemia and hemolytic workup  - active type and screen 05/29  - Keep Hgb > 7    #) Alcoholic hepatitis  - Transaminitis with ETOH hepatitis pattern  - T bili improving  - INR/PT/PTT within normal  - Cont to monitor enzyme and coagulation daily  - GI following    #) ETOH abuse, continuous  - Last drink 05/28  - Drinks 1-2 pints daily  - completed CIWA protocol - pt is on PRN ativan    VTE ppx SCD given possible active bleed'  On clears  Full code    Progress Note Handoff  Pending Consults: none  Pending Tests: labs  Pending Results: labs  Family Discussion: discussed pain control and treatment plan for pancreatitis with pt - in agreement with the plan  Disposition: Home_____/SNF______/Other_____/Unknown at this time_x____    Please call me with any questions at extension [phone]

## 2020-06-01 LAB
ALBUMIN SERPL ELPH-MCNC: 2 G/DL — LOW (ref 3.5–5.2)
ALP SERPL-CCNC: 341 U/L — HIGH (ref 30–115)
ALT FLD-CCNC: 19 U/L — SIGNIFICANT CHANGE UP (ref 0–41)
ANION GAP SERPL CALC-SCNC: 10 MMOL/L — SIGNIFICANT CHANGE UP (ref 7–14)
ANION GAP SERPL CALC-SCNC: 11 MMOL/L — SIGNIFICANT CHANGE UP (ref 7–14)
AST SERPL-CCNC: 172 U/L — HIGH (ref 0–41)
BASOPHILS # BLD AUTO: 0.11 K/UL — SIGNIFICANT CHANGE UP (ref 0–0.2)
BASOPHILS NFR BLD AUTO: 1 % — SIGNIFICANT CHANGE UP (ref 0–1)
BENZOYLEGONINE, UR RESULT: >5000 NG/ML — SIGNIFICANT CHANGE UP
BILIRUB SERPL-MCNC: 3 MG/DL — HIGH (ref 0.2–1.2)
BUN SERPL-MCNC: <3 MG/DL — LOW (ref 10–20)
BUN SERPL-MCNC: <3 MG/DL — LOW (ref 10–20)
BZE UR QL SCN: >5000 NG/ML — SIGNIFICANT CHANGE UP
CALCIUM SERPL-MCNC: 7.4 MG/DL — LOW (ref 8.5–10.1)
CALCIUM SERPL-MCNC: 7.5 MG/DL — LOW (ref 8.5–10.1)
CHLORIDE SERPL-SCNC: 106 MMOL/L — SIGNIFICANT CHANGE UP (ref 98–110)
CHLORIDE SERPL-SCNC: 106 MMOL/L — SIGNIFICANT CHANGE UP (ref 98–110)
CO2 SERPL-SCNC: 21 MMOL/L — SIGNIFICANT CHANGE UP (ref 17–32)
CO2 SERPL-SCNC: 23 MMOL/L — SIGNIFICANT CHANGE UP (ref 17–32)
COCAINE IN-HOUSE INTERPRETATION: POSITIVE
COCAINE UR QL SCN: POSITIVE
CREAT SERPL-MCNC: <0.5 MG/DL — LOW (ref 0.7–1.5)
CREAT SERPL-MCNC: <0.5 MG/DL — LOW (ref 0.7–1.5)
EOSINOPHIL # BLD AUTO: 0.2 K/UL — SIGNIFICANT CHANGE UP (ref 0–0.7)
EOSINOPHIL NFR BLD AUTO: 1.8 % — SIGNIFICANT CHANGE UP (ref 0–8)
GLUCOSE SERPL-MCNC: 110 MG/DL — HIGH (ref 70–99)
GLUCOSE SERPL-MCNC: 93 MG/DL — SIGNIFICANT CHANGE UP (ref 70–99)
HCT VFR BLD CALC: 27 % — LOW (ref 37–47)
HGB BLD-MCNC: 8.4 G/DL — LOW (ref 12–16)
IMM GRANULOCYTES NFR BLD AUTO: 0.8 % — HIGH (ref 0.1–0.3)
LYMPHOCYTES # BLD AUTO: 1.24 K/UL — SIGNIFICANT CHANGE UP (ref 1.2–3.4)
LYMPHOCYTES # BLD AUTO: 11.4 % — LOW (ref 20.5–51.1)
MAGNESIUM SERPL-MCNC: 1.6 MG/DL — LOW (ref 1.8–2.4)
MAGNESIUM SERPL-MCNC: 2 MG/DL — SIGNIFICANT CHANGE UP (ref 1.8–2.4)
MCHC RBC-ENTMCNC: 31.1 G/DL — LOW (ref 32–37)
MCHC RBC-ENTMCNC: 31.9 PG — HIGH (ref 27–31)
MCV RBC AUTO: 102.7 FL — HIGH (ref 81–99)
MONOCYTES # BLD AUTO: 0.92 K/UL — HIGH (ref 0.1–0.6)
MONOCYTES NFR BLD AUTO: 8.4 % — SIGNIFICANT CHANGE UP (ref 1.7–9.3)
NEUTROPHILS # BLD AUTO: 8.36 K/UL — HIGH (ref 1.4–6.5)
NEUTROPHILS NFR BLD AUTO: 76.6 % — HIGH (ref 42.2–75.2)
NRBC # BLD: 0 /100 WBCS — SIGNIFICANT CHANGE UP (ref 0–0)
PHOSPHATE SERPL-MCNC: 1.6 MG/DL — LOW (ref 2.1–4.9)
PHOSPHATE SERPL-MCNC: 2.2 MG/DL — SIGNIFICANT CHANGE UP (ref 2.1–4.9)
PLATELET # BLD AUTO: 233 K/UL — SIGNIFICANT CHANGE UP (ref 130–400)
POTASSIUM SERPL-MCNC: 3.6 MMOL/L — SIGNIFICANT CHANGE UP (ref 3.5–5)
POTASSIUM SERPL-MCNC: 4.3 MMOL/L — SIGNIFICANT CHANGE UP (ref 3.5–5)
POTASSIUM SERPL-SCNC: 3.6 MMOL/L — SIGNIFICANT CHANGE UP (ref 3.5–5)
POTASSIUM SERPL-SCNC: 4.3 MMOL/L — SIGNIFICANT CHANGE UP (ref 3.5–5)
PROT SERPL-MCNC: 4.8 G/DL — LOW (ref 6–8)
RBC # BLD: 2.63 M/UL — LOW (ref 4.2–5.4)
RBC # FLD: 22.4 % — HIGH (ref 11.5–14.5)
SODIUM SERPL-SCNC: 138 MMOL/L — SIGNIFICANT CHANGE UP (ref 135–146)
SODIUM SERPL-SCNC: 139 MMOL/L — SIGNIFICANT CHANGE UP (ref 135–146)
WBC # BLD: 10.92 K/UL — HIGH (ref 4.8–10.8)
WBC # FLD AUTO: 10.92 K/UL — HIGH (ref 4.8–10.8)

## 2020-06-01 PROCEDURE — 99232 SBSQ HOSP IP/OBS MODERATE 35: CPT

## 2020-06-01 PROCEDURE — 99233 SBSQ HOSP IP/OBS HIGH 50: CPT

## 2020-06-01 RX ORDER — HYDROMORPHONE HYDROCHLORIDE 2 MG/ML
1 INJECTION INTRAMUSCULAR; INTRAVENOUS; SUBCUTANEOUS
Refills: 0 | Status: DISCONTINUED | OUTPATIENT
Start: 2020-06-01 | End: 2020-06-04

## 2020-06-01 RX ORDER — MAGNESIUM SULFATE 500 MG/ML
2 VIAL (ML) INJECTION ONCE
Refills: 0 | Status: COMPLETED | OUTPATIENT
Start: 2020-06-01 | End: 2020-06-01

## 2020-06-01 RX ORDER — POTASSIUM PHOSPHATE, MONOBASIC POTASSIUM PHOSPHATE, DIBASIC 236; 224 MG/ML; MG/ML
30 INJECTION, SOLUTION INTRAVENOUS ONCE
Refills: 0 | Status: COMPLETED | OUTPATIENT
Start: 2020-06-01 | End: 2020-06-01

## 2020-06-01 RX ORDER — SODIUM CHLORIDE 9 MG/ML
1000 INJECTION INTRAMUSCULAR; INTRAVENOUS; SUBCUTANEOUS
Refills: 0 | Status: DISCONTINUED | OUTPATIENT
Start: 2020-06-01 | End: 2020-06-04

## 2020-06-01 RX ADMIN — Medication 2 MILLIGRAM(S): at 01:04

## 2020-06-01 RX ADMIN — Medication 1 PATCH: at 11:22

## 2020-06-01 RX ADMIN — MORPHINE SULFATE 4 MILLIGRAM(S): 50 CAPSULE, EXTENDED RELEASE ORAL at 07:21

## 2020-06-01 RX ADMIN — CHLORHEXIDINE GLUCONATE 1 APPLICATION(S): 213 SOLUTION TOPICAL at 06:03

## 2020-06-01 RX ADMIN — SODIUM CHLORIDE 75 MILLILITER(S): 9 INJECTION INTRAMUSCULAR; INTRAVENOUS; SUBCUTANEOUS at 20:11

## 2020-06-01 RX ADMIN — SODIUM CHLORIDE 100 MILLILITER(S): 9 INJECTION INTRAMUSCULAR; INTRAVENOUS; SUBCUTANEOUS at 06:03

## 2020-06-01 RX ADMIN — Medication 1 MILLIGRAM(S): at 11:21

## 2020-06-01 RX ADMIN — CHLORHEXIDINE GLUCONATE 1 APPLICATION(S): 213 SOLUTION TOPICAL at 15:34

## 2020-06-01 RX ADMIN — Medication 1 TABLET(S): at 11:21

## 2020-06-01 RX ADMIN — Medication 1 PATCH: at 20:10

## 2020-06-01 RX ADMIN — PANTOPRAZOLE SODIUM 40 MILLIGRAM(S): 20 TABLET, DELAYED RELEASE ORAL at 11:22

## 2020-06-01 RX ADMIN — Medication 2 MILLIGRAM(S): at 20:10

## 2020-06-01 RX ADMIN — Medication 50 GRAM(S): at 10:24

## 2020-06-01 RX ADMIN — SODIUM CHLORIDE 75 MILLILITER(S): 9 INJECTION INTRAMUSCULAR; INTRAVENOUS; SUBCUTANEOUS at 10:23

## 2020-06-01 RX ADMIN — HYDROMORPHONE HYDROCHLORIDE 1 MILLIGRAM(S): 2 INJECTION INTRAMUSCULAR; INTRAVENOUS; SUBCUTANEOUS at 22:09

## 2020-06-01 RX ADMIN — HYDROMORPHONE HYDROCHLORIDE 1 MILLIGRAM(S): 2 INJECTION INTRAMUSCULAR; INTRAVENOUS; SUBCUTANEOUS at 19:00

## 2020-06-01 RX ADMIN — MEROPENEM 100 MILLIGRAM(S): 1 INJECTION INTRAVENOUS at 15:31

## 2020-06-01 RX ADMIN — POTASSIUM PHOSPHATE, MONOBASIC POTASSIUM PHOSPHATE, DIBASIC 83.33 MILLIMOLE(S): 236; 224 INJECTION, SOLUTION INTRAVENOUS at 11:44

## 2020-06-01 RX ADMIN — HYDROMORPHONE HYDROCHLORIDE 1 MILLIGRAM(S): 2 INJECTION INTRAMUSCULAR; INTRAVENOUS; SUBCUTANEOUS at 15:40

## 2020-06-01 RX ADMIN — MEROPENEM 100 MILLIGRAM(S): 1 INJECTION INTRAVENOUS at 22:09

## 2020-06-01 RX ADMIN — Medication 100 MILLIGRAM(S): at 11:21

## 2020-06-01 RX ADMIN — Medication 1 PATCH: at 11:21

## 2020-06-01 RX ADMIN — Medication 1 PATCH: at 07:58

## 2020-06-01 RX ADMIN — MEROPENEM 100 MILLIGRAM(S): 1 INJECTION INTRAVENOUS at 06:06

## 2020-06-01 RX ADMIN — HYDROMORPHONE HYDROCHLORIDE 1 MILLIGRAM(S): 2 INJECTION INTRAMUSCULAR; INTRAVENOUS; SUBCUTANEOUS at 12:00

## 2020-06-01 NOTE — PROGRESS NOTE ADULT - ASSESSMENT
A 38 yo female, pmh of anxiety, depression, substance abuse, eoth abuse, presents to ED for "I'm in a lot of pain." States she has been having pain for several months now. Located in epigastrium, severe 10/10 aching, no radiation. Denies fever, chills, cp, sob, le swelling, back pain, neck pain, nvd, dysuria, hematuria. (28 May 2020 20:48)      # Alcohol-induced acute pancreatitis with multiple pockets of fluid in the pancreatic head    # ETOH abuse.  Recommendation: chronic abuse  substance abuse eval.     #  Rash    would recommend:    1. NPO, IVF and pain management  2. Optimize pain management   3. IR evaluation for possible aspiration of fluid around pancreatic head      d/w patient and ICU team        Attending Attestation:   Spent critical  care time 45 minutes  on total encounter; more than 50% of the visit was spent counseling and/or coordinating care by the attending physician.

## 2020-06-01 NOTE — PROGRESS NOTE ADULT - ASSESSMENT
39 year old female with a history of acute alcoholic pancreatitis, alcohol abuse,  presents with abdominal pain, leukocytosis, acute pancreatitis with developing pseudocysts, thickened gallbladder wall, sludge in the gallbladder elevated LFTs, acute anemia without overt GI bleeding. Ultrasound shows gallbladder sludge but a normal CBD.    Dr. Kennedy spoke to Dr. Palacios regarding the patient's CT scan: no evidence of hemorrhagic pancreatitis, can't exclude infected fluid collection.   Repeat CT showed no hemorrhagic pancreatitis    HGb stable after drop and transfusion. No overt bleeding    Problem 1-Acute complicated pancreatitis   Rec  - Doubt cholangitis given normal bile ducts on CT and ultrasound  -MRCP appreciated   - Elevated LFTs are likely secondary to alcoholic hepatitis (>2:1 ratio between AST: ALT) but would not recommend prednisolone   (DF<32)  -Put back to NPO  - antibiotics  - continue IVF  - Check bid BUN and HCT (if either rises, increase fluids) but patient with evidence of volume overload so aggressive fluid hydration ability limited at time  - if overt bleeding or significant drop in HGB, call GI stat.  -surgery consult appreciated (no intervention planned)  - alcohol abstinence  - pain control 39 year old female with a history of acute alcoholic pancreatitis, alcohol abuse,  presents with abdominal pain, leukocytosis, acute pancreatitis with developing pseudocysts, thickened gallbladder wall, sludge in the gallbladder elevated LFTs, acute anemia without overt GI bleeding. Ultrasound shows gallbladder sludge but a normal CBD.    Dr. Kennedy spoke to Dr. Palacios regarding the patient's CT scan: no evidence of hemorrhagic pancreatitis, can't exclude infected fluid collection.   Repeat CT showed no hemorrhagic pancreatitis    HGb stable after drop and transfusion. No overt bleeding    Problem 1-Acute complicated pancreatitis likely secondary to alcohol with superimposed biliary pancreatitis given gallbladder sludge   Rec  - Doubt cholangitis given normal bile ducts on CT and ultrasound and on MR  -MRCP appreciated   - Elevated LFTs are likely secondary to alcoholic hepatitis (>2:1 ratio between AST: ALT) but would not recommend prednisolone   (DF<32)  -Put back to NPO  - Continue antibiotics  - continue IVF  - Check bid BUN and HCT (if either rises, increase fluids) but patient with evidence of volume overload so aggressive fluid hydration ability limited at present  - if overt bleeding or significant drop in HGB, call GI stat.  -surgery consult appreciated (no intervention planned)  - alcohol abstinence  - pain control    Problem 2- Marked hepatomegaly steatosis.  Rec  -Dietary and lifestyle modifications advised

## 2020-06-01 NOTE — PROGRESS NOTE ADULT - ASSESSMENT
40 yo female, pmh of anxiety, depression, substance abuse, eoth abuse, presents to ED for epigastric pain    #) acute pancreatitis / alcohol abuse / substance abuse / hypokalemia - hypophosphatemia - hypomagnesemia    - repeat  CT a/p as above   - MRCP done pending radiology read  - continue IVF and IV Meropenem   - not tolerating clears, now with recurring severe pain   - supplement electrolytes and follow repeat levels    - NPO  - DC 2 orders for prn morphine, start Dilaudid 1mg q3prn for pain  - GI following  - ativan prn to prevent alcohol withdrawal       #) Acute Anemia - watch closely   - Hgb 13 3 weeks ago   - transfused 1 unit prbc on 5/29  - No sign of active GI bleed and CT shows no retroperitoneal bleed  - Anemia and hemolytic workup  - active type and screen 05/29  - Keep Hgb > 7   - sequential stockings for DVT prophylaxis secondary to anemia      Full code

## 2020-06-01 NOTE — PROGRESS NOTE ADULT - ASSESSMENT
40 yo female, pmh of anxiety, depression, substance abuse, eoth abuse, presents to ED for epigastric pain    #) acute pancreatitis   - management as per ICU  - Lipase 115 on presentation and CT a/p shows evidence of pancreatitis  - continue IVF and IV Meropenem   - Monitor I&O's  - diet advanced to clears on 5/30 - advance as tolerated  - CT angio and ruq sono reviewed   - await MRI(pending read)  - GI following  - pain control     #) Elevated lactate on admission - resolved   - Lactate 6.3, with WBC 18  - Possible Hypoperfusion and acute phase reaction vs possible cholangitis  - Cont IVF and IV Meropenem    #) Acute Anemia - watch closely   - Hgb 13 3 weeks ago   - transfused 1 unit prbc on 5/29  - No sign of active GI bleed and CT shows no retroperitoneal bleed  - Anemia and hemolytic workup  - active type and screen 05/29  - Keep Hgb > 7  -stable    #) Alcoholic hepatitis  - Transaminitis with ETOH hepatitis pattern  - T bili improving  - INR/PT/PTT within normal  - Cont to monitor enzyme and coagulation daily  - GI following    #) ETOH abuse, continuous  - Last drink 05/28  - Drinks 1-2 pints daily  - completed CIWA protocol - pt is on PRN ativan    VTE ppx SCD given possible active bleed  On clears  Full code    # dispo; after medical optimization

## 2020-06-01 NOTE — PROGRESS NOTE ADULT - SUBJECTIVE AND OBJECTIVE BOX
Patient is a 39y old  Female who presents with a chief complaint of severe abdominal pain (01 Jun 2020 07:57)      Over Night Events:  Patient seen and examined.   look comfortable but said still has abd pain     ROS:  See HPI    PHYSICAL EXAM    ICU Vital Signs Last 24 Hrs  T(C): 37.2 (01 Jun 2020 07:02), Max: 37.7 (31 May 2020 19:00)  T(F): 99 (01 Jun 2020 07:02), Max: 99.8 (31 May 2020 19:00)  HR: 111 (01 Jun 2020 08:00) (100 - 132)  BP: 120/69 (01 Jun 2020 08:00) (96/55 - 120/69)  BP(mean): 88 (01 Jun 2020 08:00) (69 - 88)  ABP: --  ABP(mean): --  RR: 24 (01 Jun 2020 08:00) (14 - 30)  SpO2: 100% (01 Jun 2020 07:30) (95% - 100%)      General: Aox3  HEENT:          romaine      Lymph Nodes: NO cervical LN   Lungs: Bilateral BS  Cardiovascular: Regular   Abdomen: Soft, Positive BS mild epigastric tenderness   Extremities: No clubbing   Skin: warm   Neurological: no focal deficit   Musculoskeletal: move all ext     I&O's Detail    31 May 2020 07:01  -  01 Jun 2020 07:00  --------------------------------------------------------  IN:    IV PiggyBack: 150 mL    sodium chloride 0.9%.: 1900 mL  Total IN: 2050 mL    OUT:  Total OUT: 0 mL    Total NET: 2050 mL      01 Jun 2020 07:01  -  01 Jun 2020 08:13  --------------------------------------------------------  IN:    sodium chloride 0.9%.: 200 mL  Total IN: 200 mL    OUT:  Total OUT: 0 mL    Total NET: 200 mL          LABS:                          8.4    10.92 )-----------( 233      ( 01 Jun 2020 06:03 )             27.0         31 May 2020 05:29    140    |  106    |  <3     ----------------------------<  86     4.0     |  27     |  <0.5     Ca    7.0        31 May 2020 05:29                                                                                          Lactate, Blood: 0.9 mmol/L (05-30-20 @ 05:25)  Lactate, Blood: 1.9 mmol/L (05-29-20 @ 11:35)                                                                                                                                               MEDICATIONS  (STANDING):  chlorhexidine 4% Liquid 1 Application(s) Topical two times a day  folic acid 1 milliGRAM(s) Oral daily  meropenem  IVPB 1000 milliGRAM(s) IV Intermittent every 8 hours  multivitamin 1 Tablet(s) Oral daily  nicotine -  14 mG/24Hr(s) Patch 1 patch Transdermal daily  pantoprazole  Injectable 40 milliGRAM(s) IV Push daily  sodium chloride 0.9%. 1000 milliLiter(s) (100 mL/Hr) IV Continuous <Continuous>  thiamine 100 milliGRAM(s) Oral daily    MEDICATIONS  (PRN):  LORazepam   Injectable 2 milliGRAM(s) IV Push every 2 hours PRN Symptom-triggered: 2 point increase in CIWA -Ar score and a total score of 7 or LESS  morphine  - Injectable 2 milliGRAM(s) IV Push every 2 hours PRN Severe Pain (7 - 10)  morphine  - Injectable 4 milliGRAM(s) IV Push every 4 hours PRN Severe Pain (7 - 10)  ondansetron Injectable 4 milliGRAM(s) IV Push every 8 hours PRN Nausea and/or Vomiting          Xrays:  TLC:  OG:  ET tube:                                                                                       ECHO:  [facility] ICU:

## 2020-06-01 NOTE — PROGRESS NOTE ADULT - SUBJECTIVE AND OBJECTIVE BOX
LENGTH OF HOSPITAL STAY: 4d    CHIEF COMPLAINT:   Patient is a 39y old  Female who presents with a chief complaint of severe abdominal pain (31 May 2020 16:10)    EVENTS OVERNIGHT : c/o pain 10/10 and still tachycardic, Hb stable .Tolerating PO liquid diet .  Pending MRCP read       HISTORY OF PRESENTING ILLNESS:    HPI:  40 yo female, pmh of anxiety, depression, substance abuse, eoth abuse, presents to ED for "I'm in a lot of pain." States she has been having pain for several months now. Located in epigastrium, severe 10/10 aching, no radiation. Denies fever, chills, cp, sob, le swelling, back pain, neck pain, nvd, dysuria, hematuria. (28 May 2020 20:48)    PAST MEDICAL & SURGICAL HISTORY  PAST MEDICAL & SURGICAL HISTORY:  ETOH abuse  Postpartum depression  Substance abuse  Anxiety  Depression  No significant past surgical history    SOCIAL HISTORY:    ALLERGIES:  No Known Allergies    MEDICATIONS:  STANDING MEDICATIONS  chlorhexidine 4% Liquid 1 Application(s) Topical two times a day  folic acid 1 milliGRAM(s) Oral daily  meropenem  IVPB 1000 milliGRAM(s) IV Intermittent every 8 hours  multivitamin 1 Tablet(s) Oral daily  nicotine -  14 mG/24Hr(s) Patch 1 patch Transdermal daily  pantoprazole  Injectable 40 milliGRAM(s) IV Push daily  sodium chloride 0.9%. 1000 milliLiter(s) IV Continuous <Continuous>  thiamine 100 milliGRAM(s) Oral daily    PRN MEDICATIONS  LORazepam   Injectable 2 milliGRAM(s) IV Push every 2 hours PRN  morphine  - Injectable 2 milliGRAM(s) IV Push every 2 hours PRN  morphine  - Injectable 4 milliGRAM(s) IV Push every 4 hours PRN  ondansetron Injectable 4 milliGRAM(s) IV Push every 8 hours PRN    VITALS:   T(F): 99  HR: 106  BP: 107/62  RR: 22  SpO2: 97%    LABS:                        8.4    10.92 )-----------( 233      ( 01 Jun 2020 06:03 )             27.0     05-31    140  |  106  |  <3<L>  ----------------------------<  86  4.0   |  27  |  <0.5<L>    Ca    7.0<L>      31 May 2020 05:29    TPro  5.0<L>  /  Alb  2.0<L>  /  TBili  3.1<H>  /  DBili  x   /  AST  176<H>  /  ALT  19  /  AlkPhos  356<H>  05-31                  RADIOLOGY:  < from: CT Angio Abdomen and Pelvis w/ IV Cont (05.29.20 @ 22:24) >  IMPRESSION:    Since May 28, 2020;    No CTA evidence of acute intra-abdominal hemorrhage.    Essentially unchanged findings of pancreatitis with formation of multiple pockets of fluid in the pancreatic head, body and tail as described above.    Multiple findings likely related to the sequelae of pancreatitis including bilateral pleural effusions, abdominopelvic ascites and soft tissue edema.    < end of copied text >  < from: US Abdomen Limited (05.29.20 @ 11:03) >    IMPRESSION:    Hepatic steatosis and hepatomegaly.    Gallbladder sludge with gallbladder wall thickening 0.7 cm. Reportedly negativesonographic Patton's sign.    CBD 4.8 mm.    < end of copied text >    PHYSICAL EXAM:  GEN: in pain   HEENT: within normal range  LUNGS: Clear to auscultation bilaterally   HEART: S1/S2 present. RRR.   ABD: Soft, tender in epigastric area ,distended. Bowel sounds present  EXT: no LE edema   NEURO: AAOX3

## 2020-06-01 NOTE — PROGRESS NOTE ADULT - SUBJECTIVE AND OBJECTIVE BOX
Patient is c/o severe 10/10 abdominal pain       T(F): 99 (06-01-20 @ 07:02), Max: 99.8 (05-31-20 @ 19:00)  HR: 111 (06-01-20 @ 08:00)  BP: 120/69 (06-01-20 @ 08:00)  RR: 26 (06-01-20 @ 08:59)  SpO2: 100% (06-01-20 @ 07:30) (95% - 100%)    PHYSICAL EXAM:  GENERAL: NAD  HEAD:  Atraumatic, Normocephalic  NERVOUS SYSTEM:  Alert & Oriented X3, no focal deficits   CHEST/LUNG: Clear to percussion bilaterally; No rales, rhonchi, wheezing, or rubs  HEART: Regular rate and rhythm; No murmurs, rubs, or gallops  ABDOMEN: Soft, mild diffuse tenderness, no rebound   EXTREMITIES:  2+ Peripheral Pulses, No clubbing, cyanosis, or edema  LYMPH: No lymphadenopathy noted      LABS  06-01  Opiate, Urine (05.28.20 @ 22:10)    Opiate, Urine: Positive    Cocaine Metabolite, Urine (05.28.20 @ 22:10)    Cocaine Metabolite, Urine: Positive      139  |  106  |  <3<L>  ----------------------------<  110<H>  3.6   |  23  |  <0.5<L>    Ca    7.4<L>      01 Jun 2020 06:03  Phos  1.6     06-01  Mg     1.6     06-01    TPro  4.8<L>  /  Alb  2.0<L>  /  TBili  3.0<H>  /  DBili  x   /  AST  172<H>  /  ALT  19  /  AlkPhos  341<H>  06-01      Lipase, Serum (05.28.20 @ 15:22)    Lipase, Serum: 115 U/L                        8.4    10.92 )-----------( 233      ( 01 Jun 2020 06:03 )             27.0       Culture Results:   No growth to date. (05-28-20)  Culture Results:   No growth to date. (05-28-20)  Culture Results:   <10,000 CFU/mL Normal Urogenital Nika (05-28-20)    RADIOLOGY  < from: CT Angio Abdomen and Pelvis w/ IV Cont (05.29.20 @ 22:24) >  PANCREAS: Heterogeneous, ill-defined pancreas with peripancreatic fat stranding. Grossly unchanged collections within the pancreatic head, body and tail. Pancreatic head collection measures approximately 2 cm (series 9, image 159), pancreatic body collection measures approximately 1 cm (series 9, image 139), and pancreatic tail lesion measures approximately 1.2 cm (series 9, image 132). Additional collection adjacent to the lesser curvature of the stomach measuring approximately 1.3 cm (series 9, image 140). These collections appear to be forming a wall.    ADRENAL GLANDS: Unchanged.    KIDNEYS: Unchanged.    ABDOMINOPELVIC NODES: Unchanged.    PELVIC ORGANS: Unchanged.    PERITONEUM/MESENTERY/BOWEL: Interval mild increase of abdominopelvic ascites. No obstructionor intraperitoneal free air.    BONES/SOFT TISSUES: Interval increase of soft tissue edema.    VASCULAR: Unchanged.      IMPRESSION:    Since May 28, 2020;    No CTA evidence of acute intra-abdominal hemorrhage.    Essentially unchanged findings of pancreatitis with formation of multiple pockets of fluid in the pancreatic head, body and tail as described above.      < end of copied text >    MEDICATIONS  (STANDING):  chlorhexidine 4% Liquid 1 Application(s) Topical two times a day  folic acid 1 milliGRAM(s) Oral daily  meropenem  IVPB 1000 milliGRAM(s) IV Intermittent every 8 hours  multivitamin 1 Tablet(s) Oral daily  nicotine -  14 mG/24Hr(s) Patch 1 patch Transdermal daily  pantoprazole  Injectable 40 milliGRAM(s) IV Push daily  potassium phosphate IVPB 30 milliMole(s) IV Intermittent once  sodium chloride 0.9%. 1000 milliLiter(s) (75 mL/Hr) IV Continuous <Continuous>  thiamine 100 milliGRAM(s) Oral daily    MEDICATIONS  (PRN):  HYDROmorphone  Injectable 1 milliGRAM(s) IV Push every 3 hours PRN Moderate Pain (4 - 6)  LORazepam   Injectable 2 milliGRAM(s) IV Push every 2 hours PRN Symptom-triggered: 2 point increase in CIWA -Ar score and a total score of 7 or LESS  ondansetron Injectable 4 milliGRAM(s) IV Push every 8 hours PRN Nausea and/or Vomiting

## 2020-06-01 NOTE — PROGRESS NOTE ADULT - ASSESSMENT
IMPRESSION:  acute/chronic pancreatitis slowly improving  phlegmon development possible infected  acute/chronic anemia   DTs  active ETOH abuse  r/o biliary obstruction    SUGGEST:  Gi/Surg f/u  keep HGB >8  serial HH  follow result MRI/MRCP abdomen   cont Iv flluid   analgesia  PRN benzo per CIWA  empiric abx Meropenem as per GI follow   GI recommendation   repeat labs  supplement lytes as needed      monitor in ICU possible downgrade afternoon

## 2020-06-01 NOTE — PROGRESS NOTE ADULT - SUBJECTIVE AND OBJECTIVE BOX
Patient is seen and examined at the bed side, is afebrile.  She is c/o abdominal pain.         REVIEW OF SYSTEMS: All other review systems are negative         ICU Vital Signs Last 24 Hrs  T(C): 36.8 (01 Jun 2020 15:05), Max: 37.7 (31 May 2020 19:00)  T(F): 98.3 (01 Jun 2020 15:05), Max: 99.8 (31 May 2020 19:00)  HR: 115 (01 Jun 2020 16:13) (100 - 132)  BP: 122/88 (01 Jun 2020 16:13) (96/55 - 122/88)  BP(mean): 101 (01 Jun 2020 16:13) (69 - 101)  ABP: --  ABP(mean): --  RR: 22 (01 Jun 2020 16:13) (14 - 30)  SpO2: 100% (01 Jun 2020 16:13) (95% - 100%)        PHYSICAL EXAM:  GENERAL:  Crying due to abdominal pain   CHEST/LUNG: not Using accessory muscle  HEART:  s1 and s2 present   ABDOMEN: Epigastric and RUQ tenderness   EXTREMITIES:  No pedal edema  CNS: Awake and alert          MEDICATIONS  (STANDING):    MEDICATIONS  (STANDING):  chlorhexidine 4% Liquid 1 Application(s) Topical two times a day  folic acid 1 milliGRAM(s) Oral daily  meropenem  IVPB 1000 milliGRAM(s) IV Intermittent every 8 hours  multivitamin 1 Tablet(s) Oral daily  nicotine -  14 mG/24Hr(s) Patch 1 patch Transdermal daily  pantoprazole  Injectable 40 milliGRAM(s) IV Push daily  sodium chloride 0.9%. 1000 milliLiter(s) (75 mL/Hr) IV Continuous <Continuous>  thiamine 100 milliGRAM(s) Oral daily      Allergies    No Known Allergies        LABS:                        8.4    10.92 )-----------( 233      ( 01 Jun 2020 06:03 )             27.0                           8.1    10.88 )-----------( 243      ( 31 May 2020 05:29 )             26.0       06-01    139  |  106  |  <3<L>  ----------------------------<  110<H>  3.6   |  23  |  <0.5<L>    Ca    7.4<L>      01 Jun 2020 06:03  Phos  1.6     06-01  Mg     1.6     06-01    TPro  4.8<L>  /  Alb  2.0<L>  /  TBili  3.0<H>  /  DBili  x   /  AST  172<H>  /  ALT  19  /  AlkPhos  341<H>  06-01 05-31    140  |  106  |  <3<L>  ----------------------------<  86  4.0   |  27  |  <0.5<L>    Ca    7.0<L>      31 May 2020 05:29  Mg     2.1     05-30    TPro  5.0<L>  /  Alb  2.0<L>  /  TBili  3.1<H>  /  DBili  x   /  AST  176<H>  /  ALT  19  /  AlkPhos  356<H>  05-31    PT/INR - ( 30 May 2020 05:25 )   PT: 15.10 sec;   INR: 1.31 ratio     PTT - ( 30 May 2020 05:25 )  PTT:31.8 sec        RADIOLOGY & ADDITIONAL TESTS:    < from: MR Abdomen No Cont (05.31.20 @ 11:05) >    1.  Findings of ongoing pancreatitis with pancreatic and peripancreatic fluid collections, not significantly changed from prior CT.  2.  Small volume ascites.  3.  Moderate bilateral pleural effusions.  4.  Marked hepatomegaly steatosis.      < end of copied text >      < from: Xray Chest 1 View- PORTABLE-Routine (05.31.20 @ 04:55) >  New bibasilar opacity/effusions, left greater than right.    < end of copied text >    < from: CT Angio Abdomen and Pelvis w/ IV Cont (05.29.20 @ 22:24) >    No CTA evidence of acute intra-abdominal hemorrhage.    Essentially unchanged findings of pancreatitis with formation of multiple pockets of fluid in the pancreatic head, body and tail as described above.    < end of copied text >      MICROBIOLOGY DATA:      Culture - Blood (05.28.20 @ 22:15)    Specimen Source: .Blood Blood    Culture Results:   No growth to date.      Culture - Blood (05.28.20 @ 22:15)    Specimen Source: .Blood Blood    Culture Results:   No growth to date.      COVID-19 PCR . (05.28.20 @ 20:10)    COVID-19 PCR: NotDetec: This test has been validated by [employer] to be accurate;  though it has not been FDA cleared/approved by the usual pathway  As with all laboratory test, results should be correlated with clinical  findings.  https://www.fda.gov/media/356287/download  https://www.fda.gov/media/785726/download            Assessment and Plan:   · Assessment		  A 40 yo female, pmh of anxiety, depression, substance abuse, eoth abuse, presents to ED for "I'm in a lot of pain." States she has been having pain for several months now. Located in epigastrium, severe 10/10 aching, no radiation. Denies fever, chills, cp, sob, le swelling, back pain, neck pain, nvd, dysuria, hematuria. (28 May 2020 20:48)      # Alcohol-induced acute pancreatitis with multiple pockets of fluid in the pancreatic head    # ETOH abuse.  Recommendation: chronic abuse  substance abuse eval.     #  Rash.    would recommend:    1. Optimize pain management   2. Consider discontinuing  Meropenem since NO pancreatic necrosis  3. IR evaluation for possible aspiration of fluid around pancreatic head  4. ? diet in the setting of moderate abdominal pain,     d/w patient and ICU team        Attending Attestation:   Spent critical  care time 45 minutes  on total encounter; more than 50% of the visit was spent counseling and/or coordinating care by the attending physician. Patient is seen and examined at the bed side, is afebrile.  The abdominal pain has not resolved yet. The ABdominal MRI has been reviewed.         REVIEW OF SYSTEMS: All other review systems are negative         ICU Vital Signs Last 24 Hrs  T(C): 36.8 (01 Jun 2020 15:05), Max: 37.7 (31 May 2020 19:00)  T(F): 98.3 (01 Jun 2020 15:05), Max: 99.8 (31 May 2020 19:00)  HR: 115 (01 Jun 2020 16:13) (100 - 132)  BP: 122/88 (01 Jun 2020 16:13) (96/55 - 122/88)  BP(mean): 101 (01 Jun 2020 16:13) (69 - 101)  ABP: --  ABP(mean): --  RR: 22 (01 Jun 2020 16:13) (14 - 30)  SpO2: 100% (01 Jun 2020 16:13) (95% - 100%)        PHYSICAL EXAM:  GENERAL:  Not in acute distress  CHEST/LUNG: not Using accessory muscle  HEART:  s1 and s2 present   ABDOMEN: Epigastric and RUQ tenderness   EXTREMITIES:  No pedal edema  CNS: Awake and alert          MEDICATIONS  (STANDING):    chlorhexidine 4% Liquid 1 Application(s) Topical two times a day  folic acid 1 milliGRAM(s) Oral daily  meropenem  IVPB 1000 milliGRAM(s) IV Intermittent every 8 hours  multivitamin 1 Tablet(s) Oral daily  nicotine -  14 mG/24Hr(s) Patch 1 patch Transdermal daily  pantoprazole  Injectable 40 milliGRAM(s) IV Push daily  sodium chloride 0.9%. 1000 milliLiter(s) (75 mL/Hr) IV Continuous <Continuous>  thiamine 100 milliGRAM(s) Oral daily      Allergies    No Known Allergies        LABS:                        8.4    10.92 )-----------( 233      ( 01 Jun 2020 06:03 )             27.0                           8.1    10.88 )-----------( 243      ( 31 May 2020 05:29 )             26.0       06-01    139  |  106  |  <3<L>  ----------------------------<  110<H>  3.6   |  23  |  <0.5<L>    Ca    7.4<L>      01 Jun 2020 06:03  Phos  1.6     06-01  Mg     1.6     06-01    TPro  4.8<L>  /  Alb  2.0<L>  /  TBili  3.0<H>  /  DBili  x   /  AST  172<H>  /  ALT  19  /  AlkPhos  341<H>  06-01 05-31    140  |  106  |  <3<L>  ----------------------------<  86  4.0   |  27  |  <0.5<L>    Ca    7.0<L>      31 May 2020 05:29  Mg     2.1     05-30    TPro  5.0<L>  /  Alb  2.0<L>  /  TBili  3.1<H>  /  DBili  x   /  AST  176<H>  /  ALT  19  /  AlkPhos  356<H>  05-31    PT/INR - ( 30 May 2020 05:25 )   PT: 15.10 sec;   INR: 1.31 ratio     PTT - ( 30 May 2020 05:25 )  PTT:31.8 sec        RADIOLOGY & ADDITIONAL TESTS:    < from: MR Abdomen No Cont (05.31.20 @ 11:05) >    1.  Findings of ongoing pancreatitis with pancreatic and peripancreatic fluid collections, not significantly changed from prior CT.  2.  Small volume ascites.  3.  Moderate bilateral pleural effusions.  4.  Marked hepatomegaly steatosis.        5/31/20 : Xray Chest 1 View- PORTABLE-Routine (05.31.20 @ 04:55) New bibasilar opacity/effusions, left greater than right.      5/29/20 : CT Angio Abdomen and Pelvis w/ IV Cont (05.29.20 @ 22:24) >    No CTA evidence of acute intra-abdominal hemorrhage.    Essentially unchanged findings of pancreatitis with formation of multiple pockets of fluid in the pancreatic head, body and tail as described above.          MICROBIOLOGY DATA:      Culture - Blood (05.28.20 @ 22:15)    Specimen Source: .Blood Blood    Culture Results:   No growth to date.      Culture - Blood (05.28.20 @ 22:15)    Specimen Source: .Blood Blood    Culture Results:   No growth to date.      COVID-19 PCR . (05.28.20 @ 20:10)    COVID-19 PCR: NotDetec: This test has been validated by [employer] to be accurate;  though it has not been FDA cleared/approved by the usual pathway  As with all laboratory test, results should be correlated with clinical  findings.  https://www.fda.gov/media/848774/download  https://www.fda.gov/media/405410/download

## 2020-06-02 LAB
ALBUMIN SERPL ELPH-MCNC: 2.1 G/DL — LOW (ref 3.5–5.2)
ALP SERPL-CCNC: 305 U/L — HIGH (ref 30–115)
ALT FLD-CCNC: 18 U/L — SIGNIFICANT CHANGE UP (ref 0–41)
ANION GAP SERPL CALC-SCNC: 8 MMOL/L — SIGNIFICANT CHANGE UP (ref 7–14)
AST SERPL-CCNC: 154 U/L — HIGH (ref 0–41)
BASOPHILS # BLD AUTO: 0.11 K/UL — SIGNIFICANT CHANGE UP (ref 0–0.2)
BASOPHILS NFR BLD AUTO: 1.1 % — HIGH (ref 0–1)
BILIRUB SERPL-MCNC: 3 MG/DL — HIGH (ref 0.2–1.2)
BUN SERPL-MCNC: <3 MG/DL — LOW (ref 10–20)
CALCIUM SERPL-MCNC: 7.4 MG/DL — LOW (ref 8.5–10.1)
CHLORIDE SERPL-SCNC: 107 MMOL/L — SIGNIFICANT CHANGE UP (ref 98–110)
CO2 SERPL-SCNC: 25 MMOL/L — SIGNIFICANT CHANGE UP (ref 17–32)
CREAT SERPL-MCNC: <0.5 MG/DL — LOW (ref 0.7–1.5)
EOSINOPHIL # BLD AUTO: 0.24 K/UL — SIGNIFICANT CHANGE UP (ref 0–0.7)
EOSINOPHIL NFR BLD AUTO: 2.5 % — SIGNIFICANT CHANGE UP (ref 0–8)
GLUCOSE SERPL-MCNC: 72 MG/DL — SIGNIFICANT CHANGE UP (ref 70–99)
HCT VFR BLD CALC: 27.3 % — LOW (ref 37–47)
HGB BLD-MCNC: 8.4 G/DL — LOW (ref 12–16)
IMM GRANULOCYTES NFR BLD AUTO: 0.6 % — HIGH (ref 0.1–0.3)
LYMPHOCYTES # BLD AUTO: 1.2 K/UL — SIGNIFICANT CHANGE UP (ref 1.2–3.4)
LYMPHOCYTES # BLD AUTO: 12.3 % — LOW (ref 20.5–51.1)
MAGNESIUM SERPL-MCNC: 1.9 MG/DL — SIGNIFICANT CHANGE UP (ref 1.8–2.4)
MCHC RBC-ENTMCNC: 30.8 G/DL — LOW (ref 32–37)
MCHC RBC-ENTMCNC: 32.4 PG — HIGH (ref 27–31)
MCV RBC AUTO: 105.4 FL — HIGH (ref 81–99)
MONOCYTES # BLD AUTO: 0.76 K/UL — HIGH (ref 0.1–0.6)
MONOCYTES NFR BLD AUTO: 7.8 % — SIGNIFICANT CHANGE UP (ref 1.7–9.3)
NEUTROPHILS # BLD AUTO: 7.41 K/UL — HIGH (ref 1.4–6.5)
NEUTROPHILS NFR BLD AUTO: 75.7 % — HIGH (ref 42.2–75.2)
NRBC # BLD: 0 /100 WBCS — SIGNIFICANT CHANGE UP (ref 0–0)
PHOSPHATE SERPL-MCNC: 2 MG/DL — LOW (ref 2.1–4.9)
PLATELET # BLD AUTO: 244 K/UL — SIGNIFICANT CHANGE UP (ref 130–400)
POTASSIUM SERPL-MCNC: 3.7 MMOL/L — SIGNIFICANT CHANGE UP (ref 3.5–5)
POTASSIUM SERPL-SCNC: 3.7 MMOL/L — SIGNIFICANT CHANGE UP (ref 3.5–5)
PROT SERPL-MCNC: 4.9 G/DL — LOW (ref 6–8)
RBC # BLD: 2.59 M/UL — LOW (ref 4.2–5.4)
RBC # FLD: 22.2 % — HIGH (ref 11.5–14.5)
SODIUM SERPL-SCNC: 140 MMOL/L — SIGNIFICANT CHANGE UP (ref 135–146)
WBC # BLD: 9.78 K/UL — SIGNIFICANT CHANGE UP (ref 4.8–10.8)
WBC # FLD AUTO: 9.78 K/UL — SIGNIFICANT CHANGE UP (ref 4.8–10.8)

## 2020-06-02 PROCEDURE — 99233 SBSQ HOSP IP/OBS HIGH 50: CPT

## 2020-06-02 PROCEDURE — 71045 X-RAY EXAM CHEST 1 VIEW: CPT | Mod: 26

## 2020-06-02 RX ADMIN — Medication 1 PATCH: at 11:12

## 2020-06-02 RX ADMIN — HYDROMORPHONE HYDROCHLORIDE 1 MILLIGRAM(S): 2 INJECTION INTRAMUSCULAR; INTRAVENOUS; SUBCUTANEOUS at 14:36

## 2020-06-02 RX ADMIN — SODIUM CHLORIDE 75 MILLILITER(S): 9 INJECTION INTRAMUSCULAR; INTRAVENOUS; SUBCUTANEOUS at 06:16

## 2020-06-02 RX ADMIN — SODIUM CHLORIDE 75 MILLILITER(S): 9 INJECTION INTRAMUSCULAR; INTRAVENOUS; SUBCUTANEOUS at 00:56

## 2020-06-02 RX ADMIN — HYDROMORPHONE HYDROCHLORIDE 1 MILLIGRAM(S): 2 INJECTION INTRAMUSCULAR; INTRAVENOUS; SUBCUTANEOUS at 23:48

## 2020-06-02 RX ADMIN — Medication 1 MILLIGRAM(S): at 11:12

## 2020-06-02 RX ADMIN — Medication 100 MILLIGRAM(S): at 11:12

## 2020-06-02 RX ADMIN — CHLORHEXIDINE GLUCONATE 1 APPLICATION(S): 213 SOLUTION TOPICAL at 06:08

## 2020-06-02 RX ADMIN — HYDROMORPHONE HYDROCHLORIDE 1 MILLIGRAM(S): 2 INJECTION INTRAMUSCULAR; INTRAVENOUS; SUBCUTANEOUS at 02:21

## 2020-06-02 RX ADMIN — Medication 2 MILLIGRAM(S): at 04:29

## 2020-06-02 RX ADMIN — MEROPENEM 100 MILLIGRAM(S): 1 INJECTION INTRAVENOUS at 22:21

## 2020-06-02 RX ADMIN — MEROPENEM 100 MILLIGRAM(S): 1 INJECTION INTRAVENOUS at 06:14

## 2020-06-02 RX ADMIN — PANTOPRAZOLE SODIUM 40 MILLIGRAM(S): 20 TABLET, DELAYED RELEASE ORAL at 11:13

## 2020-06-02 RX ADMIN — HYDROMORPHONE HYDROCHLORIDE 1 MILLIGRAM(S): 2 INJECTION INTRAMUSCULAR; INTRAVENOUS; SUBCUTANEOUS at 20:41

## 2020-06-02 RX ADMIN — HYDROMORPHONE HYDROCHLORIDE 1 MILLIGRAM(S): 2 INJECTION INTRAMUSCULAR; INTRAVENOUS; SUBCUTANEOUS at 11:12

## 2020-06-02 RX ADMIN — HYDROMORPHONE HYDROCHLORIDE 1 MILLIGRAM(S): 2 INJECTION INTRAMUSCULAR; INTRAVENOUS; SUBCUTANEOUS at 07:22

## 2020-06-02 RX ADMIN — Medication 1 PATCH: at 11:13

## 2020-06-02 RX ADMIN — Medication 2 MILLIGRAM(S): at 19:26

## 2020-06-02 RX ADMIN — Medication 2 MILLIGRAM(S): at 22:27

## 2020-06-02 RX ADMIN — Medication 1 PATCH: at 07:13

## 2020-06-02 RX ADMIN — Medication 2 MILLIGRAM(S): at 08:49

## 2020-06-02 RX ADMIN — MEROPENEM 100 MILLIGRAM(S): 1 INJECTION INTRAVENOUS at 16:00

## 2020-06-02 RX ADMIN — Medication 1 PATCH: at 19:17

## 2020-06-02 RX ADMIN — Medication 2 MILLIGRAM(S): at 00:55

## 2020-06-02 RX ADMIN — CHLORHEXIDINE GLUCONATE 1 APPLICATION(S): 213 SOLUTION TOPICAL at 17:02

## 2020-06-02 RX ADMIN — Medication 1 TABLET(S): at 11:14

## 2020-06-02 RX ADMIN — HYDROMORPHONE HYDROCHLORIDE 1 MILLIGRAM(S): 2 INJECTION INTRAMUSCULAR; INTRAVENOUS; SUBCUTANEOUS at 17:28

## 2020-06-02 NOTE — PROGRESS NOTE ADULT - ASSESSMENT
40 yo female, pmh of anxiety, depression, substance abuse, eoth abuse, presents to ED for "I'm in a lot of pain." States she has been having pain for several months now. Located in epigastrium, severe 10/10 aching, no radiation. Denies fever, chills, cp, sob, le swelling, back pain, neck pain, nvd, dysuria, hematuria. (28 May 2020 20:48)      # Alcohol-induced acute pancreatitis with multiple pockets of fluid in the pancreatic head    - T(F): , Max: 99.2   - WBC Count: 9.78 K/uL     # ETOH abuse.      #  Rash    # Lactic acidosis    # Ascites    PLAN  Continue IV meropenem  GI following

## 2020-06-02 NOTE — PROGRESS NOTE ADULT - ASSESSMENT
39 year old female with a history of acute alcoholic pancreatitis, alcohol abuse,  presents with abdominal pain, leukocytosis, acute pancreatitis with developing pseudocysts, thickened gallbladder wall, sludge in the gallbladder elevated LFTs, acute anemia without overt GI bleeding. Ultrasound shows gallbladder sludge but a normal CBD.    Dr. Kennedy spoke to Dr. Palacios regarding the patient's CT scan: no evidence of hemorrhagic pancreatitis, can't exclude infected fluid collection.   Repeat CT showed no hemorrhagic pancreatitis    HGb stable after drop and transfusion. No overt bleeding    Problem 1-Acute complicated pancreatitis likely secondary to alcohol with superimposed biliary pancreatitis given gallbladder sludge   Rec  -Advance to clear liquids   - Doubt cholangitis given normal bile ducts on CT and ultrasound and on MR  -MRCP appreciated   - Elevated LFTs are likely secondary to alcoholic hepatitis (>2:1 ratio between AST: ALT) but would not recommend prednisolone   (DF<32)  - Continue antibiotics  - continue IVF  - Check bid BUN and HCT (if either rises, increase fluids) but patient with evidence of volume overload so aggressive fluid hydration ability limited at present  - if overt bleeding or significant drop in HGB, call GI stat.  -surgery consult appreciated (no intervention planned)  - alcohol abstinence  - pain control    Problem 2- Marked hepatomegaly steatosis.  Rec  -Dietary and lifestyle modifications advised 39 year old female with a history of acute alcoholic pancreatitis, alcohol abuse,  presents with abdominal pain, leukocytosis, acute pancreatitis with developing pseudocysts, thickened gallbladder wall, sludge in the gallbladder elevated LFTs, acute anemia without overt GI bleeding. Ultrasound shows gallbladder sludge but a normal CBD.    Dr. Kennedy spoke to Dr. Palacios regarding the patient's CT scan: no evidence of hemorrhagic pancreatitis, can't exclude infected fluid collection.   Repeat CT showed no hemorrhagic pancreatitis    HGb stable after drop and transfusion. No overt bleeding    Problem 1-Acute complicated pancreatitis likely secondary to alcohol with superimposed biliary pancreatitis given gallbladder sludge   Rec  -Advance to clear liquids   -Doubt cholangitis given normal bile ducts on CT and ultrasound and on MR  -MRCP appreciated   -Elevated LFTs are likely secondary to alcoholic hepatitis (>2:1 ratio between AST: ALT) but would not recommend prednisolone   (DF<32)  -Continue antibiotics  -continue IVF  -Check bid BUN and HCT (if either rises, increase fluids) but patient with evidence of volume overload so aggressive fluid hydration ability limited at present  -if overt bleeding or significant drop in HGB, call GI stat.  -surgery consult appreciated (no intervention planned)  -alcohol abstinence  -pain control    Problem 2- Marked hepatomegaly steatosis.  Rec  -Dietary and lifestyle modifications advised 39 year old female with a history of acute alcoholic pancreatitis, alcohol abuse,  presents with abdominal pain, leukocytosis, acute pancreatitis with developing pseudocysts, thickened gallbladder wall, sludge in the gallbladder elevated LFTs, acute anemia without overt GI bleeding. Ultrasound shows gallbladder sludge but a normal CBD.    Dr. Kennedy spoke to Dr. Palacios regarding the patient's CT scan: no evidence of hemorrhagic pancreatitis, can't exclude infected fluid collection.   Repeat CT showed no hemorrhagic pancreatitis    HGb stable after drop and transfusion. No overt bleeding    Problem 1-Acute complicated pancreatitis likely secondary to alcohol with superimposed biliary pancreatitis given gallbladder sludge   Rec  -Advance to clear liquids   -Doubt cholangitis given normal bile ducts on CT and ultrasound and on MR  -MRCP appreciated   -Elevated LFTs are likely secondary to alcoholic hepatitis (>2:1 ratio between AST: ALT) but would not recommend prednisolone   (DF<32)  -Continue antibiotics  -continue IVF  -Check BUN and HCT (if either rises, increase fluids) but patient with evidence of volume overload so aggressive fluid hydration ability limited at present  -if overt bleeding or significant drop in HGB, call GI stat.  -surgery consult appreciated (no intervention planned)  -alcohol abstinence  -pain control    Problem 2- Marked hepatomegaly steatosis.  Rec  -Dietary and lifestyle modifications advised

## 2020-06-02 NOTE — PROGRESS NOTE ADULT - SUBJECTIVE AND OBJECTIVE BOX
LENGTH OF HOSPITAL STAY: 5d    CHIEF COMPLAINT:   Patient is a 39y old  Female who presents with a chief complaint of severe abdominal pain (02 Jun 2020 07:08)    EVENTS OVERNIGHT :      HISTORY OF PRESENTING ILLNESS:    HPI:  40 yo female, pmh of anxiety, depression, substance abuse, eoth abuse, presents to ED for "I'm in a lot of pain." States she has been having pain for several months now. Located in epigastrium, severe 10/10 aching, no radiation. Denies fever, chills, cp, sob, le swelling, back pain, neck pain, nvd, dysuria, hematuria. (28 May 2020 20:48)    PAST MEDICAL & SURGICAL HISTORY  PAST MEDICAL & SURGICAL HISTORY:  ETOH abuse  Postpartum depression  Substance abuse  Anxiety  Depression  No significant past surgical history    SOCIAL HISTORY:    ALLERGIES:  No Known Allergies    MEDICATIONS:  STANDING MEDICATIONS  chlorhexidine 4% Liquid 1 Application(s) Topical two times a day  folic acid 1 milliGRAM(s) Oral daily  meropenem  IVPB 1000 milliGRAM(s) IV Intermittent every 8 hours  multivitamin 1 Tablet(s) Oral daily  nicotine -  14 mG/24Hr(s) Patch 1 patch Transdermal daily  pantoprazole  Injectable 40 milliGRAM(s) IV Push daily  sodium chloride 0.9%. 1000 milliLiter(s) IV Continuous <Continuous>  thiamine 100 milliGRAM(s) Oral daily    PRN MEDICATIONS  HYDROmorphone  Injectable 1 milliGRAM(s) IV Push every 3 hours PRN  LORazepam   Injectable 2 milliGRAM(s) IV Push every 2 hours PRN  ondansetron Injectable 4 milliGRAM(s) IV Push every 8 hours PRN    VITALS:   T(F): 98  HR: 103  BP: 101/55  RR: 22  SpO2: 99%    LABS:                        8.4    9.78  )-----------( 244      ( 02 Jun 2020 05:25 )             27.3     06-01    138  |  106  |  <3<L>  ----------------------------<  93  4.3   |  21  |  <0.5<L>    Ca    7.5<L>      01 Jun 2020 22:00  Phos  2.2     06-01  Mg     2.0     06-01    TPro  4.8<L>  /  Alb  2.0<L>  /  TBili  3.0<H>  /  DBili  x   /  AST  172<H>  /  ALT  19  /  AlkPhos  341<H>  06-01                  RADIOLOGY:  < from: MR Abdomen No Cont (05.31.20 @ 11:05) >    IMPRESSION:    1.  Findings of ongoing pancreatitis with pancreatic and peripancreatic fluid collections, not significantly changed from prior CT.  2.  Small volume ascites.  3.  Moderate bilateral pleural effusions.  4.  Marked hepatomegaly steatosis.    < end of copied text >    PHYSICAL EXAM:  GEN: No acute distress  HEENT:   LUNGS: Clear to auscultation bilaterally   HEART: S1/S2 present. RRR.   ABD: Soft, non-tender, non-distended. Bowel sounds present  EXT:  NEURO: AAOX3 LENGTH OF HOSPITAL STAY: 5d    CHIEF COMPLAINT:   Patient is a 39y old  Female who presents with a chief complaint of severe abdominal pain (02 Jun 2020 07:08)    EVENTS OVERNIGHT :still c/p abd pain ,using Dilaudid every 3 hrly ,had one brown BM       HISTORY OF PRESENTING ILLNESS:    HPI:  38 yo female, pmh of anxiety, depression, substance abuse, eoth abuse, presents to ED for "I'm in a lot of pain." States she has been having pain for several months now. Located in epigastrium, severe 10/10 aching, no radiation. Denies fever, chills, cp, sob, le swelling, back pain, neck pain, nvd, dysuria, hematuria. (28 May 2020 20:48)    PAST MEDICAL & SURGICAL HISTORY  PAST MEDICAL & SURGICAL HISTORY:  ETOH abuse  Postpartum depression  Substance abuse  Anxiety  Depression  No significant past surgical history    SOCIAL HISTORY:    ALLERGIES:  No Known Allergies    MEDICATIONS:  STANDING MEDICATIONS  chlorhexidine 4% Liquid 1 Application(s) Topical two times a day  folic acid 1 milliGRAM(s) Oral daily  meropenem  IVPB 1000 milliGRAM(s) IV Intermittent every 8 hours  multivitamin 1 Tablet(s) Oral daily  nicotine -  14 mG/24Hr(s) Patch 1 patch Transdermal daily  pantoprazole  Injectable 40 milliGRAM(s) IV Push daily  sodium chloride 0.9%. 1000 milliLiter(s) IV Continuous <Continuous>  thiamine 100 milliGRAM(s) Oral daily    PRN MEDICATIONS  HYDROmorphone  Injectable 1 milliGRAM(s) IV Push every 3 hours PRN  LORazepam   Injectable 2 milliGRAM(s) IV Push every 2 hours PRN  ondansetron Injectable 4 milliGRAM(s) IV Push every 8 hours PRN    VITALS:   T(F): 98  HR: 103  BP: 101/55  RR: 22  SpO2: 99%    LABS:                        8.4    9.78  )-----------( 244      ( 02 Jun 2020 05:25 )             27.3     06-01    138  |  106  |  <3<L>  ----------------------------<  93  4.3   |  21  |  <0.5<L>    Ca    7.5<L>      01 Jun 2020 22:00  Phos  2.2     06-01  Mg     2.0     06-01    TPro  4.8<L>  /  Alb  2.0<L>  /  TBili  3.0<H>  /  DBili  x   /  AST  172<H>  /  ALT  19  /  AlkPhos  341<H>  06-01                  RADIOLOGY:  < from: MR Abdomen No Cont (05.31.20 @ 11:05) >    IMPRESSION:    1.  Findings of ongoing pancreatitis with pancreatic and peripancreatic fluid collections, not significantly changed from prior CT.  2.  Small volume ascites.  3.  Moderate bilateral pleural effusions.  4.  Marked hepatomegaly steatosis.    < end of copied text >    PHYSICAL EXAM:  GEN: No acute distress  HEENT:   LUNGS: Clear to auscultation bilaterally   HEART: S1/S2 present. RRR.   ABD: Soft, non-tender, non-distended. Bowel sounds present  EXT:  NEURO: AAOX3 LENGTH OF HOSPITAL STAY: 5d    CHIEF COMPLAINT:   Patient is a 39y old  Female who presents with a chief complaint of severe abdominal pain (02 Jun 2020 07:08)    EVENTS OVERNIGHT :still c/p abd pain ,using Dilaudid every 3 hrly ,had one brown BM       HISTORY OF PRESENTING ILLNESS:    HPI:  38 yo female, pmh of anxiety, depression, substance abuse, eoth abuse, presents to ED for "I'm in a lot of pain." States she has been having pain for several months now. Located in epigastrium, severe 10/10 aching, no radiation. Denies fever, chills, cp, sob, le swelling, back pain, neck pain, nvd, dysuria, hematuria. (28 May 2020 20:48)    PAST MEDICAL & SURGICAL HISTORY  PAST MEDICAL & SURGICAL HISTORY:  ETOH abuse  Postpartum depression  Substance abuse  Anxiety  Depression  No significant past surgical history    SOCIAL HISTORY:    ALLERGIES:  No Known Allergies    MEDICATIONS:  STANDING MEDICATIONS  chlorhexidine 4% Liquid 1 Application(s) Topical two times a day  folic acid 1 milliGRAM(s) Oral daily  meropenem  IVPB 1000 milliGRAM(s) IV Intermittent every 8 hours  multivitamin 1 Tablet(s) Oral daily  nicotine -  14 mG/24Hr(s) Patch 1 patch Transdermal daily  pantoprazole  Injectable 40 milliGRAM(s) IV Push daily  sodium chloride 0.9%. 1000 milliLiter(s) IV Continuous <Continuous>  thiamine 100 milliGRAM(s) Oral daily    PRN MEDICATIONS  HYDROmorphone  Injectable 1 milliGRAM(s) IV Push every 3 hours PRN  LORazepam   Injectable 2 milliGRAM(s) IV Push every 2 hours PRN  ondansetron Injectable 4 milliGRAM(s) IV Push every 8 hours PRN    VITALS:   T(F): 98  HR: 103  BP: 101/55  RR: 22  SpO2: 99%    LABS:                        8.4    9.78  )-----------( 244      ( 02 Jun 2020 05:25 )             27.3     06-01    138  |  106  |  <3<L>  ----------------------------<  93  4.3   |  21  |  <0.5<L>    Ca    7.5<L>      01 Jun 2020 22:00  Phos  2.2     06-01  Mg     2.0     06-01    TPro  4.8<L>  /  Alb  2.0<L>  /  TBili  3.0<H>  /  DBili  x   /  AST  172<H>  /  ALT  19  /  AlkPhos  341<H>  06-01                  RADIOLOGY:  < from: MR Abdomen No Cont (05.31.20 @ 11:05) >    IMPRESSION:    1.  Findings of ongoing pancreatitis with pancreatic and peripancreatic fluid collections, not significantly changed from prior CT.  2.  Small volume ascites.  3.  Moderate bilateral pleural effusions.  4.  Marked hepatomegaly steatosis.    < end of copied text >    PHYSICAL EXAM:  GEN: No acute distress  HEENT: within normal range  LUNGS: Clear to auscultation bilaterally   HEART: S1/S2 present. RRR.   ABD: Soft, non-tender,distended. Bowel sounds present  EXT: no LE edema   NEURO: AAOX3  non focal

## 2020-06-02 NOTE — PROGRESS NOTE ADULT - SUBJECTIVE AND OBJECTIVE BOX
SEBLEADRIAN  39y, Female  Allergy: No Known Allergies      CHIEF COMPLAINT: severe abdominal pain (01 Jun 2020 16:35)      INTERVAL EVENTS/HPI  - No acute events overnight  - T(F): , Max: 99.2 (06-01-20 @ 23:13)  - Tolerating medication  - WBC Count: 9.78 K/uL (06-02-20 @ 05:25)      ROS  General: Denies fevers, chills, nightsweats, weight loss  HEENT: Denies headache, rhinorrhea, sore throat, eye pain  CV: Denies CP, palpitations  PULM: Denies SOB, cough  : Denies dysuria, hematuria  MSK: Denies arthralgias  SKIN: Denies rash   NEURO: Denies paresthesias, weakness  PSYCH: Denies depression    FH non-contributory   Social Hx non-contributory    VITALS:  T(F): 98.3, Max: 99.2 (06-01-20 @ 23:13)  HR: 103  BP: 101/55  RR: 20Vital Signs Last 24 Hrs  T(C): 36.8 (02 Jun 2020 03:00), Max: 37.3 (01 Jun 2020 23:13)  T(F): 98.3 (02 Jun 2020 03:00), Max: 99.2 (01 Jun 2020 23:13)  HR: 103 (02 Jun 2020 06:04) (103 - 133)  BP: 101/55 (02 Jun 2020 06:04) (98/58 - 152/66)  BP(mean): 74 (02 Jun 2020 06:04) (73 - 105)  RR: 20 (02 Jun 2020 06:04) (19 - 38)  SpO2: 99% (02 Jun 2020 04:26) (99% - 100%)    PHYSICAL EXAM:  Gen: NAD, resting in bed  HEENT: Normocephalic, atraumatic  Neck: supple, no lymphadenopathy  CV: Regular rate & regular rhythm  Lungs: decreased BS at bases, no fremitus  Abdomen: Epigastric tenderness, BS present  Ext: Warm, well perfused  Neuro: non focal, awake  Skin: no rash, no erythema      TESTS & MEASUREMENTS:                        8.4    9.78  )-----------( 244      ( 02 Jun 2020 05:25 )             27.3     06-01    138  |  106  |  <3<L>  ----------------------------<  93  4.3   |  21  |  <0.5<L>    Ca    7.5<L>      01 Jun 2020 22:00  Phos  2.2     06-01  Mg     2.0     06-01    TPro  4.8<L>  /  Alb  2.0<L>  /  TBili  3.0<H>  /  DBili  x   /  AST  172<H>  /  ALT  19  /  AlkPhos  341<H>  06-01    eGFR if Non : 146 mL/min/1.73M2 (06-01-20 @ 22:00)  eGFR if : 170 mL/min/1.73M2 (06-01-20 @ 22:00)    LIVER FUNCTIONS - ( 01 Jun 2020 06:03 )  Alb: 2.0 g/dL / Pro: 4.8 g/dL / ALK PHOS: 341 U/L / ALT: 19 U/L / AST: 172 U/L / GGT: x               Culture - Blood (collected 05-28-20 @ 22:15)  Source: .Blood Blood  Preliminary Report (05-30-20 @ 17:01):    No growth to date.    Culture - Blood (collected 05-28-20 @ 22:15)  Source: .Blood Blood  Preliminary Report (05-30-20 @ 17:01):    No growth to date.    Culture - Urine (collected 05-28-20 @ 22:10)  Source: .Urine Clean Catch (Midstream)  Final Report (05-30-20 @ 12:40):    <10,000 CFU/mL Normal Urogenital Nika        Lactate, Blood: 0.9 mmol/L (05-30-20 @ 05:25)  Lactate, Blood: 1.9 mmol/L (05-29-20 @ 11:35)  Lactate, Blood: 1.3 mmol/L (05-29-20 @ 07:21)  Blood Gas Venous - Lactate: 3.4 mmoL/L (05-28-20 @ 20:01)  Lactate, Blood: 6.3 mmol/L (05-28-20 @ 15:22)      INFECTIOUS DISEASES TESTING  HIV-1/2 Combo Result: Nonreact (06-10-19 @ 17:08)  Hepatitis C Virus Interpretation: Nonreact (06-10-19 @ 17:08)  Hepatitis B Surface Antigen: Nonreact (06-10-19 @ 17:08)      RADIOLOGY & ADDITIONAL TESTS:  I have personally reviewed the last Chest xray  CXR      CT      CARDIOLOGY TESTING  12 Lead ECG:   Ventricular Rate 112 BPM    Atrial Rate 112 BPM    P-R Interval 146 ms    QRS Duration 96 ms    Q-T Interval 370 ms    QTC Calculation(Bezet) 505 ms    P Axis 56 degrees    R Axis 16 degrees    T Axis 52 degrees    Diagnosis Line Sinus tachycardia  Incomplete right bundle branch block  Borderline ECG    Confirmed by SENDY RAM MD [id number]) on 5/29/2020 12:06:19 PM (05-28-20 @ 17:00)  12 Lead ECG:   Ventricular Rate 128 BPM    Atrial Rate 128 BPM    P-R Interval 144 ms    QRS Duration 90 ms    Q-T Interval 310 ms    QTC Calculation(Bezet) 452 ms    P Axis 66 degrees    R Axis 10 degrees    T Axis 56 degrees    Diagnosis Line Sinus tachycardia  Poor R wave progression  Nonspecific ST-T changes    Confirmed by SENDY RAM MD [id number]) on 5/29/2020 12:06:15 PM (05-28-20 @ 14:34)      MEDICATIONS  chlorhexidine 4% Liquid 1  folic acid 1  meropenem  IVPB 1000  multivitamin 1  nicotine -  14 mG/24Hr(s) Patch 1  pantoprazole  Injectable 40  sodium chloride 0.9%. 1000  thiamine 100      ANTIBIOTICS:  meropenem  IVPB 1000 milliGRAM(s) IV Intermittent every 8 hours      All available historical data has been reviewed

## 2020-06-02 NOTE — PROGRESS NOTE ADULT - SUBJECTIVE AND OBJECTIVE BOX
Patient is a 39y old  Female who presents with a chief complaint of severe abdominal pain (02 Jun 2020 07:26)      Over Night Events:  Patient seen and examined.   still complaining of abd pain s/p mri same finding as the ct scan pancreatitis with some fluid collection     ROS:  See HPI    PHYSICAL EXAM    ICU Vital Signs Last 24 Hrs  T(C): 36.7 (02 Jun 2020 07:01), Max: 37.3 (01 Jun 2020 23:13)  T(F): 98 (02 Jun 2020 07:01), Max: 99.2 (01 Jun 2020 23:13)  HR: 101 (02 Jun 2020 07:01) (101 - 133)  BP: 104/63 (02 Jun 2020 07:01) (98/58 - 152/66)  BP(mean): 79 (02 Jun 2020 07:01) (73 - 105)  ABP: --  ABP(mean): --  RR: 22 (02 Jun 2020 07:01) (19 - 38)  SpO2: 99% (02 Jun 2020 04:26) (99% - 100%)      General: Aox3  HEENT:     romaine          Lymph Nodes: NO cervical LN   Lungs: Bilateral BS  Cardiovascular: Regular   Abdomen: Soft, some epigastric tenderness   Extremities: No clubbing   Skin: warm   Neurological: no focal deficit   Musculoskeletal: move all ext     I&O's Detail    01 Jun 2020 07:01  -  02 Jun 2020 07:00  --------------------------------------------------------  IN:    IV PiggyBack: 599.8 mL    Oral Fluid: 230 mL    sodium chloride 0.9%: 300 mL    sodium chloride 0.9%.: 1425 mL    Solution: 150 mL  Total IN: 2704.8 mL    OUT:  Total OUT: 0 mL    Total NET: 2704.8 mL      02 Jun 2020 07:01  -  02 Jun 2020 08:08  --------------------------------------------------------  IN:    sodium chloride 0.9%.: 75 mL  Total IN: 75 mL    OUT:  Total OUT: 0 mL    Total NET: 75 mL          LABS:                          8.4    9.78  )-----------( 244      ( 02 Jun 2020 05:25 )             27.3         02 Jun 2020 05:25    140    |  107    |  <3     ----------------------------<  72     3.7     |  25     |  <0.5     Ca    7.4        02 Jun 2020 05:25  Phos  2.0       02 Jun 2020 05:25  Mg     1.9       02 Jun 2020 05:25    TPro  4.9    /  Alb  2.1    /  TBili  3.0    /  DBili  2.2    /  AST  154    /  ALT  18     /  AlkPhos  305    02 Jun 2020 05:25  Amylase x     lipase x                                                                                                                                                                                                                                         MEDICATIONS  (STANDING):  chlorhexidine 4% Liquid 1 Application(s) Topical two times a day  folic acid 1 milliGRAM(s) Oral daily  meropenem  IVPB 1000 milliGRAM(s) IV Intermittent every 8 hours  multivitamin 1 Tablet(s) Oral daily  nicotine -  14 mG/24Hr(s) Patch 1 patch Transdermal daily  pantoprazole  Injectable 40 milliGRAM(s) IV Push daily  sodium chloride 0.9%. 1000 milliLiter(s) (75 mL/Hr) IV Continuous <Continuous>  thiamine 100 milliGRAM(s) Oral daily    MEDICATIONS  (PRN):  HYDROmorphone  Injectable 1 milliGRAM(s) IV Push every 3 hours PRN Moderate Pain (4 - 6)  LORazepam   Injectable 2 milliGRAM(s) IV Push every 2 hours PRN Symptom-triggered: 2 point increase in CIWA -Ar score and a total score of 7 or LESS  ondansetron Injectable 4 milliGRAM(s) IV Push every 8 hours PRN Nausea and/or Vomiting          Xrays:  TLC:  OG:  ET tube:                                                                                       ECHO:  [facility] ICU:

## 2020-06-02 NOTE — PROGRESS NOTE ADULT - SUBJECTIVE AND OBJECTIVE BOX
Patient is a 39y old  Female who presents with a chief complaint of severe abdominal pain (02 Jun 2020 08:08)      T(F): 98 (06-02-20 @ 07:01), Max: 99.2 (06-01-20 @ 23:13)  HR: 115 (06-02-20 @ 08:00)  BP: 101/57 (06-02-20 @ 08:00)  RR: 22 (06-02-20 @ 09:00)  SpO2: 99% (06-02-20 @ 04:26) (99% - 100%)    PHYSICAL EXAM:  GENERAL: NAD  HEAD:  Atraumatic, Normocephalic  NERVOUS SYSTEM:  Alert & Oriented x 3, no focal deficits   CHEST/LUNG: B/L rhonchi   HEART: Regular rate and rhythm; No murmurs, rubs, or gallops  ABDOMEN: Soft, mild diffuse tenderness   EXTREMITIES:  2+ Peripheral Pulses, No clubbing, cyanosis, or edema  LYMPH: No lymphadenopathy noted  SKIN: No rashes or lesions    LABS  06-02    140  |  107  |  <3<L>  ----------------------------<  72  3.7   |  25  |  <0.5<L>    Ca    7.4<L>      02 Jun 2020 05:25  Phos  2.0     06-02  Mg     1.9     06-02    TPro  4.9<L>  /  Alb  2.1<L>  /  TBili  3.0<H>  /  DBili  2.2<H>  /  AST  154<H>  /  ALT  18  /  AlkPhos  305<H>  06-02                          8.4    9.78  )-----------( 244      ( 02 Jun 2020 05:25 )             27.3       Culture Results:   No growth to date. (05-28-20)  Culture Results:   No growth to date. (05-28-20)  Culture Results:   <10,000 CFU/mL Normal Urogenital Nika (05-28-20)    RADIOLOGY  < from: Xray Chest 1 View-PORTABLE IMMEDIATE (06.02.20 @ 09:44) >  Impression:    Minimal increase, left greater than right basilar opacity/effusions. No pneumothorax    < end of copied text >    MEDICATIONS  (STANDING):  chlorhexidine 4% Liquid 1 Application(s) Topical two times a day  folic acid 1 milliGRAM(s) Oral daily  meropenem  IVPB 1000 milliGRAM(s) IV Intermittent every 8 hours  multivitamin 1 Tablet(s) Oral daily  nicotine -  14 mG/24Hr(s) Patch 1 patch Transdermal daily  pantoprazole  Injectable 40 milliGRAM(s) IV Push daily  sodium chloride 0.9%. 1000 milliLiter(s) (75 mL/Hr) IV Continuous <Continuous>  thiamine 100 milliGRAM(s) Oral daily    MEDICATIONS  (PRN):  HYDROmorphone  Injectable 1 milliGRAM(s) IV Push every 3 hours PRN Moderate Pain (4 - 6)  LORazepam   Injectable 2 milliGRAM(s) IV Push every 2 hours PRN Symptom-triggered: 2 point increase in CIWA -Ar score and a total score of 7 or LESS  ondansetron Injectable 4 milliGRAM(s) IV Push every 8 hours PRN Nausea and/or Vomiting
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
## 2020-06-02 NOTE — PROGRESS NOTE ADULT - ASSESSMENT
IMPRESSION:  acute/chronic pancreatitis slowly improving  phlegmon development possible infected  acute/chronic anemia   DTs  active ETOH abuse  r/o biliary obstruction    SUGGEST:  Gi/Surg f/u   keep HGB >8  serial HH  cont Iv flluid   cxr today   OOB to chair   analgesia  PRN benzo per CIWA  empiric abx Meropenem as per GI follow and ID   GI recommendation   repeat labs  supplement lytes as needed      monitor in ICU

## 2020-06-02 NOTE — PROGRESS NOTE ADULT - SUBJECTIVE AND OBJECTIVE BOX
39yFemale  Being followed for ETOH pancreatitis   Interval history: Patient denies nausea, vomiting, hematemesis, melena, blood in stool, diarrhea, constipation. +epigastric pain 6.5/10 patient reports 20-30% improvement.      PAST MEDICAL & SURGICAL HISTORY:   ETOH abuse  Postpartum depression  Substance abuse  Anxiety  Depression  No significant past surgical history          Social History: +pack a day cigarette smoking. 1-2 pints vodka daily alcohol. No illegal drug use.          MEDICATIONS:  MEDICATIONS  (STANDING):  chlorhexidine 4% Liquid 1 Application(s) Topical two times a day  folic acid 1 milliGRAM(s) Oral daily  meropenem  IVPB 1000 milliGRAM(s) IV Intermittent every 8 hours  multivitamin 1 Tablet(s) Oral daily  nicotine -  14 mG/24Hr(s) Patch 1 patch Transdermal daily  pantoprazole  Injectable 40 milliGRAM(s) IV Push daily  sodium chloride 0.9%. 1000 milliLiter(s) (75 mL/Hr) IV Continuous <Continuous>  thiamine 100 milliGRAM(s) Oral daily    MEDICATIONS  (PRN):  HYDROmorphone  Injectable 1 milliGRAM(s) IV Push every 3 hours PRN Moderate Pain (4 - 6)  LORazepam   Injectable 2 milliGRAM(s) IV Push every 2 hours PRN Symptom-triggered: 2 point increase in CIWA -Ar score and a total score of 7 or LESS  ondansetron Injectable 4 milliGRAM(s) IV Push every 8 hours PRN Nausea and/or Vomiting      Allergies:   No Known Allergies              REVIEW OF SYSTEMS:  General:  No weight loss, fevers, or chills.  Eyes:  No reported pain or visual changes  ENT:  No sore throat or runny nose.  NECK: No stiffness   CV:  No chest pain or palpitations.  Resp:  No shortness of breath, cough  GI:  +epigastric abdominal pain, +nausea, No vomiting, dysphagia, diarrhea or constipation. No rectal bleeding, melena, or hematemesis.  Muscle:  No aches or weakness  Neuro:  No tingling, numbness           VITAL SIGNS:   T(F): 98 (06-02-20 @ 07:01), Max: 99.2 (06-01-20 @ 23:13)  HR: 115 (06-02-20 @ 08:00) (101 - 133)  BP: 101/57 (06-02-20 @ 08:00) (101/55 - 152/66)  RR: 22 (06-02-20 @ 09:00) (19 - 38)  SpO2: 99% (06-02-20 @ 04:26) (99% - 100%)    PHYSICAL EXAM:  GENERAL: AAOx3, no acute distress.  HEAD:  Atraumatic, Normocephalic  EYES: conjunctiva and sclera clear  NECK: Supple, no JVD or thyromegaly  CHEST/LUNG: Clear to auscultation bilaterally; No wheeze, rhonchi, or rales  HEART: Regular rate and rhythm; normal S1, S2, No murmurs.  ABDOMEN: Soft, +epigastric tenderness, nondistended; Bowel sounds present, no abdominal bruit, masses, or hepatosplenomegaly  NEUROLOGY: No asterixis or tremor.   SKIN: Intact, no jaundice            LABS:                        8.4    9.78  )-----------( 244      ( 02 Jun 2020 05:25 )             27.3     06-02    140  |  107  |  <3<L>  ----------------------------<  72  3.7   |  25  |  <0.5<L>    Ca    7.4<L>      02 Jun 2020 05:25  Phos  2.0     06-02  Mg     1.9     06-02    TPro  4.9<L>  /  Alb  2.1<L>  /  TBili  3.0<H>  /  DBili  2.2<H>  /  AST  154<H>  /  ALT  18  /  AlkPhos  305<H>  06-02    LIVER FUNCTIONS - ( 02 Jun 2020 05:25 )  Alb: 2.1 g/dL / Pro: 4.9 g/dL / ALK PHOS: 305 U/L / ALT: 18 U/L / AST: 154 U/L / GGT: x               IMAGING:    < from: MR Abdomen No Cont (05.31.20 @ 11:05) >  EXAM:  MR ABDOMEN            PROCEDURE DATE:  05/31/2020            INTERPRETATION:  CLINICAL STATEMENT:  Acute pancreatitis..    TECHNIQUE: Axial in- and out-of-phase T1-weighted; axial fat-saturated T2-weighted; axial and coronal single-shot fastspin-echo T2-weighted; 3D high-resolution, heavily T2-weighted MRCP images were acquired.    COMPARISON: CT dated 5/29/2020    FINDINGS:    LIVER: Marked hepatomegaly with steatosis.    GALLBLADDER: Gallbladder sludge.    BILIARY TREE: No definite evidence of choledocholithiasis. No biliary ductal dilatation.    SPLEEN:  Unremarkable.    PANCREAS: Findings of ongoing pancreatitis with redemonstration of multiple collections involving the pancreatic parenchyma, not significantly changed from priorCT-the largest measuring 2.6 x 1.9 cm at the level of the pancreatic neck (series 3 image 27). Additional collection in the lesser sac measuring up to 1.3 cm, better seen on prior CT.    ADRENAL GLANDS:  Unremarkable.    KIDNEYS:  Unremarkable.    ABDOMINAL NODES:  No adenopathy.    BONES/SOFT TISSUES: Diffuse anasarca.    OTHER: Small volume ascites. Moderate bilateral pleural effusions.    IMPRESSION:    1.  Findings of ongoing pancreatitis with pancreatic and peripancreatic fluid collections, not significantly changed from prior CT.  2.  Small volume ascites.  3.  Moderate bilateral pleural effusions.  4.  Marked hepatomegaly steatosis.                  LAWRENCE THRASHER M.D., ATTENDING RADIOLOGIST  This document has been electronically signed. Jun 1 2020 10:49AM              < end of copied text >      < from: CT Angio Abdomen and Pelvis w/ IV Cont (05.29.20 @ 22:24) >  EXAM:  CT ANGIO ABD PELV (W)AW IC            PROCEDURE DATE:  05/29/2020            INTERPRETATION:  CLINICAL HISTORY / REASON FOR EXAM: Acute anemia; evaluation for hemorrhagic pancreatitis.    TECHNIQUE: Contiguous axial CT images were obtained from the lower chest to the pubic symphysis before and after administration of 95 mL Optiray 320 intravenous contrast, 5 mL discarded using a noncontrast, arterial and venous phase protocol. Oral contrast was not administered. Reformatted images in thecoronal and sagittal planes were acquired.   3-D/MIP postprocessing images were performed as well..    COMPARISON CT: CT abdomen and pelvis from May 28, 2020    OTHER STUDIES USED FOR CORRELATION: None.       FINDINGS:    LOWER CHEST: Bilateral pleural effusions with atelectasis. Previously seen 8 mm solid nodule in the right lung base is not well visualized on this examination.    HEPATOBILIARY: Markedly enlarged hypodense liver. Gallbladder sludge.    SPLEEN: Unchanged.    PANCREAS: Heterogeneous, ill-defined pancreas with peripancreatic fat stranding. Grossly unchanged collections within the pancreatic head, body and tail. Pancreatic head collection measures approximately 2 cm (series 9, image 159), pancreatic body collection measures approximately 1 cm (series 9, image 139), and pancreatic tail lesion measures approximately 1.2 cm (series 9, image 132). Additional collection adjacent to the lesser curvature of the stomach measuring approximately 1.3 cm (series 9, image 140). These collections appear to be forming a wall.    ADRENAL GLANDS: Unchanged.    KIDNEYS: Unchanged.    ABDOMINOPELVIC NODES: Unchanged.    PELVIC ORGANS: Unchanged.    PERITONEUM/MESENTERY/BOWEL: Interval mild increase of abdominopelvic ascites. No obstructionor intraperitoneal free air.    BONES/SOFT TISSUES: Interval increase of soft tissue edema.    VASCULAR: Unchanged.      IMPRESSION:    Since May 28, 2020;    No CTA evidence of acute intra-abdominal hemorrhage.    Essentially unchanged findings of pancreatitis with formation of multiple pockets of fluid in the pancreatic head, body and tail as described above.    Multiple findings likely related to the sequelae of pancreatitis including bilateral pleural effusions, abdominopelvic ascites and soft tissue edema.                JOSE ROBERTO ALTMAN M.D., RESIDENT RADIOLOGIST  This document has been electronically signed.  CELESTE HARDIN M.D., ATTENDING RADIOLOGIST  This document has been electronically signed. May 30 2020 12:10AM              < end of copied text > 39yFemale  Being followed for ETOH pancreatitis   Interval history: Patient denies nausea, vomiting, hematemesis, melena, blood in stool, diarrhea, constipation. +epigastric pain 6.5/10 patient reports 20-30% improvement.      PAST MEDICAL & SURGICAL HISTORY:   ETOH abuse  Postpartum depression  Substance abuse  Anxiety  Depression  No significant past surgical history          Social History: +pack a day cigarette smoking. 1-2 pints vodka daily alcohol. No illegal drug use.          MEDICATIONS:  MEDICATIONS  (STANDING):  chlorhexidine 4% Liquid 1 Application(s) Topical two times a day  folic acid 1 milliGRAM(s) Oral daily  meropenem  IVPB 1000 milliGRAM(s) IV Intermittent every 8 hours  multivitamin 1 Tablet(s) Oral daily  nicotine -  14 mG/24Hr(s) Patch 1 patch Transdermal daily  pantoprazole  Injectable 40 milliGRAM(s) IV Push daily  sodium chloride 0.9%. 1000 milliLiter(s) (75 mL/Hr) IV Continuous <Continuous>  thiamine 100 milliGRAM(s) Oral daily    MEDICATIONS  (PRN):  HYDROmorphone  Injectable 1 milliGRAM(s) IV Push every 3 hours PRN Moderate Pain (4 - 6)  LORazepam   Injectable 2 milliGRAM(s) IV Push every 2 hours PRN Symptom-triggered: 2 point increase in CIWA -Ar score and a total score of 7 or LESS  ondansetron Injectable 4 milliGRAM(s) IV Push every 8 hours PRN Nausea and/or Vomiting      Allergies:   No Known Allergies              REVIEW OF SYSTEMS:  General:  No weight loss, fevers, or chills.  Eyes:  No reported pain or visual changes  ENT:  No sore throat or runny nose.  NECK: No stiffness   CV:  No chest pain or palpitations.  Resp:  No shortness of breath, cough  GI:  +epigastric abdominal pain, +nausea, No vomiting, dysphagia, diarrhea or constipation. No rectal bleeding, melena, or hematemesis.  Muscle:  No aches or weakness  Neuro:  No tingling, numbness           VITAL SIGNS:   T(F): 98 (06-02-20 @ 07:01), Max: 99.2 (06-01-20 @ 23:13)  HR: 115 (06-02-20 @ 08:00) (101 - 133)  BP: 101/57 (06-02-20 @ 08:00) (101/55 - 152/66)  RR: 22 (06-02-20 @ 09:00) (19 - 38)  SpO2: 99% (06-02-20 @ 04:26) (99% - 100%)    PHYSICAL EXAM:  GENERAL: AAOx3, no acute distress.  HEAD:  Atraumatic, Normocephalic  EYES: conjunctiva and sclera clear  NECK: Supple, no JVD or thyromegaly  CHEST/LUNG: Clear to auscultation bilaterally; No wheeze, rhonchi, or rales  HEART: Regular rate and rhythm; normal S1, S2, No murmurs.  ABDOMEN: Soft, +epigastric tenderness, +mildly distended; Bowel sounds present, no abdominal bruit, masses, or hepatosplenomegaly  NEUROLOGY: No asterixis or tremor.   SKIN: Intact, no jaundice            LABS:                        8.4    9.78  )-----------( 244      ( 02 Jun 2020 05:25 )             27.3     06-02    140  |  107  |  <3<L>  ----------------------------<  72  3.7   |  25  |  <0.5<L>    Ca    7.4<L>      02 Jun 2020 05:25  Phos  2.0     06-02  Mg     1.9     06-02    TPro  4.9<L>  /  Alb  2.1<L>  /  TBili  3.0<H>  /  DBili  2.2<H>  /  AST  154<H>  /  ALT  18  /  AlkPhos  305<H>  06-02    LIVER FUNCTIONS - ( 02 Jun 2020 05:25 )  Alb: 2.1 g/dL / Pro: 4.9 g/dL / ALK PHOS: 305 U/L / ALT: 18 U/L / AST: 154 U/L / GGT: x               IMAGING:    < from: MR Abdomen No Cont (05.31.20 @ 11:05) >  EXAM:  MR ABDOMEN            PROCEDURE DATE:  05/31/2020            INTERPRETATION:  CLINICAL STATEMENT:  Acute pancreatitis..    TECHNIQUE: Axial in- and out-of-phase T1-weighted; axial fat-saturated T2-weighted; axial and coronal single-shot fastspin-echo T2-weighted; 3D high-resolution, heavily T2-weighted MRCP images were acquired.    COMPARISON: CT dated 5/29/2020    FINDINGS:    LIVER: Marked hepatomegaly with steatosis.    GALLBLADDER: Gallbladder sludge.    BILIARY TREE: No definite evidence of choledocholithiasis. No biliary ductal dilatation.    SPLEEN:  Unremarkable.    PANCREAS: Findings of ongoing pancreatitis with redemonstration of multiple collections involving the pancreatic parenchyma, not significantly changed from priorCT-the largest measuring 2.6 x 1.9 cm at the level of the pancreatic neck (series 3 image 27). Additional collection in the lesser sac measuring up to 1.3 cm, better seen on prior CT.    ADRENAL GLANDS:  Unremarkable.    KIDNEYS:  Unremarkable.    ABDOMINAL NODES:  No adenopathy.    BONES/SOFT TISSUES: Diffuse anasarca.    OTHER: Small volume ascites. Moderate bilateral pleural effusions.    IMPRESSION:    1.  Findings of ongoing pancreatitis with pancreatic and peripancreatic fluid collections, not significantly changed from prior CT.  2.  Small volume ascites.  3.  Moderate bilateral pleural effusions.  4.  Marked hepatomegaly steatosis.                  LAWRENCE THRASHER M.D., ATTENDING RADIOLOGIST  This document has been electronically signed. Jun 1 2020 10:49AM              < end of copied text >      < from: CT Angio Abdomen and Pelvis w/ IV Cont (05.29.20 @ 22:24) >  EXAM:  CT ANGIO ABD PELV (W)AW IC            PROCEDURE DATE:  05/29/2020            INTERPRETATION:  CLINICAL HISTORY / REASON FOR EXAM: Acute anemia; evaluation for hemorrhagic pancreatitis.    TECHNIQUE: Contiguous axial CT images were obtained from the lower chest to the pubic symphysis before and after administration of 95 mL Optiray 320 intravenous contrast, 5 mL discarded using a noncontrast, arterial and venous phase protocol. Oral contrast was not administered. Reformatted images in thecoronal and sagittal planes were acquired.   3-D/MIP postprocessing images were performed as well..    COMPARISON CT: CT abdomen and pelvis from May 28, 2020    OTHER STUDIES USED FOR CORRELATION: None.       FINDINGS:    LOWER CHEST: Bilateral pleural effusions with atelectasis. Previously seen 8 mm solid nodule in the right lung base is not well visualized on this examination.    HEPATOBILIARY: Markedly enlarged hypodense liver. Gallbladder sludge.    SPLEEN: Unchanged.    PANCREAS: Heterogeneous, ill-defined pancreas with peripancreatic fat stranding. Grossly unchanged collections within the pancreatic head, body and tail. Pancreatic head collection measures approximately 2 cm (series 9, image 159), pancreatic body collection measures approximately 1 cm (series 9, image 139), and pancreatic tail lesion measures approximately 1.2 cm (series 9, image 132). Additional collection adjacent to the lesser curvature of the stomach measuring approximately 1.3 cm (series 9, image 140). These collections appear to be forming a wall.    ADRENAL GLANDS: Unchanged.    KIDNEYS: Unchanged.    ABDOMINOPELVIC NODES: Unchanged.    PELVIC ORGANS: Unchanged.    PERITONEUM/MESENTERY/BOWEL: Interval mild increase of abdominopelvic ascites. No obstructionor intraperitoneal free air.    BONES/SOFT TISSUES: Interval increase of soft tissue edema.    VASCULAR: Unchanged.      IMPRESSION:    Since May 28, 2020;    No CTA evidence of acute intra-abdominal hemorrhage.    Essentially unchanged findings of pancreatitis with formation of multiple pockets of fluid in the pancreatic head, body and tail as described above.    Multiple findings likely related to the sequelae of pancreatitis including bilateral pleural effusions, abdominopelvic ascites and soft tissue edema.                JOSE ROBERTO ALTMAN M.D., RESIDENT RADIOLOGIST  This document has been electronically signed.  CELESTE HARDIN M.D., ATTENDING RADIOLOGIST  This document has been electronically signed. May 30 2020 12:10AM              < end of copied text >

## 2020-06-02 NOTE — PROGRESS NOTE ADULT - ASSESSMENT
38 yo female, pmh of anxiety, depression, substance abuse, eoth abuse, presents to ED for epigastric pain    #) acute pancreatitis ;  - management as per ICU  - Lipase 115 on presentation and CT a/p shows evidence of pancreatitis  - LR decreased to 75/min 6/1 .  -day 6 of IV abx (meropenam)  - Monitor I&O's  - was made NPO due to pain yesterday (6/1)  - advance diet if pain is better   - MR abd ; pancreatitis with pancreatic and peripancreatic fluid collections, not significantly changed from prior CT.  Small volume ascites.  Moderate bilateral pleural effusions.  - GI following  - pain control with Dilaudid     #) Elevated lactate on admission - resolved   - Lactate 6.3, with WBC 18  - Possible Hypoperfusion and acute phase reaction   - Cont IVF and IV Meropenem    #) Acute Anemia - watch closely   - Hgb 13 3 weeks ago   - transfused 1 unit prbc on 5/29  - No sign of active GI bleed and CT shows no retroperitoneal bleed  - Anemia and hemolytic workup  - active type and screen   - Keep Hgb > 7  -stable    #) Alcoholic hepatitis  - Transaminitis with ETOH hepatitis pattern  - T bili improving  - INR/PT/PTT within normal  - Cont to monitor enzyme and coagulation daily  - GI following    #) ETOH abuse, continuous  - Last drink 05/28  - Drinks 1-2 pints daily  - completed CIWA protocol - pt is on PRN ativan    VTE ppx SCD given possible active bleed  NPO ,will advance if pain improved  Full code    # dispo; after medical optimization

## 2020-06-02 NOTE — PROGRESS NOTE ADULT - ASSESSMENT
40 yo female, pmh of anxiety, depression, substance abuse, eoth abuse, presents to ED for epigastric pain    #) acute pancreatitis / alcohol abuse / substance abuse / hypokalemia - hypophosphatemia - hypomagnesemia    - repeat  CT a/p as above   - MRCP done pending radiology read  - continue IVF and IV Meropenem    - supplement electrolytes and follow repeat levels    - NPO  -  Dilaudid 1mg q3prn for pain  - GI following  - ativan prn to prevent alcohol withdrawal       #) Acute Anemia - watch closely   - Hgb 13 3 weeks ago   - transfused 1 unit prbc on 5/29  - No sign of active GI bleed and CT shows no retroperitoneal bleed  - Anemia and hemolytic workup  - active type and screen 05/29   - sequential stockings for DVT prophylaxis secondary to anemia      Full code

## 2020-06-03 LAB
6-ACETYLMORPHINE, UR RESULT: NEGATIVE NG/ML — SIGNIFICANT CHANGE UP
6MAM UR CFM-MCNC: NEGATIVE NG/ML — SIGNIFICANT CHANGE UP
ALBUMIN SERPL ELPH-MCNC: 2 G/DL — LOW (ref 3.5–5.2)
ALP SERPL-CCNC: 291 U/L — HIGH (ref 30–115)
ALT FLD-CCNC: 19 U/L — SIGNIFICANT CHANGE UP (ref 0–41)
ANION GAP SERPL CALC-SCNC: 8 MMOL/L — SIGNIFICANT CHANGE UP (ref 7–14)
AST SERPL-CCNC: 148 U/L — HIGH (ref 0–41)
BASOPHILS # BLD AUTO: 0.09 K/UL — SIGNIFICANT CHANGE UP (ref 0–0.2)
BASOPHILS NFR BLD AUTO: 0.9 % — SIGNIFICANT CHANGE UP (ref 0–1)
BILIRUB SERPL-MCNC: 3.1 MG/DL — HIGH (ref 0.2–1.2)
BUN SERPL-MCNC: <3 MG/DL — LOW (ref 10–20)
CALCIUM SERPL-MCNC: 7.5 MG/DL — LOW (ref 8.5–10.1)
CHLORIDE SERPL-SCNC: 106 MMOL/L — SIGNIFICANT CHANGE UP (ref 98–110)
CO2 SERPL-SCNC: 23 MMOL/L — SIGNIFICANT CHANGE UP (ref 17–32)
CODEINE UR CFM-MCNC: NEGATIVE NG/ML — SIGNIFICANT CHANGE UP
CODEINE, UR RESULT: NEGATIVE NG/ML — SIGNIFICANT CHANGE UP
CREAT SERPL-MCNC: <0.5 MG/DL — LOW (ref 0.7–1.5)
CULTURE RESULTS: SIGNIFICANT CHANGE UP
CULTURE RESULTS: SIGNIFICANT CHANGE UP
EOSINOPHIL # BLD AUTO: 0.19 K/UL — SIGNIFICANT CHANGE UP (ref 0–0.7)
EOSINOPHIL NFR BLD AUTO: 1.9 % — SIGNIFICANT CHANGE UP (ref 0–8)
GLUCOSE SERPL-MCNC: 92 MG/DL — SIGNIFICANT CHANGE UP (ref 70–99)
HCT VFR BLD CALC: 26.6 % — LOW (ref 37–47)
HGB BLD-MCNC: 8.3 G/DL — LOW (ref 12–16)
HYDROCODONE UR QL CFM: NEGATIVE NG/ML — SIGNIFICANT CHANGE UP
HYDROCODONE, UR RESULT: NEGATIVE NG/ML — SIGNIFICANT CHANGE UP
HYDROMORPHONE UR QL CFM: NEGATIVE NG/ML — SIGNIFICANT CHANGE UP
HYDROMORPHONE, UR RESULT: NEGATIVE NG/ML — SIGNIFICANT CHANGE UP
IMM GRANULOCYTES NFR BLD AUTO: 0.5 % — HIGH (ref 0.1–0.3)
LYMPHOCYTES # BLD AUTO: 1.14 K/UL — LOW (ref 1.2–3.4)
LYMPHOCYTES # BLD AUTO: 11.3 % — LOW (ref 20.5–51.1)
MAGNESIUM SERPL-MCNC: 1.7 MG/DL — LOW (ref 1.8–2.4)
MCHC RBC-ENTMCNC: 31.2 G/DL — LOW (ref 32–37)
MCHC RBC-ENTMCNC: 32.5 PG — HIGH (ref 27–31)
MCV RBC AUTO: 104.3 FL — HIGH (ref 81–99)
MONOCYTES # BLD AUTO: 0.92 K/UL — HIGH (ref 0.1–0.6)
MONOCYTES NFR BLD AUTO: 9.1 % — SIGNIFICANT CHANGE UP (ref 1.7–9.3)
MORPHINE UR QL CFM: 3982 NG/ML — SIGNIFICANT CHANGE UP
MORPHINE, UR RESULT: 3982 NG/ML — SIGNIFICANT CHANGE UP
NEUTROPHILS # BLD AUTO: 7.72 K/UL — HIGH (ref 1.4–6.5)
NEUTROPHILS NFR BLD AUTO: 76.3 % — HIGH (ref 42.2–75.2)
NOROXYCODONE (OPIATES), UR RESULT: NEGATIVE NG/ML — SIGNIFICANT CHANGE UP
NOROXYCODONE UR CFM-MCNC: NEGATIVE NG/ML — SIGNIFICANT CHANGE UP
NRBC # BLD: 0 /100 WBCS — SIGNIFICANT CHANGE UP (ref 0–0)
OPIATES IN-HOUSE INTERPRETATION: POSITIVE
OPIATES UR QL CFM: POSITIVE
OXYCODONE (OPIATES), UR RESULT: NEGATIVE NG/ML — SIGNIFICANT CHANGE UP
OXYCODONE UR-MCNC: NEGATIVE NG/ML — SIGNIFICANT CHANGE UP
OXYMORPHONE (OPIATES), UR RESULT: NEGATIVE NG/ML — SIGNIFICANT CHANGE UP
OXYMORPHONE UR CFM-MCNC: NEGATIVE NG/ML — SIGNIFICANT CHANGE UP
PHOSPHATE SERPL-MCNC: 2 MG/DL — LOW (ref 2.1–4.9)
PLATELET # BLD AUTO: 242 K/UL — SIGNIFICANT CHANGE UP (ref 130–400)
POTASSIUM SERPL-MCNC: 3.8 MMOL/L — SIGNIFICANT CHANGE UP (ref 3.5–5)
POTASSIUM SERPL-SCNC: 3.8 MMOL/L — SIGNIFICANT CHANGE UP (ref 3.5–5)
PROT SERPL-MCNC: 4.9 G/DL — LOW (ref 6–8)
RBC # BLD: 2.55 M/UL — LOW (ref 4.2–5.4)
RBC # FLD: 22 % — HIGH (ref 11.5–14.5)
SODIUM SERPL-SCNC: 137 MMOL/L — SIGNIFICANT CHANGE UP (ref 135–146)
SPECIMEN SOURCE: SIGNIFICANT CHANGE UP
SPECIMEN SOURCE: SIGNIFICANT CHANGE UP
WBC # BLD: 10.11 K/UL — SIGNIFICANT CHANGE UP (ref 4.8–10.8)
WBC # FLD AUTO: 10.11 K/UL — SIGNIFICANT CHANGE UP (ref 4.8–10.8)

## 2020-06-03 PROCEDURE — 99232 SBSQ HOSP IP/OBS MODERATE 35: CPT

## 2020-06-03 PROCEDURE — 93970 EXTREMITY STUDY: CPT | Mod: 26

## 2020-06-03 PROCEDURE — 99233 SBSQ HOSP IP/OBS HIGH 50: CPT

## 2020-06-03 RX ORDER — MAGNESIUM SULFATE 500 MG/ML
2 VIAL (ML) INJECTION ONCE
Refills: 0 | Status: COMPLETED | OUTPATIENT
Start: 2020-06-03 | End: 2020-06-03

## 2020-06-03 RX ORDER — MAGNESIUM SULFATE 500 MG/ML
4 VIAL (ML) INJECTION ONCE
Refills: 0 | Status: DISCONTINUED | OUTPATIENT
Start: 2020-06-03 | End: 2020-06-03

## 2020-06-03 RX ORDER — ENOXAPARIN SODIUM 100 MG/ML
40 INJECTION SUBCUTANEOUS DAILY
Refills: 0 | Status: DISCONTINUED | OUTPATIENT
Start: 2020-06-03 | End: 2020-06-12

## 2020-06-03 RX ADMIN — MEROPENEM 100 MILLIGRAM(S): 1 INJECTION INTRAVENOUS at 05:44

## 2020-06-03 RX ADMIN — PANTOPRAZOLE SODIUM 40 MILLIGRAM(S): 20 TABLET, DELAYED RELEASE ORAL at 11:43

## 2020-06-03 RX ADMIN — Medication 100 MILLIGRAM(S): at 11:42

## 2020-06-03 RX ADMIN — Medication 1 PATCH: at 19:29

## 2020-06-03 RX ADMIN — HYDROMORPHONE HYDROCHLORIDE 1 MILLIGRAM(S): 2 INJECTION INTRAMUSCULAR; INTRAVENOUS; SUBCUTANEOUS at 15:20

## 2020-06-03 RX ADMIN — Medication 2 MILLIGRAM(S): at 05:25

## 2020-06-03 RX ADMIN — MEROPENEM 100 MILLIGRAM(S): 1 INJECTION INTRAVENOUS at 21:32

## 2020-06-03 RX ADMIN — Medication 1 PATCH: at 11:43

## 2020-06-03 RX ADMIN — Medication 1 PATCH: at 11:45

## 2020-06-03 RX ADMIN — SODIUM CHLORIDE 75 MILLILITER(S): 9 INJECTION INTRAMUSCULAR; INTRAVENOUS; SUBCUTANEOUS at 05:47

## 2020-06-03 RX ADMIN — CHLORHEXIDINE GLUCONATE 1 APPLICATION(S): 213 SOLUTION TOPICAL at 05:42

## 2020-06-03 RX ADMIN — Medication 2 MILLIGRAM(S): at 03:01

## 2020-06-03 RX ADMIN — MEROPENEM 100 MILLIGRAM(S): 1 INJECTION INTRAVENOUS at 15:00

## 2020-06-03 RX ADMIN — Medication 1 PATCH: at 08:16

## 2020-06-03 RX ADMIN — HYDROMORPHONE HYDROCHLORIDE 1 MILLIGRAM(S): 2 INJECTION INTRAMUSCULAR; INTRAVENOUS; SUBCUTANEOUS at 20:01

## 2020-06-03 RX ADMIN — Medication 1 MILLIGRAM(S): at 11:42

## 2020-06-03 RX ADMIN — Medication 1 TABLET(S): at 11:42

## 2020-06-03 RX ADMIN — Medication 50 GRAM(S): at 08:17

## 2020-06-03 NOTE — PROGRESS NOTE ADULT - SUBJECTIVE AND OBJECTIVE BOX
Patient is a 39y old  Female who presents with a chief complaint of severe abdominal pain (03 Jun 2020 10:45)      T(F): 97.5 (06-03-20 @ 13:12), Max: 99.1 (06-02-20 @ 22:39)  HR: 114 (06-03-20 @ 13:12)  BP: 119/76 (06-03-20 @ 13:12)  RR: 18 (06-03-20 @ 13:12)  SpO2: 98% (06-03-20 @ 13:12) (97% - 99%)    PHYSICAL EXAM:  GENERAL: NAD  HEAD:  Atraumatic, Normocephalic  NERVOUS SYSTEM:  Alert & Oriented X3, no focal deficits   CHEST/LUNG: Clear to percussion bilaterally; No rales, rhonchi, wheezing, or rubs  HEART: Regular rate and rhythm; No murmurs, rubs, or gallops  ABDOMEN: Soft, mild diffuse tenderness   EXTREMITIES:  2+ Peripheral Pulses, No clubbing, cyanosis, or edema  LYMPH: No lymphadenopathy noted  SKIN: No rashes or lesions    LABS  06-03    137  |  106  |  <3<L>  ----------------------------<  92  3.8   |  23  |  <0.5<L>    Ca    7.5<L>      03 Jun 2020 05:48  Phos  2.0     06-03  Mg     1.7     06-03    TPro  4.9<L>  /  Alb  2.0<L>  /  TBili  3.1<H>  /  DBili  2.3<H>  /  AST  148<H>  /  ALT  19  /  AlkPhos  291<H>  06-03                          8.3    10.11 )-----------( 242      ( 03 Jun 2020 05:48 )             26.6     Culture Results:   No growth to date. (05-28-20)  Culture Results:   No growth to date. (05-28-20)  Culture Results:   <10,000 CFU/mL Normal Urogenital Nika (05-28-20)    RADIOLOGY  < from: VA Duplex Lower Ext Vein Scan, Bilat (06.03.20 @ 11:43) >  Impression:    No evidence of deep venous thrombosis or superficial thrombophlebitis in the bilateral lower extremities.    < end of copied text >  < from:  Abdomen No Cont (05.31.20 @ 11:05) >  IMPRESSION:    1.  Findings of ongoing pancreatitis with pancreatic and peripancreatic fluid collections, not significantly changed from prior CT.  2.  Small volume ascites.  3.  Moderate bilateral pleural effusions.  4.  Marked hepatomegaly steatosis.    < end of copied text >    MEDICATIONS  (STANDING):  chlorhexidine 4% Liquid 1 Application(s) Topical two times a day  enoxaparin Injectable 40 milliGRAM(s) SubCutaneous daily  folic acid 1 milliGRAM(s) Oral daily  meropenem  IVPB 1000 milliGRAM(s) IV Intermittent every 8 hours  multivitamin 1 Tablet(s) Oral daily  nicotine -  14 mG/24Hr(s) Patch 1 patch Transdermal daily  pantoprazole  Injectable 40 milliGRAM(s) IV Push daily  sodium chloride 0.9%. 1000 milliLiter(s) (75 mL/Hr) IV Continuous <Continuous>  thiamine 100 milliGRAM(s) Oral daily    MEDICATIONS  (PRN):  HYDROmorphone  Injectable 1 milliGRAM(s) IV Push every 3 hours PRN Moderate Pain (4 - 6)  LORazepam   Injectable 2 milliGRAM(s) IV Push every 2 hours PRN Symptom-triggered: 2 point increase in CIWA -Ar score and a total score of 7 or LESS  ondansetron Injectable 4 milliGRAM(s) IV Push every 8 hours PRN Nausea and/or Vomiting

## 2020-06-03 NOTE — PROGRESS NOTE ADULT - ASSESSMENT
40 yo female, pmh of anxiety, depression, substance abuse, eoth abuse, presents to ED for "I'm in a lot of pain." States she has been having pain for several months now. Located in epigastrium, severe 10/10 aching, no radiation. Denies fever, chills, cp, sob, le swelling, back pain, neck pain, nvd, dysuria, hematuria. (28 May 2020 20:48)      # Alcohol-induced acute pancreatitis with multiple pockets of fluid in the pancreatic head    - T(F): , Max: 99.2   - WBC Count: 9.78 K/uL     # Alcoholic hepatitis     #  Rash    # Lactic acidosis    # Ascites    PLAN  Continue IV meropenem  GI F/U

## 2020-06-03 NOTE — PROGRESS NOTE ADULT - ASSESSMENT
39 year old female with a history of acute alcoholic pancreatitis, alcohol abuse,  presents with abdominal pain, leukocytosis, acute pancreatitis with developing pseudocysts, thickened gallbladder wall, sludge in the gallbladder elevated LFTs, acute anemia without overt GI bleeding. Ultrasound shows gallbladder sludge but a normal CBD.    Dr. Kennedy spoke to Dr. Palacios regarding the patient's CT scan: no evidence of hemorrhagic pancreatitis, can't exclude infected fluid collection.   Repeat CT showed no hemorrhagic pancreatitis    HGb stable after drop in hgb  no overt bleeding  MRCP shows no choledocholithiasis    Problem 1-Acute complicated pancreatitis likely secondary to alcohol with superimposed biliary pancreatitis given gallbladder sludge   Rec  -Advance to low fat diet  -Doubt cholangitis given normal bile ducts on CT and ultrasound and on MR  -Elevated LFTs are likely secondary to alcoholic hepatitis (>2:1 ratio between AST: ALT) but would not recommend prednisolone   (wDF<32)  -Continue antibiotics  -continue IVF  -Check BUN and HCT (if either rises, increase fluids) but patient with evidence of volume overload so aggressive fluid hydration ability limited at present  -if overt bleeding or significant drop in HGB, call GI stat.  -surgery consult appreciated (no intervention planned)  -alcohol abstinence  -pain control    Problem 2- Marked hepatomegaly steatosis.  Rec  -Dietary and lifestyle modifications advised

## 2020-06-03 NOTE — PROGRESS NOTE ADULT - SUBJECTIVE AND OBJECTIVE BOX
THIS NOTE IS NOT COMPLETE        Gastroenterology progress note:     Patient is a 39y old  Female who presents with a chief complaint of severe abdominal pain (03 Jun 2020 09:35)       Admitted on: 05-28-20    We are following the patient for: Acute Pancreatitis     Interval History: tolerating clear liquids. Pain persists    Patient's medical problems are stable   Prior records reviewed (Y/N): y  History obtained from someone other than patient (Y/N): N      PAST MEDICAL & SURGICAL HISTORY:  ETOH abuse  Postpartum depression  Substance abuse  Anxiety  Depression  No significant past surgical history      MEDICATIONS  (STANDING):  chlorhexidine 4% Liquid 1 Application(s) Topical two times a day  folic acid 1 milliGRAM(s) Oral daily  meropenem  IVPB 1000 milliGRAM(s) IV Intermittent every 8 hours  multivitamin 1 Tablet(s) Oral daily  nicotine -  14 mG/24Hr(s) Patch 1 patch Transdermal daily  pantoprazole  Injectable 40 milliGRAM(s) IV Push daily  sodium chloride 0.9%. 1000 milliLiter(s) (75 mL/Hr) IV Continuous <Continuous>  thiamine 100 milliGRAM(s) Oral daily    MEDICATIONS  (PRN):  HYDROmorphone  Injectable 1 milliGRAM(s) IV Push every 3 hours PRN Moderate Pain (4 - 6)  LORazepam   Injectable 2 milliGRAM(s) IV Push every 2 hours PRN Symptom-triggered: 2 point increase in CIWA -Ar score and a total score of 7 or LESS  ondansetron Injectable 4 milliGRAM(s) IV Push every 8 hours PRN Nausea and/or Vomiting      Allergies  No Known Allergies      Review of Systems:   Cardiovascular:  No Chest Pain, No Palpitations  Respiratory:  No Cough, No Dyspnea  Gastrointestinal:  As described in HPI    Physical Examination:  T(C): 37 (06-03-20 @ 07:01), Max: 37.3 (06-02-20 @ 22:39)  HR: 96 (06-03-20 @ 09:05) (96 - 143)  BP: 104/66 (06-03-20 @ 09:05) (98/57 - 123/69)  RR: 23 (06-03-20 @ 09:05) (4 - 36)  SpO2: 98% (06-03-20 @ 08:00) (95% - 99%)      06-02-20 @ 07:01  -  06-03-20 @ 07:00  --------------------------------------------------------  IN: 1488.5 mL / OUT: 0 mL / NET: 1488.5 mL    06-03-20 @ 07:01  -  06-03-20 @ 10:49  --------------------------------------------------------  IN: 275 mL / OUT: 0 mL / NET: 275 mL      Constitutional: No acute distress.  Respiratory:  No signs of respiratory distress. Lung sounds are clear bilaterally.  Cardiovascular:  S1 S2, Regular rate and rhythm.  Abdominal: Abdomen is soft, symmetric, and mildly tender with mild distention. There are no visible lesions. Bowel sounds are present and normoactive in all four quadrants. No masses, hepatomegaly, or splenomegaly are noted.   Skin: No rashes, No Jaundice.        Data: (reviewed by attending)                        8.3    10.11 )-----------( 242      ( 03 Jun 2020 05:48 )             26.6     Hgb trend:  8.3  06-03-20 @ 05:48  8.4  06-02-20 @ 05:25  8.4  06-01-20 @ 06:03        06-03    137  |  106  |  <3<L>  ----------------------------<  92  3.8   |  23  |  <0.5<L>    Ca    7.5<L>      03 Jun 2020 05:48  Phos  2.0     06-03  Mg     1.7     06-03    TPro  4.9<L>  /  Alb  2.0<L>  /  TBili  3.1<H>  /  DBili  2.3<H>  /  AST  148<H>  /  ALT  19  /  AlkPhos  291<H>  06-03    Liver panel trend:  TBili 3.1   /      /   ALT 19   /   AlkP 291   /   Tptn 4.9   /   Alb 2.0    /   DBili 2.3      06-03  TBili 3.0   /      /   ALT 18   /   AlkP 305   /   Tptn 4.9   /   Alb 2.1    /   DBili 2.2      06-02  TBili 3.0   /      /   ALT 19   /   AlkP 341   /   Tptn 4.8   /   Alb 2.0    /   DBili --      06-01  TBili 3.1   /      /   ALT 19   /   AlkP 356   /   Tptn 5.0   /   Alb 2.0    /   DBili --      05-31  TBili 3.6   /      /   ALT 19   /   AlkP 386   /   Tptn 4.9   /   Alb 2.2    /   DBili 2.9      05-30  TBili 4.7   /      /   ALT 22   /   AlkP 400   /   Tptn 5.2   /   Alb 2.2    /   DBili 3.7      05-29  TBili 4.8   /      /   ALT 24   /   AlkP 474   /   Tptn 6.2   /   Alb 2.6    /   DBili 3.6      05-28 Gastroenterology progress note:     Patient is a 39y old  Female who presents with a chief complaint of severe abdominal pain (03 Jun 2020 09:35)       Admitted on: 05-28-20    We are following the patient for: Acute Pancreatitis     Interval History: tolerating clear liquids. Pain persists    Patient's medical problems are stable   Prior records reviewed (Y/N): y  History obtained from someone other than patient (Y/N): N      PAST MEDICAL & SURGICAL HISTORY:  ETOH abuse  Postpartum depression  Substance abuse  Anxiety  Depression  No significant past surgical history      MEDICATIONS  (STANDING):  chlorhexidine 4% Liquid 1 Application(s) Topical two times a day  folic acid 1 milliGRAM(s) Oral daily  meropenem  IVPB 1000 milliGRAM(s) IV Intermittent every 8 hours  multivitamin 1 Tablet(s) Oral daily  nicotine -  14 mG/24Hr(s) Patch 1 patch Transdermal daily  pantoprazole  Injectable 40 milliGRAM(s) IV Push daily  sodium chloride 0.9%. 1000 milliLiter(s) (75 mL/Hr) IV Continuous <Continuous>  thiamine 100 milliGRAM(s) Oral daily    MEDICATIONS  (PRN):  HYDROmorphone  Injectable 1 milliGRAM(s) IV Push every 3 hours PRN Moderate Pain (4 - 6)  LORazepam   Injectable 2 milliGRAM(s) IV Push every 2 hours PRN Symptom-triggered: 2 point increase in CIWA -Ar score and a total score of 7 or LESS  ondansetron Injectable 4 milliGRAM(s) IV Push every 8 hours PRN Nausea and/or Vomiting      Allergies  No Known Allergies      Review of Systems:   Cardiovascular:  No Chest Pain, No Palpitations  Respiratory:  No Cough, No Dyspnea  Gastrointestinal:  As described in HPI    Physical Examination:  T(C): 37 (06-03-20 @ 07:01), Max: 37.3 (06-02-20 @ 22:39)  HR: 96 (06-03-20 @ 09:05) (96 - 143)  BP: 104/66 (06-03-20 @ 09:05) (98/57 - 123/69)  RR: 23 (06-03-20 @ 09:05) (4 - 36)  SpO2: 98% (06-03-20 @ 08:00) (95% - 99%)      06-02-20 @ 07:01  -  06-03-20 @ 07:00  --------------------------------------------------------  IN: 1488.5 mL / OUT: 0 mL / NET: 1488.5 mL    06-03-20 @ 07:01  -  06-03-20 @ 10:49  --------------------------------------------------------  IN: 275 mL / OUT: 0 mL / NET: 275 mL      Constitutional: No acute distress.  Respiratory:  No signs of respiratory distress. Lung sounds are clear bilaterally.  Cardiovascular:  S1 S2, Regular rate and rhythm.  Abdominal: Abdomen is soft, symmetric, and mildly tender with mild distention. There are no visible lesions. Bowel sounds are present and normoactive in all four quadrants. No masses, hepatomegaly, or splenomegaly are noted.   Skin: No rashes, No Jaundice.        Data: (reviewed by attending)                        8.3    10.11 )-----------( 242      ( 03 Jun 2020 05:48 )             26.6     Hgb trend:  8.3  06-03-20 @ 05:48  8.4  06-02-20 @ 05:25  8.4  06-01-20 @ 06:03        06-03    137  |  106  |  <3<L>  ----------------------------<  92  3.8   |  23  |  <0.5<L>    Ca    7.5<L>      03 Jun 2020 05:48  Phos  2.0     06-03  Mg     1.7     06-03    TPro  4.9<L>  /  Alb  2.0<L>  /  TBili  3.1<H>  /  DBili  2.3<H>  /  AST  148<H>  /  ALT  19  /  AlkPhos  291<H>  06-03    Liver panel trend:  TBili 3.1   /      /   ALT 19   /   AlkP 291   /   Tptn 4.9   /   Alb 2.0    /   DBili 2.3      06-03  TBili 3.0   /      /   ALT 18   /   AlkP 305   /   Tptn 4.9   /   Alb 2.1    /   DBili 2.2      06-02  TBili 3.0   /      /   ALT 19   /   AlkP 341   /   Tptn 4.8   /   Alb 2.0    /   DBili --      06-01  TBili 3.1   /      /   ALT 19   /   AlkP 356   /   Tptn 5.0   /   Alb 2.0    /   DBili --      05-31  TBili 3.6   /      /   ALT 19   /   AlkP 386   /   Tptn 4.9   /   Alb 2.2    /   DBili 2.9      05-30  TBili 4.7   /      /   ALT 22   /   AlkP 400   /   Tptn 5.2   /   Alb 2.2    /   DBili 3.7      05-29  TBili 4.8   /      /   ALT 24   /   AlkP 474   /   Tptn 6.2   /   Alb 2.6    /   DBili 3.6      05-28

## 2020-06-03 NOTE — PROGRESS NOTE ADULT - SUBJECTIVE AND OBJECTIVE BOX
LENGTH OF HOSPITAL STAY: 6d    CHIEF COMPLAINT:   Patient is a 39y old  Female who presents with a chief complaint of severe abdominal pain (03 Jun 2020 09:02)    EVENTS OVERNIGHT ; sleeping comfortably in bed   still taking ativan and Dilaudid around the clock   rt leg looked more swollen on exam       HISTORY OF PRESENTING ILLNESS:    HPI:  38 yo female, pmh of anxiety, depression, substance abuse, eoth abuse, presents to ED for "I'm in a lot of pain." States she has been having pain for several months now. Located in epigastrium, severe 10/10 aching, no radiation. Denies fever, chills, cp, sob, le swelling, back pain, neck pain, nvd, dysuria, hematuria. (28 May 2020 20:48)    PAST MEDICAL & SURGICAL HISTORY  PAST MEDICAL & SURGICAL HISTORY:  ETOH abuse  Postpartum depression  Substance abuse  Anxiety  Depression  No significant past surgical history    SOCIAL HISTORY:    ALLERGIES:  No Known Allergies    MEDICATIONS:  STANDING MEDICATIONS  chlorhexidine 4% Liquid 1 Application(s) Topical two times a day  folic acid 1 milliGRAM(s) Oral daily  meropenem  IVPB 1000 milliGRAM(s) IV Intermittent every 8 hours  multivitamin 1 Tablet(s) Oral daily  nicotine -  14 mG/24Hr(s) Patch 1 patch Transdermal daily  pantoprazole  Injectable 40 milliGRAM(s) IV Push daily  sodium chloride 0.9%. 1000 milliLiter(s) IV Continuous <Continuous>  thiamine 100 milliGRAM(s) Oral daily    PRN MEDICATIONS  HYDROmorphone  Injectable 1 milliGRAM(s) IV Push every 3 hours PRN  LORazepam   Injectable 2 milliGRAM(s) IV Push every 2 hours PRN  ondansetron Injectable 4 milliGRAM(s) IV Push every 8 hours PRN    VITALS:   T(F): 98.6  HR: 96  BP: 104/66  RR: 23  SpO2: 98%    LABS:                        8.3    10.11 )-----------( 242      ( 03 Jun 2020 05:48 )             26.6     06-03    137  |  106  |  <3<L>  ----------------------------<  92  3.8   |  23  |  <0.5<L>    Ca    7.5<L>      03 Jun 2020 05:48  Phos  2.0     06-03  Mg     1.7     06-03    TPro  4.9<L>  /  Alb  2.0<L>  /  TBili  3.1<H>  /  DBili  2.3<H>  /  AST  148<H>  /  ALT  19  /  AlkPhos  291<H>  06-03                  RADIOLOGY:  < from: Xray Chest 1 View-PORTABLE IMMEDIATE (06.02.20 @ 09:44) >  Impression:    Minimal increase, left greater than right basilar opacity/effusions. No pneumothorax    < end of copied text >    PHYSICAL EXAM:  GEN: No acute distress  HEENT: within normal range  LUNGS: Clear to auscultation bilaterally   HEART: S1/S2 present. RRR.   ABD: Soft, non-tender, distended. Bowel sounds present  EXT: Rt leg >left leg  NEURO: AAOX3  non  focal

## 2020-06-03 NOTE — PROGRESS NOTE ADULT - ASSESSMENT
40 yo female, pmh of anxiety, depression, substance abuse, eoth abuse, presents to ED for epigastric pain    #) acute pancreatitis ;  - Lipase 115 on presentation and CT a/p shows evidence of pancreatitis  - LR decreased to 75/min since 6/1 .  -day 7 of IV abx (meropenam)  - cw IV abx as per ID   - Monitor I&O's  -tolerating liquid diet  - MR abd ; pancreatitis with pancreatic and peripancreatic fluid collections, not significantly changed from prior CT.  Small volume ascites.  Moderate bilateral pleural effusions.  - GI follow up  - still requiring pain control with Dilaudid around the clock    # RT leg swelling >left ;  will get LE duplex to rule out dVT     #) Acute Anemia - watch closely   - Hgb 13 3 weeks ago   - transfused 1 unit prbc on 5/29  - No sign of active GI bleed and CT shows no retroperitoneal bleed  - Anemia and hemolytic workup  - active type and screen   - Keep Hgb > 7  -stable at 8 now    #) Alcoholic hepatitis  - Transaminitis with ETOH hepatitis pattern  - GI following    #) ETOH abuse, continuous  - Last drink 05/28  - Drinks 1-2 pints daily  - completed CIWA protocol - pt is on PRN ativan    VTE ppx ;was on SCD given possible active bleed   will get LE duplex to rule out DVT   and will start sun q heparin if no DVT     on PO liquid diet ,will advance if pain improved  Full code    # dispo; will down grade to medical floor ,if LE duplex is negative for DVT

## 2020-06-03 NOTE — PROGRESS NOTE ADULT - SUBJECTIVE AND OBJECTIVE BOX
Patient is a 39y old  Female who presents with a chief complaint of severe abdominal pain (02 Jun 2020 11:26)      Over Night Events:  Patient seen and examined.   tolerate diet   still complain of abd pain   ROS:  See HPI    PHYSICAL EXAM    ICU Vital Signs Last 24 Hrs  T(C): 37 (03 Jun 2020 07:01), Max: 37.3 (02 Jun 2020 22:39)  T(F): 98.6 (03 Jun 2020 07:01), Max: 99.1 (02 Jun 2020 22:39)  HR: 107 (03 Jun 2020 07:09) (103 - 143)  BP: 98/57 (03 Jun 2020 07:09) (90/60 - 123/69)  BP(mean): 71 (03 Jun 2020 07:09) (70 - 99)  ABP: --  ABP(mean): --  RR: 31 (03 Jun 2020 07:09) (4 - 36)  SpO2: 98% (03 Jun 2020 04:10) (95% - 99%)      General: awake ,Ox3  HEENT:    romaine            Lymph Nodes: NO cervical LN   Lungs: Bilateral BS  Cardiovascular: Regular   Abdomen: Soft, Positive BS  Extremities: No clubbing   Skin: warm   Neurological: no focal   Musculoskeletal: move all ext     I&O's Detail    02 Jun 2020 07:01  -  03 Jun 2020 07:00  --------------------------------------------------------  IN:    IV PiggyBack: 100 mL    sodium chloride 0.9%.: 1313.5 mL  Total IN: 1413.5 mL    OUT:  Total OUT: 0 mL    Total NET: 1413.5 mL      03 Jun 2020 07:01  -  03 Jun 2020 08:24  --------------------------------------------------------  IN:    IV PiggyBack: 50 mL  Total IN: 50 mL    OUT:  Total OUT: 0 mL    Total NET: 50 mL          LABS:                          8.3    10.11 )-----------( 242      ( 03 Jun 2020 05:48 )             26.6         03 Jun 2020 05:48    137    |  106    |  <3     ----------------------------<  92     3.8     |  23     |  <0.5     Ca    7.5        03 Jun 2020 05:48  Phos  2.0       03 Jun 2020 05:48  Mg     1.7       03 Jun 2020 05:48    TPro  4.9    /  Alb  2.0    /  TBili  3.1    /  DBili  2.3    /  AST  148    /  ALT  19     /  AlkPhos  291    03 Jun 2020 05:48  Amylase x     lipase x                                                                                                                                                                                                                                         MEDICATIONS  (STANDING):  chlorhexidine 4% Liquid 1 Application(s) Topical two times a day  folic acid 1 milliGRAM(s) Oral daily  meropenem  IVPB 1000 milliGRAM(s) IV Intermittent every 8 hours  multivitamin 1 Tablet(s) Oral daily  nicotine -  14 mG/24Hr(s) Patch 1 patch Transdermal daily  pantoprazole  Injectable 40 milliGRAM(s) IV Push daily  sodium chloride 0.9%. 1000 milliLiter(s) (75 mL/Hr) IV Continuous <Continuous>  thiamine 100 milliGRAM(s) Oral daily    MEDICATIONS  (PRN):  HYDROmorphone  Injectable 1 milliGRAM(s) IV Push every 3 hours PRN Moderate Pain (4 - 6)  LORazepam   Injectable 2 milliGRAM(s) IV Push every 2 hours PRN Symptom-triggered: 2 point increase in CIWA -Ar score and a total score of 7 or LESS  ondansetron Injectable 4 milliGRAM(s) IV Push every 8 hours PRN Nausea and/or Vomiting          Xrays:  TLC:  OG:  ET tube:                                                                                    small b/l  effusion    ECHO:  [facility] ICU:

## 2020-06-03 NOTE — PROGRESS NOTE ADULT - ASSESSMENT
IMPRESSION:  acute/chronic pancreatitis slowly improving  phlegmon development possible infected  acute/chronic anemia   DTs  active ETOH abuse  r/o biliary obstruction    SUGGEST:  Gi/Surg f/u see if ok to advance diet   keep HGB >8  serial HH  cont Iv flluid    OOB to chair   analgesia  PRN benzo per CIWA  empiric abx Meropenem as per GI follow and ID   GI recommendation   repeat labs  supplement lytes as needed  downgrade to floor IMPRESSION:  acute/chronic pancreatitis slowly improving  phlegmon development possible infected  acute/chronic anemia   DTs  active ETOH abuse  r/o biliary obstruction    SUGGEST:  Gi/Surg f/u see if ok to advance diet   keep HGB >8  serial HH  cont Iv flluid    OOB to chair   analgesia  PRN benzo per CIWA  empiric abx Meropenem as per GI follow and ID   GI recommendation   repeat labs  supplement lytes as needed  do doppler lower ext for now if positive will start heparin drip if negative start heparin Sq h/h stable   downgrade to floor  if doppler negative

## 2020-06-03 NOTE — PROGRESS NOTE ADULT - SUBJECTIVE AND OBJECTIVE BOX
SEBLEADRIAN ALEXANDER  39y, Female  Allergy: No Known Allergies      CHIEF COMPLAINT: severe abdominal pain (03 Jun 2020 08:24)      INTERVAL EVENTS/HPI  - No acute events overnight  - T(F): , Max: 99.1 (06-02-20 @ 22:39)  - Denies any worsening symptoms  - Tolerating medication  - WBC Count: 10.11 K/uL (06-03-20 @ 05:48)      ROS  General: Denies fevers, chills, nightsweats, weight loss  HEENT: Denies headache, rhinorrhea, sore throat, eye pain  CV: Denies CP, palpitations  PULM: Denies SOB, cough  GI: Denies abdominal pain, diarrhea  : Denies dysuria, hematuria  MSK: Denies arthralgias  SKIN: Denies rash   NEURO: Denies paresthesias, weakness  PSYCH: Denies depression    FH non-contributory   Social Hx non-contributory    VITALS:  T(F): 98.6, Max: 99.1 (06-02-20 @ 22:39)  HR: 102  BP: 98/57  RR: 31Vital Signs Last 24 Hrs  T(C): 37 (03 Jun 2020 07:01), Max: 37.3 (02 Jun 2020 22:39)  T(F): 98.6 (03 Jun 2020 07:01), Max: 99.1 (02 Jun 2020 22:39)  HR: 102 (03 Jun 2020 08:00) (102 - 143)  BP: 98/57 (03 Jun 2020 07:09) (90/60 - 123/69)  BP(mean): 71 (03 Jun 2020 07:09) (70 - 99)  RR: 31 (03 Jun 2020 07:09) (4 - 36)  SpO2: 98% (03 Jun 2020 08:00) (95% - 99%)    PHYSICAL EXAM:  Gen: NAD, resting in bed  HEENT: Normocephalic, atraumatic  Neck: supple, no lymphadenopathy  CV: Regular rate & regular rhythm  Lungs: decreased BS at bases, no fremitus  Abdomen: Soft, BS present  Ext: Warm, well perfused  Neuro: non focal, awake  Skin: no rash, no erythema      TESTS & MEASUREMENTS:                        8.3    10.11 )-----------( 242      ( 03 Jun 2020 05:48 )             26.6     06-03    137  |  106  |  <3<L>  ----------------------------<  92  3.8   |  23  |  <0.5<L>    Ca    7.5<L>      03 Jun 2020 05:48  Phos  2.0     06-03  Mg     1.7     06-03    TPro  4.9<L>  /  Alb  2.0<L>  /  TBili  3.1<H>  /  DBili  2.3<H>  /  AST  148<H>  /  ALT  19  /  AlkPhos  291<H>  06-03    eGFR if Non : 146 mL/min/1.73M2 (06-03-20 @ 05:48)  eGFR if : 170 mL/min/1.73M2 (06-03-20 @ 05:48)    LIVER FUNCTIONS - ( 03 Jun 2020 05:48 )  Alb: 2.0 g/dL / Pro: 4.9 g/dL / ALK PHOS: 291 U/L / ALT: 19 U/L / AST: 148 U/L / GGT: x               Culture - Blood (collected 05-28-20 @ 22:15)  Source: .Blood Blood  Preliminary Report (05-30-20 @ 17:01):    No growth to date.    Culture - Blood (collected 05-28-20 @ 22:15)  Source: .Blood Blood  Preliminary Report (05-30-20 @ 17:01):    No growth to date.    Culture - Urine (collected 05-28-20 @ 22:10)  Source: .Urine Clean Catch (Midstream)  Final Report (05-30-20 @ 12:40):    <10,000 CFU/mL Normal Urogenital Nika        Lactate, Blood: 0.9 mmol/L (05-30-20 @ 05:25)  Lactate, Blood: 1.9 mmol/L (05-29-20 @ 11:35)      INFECTIOUS DISEASES TESTING  HIV-1/2 Combo Result: Nonreact (06-10-19 @ 17:08)  Hepatitis C Virus Interpretation: Nonreact (06-10-19 @ 17:08)  Hepatitis B Surface Antigen: Nonreact (06-10-19 @ 17:08)      RADIOLOGY & ADDITIONAL TESTS:  I have personally reviewed the last Chest xray  CXR      CT      CARDIOLOGY TESTING  12 Lead ECG:   Ventricular Rate 112 BPM    Atrial Rate 112 BPM    P-R Interval 146 ms    QRS Duration 96 ms    Q-T Interval 370 ms    QTC Calculation(Bezet) 505 ms    P Axis 56 degrees    R Axis 16 degrees    T Axis 52 degrees    Diagnosis Line Sinus tachycardia  Incomplete right bundle branch block  Borderline ECG    Confirmed by SENDY RAM MD ([id number]) on 5/29/2020 12:06:19 PM (05-28-20 @ 17:00)  12 Lead ECG:   Ventricular Rate 128 BPM    Atrial Rate 128 BPM    P-R Interval 144 ms    QRS Duration 90 ms    Q-T Interval 310 ms    QTC Calculation(Bezet) 452 ms    P Axis 66 degrees    R Axis 10 degrees    T Axis 56 degrees    Diagnosis Line Sinus tachycardia  Poor R wave progression  Nonspecific ST-T changes    Confirmed by SENDY RAM MD [id number]) on 5/29/2020 12:06:15 PM (05-28-20 @ 14:34)      MEDICATIONS  chlorhexidine 4% Liquid 1  folic acid 1  meropenem  IVPB 1000  multivitamin 1  nicotine -  14 mG/24Hr(s) Patch 1  pantoprazole  Injectable 40  sodium chloride 0.9%. 1000  thiamine 100      ANTIBIOTICS:  meropenem  IVPB 1000 milliGRAM(s) IV Intermittent every 8 hours      All available historical data has been reviewed

## 2020-06-03 NOTE — PROGRESS NOTE ADULT - ASSESSMENT
40 yo female, pmh of anxiety, depression, substance abuse, eoth abuse, presents to ED for epigastric pain    #) acute pancreatitis / alcohol abuse / substance abuse / hypokalemia - hypophosphatemia - hypomagnesemia      - continue IVF and IV Meropenem    - supplement electrolytes and follow repeat levels    - advance diet asper GI   - analgesia   - ativan prn to prevent alcohol withdrawal       #) Acute Anemia - watch closely   - Hgb 13 3 weeks ago   - transfused 1 unit prbc on 5/29  - No sign of active GI bleed and CT shows no retroperitoneal bleed  - HGB now stable   - active type and screen 05/29   - sequential stockings for DVT prophylaxis secondary to anemia      Full code

## 2020-06-04 LAB
ALBUMIN SERPL ELPH-MCNC: 2.1 G/DL — LOW (ref 3.5–5.2)
ALP SERPL-CCNC: 293 U/L — HIGH (ref 30–115)
ALT FLD-CCNC: 22 U/L — SIGNIFICANT CHANGE UP (ref 0–41)
AST SERPL-CCNC: 169 U/L — HIGH (ref 0–41)
BASOPHILS # BLD AUTO: 0.11 K/UL — SIGNIFICANT CHANGE UP (ref 0–0.2)
BASOPHILS NFR BLD AUTO: 1 % — SIGNIFICANT CHANGE UP (ref 0–1)
BILIRUB DIRECT SERPL-MCNC: 2.5 MG/DL — HIGH (ref 0–0.2)
BILIRUB INDIRECT FLD-MCNC: 0.8 MG/DL — SIGNIFICANT CHANGE UP (ref 0.2–1.2)
BILIRUB SERPL-MCNC: 3.3 MG/DL — HIGH (ref 0.2–1.2)
CK SERPL-CCNC: 29 U/L — SIGNIFICANT CHANGE UP (ref 0–225)
EOSINOPHIL # BLD AUTO: 0.13 K/UL — SIGNIFICANT CHANGE UP (ref 0–0.7)
EOSINOPHIL NFR BLD AUTO: 1.2 % — SIGNIFICANT CHANGE UP (ref 0–8)
HCT VFR BLD CALC: 28.4 % — LOW (ref 37–47)
HGB BLD-MCNC: 8.7 G/DL — LOW (ref 12–16)
IMM GRANULOCYTES NFR BLD AUTO: 0.5 % — HIGH (ref 0.1–0.3)
LYMPHOCYTES # BLD AUTO: 1.14 K/UL — LOW (ref 1.2–3.4)
LYMPHOCYTES # BLD AUTO: 10.5 % — LOW (ref 20.5–51.1)
MAGNESIUM SERPL-MCNC: 1.9 MG/DL — SIGNIFICANT CHANGE UP (ref 1.8–2.4)
MCHC RBC-ENTMCNC: 30.6 G/DL — LOW (ref 32–37)
MCHC RBC-ENTMCNC: 32.1 PG — HIGH (ref 27–31)
MCV RBC AUTO: 104.8 FL — HIGH (ref 81–99)
MONOCYTES # BLD AUTO: 0.99 K/UL — HIGH (ref 0.1–0.6)
MONOCYTES NFR BLD AUTO: 9.1 % — SIGNIFICANT CHANGE UP (ref 1.7–9.3)
NEUTROPHILS # BLD AUTO: 8.41 K/UL — HIGH (ref 1.4–6.5)
NEUTROPHILS NFR BLD AUTO: 77.7 % — HIGH (ref 42.2–75.2)
NRBC # BLD: 0 /100 WBCS — SIGNIFICANT CHANGE UP (ref 0–0)
PHOSPHATE SERPL-MCNC: 2 MG/DL — LOW (ref 2.1–4.9)
PLATELET # BLD AUTO: 270 K/UL — SIGNIFICANT CHANGE UP (ref 130–400)
PROT SERPL-MCNC: 5.1 G/DL — LOW (ref 6–8)
RBC # BLD: 2.71 M/UL — LOW (ref 4.2–5.4)
RBC # FLD: 21.9 % — HIGH (ref 11.5–14.5)
WBC # BLD: 10.83 K/UL — HIGH (ref 4.8–10.8)
WBC # FLD AUTO: 10.83 K/UL — HIGH (ref 4.8–10.8)

## 2020-06-04 PROCEDURE — 71045 X-RAY EXAM CHEST 1 VIEW: CPT | Mod: 26

## 2020-06-04 PROCEDURE — 99231 SBSQ HOSP IP/OBS SF/LOW 25: CPT

## 2020-06-04 PROCEDURE — 99233 SBSQ HOSP IP/OBS HIGH 50: CPT

## 2020-06-04 RX ORDER — SODIUM,POTASSIUM PHOSPHATES 278-250MG
2 POWDER IN PACKET (EA) ORAL THREE TIMES A DAY
Refills: 0 | Status: DISCONTINUED | OUTPATIENT
Start: 2020-06-04 | End: 2020-06-12

## 2020-06-04 RX ORDER — MORPHINE SULFATE 50 MG/1
2 CAPSULE, EXTENDED RELEASE ORAL EVERY 4 HOURS
Refills: 0 | Status: DISCONTINUED | OUTPATIENT
Start: 2020-06-04 | End: 2020-06-04

## 2020-06-04 RX ORDER — MORPHINE SULFATE 50 MG/1
2 CAPSULE, EXTENDED RELEASE ORAL ONCE
Refills: 0 | Status: DISCONTINUED | OUTPATIENT
Start: 2020-06-04 | End: 2020-06-04

## 2020-06-04 RX ORDER — MORPHINE SULFATE 50 MG/1
4 CAPSULE, EXTENDED RELEASE ORAL
Refills: 0 | Status: DISCONTINUED | OUTPATIENT
Start: 2020-06-04 | End: 2020-06-06

## 2020-06-04 RX ADMIN — MORPHINE SULFATE 2 MILLIGRAM(S): 50 CAPSULE, EXTENDED RELEASE ORAL at 12:04

## 2020-06-04 RX ADMIN — HYDROMORPHONE HYDROCHLORIDE 1 MILLIGRAM(S): 2 INJECTION INTRAMUSCULAR; INTRAVENOUS; SUBCUTANEOUS at 05:14

## 2020-06-04 RX ADMIN — Medication 2 PACKET(S): at 13:36

## 2020-06-04 RX ADMIN — Medication 100 MILLIGRAM(S): at 11:57

## 2020-06-04 RX ADMIN — Medication 1 PATCH: at 08:26

## 2020-06-04 RX ADMIN — Medication 2 PACKET(S): at 21:42

## 2020-06-04 RX ADMIN — Medication 1 TABLET(S): at 11:56

## 2020-06-04 RX ADMIN — MORPHINE SULFATE 4 MILLIGRAM(S): 50 CAPSULE, EXTENDED RELEASE ORAL at 16:30

## 2020-06-04 RX ADMIN — Medication 1 MILLIGRAM(S): at 11:56

## 2020-06-04 RX ADMIN — Medication 2 MILLIGRAM(S): at 16:48

## 2020-06-04 RX ADMIN — PANTOPRAZOLE SODIUM 40 MILLIGRAM(S): 20 TABLET, DELAYED RELEASE ORAL at 11:57

## 2020-06-04 RX ADMIN — MORPHINE SULFATE 2 MILLIGRAM(S): 50 CAPSULE, EXTENDED RELEASE ORAL at 13:36

## 2020-06-04 RX ADMIN — Medication 1 PATCH: at 11:56

## 2020-06-04 RX ADMIN — ENOXAPARIN SODIUM 40 MILLIGRAM(S): 100 INJECTION SUBCUTANEOUS at 11:56

## 2020-06-04 RX ADMIN — HYDROMORPHONE HYDROCHLORIDE 1 MILLIGRAM(S): 2 INJECTION INTRAMUSCULAR; INTRAVENOUS; SUBCUTANEOUS at 08:27

## 2020-06-04 RX ADMIN — MEROPENEM 100 MILLIGRAM(S): 1 INJECTION INTRAVENOUS at 05:06

## 2020-06-04 RX ADMIN — HYDROMORPHONE HYDROCHLORIDE 1 MILLIGRAM(S): 2 INJECTION INTRAMUSCULAR; INTRAVENOUS; SUBCUTANEOUS at 01:01

## 2020-06-04 NOTE — DIETITIAN INITIAL EVALUATION ADULT. - RD TO REMAIN AVAILABLE
yes/INTERVENTION: meals and snacks, nutrition education, coordination of care. ME: RD to monitor diet order, energy intake, body composition, NFPF, electrolyte profile

## 2020-06-04 NOTE — DIETITIAN INITIAL EVALUATION ADULT. - ADD RECOMMEND
diet advancement to clears as medically feasible, solids per GI recs, plan to provide NCM handouts/diet education for pancreatitis upon f/u.

## 2020-06-04 NOTE — PROGRESS NOTE ADULT - SUBJECTIVE AND OBJECTIVE BOX
Patient is seen and examined at the bed side, is afebrile.  The abdominal pain has not resolved yet. The ABdominal MRI has been reviewed.         REVIEW OF SYSTEMS: All other review systems are negative         Vital Signs Last 24 Hrs  T(C): 36.4 (04 Jun 2020 13:30), Max: 37.4 (03 Jun 2020 21:21)  T(F): 97.5 (04 Jun 2020 13:30), Max: 99.3 (03 Jun 2020 21:21)  HR: 113 (04 Jun 2020 13:30) (110 - 116)  BP: 101/59 (04 Jun 2020 13:30) (101/59 - 114/70)  BP(mean): --  RR: 16 (04 Jun 2020 13:30) (16 - 18)  SpO2: 97% (04 Jun 2020 13:30) (97% - 97%)      PHYSICAL EXAM:  GENERAL:  Not in acute distress  CHEST/LUNG: not Using accessory muscle  HEART:  s1 and s2 present   ABDOMEN: Epigastric and RUQ tenderness   EXTREMITIES:  No pedal edema  CNS: Awake and alert          MEDICATIONS  (STANDING):    chlorhexidine 4% Liquid 1 Application(s) Topical two times a day  enoxaparin Injectable 40 milliGRAM(s) SubCutaneous daily  folic acid 1 milliGRAM(s) Oral daily  multivitamin 1 Tablet(s) Oral daily  nicotine -  14 mG/24Hr(s) Patch 1 patch Transdermal daily  pantoprazole  Injectable 40 milliGRAM(s) IV Push daily  potassium phosphate / sodium phosphate powder 2 Packet(s) Oral three times a day  thiamine 100 milliGRAM(s) Oral daily      Allergies    No Known Allergies        LABS:                        8.7    10.83 )-----------( 270      ( 04 Jun 2020 06:34 )             28.4                           8.4    10.92 )-----------( 233      ( 01 Jun 2020 06:03 )             27.0       06-03    137  |  106  |  <3<L>  ----------------------------<  92  3.8   |  23  |  <0.5<L>    Ca    7.5<L>      03 Jun 2020 05:48  Phos  2.0     06-04  Mg     1.9     06-04    TPro  5.1<L>  /  Alb  2.1<L>  /  TBili  3.3<H>  /  DBili  2.5<H>  /  AST  169<H>  /  ALT  22  /  AlkPhos  293<H>  06-04 06-01    139  |  106  |  <3<L>  ----------------------------<  110<H>  3.6   |  23  |  <0.5<L>    Ca    7.4<L>      01 Jun 2020 06:03  Phos  1.6     06-01  Mg     1.6     06-01    TPro  4.8<L>  /  Alb  2.0<L>  /  TBili  3.0<H>  /  DBili  x   /  AST  172<H>  /  ALT  19  /  AlkPhos  341<H>  06-01 05-31    140  |  106  |  <3<L>  ----------------------------<  86  4.0   |  27  |  <0.5<L>    Ca    7.0<L>      31 May 2020 05:29  Mg     2.1     05-30    TPro  5.0<L>  /  Alb  2.0<L>  /  TBili  3.1<H>  /  DBili  x   /  AST  176<H>  /  ALT  19  /  AlkPhos  356<H>  05-31    PT/INR - ( 30 May 2020 05:25 )   PT: 15.10 sec;   INR: 1.31 ratio     PTT - ( 30 May 2020 05:25 )  PTT:31.8 sec        RADIOLOGY & ADDITIONAL TESTS:    < from: MR Abdomen No Cont (05.31.20 @ 11:05) >    1.  Findings of ongoing pancreatitis with pancreatic and peripancreatic fluid collections, not significantly changed from prior CT.  2.  Small volume ascites.  3.  Moderate bilateral pleural effusions.  4.  Marked hepatomegaly steatosis.        5/31/20 : Xray Chest 1 View- PORTABLE-Routine (05.31.20 @ 04:55) New bibasilar opacity/effusions, left greater than right.      5/29/20 : CT Angio Abdomen and Pelvis w/ IV Cont (05.29.20 @ 22:24) >    No CTA evidence of acute intra-abdominal hemorrhage.    Essentially unchanged findings of pancreatitis with formation of multiple pockets of fluid in the pancreatic head, body and tail as described above.          MICROBIOLOGY DATA:      Culture - Blood (05.28.20 @ 22:15)    Specimen Source: .Blood Blood    Culture Results:   No growth to date.      Culture - Blood (05.28.20 @ 22:15)    Specimen Source: .Blood Blood    Culture Results:   No growth to date.      COVID-19 PCR . (05.28.20 @ 20:10)    COVID-19 PCR: NotDetec: This test has been validated by [facility] to be accurate;  though it has not been FDA cleared/approved by the usual pathway  As with all laboratory test, results should be correlated with clinical  findings.  https://www.fda.gov/media/439260/download  https://www.fda.gov/media/898665/download          Assessment and Plan:   · Assessment		  A 40 yo female, pmh of anxiety, depression, substance abuse, eoth abuse, presents to ED for "I'm in a lot of pain." States she has been having pain for several months now. Located in epigastrium, severe 10/10 aching, no radiation. Denies fever, chills, cp, sob, le swelling, back pain, neck pain, nvd, dysuria, hematuria. (28 May 2020 20:48)      # Alcohol-induced acute pancreatitis with multiple pockets of fluid in the pancreatic head    # ETOH abuse.  Recommendation: chronic abuse  substance abuse eval.     #  Rash    would recommend:    1. Monitor Off Abx  2. Optimize pain management   3. IR evaluation for possible aspiration of fluid around pancreatic head            Attending Attestation:   Spent  40 minutes  on total encounter; more than 50% of the visit was spent counseling and/or coordinating care by the attending physician. Patient is seen and examined at the bed side, is afebrile. She has transferred out of ICU, doing better.        REVIEW OF SYSTEMS: All other review systems are negative         Vital Signs Last 24 Hrs  T(C): 36.4 (04 Jun 2020 13:30), Max: 37.4 (03 Jun 2020 21:21)  T(F): 97.5 (04 Jun 2020 13:30), Max: 99.3 (03 Jun 2020 21:21)  HR: 113 (04 Jun 2020 13:30) (110 - 116)  BP: 101/59 (04 Jun 2020 13:30) (101/59 - 114/70)  BP(mean): --  RR: 16 (04 Jun 2020 13:30) (16 - 18)  SpO2: 97% (04 Jun 2020 13:30) (97% - 97%)        PHYSICAL EXAM:  GENERAL:  Not in distress  CHEST/LUNG: not Using accessory muscle  HEART:  s1 and s2 present   ABDOMEN: Epigastric and RUQ tenderness resolved  EXTREMITIES:  No pedal edema  CNS: Awake and alert          MEDICATIONS  (STANDING):    chlorhexidine 4% Liquid 1 Application(s) Topical two times a day  enoxaparin Injectable 40 milliGRAM(s) SubCutaneous daily  folic acid 1 milliGRAM(s) Oral daily  multivitamin 1 Tablet(s) Oral daily  nicotine -  14 mG/24Hr(s) Patch 1 patch Transdermal daily  pantoprazole  Injectable 40 milliGRAM(s) IV Push daily  potassium phosphate / sodium phosphate powder 2 Packet(s) Oral three times a day  thiamine 100 milliGRAM(s) Oral daily        Allergies    No Known Allergies          LABS:                        8.7    10.83 )-----------( 270      ( 04 Jun 2020 06:34 )             28.4                           8.4    10.92 )-----------( 233      ( 01 Jun 2020 06:03 )             27.0       06-03    137  |  106  |  <3<L>  ----------------------------<  92  3.8   |  23  |  <0.5<L>    Ca    7.5<L>      03 Jun 2020 05:48  Phos  2.0     06-04  Mg     1.9     06-04    TPro  5.1<L>  /  Alb  2.1<L>  /  TBili  3.3<H>  /  DBili  2.5<H>  /  AST  169<H>  /  ALT  22  /  AlkPhos  293<H>  06-04    06-01    139  |  106  |  <3<L>  ----------------------------<  110<H>  3.6   |  23  |  <0.5<L>    Ca    7.4<L>      01 Jun 2020 06:03  Phos  1.6     06-01  Mg     1.6     06-01    TPro  4.8<L>  /  Alb  2.0<L>  /  TBili  3.0<H>  /  DBili  x   /  AST  172<H>  /  ALT  19  /  AlkPhos  341<H>  06-01            RADIOLOGY & ADDITIONAL TESTS:    < from: MR Abdomen No Cont (05.31.20 @ 11:05) >    1.  Findings of ongoing pancreatitis with pancreatic and peripancreatic fluid collections, not significantly changed from prior CT.  2.  Small volume ascites.  3.  Moderate bilateral pleural effusions.  4.  Marked hepatomegaly steatosis.        5/31/20 : Xray Chest 1 View- PORTABLE-Routine (05.31.20 @ 04:55) New bibasilar opacity/effusions, left greater than right.      5/29/20 : CT Angio Abdomen and Pelvis w/ IV Cont (05.29.20 @ 22:24) >    No CTA evidence of acute intra-abdominal hemorrhage.    Essentially unchanged findings of pancreatitis with formation of multiple pockets of fluid in the pancreatic head, body and tail as described above.          MICROBIOLOGY DATA:      Culture - Blood (05.28.20 @ 22:15)    Specimen Source: .Blood Blood    Culture Results:   No growth to date.      Culture - Blood (05.28.20 @ 22:15)    Specimen Source: .Blood Blood    Culture Results:   No growth to date.      COVID-19 PCR . (05.28.20 @ 20:10)    COVID-19 PCR: NotDetec: This test has been validated by [facility] to be accurate;  though it has not been FDA cleared/approved by the usual pathway  As with all laboratory test, results should be correlated with clinical  findings.  https://www.fda.gov/media/665200/download  https://www.fda.gov/media/596738/download

## 2020-06-04 NOTE — CHART NOTE - NSCHARTNOTEFT_GEN_A_CORE
Pt c/o severe abdominal pain this afternoon 2/2 ETOH pancreatitis. Discussed case w/ Dr. Ruiz.    PLAN:  Pt currently on clr liquid diet, will revert back to NPO status.  Will increase IV morphine to 4mg Q3h PRN  Recall GI c/s

## 2020-06-04 NOTE — DIETITIAN INITIAL EVALUATION ADULT. - OTHER INFO
Pt admitted d/t pancreatitis. Per MRCP- no choledocholithiasis. No retroperitoneal bleed on CT and stable Hgb noted + also no hemorrhagic pancreatitis. Reverted back to NPO status today (6/4) 2/2 severe abdominal pain. Per GI-- recommendation for low fat/low residue diet and revert to NPO if unable to tolerate. Last BM recorded on 6/4, abdominal distention noted. Plan to provide NCM handouts for pancreatitis nutrition education upon f/u with pt present for assessment. Wt is widely varied throughout LOS per EMR (dosing 60.7 kg/134 lbs; (6/3) 74.4 kg/164 lbs; (6/1) 66.4 kg/146 lbs), unknown etiology, need to confirm CBW and UBW.

## 2020-06-04 NOTE — CONSULT NOTE ADULT - SUBJECTIVE AND OBJECTIVE BOX
"I'm in a lot of pain"  Pt admitted to Medicine on 2020 for Acute Pancreatitis. She has a H/O Polysubstance Use Disorder since the age of 14 and claims to have been sober for 11 years which ended approximately 2 years ago. Pt reports drinking alcohol daily-1/2 bottle vodka  and sniffs cocaine a few times per week. She denies any illicit opiate or benzo use. Pt's last drink was 8 days ago and denies any cravings. Pt was prescribed Percoset and Xanax 6 months ago. She has had multiple detox/rehab episodes and was in our [facility] many years ago. Pt denies any seizure history/disorder. SH/FH: +smoker since age 14-1/2 PPD, parents -found mother dead of an OD, has a daughter who lives with the father PPH: +Anxiety/Depression-Bipolar Type-untreated at this time ROS: +abdominal pain +depressed +restless +generalized body aches no CP/SOB, no N/V no BM +NPO PE:  Pt is A&O c/o abdominal pain, crying on/off  VS /59  Pain 8/10 CIWA=7 HEENT-PERRL, EOMI Resp-B/L clear BS CV-reg S1S2 GI-abdomen soft, +diffuse tenderness, increased BS Ext-B/l pedal edema, pulses b/l intact Skin-hyperpigmented dry patches b/l ankle Neuro-no focal deficits no tremor. Labs- Ast 148 Alk Phos 291 H/H 8.3/26.2 UDT-+cocaine +opiates-given MS  A/P- H/O Alcohol/CocaineTobacco Use Disorder with Severe Pancreatitis. Pt requiring  IV MS for abdominal pain. Presently not showing signs of alcohol withdrawal. Recommend using Ativan PRN as ordered for her restlessness and agitation as it may decrease her pain /anxiety. Pt may benefit from anti-craving medication and outpatient Addiction services upon discharge as well as Psychiatric evaluation.  Continue present medical regimen.

## 2020-06-04 NOTE — PROGRESS NOTE ADULT - SUBJECTIVE AND OBJECTIVE BOX
39yFemale  Being followed for Acute complicated pancreatitis   Interval history: Patient denies nausea, vomiting, hematemesis, melena, blood in stool, diarrhea, constipation. Patient reports epigastric pain 7/10      PAST MEDICAL & SURGICAL HISTORY:   ETOH abuse  Postpartum depression  Substance abuse  Anxiety  Depression  No significant past surgical history          Social History: pack a day cigarette smoking. 1-2 pints of vodka daily alcohol. No illegal drug use.          MEDICATIONS:  MEDICATIONS  (STANDING):  chlorhexidine 4% Liquid 1 Application(s) Topical two times a day  enoxaparin Injectable 40 milliGRAM(s) SubCutaneous daily  folic acid 1 milliGRAM(s) Oral daily  multivitamin 1 Tablet(s) Oral daily  nicotine -  14 mG/24Hr(s) Patch 1 patch Transdermal daily  pantoprazole  Injectable 40 milliGRAM(s) IV Push daily  potassium phosphate / sodium phosphate powder 2 Packet(s) Oral three times a day  thiamine 100 milliGRAM(s) Oral daily    MEDICATIONS  (PRN):  LORazepam     Tablet 2 milliGRAM(s) Oral every 4 hours PRN Agitation  morphine  - Injectable 2 milliGRAM(s) IV Push every 4 hours PRN Mild Pain (1 - 3)  ondansetron Injectable 4 milliGRAM(s) IV Push every 8 hours PRN Nausea and/or Vomiting      Allergies:   No Known Allergies              REVIEW OF SYSTEMS:  General:  No weight loss, fevers, or chills.  Eyes:  No reported pain or visual changes  ENT:  No sore throat or runny nose.  NECK: No stiffness   CV:  No chest pain or palpitations.  Resp:  No shortness of breath, cough  GI:  +epigastric abdominal pain, No nausea, vomiting, dysphagia, diarrhea or constipation. No rectal bleeding, melena, or hematemesis.  Muscle:  No aches or weakness  Neuro:  No tingling, numbness         VITAL SIGNS:   T(F): 98.6 (06-04-20 @ 05:00), Max: 99.3 (06-03-20 @ 21:21)  HR: 116 (06-04-20 @ 05:00) (110 - 116)  BP: 104/61 (06-04-20 @ 05:00) (102/56 - 119/76)  RR: 18 (06-04-20 @ 05:00) (18 - 18)  SpO2: 98% (06-03-20 @ 13:12) (98% - 98%)    PHYSICAL EXAM:  GENERAL: AAOx3, no acute distress.  HEAD:  Atraumatic, Normocephalic  EYES: conjunctiva and sclera clear  NECK: Supple, no JVD or thyromegaly  CHEST/LUNG: Clear to auscultation bilaterally; No wheeze, rhonchi, or rales  HEART: Regular rate and rhythm; normal S1, S2, No murmurs.  ABDOMEN: Soft, +epigastric tenderness, +distended; Bowel sounds present, no abdominal bruit, masses, or hepatosplenomegaly  NEUROLOGY: No asterixis or tremor.   SKIN: Intact, no jaundice            LABS:                        8.7    10.83 )-----------( 270      ( 04 Jun 2020 06:34 )             28.4     06-03    137  |  106  |  <3<L>  ----------------------------<  92  3.8   |  23  |  <0.5<L>    Ca    7.5<L>      03 Jun 2020 05:48  Phos  2.0     06-04  Mg     1.9     06-04    TPro  5.1<L>  /  Alb  2.1<L>  /  TBili  3.3<H>  /  DBili  2.5<H>  /  AST  169<H>  /  ALT  22  /  AlkPhos  293<H>  06-04    LIVER FUNCTIONS - ( 04 Jun 2020 06:34 )  Alb: 2.1 g/dL / Pro: 5.1 g/dL / ALK PHOS: 293 U/L / ALT: 22 U/L / AST: 169 U/L / GGT: x               IMAGING:    < from: MR Abdomen No Cont (05.31.20 @ 11:05) >  EXAM:  MR ABDOMEN            PROCEDURE DATE:  05/31/2020            INTERPRETATION:  CLINICAL STATEMENT:  Acute pancreatitis..    TECHNIQUE: Axial in- and out-of-phase T1-weighted; axial fat-saturated T2-weighted; axial and coronal single-shot fastspin-echo T2-weighted; 3D high-resolution, heavily T2-weighted MRCP images were acquired.    COMPARISON: CT dated 5/29/2020    FINDINGS:    LIVER: Marked hepatomegaly with steatosis.    GALLBLADDER: Gallbladder sludge.    BILIARY TREE: No definite evidence of choledocholithiasis. No biliary ductal dilatation.    SPLEEN:  Unremarkable.    PANCREAS: Findings of ongoing pancreatitis with redemonstration of multiple collections involving the pancreatic parenchyma, not significantly changed from priorCT-the largest measuring 2.6 x 1.9 cm at the level of the pancreatic neck (series 3 image 27). Additional collection in the lesser sac measuring up to 1.3 cm, better seen on prior CT.    ADRENAL GLANDS:  Unremarkable.    KIDNEYS:  Unremarkable.    ABDOMINAL NODES:  No adenopathy.    BONES/SOFT TISSUES: Diffuse anasarca.    OTHER: Small volume ascites. Moderate bilateral pleural effusions.    IMPRESSION:    1.  Findings of ongoing pancreatitis with pancreatic and peripancreatic fluid collections, not significantly changed from prior CT.  2.  Small volume ascites.  3.  Moderate bilateral pleural effusions.  4.  Marked hepatomegaly steatosis.                  LAWRENCE THRASHER M.D., ATTENDING RADIOLOGIST  This document has been electronically signed. Jun 1 2020 10:49AM              < end of copied text > 39yFemale  Being followed for Acute complicated pancreatitis   Interval history: Patient denies nausea, vomiting, hematemesis, melena, blood in stool, diarrhea, constipation. Patient reports improving epigastric pain 7/10      PAST MEDICAL & SURGICAL HISTORY:   ETOH abuse  Postpartum depression  Substance abuse  Anxiety  Depression  No significant past surgical history          Social History:+cigarette smoking. 1-2 pints of vodka daily alcohol. No illegal drug use.          MEDICATIONS:  MEDICATIONS  (STANDING):  chlorhexidine 4% Liquid 1 Application(s) Topical two times a day  enoxaparin Injectable 40 milliGRAM(s) SubCutaneous daily  folic acid 1 milliGRAM(s) Oral daily  multivitamin 1 Tablet(s) Oral daily  nicotine -  14 mG/24Hr(s) Patch 1 patch Transdermal daily  pantoprazole  Injectable 40 milliGRAM(s) IV Push daily  potassium phosphate / sodium phosphate powder 2 Packet(s) Oral three times a day  thiamine 100 milliGRAM(s) Oral daily    MEDICATIONS  (PRN):  LORazepam     Tablet 2 milliGRAM(s) Oral every 4 hours PRN Agitation  morphine  - Injectable 2 milliGRAM(s) IV Push every 4 hours PRN Mild Pain (1 - 3)  ondansetron Injectable 4 milliGRAM(s) IV Push every 8 hours PRN Nausea and/or Vomiting      Allergies:   No Known Allergies              REVIEW OF SYSTEMS:  General:  No weight loss, fevers, or chills.  Eyes:  No reported pain or visual changes  ENT:  No sore throat or runny nose.  NECK: No stiffness   CV:  No chest pain or palpitations.  Resp:  No shortness of breath, cough  GI:  +epigastric abdominal pain, No nausea, vomiting, dysphagia, diarrhea or constipation. No rectal bleeding, melena, or hematemesis.  Muscle:  No aches or weakness  Neuro:  No tingling, numbness         VITAL SIGNS:   T(F): 98.6 (06-04-20 @ 05:00), Max: 99.3 (06-03-20 @ 21:21)  HR: 116 (06-04-20 @ 05:00) (110 - 116)  BP: 104/61 (06-04-20 @ 05:00) (102/56 - 119/76)  RR: 18 (06-04-20 @ 05:00) (18 - 18)  SpO2: 98% (06-03-20 @ 13:12) (98% - 98%)    PHYSICAL EXAM:  GENERAL: AAOx3, no acute distress.  HEAD:  Atraumatic, Normocephalic  EYES: conjunctiva and sclera clear  NECK: Supple, no JVD or thyromegaly  CHEST/LUNG: Clear to auscultation bilaterally; No wheeze, rhonchi, or rales  HEART: Regular rate and rhythm; normal S1, S2, No murmurs.  ABDOMEN: Soft, +epigastric tenderness, +distended; Bowel sounds present, no abdominal bruit, masses, or hepatosplenomegaly  NEUROLOGY: No asterixis or tremor.   SKIN: Intact, no jaundice            LABS:                        8.7    10.83 )-----------( 270      ( 04 Jun 2020 06:34 )             28.4     06-03    137  |  106  |  <3<L>  ----------------------------<  92  3.8   |  23  |  <0.5<L>    Ca    7.5<L>      03 Jun 2020 05:48  Phos  2.0     06-04  Mg     1.9     06-04    TPro  5.1<L>  /  Alb  2.1<L>  /  TBili  3.3<H>  /  DBili  2.5<H>  /  AST  169<H>  /  ALT  22  /  AlkPhos  293<H>  06-04    LIVER FUNCTIONS - ( 04 Jun 2020 06:34 )  Alb: 2.1 g/dL / Pro: 5.1 g/dL / ALK PHOS: 293 U/L / ALT: 22 U/L / AST: 169 U/L / GGT: x               IMAGING:    < from: MR Abdomen No Cont (05.31.20 @ 11:05) >  EXAM:  MR ABDOMEN            PROCEDURE DATE:  05/31/2020            INTERPRETATION:  CLINICAL STATEMENT:  Acute pancreatitis..    TECHNIQUE: Axial in- and out-of-phase T1-weighted; axial fat-saturated T2-weighted; axial and coronal single-shot fastspin-echo T2-weighted; 3D high-resolution, heavily T2-weighted MRCP images were acquired.    COMPARISON: CT dated 5/29/2020    FINDINGS:    LIVER: Marked hepatomegaly with steatosis.    GALLBLADDER: Gallbladder sludge.    BILIARY TREE: No definite evidence of choledocholithiasis. No biliary ductal dilatation.    SPLEEN:  Unremarkable.    PANCREAS: Findings of ongoing pancreatitis with redemonstration of multiple collections involving the pancreatic parenchyma, not significantly changed from priorCT-the largest measuring 2.6 x 1.9 cm at the level of the pancreatic neck (series 3 image 27). Additional collection in the lesser sac measuring up to 1.3 cm, better seen on prior CT.    ADRENAL GLANDS:  Unremarkable.    KIDNEYS:  Unremarkable.    ABDOMINAL NODES:  No adenopathy.    BONES/SOFT TISSUES: Diffuse anasarca.    OTHER: Small volume ascites. Moderate bilateral pleural effusions.    IMPRESSION:    1.  Findings of ongoing pancreatitis with pancreatic and peripancreatic fluid collections, not significantly changed from prior CT.  2.  Small volume ascites.  3.  Moderate bilateral pleural effusions.  4.  Marked hepatomegaly steatosis.                  LAWRENCE THRASHER M.D., ATTENDING RADIOLOGIST  This document has been electronically signed. Jun 1 2020 10:49AM              < end of copied text >

## 2020-06-04 NOTE — PROGRESS NOTE ADULT - SUBJECTIVE AND OBJECTIVE BOX
Patient is tolerating full liquid diet, denies SOB, still getting frequent high IV doses of narcotics       T(F): 98.6 (06-04-20 @ 05:00), Max: 99.3 (06-03-20 @ 21:21)  HR: 116 (06-04-20 @ 05:00)  BP: 104/61 (06-04-20 @ 05:00)  RR: 18 (06-04-20 @ 05:00)  SpO2: 98% (06-03-20 @ 13:12) (98% - 98%)    PHYSICAL EXAM:  GENERAL: NAD  HEAD:  Atraumatic, Normocephalic  NERVOUS SYSTEM:  Alert & Oriented X3, no focal deficits   CHEST/LUNG:  bilateral rhonchi  HEART: Regular rate and rhythm; No murmurs, rubs, or gallops  ABDOMEN: Soft, mild tenderness, no rebound   EXTREMITIES:  2+ Peripheral Pulses, No clubbing, cyanosis, or edema  LYMPH: No lymphadenopathy noted  SKIN: No rashes or lesions    LABS  06-03    137  |  106  |  <3<L>  ----------------------------<  92  3.8   |  23  |  <0.5<L>    Ca    7.5<L>      03 Jun 2020 05:48  Phos  2.0     06-04  Mg     1.9     06-04    TPro  5.1<L>  /  Alb  2.1<L>  /  TBili  3.3<H>  /  DBili  2.5<H>  /  AST  169<H>  /  ALT  22  /  AlkPhos  293<H>  06-04                          8.7    10.83 )-----------( 270      ( 04 Jun 2020 06:34 )             28.4         CARDIAC ENZYMES  Creatine Kinase, Serum: 29 (06-04 @ 06:34)      Culture Results:   No Growth Final (05-28-20)  Culture Results:   No Growth Final (05-28-20)  Culture Results:   <10,000 CFU/mL Normal Urogenital Nika (05-28-20)    RADIOLOGY  < from: Xray Chest 1 View- PORTABLE-Routine (06.04.20 @ 05:40) >  Impression:      Bilateral pleural effusions/opacity unchanged. No parenchymal opacity or air leak    < end of copied text >    MEDICATIONS  (STANDING):  chlorhexidine 4% Liquid 1 Application(s) Topical two times a day  enoxaparin Injectable 40 milliGRAM(s) SubCutaneous daily  folic acid 1 milliGRAM(s) Oral daily  multivitamin 1 Tablet(s) Oral daily  nicotine -  14 mG/24Hr(s) Patch 1 patch Transdermal daily  pantoprazole  Injectable 40 milliGRAM(s) IV Push daily  potassium phosphate / sodium phosphate powder 2 Packet(s) Oral three times a day  thiamine 100 milliGRAM(s) Oral daily    MEDICATIONS  (PRN):  LORazepam     Tablet 2 milliGRAM(s) Oral every 4 hours PRN Agitation  morphine  - Injectable 2 milliGRAM(s) IV Push every 4 hours PRN Mild Pain (1 - 3)  ondansetron Injectable 4 milliGRAM(s) IV Push every 8 hours PRN Nausea and/or Vomiting

## 2020-06-04 NOTE — PROGRESS NOTE ADULT - ASSESSMENT
40 yo female, pmh of anxiety, depression, substance abuse, eoth abuse, presents to ED for epigastric pain    #) acute pancreatitis / alcohol abuse / substance abuse / hypokalemia - hypophosphatemia - hypomagnesemia      - DC IVF and completed one week course of  Meropenem today will DC    - supplement electrolytes and follow repeat levels    - continue full liquid diet,  GI following  - decrease frequency and dose of analgesia  - change ativan from IV to oral, prn       #) Acute Anemia    - Hgb 13 3 weeks ago   - transfused 1 unit prbc on 5/29  - No sign of active GI bleed and CT shows no retroperitoneal bleed  - HGB now stable   - active type and screen 05/29   - sequential stockings for DVT prophylaxis secondary to anemia      Full code

## 2020-06-04 NOTE — PROGRESS NOTE ADULT - ASSESSMENT
A 40 yo female, pmh of anxiety, depression, substance abuse, eoth abuse, presents to ED for "I'm in a lot of pain." States she has been having pain for several months now. Located in epigastrium, severe 10/10 aching, no radiation. Denies fever, chills, cp, sob, le swelling, back pain, neck pain, nvd, dysuria, hematuria. (28 May 2020 20:48)      # Alcohol-induced acute pancreatitis with multiple pockets of fluid in the pancreatic head    # ETOH abuse.  Recommendation: chronic abuse  substance abuse eval.     #  Rash    would recommend:    1. Monitor Off Abx  2. Continue pain management as needed  3.Advanced diet as tolerated         Attending Attestation:     Spent  35 minutes  on total encounter; more than 50% of the visit was spent counseling and/or coordinating care by the attending physician.

## 2020-06-04 NOTE — DIETITIAN INITIAL EVALUATION ADULT. - PHYSICAL APPEARANCE
BMI 23.0 using dosing/lowest recorded wt/other (specify)/well nourished (6/4) +2 edema (L/R legs); skin assessment on 6/4 shows "wound" site/type of wound unspecified.

## 2020-06-04 NOTE — PROGRESS NOTE ADULT - ASSESSMENT
39 year old female with a history of acute alcoholic pancreatitis, alcohol abuse,  presents with abdominal pain, leukocytosis, acute pancreatitis with developing pseudocysts, thickened gallbladder wall, sludge in the gallbladder elevated LFTs, acute anemia without overt GI bleeding. Ultrasound shows gallbladder sludge but a normal CBD.    Dr. Kennedy spoke to Dr. Palacios regarding the patient's CT scan: no evidence of hemorrhagic pancreatitis, can't exclude infected fluid collection.   Repeat CT showed no hemorrhagic pancreatitis    HGb stable after drop in hgb  no overt bleeding  MRCP shows no choledocholithiasis    Problem 1-Acute complicated pancreatitis likely secondary to alcohol with superimposed biliary pancreatitis given gallbladder sludge   Rec  -low fat low residue diet and if patient complains of severe pain back to clears again  -Doubt cholangitis given normal bile ducts on CT and ultrasound and on MR  -Elevated LFTs are likely secondary to alcoholic hepatitis (>2:1 ratio between AST: ALT) but would not recommend prednisolone   (wDF<32)  -Continue antibiotics  -continue IVF  -Check BUN and HCT (if either rises, increase fluids) but patient with evidence of volume overload so aggressive fluid hydration ability limited at present  -if overt bleeding or significant drop in HGB, call GI stat.  -surgery consult appreciated (no intervention planned)  -alcohol abstinence  -pain control    Problem 2- Marked hepatomegaly steatosis.  Rec  -Dietary and lifestyle modifications advised

## 2020-06-05 LAB
ALBUMIN SERPL ELPH-MCNC: 2 G/DL — LOW (ref 3.5–5.2)
ALP SERPL-CCNC: 243 U/L — HIGH (ref 30–115)
ALT FLD-CCNC: 20 U/L — SIGNIFICANT CHANGE UP (ref 0–41)
ANION GAP SERPL CALC-SCNC: 8 MMOL/L — SIGNIFICANT CHANGE UP (ref 7–14)
AST SERPL-CCNC: 146 U/L — HIGH (ref 0–41)
BILIRUB SERPL-MCNC: 3 MG/DL — HIGH (ref 0.2–1.2)
BUN SERPL-MCNC: <3 MG/DL — LOW (ref 10–20)
CALCIUM SERPL-MCNC: 7.6 MG/DL — LOW (ref 8.5–10.1)
CHLORIDE SERPL-SCNC: 105 MMOL/L — SIGNIFICANT CHANGE UP (ref 98–110)
CO2 SERPL-SCNC: 24 MMOL/L — SIGNIFICANT CHANGE UP (ref 17–32)
CREAT SERPL-MCNC: <0.5 MG/DL — LOW (ref 0.7–1.5)
GLUCOSE SERPL-MCNC: 102 MG/DL — HIGH (ref 70–99)
HCT VFR BLD CALC: 25.6 % — LOW (ref 37–47)
HGB BLD-MCNC: 7.9 G/DL — LOW (ref 12–16)
MAGNESIUM SERPL-MCNC: 1.7 MG/DL — LOW (ref 1.8–2.4)
MCHC RBC-ENTMCNC: 30.9 G/DL — LOW (ref 32–37)
MCHC RBC-ENTMCNC: 32.2 PG — HIGH (ref 27–31)
MCV RBC AUTO: 104.5 FL — HIGH (ref 81–99)
NRBC # BLD: 0 /100 WBCS — SIGNIFICANT CHANGE UP (ref 0–0)
PHOSPHATE SERPL-MCNC: 2.8 MG/DL — SIGNIFICANT CHANGE UP (ref 2.1–4.9)
PLATELET # BLD AUTO: 269 K/UL — SIGNIFICANT CHANGE UP (ref 130–400)
POTASSIUM SERPL-MCNC: 3.8 MMOL/L — SIGNIFICANT CHANGE UP (ref 3.5–5)
POTASSIUM SERPL-SCNC: 3.8 MMOL/L — SIGNIFICANT CHANGE UP (ref 3.5–5)
PROT SERPL-MCNC: 4.6 G/DL — LOW (ref 6–8)
RBC # BLD: 2.45 M/UL — LOW (ref 4.2–5.4)
RBC # FLD: 21.6 % — HIGH (ref 11.5–14.5)
SODIUM SERPL-SCNC: 137 MMOL/L — SIGNIFICANT CHANGE UP (ref 135–146)
WBC # BLD: 8.62 K/UL — SIGNIFICANT CHANGE UP (ref 4.8–10.8)
WBC # FLD AUTO: 8.62 K/UL — SIGNIFICANT CHANGE UP (ref 4.8–10.8)

## 2020-06-05 PROCEDURE — 99233 SBSQ HOSP IP/OBS HIGH 50: CPT

## 2020-06-05 RX ORDER — MAGNESIUM SULFATE 500 MG/ML
2 VIAL (ML) INJECTION ONCE
Refills: 0 | Status: COMPLETED | OUTPATIENT
Start: 2020-06-05 | End: 2020-06-05

## 2020-06-05 RX ORDER — MORPHINE SULFATE 50 MG/1
2 CAPSULE, EXTENDED RELEASE ORAL ONCE
Refills: 0 | Status: DISCONTINUED | OUTPATIENT
Start: 2020-06-05 | End: 2020-06-05

## 2020-06-05 RX ADMIN — Medication 2 MILLIGRAM(S): at 21:12

## 2020-06-05 RX ADMIN — Medication 2 MILLIGRAM(S): at 12:07

## 2020-06-05 RX ADMIN — Medication 2 PACKET(S): at 15:00

## 2020-06-05 RX ADMIN — MORPHINE SULFATE 4 MILLIGRAM(S): 50 CAPSULE, EXTENDED RELEASE ORAL at 21:12

## 2020-06-05 RX ADMIN — Medication 100 MILLIGRAM(S): at 11:11

## 2020-06-05 RX ADMIN — Medication 1 PATCH: at 19:12

## 2020-06-05 RX ADMIN — CHLORHEXIDINE GLUCONATE 1 APPLICATION(S): 213 SOLUTION TOPICAL at 17:13

## 2020-06-05 RX ADMIN — Medication 1 MILLIGRAM(S): at 11:11

## 2020-06-05 RX ADMIN — MORPHINE SULFATE 4 MILLIGRAM(S): 50 CAPSULE, EXTENDED RELEASE ORAL at 10:00

## 2020-06-05 RX ADMIN — MORPHINE SULFATE 2 MILLIGRAM(S): 50 CAPSULE, EXTENDED RELEASE ORAL at 12:08

## 2020-06-05 RX ADMIN — Medication 2 PACKET(S): at 21:12

## 2020-06-05 RX ADMIN — MORPHINE SULFATE 4 MILLIGRAM(S): 50 CAPSULE, EXTENDED RELEASE ORAL at 04:55

## 2020-06-05 RX ADMIN — Medication 1 PATCH: at 11:12

## 2020-06-05 RX ADMIN — ENOXAPARIN SODIUM 40 MILLIGRAM(S): 100 INJECTION SUBCUTANEOUS at 11:12

## 2020-06-05 RX ADMIN — MORPHINE SULFATE 4 MILLIGRAM(S): 50 CAPSULE, EXTENDED RELEASE ORAL at 18:07

## 2020-06-05 RX ADMIN — PANTOPRAZOLE SODIUM 40 MILLIGRAM(S): 20 TABLET, DELAYED RELEASE ORAL at 11:11

## 2020-06-05 RX ADMIN — Medication 50 GRAM(S): at 12:36

## 2020-06-05 RX ADMIN — MORPHINE SULFATE 4 MILLIGRAM(S): 50 CAPSULE, EXTENDED RELEASE ORAL at 15:04

## 2020-06-05 RX ADMIN — CHLORHEXIDINE GLUCONATE 1 APPLICATION(S): 213 SOLUTION TOPICAL at 05:11

## 2020-06-05 RX ADMIN — Medication 2 PACKET(S): at 05:11

## 2020-06-05 RX ADMIN — Medication 1 PATCH: at 11:13

## 2020-06-05 RX ADMIN — Medication 1 PATCH: at 07:19

## 2020-06-05 RX ADMIN — Medication 1 TABLET(S): at 11:12

## 2020-06-05 NOTE — PROGRESS NOTE ADULT - ASSESSMENT
39 year old female with a history of acute alcoholic pancreatitis, alcohol abuse,  presents with abdominal pain, leukocytosis, acute pancreatitis with developing pseudocysts, thickened gallbladder wall, sludge in the gallbladder elevated LFTs, acute anemia without overt GI bleeding. Ultrasound shows gallbladder sludge but a normal CBD.    Dr. Kennedy spoke to Dr. Palacios regarding the patient's CT scan: no evidence of hemorrhagic pancreatitis, can't exclude infected fluid collection.   Repeat CT showed no hemorrhagic pancreatitis    HGb stable after drop in hgb  no overt bleeding  MRCP shows no choledocholithiasis    Problem 1-Acute complicated pancreatitis likely secondary to alcohol with superimposed biliary pancreatitis given gallbladder sludge   Rec  -low fat low residue diet and if patient complains of severe pain back to clears again  -Doubt cholangitis given normal bile ducts on CT and ultrasound and on MR  -Elevated LFTs are likely secondary to alcoholic hepatitis (>2:1 ratio between AST: ALT) but would not recommend prednisolone   (wDF<32)  -Continue antibiotics  -continue IVF  -Check BUN and HCT (if either rises, increase fluids)   -if overt bleeding or significant drop in HGB, call GI stat, continue to monitor still no evidence of overt GI bleeding   -surgery consult appreciated (no intervention planned)  -alcohol abstinence  -pain control    Problem 2- Marked hepatomegaly steatosis.  Rec  -Dietary and lifestyle modifications advised 39 year old female with a history of acute alcoholic pancreatitis, alcohol abuse,  presents with abdominal pain, leukocytosis, acute pancreatitis with developing pseudocysts, thickened gallbladder wall, sludge in the gallbladder elevated LFTs, acute anemia without overt GI bleeding. Ultrasound shows gallbladder sludge but a normal CBD.    Dr. Kennedy spoke to Dr. Palacios regarding the patient's CT scan: no evidence of hemorrhagic pancreatitis, can't exclude infected fluid collection.   Repeat CT showed no hemorrhagic pancreatitis    HGb stable after drop in hgb  no overt bleeding  MRCP shows no choledocholithiasis    Problem 1-Acute complicated pancreatitis likely secondary to alcohol with superimposed biliary pancreatitis given gallbladder sludge   Rec  -low fat diet   -Since patient is tolerating solid food, the worst of the abdominal pain should be in the past. Reduce opioids  -Doubt cholangitis given normal bile ducts on CT and ultrasound and on MR  -Elevated LFTs are likely secondary to alcoholic hepatitis (>2:1 ratio between AST: ALT) but would not recommend prednisolone   (wDF<32)  -Continue antibiotics  - no IVF  -if overt bleeding or significant drop in HGB, call GI stat, continue to monitor still no evidence of overt GI bleeding   -surgery consult appreciated (no intervention planned)  -alcohol abstinence      Problem 2- Marked hepatomegaly steatosis.  Rec  -Dietary and lifestyle modifications advised

## 2020-06-05 NOTE — PROGRESS NOTE ADULT - SUBJECTIVE AND OBJECTIVE BOX
Patient is c/o recurrent severe abdominal       T(F): 98.6 (06-05-20 @ 04:48), Max: 98.6 (06-05-20 @ 04:48)  HR: 113 (06-05-20 @ 04:48)  BP: 102/59 (06-05-20 @ 04:48)  RR: 16 (06-05-20 @ 04:48)  SpO2: 97% (06-04-20 @ 13:30) (97% - 97%)    PHYSICAL EXAM:  GENERAL: NAD  HEAD:  Atraumatic, Normocephalic  NERVOUS SYSTEM:  Alert & Oriented X3, no focal deficits   CHEST/LUNG: Clear to percussion bilaterally; No rales, rhonchi, wheezing, or rubs  HEART: Regular rate and rhythm; No murmurs, rubs, or gallops  ABDOMEN: Soft, Nontender, Nondistended; Bowel sounds present  EXTREMITIES:  2+ Peripheral Pulses, No clubbing, cyanosis, or edema  LYMPH: No lymphadenopathy noted  SKIN: No rashes or lesions    LABS  06-05    137  |  105  |  <3<L>  ----------------------------<  102<H>  3.8   |  24  |  <0.5<L>    Ca    7.6<L>      05 Jun 2020 07:12  Phos  2.8     06-05  Mg     1.7     06-05    TPro  4.6<L>  /  Alb  2.0<L>  /  TBili  3.0<H>  /  DBili  x   /  AST  146<H>  /  ALT  20  /  AlkPhos  243<H>  06-05                          7.9    8.62  )-----------( 269      ( 05 Jun 2020 07:12 )             25.6         CARDIAC ENZYMES  Creatine Kinase, Serum: 29 (06-04 @ 06:34)    Culture Results:   No Growth Final (05-28-20)  Culture Results:   No Growth Final (05-28-20)  Culture Results:   <10,000 CFU/mL Normal Urogenital Nika (05-28-20)        MEDICATIONS  (STANDING):  chlorhexidine 4% Liquid 1 Application(s) Topical two times a day  enoxaparin Injectable 40 milliGRAM(s) SubCutaneous daily  folic acid 1 milliGRAM(s) Oral daily  magnesium sulfate  IVPB 2 Gram(s) IV Intermittent once  morphine  - Injectable 2 milliGRAM(s) IV Push once  multivitamin 1 Tablet(s) Oral daily  nicotine -  14 mG/24Hr(s) Patch 1 patch Transdermal daily  pantoprazole  Injectable 40 milliGRAM(s) IV Push daily  potassium phosphate / sodium phosphate powder 2 Packet(s) Oral three times a day  thiamine 100 milliGRAM(s) Oral daily    MEDICATIONS  (PRN):  LORazepam     Tablet 2 milliGRAM(s) Oral every 4 hours PRN Agitation  morphine  - Injectable 4 milliGRAM(s) IV Push every 3 hours PRN Severe Pain (7 - 10)  ondansetron Injectable 4 milliGRAM(s) IV Push every 8 hours PRN Nausea and/or Vomiting

## 2020-06-05 NOTE — PROGRESS NOTE ADULT - ASSESSMENT
40 yo female, pmh of anxiety, depression, substance abuse, eoth abuse, presents to ED for epigastric pain    #) acute pancreatitis / alcohol abuse / substance abuse / hypokalemia - hypophosphatemia - hypomagnesemia      -  still with severe abdominal pain   - supplement electrolytes and follow repeat levels    - NPO and IVF,  GI following  - analgesia dose increased   - continue  ativan  prn       #) Acute Anemia    - Hgb 13 3 weeks ago   - transfused 1 unit prbc on 5/29  - No sign of active GI bleed and CT shows no retroperitoneal bleed  - continue to follow daily HGB    - maintain T/S   - sequential stockings for DVT prophylaxis secondary to anemia      Full code 38 yo female, pmh of anxiety, depression, substance abuse, eoth abuse, presents to ED for epigastric pain    #) acute pancreatitis / alcohol abuse / substance abuse / hypokalemia - hypophosphatemia - hypomagnesemia      -  still with severe abdominal pain   - supplement electrolytes and follow repeat levels    - diet as per  GI following  - analgesia dose increased   - continue  ativan  prn       #) Acute Anemia    - Hgb 13 3 weeks ago   - transfused 1 unit prbc on 5/29  - No sign of active GI bleed and CT shows no retroperitoneal bleed  - continue to follow daily HGB    - maintain T/S   - sequential stockings for DVT prophylaxis secondary to anemia      Full code

## 2020-06-06 LAB
ALBUMIN SERPL ELPH-MCNC: 1.9 G/DL — LOW (ref 3.5–5.2)
ALP SERPL-CCNC: 235 U/L — HIGH (ref 30–115)
ALT FLD-CCNC: 22 U/L — SIGNIFICANT CHANGE UP (ref 0–41)
ANION GAP SERPL CALC-SCNC: 10 MMOL/L — SIGNIFICANT CHANGE UP (ref 7–14)
AST SERPL-CCNC: 137 U/L — HIGH (ref 0–41)
BILIRUB SERPL-MCNC: 3 MG/DL — HIGH (ref 0.2–1.2)
BLD GP AB SCN SERPL QL: SIGNIFICANT CHANGE UP
BUN SERPL-MCNC: <3 MG/DL — LOW (ref 10–20)
CALCIUM SERPL-MCNC: 7.8 MG/DL — LOW (ref 8.5–10.1)
CHLORIDE SERPL-SCNC: 106 MMOL/L — SIGNIFICANT CHANGE UP (ref 98–110)
CO2 SERPL-SCNC: 24 MMOL/L — SIGNIFICANT CHANGE UP (ref 17–32)
CREAT SERPL-MCNC: <0.5 MG/DL — LOW (ref 0.7–1.5)
GLUCOSE SERPL-MCNC: 95 MG/DL — SIGNIFICANT CHANGE UP (ref 70–99)
HCT VFR BLD CALC: 25.8 % — LOW (ref 37–47)
HGB BLD-MCNC: 7.9 G/DL — LOW (ref 12–16)
LIDOCAIN IGE QN: 55 U/L — SIGNIFICANT CHANGE UP (ref 7–60)
MAGNESIUM SERPL-MCNC: 1.9 MG/DL — SIGNIFICANT CHANGE UP (ref 1.8–2.4)
MCHC RBC-ENTMCNC: 30.6 G/DL — LOW (ref 32–37)
MCHC RBC-ENTMCNC: 32 PG — HIGH (ref 27–31)
MCV RBC AUTO: 104.5 FL — HIGH (ref 81–99)
NRBC # BLD: 0 /100 WBCS — SIGNIFICANT CHANGE UP (ref 0–0)
PHOSPHATE SERPL-MCNC: 3.1 MG/DL — SIGNIFICANT CHANGE UP (ref 2.1–4.9)
PLATELET # BLD AUTO: 273 K/UL — SIGNIFICANT CHANGE UP (ref 130–400)
POTASSIUM SERPL-MCNC: 3.8 MMOL/L — SIGNIFICANT CHANGE UP (ref 3.5–5)
POTASSIUM SERPL-SCNC: 3.8 MMOL/L — SIGNIFICANT CHANGE UP (ref 3.5–5)
PROT SERPL-MCNC: 4.7 G/DL — LOW (ref 6–8)
RBC # BLD: 2.47 M/UL — LOW (ref 4.2–5.4)
RBC # FLD: 21.1 % — HIGH (ref 11.5–14.5)
SODIUM SERPL-SCNC: 140 MMOL/L — SIGNIFICANT CHANGE UP (ref 135–146)
WBC # BLD: 9.9 K/UL — SIGNIFICANT CHANGE UP (ref 4.8–10.8)
WBC # FLD AUTO: 9.9 K/UL — SIGNIFICANT CHANGE UP (ref 4.8–10.8)

## 2020-06-06 PROCEDURE — 99233 SBSQ HOSP IP/OBS HIGH 50: CPT

## 2020-06-06 RX ORDER — MORPHINE SULFATE 50 MG/1
4 CAPSULE, EXTENDED RELEASE ORAL EVERY 4 HOURS
Refills: 0 | Status: DISCONTINUED | OUTPATIENT
Start: 2020-06-06 | End: 2020-06-06

## 2020-06-06 RX ORDER — HYDROMORPHONE HYDROCHLORIDE 2 MG/ML
1 INJECTION INTRAMUSCULAR; INTRAVENOUS; SUBCUTANEOUS ONCE
Refills: 0 | Status: DISCONTINUED | OUTPATIENT
Start: 2020-06-06 | End: 2020-06-07

## 2020-06-06 RX ORDER — HYDROMORPHONE HYDROCHLORIDE 2 MG/ML
0.5 INJECTION INTRAMUSCULAR; INTRAVENOUS; SUBCUTANEOUS EVERY 4 HOURS
Refills: 0 | Status: DISCONTINUED | OUTPATIENT
Start: 2020-06-06 | End: 2020-06-09

## 2020-06-06 RX ORDER — HYDROMORPHONE HYDROCHLORIDE 2 MG/ML
0.5 INJECTION INTRAMUSCULAR; INTRAVENOUS; SUBCUTANEOUS ONCE
Refills: 0 | Status: DISCONTINUED | OUTPATIENT
Start: 2020-06-06 | End: 2020-06-06

## 2020-06-06 RX ADMIN — Medication 1 PATCH: at 08:52

## 2020-06-06 RX ADMIN — Medication 100 MILLIGRAM(S): at 12:19

## 2020-06-06 RX ADMIN — ENOXAPARIN SODIUM 40 MILLIGRAM(S): 100 INJECTION SUBCUTANEOUS at 12:20

## 2020-06-06 RX ADMIN — Medication 2 PACKET(S): at 21:23

## 2020-06-06 RX ADMIN — Medication 2 PACKET(S): at 13:52

## 2020-06-06 RX ADMIN — CHLORHEXIDINE GLUCONATE 1 APPLICATION(S): 213 SOLUTION TOPICAL at 18:01

## 2020-06-06 RX ADMIN — MORPHINE SULFATE 4 MILLIGRAM(S): 50 CAPSULE, EXTENDED RELEASE ORAL at 08:50

## 2020-06-06 RX ADMIN — Medication 1 PATCH: at 07:51

## 2020-06-06 RX ADMIN — MORPHINE SULFATE 4 MILLIGRAM(S): 50 CAPSULE, EXTENDED RELEASE ORAL at 19:08

## 2020-06-06 RX ADMIN — MORPHINE SULFATE 4 MILLIGRAM(S): 50 CAPSULE, EXTENDED RELEASE ORAL at 01:16

## 2020-06-06 RX ADMIN — Medication 2 MILLIGRAM(S): at 08:50

## 2020-06-06 RX ADMIN — ONDANSETRON 4 MILLIGRAM(S): 8 TABLET, FILM COATED ORAL at 19:10

## 2020-06-06 RX ADMIN — MORPHINE SULFATE 4 MILLIGRAM(S): 50 CAPSULE, EXTENDED RELEASE ORAL at 13:52

## 2020-06-06 RX ADMIN — Medication 1 TABLET(S): at 12:20

## 2020-06-06 RX ADMIN — MORPHINE SULFATE 4 MILLIGRAM(S): 50 CAPSULE, EXTENDED RELEASE ORAL at 05:10

## 2020-06-06 RX ADMIN — HYDROMORPHONE HYDROCHLORIDE 0.5 MILLIGRAM(S): 2 INJECTION INTRAMUSCULAR; INTRAVENOUS; SUBCUTANEOUS at 21:23

## 2020-06-06 RX ADMIN — HYDROMORPHONE HYDROCHLORIDE 0.5 MILLIGRAM(S): 2 INJECTION INTRAMUSCULAR; INTRAVENOUS; SUBCUTANEOUS at 22:39

## 2020-06-06 RX ADMIN — Medication 2 PACKET(S): at 05:10

## 2020-06-06 RX ADMIN — PANTOPRAZOLE SODIUM 40 MILLIGRAM(S): 20 TABLET, DELAYED RELEASE ORAL at 12:23

## 2020-06-06 RX ADMIN — Medication 1 MILLIGRAM(S): at 12:19

## 2020-06-06 NOTE — PROGRESS NOTE ADULT - ASSESSMENT
38 yo female, pmh of anxiety, depression, substance abuse, eoth abuse, presents to ED for epigastric pain    #) acute pancreatitis / alcohol abuse / substance abuse / hypokalemia - hypophosphatemia - hypomagnesemia    -  still with severe abdominal pain, may be drug seeking   - supplement electrolytes and follow repeat levels    - diet as per  GI following  -  Will decrease frequency of morphine to q4  - continue  ativan  prn     #) Acute Anemia    - Hgb 13 3 weeks ago   - transfused 1 unit prbc on 5/29  - No sign of active GI bleed and CT shows no retroperitoneal bleed  - continue to follow daily HGB    - maintain T/S   - sequential stockings for DVT prophylaxis secondary to anemia      Full code

## 2020-06-06 NOTE — PROGRESS NOTE ADULT - SUBJECTIVE AND OBJECTIVE BOX
SEBLEADRIAN  39y, Female  Allergy: No Known Allergies    Hospital Day: 9d    Patient seen and examined earlier today. No acute events overnight.    PMH/PSH:  PAST MEDICAL & SURGICAL HISTORY:  ETOH abuse  Postpartum depression  Substance abuse  Anxiety  Depression  No significant past surgical history    VITALS:  T(F): 97.3 (06-06-20 @ 05:00), Max: 99.7 (06-05-20 @ 22:00)  HR: 100 (06-06-20 @ 05:00)  BP: 114/68 (06-06-20 @ 05:00) (105/55 - 114/68)  RR: 18 (06-06-20 @ 05:00)  SpO2: --    TESTS & MEASUREMENTS:  Weight (Kg):       06-04-20 @ 07:01  -  06-05-20 @ 07:00  --------------------------------------------------------  IN: 450 mL / OUT: 0 mL / NET: 450 mL                        7.9    9.90  )-----------( 273      ( 06 Jun 2020 07:47 )             25.8     06-06    140  |  106  |  <3<L>  ----------------------------<  95  3.8   |  24  |  <0.5<L>    Ca    7.8<L>      06 Jun 2020 07:47  Phos  3.1     06-06  Mg     1.9     06-06    TPro  4.7<L>  /  Alb  1.9<L>  /  TBili  3.0<H>  /  DBili  x   /  AST  137<H>  /  ALT  22  /  AlkPhos  235<H>  06-06    LIVER FUNCTIONS - ( 06 Jun 2020 07:47 )  Alb: 1.9 g/dL / Pro: 4.7 g/dL / ALK PHOS: 235 U/L / ALT: 22 U/L / AST: 137 U/L / GGT: x           RECENT DIAGNOSTIC ORDERS:  Type + Screen: AM  Sched. Collection:06-Jun-2020 04:30 (06-05-20 @ 12:07)  Diet, Soft:   Fiber/Residue Restricted  Low Fat (LOWFAT)  No Lactose (06-05-20 @ 13:13)  Antibody Screen Interpretation: 07:47 (06-06-20 @ 08:07)    MEDICATIONS:  MEDICATIONS  (STANDING):  chlorhexidine 4% Liquid 1 Application(s) Topical two times a day  enoxaparin Injectable 40 milliGRAM(s) SubCutaneous daily  folic acid 1 milliGRAM(s) Oral daily  multivitamin 1 Tablet(s) Oral daily  nicotine -  14 mG/24Hr(s) Patch 1 patch Transdermal daily  pantoprazole  Injectable 40 milliGRAM(s) IV Push daily  potassium phosphate / sodium phosphate powder 2 Packet(s) Oral three times a day  thiamine 100 milliGRAM(s) Oral daily    MEDICATIONS  (PRN):  LORazepam     Tablet 2 milliGRAM(s) Oral every 4 hours PRN Agitation  morphine  - Injectable 4 milliGRAM(s) IV Push every 3 hours PRN Severe Pain (7 - 10)  ondansetron Injectable 4 milliGRAM(s) IV Push every 8 hours PRN Nausea and/or Vomiting    HOME MEDICATIONS:  polyethylene glycol 3350 oral powder for reconstitution (05-02)  senna oral tablet (05-02)    REVIEW OF SYSTEMS:  All other review of systems is negative unless indicated above.

## 2020-06-07 LAB
ALBUMIN SERPL ELPH-MCNC: 2.2 G/DL — LOW (ref 3.5–5.2)
ALP SERPL-CCNC: 254 U/L — HIGH (ref 30–115)
ALT FLD-CCNC: 24 U/L — SIGNIFICANT CHANGE UP (ref 0–41)
ANION GAP SERPL CALC-SCNC: 7 MMOL/L — SIGNIFICANT CHANGE UP (ref 7–14)
AST SERPL-CCNC: 137 U/L — HIGH (ref 0–41)
BILIRUB SERPL-MCNC: 4 MG/DL — HIGH (ref 0.2–1.2)
BUN SERPL-MCNC: <3 MG/DL — LOW (ref 10–20)
C DIFF BY PCR RESULT: POSITIVE
C DIFF TOX GENS STL QL NAA+PROBE: SIGNIFICANT CHANGE UP
CALCIUM SERPL-MCNC: 8 MG/DL — LOW (ref 8.5–10.1)
CHLORIDE SERPL-SCNC: 104 MMOL/L — SIGNIFICANT CHANGE UP (ref 98–110)
CO2 SERPL-SCNC: 27 MMOL/L — SIGNIFICANT CHANGE UP (ref 17–32)
CREAT SERPL-MCNC: <0.5 MG/DL — LOW (ref 0.7–1.5)
GLUCOSE SERPL-MCNC: 88 MG/DL — SIGNIFICANT CHANGE UP (ref 70–99)
HCT VFR BLD CALC: 26.8 % — LOW (ref 37–47)
HGB BLD-MCNC: 8.2 G/DL — LOW (ref 12–16)
MCHC RBC-ENTMCNC: 30.6 G/DL — LOW (ref 32–37)
MCHC RBC-ENTMCNC: 32.4 PG — HIGH (ref 27–31)
MCV RBC AUTO: 105.9 FL — HIGH (ref 81–99)
NRBC # BLD: 0 /100 WBCS — SIGNIFICANT CHANGE UP (ref 0–0)
PLATELET # BLD AUTO: 287 K/UL — SIGNIFICANT CHANGE UP (ref 130–400)
POTASSIUM SERPL-MCNC: 3.9 MMOL/L — SIGNIFICANT CHANGE UP (ref 3.5–5)
POTASSIUM SERPL-SCNC: 3.9 MMOL/L — SIGNIFICANT CHANGE UP (ref 3.5–5)
PROT SERPL-MCNC: 5 G/DL — LOW (ref 6–8)
RBC # BLD: 2.53 M/UL — LOW (ref 4.2–5.4)
RBC # FLD: 20.7 % — HIGH (ref 11.5–14.5)
SODIUM SERPL-SCNC: 138 MMOL/L — SIGNIFICANT CHANGE UP (ref 135–146)
WBC # BLD: 12.15 K/UL — HIGH (ref 4.8–10.8)
WBC # FLD AUTO: 12.15 K/UL — HIGH (ref 4.8–10.8)

## 2020-06-07 PROCEDURE — 99233 SBSQ HOSP IP/OBS HIGH 50: CPT

## 2020-06-07 PROCEDURE — 74019 RADEX ABDOMEN 2 VIEWS: CPT | Mod: 26

## 2020-06-07 RX ORDER — OXYCODONE AND ACETAMINOPHEN 5; 325 MG/1; MG/1
1 TABLET ORAL EVERY 6 HOURS
Refills: 0 | Status: DISCONTINUED | OUTPATIENT
Start: 2020-06-07 | End: 2020-06-10

## 2020-06-07 RX ORDER — METRONIDAZOLE 500 MG
500 TABLET ORAL EVERY 8 HOURS
Refills: 0 | Status: DISCONTINUED | OUTPATIENT
Start: 2020-06-07 | End: 2020-06-08

## 2020-06-07 RX ORDER — OXYCODONE AND ACETAMINOPHEN 5; 325 MG/1; MG/1
1 TABLET ORAL EVERY 4 HOURS
Refills: 0 | Status: DISCONTINUED | OUTPATIENT
Start: 2020-06-07 | End: 2020-06-07

## 2020-06-07 RX ADMIN — Medication 1 TABLET(S): at 11:09

## 2020-06-07 RX ADMIN — Medication 2 MILLIGRAM(S): at 12:10

## 2020-06-07 RX ADMIN — PANTOPRAZOLE SODIUM 40 MILLIGRAM(S): 20 TABLET, DELAYED RELEASE ORAL at 11:09

## 2020-06-07 RX ADMIN — HYDROMORPHONE HYDROCHLORIDE 1 MILLIGRAM(S): 2 INJECTION INTRAMUSCULAR; INTRAVENOUS; SUBCUTANEOUS at 00:27

## 2020-06-07 RX ADMIN — HYDROMORPHONE HYDROCHLORIDE 0.5 MILLIGRAM(S): 2 INJECTION INTRAMUSCULAR; INTRAVENOUS; SUBCUTANEOUS at 07:42

## 2020-06-07 RX ADMIN — Medication 100 MILLIGRAM(S): at 11:08

## 2020-06-07 RX ADMIN — Medication 1 MILLIGRAM(S): at 11:08

## 2020-06-07 RX ADMIN — Medication 2 PACKET(S): at 23:01

## 2020-06-07 RX ADMIN — CHLORHEXIDINE GLUCONATE 1 APPLICATION(S): 213 SOLUTION TOPICAL at 05:06

## 2020-06-07 RX ADMIN — HYDROMORPHONE HYDROCHLORIDE 0.5 MILLIGRAM(S): 2 INJECTION INTRAMUSCULAR; INTRAVENOUS; SUBCUTANEOUS at 23:38

## 2020-06-07 RX ADMIN — HYDROMORPHONE HYDROCHLORIDE 0.5 MILLIGRAM(S): 2 INJECTION INTRAMUSCULAR; INTRAVENOUS; SUBCUTANEOUS at 19:47

## 2020-06-07 RX ADMIN — HYDROMORPHONE HYDROCHLORIDE 0.5 MILLIGRAM(S): 2 INJECTION INTRAMUSCULAR; INTRAVENOUS; SUBCUTANEOUS at 11:58

## 2020-06-07 RX ADMIN — Medication 500 MILLIGRAM(S): at 23:00

## 2020-06-07 RX ADMIN — OXYCODONE AND ACETAMINOPHEN 1 TABLET(S): 5; 325 TABLET ORAL at 10:42

## 2020-06-07 RX ADMIN — OXYCODONE AND ACETAMINOPHEN 1 TABLET(S): 5; 325 TABLET ORAL at 21:22

## 2020-06-07 RX ADMIN — Medication 2 PACKET(S): at 05:06

## 2020-06-07 RX ADMIN — Medication 2 PACKET(S): at 13:24

## 2020-06-07 RX ADMIN — OXYCODONE AND ACETAMINOPHEN 1 TABLET(S): 5; 325 TABLET ORAL at 15:08

## 2020-06-07 NOTE — PROGRESS NOTE ADULT - ASSESSMENT
38 yo female, pmh of anxiety, depression, substance abuse, eoth abuse, presents to ED for epigastric pain    #) acute pancreatitis / alcohol abuse / substance abuse / hypokalemia - hypophosphatemia - hypomagnesemia     - supplement electrolytes and follow repeat levels    - diet as per  GI following  - Pain meds changed to dilaudid 0.5mg q4h PRN  - continue  ativan  prn     #) Acute Anemia    - Hgb 13 3 weeks ago   - transfused 1 unit prbc on 5/29  - No sign of active GI bleed and CT shows no retroperitoneal bleed  - continue to follow daily HGB    - maintain T/S   - sequential stockings for DVT prophylaxis secondary to anemia      Full code

## 2020-06-07 NOTE — CHART NOTE - NSCHARTNOTEFT_GEN_A_CORE
-pt seen by GI Dr. Sanders requesting Abd x ray   -pt reports her abdomen has increased in size  -pt reports had few loose stool bowel movements today   -pt abdomen distended, soft , generalized pain, no rebound or guarding   will order abd x-ray

## 2020-06-07 NOTE — PROVIDER CONTACT NOTE (FALL NOTIFICATION) - SITUATION
patient stated "I went outside ot smoke and I fell . There was an incline and I couldn't get up" "I fell on my knees"

## 2020-06-07 NOTE — PROGRESS NOTE ADULT - SUBJECTIVE AND OBJECTIVE BOX
SEBLEADRIAN  39y, Female  Allergy: No Known Allergies    Hospital Day: 10d    Patient seen and examined earlier today. No acute events overnight.    PMH/PSH:  PAST MEDICAL & SURGICAL HISTORY:  ETOH abuse  Postpartum depression  Substance abuse  Anxiety  Depression  No significant past surgical history    VITALS:  T(F): 99.4 (06-07-20 @ 03:58), Max: 99.5 (06-06-20 @ 21:34)  HR: 129 (06-07-20 @ 03:58)  BP: 108/57 (06-07-20 @ 03:58) (11/57 - 109/62)  RR: 16 (06-07-20 @ 03:58)  SpO2: --    TESTS & MEASUREMENTS:  Weight (Kg):       06-06-20 @ 07:01  -  06-07-20 @ 07:00  --------------------------------------------------------  IN: 240 mL / OUT: 0 mL / NET: 240 mL                        8.2    12.15 )-----------( 287      ( 07 Jun 2020 06:52 )             26.8     06-07    138  |  104  |  <3<L>  ----------------------------<  88  3.9   |  27  |  <0.5<L>    Ca    8.0<L>      07 Jun 2020 06:52  Phos  3.1     06-06  Mg     1.9     06-06    TPro  5.0<L>  /  Alb  2.2<L>  /  TBili  4.0<H>  /  DBili  x   /  AST  137<H>  /  ALT  24  /  AlkPhos  254<H>  06-07    LIVER FUNCTIONS - ( 07 Jun 2020 06:52 )  Alb: 2.2 g/dL / Pro: 5.0 g/dL / ALK PHOS: 254 U/L / ALT: 24 U/L / AST: 137 U/L / GGT: x           MEDICATIONS:  MEDICATIONS  (STANDING):  chlorhexidine 4% Liquid 1 Application(s) Topical two times a day  enoxaparin Injectable 40 milliGRAM(s) SubCutaneous daily  folic acid 1 milliGRAM(s) Oral daily  multivitamin 1 Tablet(s) Oral daily  nicotine -  14 mG/24Hr(s) Patch 1 patch Transdermal daily  pantoprazole  Injectable 40 milliGRAM(s) IV Push daily  potassium phosphate / sodium phosphate powder 2 Packet(s) Oral three times a day  thiamine 100 milliGRAM(s) Oral daily    MEDICATIONS  (PRN):  HYDROmorphone  Injectable 0.5 milliGRAM(s) IV Push every 4 hours PRN Severe Pain (7 - 10)  LORazepam     Tablet 2 milliGRAM(s) Oral every 4 hours PRN Agitation  ondansetron Injectable 4 milliGRAM(s) IV Push every 8 hours PRN Nausea and/or Vomiting    HOME MEDICATIONS:  polyethylene glycol 3350 oral powder for reconstitution (05-02)  senna oral tablet (05-02)    REVIEW OF SYSTEMS:  All other review of systems is negative unless indicated above.     PHYSICAL EXAM:  GENERAL: NAD  HEENT: No Swelling  CHEST/LUNG: Good air entry, No wheezing  HEART: RRR, No murmurs  ABDOMEN: Tender to palpation  EXTREMITIES:  No clubbing

## 2020-06-07 NOTE — CHART NOTE - NSCHARTNOTEFT_GEN_A_CORE
Called by RN; patient s/p fall outside.  Patient seen and examined at bedside.  Reports was walking outside and hit an incline, and tripped, landing on her knees.  Patient with complaints of generalized pain but no MS pain s/p the fall.  Patient did not have any abrasions/lacerations/tears on her knees (point of impact) or on her palms b/l.  Patient specifically asked about other MS joints including shoulders, hips, wrists, elbows, ankles, feet and denied any pain.  Patient demonstrated full ROM of all joints.  RN present during evaluation.  There does not appear to be a need for any radiological evaluation at this time.  I urged the patient to notify us if she develops any new symptoms.

## 2020-06-08 LAB
ALBUMIN SERPL ELPH-MCNC: 2.1 G/DL — LOW (ref 3.5–5.2)
ALP SERPL-CCNC: 232 U/L — HIGH (ref 30–115)
ALT FLD-CCNC: 22 U/L — SIGNIFICANT CHANGE UP (ref 0–41)
ANION GAP SERPL CALC-SCNC: 7 MMOL/L — SIGNIFICANT CHANGE UP (ref 7–14)
AST SERPL-CCNC: 113 U/L — HIGH (ref 0–41)
BILIRUB SERPL-MCNC: 3.2 MG/DL — HIGH (ref 0.2–1.2)
BUN SERPL-MCNC: <3 MG/DL — LOW (ref 10–20)
CALCIUM SERPL-MCNC: 7.9 MG/DL — LOW (ref 8.5–10.1)
CHLORIDE SERPL-SCNC: 105 MMOL/L — SIGNIFICANT CHANGE UP (ref 98–110)
CO2 SERPL-SCNC: 26 MMOL/L — SIGNIFICANT CHANGE UP (ref 17–32)
CREAT SERPL-MCNC: <0.5 MG/DL — LOW (ref 0.7–1.5)
GLUCOSE SERPL-MCNC: 77 MG/DL — SIGNIFICANT CHANGE UP (ref 70–99)
HCT VFR BLD CALC: 25.1 % — LOW (ref 37–47)
HGB BLD-MCNC: 7.8 G/DL — LOW (ref 12–16)
MCHC RBC-ENTMCNC: 31.1 G/DL — LOW (ref 32–37)
MCHC RBC-ENTMCNC: 32.5 PG — HIGH (ref 27–31)
MCV RBC AUTO: 104.6 FL — HIGH (ref 81–99)
NRBC # BLD: 0 /100 WBCS — SIGNIFICANT CHANGE UP (ref 0–0)
PLATELET # BLD AUTO: 290 K/UL — SIGNIFICANT CHANGE UP (ref 130–400)
POTASSIUM SERPL-MCNC: 4.1 MMOL/L — SIGNIFICANT CHANGE UP (ref 3.5–5)
POTASSIUM SERPL-SCNC: 4.1 MMOL/L — SIGNIFICANT CHANGE UP (ref 3.5–5)
PROT SERPL-MCNC: 4.6 G/DL — LOW (ref 6–8)
RBC # BLD: 2.4 M/UL — LOW (ref 4.2–5.4)
RBC # FLD: 20.4 % — HIGH (ref 11.5–14.5)
SODIUM SERPL-SCNC: 138 MMOL/L — SIGNIFICANT CHANGE UP (ref 135–146)
WBC # BLD: 11.28 K/UL — HIGH (ref 4.8–10.8)
WBC # FLD AUTO: 11.28 K/UL — HIGH (ref 4.8–10.8)

## 2020-06-08 PROCEDURE — 99231 SBSQ HOSP IP/OBS SF/LOW 25: CPT

## 2020-06-08 PROCEDURE — 99233 SBSQ HOSP IP/OBS HIGH 50: CPT

## 2020-06-08 PROCEDURE — 99232 SBSQ HOSP IP/OBS MODERATE 35: CPT

## 2020-06-08 RX ORDER — NICOTINE POLACRILEX 2 MG
1 GUM BUCCAL DAILY
Refills: 0 | Status: DISCONTINUED | OUTPATIENT
Start: 2020-06-08 | End: 2020-06-12

## 2020-06-08 RX ORDER — VANCOMYCIN HCL 1 G
125 VIAL (EA) INTRAVENOUS EVERY 6 HOURS
Refills: 0 | Status: DISCONTINUED | OUTPATIENT
Start: 2020-06-08 | End: 2020-06-12

## 2020-06-08 RX ADMIN — HYDROMORPHONE HYDROCHLORIDE 0.5 MILLIGRAM(S): 2 INJECTION INTRAMUSCULAR; INTRAVENOUS; SUBCUTANEOUS at 21:22

## 2020-06-08 RX ADMIN — Medication 2 PACKET(S): at 18:24

## 2020-06-08 RX ADMIN — OXYCODONE AND ACETAMINOPHEN 1 TABLET(S): 5; 325 TABLET ORAL at 04:29

## 2020-06-08 RX ADMIN — Medication 2 MILLIGRAM(S): at 18:31

## 2020-06-08 RX ADMIN — OXYCODONE AND ACETAMINOPHEN 1 TABLET(S): 5; 325 TABLET ORAL at 23:11

## 2020-06-08 RX ADMIN — HYDROMORPHONE HYDROCHLORIDE 0.5 MILLIGRAM(S): 2 INJECTION INTRAMUSCULAR; INTRAVENOUS; SUBCUTANEOUS at 04:02

## 2020-06-08 RX ADMIN — Medication 2 PACKET(S): at 23:11

## 2020-06-08 RX ADMIN — Medication 1 MILLIGRAM(S): at 13:45

## 2020-06-08 RX ADMIN — Medication 1 TABLET(S): at 13:45

## 2020-06-08 RX ADMIN — Medication 125 MILLIGRAM(S): at 23:11

## 2020-06-08 RX ADMIN — Medication 2 MILLIGRAM(S): at 01:08

## 2020-06-08 RX ADMIN — Medication 2 PACKET(S): at 05:36

## 2020-06-08 RX ADMIN — Medication 500 MILLIGRAM(S): at 05:35

## 2020-06-08 RX ADMIN — Medication 125 MILLIGRAM(S): at 13:45

## 2020-06-08 RX ADMIN — ENOXAPARIN SODIUM 40 MILLIGRAM(S): 100 INJECTION SUBCUTANEOUS at 13:45

## 2020-06-08 RX ADMIN — Medication 1 PATCH: at 13:45

## 2020-06-08 RX ADMIN — CHLORHEXIDINE GLUCONATE 1 APPLICATION(S): 213 SOLUTION TOPICAL at 06:43

## 2020-06-08 RX ADMIN — Medication 100 MILLIGRAM(S): at 13:45

## 2020-06-08 RX ADMIN — PANTOPRAZOLE SODIUM 40 MILLIGRAM(S): 20 TABLET, DELAYED RELEASE ORAL at 13:45

## 2020-06-08 RX ADMIN — HYDROMORPHONE HYDROCHLORIDE 0.5 MILLIGRAM(S): 2 INJECTION INTRAMUSCULAR; INTRAVENOUS; SUBCUTANEOUS at 18:25

## 2020-06-08 RX ADMIN — HYDROMORPHONE HYDROCHLORIDE 0.5 MILLIGRAM(S): 2 INJECTION INTRAMUSCULAR; INTRAVENOUS; SUBCUTANEOUS at 14:19

## 2020-06-08 RX ADMIN — OXYCODONE AND ACETAMINOPHEN 1 TABLET(S): 5; 325 TABLET ORAL at 16:23

## 2020-06-08 RX ADMIN — Medication 125 MILLIGRAM(S): at 18:24

## 2020-06-08 RX ADMIN — HYDROMORPHONE HYDROCHLORIDE 0.5 MILLIGRAM(S): 2 INJECTION INTRAMUSCULAR; INTRAVENOUS; SUBCUTANEOUS at 10:30

## 2020-06-08 NOTE — PROVIDER CONTACT NOTE (OTHER) - SITUATION
Patient went for Urgent Abd XR.   MD and PA notified that XR needed to be read.  MD stated he would call XR and that Radiologist should read the XRay.

## 2020-06-08 NOTE — CHART NOTE - NSCHARTNOTEFT_GEN_A_CORE
Pt seen by GI this AM, they recommended diagnostic paracentesis. IR called and consulted - no fluid for collection based on scans. Advised to reconsult if fluid accumulates.     GI also recommending workup for chronic liver disease - will order recommended labs for AM.

## 2020-06-08 NOTE — PROGRESS NOTE ADULT - SUBJECTIVE AND OBJECTIVE BOX
SEBLEADRIAN  39y, Female  Allergy: No Known Allergies    Hospital Day: 11d    Patient seen and examined earlier today. No acute events overnight.    PMH/PSH:  PAST MEDICAL & SURGICAL HISTORY:  ETOH abuse  Postpartum depression  Substance abuse  Anxiety  Depression  No significant past surgical history    VITALS:  T(F): 98.9 (06-08-20 @ 05:50), Max: 98.9 (06-08-20 @ 05:50)  HR: 112 (06-08-20 @ 05:50)  BP: 102/58 (06-08-20 @ 05:50) (83/44 - 119/64)  RR: 21 (06-08-20 @ 05:50)  SpO2: 95% (06-08-20 @ 05:50)    TESTS & MEASUREMENTS:  Weight (Kg):       06-06-20 @ 07:01  -  06-07-20 @ 07:00  --------------------------------------------------------  IN: 240 mL / OUT: 0 mL / NET: 240 mL                      7.8    11.28 )-----------( 290      ( 08 Jun 2020 05:36 )             25.1     06-08    138  |  105  |  <3<L>  ----------------------------<  77  4.1   |  26  |  <0.5<L>    Ca    7.9<L>      08 Jun 2020 05:36    TPro  4.6<L>  /  Alb  2.1<L>  /  TBili  3.2<H>  /  DBili  x   /  AST  113<H>  /  ALT  22  /  AlkPhos  232<H>  06-08    LIVER FUNCTIONS - ( 08 Jun 2020 05:36 )  Alb: 2.1 g/dL / Pro: 4.6 g/dL / ALK PHOS: 232 U/L / ALT: 22 U/L / AST: 113 U/L / GGT: x           RECENT DIAGNOSTIC ORDERS:  Xray Abdomen 2 Views: Urgent   Indication: abdominal distention r/o obstruction  Transport: Stretcher-Crib  Addl Info: as per GI attending  Exam Completed (06-07-20 @ 18:10)    MEDICATIONS:  MEDICATIONS  (STANDING):  chlorhexidine 4% Liquid 1 Application(s) Topical two times a day  enoxaparin Injectable 40 milliGRAM(s) SubCutaneous daily  folic acid 1 milliGRAM(s) Oral daily  multivitamin 1 Tablet(s) Oral daily  nicotine -  14 mG/24Hr(s) Patch 1 patch Transdermal daily  pantoprazole  Injectable 40 milliGRAM(s) IV Push daily  potassium phosphate / sodium phosphate powder 2 Packet(s) Oral three times a day  thiamine 100 milliGRAM(s) Oral daily  vancomycin    Solution 125 milliGRAM(s) Oral every 6 hours    MEDICATIONS  (PRN):  HYDROmorphone  Injectable 0.5 milliGRAM(s) IV Push every 4 hours PRN Severe Pain (7 - 10)  LORazepam     Tablet 2 milliGRAM(s) Oral every 4 hours PRN Agitation  ondansetron Injectable 4 milliGRAM(s) IV Push every 8 hours PRN Nausea and/or Vomiting  oxycodone    5 mG/acetaminophen 325 mG 1 Tablet(s) Oral every 6 hours PRN Moderate Pain (4 - 6)    HOME MEDICATIONS:  polyethylene glycol 3350 oral powder for reconstitution (05-02)  senna oral tablet (05-02)    REVIEW OF SYSTEMS:  All other review of systems is negative unless indicated above.     PHYSICAL EXAM:  GENERAL: NAD  HEENT: No Swelling  CHEST/LUNG: Good air entry, No wheezing  HEART: RRR, No murmurs  ABDOMEN: Tender to palpation  EXTREMITIES:  No clubbing

## 2020-06-08 NOTE — PROGRESS NOTE ADULT - SUBJECTIVE AND OBJECTIVE BOX
GI Followup Note:   Pt seen and examined at bedside.      seen  by GI  for  the chief complaint of: Diarrhea  Patient intially admittted with Acute pancreatitius. Now has diarrhea 2-3/day        Subjective:      REVIEW OF SYSTEMS:  Constitutional: No fever, weight loss or fatigue  Cardiovascular: No chest pain, palpitations, dizziness or leg swelling  Gastrointestinal: No abdominal or epigastric pain. No nausea, vomiting or hematemesis; No diarrhea or constipation. No melena or hematochezia.  Skin: No itching, burning, rashes or lesions     Allergies    No Known Allergies    Intolerances        MEDICATIONS  (STANDING):  chlorhexidine 4% Liquid 1 Application(s) Topical two times a day  enoxaparin Injectable 40 milliGRAM(s) SubCutaneous daily  folic acid 1 milliGRAM(s) Oral daily  metroNIDAZOLE    Tablet 500 milliGRAM(s) Oral every 8 hours  multivitamin 1 Tablet(s) Oral daily  nicotine -  14 mG/24Hr(s) Patch 1 patch Transdermal daily  pantoprazole  Injectable 40 milliGRAM(s) IV Push daily  potassium phosphate / sodium phosphate powder 2 Packet(s) Oral three times a day  thiamine 100 milliGRAM(s) Oral daily    MEDICATIONS  (PRN):  HYDROmorphone  Injectable 0.5 milliGRAM(s) IV Push every 4 hours PRN Severe Pain (7 - 10)  LORazepam     Tablet 2 milliGRAM(s) Oral every 4 hours PRN Agitation  ondansetron Injectable 4 milliGRAM(s) IV Push every 8 hours PRN Nausea and/or Vomiting  oxycodone    5 mG/acetaminophen 325 mG 1 Tablet(s) Oral every 6 hours PRN Moderate Pain (4 - 6)      Vital Signs Last 24 Hrs  T(C): 37.2 (08 Jun 2020 05:50), Max: 37.2 (08 Jun 2020 05:50)  T(F): 98.9 (08 Jun 2020 05:50), Max: 98.9 (08 Jun 2020 05:50)  HR: 112 (08 Jun 2020 05:50) (110 - 120)  BP: 102/58 (08 Jun 2020 05:50) (83/44 - 119/64)  BP(mean): 76 (08 Jun 2020 05:50) (58 - 76)  RR: 21 (08 Jun 2020 05:50) (16 - 21)  SpO2: 95% (08 Jun 2020 05:50) (95% - 95%)    06-06 @ 07:01  -  06-07 @ 07:00  --------------------------------------------------------  IN: 240 mL / OUT: 0 mL / NET: 240 mL        PHYSICAL EXAM:    General: Well developed; well nourished; in no acute distress  HEENT: MMM, conjunctiva and sclera clear  Lungs: Clear, no Rhonchi  CVS: Normal heart sounds. No murmurs  Gastrointestinal: Soft non-tender non-distended; Normal bowel sounds; No hepatosplenomegaly. No rebound or guarding  Skin: Warm and dry. No obvious rash  CNS: Alert Oriented x3    LABS:  CBC Full  -  ( 07 Jun 2020 06:52 )  WBC Count : 12.15 K/uL  RBC Count : 2.53 M/uL  Hemoglobin : 8.2 g/dL  Hematocrit : 26.8 %  Platelet Count - Automated : 287 K/uL  Mean Cell Volume : 105.9 fL  Mean Cell Hemoglobin : 32.4 pg  Mean Cell Hemoglobin Concentration : 30.6 g/dL  Auto Neutrophil # : x  Auto Lymphocyte # : x  Auto Monocyte # : x  Auto Eosinophil # : x  Auto Basophil # : x  Auto Neutrophil % : x  Auto Lymphocyte % : x  Auto Monocyte % : x  Auto Eosinophil % : x  Auto Basophil % : x    06-07    138  |  104  |  <3<L>  ----------------------------<  88  3.9   |  27  |  <0.5<L>    Ca    8.0<L>      07 Jun 2020 06:52  Phos  3.1     06-06  Mg     1.9     06-06    TPro  5.0<L>  /  Alb  2.2<L>  /  TBili  4.0<H>  /  DBili  x   /  AST  137<H>  /  ALT  24  /  AlkPhos  254<H>  06-07                      RADIOLOGY & ADDITIONAL STUDIES:

## 2020-06-08 NOTE — PROGRESS NOTE ADULT - ASSESSMENT
I personally interviewed and examined the patient. CL Difficle positive. On Metronidazole. If not improving to consider Vancomycin. Xray KUb not done on 6/7  To do today as pat ient had mild distension.

## 2020-06-08 NOTE — PROGRESS NOTE ADULT - SUBJECTIVE AND OBJECTIVE BOX
Gastroenterology progress note:     Patient is a 39y old  Female who presents with a chief complaint of severe abdominal pain (08 Jun 2020 08:39)       Admitted on: 05-28-20    We are following the patient for acute complicated pancreatitis and C diff     Interval History: patient had total of 5 loose, brown stools overnight. She is still c/o persistent abdominal pain and wants to go out for smoking. She is eating regular diet.    Patient's medical problems are stable   Prior records reviewed (Y/N): Y  History obtained from someone other than patient (Y/N): N      PAST MEDICAL & SURGICAL HISTORY:  ETOH abuse  Postpartum depression  Substance abuse  Anxiety  Depression  No significant past surgical history      MEDICATIONS  (STANDING):  chlorhexidine 4% Liquid 1 Application(s) Topical two times a day  enoxaparin Injectable 40 milliGRAM(s) SubCutaneous daily  folic acid 1 milliGRAM(s) Oral daily  multivitamin 1 Tablet(s) Oral daily  nicotine - 21 mG/24Hr(s) Patch 1 patch Transdermal daily  pantoprazole  Injectable 40 milliGRAM(s) IV Push daily  potassium phosphate / sodium phosphate powder 2 Packet(s) Oral three times a day  thiamine 100 milliGRAM(s) Oral daily  vancomycin    Solution 125 milliGRAM(s) Oral every 6 hours    MEDICATIONS  (PRN):  HYDROmorphone  Injectable 0.5 milliGRAM(s) IV Push every 4 hours PRN Severe Pain (7 - 10)  LORazepam     Tablet 2 milliGRAM(s) Oral every 4 hours PRN Agitation  ondansetron Injectable 4 milliGRAM(s) IV Push every 8 hours PRN Nausea and/or Vomiting  oxycodone    5 mG/acetaminophen 325 mG 1 Tablet(s) Oral every 6 hours PRN Moderate Pain (4 - 6)      Allergies  No Known Allergies      Review of Systems:   Cardiovascular:  No Chest Pain, No Palpitations  Respiratory:  No Cough, No Dyspnea  Gastrointestinal:  As described in HPI    Physical Examination:  T(C): 37.2 (06-08-20 @ 05:50), Max: 37.2 (06-08-20 @ 05:50)  HR: 110 (06-08-20 @ 11:20) (110 - 120)  BP: 102/58 (06-08-20 @ 05:50) (83/44 - 119/64)  RR: 21 (06-08-20 @ 05:50) (16 - 21)  SpO2: 96% (06-08-20 @ 11:20) (95% - 96%)      Constitutional: No acute distress.  Respiratory:  No signs of respiratory distress. Lung sounds are clear bilaterally.  Cardiovascular:  S1 S2, Regular rate and rhythm.  Abdominal: Abdomen is soft, symmetric, tender on palpation with significant distension  Skin: No rashes, Jaundice+        Data: (reviewed by attending)                        7.8    11.28 )-----------( 290      ( 08 Jun 2020 05:36 )             25.1     Hgb trend:  7.8  06-08-20 @ 05:36  8.2  06-07-20 @ 06:52  7.9  06-06-20 @ 07:47        06-08    138  |  105  |  <3<L>  ----------------------------<  77  4.1   |  26  |  <0.5<L>    Ca    7.9<L>      08 Jun 2020 05:36    TPro  4.6<L>  /  Alb  2.1<L>  /  TBili  3.2<H>  /  DBili  x   /  AST  113<H>  /  ALT  22  /  AlkPhos  232<H>  06-08    Liver panel trend:  TBili 3.2   /      /   ALT 22   /   AlkP 232   /   Tptn 4.6   /   Alb 2.1    /   DBili --      06-08  TBili 4.0   /      /   ALT 24   /   AlkP 254   /   Tptn 5.0   /   Alb 2.2    /   DBili --      06-07  TBili 3.0   /      /   ALT 22   /   AlkP 235   /   Tptn 4.7   /   Alb 1.9    /   DBili --      06-06  TBili 3.0   /      /   ALT 20   /   AlkP 243   /   Tptn 4.6   /   Alb 2.0    /   DBili --      06-05  TBili 3.3   /      /   ALT 22   /   AlkP 293   /   Tptn 5.1   /   Alb 2.1    /   DBili 2.5      06-04  TBili 3.1   /      /   ALT 19   /   AlkP 291   /   Tptn 4.9   /   Alb 2.0    /   DBili 2.3      06-03  TBili 3.0   /      /   ALT 18   /   AlkP 305   /   Tptn 4.9   /   Alb 2.1    /   DBili 2.2      06-02  TBili 3.0   /      /   ALT 19   /   AlkP 341   /   Tptn 4.8   /   Alb 2.0    /   DBili --      06-01  TBili 3.1   /      /   ALT 19   /   AlkP 356   /   Tptn 5.0   /   Alb 2.0    /   DBili --      05-31  TBili 3.6   /      /   ALT 19   /   AlkP 386   /   Tptn 4.9   /   Alb 2.2    /   DBili 2.9      05-30             Radiology: (reviewed by attending)

## 2020-06-08 NOTE — PROGRESS NOTE ADULT - ASSESSMENT
39 year old female with a history of acute alcoholic pancreatitis, alcohol abuse,  presents with abdominal pain, leukocytosis, acute pancreatitis with developing pseudocysts, thickened gallbladder wall, sludge in the gallbladder elevated LFTs, acute anemia without overt GI bleeding.        Recurrent pancreatitis with pseudocyst formation secondary to alcohol abuse  Persistent pain, no fever, tolerating solid food  MRCP: CBD N, Sludge+  CA/TGs are normal.  CT no retroperitoneal bleed or hemorraghic transformation in pancreas.    Rec  -low fat diet   -Pain control as needed (although component of opioid seeking behaviour possible)  -Elevated LFTs are likely secondary to alcoholic hepatitis (>2:1 ratio between AST: ALT) but would not recommend prednisolone  (wDF<32)  -surgery consult appreciated (no intervention planned)  -alcohol abstinence  -Repeat LFTs and INR daily.  -Ascites is present please get a diagnostic paracentesis      # First known episode of C diff   On oral vancomycin 125 q6hrs (complete for 10 days)  KUB was normal for abdominal distension (likely secondary to ascites and bowel edema secondary to pancreatitis)    # Alcoholic liver disease:   Complete alcohol abstinence   Please perform a CLD work up which includes HBsAg, HBsAb, IgM/IgG, Anti HEV, Serum Ferritin, Transferrin Saturation, Ceruloplasmin level, [place], SMA, gamma globulin, AMA. 39 year old female with a history of acute alcoholic pancreatitis, alcohol abuse,  presents with abdominal pain, leukocytosis, acute pancreatitis with developing pseudocysts, thickened gallbladder wall, sludge in the gallbladder elevated LFTs, acute anemia without overt GI bleeding.        Recurrent pancreatitis with pseudocyst formation secondary to alcohol abuse  Persistent pain, no fever, tolerating solid food  MRCP: CBD N, Sludge+  CA/TGs are normal.  CT no retroperitoneal bleed or hemorraghic transformation in pancreas.  Pain control  IV hydration as tolerated, limited by edema    Rec  -low fat diet   -Pain control as needed (although component of opioid seeking behaviour possible)  -Elevated LFTs are likely secondary to alcoholic hepatitis (>2:1 ratio between AST: ALT) but would not recommend prednisolone  (wDF<32)  -surgery consult appreciated (no intervention planned)  -alcohol abstinence  -Repeat LFTs and INR daily.    # First known episode of C diff   On oral vancomycin 125 q6hrs (complete for 10 days)  KUB was normal for abdominal distension (likely secondary to ascites and bowel edema secondary to pancreatitis)    # Alcoholic liver disease:   Complete alcohol abstinence   Please perform a CLD work up which includes HBsAg, HBsAb, IgM/IgG, Anti HEV, Serum Ferritin, Transferrin Saturation, Ceruloplasmin level, [place], SMA, gamma globulin, AMA.

## 2020-06-08 NOTE — CHART NOTE - NSCHARTNOTEFT_GEN_A_CORE
Registered Dietitian Follow-Up     Patient Profile Reviewed                           Yes [x]   No []     Nutrition History Previously Obtained        Yes []  No [x]  minimal hx obtained during this assessment + attempted to provide nutrition education for pancreatitis, pt is not receptive but accepts [facility] handout encouraged pt to ask questions if there are any upon f/u. Pt also mumbles/speaks softly when answering questions.      Pertinent Subjective Information: Pt reports that she is tolerated her meals with minimal nausea/abdominal pain. Per PCA at bedside, today was the first day that pt has really been able to keep food down. Pt continues to have multiple BMs/day, Encouraged adequate hydration d/t diarrhea.     NUTRITION HX: pt reports that she eats well pta with a good appetite. No chewing/swallowing difficulties. NKFA/intolerances. No food preferences r/t culture/[employer]. No vitamins/supplements pta. Unsure about recent changes in wt, pt also does not state her UBW. Pt is covered in blankets up to her neck and does not allow for NFPE to be performed. Per pt face, she appears well nourished.      Pertinent Medical Interventions: Pt followed for complicated pancreatitis/c. diff. Per surgery, no intervention. Per GI, pt needs diagnostic paracentesis 2/2 ascited. KUB was normal for abdominal distention (likely 2/2 ascited and bowel edema). Pt needs CLD workup as well.      Diet order: Soft, fiber/residue restricted, low fat, no lactose.      Anthropometrics:  - Ht. 64"  - Wt. dosing 60.7 kg/134 lbs  - %wt change no new wt recorded since previously assessed-- wts ranging from 134-164 lbs per EMR, pt does not confirm UBW upon assessment. Edema remains present.   - BMI 23.0 using dosing/lowest recorded wt  - IBW 54.5 kg/120 lbs     Pertinent Lab Data: (6/8) RBC 2.40, H/H 7.8/25.1, BUN <3, Cr <0.5, elevated LFTs; (5/29) A1c 4.4     Pertinent Meds: lovenox, vancomycin, dilaudid, Phos-NaK, zofran, folic acid, MVI, thiamine, protonix     Physical Findings:  - Appearance: well nourished; (6/7) +2 edema (R leg)  - GI function: multiple BMs daily 2/2 c. diff, ongoing.   - Tubes: NA  - Oral/Mouth cavity: no chewing/swallowing issues noted.  - Skin: WDL, except scratches (6/8)     Nutrition Requirements  Weight Used: ideal  54.5 kg/120 lbs (widely varying wts per EMR, unsure of actual wt) (continued from initial assessment on 6/4)     Estimated Energy Needs    Continue [x]  0800-1910 (25-30 kcal/kg IBW)     Estimated Protein Needs    Continue [x]  54-65 g (1-1.2 g/kg IBW)     Estimated Fluid Needs        Continue [x]  1 mL/kcal or per LIP     Nutrient Intake: >50% per EMR, improving per PCA at bedside        [x] Previous Nutrition Diagnosis: Inadequate energy intake            [x] Ongoing               Nutrition Intervention meals and snacks, medical food supplements, nutrition related medication management, coordination of care.     Goal/Expected Outcome: pt to maintain po intake >75% over the next 3 days     Indicator/Monitoring: RD to monitor diet order, energy intake, body composition, NFPF, renal profile    Recommendation: continue current diet order, add prosource gelatein plus BID, initiate a probiotic d/t persistent diarrhea/c. diff, encourage po intake, provide assistance with meals PRN. Offered nutrition education for pancreatitis, pt declines but accepts [facility] handouts-- RD encouraged questions with regards to the material PRN. Registered Dietitian Follow-Up     Patient Profile Reviewed                           Yes [x]   No []     Nutrition History Previously Obtained        Yes []  No [x]  minimal hx obtained during this assessment + attempted to provide nutrition education for pancreatitis, pt is not receptive but accepts [facility] handout encouraged pt to ask questions if there are any upon f/u. Pt also mumbles/speaks softly when answering questions.      Pertinent Subjective Information: Pt reports that she is tolerated her meals with minimal nausea/abdominal pain. Per PCA at bedside, today was the first day that pt has really been able to keep food down. Pt continues to have multiple BMs/day, Encouraged adequate hydration d/t diarrhea.     NUTRITION HX: pt reports that she eats well pta with a good appetite. No chewing/swallowing difficulties. NKFA/intolerances. No food preferences r/t culture/[employer]. No vitamins/supplements pta. Unsure about recent changes in wt, pt also does not state her UBW. Pt is covered in blankets up to her neck and does not allow for NFPE to be performed. Per pt face, she appears well nourished.      Pertinent Medical Interventions: Pt followed for complicated pancreatitis/c. diff. Per surgery, no intervention. Per GI, pt needs diagnostic paracentesis 2/2 ascited. KUB was normal for abdominal distention (likely 2/2 ascited and bowel edema). Pt needs CLD workup as well.      Diet order: Soft, fiber/residue restricted, low fat, no lactose.      Anthropometrics:  - Ht. 64"  - Wt. dosing 60.7 kg/134 lbs  - %wt change no new wt recorded since previously assessed-- wts ranging from 134-164 lbs per EMR, pt does not confirm UBW upon assessment. Edema remains present.   - BMI 23.0 using dosing/lowest recorded wt  - IBW 54.5 kg/120 lbs     Pertinent Lab Data: (6/8) RBC 2.40, H/H 7.8/25.1, BUN <3, Cr <0.5, elevated LFTs; (5/29) A1c 4.4     Pertinent Meds: lovenox, vancomycin, dilaudid, Phos-NaK, zofran, folic acid, MVI, thiamine, protonix     Physical Findings:  - Appearance: well nourished; (6/7) +2 edema (R leg)  - GI function: multiple BMs daily 2/2 c. diff, ongoing.   - Tubes: NA  - Oral/Mouth cavity: no chewing/swallowing issues noted.  - Skin: WDL, except scratches (6/8)     Nutrition Requirements  Weight Used: ideal  54.5 kg/120 lbs (widely varying wts per EMR, unsure of actual wt) (continued from initial assessment on 6/4)     Estimated Energy Needs    Continue [x]  9844-2164 (25-30 kcal/kg IBW)     Estimated Protein Needs    Continue [x]  54-65 g (1-1.2 g/kg IBW)     Estimated Fluid Needs        Continue [x]  1 mL/kcal or per LIP     Nutrient Intake: >50% per EMR, improving per PCA at bedside        [x] Previous Nutrition Diagnosis: Inadequate energy intake            [x] Ongoing               Nutrition Intervention meals and snacks, medical food supplements, nutrition related medication management, coordination of care.     Goal/Expected Outcome: pt to maintain po intake >75% over the next 3 days     Indicator/Monitoring: RD to monitor diet order, energy intake, body composition, NFPF, renal profile    Recommendation: continue current diet order, add prosource gelatein plus BID, initiate a probiotic d/t persistent diarrhea/c. diff, encourage po intake, provide assistance with meals PRN. Offered nutrition education for pancreatitis, pt declines but accepts [facility] handouts-- RD encouraged questions with regards to the material PRN. Recs discussed with ulises MARCIAL [phone].

## 2020-06-08 NOTE — PROGRESS NOTE ADULT - ASSESSMENT
40 yo female, pmh of anxiety, depression, substance abuse, eoth abuse, presents to ED for epigastric pain    #Diarrhea secondary to c.diff colitis  - C.Diff positive 06/07, still with diarrhea today  - Vancomycin 125mg q6h for 10 days (End 06/19)    #) acute pancreatitis / alcohol abuse / substance abuse / hypokalemia - hypophosphatemia - hypomagnesemia     - supplement electrolytes and follow repeat levels    - diet as per  GI following  - Pain meds changed to dilaudid 0.5mg q4h PRN  - continue  ativan  prn     #) Acute Anemia    - Hgb 13 3 weeks ago   - transfused 1 unit prbc on 5/29  - No sign of active GI bleed and CT shows no retroperitoneal bleed  - continue to follow daily HGB    - maintain T/S   - sequential stockings for DVT prophylaxis secondary to anemia      Full code

## 2020-06-08 NOTE — PROVIDER CONTACT NOTE (OTHER) - SITUATION
MD called but told to call PA.  ANIKET Low was notified.  P.O. Flagyl ordered for +C-diff result. Called MD Basurto to report result, but was told to call PA.  ANIKET Low was notified.  P.O. Flagyl ordered for +C-diff result.

## 2020-06-09 ENCOUNTER — TRANSCRIPTION ENCOUNTER (OUTPATIENT)
Age: 40
End: 2020-06-09

## 2020-06-09 LAB
ALBUMIN SERPL ELPH-MCNC: 2.1 G/DL — LOW (ref 3.5–5.2)
ALP SERPL-CCNC: 238 U/L — HIGH (ref 30–115)
ALT FLD-CCNC: 23 U/L — SIGNIFICANT CHANGE UP (ref 0–41)
APTT BLD: 37.7 SEC — SIGNIFICANT CHANGE UP (ref 27–39.2)
AST SERPL-CCNC: 113 U/L — HIGH (ref 0–41)
BILIRUB DIRECT SERPL-MCNC: 2.9 MG/DL — HIGH (ref 0–0.2)
BILIRUB INDIRECT FLD-MCNC: 0.7 MG/DL — SIGNIFICANT CHANGE UP (ref 0.2–1.2)
BILIRUB SERPL-MCNC: 3.6 MG/DL — HIGH (ref 0.2–1.2)
CERULOPLASMIN SERPL-MCNC: 28 MG/DL — SIGNIFICANT CHANGE UP (ref 16–45)
GGT SERPL-CCNC: 351 U/L — HIGH (ref 1–40)
HBV CORE IGM SER-ACNC: SIGNIFICANT CHANGE UP
HBV SURFACE AB SER-ACNC: SIGNIFICANT CHANGE UP
HBV SURFACE AG SER-ACNC: SIGNIFICANT CHANGE UP
INR BLD: 1.7 RATIO — HIGH (ref 0.65–1.3)
PROT SERPL-MCNC: 4.8 G/DL — LOW (ref 6–8)
PROTHROM AB SERPL-ACNC: 19.5 SEC — HIGH (ref 9.95–12.87)
TRANSFERRIN SERPL-MCNC: 70 MG/DL — LOW (ref 200–360)

## 2020-06-09 PROCEDURE — 99233 SBSQ HOSP IP/OBS HIGH 50: CPT

## 2020-06-09 PROCEDURE — 99221 1ST HOSP IP/OBS SF/LOW 40: CPT

## 2020-06-09 RX ORDER — HYDROMORPHONE HYDROCHLORIDE 2 MG/ML
0.5 INJECTION INTRAMUSCULAR; INTRAVENOUS; SUBCUTANEOUS EVERY 6 HOURS
Refills: 0 | Status: DISCONTINUED | OUTPATIENT
Start: 2020-06-09 | End: 2020-06-10

## 2020-06-09 RX ADMIN — Medication 125 MILLIGRAM(S): at 05:11

## 2020-06-09 RX ADMIN — Medication 1 MILLIGRAM(S): at 11:36

## 2020-06-09 RX ADMIN — Medication 1 PATCH: at 19:48

## 2020-06-09 RX ADMIN — HYDROMORPHONE HYDROCHLORIDE 0.5 MILLIGRAM(S): 2 INJECTION INTRAMUSCULAR; INTRAVENOUS; SUBCUTANEOUS at 08:03

## 2020-06-09 RX ADMIN — OXYCODONE AND ACETAMINOPHEN 1 TABLET(S): 5; 325 TABLET ORAL at 11:59

## 2020-06-09 RX ADMIN — OXYCODONE AND ACETAMINOPHEN 1 TABLET(S): 5; 325 TABLET ORAL at 05:11

## 2020-06-09 RX ADMIN — Medication 125 MILLIGRAM(S): at 11:35

## 2020-06-09 RX ADMIN — ENOXAPARIN SODIUM 40 MILLIGRAM(S): 100 INJECTION SUBCUTANEOUS at 11:36

## 2020-06-09 RX ADMIN — Medication 125 MILLIGRAM(S): at 23:51

## 2020-06-09 RX ADMIN — Medication 1 PATCH: at 11:34

## 2020-06-09 RX ADMIN — HYDROMORPHONE HYDROCHLORIDE 0.5 MILLIGRAM(S): 2 INJECTION INTRAMUSCULAR; INTRAVENOUS; SUBCUTANEOUS at 12:30

## 2020-06-09 RX ADMIN — Medication 1 TABLET(S): at 11:36

## 2020-06-09 RX ADMIN — HYDROMORPHONE HYDROCHLORIDE 0.5 MILLIGRAM(S): 2 INJECTION INTRAMUSCULAR; INTRAVENOUS; SUBCUTANEOUS at 16:35

## 2020-06-09 RX ADMIN — ONDANSETRON 4 MILLIGRAM(S): 8 TABLET, FILM COATED ORAL at 22:08

## 2020-06-09 RX ADMIN — Medication 1 PATCH: at 12:06

## 2020-06-09 RX ADMIN — Medication 2 PACKET(S): at 22:08

## 2020-06-09 RX ADMIN — PANTOPRAZOLE SODIUM 40 MILLIGRAM(S): 20 TABLET, DELAYED RELEASE ORAL at 11:36

## 2020-06-09 RX ADMIN — Medication 2 PACKET(S): at 05:12

## 2020-06-09 RX ADMIN — Medication 2 MILLIGRAM(S): at 18:41

## 2020-06-09 RX ADMIN — Medication 100 MILLIGRAM(S): at 11:35

## 2020-06-09 RX ADMIN — Medication 125 MILLIGRAM(S): at 17:11

## 2020-06-09 RX ADMIN — HYDROMORPHONE HYDROCHLORIDE 0.5 MILLIGRAM(S): 2 INJECTION INTRAMUSCULAR; INTRAVENOUS; SUBCUTANEOUS at 22:08

## 2020-06-09 RX ADMIN — Medication 2 PACKET(S): at 12:00

## 2020-06-09 RX ADMIN — HYDROMORPHONE HYDROCHLORIDE 0.5 MILLIGRAM(S): 2 INJECTION INTRAMUSCULAR; INTRAVENOUS; SUBCUTANEOUS at 01:27

## 2020-06-09 NOTE — DISCHARGE NOTE PROVIDER - NSDCMRMEDTOKEN_GEN_ALL_CORE_FT
EpiPen 2-Piero 0.3 mg injectable kit: 0.3 milligram(s) intramuscularly once   famotidine 20 mg oral tablet: 1 tab(s) orally once a day  folic acid 1 mg oral tablet: 1 tab(s) orally once a day  Multiple Vitamins oral tablet: 1 tab(s) orally once a day  nicotine 21 mg/24 hr transdermal film, extended release: 1 patch transdermal once a day   polyethylene glycol 3350 oral powder for reconstitution: 17 gram(s) orally once a day  senna oral tablet: 2 tab(s) orally once a day (at bedtime)  thiamine 100 mg oral tablet: 1 tab(s) orally once a day EpiPen 2-Piero 0.3 mg injectable kit: 0.3 milligram(s) intramuscularly once   famotidine 20 mg oral tablet: 1 tab(s) orally once a day  folic acid 1 mg oral tablet: 1 tab(s) orally once a day  furosemide 20 mg oral tablet: 1 tab(s) orally once a day  K-Tab 20 mEq oral tablet, extended release: 1 tab(s) orally once a day   Multiple Vitamins oral tablet: 1 tab(s) orally once a day  nicotine 21 mg/24 hr transdermal film, extended release: 1 patch transdermal once a day   thiamine 100 mg oral tablet: 1 tab(s) orally once a day  vancomycin 125 mg oral capsule: 1 cap(s) orally every 6 hours

## 2020-06-09 NOTE — DISCHARGE NOTE PROVIDER - NSDCCPCAREPLAN_GEN_ALL_CORE_FT
PRINCIPAL DISCHARGE DIAGNOSIS  Diagnosis: Pancreatitis  Assessment and Plan of Treatment: Follow up with GI within 2-4 weeks. Ensure adequate hydration and avoid alcohol use.      SECONDARY DISCHARGE DIAGNOSES  Diagnosis: Clostridium difficile colitis  Assessment and Plan of Treatment: Continue vancomycin 125mg every 6 hours through 06/19/2020. Follow up with your PCP within 1-2 weeks.    Diagnosis: Substance abuse  Assessment and Plan of Treatment: Follow up [facility] outpatient addiction services as referred

## 2020-06-09 NOTE — PROGRESS NOTE ADULT - ASSESSMENT
40 yo female, pmh of anxiety, depression, substance abuse, eoth abuse, presents to ED for epigastric pain    #Diarrhea secondary to c.diff colitis  - C.Diff positive 06/07, still with diarrhea 2-3 episodes  - Vancomycin 125mg q6h for 10 days (End 06/19)    #) acute pancreatitis / alcohol abuse / substance abuse / hypokalemia - hypophosphatemia - hypomagnesemia     - supplement electrolytes and follow repeat levels    - diet as per  GI following  - Pain meds changed to dilaudid 0.5mg q4h PRN  - continue  ativan  prn     #) Acute Anemia    - Hgb 13 3 weeks ago   - transfused 1 unit prbc on 5/29  - No sign of active GI bleed and CT shows no retroperitoneal bleed  - continue to follow daily HGB    - maintain T/S   - sequential stockings for DVT prophylaxis secondary to anemia    Full code 40 yo female, pmh of anxiety, depression, substance abuse, eoth abuse, presents to ED for epigastric pain    #Diarrhea secondary to c.diff colitis  - C.Diff positive 06/07, still with diarrhea 2-3 episodes  - Vancomycin 125mg q6h for 10 days (End 06/19)    #) acute pancreatitis / alcohol abuse / substance abuse / hypokalemia - hypophosphatemia - hypomagnesemia     - supplement electrolytes and follow repeat levels    - diet as per  GI following  - Pain meds changed to dilaudid 0.5mg q4h PRN  - continue  ativan  prn     #) Acute Anemia    - Hgb 13 3 weeks ago   - transfused 1 unit prbc on 5/29  - No sign of active GI bleed and CT shows no retroperitoneal bleed  - continue to follow daily HGB    - maintain T/S   - sequential stockings for DVT prophylaxis secondary to anemia    #Folate deficiency  #Suspected Magnesium deficiency  #Thiamine deficiency  - Cont oral supplements    Full code 40 yo female, pmh of anxiety, depression, substance abuse, eoth abuse, presents to ED for epigastric pain    #Diarrhea secondary to c.diff colitis  - C.Diff positive 06/07, still with diarrhea 3-4 episodes/day  - Vancomycin 125mg q6h for 10 days (End 06/19)    #) acute pancreatitis / alcohol abuse / substance abuse / hypokalemia - hypophosphatemia - hypomagnesemia     - supplement electrolytes and follow repeat levels    - diet as per  GI following  - Pain meds changed to dilaudid 0.5mg q4h PRN - will start tapering down to q6 today  - continue  ativan  prn     #) Acute Anemia    - Hgb 13 3 weeks ago   - transfused 1 unit prbc on 5/29  - No sign of active GI bleed and CT shows no retroperitoneal bleed  - continue to follow daily HGB    - maintain T/S   - sequential stockings for DVT prophylaxis secondary to anemia    #Folate deficiency  #Suspected Magnesium deficiency  #Thiamine deficiency  - Cont oral supplements    Full code

## 2020-06-09 NOTE — CHART NOTE - NSCHARTNOTEFT_GEN_A_CORE
Called to see patient for a mental health evaluation as patient was said to be some what disorganised earlier today.     On approach of the patient , she was observed to have her hands over her chest , complaining of cheat pain and swelling of her legs. Patient was observed to have pitting pedal edema. She reported that her chest pain and leg swelling  started earlier today but have both gradually become worse.   As per nurse taking care of patient, she had received a dose of Ativan about 1 and a half hour ago for anxiety but the chest rowena hasn't subsided. Nurse also reports that patient's vitals have shown consistent tachycardia and hypotension. The nurse had asked for an EKG which showed tachycardia     Vital Signs Last 24 Hrs  T(C): 37.4 (09 Jun 2020 13:49), Max: 37.6 (09 Jun 2020 05:22)  T(F): 99.4 (09 Jun 2020 13:49), Max: 99.6 (09 Jun 2020 05:22)  HR: 118 (09 Jun 2020 13:49) (112 - 129)  BP: 105/62 (09 Jun 2020 13:49) (100/52 - 105/62)  BP(mean): --  RR: 18 (09 Jun 2020 13:49) (18 - 18)  SpO2: 96% (09 Jun 2020 08:00) (96% - 96%)    Writer recommended calling the hospitalist on call to review the patient's EKG and give recommendations for her chest pain. Psychiatry consult will be sign off to the  Psychiatry team tomorrow morning as patient is in too much distress to be interviewed.

## 2020-06-09 NOTE — DISCHARGE NOTE PROVIDER - CARE PROVIDER_API CALL
Harvey Michel  Gastroenterology  [address] [place] [id number]  Phone: [phone]  Fax: [phone]  Follow Up Time: 2 weeks Julisa Moralez)  Gastroenterology; Internal Medicine  [address] [place] [address]  Phone: [phone]  Fax: [phone]  Follow Up Time: 2 weeks    Primary care physician,   Phone: (   )    -  Fax: (   )    -  Follow Up Time: 1 week

## 2020-06-09 NOTE — DISCHARGE NOTE PROVIDER - HOSPITAL COURSE
38 yo female, pmh of anxiety, depression, substance abuse, eoth abuse, presents to ED for "I'm in a lot of pain." States she has been having pain for several months now. Located in epigastrium, severe 10/10 aching, no radiation.        She was initially treated in the ICU due to pseudocysts and possible sepsis due to pancreatitis. Patient also had a drop in hemoglobin noted on admission which was concerning for hemorrhagic cysts. Patient was evaluated by GI and surgery throughout admission and no surgical interventions were warranted. Patient was treated with IVF hydration, pain medication, and IV antibiotics. Hospital course was complicated by acute c.diff diarrhea for which she was started on oral vancomycin. Patient is now medically stable for discharge. 40 yo female, pmh of anxiety, depression, substance abuse, eoth abuse, presents to ED for "I'm in a lot of pain." States she has been having pain for several months now. Located in epigastrium, severe 10/10 aching, no radiation.        She was initially treated in the ICU due to pseudocysts and possible sepsis due to pancreatitis. Patient also had a drop in hemoglobin noted on admission which was concerning for hemorrhagic cysts. Patient was evaluated by GI and surgery throughout admission and no surgical interventions were warranted. Patient was treated with IVF hydration, pain medication, and IV antibiotics. Hospital course was complicated by acute c.diff diarrhea for which she was started on oral vancomycin.        Patient was monitored. Patient had constant complaints of abdominal pain and to rule out further complications CT scan of abd and pelvis was performed. It was showing stable changes and mild improvement. Patient had opioid medication seeking behavior. Even on day of discharge patient initially looked uncomfortable and was asking for early pain medications. But during my interview patient looked more comfortable and without distress. I discussed with patient regarding opioid addictive potential. Considering pancreatitis with pseudocyst formation and possibility of chronic pain; will continue current regime.             Patient is now medically stable for discharge. 40 yo female, pmh of anxiety, depression, substance abuse, eoth abuse, presents to ED for "I'm in a lot of pain." States she has been having pain for several months now. Located in epigastrium, severe 10/10 aching, no radiation.        She was initially treated in the ICU due to pseudocysts and possible sepsis due to pancreatitis. Patient also had a drop in hemoglobin noted on admission which was concerning for hemorrhagic cysts. Patient was evaluated by GI and surgery throughout admission and no surgical interventions were warranted. Patient was treated with IVF hydration, pain medication, and IV antibiotics. Hospital course was complicated by acute c.diff diarrhea for which she was started on oral vancomycin.        Patient was monitored. Patient had constant complaints of abdominal pain and to rule out further complications CT scan of abd and pelvis was performed. It was showing stable changes and mild improvement. Patient had opioid medication seeking behavior. Today patient was adamant that she wants to be discharged. She has decision making capacity, She verbalized understanding of her medical problems and complications that can happen without proper follow up. Also understands the deliterious health effects with alcohol use. Patient wants to leave AMA if not discharged. Considering medical stability plan was made for discharge with guarded prognosis.

## 2020-06-09 NOTE — PROGRESS NOTE ADULT - ASSESSMENT
39 year old female with a history of acute alcoholic pancreatitis, alcohol abuse,  presents with abdominal pain, leukocytosis, acute pancreatitis with developing pseudocysts, thickened gallbladder wall, sludge in the gallbladder elevated LFTs, acute anemia without overt GI bleeding.    Problem 1-Recurrent pancreatitis with pseudocyst formation secondary to alcohol abuse  Persistent pain, no fever, tolerating solid food  MRCP: CBD Normal, Sludge+  CA/TGs are normal.  CT no retroperitoneal bleed or hemorraghic transformation in pancreas.  Pain control  IV hydration as tolerated, limited by edema  Rec  -low fat diet, patient tolerating    -Pain control as needed (although component of opioid seeking behaviour possible)  -Elevated LFTs are likely secondary to alcoholic hepatitis (>2:1 ratio between AST: ALT) but would not recommend prednisolone  (wDF<32)  -surgery consult appreciated (no intervention planned)  -alcohol abstinence  -Repeat LFTs and INR daily.    Problem 2- First known episode of C diff   Rec  On oral vancomycin 125 q6hrs (complete for 10 days)  KUB was normal for abdominal distension (likely secondary to ascites and bowel edema secondary to pancreatitis)    Problem 3-Alcoholic liver disease:   Rec  Complete alcohol abstinence   Please perform a CLD work up which includes HBsAg, HBsAb, IgM/IgG, Anti HEV, Serum Ferritin, Transferrin Saturation, Ceruloplasmin level, CHEYENNE, SMA, gamma globulin, AMA.    Follow up with our GI office located on [address] [place] [address], Phone number [phone] with Dr. Moralez 39 year old female with a history of acute alcoholic pancreatitis, alcohol abuse,  presents with abdominal pain, leukocytosis, acute pancreatitis with developing pseudocysts, thickened gallbladder wall, sludge in the gallbladder elevated LFTs, acute anemia without overt GI bleeding.    Problem 1-Recurrent pancreatitis with pseudocyst formation secondary to alcohol abuse  Persistent pain, no fever, tolerating solid food  MRCP: CBD Normal, Sludge+  CA/TGs are normal.  CT no retroperitoneal bleed or hemorraghic transformation in pancreas.  Pain control  IV hydration as tolerated, limited by edema  Rec  -low fat diet, patient tolerating    -Pain control as needed (although component of opioid seeking behaviour possible)  -Elevated LFTs are likely secondary to alcoholic hepatitis (>2:1 ratio between AST: ALT) but would not recommend prednisolone  (wDF<32)  -surgery consult appreciated (no intervention planned)  -alcohol abstinence  -Repeat LFTs and INR daily.    Problem 2- First known episode of C diff   Rec  -On oral vancomycin 125 q6hrs (complete for 10 days)  -KUB was normal for abdominal distension (likely secondary to ascites and bowel edema secondary to pancreatitis)    Problem 3-Alcoholic liver disease:   Rec  -Complete alcohol abstinence   -F/U CLD work up which includes HBsAg, HBsAb, IgM/IgG, Anti HEV, Serum Ferritin, Transferrin Saturation, Ceruloplasmin level, CHEYENNE, SMA, gamma globulin, AMA.    Follow up with our GI office located on [address] [place] [address], Phone number [phone] with Dr. Moralez 39 year old female with a history of acute alcoholic pancreatitis, alcohol abuse,  presents with abdominal pain, leukocytosis, acute pancreatitis with developing pseudocysts, thickened gallbladder wall, sludge in the gallbladder elevated LFTs, acute anemia without overt GI bleeding.    Problem 1-Recurrent pancreatitis with pseudocyst formation secondary to alcohol abuse  Persistent pain, no fever, tolerating solid food  MRCP: CBD Normal, Sludge+  CA/TGs are normal.  CT no retroperitoneal bleed or hemorraghic transformation in pancreas.  Pain control  IV hydration as tolerated, limited by edema  Rec  -low fat diet, patient tolerating    -Pain control as needed (although component of opioid seeking behavior possible)  -Elevated LFTs are likely secondary to alcoholic hepatitis (>2:1 ratio between AST: ALT) but would not recommend prednisolone  (wDF<32)  -surgery consult appreciated (no intervention planned)  -alcohol abstinence  -Repeat LFTs and INR daily.    Problem 2- First known episode of C diff   Rec  -On oral vancomycin 125 q6hrs (complete for 10 days)  -KUB was normal for abdominal distension (likely secondary to ascites and bowel edema secondary to pancreatitis)    Problem 3-Alcoholic liver disease:   Rec  -Complete alcohol abstinence   -F/U CLD work up which includes HBsAg, HBsAb, IgM/IgG, Anti HEV, Serum Ferritin, Transferrin Saturation, Ceruloplasmin level, CHEYENNE, SMA, gamma globulin, AMA.    Follow up with our GI office located on [address] [place] [address], Phone number [phone] with Dr. Moralez

## 2020-06-09 NOTE — PROGRESS NOTE ADULT - SUBJECTIVE AND OBJECTIVE BOX
39yFemale  Being followed for acute on chronic pancreatitis   Interval history: Patient denies nausea, vomiting, hematemesis, melena, blood in stool, diarrhea, constipation. +epigastric pain.      PAST MEDICAL & SURGICAL HISTORY:   ETOH abuse  Postpartum depression  Substance abuse  Anxiety  Depression  No significant past surgical history          Social History: No smoking. +2-3 pints of vodka alcohol daily. No illegal drug use.          MEDICATIONS  (STANDING):  chlorhexidine 4% Liquid 1 Application(s) Topical two times a day  enoxaparin Injectable 40 milliGRAM(s) SubCutaneous daily  folic acid 1 milliGRAM(s) Oral daily  multivitamin 1 Tablet(s) Oral daily  nicotine - 21 mG/24Hr(s) Patch 1 patch Transdermal daily  pantoprazole  Injectable 40 milliGRAM(s) IV Push daily  potassium phosphate / sodium phosphate powder 2 Packet(s) Oral three times a day  thiamine 100 milliGRAM(s) Oral daily  vancomycin    Solution 125 milliGRAM(s) Oral every 6 hours    MEDICATIONS  (PRN):  HYDROmorphone  Injectable 0.5 milliGRAM(s) IV Push every 4 hours PRN Severe Pain (7 - 10)  LORazepam     Tablet 2 milliGRAM(s) Oral every 4 hours PRN Agitation  ondansetron Injectable 4 milliGRAM(s) IV Push every 8 hours PRN Nausea and/or Vomiting  oxycodone    5 mG/acetaminophen 325 mG 1 Tablet(s) Oral every 6 hours PRN Moderate Pain (4 - 6)      Allergies:   No Known Allergies            REVIEW OF SYSTEMS:  General:  No weight loss, fevers, or chills.  Eyes:  No reported pain or visual changes  ENT:  No sore throat or runny nose.  NECK: No stiffness   CV:  No chest pain or palpitations.  Resp:  No shortness of breath, cough  GI:  +epigastric abdominal pain, No nausea, vomiting, dysphagia, +diarrhea No constipation. No rectal bleeding, melena, or hematemesis.  Muscle:  No aches or weakness  Neuro:  No tingling, numbness         VITAL SIGNS:   T(F): 99.4 (06-09-20 @ 13:49), Max: 99.6 (06-09-20 @ 05:22)  HR: 118 (06-09-20 @ 13:49) (112 - 129)  BP: 105/62 (06-09-20 @ 13:49) (100/52 - 105/62)  RR: 18 (06-09-20 @ 13:49) (18 - 18)  SpO2: 96% (06-09-20 @ 08:00) (96% - 96%)    PHYSICAL EXAM:  GENERAL: AAOx3, no acute distress.  HEAD:  Atraumatic, Normocephalic  EYES: conjunctiva and sclera clear  NECK: Supple, no JVD or thyromegaly  CHEST/LUNG: Clear to auscultation bilaterally; No wheeze, rhonchi, or rales  HEART: Regular rate and rhythm; normal S1, S2, No murmurs.  ABDOMEN: Soft, +epigastric tenderness, nondistended; Bowel sounds present, no abdominal bruit, masses, or hepatosplenomegaly  NEUROLOGY: No asterixis or tremor.   SKIN: Intact, no jaundice        LABS:                        7.8    11.28 )-----------( 290      ( 08 Jun 2020 05:36 )             25.1     06-08    138  |  105  |  <3<L>  ----------------------------<  77  4.1   |  26  |  <0.5<L>    Ca    7.9<L>      08 Jun 2020 05:36    TPro  4.8<L>  /  Alb  2.1<L>  /  TBili  3.6<H>  /  DBili  2.9<H>  /  AST  113<H>  /  ALT  23  /  AlkPhos  238<H>  06-09    LIVER FUNCTIONS - ( 09 Jun 2020 06:29 )  Alb: 2.1 g/dL / Pro: 4.8 g/dL / ALK PHOS: 238 U/L / ALT: 23 U/L / AST: 113 U/L / GGT: 351 U/L       PT/INR - ( 09 Jun 2020 06:29 )   PT: 19.50 sec;   INR: 1.70 ratio         PTT - ( 09 Jun 2020 06:29 )  PTT:37.7 sec    IMAGING:    < from: CT Angio Abdomen and Pelvis w/ IV Cont (05.29.20 @ 22:24) >  EXAM:  CT ANGIO ABD PELV (W)AW IC            PROCEDURE DATE:  05/29/2020            INTERPRETATION:  CLINICAL HISTORY / REASON FOR EXAM: Acute anemia; evaluation for hemorrhagic pancreatitis.    TECHNIQUE: Contiguous axial CT images were obtained from the lower chest to the pubic symphysis before and after administration of 95 mL Optiray 320 intravenous contrast, 5 mL discarded using a noncontrast, arterial and venous phase protocol. Oral contrast was not administered. Reformatted images in thecoronal and sagittal planes were acquired.   3-D/MIP postprocessing images were performed as well..    COMPARISON CT: CT abdomen and pelvis from May 28, 2020    OTHER STUDIES USED FOR CORRELATION: None.       FINDINGS:    LOWER CHEST: Bilateral pleural effusions with atelectasis. Previously seen 8 mm solid nodule in the right lung base is not well visualized on this examination.    HEPATOBILIARY: Markedly enlarged hypodense liver. Gallbladder sludge.    SPLEEN: Unchanged.    PANCREAS: Heterogeneous, ill-defined pancreas with peripancreatic fat stranding. Grossly unchanged collections within the pancreatic head, body and tail. Pancreatic head collection measures approximately 2 cm (series 9, image 159), pancreatic body collection measures approximately 1 cm (series 9, image 139), and pancreatic tail lesion measures approximately 1.2 cm (series 9, image 132). Additional collection adjacent to the lesser curvature of the stomach measuring approximately 1.3 cm (series 9, image 140). These collections appear to be forming a wall.    ADRENAL GLANDS: Unchanged.    KIDNEYS: Unchanged.    ABDOMINOPELVIC NODES: Unchanged.    PELVIC ORGANS: Unchanged.    PERITONEUM/MESENTERY/BOWEL: Interval mild increase of abdominopelvic ascites. No obstructionor intraperitoneal free air.    BONES/SOFT TISSUES: Interval increase of soft tissue edema.    VASCULAR: Unchanged.      IMPRESSION:    Since May 28, 2020;    No CTA evidence of acute intra-abdominal hemorrhage.    Essentially unchanged findings of pancreatitis with formation of multiple pockets of fluid in the pancreatic head, body and tail as described above.    Multiple findings likely related to the sequelae of pancreatitis including bilateral pleural effusions, abdominopelvic ascites and soft tissue edema.          JOSE ROBERTO ALTMAN M.D., RESIDENT RADIOLOGIST  This document has been electronically signed.  CELESTE HARDIN M.D., ATTENDING RADIOLOGIST  This document has been electronically signed. May 30 2020 12:10AM              < end of copied text >      < from: MR Abdomen No Cont (05.31.20 @ 11:05) >  EXAM:  MR ABDOMEN            PROCEDURE DATE:  05/31/2020            INTERPRETATION:  CLINICAL STATEMENT:  Acute pancreatitis..    TECHNIQUE: Axial in- and out-of-phase T1-weighted; axial fat-saturated T2-weighted; axial and coronal single-shot fastspin-echo T2-weighted; 3D high-resolution, heavily T2-weighted MRCP images were acquired.    COMPARISON: CT dated 5/29/2020    FINDINGS:    LIVER: Marked hepatomegaly with steatosis.    GALLBLADDER: Gallbladder sludge.    BILIARY TREE: No definite evidence of choledocholithiasis. No biliary ductal dilatation.    SPLEEN:  Unremarkable.    PANCREAS: Findings of ongoing pancreatitis with redemonstration of multiple collections involving the pancreatic parenchyma, not significantly changed from priorCT-the largest measuring 2.6 x 1.9 cm at the level of the pancreatic neck (series 3 image 27). Additional collection in the lesser sac measuring up to 1.3 cm, better seen on prior CT.    ADRENAL GLANDS:  Unremarkable.    KIDNEYS:  Unremarkable.    ABDOMINAL NODES:  No adenopathy.    BONES/SOFT TISSUES: Diffuse anasarca.    OTHER: Small volume ascites. Moderate bilateral pleural effusions.    IMPRESSION:    1.  Findings of ongoing pancreatitis with pancreatic and peripancreatic fluid collections, not significantly changed from prior CT.  2.  Small volume ascites.  3.  Moderate bilateral pleural effusions.  4.  Marked hepatomegaly steatosis.                  LAWRENCE THRASHER M.D., ATTENDING RADIOLOGIST  This document has been electronically signed. Jun 1 2020 10:49AM              < end of copied text > 39yFemale  Being followed for acute on chronic pancreatitis   Interval history: Patient denies nausea, vomiting, hematemesis, melena, blood in stool, diarrhea, constipation. +epigastric pain.      PAST MEDICAL & SURGICAL HISTORY:   ETOH abuse  Postpartum depression  Substance abuse  Anxiety  Depression  No significant past surgical history          Social History: No smoking. +2-3 pints of vodka alcohol daily. No illegal drug use.          MEDICATIONS  (STANDING):  chlorhexidine 4% Liquid 1 Application(s) Topical two times a day  enoxaparin Injectable 40 milliGRAM(s) SubCutaneous daily  folic acid 1 milliGRAM(s) Oral daily  multivitamin 1 Tablet(s) Oral daily  nicotine - 21 mG/24Hr(s) Patch 1 patch Transdermal daily  pantoprazole  Injectable 40 milliGRAM(s) IV Push daily  potassium phosphate / sodium phosphate powder 2 Packet(s) Oral three times a day  thiamine 100 milliGRAM(s) Oral daily  vancomycin    Solution 125 milliGRAM(s) Oral every 6 hours    MEDICATIONS  (PRN):  HYDROmorphone  Injectable 0.5 milliGRAM(s) IV Push every 4 hours PRN Severe Pain (7 - 10)  LORazepam     Tablet 2 milliGRAM(s) Oral every 4 hours PRN Agitation  ondansetron Injectable 4 milliGRAM(s) IV Push every 8 hours PRN Nausea and/or Vomiting  oxycodone    5 mG/acetaminophen 325 mG 1 Tablet(s) Oral every 6 hours PRN Moderate Pain (4 - 6)      Allergies:   No Known Allergies        REVIEW OF SYSTEMS:  General:  No weight loss, fevers, or chills.  Eyes:  No reported pain or visual changes  ENT:  No sore throat or runny nose.  NECK: No stiffness   CV:  No chest pain or palpitations.  Resp:  No shortness of breath, cough  GI:  +epigastric abdominal pain, No nausea, vomiting, dysphagia, +diarrhea No constipation. No rectal bleeding, melena, or hematemesis.  Muscle:  No aches or weakness  Neuro:  No tingling, numbness         VITAL SIGNS:   T(F): 99.4 (06-09-20 @ 13:49), Max: 99.6 (06-09-20 @ 05:22)  HR: 118 (06-09-20 @ 13:49) (112 - 129)  BP: 105/62 (06-09-20 @ 13:49) (100/52 - 105/62)  RR: 18 (06-09-20 @ 13:49) (18 - 18)  SpO2: 96% (06-09-20 @ 08:00) (96% - 96%)    PHYSICAL EXAM:  GENERAL: AAOx3, no acute distress.  HEAD:  Atraumatic, Normocephalic  EYES: conjunctiva and sclera clear  NECK: Supple, no JVD or thyromegaly  CHEST/LUNG: Clear to auscultation bilaterally; No wheeze, rhonchi, or rales  HEART: Regular rate and rhythm; normal S1, S2, No murmurs.  ABDOMEN: Soft, +epigastric tenderness, nondistended; Bowel sounds present, no abdominal bruit, masses, or hepatosplenomegaly  NEUROLOGY: No asterixis or tremor.   SKIN: Intact, no jaundice        LABS:                        7.8    11.28 )-----------( 290      ( 08 Jun 2020 05:36 )             25.1     06-08    138  |  105  |  <3<L>  ----------------------------<  77  4.1   |  26  |  <0.5<L>    Ca    7.9<L>      08 Jun 2020 05:36    TPro  4.8<L>  /  Alb  2.1<L>  /  TBili  3.6<H>  /  DBili  2.9<H>  /  AST  113<H>  /  ALT  23  /  AlkPhos  238<H>  06-09    LIVER FUNCTIONS - ( 09 Jun 2020 06:29 )  Alb: 2.1 g/dL / Pro: 4.8 g/dL / ALK PHOS: 238 U/L / ALT: 23 U/L / AST: 113 U/L / GGT: 351 U/L       PT/INR - ( 09 Jun 2020 06:29 )   PT: 19.50 sec;   INR: 1.70 ratio         PTT - ( 09 Jun 2020 06:29 )  PTT:37.7 sec    IMAGING:    < from: CT Angio Abdomen and Pelvis w/ IV Cont (05.29.20 @ 22:24) >  EXAM:  CT ANGIO ABD PELV (W)AW IC            PROCEDURE DATE:  05/29/2020            INTERPRETATION:  CLINICAL HISTORY / REASON FOR EXAM: Acute anemia; evaluation for hemorrhagic pancreatitis.    TECHNIQUE: Contiguous axial CT images were obtained from the lower chest to the pubic symphysis before and after administration of 95 mL Optiray 320 intravenous contrast, 5 mL discarded using a noncontrast, arterial and venous phase protocol. Oral contrast was not administered. Reformatted images in thecoronal and sagittal planes were acquired.   3-D/MIP postprocessing images were performed as well..    COMPARISON CT: CT abdomen and pelvis from May 28, 2020    OTHER STUDIES USED FOR CORRELATION: None.       FINDINGS:    LOWER CHEST: Bilateral pleural effusions with atelectasis. Previously seen 8 mm solid nodule in the right lung base is not well visualized on this examination.    HEPATOBILIARY: Markedly enlarged hypodense liver. Gallbladder sludge.    SPLEEN: Unchanged.    PANCREAS: Heterogeneous, ill-defined pancreas with peripancreatic fat stranding. Grossly unchanged collections within the pancreatic head, body and tail. Pancreatic head collection measures approximately 2 cm (series 9, image 159), pancreatic body collection measures approximately 1 cm (series 9, image 139), and pancreatic tail lesion measures approximately 1.2 cm (series 9, image 132). Additional collection adjacent to the lesser curvature of the stomach measuring approximately 1.3 cm (series 9, image 140). These collections appear to be forming a wall.    ADRENAL GLANDS: Unchanged.    KIDNEYS: Unchanged.    ABDOMINOPELVIC NODES: Unchanged.    PELVIC ORGANS: Unchanged.    PERITONEUM/MESENTERY/BOWEL: Interval mild increase of abdominopelvic ascites. No obstructionor intraperitoneal free air.    BONES/SOFT TISSUES: Interval increase of soft tissue edema.    VASCULAR: Unchanged.      IMPRESSION:    Since May 28, 2020;    No CTA evidence of acute intra-abdominal hemorrhage.    Essentially unchanged findings of pancreatitis with formation of multiple pockets of fluid in the pancreatic head, body and tail as described above.    Multiple findings likely related to the sequelae of pancreatitis including bilateral pleural effusions, abdominopelvic ascites and soft tissue edema.          JOSE ROBERTO ALTMAN M.D., RESIDENT RADIOLOGIST  This document has been electronically signed.  CELESTE HARDIN M.D., ATTENDING RADIOLOGIST  This document has been electronically signed. May 30 2020 12:10AM              < end of copied text >      < from: MR Abdomen No Cont (05.31.20 @ 11:05) >  EXAM:  MR ABDOMEN            PROCEDURE DATE:  05/31/2020            INTERPRETATION:  CLINICAL STATEMENT:  Acute pancreatitis..    TECHNIQUE: Axial in- and out-of-phase T1-weighted; axial fat-saturated T2-weighted; axial and coronal single-shot fastspin-echo T2-weighted; 3D high-resolution, heavily T2-weighted MRCP images were acquired.    COMPARISON: CT dated 5/29/2020    FINDINGS:    LIVER: Marked hepatomegaly with steatosis.    GALLBLADDER: Gallbladder sludge.    BILIARY TREE: No definite evidence of choledocholithiasis. No biliary ductal dilatation.    SPLEEN:  Unremarkable.    PANCREAS: Findings of ongoing pancreatitis with redemonstration of multiple collections involving the pancreatic parenchyma, not significantly changed from priorCT-the largest measuring 2.6 x 1.9 cm at the level of the pancreatic neck (series 3 image 27). Additional collection in the lesser sac measuring up to 1.3 cm, better seen on prior CT.    ADRENAL GLANDS:  Unremarkable.    KIDNEYS:  Unremarkable.    ABDOMINAL NODES:  No adenopathy.    BONES/SOFT TISSUES: Diffuse anasarca.    OTHER: Small volume ascites. Moderate bilateral pleural effusions.    IMPRESSION:    1.  Findings of ongoing pancreatitis with pancreatic and peripancreatic fluid collections, not significantly changed from prior CT.  2.  Small volume ascites.  3.  Moderate bilateral pleural effusions.  4.  Marked hepatomegaly steatosis.                  LAWRENCE THRASHER M.D., ATTENDING RADIOLOGIST  This document has been electronically signed. Jun 1 2020 10:49AM              < end of copied text >

## 2020-06-09 NOTE — PROGRESS NOTE ADULT - SUBJECTIVE AND OBJECTIVE BOX
SEBLEADRIAN  39y, Female  Allergy: No Known Allergies    Hospital Day: 12d    Patient seen and examined earlier today. No acute events overnight.    PMH/PSH:  PAST MEDICAL & SURGICAL HISTORY:  ETOH abuse  Postpartum depression  Substance abuse  Anxiety  Depression  No significant past surgical history    VITALS:  T(F): 99.6 (06-09-20 @ 05:22), Max: 99.6 (06-09-20 @ 05:22)  HR: 112 (06-09-20 @ 08:00)  BP: 101/51 (06-09-20 @ 05:22) (89/52 - 101/51)  RR: 18 (06-09-20 @ 05:22)  SpO2: 96% (06-09-20 @ 08:00)    TESTS & MEASUREMENTS:  Weight (Kg):     06-08-20 @ 07:01  -  06-09-20 @ 07:00  --------------------------------------------------------  IN: 480 mL / OUT: 0 mL / NET: 480 mL                            7.8    11.28 )-----------( 290      ( 08 Jun 2020 05:36 )             25.1     06-08    138  |  105  |  <3<L>  ----------------------------<  77  4.1   |  26  |  <0.5<L>    Ca    7.9<L>      08 Jun 2020 05:36    TPro  4.6<L>  /  Alb  2.1<L>  /  TBili  3.2<H>  /  DBili  x   /  AST  113<H>  /  ALT  22  /  AlkPhos  232<H>  06-08    LIVER FUNCTIONS - ( 08 Jun 2020 05:36 )  Alb: 2.1 g/dL / Pro: 4.6 g/dL / ALK PHOS: 232 U/L / ALT: 22 U/L / AST: 113 U/L / GGT: x           RECENT DIAGNOSTIC ORDERS:  IR Procedure: Routine  Transport: Stretcher-Crib  Provider's Contact #: [phone] (06-08-20 @ 12:13)  Diet, Soft:   Fiber/Residue Restricted  Low Fat (LOWFAT)  No Lactose  Prosource Gelatein Plus     Qty per Day:  2 (06-08-20 @ 13:36)  Hepatic Function Panel: AM Sched. Collection: 09-Jun-2020 04:30 (06-08-20 @ 14:55)  Prothrombin Time and INR, Plasma:  Start Date:09-Jun-2020. AM Sched. Collection:09-Jun-2020 04:30 (06-08-20 @ 14:55)  Activated Partial Thromboplastin Time:  Start Date:09-Jun-2020. AM Sched. Collection:09-Jun-2020 04:30 (06-08-20 @ 14:55)  Hepatitis B Surface Antigen: AM Sched. Collection: 09-Jun-2020 04:30 (06-08-20 @ 14:55)  Hepatitis B Surface Antibody: AM Sched. Collection: 09-Jun-2020 04:30 (06-08-20 @ 14:55)  Hepatitis B Core IgM Antibody: AM Sched. Collection: 09-Jun-2020 04:30 (06-08-20 @ 14:55)  Ferritin, Serum: AM Sched. Collection: 09-Jun-2020 04:30 (06-08-20 @ 14:55)  Transferrin, Serum: AM Sched. Collection: 09-Jun-2020 04:30 (06-08-20 @ 14:55)  Ceruloplasmin, Serum: AM Sched. Collection: 09-Jun-2020 04:30 (06-08-20 @ 14:55)  Anti-Nuclear Antibody: AM Sched. Collection: 09-Jun-2020 04:30 (06-08-20 @ 14:55)  Mitochondrial Antibody: AM Sched. Collection: 09-Jun-2020 04:30 (06-08-20 @ 14:55)  Smooth Muscle Antibody: AM Sched. Collection: 09-Jun-2020 04:30 (06-08-20 @ 14:55)  Hepatitis E Ab IgM: AM Sched. Collection: 09-Jun-2020 04:30 (06-08-20 @ 14:55)  Hepatitis E IgG Antibodies: AM Sched. Collection: 09-Jun-2020 04:30 (06-08-20 @ 14:55)  Gamma Glutamyl Transferase, Serum: AM Sched. Collection: 09-Jun-2020 04:30 (06-08-20 @ 14:59)    MEDICATIONS:  MEDICATIONS  (STANDING):  chlorhexidine 4% Liquid 1 Application(s) Topical two times a day  enoxaparin Injectable 40 milliGRAM(s) SubCutaneous daily  folic acid 1 milliGRAM(s) Oral daily  multivitamin 1 Tablet(s) Oral daily  nicotine - 21 mG/24Hr(s) Patch 1 patch Transdermal daily  pantoprazole  Injectable 40 milliGRAM(s) IV Push daily  potassium phosphate / sodium phosphate powder 2 Packet(s) Oral three times a day  thiamine 100 milliGRAM(s) Oral daily  vancomycin    Solution 125 milliGRAM(s) Oral every 6 hours    MEDICATIONS  (PRN):  HYDROmorphone  Injectable 0.5 milliGRAM(s) IV Push every 4 hours PRN Severe Pain (7 - 10)  LORazepam     Tablet 2 milliGRAM(s) Oral every 4 hours PRN Agitation  ondansetron Injectable 4 milliGRAM(s) IV Push every 8 hours PRN Nausea and/or Vomiting  oxycodone    5 mG/acetaminophen 325 mG 1 Tablet(s) Oral every 6 hours PRN Moderate Pain (4 - 6)      HOME MEDICATIONS:  polyethylene glycol 3350 oral powder for reconstitution (05-02)  senna oral tablet (05-02)      REVIEW OF SYSTEMS:  All other review of systems is negative unless indicated above.     PHYSICAL EXAM:  GENERAL: NAD  HEENT: No Swelling  CHEST/LUNG: Good air entry, No wheezing  HEART: RRR, No murmurs  ABDOMEN: Tender to palpation  EXTREMITIES:  No clubbing

## 2020-06-09 NOTE — DISCHARGE NOTE PROVIDER - CARE PROVIDERS DIRECT ADDRESSES
,DirectAddress_Unknown ,[email or web address],DirectAddress_Unknown

## 2020-06-09 NOTE — DISCHARGE NOTE PROVIDER - PROVIDER TOKENS
PROVIDER:[TOKEN:[[id number]],FOLLOWUP:[2 weeks]] PROVIDER:[TOKEN:[[id number]],FOLLOWUP:[2 weeks]],FREE:[LAST:[Primary care physician],PHONE:[(   )    -],FAX:[(   )    -],FOLLOWUP:[1 week]]

## 2020-06-09 NOTE — PHYSICAL THERAPY INITIAL EVALUATION ADULT - DISCHARGE DISPOSITION, PT EVAL
Pt able to perform bed mobility and transfers indpendently and ambulate supervision level (for safety) without AD. Pt ambulating with 1:1 PCA on unit. Spoke with Dr. Gore and discussed no need for skilled PT intervention in the acute care setting at this time. Please reconsult if pt status changes.

## 2020-06-10 LAB
ALBUMIN SERPL ELPH-MCNC: 2.1 G/DL — LOW (ref 3.5–5.2)
ALP SERPL-CCNC: 257 U/L — HIGH (ref 30–115)
ALT FLD-CCNC: 22 U/L — SIGNIFICANT CHANGE UP (ref 0–41)
ANA TITR SER: NEGATIVE — SIGNIFICANT CHANGE UP
ANION GAP SERPL CALC-SCNC: 9 MMOL/L — SIGNIFICANT CHANGE UP (ref 7–14)
AST SERPL-CCNC: 100 U/L — HIGH (ref 0–41)
BILIRUB SERPL-MCNC: 3.5 MG/DL — HIGH (ref 0.2–1.2)
BUN SERPL-MCNC: <3 MG/DL — LOW (ref 10–20)
CALCIUM SERPL-MCNC: 7.7 MG/DL — LOW (ref 8.5–10.1)
CHLORIDE SERPL-SCNC: 103 MMOL/L — SIGNIFICANT CHANGE UP (ref 98–110)
CO2 SERPL-SCNC: 25 MMOL/L — SIGNIFICANT CHANGE UP (ref 17–32)
CREAT SERPL-MCNC: <0.5 MG/DL — LOW (ref 0.7–1.5)
FERRITIN SERPL-MCNC: 492 NG/ML — HIGH (ref 15–150)
GLUCOSE SERPL-MCNC: 94 MG/DL — SIGNIFICANT CHANGE UP (ref 70–99)
HCT VFR BLD CALC: 27.3 % — LOW (ref 37–47)
HGB BLD-MCNC: 8.4 G/DL — LOW (ref 12–16)
MCHC RBC-ENTMCNC: 30.8 G/DL — LOW (ref 32–37)
MCHC RBC-ENTMCNC: 31.7 PG — HIGH (ref 27–31)
MCV RBC AUTO: 103 FL — HIGH (ref 81–99)
MITOCHONDRIA AB SER-ACNC: SIGNIFICANT CHANGE UP
NRBC # BLD: 0 /100 WBCS — SIGNIFICANT CHANGE UP (ref 0–0)
PLATELET # BLD AUTO: 274 K/UL — SIGNIFICANT CHANGE UP (ref 130–400)
POTASSIUM SERPL-MCNC: 4 MMOL/L — SIGNIFICANT CHANGE UP (ref 3.5–5)
POTASSIUM SERPL-SCNC: 4 MMOL/L — SIGNIFICANT CHANGE UP (ref 3.5–5)
PROT SERPL-MCNC: 5.1 G/DL — LOW (ref 6–8)
RBC # BLD: 2.65 M/UL — LOW (ref 4.2–5.4)
RBC # FLD: 20.3 % — HIGH (ref 11.5–14.5)
SMOOTH MUSCLE AB SER-ACNC: SIGNIFICANT CHANGE UP
SODIUM SERPL-SCNC: 137 MMOL/L — SIGNIFICANT CHANGE UP (ref 135–146)
WBC # BLD: 12.3 K/UL — HIGH (ref 4.8–10.8)
WBC # FLD AUTO: 12.3 K/UL — HIGH (ref 4.8–10.8)

## 2020-06-10 PROCEDURE — 74177 CT ABD & PELVIS W/CONTRAST: CPT | Mod: 26

## 2020-06-10 PROCEDURE — 99233 SBSQ HOSP IP/OBS HIGH 50: CPT

## 2020-06-10 RX ORDER — HYDROMORPHONE HYDROCHLORIDE 2 MG/ML
0.5 INJECTION INTRAMUSCULAR; INTRAVENOUS; SUBCUTANEOUS ONCE
Refills: 0 | Status: DISCONTINUED | OUTPATIENT
Start: 2020-06-10 | End: 2020-06-10

## 2020-06-10 RX ORDER — OXYCODONE HYDROCHLORIDE 5 MG/1
5 TABLET ORAL EVERY 4 HOURS
Refills: 0 | Status: DISCONTINUED | OUTPATIENT
Start: 2020-06-10 | End: 2020-06-12

## 2020-06-10 RX ADMIN — Medication 1 TABLET(S): at 11:23

## 2020-06-10 RX ADMIN — Medication 125 MILLIGRAM(S): at 23:14

## 2020-06-10 RX ADMIN — Medication 1 PATCH: at 07:21

## 2020-06-10 RX ADMIN — Medication 1 MILLIGRAM(S): at 11:22

## 2020-06-10 RX ADMIN — Medication 125 MILLIGRAM(S): at 17:24

## 2020-06-10 RX ADMIN — Medication 2 PACKET(S): at 21:02

## 2020-06-10 RX ADMIN — Medication 125 MILLIGRAM(S): at 11:22

## 2020-06-10 RX ADMIN — OXYCODONE AND ACETAMINOPHEN 1 TABLET(S): 5; 325 TABLET ORAL at 00:09

## 2020-06-10 RX ADMIN — ENOXAPARIN SODIUM 40 MILLIGRAM(S): 100 INJECTION SUBCUTANEOUS at 11:23

## 2020-06-10 RX ADMIN — Medication 100 MILLIGRAM(S): at 11:22

## 2020-06-10 RX ADMIN — OXYCODONE HYDROCHLORIDE 5 MILLIGRAM(S): 5 TABLET ORAL at 18:38

## 2020-06-10 RX ADMIN — Medication 2 PACKET(S): at 14:09

## 2020-06-10 RX ADMIN — Medication 1 PATCH: at 11:22

## 2020-06-10 RX ADMIN — Medication 1 PATCH: at 19:28

## 2020-06-10 RX ADMIN — OXYCODONE HYDROCHLORIDE 5 MILLIGRAM(S): 5 TABLET ORAL at 23:14

## 2020-06-10 RX ADMIN — Medication 2 PACKET(S): at 05:15

## 2020-06-10 RX ADMIN — OXYCODONE HYDROCHLORIDE 5 MILLIGRAM(S): 5 TABLET ORAL at 14:07

## 2020-06-10 RX ADMIN — HYDROMORPHONE HYDROCHLORIDE 0.5 MILLIGRAM(S): 2 INJECTION INTRAMUSCULAR; INTRAVENOUS; SUBCUTANEOUS at 05:14

## 2020-06-10 RX ADMIN — HYDROMORPHONE HYDROCHLORIDE 0.5 MILLIGRAM(S): 2 INJECTION INTRAMUSCULAR; INTRAVENOUS; SUBCUTANEOUS at 01:23

## 2020-06-10 RX ADMIN — Medication 1 PATCH: at 11:29

## 2020-06-10 RX ADMIN — PANTOPRAZOLE SODIUM 40 MILLIGRAM(S): 20 TABLET, DELAYED RELEASE ORAL at 11:22

## 2020-06-10 RX ADMIN — Medication 125 MILLIGRAM(S): at 05:15

## 2020-06-10 RX ADMIN — OXYCODONE AND ACETAMINOPHEN 1 TABLET(S): 5; 325 TABLET ORAL at 07:17

## 2020-06-10 NOTE — PROGRESS NOTE ADULT - SUBJECTIVE AND OBJECTIVE BOX
39yFemale  Being followed for   Interval history:      PAST MEDICAL & SURGICAL HISTORY: PAST MEDICAL & SURGICAL HISTORY:  ETOH abuse  Postpartum depression  Substance abuse  Anxiety  Depression  No significant past surgical history          Social History: No smoking. No alcohol. No illegal drug use.          MEDICATIONS:  MEDICATIONS  (STANDING):  chlorhexidine 4% Liquid 1 Application(s) Topical two times a day  enoxaparin Injectable 40 milliGRAM(s) SubCutaneous daily  folic acid 1 milliGRAM(s) Oral daily  multivitamin 1 Tablet(s) Oral daily  nicotine - 21 mG/24Hr(s) Patch 1 patch Transdermal daily  pantoprazole  Injectable 40 milliGRAM(s) IV Push daily  potassium phosphate / sodium phosphate powder 2 Packet(s) Oral three times a day  thiamine 100 milliGRAM(s) Oral daily  vancomycin    Solution 125 milliGRAM(s) Oral every 6 hours    MEDICATIONS  (PRN):  ondansetron Injectable 4 milliGRAM(s) IV Push every 8 hours PRN Nausea and/or Vomiting  oxyCODONE    IR 5 milliGRAM(s) Oral every 4 hours PRN Moderate Pain (4 - 6)      Allergies:      REVIEW OF SYSTEMS:  General:  No weight loss, fevers, or chills.  Eyes:  No reported pain or visual changes  ENT:  No sore throat or runny nose.  NECK: No stiffness   CV:  No chest pain or palpitations.  Resp:  No shortness of breath, cough  GI:  No abdominal pain, nausea, vomiting, dysphagia, diarrhea or constipation. No rectal bleeding, melena, or hematemesis.  Muscle:  No aches or weakness  Neuro:  No tingling, numbness   Heme:  No ecchymosis or easy bruisability        VITAL SIGNS:   T(F): 97.8 (06-10-20 @ 06:00), Max: 97.9 (06-09-20 @ 21:04)  HR: 124 (06-10-20 @ 06:00) (124 - 133)  BP: 102/52 (06-10-20 @ 06:00) (102/52 - 124/58)  RR: 18 (06-10-20 @ 06:00) (18 - 18)  SpO2: --    PHYSICAL EXAM:  GENERAL: AAOx3, no acute distress.  HEAD:  Atraumatic, Normocephalic  EYES: conjunctiva and sclera clear  NECK: Supple, no JVD or thyromegaly  CHEST/LUNG: Clear to auscultation bilaterally; No wheeze, rhonchi, or rales  HEART: Regular rate and rhythm; normal S1, S2, No murmurs.  ABDOMEN: Soft, nontender, nondistended; Bowel sounds present, no abdominal bruit, masses, or hepatosplenomegaly  EXTREMITIES:  2+ Peripheral Pulses. No clubbing, cyanosis, or edema, warm  NEUROLOGY: No asterixis or tremor.   SKIN: Intact, no jaundice            LABS:                        8.4    12.30 )-----------( 274      ( 10 Aaron 2020 06:28 )             27.3     06-10    137  |  103  |  <3<L>  ----------------------------<  94  4.0   |  25  |  <0.5<L>    Ca    7.7<L>      10 Aaron 2020 06:28    TPro  5.1<L>  /  Alb  2.1<L>  /  TBili  3.5<H>  /  DBili  x   /  AST  100<H>  /  ALT  22  /  AlkPhos  257<H>  06-10    LIVER FUNCTIONS - ( 10 Aaron 2020 06:28 )  Alb: 2.1 g/dL / Pro: 5.1 g/dL / ALK PHOS: 257 U/L / ALT: 22 U/L / AST: 100 U/L / GGT: x           PT/INR - ( 09 Jun 2020 06:29 )   PT: 19.50 sec;   INR: 1.70 ratio         PTT - ( 09 Jun 2020 06:29 )  PTT:37.7 sec    IMAGING: 39yFemale  Being followed for acute complicated pancreatitis   Interval history: Patient denies nausea, vomiting, hematemesis, melena, blood in stool, diarrhea, constipation. Patient with epigastric pain 8/10 and abdominal distention. Patient reports she is still having severe abdominal pain. Patient reports she only had one bowel movement, which was soft today.      PAST MEDICAL & SURGICAL HISTORY: PAST MEDICAL & SURGICAL HISTORY:  ETOH abuse  Postpartum depression  Substance abuse  Anxiety  Depression  No significant past surgical history          Social History: No smoking. 2-3 pints of vodka alcohol. No illegal drug use.            MEDICATIONS  (STANDING):  chlorhexidine 4% Liquid 1 Application(s) Topical two times a day  enoxaparin Injectable 40 milliGRAM(s) SubCutaneous daily  folic acid 1 milliGRAM(s) Oral daily  multivitamin 1 Tablet(s) Oral daily  nicotine - 21 mG/24Hr(s) Patch 1 patch Transdermal daily  pantoprazole  Injectable 40 milliGRAM(s) IV Push daily  potassium phosphate / sodium phosphate powder 2 Packet(s) Oral three times a day  thiamine 100 milliGRAM(s) Oral daily  vancomycin    Solution 125 milliGRAM(s) Oral every 6 hours    MEDICATIONS  (PRN):  ondansetron Injectable 4 milliGRAM(s) IV Push every 8 hours PRN Nausea and/or Vomiting  oxyCODONE    IR 5 milliGRAM(s) Oral every 4 hours PRN Moderate Pain (4 - 6)      Allergies:   No Known Allergies              REVIEW OF SYSTEMS:  General:  No weight loss, fevers, or chills.  Eyes:  No reported pain or visual changes  ENT:  No sore throat or runny nose.  NECK: No stiffness   CV:  No chest pain or palpitations.  Resp:  No shortness of breath, cough  GI:  +epigastric abdominal pain, No nausea, vomiting, dysphagia, diarrhea or constipation. No rectal bleeding, melena, or hematemesis.  Muscle:  No aches or weakness  Neuro:  No tingling, numbness           VITAL SIGNS:   T(F): 97.8 (06-10-20 @ 06:00), Max: 97.9 (06-09-20 @ 21:04)  HR: 124 (06-10-20 @ 06:00) (124 - 133)  BP: 102/52 (06-10-20 @ 06:00) (102/52 - 124/58)  RR: 18 (06-10-20 @ 06:00) (18 - 18)  SpO2: --    PHYSICAL EXAM:  GENERAL: AAOx3, no acute distress.  HEAD:  Atraumatic, Normocephalic  EYES: conjunctiva and sclera clear  NECK: Supple, no JVD or thyromegaly  CHEST/LUNG: Clear to auscultation bilaterally; No wheeze, rhonchi, or rales  HEART: Regular rate and rhythm; normal S1, S2, No murmurs.  ABDOMEN: Soft, +epigastric tenderness, +distended; Bowel sounds present, no abdominal bruit, masses, or hepatosplenomegaly  NEUROLOGY: No asterixis or tremor.   SKIN: Intact, no jaundice            LABS:                        8.4    12.30 )-----------( 274      ( 10 Aaron 2020 06:28 )             27.3     06-10    137  |  103  |  <3<L>  ----------------------------<  94  4.0   |  25  |  <0.5<L>    Ca    7.7<L>      10 Aaron 2020 06:28    TPro  5.1<L>  /  Alb  2.1<L>  /  TBili  3.5<H>  /  DBili  x   /  AST  100<H>  /  ALT  22  /  AlkPhos  257<H>  06-10    LIVER FUNCTIONS - ( 10 Aaron 2020 06:28 )  Alb: 2.1 g/dL / Pro: 5.1 g/dL / ALK PHOS: 257 U/L / ALT: 22 U/L / AST: 100 U/L / GGT: x           PT/INR - ( 09 Jun 2020 06:29 )   PT: 19.50 sec;   INR: 1.70 ratio         PTT - ( 09 Jun 2020 06:29 )  PTT:37.7 sec    IMAGING:    < from: CT Angio Abdomen and Pelvis w/ IV Cont (05.29.20 @ 22:24) >  EXAM:  CT ANGIO ABD PELV (W)AW IC            PROCEDURE DATE:  05/29/2020            INTERPRETATION:  CLINICAL HISTORY / REASON FOR EXAM: Acute anemia; evaluation for hemorrhagic pancreatitis.    TECHNIQUE: Contiguous axial CT images were obtained from the lower chest to the pubic symphysis before and after administration of 95 mL Optiray 320 intravenous contrast, 5 mL discarded using a noncontrast, arterial and venous phase protocol. Oral contrast was not administered. Reformatted images in thecoronal and sagittal planes were acquired.   3-D/MIP postprocessing images were performed as well..    COMPARISON CT: CT abdomen and pelvis from May 28, 2020    OTHER STUDIES USED FOR CORRELATION: None.       FINDINGS:    LOWER CHEST: Bilateral pleural effusions with atelectasis. Previously seen 8 mm solid nodule in the right lung base is not well visualized on this examination.    HEPATOBILIARY: Markedly enlarged hypodense liver. Gallbladder sludge.    SPLEEN: Unchanged.    PANCREAS: Heterogeneous, ill-defined pancreas with peripancreatic fat stranding. Grossly unchanged collections within the pancreatic head, body and tail. Pancreatic head collection measures approximately 2 cm (series 9, image 159), pancreatic body collection measures approximately 1 cm (series 9, image 139), and pancreatic tail lesion measures approximately 1.2 cm (series 9, image 132). Additional collection adjacent to the lesser curvature of the stomach measuring approximately 1.3 cm (series 9, image 140). These collections appear to be forming a wall.    ADRENAL GLANDS: Unchanged.    KIDNEYS: Unchanged.    ABDOMINOPELVIC NODES: Unchanged.    PELVIC ORGANS: Unchanged.    PERITONEUM/MESENTERY/BOWEL: Interval mild increase of abdominopelvic ascites. No obstructionor intraperitoneal free air.    BONES/SOFT TISSUES: Interval increase of soft tissue edema.    VASCULAR: Unchanged.      IMPRESSION:    Since May 28, 2020;    No CTA evidence of acute intra-abdominal hemorrhage.    Essentially unchanged findings of pancreatitis with formation of multiple pockets of fluid in the pancreatic head, body and tail as described above.    Multiple findings likely related to the sequelae of pancreatitis including bilateral pleural effusions, abdominopelvic ascites and soft tissue edema.                JOSE ROBERTO ALTMAN M.D., RESIDENT RADIOLOGIST  This document has been electronically signed.  CELESTE HARDIN M.D., ATTENDING RADIOLOGIST  This document has been electronically signed. May 30 2020 12:10AM              < end of copied text >    < from: MR Abdomen No Cont (05.31.20 @ 11:05) >  EXAM:  MR ABDOMEN            PROCEDURE DATE:  05/31/2020            INTERPRETATION:  CLINICAL STATEMENT:  Acute pancreatitis..    TECHNIQUE: Axial in- and out-of-phase T1-weighted; axial fat-saturated T2-weighted; axial and coronal single-shot fastspin-echo T2-weighted; 3D high-resolution, heavily T2-weighted MRCP images were acquired.    COMPARISON: CT dated 5/29/2020    FINDINGS:    LIVER: Marked hepatomegaly with steatosis.    GALLBLADDER: Gallbladder sludge.    BILIARY TREE: No definite evidence of choledocholithiasis. No biliary ductal dilatation.    SPLEEN:  Unremarkable.    PANCREAS: Findings of ongoing pancreatitis with redemonstration of multiple collections involving the pancreatic parenchyma, not significantly changed from priorCT-the largest measuring 2.6 x 1.9 cm at the level of the pancreatic neck (series 3 image 27). Additional collection in the lesser sac measuring up to 1.3 cm, better seen on prior CT.    ADRENAL GLANDS:  Unremarkable.    KIDNEYS:  Unremarkable.    ABDOMINAL NODES:  No adenopathy.    BONES/SOFT TISSUES: Diffuse anasarca.    OTHER: Small volume ascites. Moderate bilateral pleural effusions.    IMPRESSION:    1.  Findings of ongoing pancreatitis with pancreatic and peripancreatic fluid collections, not significantly changed from prior CT.  2.  Small volume ascites.  3.  Moderate bilateral pleural effusions.  4.  Marked hepatomegaly steatosis.                  LAWRENCE THRASHER M.D., ATTENDING RADIOLOGIST  This document has been electronically signed. Jun 1 2020 10:49AM        < end of copied text > 39yFemale  Being followed for acute complicated pancreatitis   Interval history: Patient denies nausea, vomiting, hematemesis, melena, blood in stool, diarrhea, constipation. Patient with epigastric pain 8/10 and abdominal distention. Patient reports she is still having severe abdominal pain. Patient reports she only had one bowel movement, which was soft today.      PAST MEDICAL & SURGICAL HISTORY: PAST MEDICAL & SURGICAL HISTORY:  ETOH abuse  Postpartum depression  Substance abuse  Anxiety  Depression  No significant past surgical history      Social History: No smoking. 2-3 pints of vodka alcohol. No illegal drug use.    MEDICATIONS  (STANDING):  chlorhexidine 4% Liquid 1 Application(s) Topical two times a day  enoxaparin Injectable 40 milliGRAM(s) SubCutaneous daily  folic acid 1 milliGRAM(s) Oral daily  multivitamin 1 Tablet(s) Oral daily  nicotine - 21 mG/24Hr(s) Patch 1 patch Transdermal daily  pantoprazole  Injectable 40 milliGRAM(s) IV Push daily  potassium phosphate / sodium phosphate powder 2 Packet(s) Oral three times a day  thiamine 100 milliGRAM(s) Oral daily  vancomycin    Solution 125 milliGRAM(s) Oral every 6 hours    MEDICATIONS  (PRN):  ondansetron Injectable 4 milliGRAM(s) IV Push every 8 hours PRN Nausea and/or Vomiting  oxyCODONE    IR 5 milliGRAM(s) Oral every 4 hours PRN Moderate Pain (4 - 6)      Allergies:   No Known Allergies    REVIEW OF SYSTEMS:  General:  No weight loss, fevers, or chills.  Eyes:  No reported pain or visual changes  ENT:  No sore throat or runny nose.  NECK: No stiffness   CV:  No chest pain or palpitations.  Resp:  No shortness of breath, cough  GI:  +epigastric abdominal pain, No nausea, vomiting, dysphagia, diarrhea or constipation. No rectal bleeding, melena, or hematemesis.  Muscle:  No aches or weakness  Neuro:  No tingling, numbness           VITAL SIGNS:   T(F): 97.8 (06-10-20 @ 06:00), Max: 97.9 (06-09-20 @ 21:04)  HR: 124 (06-10-20 @ 06:00) (124 - 133)  BP: 102/52 (06-10-20 @ 06:00) (102/52 - 124/58)  RR: 18 (06-10-20 @ 06:00) (18 - 18)  SpO2: --    PHYSICAL EXAM:  GENERAL: AAOx3, no acute distress.  HEAD:  Atraumatic, Normocephalic  EYES: conjunctiva and sclera clear  NECK: Supple, no JVD or thyromegaly  CHEST/LUNG: Clear to auscultation bilaterally; No wheeze, rhonchi, or rales  HEART: Regular rate and rhythm; normal S1, S2, No murmurs.  ABDOMEN: Soft, +epigastric tenderness, +distended; Bowel sounds present, no abdominal bruit, masses, or hepatosplenomegaly  NEUROLOGY: No asterixis or tremor.   SKIN: Intact, no jaundice      LABS:                        8.4    12.30 )-----------( 274      ( 10 Aaron 2020 06:28 )             27.3     06-10    137  |  103  |  <3<L>  ----------------------------<  94  4.0   |  25  |  <0.5<L>    Ca    7.7<L>      10 Aaron 2020 06:28    TPro  5.1<L>  /  Alb  2.1<L>  /  TBili  3.5<H>  /  DBili  x   /  AST  100<H>  /  ALT  22  /  AlkPhos  257<H>  06-10    LIVER FUNCTIONS - ( 10 Aaron 2020 06:28 )  Alb: 2.1 g/dL / Pro: 5.1 g/dL / ALK PHOS: 257 U/L / ALT: 22 U/L / AST: 100 U/L / GGT: x           PT/INR - ( 09 Jun 2020 06:29 )   PT: 19.50 sec;   INR: 1.70 ratio         PTT - ( 09 Jun 2020 06:29 )  PTT:37.7 sec    IMAGING:    < from: CT Angio Abdomen and Pelvis w/ IV Cont (05.29.20 @ 22:24) >  EXAM:  CT ANGIO ABD PELV (W)AW IC            PROCEDURE DATE:  05/29/2020            INTERPRETATION:  CLINICAL HISTORY / REASON FOR EXAM: Acute anemia; evaluation for hemorrhagic pancreatitis.    TECHNIQUE: Contiguous axial CT images were obtained from the lower chest to the pubic symphysis before and after administration of 95 mL Optiray 320 intravenous contrast, 5 mL discarded using a noncontrast, arterial and venous phase protocol. Oral contrast was not administered. Reformatted images in thecoronal and sagittal planes were acquired.   3-D/MIP postprocessing images were performed as well..    COMPARISON CT: CT abdomen and pelvis from May 28, 2020    OTHER STUDIES USED FOR CORRELATION: None.       FINDINGS:    LOWER CHEST: Bilateral pleural effusions with atelectasis. Previously seen 8 mm solid nodule in the right lung base is not well visualized on this examination.    HEPATOBILIARY: Markedly enlarged hypodense liver. Gallbladder sludge.    SPLEEN: Unchanged.    PANCREAS: Heterogeneous, ill-defined pancreas with peripancreatic fat stranding. Grossly unchanged collections within the pancreatic head, body and tail. Pancreatic head collection measures approximately 2 cm (series 9, image 159), pancreatic body collection measures approximately 1 cm (series 9, image 139), and pancreatic tail lesion measures approximately 1.2 cm (series 9, image 132). Additional collection adjacent to the lesser curvature of the stomach measuring approximately 1.3 cm (series 9, image 140). These collections appear to be forming a wall.    ADRENAL GLANDS: Unchanged.    KIDNEYS: Unchanged.    ABDOMINOPELVIC NODES: Unchanged.    PELVIC ORGANS: Unchanged.    PERITONEUM/MESENTERY/BOWEL: Interval mild increase of abdominopelvic ascites. No obstructionor intraperitoneal free air.    BONES/SOFT TISSUES: Interval increase of soft tissue edema.    VASCULAR: Unchanged.      IMPRESSION:    Since May 28, 2020;    No CTA evidence of acute intra-abdominal hemorrhage.    Essentially unchanged findings of pancreatitis with formation of multiple pockets of fluid in the pancreatic head, body and tail as described above.    Multiple findings likely related to the sequelae of pancreatitis including bilateral pleural effusions, abdominopelvic ascites and soft tissue edema.        JOSE ROBERTO ALTMAN M.D., RESIDENT RADIOLOGIST  This document has been electronically signed.  CELESTE HARDIN M.D., ATTENDING RADIOLOGIST  This document has been electronically signed. May 30 2020 12:10AM              < end of copied text >    < from: MR Abdomen No Cont (05.31.20 @ 11:05) >  EXAM:  MR ABDOMEN            PROCEDURE DATE:  05/31/2020            INTERPRETATION:  CLINICAL STATEMENT:  Acute pancreatitis..    TECHNIQUE: Axial in- and out-of-phase T1-weighted; axial fat-saturated T2-weighted; axial and coronal single-shot fastspin-echo T2-weighted; 3D high-resolution, heavily T2-weighted MRCP images were acquired.    COMPARISON: CT dated 5/29/2020    FINDINGS:    LIVER: Marked hepatomegaly with steatosis.    GALLBLADDER: Gallbladder sludge.    BILIARY TREE: No definite evidence of choledocholithiasis. No biliary ductal dilatation.    SPLEEN:  Unremarkable.    PANCREAS: Findings of ongoing pancreatitis with redemonstration of multiple collections involving the pancreatic parenchyma, not significantly changed from priorCT-the largest measuring 2.6 x 1.9 cm at the level of the pancreatic neck (series 3 image 27). Additional collection in the lesser sac measuring up to 1.3 cm, better seen on prior CT.    ADRENAL GLANDS:  Unremarkable.    KIDNEYS:  Unremarkable.    ABDOMINAL NODES:  No adenopathy.    BONES/SOFT TISSUES: Diffuse anasarca.    OTHER: Small volume ascites. Moderate bilateral pleural effusions.    IMPRESSION:    1.  Findings of ongoing pancreatitis with pancreatic and peripancreatic fluid collections, not significantly changed from prior CT.  2.  Small volume ascites.  3.  Moderate bilateral pleural effusions.  4.  Marked hepatomegaly steatosis.                  LAWRENCE THRASHER M.D., ATTENDING RADIOLOGIST  This document has been electronically signed. Jun 1 2020 10:49AM        < end of copied text >

## 2020-06-10 NOTE — CHART NOTE - NSCHARTNOTEFT_GEN_A_CORE
Pt picked at a very small scab on her face, just fwd on the right zygomatic arch.  A small 1cm piece of quick clot gauze was placed and covered with tegaderm and then pressure was applied for several minutes.  Bleeding appeared to be under control at this point.  Instructed pt not to touch or pick at the tegaderm.

## 2020-06-10 NOTE — PROGRESS NOTE ADULT - ASSESSMENT
39 year old female with a history of acute alcoholic pancreatitis, alcohol abuse,  presents with abdominal pain, leukocytosis, acute pancreatitis with developing pseudocysts, thickened gallbladder wall, sludge in the gallbladder elevated LFTs, acute anemia without overt GI bleeding.    Problem 1-Recurrent pancreatitis with pseudocyst formation secondary to alcohol abuse  Persistent pain, no fever, tolerating solid food  MRCP: CBD Normal, Sludge+  CA/TGs are normal.  CT no retroperitoneal bleed or hemorraghic transformation in pancreas.  Pain control  IV hydration as tolerated, limited by edema  Rec  -low fat diet, patient tolerating    -Pain control as needed (although component of opioid seeking behaviour possible)  -Elevated LFTs are likely secondary to alcoholic hepatitis (>2:1 ratio between AST: ALT) but would not recommend prednisolone,  MDS>32 now however would still hold off ETOH hepatitis and component of DILI likely contributing to LFTs as ALT is now normal  -surgery consult appreciated (no intervention planned)  -alcohol abstinence  -Repeat LFTs and INR daily.    Problem 2- First known episode of C diff   Rec  -On oral vancomycin 125 q6hrs (complete for 10 days)  -KUB was normal for abdominal distension (likely secondary to ascites and bowel edema secondary to pancreatitis)    Problem 3-Alcoholic liver disease: altered liver chemistries more cholestatic   Rec  -Complete alcohol abstinence   -F/U CLD work up     Follow up with our GI office located on [address] [place] [address], Phone number [phone] with Dr. Moralez 39 year old female with a history of acute alcoholic pancreatitis, alcohol abuse,  presents with abdominal pain, leukocytosis, acute pancreatitis with developing pseudocysts, thickened gallbladder wall, sludge in the gallbladder elevated LFTs, acute anemia without overt GI bleeding.    Problem 1-Recurrent pancreatitis with pseudocyst formation secondary to alcohol abuse  Persistent pain, no fever, tolerating solid food  MRCP: CBD Normal, Sludge+  CA/TGs are normal.  CT no retroperitoneal bleed or hemorraghic transformation in pancreas.  Pain control  Rec  -Repeat CT scan abdomen pelvis IV contrast r/o necrosis and worsening pancreatitis picture   -low fat diet, patient tolerating    -Pain control as needed (although component of opioid seeking behaviour possible)  -Elevated LFTs are likely secondary to alcoholic hepatitis (>2:1 ratio between AST: ALT) but would not recommend prednisolone,  MDS>32 now however would still hold off ETOH hepatitis and component of DILI likely contributing to LFTs as ALT is now normal  -surgery consult appreciated (no intervention planned)  -alcohol abstinence, if pancreatitis recurs patient may need lap cholecystectomy as biliary pancreatitis may be contributing causative source for pancreatitis given sludge on gallbladder  -Repeat LFTs and INR daily.    Problem 2- First known episode of C diff   Rec  -On oral vancomycin 125 q6hrs (complete for 10 days)  -KUB was normal for abdominal distension (likely secondary to ascites and bowel edema secondary to pancreatitis)    Problem 3-Alcoholic liver disease: altered liver chemistries more cholestatic combination of ETOH hepatitis and DILI, T-Bili will lag behind  Rec  -Complete alcohol abstinence   -F/U CLD work up   -Follow up with our GI office located on [address] [place] [address], Phone number [phone] with Dr. Moralez 39 year old female with a history of acute alcoholic pancreatitis, alcohol abuse,  presents with abdominal pain, leukocytosis, acute pancreatitis with developing pseudocysts, thickened gallbladder wall, sludge in the gallbladder elevated LFTs, acute anemia without overt GI bleeding.    Problem 1-Recurrent pancreatitis with pseudocyst formation secondary to alcohol abuse  Persistent pain, no fever, tolerating solid food  MRCP: CBD Normal, GB Sludge+  CA/TGs are normal.  CT no retroperitoneal bleed or hemorraghic transformation in pancreas.  Pain control  Rec  -Repeat CT scan abdomen pelvis IV contrast r/o necrosis and worsening pancreatitis picture   -low fat diet, patient tolerating    -Pain control as needed (although component of opioid seeking behaviour possible)  -Elevated LFTs are likely secondary to alcoholic hepatitis (>2:1 ratio between AST: ALT) but would not recommend prednisolone,  MDS>32 now however would still hold off ETOH hepatitis and component of DILI likely contributing to LFTs as ALT is now normal  -surgery consult appreciated (no intervention planned)  -alcohol abstinence, if pancreatitis recurs patient may need lap cholecystectomy as biliary pancreatitis may be contributing causative source for pancreatitis given sludge on gallbladder  -Repeat LFTs and INR daily.    Problem 2- First known episode of C diff   Rec  -On oral vancomycin 125 q6hrs (complete for 10 days)  -KUB was normal for abdominal distension (likely secondary to ascites and bowel edema secondary to pancreatitis)    Problem 3-Alcoholic liver disease: altered liver chemistries more cholestatic combination of ETOH hepatitis and DILI, T-Bili will lag behind  Rec  -Complete alcohol abstinence   -F/U CLD work up   -Follow up with our GI office located on [address] [place] [address], Phone number [phone] with Dr. Moralez 39 year old female with a history of acute alcoholic pancreatitis, alcohol abuse,  presents with abdominal pain, leukocytosis, acute pancreatitis with developing pseudocysts, thickened gallbladder wall, sludge in the gallbladder elevated LFTs, acute anemia without overt GI bleeding.    Problem 1-Recurrent pancreatitis with pseudocyst formation secondary to alcohol abuse  Persistent pain, no fever, tolerating solid food  MRCP: CBD Normal, GB Sludge+  CA/TGs are normal.  CT no retroperitoneal bleed or hemorraghic transformation in pancreas.  Pain control  Rec  -Repeat CT scan abdomen pelvis IV contrast r/o necrosis and worsening pancreatitis picture   -low fat diet, patient tolerating    -Pain control as needed (although component of opioid seeking behavior possible)  -Elevated LFTs are likely secondary to alcoholic hepatitis (>2:1 ratio between AST: ALT) but would not recommend prednisolone,  MDS>32 now however would still hold off ETOH hepatitis and component of DILI likely contributing to LFTs as ALT is now normal  -surgery consult appreciated (no intervention planned)  -alcohol abstinence, if pancreatitis recurs patient may need lap cholecystectomy as biliary pancreatitis may be contributing causative source for pancreatitis given sludge on gallbladder  -Repeat LFTs and INR daily.    Problem 2- First known episode of C diff   Rec  -On oral vancomycin 125 q6hrs (complete for 10 days)  -KUB was normal for abdominal distension (likely secondary to ascites and bowel edema secondary to pancreatitis)    Problem 3-Alcoholic liver disease: altered liver chemistries more cholestatic combination of ETOH hepatitis and DILI, T-Bili will lag behind  Rec  -Complete alcohol abstinence   -F/U CLD work up   -Follow up with our GI office located on [address] [place] [address], Phone number [phone] with Dr. Moralez

## 2020-06-10 NOTE — PROGRESS NOTE ADULT - SUBJECTIVE AND OBJECTIVE BOX
HPI  Patient is a 39y old Female who presents with a chief complaint of severe abdominal pain (09 Jun 2020 15:04)    Currently admitted to medicine with the primary diagnosis of Pancreatitis     Today is hospital day 13d.     INTERVAL HPI / OVERNIGHT EVENTS:  Patient was examined and seen at bedside.   States she is feeling horrible. States she have lot of pain in the epigastric and abdomen. Asking about pain medication.  no chest pain or SOB  no fever  no dysuria  had only one bowel movement today    ROS: Otherwise unremarkable     PAST MEDICAL & SURGICAL HISTORY  ETOH abuse  Postpartum depression  Substance abuse  Anxiety  Depression  No significant past surgical history    ALLERGIES  No Known Allergies    MEDICATIONS  STANDING MEDICATIONS  chlorhexidine 4% Liquid 1 Application(s) Topical two times a day  enoxaparin Injectable 40 milliGRAM(s) SubCutaneous daily  folic acid 1 milliGRAM(s) Oral daily  multivitamin 1 Tablet(s) Oral daily  nicotine - 21 mG/24Hr(s) Patch 1 patch Transdermal daily  pantoprazole  Injectable 40 milliGRAM(s) IV Push daily  potassium phosphate / sodium phosphate powder 2 Packet(s) Oral three times a day  thiamine 100 milliGRAM(s) Oral daily  vancomycin    Solution 125 milliGRAM(s) Oral every 6 hours    PRN MEDICATIONS  ondansetron Injectable 4 milliGRAM(s) IV Push every 8 hours PRN  oxyCODONE    IR 5 milliGRAM(s) Oral every 4 hours PRN    VITALS:  T(F): 97.8  HR: 124  BP: 102/52  RR: 18  SpO2: --    PHYSICAL EXAM  GEN: NAD, resting in bed; patient is drowsy and drifts off to sleep during conversation.   PULM: Clear to auscultation bilaterally; no wheezing  CVS: Regular rate and rhythm, tachycardia present,  S1-S2, no murmurs  ABD: Soft, non-tender, non-distended, no guarding  EXT: No edema  NEURO: A&Ox3, no focal deficits    LABS                        8.4    12.30 )-----------( 274      ( 10 Aaron 2020 06:28 )             27.3     06-10    137  |  103  |  <3<L>  ----------------------------<  94  4.0   |  25  |  <0.5<L>    Ca    7.7<L>      10 Aaron 2020 06:28    TPro  5.1<L>  /  Alb  2.1<L>  /  TBili  3.5<H>  /  DBili  x   /  AST  100<H>  /  ALT  22  /  AlkPhos  257<H>  06-10    PT/INR - ( 09 Jun 2020 06:29 )   PT: 19.50 sec;   INR: 1.70 ratio         PTT - ( 09 Jun 2020 06:29 )  PTT:37.7 sec              RADIOLOGY HPI  Patient is a 39y old Female who presents with a chief complaint of severe abdominal pain (09 Jun 2020 15:04)    Currently admitted to medicine with the primary diagnosis of Pancreatitis     Today is hospital day 13d.     INTERVAL HPI / OVERNIGHT EVENTS:  Patient was examined and seen at bedside.   States she is feeling horrible. States she have lot of pain in the epigastric and abdomen. Asking about pain medication.  no chest pain or SOB  no fever  no dysuria  had only one bowel movement today    ROS: Otherwise unremarkable     PAST MEDICAL & SURGICAL HISTORY  ETOH abuse  Postpartum depression  Substance abuse  Anxiety  Depression  No significant past surgical history    ALLERGIES  No Known Allergies    MEDICATIONS  STANDING MEDICATIONS  chlorhexidine 4% Liquid 1 Application(s) Topical two times a day  enoxaparin Injectable 40 milliGRAM(s) SubCutaneous daily  folic acid 1 milliGRAM(s) Oral daily  multivitamin 1 Tablet(s) Oral daily  nicotine - 21 mG/24Hr(s) Patch 1 patch Transdermal daily  pantoprazole  Injectable 40 milliGRAM(s) IV Push daily  potassium phosphate / sodium phosphate powder 2 Packet(s) Oral three times a day  thiamine 100 milliGRAM(s) Oral daily  vancomycin    Solution 125 milliGRAM(s) Oral every 6 hours    PRN MEDICATIONS  ondansetron Injectable 4 milliGRAM(s) IV Push every 8 hours PRN  oxyCODONE    IR 5 milliGRAM(s) Oral every 4 hours PRN    VITALS:  T(F): 97.8  HR: 124  BP: 102/52  RR: 18  SpO2: --    PHYSICAL EXAM  GEN: NAD, resting in bed; patient is drowsy and drifts off to sleep during conversation.   PULM: Clear to auscultation bilaterally; no wheezing  CVS: Regular rate and rhythm, tachycardia present,  S1-S2, no murmurs  ABD: Soft, distended, tenderness in epigastric region. no guarding or rigidity  EXT: 2+ edema  NEURO: A&Ox3, no focal deficits    LABS                        8.4    12.30 )-----------( 274      ( 10 Aaron 2020 06:28 )             27.3     06-10    137  |  103  |  <3<L>  ----------------------------<  94  4.0   |  25  |  <0.5<L>    Ca    7.7<L>      10 Aaron 2020 06:28    TPro  5.1<L>  /  Alb  2.1<L>  /  TBili  3.5<H>  /  DBili  x   /  AST  100<H>  /  ALT  22  /  AlkPhos  257<H>  06-10    PT/INR - ( 09 Jun 2020 06:29 )   PT: 19.50 sec;   INR: 1.70 ratio         PTT - ( 09 Jun 2020 06:29 )  PTT:37.7 sec              RADIOLOGY

## 2020-06-10 NOTE — PROGRESS NOTE ADULT - ASSESSMENT
40 yo female, pmh of anxiety, depression, substance abuse, eoth abuse, presents to ED for epigastric pain    #Diarrhea secondary to c.diff colitis- improving  - C.Diff positive 06/07, diarrhea improving  - Vancomycin 125mg q6h for 10 days (End 06/19)    # acute complicated pancreatitis with multiple pseudocysts secondary to alcohol abuse  # acute on chronic abdominal pain secondary to above  # alcoholic hepatitis   Appreciate Gi input  complete alcohol abstinence discussed with patient  Discussed regarding chronicity of problems and chances of exacerbation and even death. Patient verbalized understanding of clinical condition  Hepatitis panel is negative  Patient init MDF score on presentation was 23 and hence not a candidate for glucocorticoid therapy  Patient with drowsiness possibly medication related; discussed with patient. Discontinue IV pain medication. Continue oxycodone 5 mg PRN; changed frequency to q4 hours. Hold if episode of drowsiness.    # Acute Anemia  - Hgb 13 3 weeks ago   - transfused 1 unit prbc on 5/29  - No sign of active GI bleed and CT shows no retroperitoneal bleed  - continue to follow daily HGB ; currently stable   - sequential stockings for DVT prophylaxis secondary to anemia    # multiple electrolyte abnormality-hypokalemia - hypophosphatemia - hypomagnesemia; improving  continue replacements; monitor    # history of substance abuse     #Folate deficiency  #Suspected Magnesium deficiency  #Thiamine deficiency  - Cont oral supplements    Full code 40 yo female, pmh of anxiety, depression, substance abuse, eoth abuse, presents to ED for epigastric pain    #Diarrhea secondary to c.diff colitis- improving  - C.Diff positive 06/07, diarrhea improving  - Vancomycin 125mg q6h for 10 days (End 06/19)    # acute complicated pancreatitis with multiple pseudocysts secondary to alcohol abuse  # acute on chronic abdominal pain secondary to above  # alcoholic hepatitis   Appreciate Gi input  complete alcohol abstinence discussed with patient  Discussed regarding chronicity of problems and chances of exacerbation and even death. Patient verbalized understanding of clinical condition  Hepatitis panel is negative  Patient initial MDF score on presentation was 23; currently patient have higher MDF score ; Discussed with Gi team and considering acitive infection she is  not a candidate for glucocorticoid therapy.  Patient with drowsiness possibly medication related; discussed with patient. Discontinue IV pain medication. Continue oxycodone 5 mg PRN; changed frequency to q4 hours. Hold if episode of drowsiness.    # Acute Anemia  - Hgb 13 3 weeks ago   - transfused 1 unit prbc on 5/29  - No sign of active GI bleed and CT shows no retroperitoneal bleed  - continue to follow daily HGB ; currently stable   - sequential stockings for DVT prophylaxis secondary to anemia    # multiple electrolyte abnormality-hypokalemia - hypophosphatemia - hypomagnesemia; improving  continue replacements; monitor    # history of substance abuse     #Folate deficiency  #Suspected Magnesium deficiency  #Thiamine deficiency  - Cont oral supplements    Full code

## 2020-06-11 DIAGNOSIS — F41.9 ANXIETY DISORDER, UNSPECIFIED: ICD-10-CM

## 2020-06-11 DIAGNOSIS — F19.10 OTHER PSYCHOACTIVE SUBSTANCE ABUSE, UNCOMPLICATED: ICD-10-CM

## 2020-06-11 LAB
ALBUMIN SERPL ELPH-MCNC: 2.1 G/DL — LOW (ref 3.5–5.2)
ALP SERPL-CCNC: 247 U/L — HIGH (ref 30–115)
ALT FLD-CCNC: 21 U/L — SIGNIFICANT CHANGE UP (ref 0–41)
AMMONIA BLD-MCNC: 47 UMOL/L — SIGNIFICANT CHANGE UP (ref 11–55)
ANION GAP SERPL CALC-SCNC: 8 MMOL/L — SIGNIFICANT CHANGE UP (ref 7–14)
APTT BLD: 37.7 SEC — SIGNIFICANT CHANGE UP (ref 27–39.2)
AST SERPL-CCNC: 98 U/L — HIGH (ref 0–41)
BASOPHILS # BLD AUTO: 0.11 K/UL — SIGNIFICANT CHANGE UP (ref 0–0.2)
BASOPHILS NFR BLD AUTO: 0.9 % — SIGNIFICANT CHANGE UP (ref 0–1)
BILIRUB SERPL-MCNC: 3.3 MG/DL — HIGH (ref 0.2–1.2)
BUN SERPL-MCNC: <3 MG/DL — LOW (ref 10–20)
CALCIUM SERPL-MCNC: 7.7 MG/DL — LOW (ref 8.5–10.1)
CHLORIDE SERPL-SCNC: 106 MMOL/L — SIGNIFICANT CHANGE UP (ref 98–110)
CO2 SERPL-SCNC: 26 MMOL/L — SIGNIFICANT CHANGE UP (ref 17–32)
CREAT SERPL-MCNC: <0.5 MG/DL — LOW (ref 0.7–1.5)
EOSINOPHIL # BLD AUTO: 0.32 K/UL — SIGNIFICANT CHANGE UP (ref 0–0.7)
EOSINOPHIL NFR BLD AUTO: 2.6 % — SIGNIFICANT CHANGE UP (ref 0–8)
GLUCOSE SERPL-MCNC: 96 MG/DL — SIGNIFICANT CHANGE UP (ref 70–99)
HCT VFR BLD CALC: 27.5 % — LOW (ref 37–47)
HGB BLD-MCNC: 8.4 G/DL — LOW (ref 12–16)
IMM GRANULOCYTES NFR BLD AUTO: 0.6 % — HIGH (ref 0.1–0.3)
INR BLD: 1.62 RATIO — HIGH (ref 0.65–1.3)
LYMPHOCYTES # BLD AUTO: 1.27 K/UL — SIGNIFICANT CHANGE UP (ref 1.2–3.4)
LYMPHOCYTES # BLD AUTO: 10.4 % — LOW (ref 20.5–51.1)
MAGNESIUM SERPL-MCNC: 1.7 MG/DL — LOW (ref 1.8–2.4)
MCHC RBC-ENTMCNC: 30.5 G/DL — LOW (ref 32–37)
MCHC RBC-ENTMCNC: 31.9 PG — HIGH (ref 27–31)
MCV RBC AUTO: 104.6 FL — HIGH (ref 81–99)
MONOCYTES # BLD AUTO: 0.92 K/UL — HIGH (ref 0.1–0.6)
MONOCYTES NFR BLD AUTO: 7.6 % — SIGNIFICANT CHANGE UP (ref 1.7–9.3)
NEUTROPHILS # BLD AUTO: 9.47 K/UL — HIGH (ref 1.4–6.5)
NEUTROPHILS NFR BLD AUTO: 77.9 % — HIGH (ref 42.2–75.2)
NRBC # BLD: 0 /100 WBCS — SIGNIFICANT CHANGE UP (ref 0–0)
PHOSPHATE SERPL-MCNC: 2.9 MG/DL — SIGNIFICANT CHANGE UP (ref 2.1–4.9)
PLATELET # BLD AUTO: 311 K/UL — SIGNIFICANT CHANGE UP (ref 130–400)
POTASSIUM SERPL-MCNC: 4.1 MMOL/L — SIGNIFICANT CHANGE UP (ref 3.5–5)
POTASSIUM SERPL-SCNC: 4.1 MMOL/L — SIGNIFICANT CHANGE UP (ref 3.5–5)
PROT SERPL-MCNC: 5 G/DL — LOW (ref 6–8)
PROTHROM AB SERPL-ACNC: 18.6 SEC — HIGH (ref 9.95–12.87)
RBC # BLD: 2.63 M/UL — LOW (ref 4.2–5.4)
RBC # FLD: 19.8 % — HIGH (ref 11.5–14.5)
SODIUM SERPL-SCNC: 140 MMOL/L — SIGNIFICANT CHANGE UP (ref 135–146)
WBC # BLD: 12.16 K/UL — HIGH (ref 4.8–10.8)
WBC # FLD AUTO: 12.16 K/UL — HIGH (ref 4.8–10.8)

## 2020-06-11 PROCEDURE — 99233 SBSQ HOSP IP/OBS HIGH 50: CPT

## 2020-06-11 PROCEDURE — 99232 SBSQ HOSP IP/OBS MODERATE 35: CPT

## 2020-06-11 RX ORDER — IBUPROFEN 200 MG
400 TABLET ORAL ONCE
Refills: 0 | Status: COMPLETED | OUTPATIENT
Start: 2020-06-11 | End: 2020-06-11

## 2020-06-11 RX ORDER — FUROSEMIDE 40 MG
20 TABLET ORAL DAILY
Refills: 0 | Status: DISCONTINUED | OUTPATIENT
Start: 2020-06-11 | End: 2020-06-12

## 2020-06-11 RX ADMIN — Medication 1 MILLIGRAM(S): at 12:08

## 2020-06-11 RX ADMIN — Medication 2 PACKET(S): at 13:18

## 2020-06-11 RX ADMIN — OXYCODONE HYDROCHLORIDE 5 MILLIGRAM(S): 5 TABLET ORAL at 15:39

## 2020-06-11 RX ADMIN — OXYCODONE HYDROCHLORIDE 5 MILLIGRAM(S): 5 TABLET ORAL at 11:35

## 2020-06-11 RX ADMIN — Medication 125 MILLIGRAM(S): at 06:19

## 2020-06-11 RX ADMIN — OXYCODONE HYDROCHLORIDE 5 MILLIGRAM(S): 5 TABLET ORAL at 23:55

## 2020-06-11 RX ADMIN — Medication 125 MILLIGRAM(S): at 23:55

## 2020-06-11 RX ADMIN — Medication 2 PACKET(S): at 06:19

## 2020-06-11 RX ADMIN — CHLORHEXIDINE GLUCONATE 1 APPLICATION(S): 213 SOLUTION TOPICAL at 06:17

## 2020-06-11 RX ADMIN — Medication 20 MILLIGRAM(S): at 12:08

## 2020-06-11 RX ADMIN — Medication 125 MILLIGRAM(S): at 17:53

## 2020-06-11 RX ADMIN — Medication 1 PATCH: at 08:34

## 2020-06-11 RX ADMIN — OXYCODONE HYDROCHLORIDE 5 MILLIGRAM(S): 5 TABLET ORAL at 07:33

## 2020-06-11 RX ADMIN — Medication 2 PACKET(S): at 21:07

## 2020-06-11 RX ADMIN — Medication 400 MILLIGRAM(S): at 21:56

## 2020-06-11 RX ADMIN — Medication 100 MILLIGRAM(S): at 12:08

## 2020-06-11 RX ADMIN — Medication 1 PATCH: at 12:14

## 2020-06-11 RX ADMIN — PANTOPRAZOLE SODIUM 40 MILLIGRAM(S): 20 TABLET, DELAYED RELEASE ORAL at 12:10

## 2020-06-11 RX ADMIN — Medication 1 TABLET(S): at 12:09

## 2020-06-11 RX ADMIN — OXYCODONE HYDROCHLORIDE 5 MILLIGRAM(S): 5 TABLET ORAL at 03:31

## 2020-06-11 RX ADMIN — ENOXAPARIN SODIUM 40 MILLIGRAM(S): 100 INJECTION SUBCUTANEOUS at 12:09

## 2020-06-11 RX ADMIN — Medication 125 MILLIGRAM(S): at 12:09

## 2020-06-11 RX ADMIN — Medication 1 PATCH: at 19:30

## 2020-06-11 RX ADMIN — OXYCODONE HYDROCHLORIDE 5 MILLIGRAM(S): 5 TABLET ORAL at 19:52

## 2020-06-11 NOTE — CONSULT NOTE ADULT - CONSULT REASON
Pancreatitis
"Labile mood, anxiety"
Acute pancreatitis
Polysubstance Use Disorder
acute pancreatitis
opioid/etoh

## 2020-06-11 NOTE — CONSULT NOTE ADULT - CONSULT REQUESTED DATE/TIME
29-May-2020 05:25
04-Jun-2020 15:02
07-Jun-2020 07:00
11-Jun-2020 20:46
29-May-2020 07:33
29-May-2020 13:03

## 2020-06-11 NOTE — CHART NOTE - NSCHARTNOTEFT_GEN_A_CORE
Registered Dietitian Follow-Up     Patient Profile Reviewed                           Yes [x]   No []     Nutrition History Previously Obtained        Yes [x]  No [] Minimal nutrition hx obtained at previous RD assessment. Pt was unable to provide more details at this time. Previous nutrition hx: "pt reports that she eats well pta with a good appetite. No chewing/swallowing difficulties. NKFA/intolerances. No food preferences r/t culture/Catholic. No vitamins/supplements pta. Unsure about recent changes in wt, pt also does not state her UBW. Pt is covered in blankets up to her neck and does not allow for NFPE to be performed."     Pertinent Medical Interventions: Pt noted with recurrent pancreatitis with pseudocyst formation secondary to alcohol abuse. Ongoing lifestyle modification counseling. Noted episode of C diff - noted improved per latest progress notes. Ascites from fluid overload status noted.     Diet order: Soft, fiber/residue restricted, low fat, no lactose + Prosource Gelatein Plus q12hrs.     Anthropometrics:  - Ht. 162.6 cm.  - Wt. 74.4 kg (6/3) - no new wt at this time. Bahman noted this admit. Significant wt changes observed. Wt has ranged from 60.7 kg (5/28; admit wt) to 74.4 kg (6/3). Pt unable to confirm UBW at this time.  - BMI 23.0 using dosing/lowest recorded wt  - IBW 54.5 kg     Pertinent Lab Data: 6/11: RBC-2.63, H/H-8.4/27.5, BUN- <3, creat- <0.5, Mg-1.7; 5/29: [id number]%     Pertinent Meds: enoxaparin, lasix, zofran, potassium phosphate/sodium phosphate powder, folic acid, MVI, thiamine, protonix     Physical Findings:  - Appearance: alert; somnolent. +2 edema (B/L leg)  - GI function: Last BM 6/11 - multiple loose BMs remains at this time. Abd noted distended.  - Tubes: no feeding tubes  - Oral/Mouth cavity: no chewing/swallowing issues noted.  - Skin: WDL, except excoriation     Nutrition Requirements  Weight Used: ideal  54.5 kg lowest ABW (continued from initial assessment on 6/4)     Estimated Energy Needs    Continue [x]  9467-6014 (25-30 kcal/kg IBW)     Estimated Protein Needs    Continue [x]  54-65 g (1-1.2 g/kg IBW)     Estimated Fluid Needs        Continue [x]  1 mL/kcal or per LIP     Nutrient Intake: Po intake reportedly fair at this time; consuming % of meals + po supplement. Appears to be meeting estimated nutrient needs.     [x] Previous Nutrition Diagnosis: Inadequate energy intake            [x] Resolved    ***No new nutrition diagnosis***          Nutrition Intervention Meals & Snacks, Nutrition Related Medication Management     Goal/Expected Outcome: Pt to maintain tolerance to diet order, ideally with at least 75% po intake of meals & supplements over next 7 days.     Indicator/Monitoring: Energy intake, glucose profile, renal profile, nutrition focused physical findings, body composition.    Recommendation: Continue current diet order as tolerated. Consider adding acidophilus daily. Reviewed with LIP via spectra transferred by .

## 2020-06-11 NOTE — CONSULT NOTE ADULT - PROBLEM SELECTOR RECOMMENDATION 2
chronic abuse  substance abuse eval
Most likely substance-induced, although Pt denies any excessive anxiety.  Pt is not motivated for any Tx.

## 2020-06-11 NOTE — PROGRESS NOTE ADULT - ASSESSMENT
39 year old female with a history of acute alcoholic pancreatitis, alcohol abuse,  presents with abdominal pain, leukocytosis, acute pancreatitis with developing pseudocysts, thickened gallbladder wall, sludge in the gallbladder elevated LFTs, acute anemia without overt GI bleeding.    Problem 1-Recurrent pancreatitis with pseudocyst formation secondary to alcohol abuse  Persistent pain, no fever, tolerating solid food  MRCP: CBD Normal, GB Sludge+  CA/TGs are normal.  CT no retroperitoneal bleed or hemorraghic transformation in pancreas.  Pain control  Rec  -Will discuss CT scan results with Dr. Sanders and ask if paracentesis is warrented   -low fat diet, patient tolerating    -Pain control as needed (although component of opioid seeking behaviour possible)  -Elevated LFTs are likely secondary to alcoholic hepatitis (>2:1 ratio between AST: ALT) but would not recommend prednisolone,  MDS>32 now however would still hold off ETOH hepatitis and component of DILI likely contributing to LFTs as ALT is now normal  -surgery consult appreciated (no intervention planned)  -alcohol abstinence, if pancreatitis recurs patient may need lap cholecystectomy as biliary pancreatitis may be contributing causative source for pancreatitis given sludge on gallbladder  -Repeat LFTs and INR daily.    Problem 2- First known episode of C diff   Rec  -On oral vancomycin 125 q6hrs (complete for 10 days)  -KUB was normal for abdominal distension (likely secondary to ascites and bowel edema secondary to pancreatitis)    Problem 3-Alcoholic liver disease: altered liver chemistries more cholestatic combination of ETOH hepatitis and DILI, T-Bili will lag behind  Rec  -Complete alcohol abstinence   -F/U CLD work up   -Follow up with our GI office located on [address] [place] [address], Phone number [phone] with Dr. Moralez 39 year old female with a history of acute alcoholic pancreatitis, alcohol abuse,  presents with abdominal pain, leukocytosis, acute pancreatitis with developing pseudocysts, thickened gallbladder wall, sludge in the gallbladder elevated LFTs, acute anemia without overt GI bleeding.    Problem 1-Recurrent pancreatitis with pseudocyst formation secondary to alcohol abuse  Persistent pain, no fever, tolerating solid food  MRCP: CBD Normal, GB Sludge+  CA/TGs are normal.  CT no retroperitoneal bleed or hemorraghic transformation in pancreas.  Pain control  Rec  -Will discuss CT scan results with Dr. Sanders and ask if paracentesis is warrented, lasix 20mg   -low fat diet, patient tolerating    -Pain control as needed (although component of opioid seeking behaviour possible)  -Elevated LFTs are likely secondary to alcoholic hepatitis (>2:1 ratio between AST: ALT) but would not recommend prednisolone,  MDS>32 now however would still hold off ETOH hepatitis and component of DILI likely contributing to LFTs as ALT is now normal  -surgery consult appreciated (no intervention planned)  -alcohol abstinence, if pancreatitis recurs patient may need lap cholecystectomy as biliary pancreatitis may be contributing causative source for pancreatitis given sludge on gallbladder  -Repeat LFTs and INR daily.    Problem 2- First known episode of C diff   Rec  -On oral vancomycin 125 q6hrs (complete for 10 days)  -KUB was normal for abdominal distension (likely secondary to ascites and bowel edema secondary to pancreatitis)    Problem 3-Alcoholic liver disease: altered liver chemistries more cholestatic combination of ETOH hepatitis and DILI, T-Bili will lag behind  Rec  -Complete alcohol abstinence   -F/U CLD work up   -Follow up with our GI office located on [address] [place] [address], Phone number [phone] with Dr. Moralez 39 year old female with a history of acute alcoholic pancreatitis, alcohol abuse,  presents with abdominal pain, leukocytosis, acute pancreatitis with developing pseudocysts, thickened gallbladder wall, sludge in the gallbladder elevated LFTs, acute anemia without overt GI bleeding.    Problem 1-Recurrent pancreatitis with pseudocyst formation secondary to alcohol abuse  Persistent pain, no fever, tolerating solid food  MRCP: CBD Normal, GB Sludge+  CA/TGs are normal.  CT no retroperitoneal bleed or hemorraghic transformation in pancreas.  Pain control  Rec  -Will discuss CT scan results with Dr. Sanders and ask if paracentesis is warrented, lasix 20mg   -low fat diet, patient tolerating    -Pain control as needed (although component of opioid seeking behaviour possible)  -Elevated LFTs are likely secondary to alcoholic hepatitis (>2:1 ratio between AST: ALT) but would not recommend prednisolone,  MDS>32 now however would still hold off ETOH hepatitis and component of DILI likely contributing to LFTs as ALT is now normal, merepenem course patient received can cause self limiting cholestatic hepatitis   -surgery consult appreciated (no intervention planned)  -alcohol abstinence, if pancreatitis recurs patient may need lap cholecystectomy as biliary pancreatitis may be contributing causative source for pancreatitis given sludge on gallbladder  -Repeat LFTs and INR daily.    Problem 2- First known episode of C diff   Rec  -On oral vancomycin 125 q6hrs (complete for 10 days)  -KUB was normal for abdominal distension (likely secondary to ascites and bowel edema secondary to pancreatitis)    Problem 3-Alcoholic liver disease: altered liver chemistries more cholestatic combination of ETOH hepatitis and DILI, T-Bili will lag behind  Rec  -Complete alcohol abstinence   -F/U CLD work up   -Follow up with our GI office located on [address] [place] [address], Phone number [phone] with Dr. Moralez 39 year old female with a history of acute alcoholic pancreatitis, alcohol abuse,  presents with abdominal pain, leukocytosis, acute pancreatitis with developing pseudocysts, thickened gallbladder wall, sludge in the gallbladder elevated LFTs, acute anemia without overt GI bleeding.    Problem 1-Recurrent pancreatitis with pseudocyst formation secondary to alcohol abuse  Persistent pain, no fever, tolerating solid food  MRCP: CBD Normal, GB Sludge+  CA/TGs are normal.  CT no retroperitoneal bleed or hemorraghic transformation in pancreas.  Pain control  Rec  -Start Lasix 20mg   -low fat diet, patient tolerating    -Pain control as needed (although component of opioid seeking behaviour possible)  -Elevated LFTs are likely secondary to alcoholic hepatitis (>2:1 ratio between AST: ALT) but would not recommend prednisolone,  MDS>32 now however would still hold off ETOH hepatitis and component of DILI likely contributing to LFTs as ALT is now normal, meropenem course patient received can cause self limiting cholestatic hepatitis   -surgery consult appreciated (no intervention planned)  -alcohol abstinence, if pancreatitis recurs patient may need lap cholecystectomy as biliary pancreatitis may be contributing causative source for pancreatitis given sludge on gallbladder  -Repeat LFTs and INR daily.    Problem 2- First known episode of C diff---->diarrhea resolved   Rec  -On oral vancomycin 125 q6hrs (complete for 10 days)  -KUB was normal for abdominal distension (likely secondary to ascites and bowel edema secondary to pancreatitis)    Problem 3-Alcoholic liver disease: altered liver chemistries more cholestatic combination of ETOH hepatitis and DILI, T-Bili will lag behind, hepatic steatosis   Rec  -Dietary and lifestyle modifications advised  -Meropenem can cause cholestatic hepatitis   -Complete alcohol abstinence   -F/U CLD work up   -Follow up with our GI office located on [address] [place] [address], Phone number [phone] with Dr. Moralez    Problem 4-Bilateral pleural effusions and associated opacities as above  Rec  - Care as per primary team     -Will Follow-up 39 year old female with a history of acute alcoholic pancreatitis, alcohol abuse,  presents with abdominal pain, leukocytosis, acute pancreatitis with developing pseudocysts, thickened gallbladder wall, sludge in the gallbladder elevated LFTs, acute anemia without overt GI bleeding.    Problem 1-Recurrent pancreatitis with pseudocyst formation secondary to alcohol abuse  Persistent pain, no fever, tolerating solid food  MRCP: CBD Normal, GB Sludge+  CA/TGs are normal.  CT no retroperitoneal bleed or hemorraghic transformation in pancreas.  Pain control  Rec  -Start Lasix 20mg   -low fat diet, patient tolerating    -Pain control as needed (although component of opioid seeking behavior possible)  -Elevated LFTs are likely secondary to alcoholic hepatitis (>2:1 ratio between AST: ALT) but would not recommend prednisolone,  MDS>32 now however would still hold off ETOH hepatitis and component of DILI likely contributing to LFTs as ALT is now normal, meropenem course patient received can cause self limiting cholestatic hepatitis   -surgery consult appreciated (no intervention planned)  -alcohol abstinence, if pancreatitis recurs patient may need lap cholecystectomy as biliary pancreatitis may be contributing causative source for pancreatitis given sludge on gallbladder  -Repeat LFTs and INR daily.    Problem 2- First known episode of C diff---->diarrhea resolved   Rec  -On oral vancomycin 125 q6hrs (complete for 10 days)  -KUB was normal for abdominal distension (likely secondary to ascites and bowel edema secondary to pancreatitis)    Problem 3-Alcoholic liver disease: altered liver chemistries more cholestatic combination of ETOH hepatitis and DILI, T-Bili will lag behind, hepatic steatosis   Rec  -Dietary and lifestyle modifications advised  -Meropenem can cause cholestatic hepatitis   -Complete alcohol abstinence   -F/U CLD work up   -Follow up with our GI office located on [address] [place] [address], Phone number [phone] with Dr. Moralez    Problem 4-Bilateral pleural effusions and associated opacities as above  Rec  - Care as per primary team     -Will Follow-up

## 2020-06-11 NOTE — CHART NOTE - NSCHARTNOTEFT_GEN_A_CORE
I was called by nursing staff to report that patient complaint of a fall.    I evaluated patient. Patient was resting in bed comfortably. Patient has a

## 2020-06-11 NOTE — PROGRESS NOTE ADULT - ASSESSMENT
40 yo female, pmh of anxiety, depression, substance abuse, eoth abuse, presents to ED for epigastric pain    #Diarrhea secondary to c.diff colitis- improving  - C.Diff positive 06/07, diarrhea improving  - Vancomycin 125mg q6h for 10 days (End 06/19)    # acute complicated pancreatitis with multiple pseudocysts secondary to alcohol abuse  # acute on chronic abdominal pain secondary to above  # alcoholic hepatitis   Gi team following; Case discussed. Ct scan from 6/10 rsults reviewed  complete alcohol abstinence discussed with patient again  Considering chanced of pain with her underlying condition will continue current regime of pain medication  Hepatitis panel is negative  Patient initial MDF score on presentation was 23; currently patient have higher MDF score - not a candidate for glucocorticoid treatment    # lethargy from medications improved  ammonia level- 47    # ascites from fluid overload status  started on lasix daily  Will await recommendations to see whether need paracentesis    # Acute Anemia- stable  - transfused 1 unit prbc on 5/29  - No sign of active GI bleed and CT shows no retroperitoneal bleed  - continue to follow daily HGB ; currently stable   - sequential stockings for DVT prophylaxis secondary to anemia    # multiple electrolyte abnormality-hypokalemia - hypophosphatemia - hypomagnesemia; improved  continue replacements; monitor    # history of substance abuse     #Folate deficiency  #Thiamine deficiency  - Cont oral supplements    Full code    #Progress Note Handoff  Pending (specify):  Gi recommendation for ascites  Family discussion: Plan of care discussed with patient  Disposition: Home    Goals of care discussed with patient on 6/10. Patient wants to continue full code status.

## 2020-06-11 NOTE — CONSULT NOTE ADULT - SUBJECTIVE AND OBJECTIVE BOX
CC: Pt denies any psychiatric complaints.     HPI: Pt is 40yo W known to me from past ED visits and Tx [facility], with h/o severe ETOH use d/c, stimulant (cocaine) and opioid use d/o, no h/o consistent psych Tx, h/o chronic non-adherence to follow up, who was admitted to  due to pancreatitis, likely secondary to ETOH use. Pt was seen, EMR records reviewed. Pt presents pleasant, polite, however at the same time very guarded and superficial, which is her usual presentation. Pt is NOT a reliable historian. Pt denies all psychiatric Sx, denies feeling depressed, reports only mild anxiety "because I am in the hospital and away from my family". Pt denies SI/HI/AH/VH, reports feeling safe in the hospital. She is looking forward to restart school after the d/c. As per  staff, at some point Pt appeared paranoid thinking that people were talking about her in hallway, however, now Pt denies all of the above.     PPH: Pt has a h/o chronic non-adherence to follow up. Pt reports h/o one IPP for depression years ago, denies h/o SAs.    PMH: Pancreatitis. Denies other PMH.    Drug Hx: cocaine, etoh use d/o, opioid use d/o.    SH: To my knowledge Pt has a long h/o homelessness. She reports that currently she lives with "a friend". Pt is unemployed. She has 2.6 yo daughter who lives with her father and stepmother.    MSE: A,Ox3, cooperative, superficially related, good eye contact, no PMA/R, speech NL r/r/v, laconic, mood "OK", affect guarded, t/p linear, t/c denies si/hi/ah/vh, no delusions elicited, i/j poor into her addiction.    Vital Signs Last 24 Hrs  T(C): 36.2 (11 Jun 2020 13:56), Max: 37.7 (10 Aaron 2020 21:10)  T(F): 97.2 (11 Jun 2020 13:56), Max: 99.9 (10 Aaron 2020 21:10)  HR: 115 (11 Jun 2020 13:56) (107 - 115)  BP: 101/60 (11 Jun 2020 13:56) (91/50 - 103/55)  BP(mean): --  RR: 18 (11 Jun 2020 13:56) (18 - 18)  SpO2: --                          8.4    12.16 )-----------( 311      ( 11 Jun 2020 05:40 )             27.5       06-11    140  |  106  |  <3<L>  ----------------------------<  96  4.1   |  26  |  <0.5<L>    Ca    7.7<L>      11 Jun 2020 05:40  Phos  2.9     06-11  Mg     1.7     06-11    TPro  5.0<L>  /  Alb  2.1<L>  /  TBili  3.3<H>  /  DBili  x   /  AST  98<H>  /  ALT  21  /  AlkPhos  247<H>  06-11    MEDICATIONS  (STANDING):  chlorhexidine 4% Liquid 1 Application(s) Topical two times a day  enoxaparin Injectable 40 milliGRAM(s) SubCutaneous daily  folic acid 1 milliGRAM(s) Oral daily  furosemide    Tablet 20 milliGRAM(s) Oral daily  multivitamin 1 Tablet(s) Oral daily  nicotine - 21 mG/24Hr(s) Patch 1 patch Transdermal daily  pantoprazole  Injectable 40 milliGRAM(s) IV Push daily  potassium phosphate / sodium phosphate powder 2 Packet(s) Oral three times a day  thiamine 100 milliGRAM(s) Oral daily  vancomycin    Solution 125 milliGRAM(s) Oral every 6 hours    MEDICATIONS  (PRN):  ondansetron Injectable 4 milliGRAM(s) IV Push every 8 hours PRN Nausea and/or Vomiting  oxyCODONE    IR 5 milliGRAM(s) Oral every 4 hours PRN Moderate Pain (4 - 6)

## 2020-06-11 NOTE — CONSULT NOTE ADULT - ASSESSMENT
38yo W known to me from past ED visits and Tx at [facility], with h/o severe ETOH use d/c, stimulant (cocaine) and opioid use d/o, no h/o consistent psych Tx, h/o chronic non-adherence to follow up, who was admitted to  due to pancreatitis, likely secondary to ETOH use. Pt presents as usual, guarded, in profound denial of severe ETOH-related problem, denying any psych complaints and not motivated for any form of behavioral Tx.

## 2020-06-11 NOTE — PROGRESS NOTE ADULT - SUBJECTIVE AND OBJECTIVE BOX
HPI  Patient is a 39y old Female who presents with a chief complaint of severe abdominal pain (11 Jun 2020 10:51)    Currently admitted to medicine with the primary diagnosis of Pancreatitis     Today is hospital day 14d.     INTERVAL HPI / OVERNIGHT EVENTS:  patient initially looked uncomfortable and was asking for early pain medications. But during my interview patient looked more comfortable and without distress. I discussed with patient regarding opioid addictive potential. Considering pancreatitis with pseudocyst formation and possibility of chronic pain; will continue current regime.    PAST MEDICAL & SURGICAL HISTORY  ETOH abuse  Postpartum depression  Substance abuse  Anxiety  Depression  No significant past surgical history    ALLERGIES  No Known Allergies    MEDICATIONS  STANDING MEDICATIONS  chlorhexidine 4% Liquid 1 Application(s) Topical two times a day  enoxaparin Injectable 40 milliGRAM(s) SubCutaneous daily  folic acid 1 milliGRAM(s) Oral daily  furosemide    Tablet 20 milliGRAM(s) Oral daily  multivitamin 1 Tablet(s) Oral daily  nicotine - 21 mG/24Hr(s) Patch 1 patch Transdermal daily  pantoprazole  Injectable 40 milliGRAM(s) IV Push daily  potassium phosphate / sodium phosphate powder 2 Packet(s) Oral three times a day  thiamine 100 milliGRAM(s) Oral daily  vancomycin    Solution 125 milliGRAM(s) Oral every 6 hours    PRN MEDICATIONS  ondansetron Injectable 4 milliGRAM(s) IV Push every 8 hours PRN  oxyCODONE    IR 5 milliGRAM(s) Oral every 4 hours PRN    VITALS:  T(F): 97.5  HR: 107  BP: 91/50  RR: 18  SpO2: --    PHYSICAL EXAM  GEN: NAD, uncomfortable  PULM: Clear to auscultation bilaterally, No wheezes  CVS: Regular rate and rhythm, S1-S2, no murmurs  ABD: distended, BS present; epigastric tenderness present; no guarding or rigidity  EXT: 1+ edema  NEURO: A&Ox3, no focal deficits    LABS                        8.4    12.16 )-----------( 311      ( 11 Jun 2020 05:40 )             27.5     06-11    140  |  106  |  <3<L>  ----------------------------<  96  4.1   |  26  |  <0.5<L>    Ca    7.7<L>      11 Jun 2020 05:40  Phos  2.9     06-11  Mg     1.7     06-11    TPro  5.0<L>  /  Alb  2.1<L>  /  TBili  3.3<H>  /  DBili  x   /  AST  98<H>  /  ALT  21  /  AlkPhos  247<H>  06-11    PT/INR - ( 11 Jun 2020 05:40 )   PT: 18.60 sec;   INR: 1.62 ratio         PTT - ( 11 Jun 2020 05:40 )  PTT:37.7 sec              RADIOLOGY

## 2020-06-11 NOTE — CONSULT NOTE ADULT - PROBLEM SELECTOR RECOMMENDATION 9
acute pancreatitis with pseudocysts   No report of necrosis     Gi eval   ? can cysts be aspirated   If no infection or necrosis - abx will not alter prognosis and should be stopped
Pt is not interested in Tx.   Try to motivate Pt for Tx such as AA, residential addiction Tx program, rehab.
-IV hydration  -IV abx for leukocytosis. UTI  -GI consult  -Pain mgmt  -Repeat labs including LFTs, lipase/scott, lactic acid  -GI and DVT prophylaxis

## 2020-06-11 NOTE — PROGRESS NOTE ADULT - SUBJECTIVE AND OBJECTIVE BOX
39yFemale  Being followed for acute Pancreatitis   Interval history: Patient denies nausea, vomiting, hematemesis, melena, blood in stool, diarrhea, constipation. Patient reporting 10/10 epigastric pain and tolerating diet. Patient reports diarrhea has resolved.      PAST MEDICAL & SURGICAL HISTORY:   ETOH abuse  Postpartum depression  Substance abuse  Anxiety  Depression  No significant past surgical history          Social History: No smoking. 2-3 pints of vodka alcohol. No illegal drug use.            MEDICATIONS  (STANDING):  chlorhexidine 4% Liquid 1 Application(s) Topical two times a day  enoxaparin Injectable 40 milliGRAM(s) SubCutaneous daily  folic acid 1 milliGRAM(s) Oral daily  multivitamin 1 Tablet(s) Oral daily  nicotine - 21 mG/24Hr(s) Patch 1 patch Transdermal daily  pantoprazole  Injectable 40 milliGRAM(s) IV Push daily  potassium phosphate / sodium phosphate powder 2 Packet(s) Oral three times a day  thiamine 100 milliGRAM(s) Oral daily  vancomycin    Solution 125 milliGRAM(s) Oral every 6 hours    MEDICATIONS  (PRN):  ondansetron Injectable 4 milliGRAM(s) IV Push every 8 hours PRN Nausea and/or Vomiting  oxyCODONE    IR 5 milliGRAM(s) Oral every 4 hours PRN Moderate Pain (4 - 6)      Allergies:   No Known Allergies            REVIEW OF SYSTEMS:  General:  No weight loss, fevers, or chills.  Eyes:  No reported pain or visual changes  ENT:  No sore throat or runny nose.  NECK: No stiffness   CV:  No chest pain or palpitations.  Resp:  No shortness of breath, cough  GI:  +epigastric abdominal pain, No nausea, vomiting, dysphagia, diarrhea or constipation. No rectal bleeding, melena, or hematemesis.  Muscle:  No aches or weakness  Neuro:  No tingling, numbness         VITAL SIGNS:   T(F): 97.5 (06-11-20 @ 05:06), Max: 99.9 (06-10-20 @ 21:10)  HR: 107 (06-11-20 @ 05:06) (107 - 110)  BP: 91/50 (06-11-20 @ 05:06) (91/50 - 107/57)  RR: 18 (06-11-20 @ 05:06) (18 - 18)  SpO2: --    PHYSICAL EXAM:  GENERAL: AAOx3, no acute distress.  HEAD:  Atraumatic, Normocephalic  EYES: conjunctiva and sclera clear  NECK: Supple, no JVD or thyromegaly  CHEST/LUNG: Clear to auscultation bilaterally; No wheeze, rhonchi, or rales  HEART: Regular rate and rhythm; normal S1, S2, No murmurs.  ABDOMEN: Soft, +epigastric tenderness, +distended; Bowel sounds present, no abdominal bruit, masses, or hepatosplenomegaly  NEUROLOGY: No asterixis or tremor.   SKIN: Intact, no jaundice            LABS:                        8.4    12.16 )-----------( 311      ( 11 Jun 2020 05:40 )             27.5     06-11    140  |  106  |  <3<L>  ----------------------------<  96  4.1   |  26  |  <0.5<L>    Ca    7.7<L>      11 Jun 2020 05:40  Phos  2.9     06-11  Mg     1.7     06-11    TPro  5.0<L>  /  Alb  2.1<L>  /  TBili  3.3<H>  /  DBili  x   /  AST  98<H>  /  ALT  21  /  AlkPhos  247<H>  06-11    LIVER FUNCTIONS - ( 11 Jun 2020 05:40 )  Alb: 2.1 g/dL / Pro: 5.0 g/dL / ALK PHOS: 247 U/L / ALT: 21 U/L / AST: 98 U/L / GGT: x           PT/INR - ( 11 Jun 2020 05:40 )   PT: 18.60 sec;   INR: 1.62 ratio         PTT - ( 11 Jun 2020 05:40 )  PTT:37.7 sec    IMAGING:    < from: CT Abdomen and Pelvis w/ IV Cont (06.10.20 @ 16:25) >    EXAM:  CT ABDOMEN AND PELVIS IC            PROCEDURE DATE:  06/10/2020            INTERPRETATION:  CLINICAL STATEMENT: Persistent abdominal pain, pancreatitis      TECHNIQUE: Contiguous axial CT images were obtained from the lower chest to the pubic symphysis after 95 cc Optiray 320 intravenous contrast.  Oral contrast was not given.  Reformatted images in the coronal and sagittal planes were acquired.    COMPARISON CT: 5/29/2020    FINDINGS:    LOWER CHEST: There is a stable moderate left pleural effusion with improvement in small right pleural effusion. There are associated increased opacities..    HEPATOBILIARY: Heterogeneous attenuation to the liver is likely related to heterogeneous fatty infiltration. Continued follow-up is recommended. High density material within the gallbladder may represent sludge or small stones. The liver is enlarged..    SPLEEN: Unremarkable..    PANCREAS: Again seen is heterogeneity to the pancreas with pancreatic fluid collections. The largest in the neckof the pancreas is essentially stable in size since prior examination measuring 2.4 cm. Smaller fluid collection seen along the body and tail have improved slightly. Again seen are peripancreatic inflammatory changes.  .  ADRENAL GLANDS: Unremarkable..    KIDNEYS: Unremarkable..    ABDOMINOPELVIC NODES: Unremarkable..    PELVIC ORGANS: Unremarkable..    PERITONEUM/MESENTERY/BOWEL: There is increase in abdominal pelvic ascites, now moderate in amount. There is right colonic wall thickening as wellas wall thickening of multiple small bowel loops. Findings may represent an enterocolitis. Alternatively, findings may be related to the volume overload state of the patient. There is no free air or obstruction.    BONES/SOFT TISSUES: Unremarkable for age. There is subcutaneous edema.. Subcutaneous emphysema likely related to injection phenomena..    OTHER: There appears to be recanalization of the umbilical vein.      IMPRESSION:     Again seen is heterogeneity to the pancreas with pancreatic fluid collections with peripancreatic inflammatory change.     Stable in size largest pancreatic collection within the neck measuring 2.4 cm.    Improvement, slight, in small collections noted along the body and tail     Follow to resolution of the above findings is recommended to exclude underlying process.    Heterogeneous attenuation to the liver is felt to likely represent heterogeneous fatty infiltration. Continued follow-up is recommended.    Right colonic wall thickening and small bowel wall thickening. Findings may be related to an enterocolitis versus sequela of moderate ascites, increased since prior exam    Bilateral pleural effusions and associated opacities as above                  LIZA FREEMAN M.D., ATTENDING RADIOLOGIST  This document has been electronically signed. Jun 11 2020  8:47AM              < end of copied text >

## 2020-06-12 ENCOUNTER — TRANSCRIPTION ENCOUNTER (OUTPATIENT)
Age: 40
End: 2020-06-12

## 2020-06-12 VITALS
RESPIRATION RATE: 18 BRPM | SYSTOLIC BLOOD PRESSURE: 100 MMHG | DIASTOLIC BLOOD PRESSURE: 53 MMHG | HEART RATE: 107 BPM | TEMPERATURE: 97 F

## 2020-06-12 LAB
ALBUMIN SERPL ELPH-MCNC: 2.1 G/DL — LOW (ref 3.5–5.2)
ALP SERPL-CCNC: 249 U/L — HIGH (ref 30–115)
ALT FLD-CCNC: 21 U/L — SIGNIFICANT CHANGE UP (ref 0–41)
ANION GAP SERPL CALC-SCNC: 10 MMOL/L — SIGNIFICANT CHANGE UP (ref 7–14)
APTT BLD: 40 SEC — HIGH (ref 27–39.2)
AST SERPL-CCNC: 92 U/L — HIGH (ref 0–41)
BILIRUB SERPL-MCNC: 3.8 MG/DL — HIGH (ref 0.2–1.2)
BUN SERPL-MCNC: <3 MG/DL — LOW (ref 10–20)
CALCIUM SERPL-MCNC: 7.9 MG/DL — LOW (ref 8.5–10.1)
CHLORIDE SERPL-SCNC: 104 MMOL/L — SIGNIFICANT CHANGE UP (ref 98–110)
CO2 SERPL-SCNC: 25 MMOL/L — SIGNIFICANT CHANGE UP (ref 17–32)
CREAT SERPL-MCNC: <0.5 MG/DL — LOW (ref 0.7–1.5)
GLUCOSE SERPL-MCNC: 117 MG/DL — HIGH (ref 70–99)
HCT VFR BLD CALC: 27.5 % — LOW (ref 37–47)
HEV IGM SER QL: SIGNIFICANT CHANGE UP
HGB BLD-MCNC: 8.4 G/DL — LOW (ref 12–16)
INR BLD: 1.7 RATIO — HIGH (ref 0.65–1.3)
MAGNESIUM SERPL-MCNC: 1.7 MG/DL — LOW (ref 1.8–2.4)
MCHC RBC-ENTMCNC: 30.5 G/DL — LOW (ref 32–37)
MCHC RBC-ENTMCNC: 32.1 PG — HIGH (ref 27–31)
MCV RBC AUTO: 105 FL — HIGH (ref 81–99)
NRBC # BLD: 0 /100 WBCS — SIGNIFICANT CHANGE UP (ref 0–0)
PLATELET # BLD AUTO: 316 K/UL — SIGNIFICANT CHANGE UP (ref 130–400)
POTASSIUM SERPL-MCNC: 3.5 MMOL/L — SIGNIFICANT CHANGE UP (ref 3.5–5)
POTASSIUM SERPL-SCNC: 3.5 MMOL/L — SIGNIFICANT CHANGE UP (ref 3.5–5)
PROT SERPL-MCNC: 5.1 G/DL — LOW (ref 6–8)
PROTHROM AB SERPL-ACNC: 19.6 SEC — HIGH (ref 9.95–12.87)
RBC # BLD: 2.62 M/UL — LOW (ref 4.2–5.4)
RBC # FLD: 19.1 % — HIGH (ref 11.5–14.5)
SODIUM SERPL-SCNC: 139 MMOL/L — SIGNIFICANT CHANGE UP (ref 135–146)
WBC # BLD: 12.47 K/UL — HIGH (ref 4.8–10.8)
WBC # FLD AUTO: 12.47 K/UL — HIGH (ref 4.8–10.8)

## 2020-06-12 PROCEDURE — 99239 HOSP IP/OBS DSCHRG MGMT >30: CPT

## 2020-06-12 RX ORDER — LANOLIN ALCOHOL/MO/W.PET/CERES
3 CREAM (GRAM) TOPICAL ONCE
Refills: 0 | Status: COMPLETED | OUTPATIENT
Start: 2020-06-12 | End: 2020-06-12

## 2020-06-12 RX ORDER — POTASSIUM CHLORIDE 20 MEQ
1 PACKET (EA) ORAL
Qty: 14 | Refills: 0
Start: 2020-06-12 | End: 2020-06-25

## 2020-06-12 RX ORDER — FUROSEMIDE 40 MG
1 TABLET ORAL
Qty: 14 | Refills: 0
Start: 2020-06-12 | End: 2020-06-27

## 2020-06-12 RX ORDER — VANCOMYCIN HCL 1 G
1 VIAL (EA) INTRAVENOUS
Qty: 24 | Refills: 0
Start: 2020-06-12 | End: 2020-06-19

## 2020-06-12 RX ORDER — FAMOTIDINE 10 MG/ML
1 INJECTION INTRAVENOUS
Qty: 30 | Refills: 0
Start: 2020-06-12 | End: 2020-07-11

## 2020-06-12 RX ORDER — POTASSIUM CHLORIDE 20 MEQ
1 PACKET (EA) ORAL
Qty: 14 | Refills: 0
Start: 2020-06-12 | End: 2020-06-27

## 2020-06-12 RX ORDER — VANCOMYCIN HCL 1 G
1 VIAL (EA) INTRAVENOUS
Qty: 24 | Refills: 0
Start: 2020-06-12 | End: 2020-06-17

## 2020-06-12 RX ORDER — FAMOTIDINE 10 MG/ML
1 INJECTION INTRAVENOUS
Qty: 30 | Refills: 0
Start: 2020-06-12 | End: 2020-07-13

## 2020-06-12 RX ORDER — PHYTONADIONE (VIT K1) 5 MG
10 TABLET ORAL DAILY
Refills: 0 | Status: DISCONTINUED | OUTPATIENT
Start: 2020-06-12 | End: 2020-06-12

## 2020-06-12 RX ORDER — FUROSEMIDE 40 MG
1 TABLET ORAL
Qty: 14 | Refills: 0
Start: 2020-06-12 | End: 2020-06-25

## 2020-06-12 RX ADMIN — Medication 20 MILLIGRAM(S): at 05:41

## 2020-06-12 RX ADMIN — Medication 10 MILLIGRAM(S): at 11:35

## 2020-06-12 RX ADMIN — Medication 3 MILLIGRAM(S): at 01:35

## 2020-06-12 RX ADMIN — Medication 125 MILLIGRAM(S): at 05:42

## 2020-06-12 RX ADMIN — Medication 1 PATCH: at 06:02

## 2020-06-12 RX ADMIN — PANTOPRAZOLE SODIUM 40 MILLIGRAM(S): 20 TABLET, DELAYED RELEASE ORAL at 11:35

## 2020-06-12 RX ADMIN — Medication 125 MILLIGRAM(S): at 11:35

## 2020-06-12 RX ADMIN — CHLORHEXIDINE GLUCONATE 1 APPLICATION(S): 213 SOLUTION TOPICAL at 05:41

## 2020-06-12 RX ADMIN — Medication 2 PACKET(S): at 05:41

## 2020-06-12 RX ADMIN — Medication 1 MILLIGRAM(S): at 11:35

## 2020-06-12 RX ADMIN — OXYCODONE HYDROCHLORIDE 5 MILLIGRAM(S): 5 TABLET ORAL at 06:44

## 2020-06-12 RX ADMIN — Medication 100 MILLIGRAM(S): at 11:35

## 2020-06-12 RX ADMIN — ONDANSETRON 4 MILLIGRAM(S): 8 TABLET, FILM COATED ORAL at 08:19

## 2020-06-12 RX ADMIN — ENOXAPARIN SODIUM 40 MILLIGRAM(S): 100 INJECTION SUBCUTANEOUS at 11:36

## 2020-06-12 NOTE — PROGRESS NOTE ADULT - ATTENDING COMMENTS
Attending Statement: I have personally performed a face to face diagnostic evaluation on this patient and have arrived at the suggestions for care. I have written all aspects of the above note.
Attending Statement: I have personally performed a face to face diagnostic evaluation on this patient and have arrived at the suggestions for care. I have written all aspects of the above note.
38 yo f with alcoholic pancreatitis and alcoholic hepatitis.  Plans  as noted above  IV hydration as tolerated  Pain control  Watch for signs of infection.    C Diff  Oral vanco as written.
Alcoholic hepatitis, Pancreatitis and  C Diff.  Plans and recommendations as noted above.  Recommend avoid etoh, followup in GI clinic. Follow lfts for alchoholic hepatitis  Complete course of antibiotics for C Diff and followup in GI clinic
Attending Statement: I have personally performed a face to face diagnostic evaluation on this patient and have arrived at the suggestions for care. I have written all aspects of the above note.
I personally interviewed and Examined the patient. patient with acute pancreatitis. Still complaining of pain. Continue pain control, IV fluids.
Pt seen and examined. Hypotension recorded on chart 72/42 noted. Repeat BP was 90/53. Likely positional. NPO, Cont IVF hydration, repeat CBC in evening and transfuse if Hg<7. Continue meropenem 1g q8h. Check B12/Folate. f/u GI final recds, pt will likely need MRI + MRCP to r/o infected pancreatic pseydocysts and cholangitis.
Acute pancreatitis with Acute Pancreatic Fluid Collections.  Plans and recommendations as noted above
I personally interviewed and examined the patient. Patient with alcoholic pancreatitis. Still has pain and is tachycardic. Monitor CBC IV fluids, pain control
Patient is a 39F who was admitted with recurrent alcoholic pancreatitis with developing pseudocysts.    labs and images reviewed    Plan:   - no indication for acute surgical intervention  - trend labs  - continue aggressive fluid resuscitation, recommend walsh for strict I/Os  - appreciate GI recommendations  - surgery to sign off
I personally interviewed and examined the patient. Has moderate amount of ascites. Rec Lasix 20 mg po od.

## 2020-06-12 NOTE — PROGRESS NOTE ADULT - REASON FOR ADMISSION
severe abdominal pain

## 2020-06-12 NOTE — PROGRESS NOTE ADULT - SUBJECTIVE AND OBJECTIVE BOX
We are following the patient for Abd pain/ Pancreatitis / alcoholic hepatitis      GI HPI Today:  Pt feels ok, no vomiting but had nausea yesterday   Tolerating diet   No SOB   Hemodynamically stable and no diarrhea     PAST MEDICAL & SURGICAL HISTORY  ETOH abuse  Postpartum depression  Substance abuse  Anxiety  Depression  No significant past surgical history      ALLERGIES:  No Known Allergies      MEDICATIONS:  MEDICATIONS  (STANDING):  chlorhexidine 4% Liquid 1 Application(s) Topical two times a day  enoxaparin Injectable 40 milliGRAM(s) SubCutaneous daily  folic acid 1 milliGRAM(s) Oral daily  furosemide    Tablet 20 milliGRAM(s) Oral daily  multivitamin 1 Tablet(s) Oral daily  nicotine - 21 mG/24Hr(s) Patch 1 patch Transdermal daily  pantoprazole  Injectable 40 milliGRAM(s) IV Push daily  potassium phosphate / sodium phosphate powder 2 Packet(s) Oral three times a day  thiamine 100 milliGRAM(s) Oral daily  vancomycin    Solution 125 milliGRAM(s) Oral every 6 hours    MEDICATIONS  (PRN):  ondansetron Injectable 4 milliGRAM(s) IV Push every 8 hours PRN Nausea and/or Vomiting  oxyCODONE    IR 5 milliGRAM(s) Oral every 4 hours PRN Moderate Pain (4 - 6)      REVIEW OF SYSTEMS  General:  No fevers  Eyes:  No reported pain   ENT:  No sore throat   NECK: No stiffness   CV:  No chest pain   Resp:  No shortness of breath  GI:  See HPI  :  No dysuria  Muscle:  ++  weakness  Neuro:  No tingling  Endocrine:  No polyuria  Heme:  No ecchymosis        VITALS:   T(F): 97.4 (06-12 @ 05:33), Max: 99.9 (06-10 @ 21:10)  HR: 107 (06-12 @ 05:33) (107 - 133)  BP: 100/53 (06-12 @ 05:33) (91/50 - 124/58)  BP(mean): --  RR: 18 (06-12 @ 05:33) (18 - 18)  SpO2: 96% (06-09 @ 08:00) (96% - 96%)        PHYSICAL EXAM:  GENERAL:  Appears in no distress  HEENT:  Conjunctivae Anicteric   CHEST:  Full & symmetric excursion  HEART:  NS1, S2,   ABDOMEN:  Soft, LUQ tender, ++ distended  EXTEREMITIES:  + edema  SKIN:  No rash  NEURO:  Alert         Blood Work :                        8.4    12.16 )-----------( 311      ( 11 Jun 2020 05:40 )             27.5     PT/INR - ( 11 Jun 2020 05:40 )  INR: 1.62          PTT - ( 11 Jun 2020 05:40 )  PTT:37.7   06-11    140  |  106  |  <3<L>  ----------------------------<  96  4.1   |  26  |  <0.5<L>    Ca    7.7<L>      11 Jun 2020 05:40  Phos  2.9     06-11  Mg     1.7     06-11      CBC -  ( 11 Jun 2020 05:40 )  Hemoglobin : 8.4    CBC -  ( 10 Aaron 2020 06:28 )  Hemoglobin : 8.4      LIVER FUNCTIONS - ( 11 Jun 2020 05:40 )  Alb: 2.1 [3.5 - 5.2] / Pro: 5.0 [6.0 - 8.0] / ALK PHOS: 247 [30 - 115] / ALT: 21 [0 - 41] / AST: 98 [0 - 41] / GGT: x     LIVER FUNCTIONS - ( 10 Aaron 2020 06:28 )  Alb: 2.1 [3.5 - 5.2] / Pro: 5.1 [6.0 - 8.0] / ALK PHOS: 257 [30 - 115] / ALT: 22 [0 - 41] / AST: 100 [0 - 41] / GGT: x     LIVER FUNCTIONS - ( 09 Jun 2020 06:29 )  Alb: 2.1 [3.5 - 5.2] / Pro: 4.8 [6.0 - 8.0] / ALK PHOS: 238 [30 - 115] / ALT: 23 [0 - 41] / AST: 113 [0 - 41] / GGT: 351 [1 - 40]     LIVER FUNCTIONS - ( 08 Jun 2020 05:36 )  Alb: 2.1 [3.5 - 5.2] / Pro: 4.6 [6.0 - 8.0] / ALK PHOS: 232 [30 - 115] / ALT: 22 [0 - 41] / AST: 113 [0 - 41] / GGT: x     LIVER FUNCTIONS - ( 07 Jun 2020 06:52 )  Alb: 2.2 [3.5 - 5.2] / Pro: 5.0 [6.0 - 8.0] / ALK PHOS: 254 [30 - 115] / ALT: 24 [0 - 41] / AST: 137 [0 - 41] / GGT: x     LIVER FUNCTIONS - ( 06 Jun 2020 07:47 )  Alb: 1.9 [3.5 - 5.2] / Pro: 4.7 [6.0 - 8.0] / ALK PHOS: 235 [30 - 115] / ALT: 22 [0 - 41] / AST: 137 [0 - 41] / GGT: x         RADIOLOGY:    < from: CT Abdomen and Pelvis w/ IV Cont (06.10.20 @ 16:25) >    EXAM:  CT ABDOMEN AND PELVIS IC            PROCEDURE DATE:  06/10/2020            INTERPRETATION:  CLINICAL STATEMENT: Persistent abdominal pain, pancreatitis      TECHNIQUE: Contiguous axial CT images were obtained from the lower chest to the pubic symphysis after 95 cc Optiray 320 intravenous contrast.  Oral contrast was not given.  Reformatted images in the coronal and sagittal planes were acquired.    COMPARISON CT: 5/29/2020    FINDINGS:    LOWER CHEST: There is a stable moderate left pleural effusion with improvement in small right pleural effusion. There are associated increased opacities..    HEPATOBILIARY: Heterogeneous attenuation to the liver is likely related to heterogeneous fatty infiltration. Continued follow-up is recommended. High density material within the gallbladder may represent sludge or small stones. The liver is enlarged..    SPLEEN: Unremarkable..    PANCREAS: Again seen is heterogeneity to the pancreas with pancreatic fluid collections. The largest in the neckof the pancreas is essentially stable in size since prior examination measuring 2.4 cm. Smaller fluid collection seen along the body and tail have improved slightly. Again seen are peripancreatic inflammatory changes.  .  ADRENAL GLANDS: Unremarkable..    KIDNEYS: Unremarkable..    ABDOMINOPELVIC NODES: Unremarkable..    PELVIC ORGANS: Unremarkable..    PERITONEUM/MESENTERY/BOWEL: There is increase in abdominal pelvic ascites, now moderate in amount. There is right colonic wall thickening as wellas wall thickening of multiple small bowel loops. Findings may represent an enterocolitis. Alternatively, findings may be related to the volume overload state of the patient. There is no free air or obstruction.    BONES/SOFT TISSUES: Unremarkable for age. There is subcutaneous edema.. Subcutaneous emphysema likely related to injection phenomena..    OTHER: There appears to be recanalization of the umbilical vein.      IMPRESSION:     Again seen is heterogeneity to the pancreas with pancreatic fluid collections with peripancreatic inflammatory change.     Stable in size largest pancreatic collection within the neck measuring 2.4 cm.    Improvement, slight, in small collections noted along the body and tail     Follow to resolution of the above findings is recommended to exclude underlying process.    Heterogeneous attenuation to the liver is felt to likely represent heterogeneous fatty infiltration. Continued follow-up is recommended.    Right colonic wall thickening and small bowel wall thickening. Findings may be related to an enterocolitis versus sequela of moderate ascites, increased since prior exam    Bilateral pleural effusions and associated opacities as above                  LIZA FREEMAN M.D., ATTENDING RADIOLOGIST  This document has been electronically signed. Jun 11 2020  8:47AM                < end of copied text >

## 2020-06-12 NOTE — PROGRESS NOTE ADULT - ASSESSMENT
38 yo female, pmh of anxiety, depression, substance abuse, eoth abuse, presents to ED for epigastric pain    #Diarrhea secondary to c.diff colitis- improved; no episodes of diarrhea today  - C.Diff positive 06/07;  Vancomycin 125mg q6h for 10 days (End 06/19)    # acute complicated pancreatitis with multiple pseudocysts secondary to alcohol abuse  # acute on chronic abdominal pain secondary to above  # alcoholic hepatitis   complete alcohol abstinence discussed with patient again  Hepatitis panel is negative  Patient initial MDF score on presentation was 23; currently patient have higher MDF score - not a candidate for glucocorticoid treatment  patient to follow up with GI physician    # non compliance with medical advises and follow up  Patient counseled. Patient verbalized understanding of importance of follow up and compliance. Also verbalized understanding of complications that can happen including worsening condition and even death without proper follow up/compliamce or with use of alcohol.    # lethargy from medications- resolved  ammonia level- 47    # ascites from fluid overload status  started on lasix daily; along with potassium supplement  Follow up with PCP and GI physician    # Acute Anemia- stable  - transfused 1 unit prbc on 5/29  - No sign of active GI bleed and CT shows no retroperitoneal bleed    # multiple electrolyte abnormality-hypokalemia - hypophosphatemia - hypomagnesemia; improved    # history of substance abuse   #Folate deficiency  #Thiamine deficiency  - Cont oral supplements    Please see discharge nor  patient with guarded prognosis.

## 2020-06-12 NOTE — PROGRESS NOTE ADULT - ASSESSMENT
39 year old female with a history of acute alcoholic pancreatitis, alcohol abuse,  presents with abdominal pain, leukocytosis, acute pancreatitis with developing pseudocysts, thickened gallbladder wall, sludge in the gallbladder elevated LFTs, acute anemia without overt GI bleeding.    # Recurrent pancreatitis with Acute pancreatic collection / pseudocyst formation secondary   Pt still active alcohol drinker   Persistent pain, no fever, tolerating solid food  MRCP: CBD Normal, GB Sludge+  CA normal   No TG this admission   Repeat CT showed stable peripancreatic collections , but has ascites and pleural effusion   Pt started on lasix yesterday   Rec  -low fat diet, patient tolerating    -Pain control as needed (although component of opioid seeking behavior possible)  -surgery consult appreciated (no intervention planned)  -alcohol abstinence, if pancreatitis recurs patient may need lap cholecystectomy as biliary pancreatitis may be contributing causative source for pancreatitis given sludge on gallbladder  -Repeat LFTs and INR daily.    # First known episode of C diff---->diarrhea resolved   Rec  -On oral vancomycin 125 q6hrs since 6/8  (complete for 10 days)    # Alcoholic liver disease/ ETOH hepatitis  Maddrey score yesterday 38 but pt has infection (C DIff) so not candidate for steroids  INR still elevated but liver enzymes stable   CLD workup negative    Rec  - Vitamin k 10 mg po daily   -Dietary and lifestyle modifications advised  -Complete alcohol abstinence   -Follow up with our GI office located on [address] [place] [address], Phone number [phone] with Dr. Moralez

## 2020-06-12 NOTE — DISCHARGE NOTE NURSING/CASE MANAGEMENT/SOCIAL WORK - PATIENT PORTAL LINK FT
You can access the FollowMyHealth Patient Portal offered by [facility] by registering at the following website: [email or web address]. By joining [email or web address] [email or web address] portal, you will also be able to view your health information using other applications (apps) compatible with our system.

## 2020-06-12 NOTE — PROGRESS NOTE ADULT - SUBJECTIVE AND OBJECTIVE BOX
HPI  Patient is a 39y old Female who presents with a chief complaint of severe abdominal pain (12 Jun 2020 07:35)    Currently admitted to medicine with the primary diagnosis of Pancreatitis     Today is hospital day 15d.     INTERVAL HPI / OVERNIGHT EVENTS:  Patient was examined and seen at bedside.   Patient was standing at the door and asking whether she can leave the hospital. States she was not prepared for the hospitalization.  She wants to go home to take a bath. States she knows she needs  follow up with GI physician and PCP. Also verbalizing understanding of problems that can cause with alcohol use.  After conversation patient agreed to stay some more time in the hospital so I can review the labs and sent medications to pharmacy. Patient states she will leave against medical advise if not discharged. Please discharge note     ROS: Otherwise unremarkable     PAST MEDICAL & SURGICAL HISTORY  ETOH abuse  Postpartum depression  Substance abuse  Anxiety  Depression  No significant past surgical history    ALLERGIES  No Known Allergies    MEDICATIONS  STANDING MEDICATIONS  chlorhexidine 4% Liquid 1 Application(s) Topical two times a day  enoxaparin Injectable 40 milliGRAM(s) SubCutaneous daily  folic acid 1 milliGRAM(s) Oral daily  furosemide    Tablet 20 milliGRAM(s) Oral daily  multivitamin 1 Tablet(s) Oral daily  nicotine - 21 mG/24Hr(s) Patch 1 patch Transdermal daily  pantoprazole  Injectable 40 milliGRAM(s) IV Push daily  phytonadione   Solution 10 milliGRAM(s) Oral daily  potassium phosphate / sodium phosphate powder 2 Packet(s) Oral three times a day  thiamine 100 milliGRAM(s) Oral daily  vancomycin    Solution 125 milliGRAM(s) Oral every 6 hours    PRN MEDICATIONS  ondansetron Injectable 4 milliGRAM(s) IV Push every 8 hours PRN  oxyCODONE    IR 5 milliGRAM(s) Oral every 4 hours PRN    VITALS:  T(F): 97.4  HR: 107  BP: 100/53  RR: 18  SpO2: --    PHYSICAL EXAM  GEN: NAD, anxious to leave the hospital  PULM: Clear to auscultation bilaterally, No wheezes  CVS: Regular rate and rhythm, S1-S2, no murmurs  ABD: Soft, tenderness present in the epigastric region, distended, no guarding  EXT: 2+ edema  NEURO: A&Ox3, no focal deficits; able to ambulate in the room    LABS                        8.4    12.47 )-----------( 316      ( 12 Jun 2020 06:34 )             27.5     06-12    139  |  104  |  <3<L>  ----------------------------<  117<H>  3.5   |  25  |  <0.5<L>    Ca    7.9<L>      12 Jun 2020 06:34  Phos  2.9     06-11  Mg     1.7     06-12    TPro  5.1<L>  /  Alb  2.1<L>  /  TBili  3.8<H>  /  DBili  x   /  AST  92<H>  /  ALT  21  /  AlkPhos  249<H>  06-12    PT/INR - ( 12 Jun 2020 06:34 )   PT: 19.60 sec;   INR: 1.70 ratio         PTT - ( 12 Jun 2020 06:34 )  PTT:40.0 sec              RADIOLOGY

## 2020-06-13 ENCOUNTER — EMERGENCY (EMERGENCY)
Facility: HOSPITAL | Age: 40
LOS: 0 days | Discharge: LEFT BEFORE DISCHARGE | End: 2020-06-13
Attending: EMERGENCY MEDICINE | Admitting: EMERGENCY MEDICINE
Payer: MEDICAID

## 2020-06-13 VITALS
DIASTOLIC BLOOD PRESSURE: 63 MMHG | HEART RATE: 110 BPM | TEMPERATURE: 98 F | SYSTOLIC BLOOD PRESSURE: 103 MMHG | OXYGEN SATURATION: 99 % | RESPIRATION RATE: 20 BRPM

## 2020-06-13 DIAGNOSIS — R10.84 GENERALIZED ABDOMINAL PAIN: ICD-10-CM

## 2020-06-13 DIAGNOSIS — R94.31 ABNORMAL ELECTROCARDIOGRAM [ECG] [EKG]: ICD-10-CM

## 2020-06-13 DIAGNOSIS — R19.7 DIARRHEA, UNSPECIFIED: ICD-10-CM

## 2020-06-13 DIAGNOSIS — R10.9 UNSPECIFIED ABDOMINAL PAIN: ICD-10-CM

## 2020-06-13 DIAGNOSIS — F17.200 NICOTINE DEPENDENCE, UNSPECIFIED, UNCOMPLICATED: ICD-10-CM

## 2020-06-13 DIAGNOSIS — Z20.828 CONTACT WITH AND (SUSPECTED) EXPOSURE TO OTHER VIRAL COMMUNICABLE DISEASES: ICD-10-CM

## 2020-06-13 LAB
ALBUMIN SERPL ELPH-MCNC: 2.2 G/DL — LOW (ref 3.5–5.2)
ALP SERPL-CCNC: 273 U/L — HIGH (ref 30–115)
ALT FLD-CCNC: 23 U/L — SIGNIFICANT CHANGE UP (ref 0–41)
ANION GAP SERPL CALC-SCNC: 12 MMOL/L — SIGNIFICANT CHANGE UP (ref 7–14)
APTT BLD: 39.4 SEC — HIGH (ref 27–39.2)
AST SERPL-CCNC: 90 U/L — HIGH (ref 0–41)
BASOPHILS # BLD AUTO: 0.09 K/UL — SIGNIFICANT CHANGE UP (ref 0–0.2)
BASOPHILS NFR BLD AUTO: 0.6 % — SIGNIFICANT CHANGE UP (ref 0–1)
BILIRUB DIRECT SERPL-MCNC: 3.5 MG/DL — HIGH (ref 0–0.2)
BILIRUB INDIRECT FLD-MCNC: 1.6 MG/DL — HIGH (ref 0.2–1.2)
BILIRUB SERPL-MCNC: 5.1 MG/DL — HIGH (ref 0.2–1.2)
BUN SERPL-MCNC: <3 MG/DL — LOW (ref 10–20)
CALCIUM SERPL-MCNC: 8 MG/DL — LOW (ref 8.5–10.1)
CHLORIDE SERPL-SCNC: 102 MMOL/L — SIGNIFICANT CHANGE UP (ref 98–110)
CO2 SERPL-SCNC: 25 MMOL/L — SIGNIFICANT CHANGE UP (ref 17–32)
CREAT SERPL-MCNC: <0.5 MG/DL — LOW (ref 0.7–1.5)
EOSINOPHIL # BLD AUTO: 0.14 K/UL — SIGNIFICANT CHANGE UP (ref 0–0.7)
EOSINOPHIL NFR BLD AUTO: 0.9 % — SIGNIFICANT CHANGE UP (ref 0–8)
GLUCOSE SERPL-MCNC: 100 MG/DL — HIGH (ref 70–99)
HCG SERPL QL: NEGATIVE — SIGNIFICANT CHANGE UP
HCT VFR BLD CALC: 30.7 % — LOW (ref 37–47)
HGB BLD-MCNC: 9.8 G/DL — LOW (ref 12–16)
IMM GRANULOCYTES NFR BLD AUTO: 0.7 % — HIGH (ref 0.1–0.3)
INR BLD: 1.39 RATIO — HIGH (ref 0.65–1.3)
LACTATE SERPL-SCNC: 2 MMOL/L — SIGNIFICANT CHANGE UP (ref 0.7–2)
LIDOCAIN IGE QN: 52 U/L — SIGNIFICANT CHANGE UP (ref 7–60)
LYMPHOCYTES # BLD AUTO: 1.21 K/UL — SIGNIFICANT CHANGE UP (ref 1.2–3.4)
LYMPHOCYTES # BLD AUTO: 8 % — LOW (ref 20.5–51.1)
MAGNESIUM SERPL-MCNC: 1.8 MG/DL — SIGNIFICANT CHANGE UP (ref 1.8–2.4)
MCHC RBC-ENTMCNC: 31.9 G/DL — LOW (ref 32–37)
MCHC RBC-ENTMCNC: 33.2 PG — HIGH (ref 27–31)
MCV RBC AUTO: 104.1 FL — HIGH (ref 81–99)
MONOCYTES # BLD AUTO: 1.12 K/UL — HIGH (ref 0.1–0.6)
MONOCYTES NFR BLD AUTO: 7.4 % — SIGNIFICANT CHANGE UP (ref 1.7–9.3)
NEUTROPHILS # BLD AUTO: 12.53 K/UL — HIGH (ref 1.4–6.5)
NEUTROPHILS NFR BLD AUTO: 82.4 % — HIGH (ref 42.2–75.2)
NRBC # BLD: 0 /100 WBCS — SIGNIFICANT CHANGE UP (ref 0–0)
PLATELET # BLD AUTO: 361 K/UL — SIGNIFICANT CHANGE UP (ref 130–400)
POTASSIUM SERPL-MCNC: 3.6 MMOL/L — SIGNIFICANT CHANGE UP (ref 3.5–5)
POTASSIUM SERPL-SCNC: 3.6 MMOL/L — SIGNIFICANT CHANGE UP (ref 3.5–5)
PROT SERPL-MCNC: 5.9 G/DL — LOW (ref 6–8)
PROTHROM AB SERPL-ACNC: 16 SEC — HIGH (ref 9.95–12.87)
RBC # BLD: 2.95 M/UL — LOW (ref 4.2–5.4)
RBC # FLD: 17.8 % — HIGH (ref 11.5–14.5)
SODIUM SERPL-SCNC: 139 MMOL/L — SIGNIFICANT CHANGE UP (ref 135–146)
WBC # BLD: 15.2 K/UL — HIGH (ref 4.8–10.8)
WBC # FLD AUTO: 15.2 K/UL — HIGH (ref 4.8–10.8)

## 2020-06-13 PROCEDURE — 99285 EMERGENCY DEPT VISIT HI MDM: CPT

## 2020-06-13 PROCEDURE — 76705 ECHO EXAM OF ABDOMEN: CPT | Mod: 26

## 2020-06-13 PROCEDURE — 74018 RADEX ABDOMEN 1 VIEW: CPT | Mod: 26

## 2020-06-13 PROCEDURE — 93010 ELECTROCARDIOGRAM REPORT: CPT

## 2020-06-13 PROCEDURE — 71045 X-RAY EXAM CHEST 1 VIEW: CPT | Mod: 26

## 2020-06-13 RX ORDER — MORPHINE SULFATE 50 MG/1
4 CAPSULE, EXTENDED RELEASE ORAL ONCE
Refills: 0 | Status: DISCONTINUED | OUTPATIENT
Start: 2020-06-13 | End: 2020-06-13

## 2020-06-13 RX ORDER — SODIUM CHLORIDE 9 MG/ML
1000 INJECTION INTRAMUSCULAR; INTRAVENOUS; SUBCUTANEOUS ONCE
Refills: 0 | Status: COMPLETED | OUTPATIENT
Start: 2020-06-13 | End: 2020-06-13

## 2020-06-13 RX ORDER — MAGNESIUM SULFATE 500 MG/ML
2 VIAL (ML) INJECTION ONCE
Refills: 0 | Status: COMPLETED | OUTPATIENT
Start: 2020-06-13 | End: 2020-06-13

## 2020-06-13 RX ORDER — FAMOTIDINE 10 MG/ML
20 INJECTION INTRAVENOUS ONCE
Refills: 0 | Status: COMPLETED | OUTPATIENT
Start: 2020-06-13 | End: 2020-06-13

## 2020-06-13 RX ORDER — KETOROLAC TROMETHAMINE 30 MG/ML
15 SYRINGE (ML) INJECTION ONCE
Refills: 0 | Status: DISCONTINUED | OUTPATIENT
Start: 2020-06-13 | End: 2020-06-13

## 2020-06-13 RX ADMIN — MORPHINE SULFATE 4 MILLIGRAM(S): 50 CAPSULE, EXTENDED RELEASE ORAL at 16:04

## 2020-06-13 RX ADMIN — Medication 50 GRAM(S): at 16:04

## 2020-06-13 RX ADMIN — FAMOTIDINE 20 MILLIGRAM(S): 10 INJECTION INTRAVENOUS at 13:54

## 2020-06-13 RX ADMIN — SODIUM CHLORIDE 1000 MILLILITER(S): 9 INJECTION INTRAMUSCULAR; INTRAVENOUS; SUBCUTANEOUS at 13:54

## 2020-06-13 RX ADMIN — Medication 20 MILLIGRAM(S): at 13:54

## 2020-06-13 RX ADMIN — Medication 15 MILLIGRAM(S): at 14:57

## 2020-06-13 NOTE — ED ADULT NURSE NOTE - OBJECTIVE STATEMENT
pt BIBA, C/O abd pain that started again today. pt was admitted from 5/28-6/12 for pancreatitis, pt was seen by GI and surgery with no surgical intervention at the time. Also c/o nausea, vomiting, and 3 episodes of diarrhea. Denies recent drug use or alcohol use. Denies fever, cp, or sob.

## 2020-06-13 NOTE — ED PROVIDER NOTE - PATIENT PORTAL LINK FT
You can access the FollowMyHealth Patient Portal offered by [facility] by registering at the following website: [email or web address]. By joining [facility]’s FollowMyHealth portal, you will also be able to view your health information using other applications (apps) compatible with our system.

## 2020-06-13 NOTE — ED PROVIDER NOTE - NS ED ROS FT
GEN:  no fever, no chills  NEURO:  no headache, no dizziness  ENT: no sore throat, no runny nose  CV:  no chest pain, no palpitations  RESP:  no sob, no cough  GI:  no nausea, no vomiting, + abdominal pain, + diarrhea  :  no dysuria, no urinary frequency, no hematuria  MSK:  no joint pain, no edema  SKIN:  no rash, no bruising  HEME: no hematochezia, no melena

## 2020-06-13 NOTE — ED PROVIDER NOTE - CARE PROVIDER_API CALL
Julisa Moralez)  Gastroenterology; Internal Medicine  [address] [place] [address]  Phone: [phone]  Fax: [phone]  Follow Up Time:

## 2020-06-13 NOTE — ED ADULT NURSE NOTE - NSSEPSISSUSPECTED_ED_A_ED
----- Message from Sidra Rubi sent at 1/31/2020 11:43 AM CST -----      ----- Message -----  From: Lita Dee RN  Sent: 1/31/2020  10:55 AM CST  To: Sidra Rubi      ----- Message -----  From: Julieta Camacho MD  Sent: 1/21/2020  11:33 AM CST  To: , #    Needs pelvic floor physical therapy. Needs referral completed? Thanks!    
Mess to call office  
Message left for pt that referral is approved and scanned into media  
Referral completed, mess left to call office  
No

## 2020-06-13 NOTE — ED PROVIDER NOTE - CLINICAL SUMMARY MEDICAL DECISION MAKING FREE TEXT BOX
pt requesting to leave AMA. refusing admission. has capacity to refuse. understands risk of death and severe morbidity. will continue her po abx for cdif and diet modification for pancreatitis. understands can come back anytime if changes mind to be readmitted. will have to sign out AMA. strict return precautions provided.    Patient is awake/alert/interactive with normal mental status and normal neurologic function. Patient reports no SI/HI and demonstrates normal thought processes with no evidence of intoxication, delirium, delusions or hallucinations. Patient requesting to leave against medical advice at this time. Advised patient of potential risks of leaving AMA which include potential disability or death. Attempted to convince patient to stay and continue work up/treatment and patient refused. Patient has capacity to make medical decisions at this time and will be signed out AMA. Patient instructed to follow up with his primary care provider as an outpatient and is aware they can return to the emergency department at any time for evaluation.

## 2020-06-13 NOTE — ED PROVIDER NOTE - ATTENDING CONTRIBUTION TO CARE
39F PMH anx/dep, etoh, pancreatitis/cdif just requested dc from hospital, p/w diffuse sharp constant abd pain and swelling, associated w watery diarrhea. no fever. no n/v. no cp, sob. no dysuria, freq, hematuria. pt states last drink was in May. no cough, uri. pt requesting narcotic pain meds.    on exam, AFVSS, well tom nad, ncat, eomi, perrla, mmm, lctab, rrr nl s1s2 no mrg, abd soft mild diffuse ttp, worse in LUQ, no rebound or rigidity, no cvat, nd, aaox3, no focal deficits, no le edema or calf ttp,     a/p; Pancreatitis, C dif colitis, will do labs, abd sono, CXR/KUB, ivf pain control - admit to medicine.

## 2020-06-13 NOTE — ED PROVIDER NOTE - PROGRESS NOTE DETAILS
TC: Given IVF, pepcid, maalox, bentyl. Will reassess. TC: 40 yo F with PMHx of anxiety, depression, etoh abuse, substance abuse who presents with acute on chronic abd pain. Recently admitted for pancreatitis and d/c'ed yesterday with CT showing improvement. Course complicated by c.diff and pt with opioid seeking behavior. Here in ED, pt mildly tachy 110s but crying and agitated in stretcher. Diffuse abd tenderness but easily distracted. Ordered labs, ekg, urine, cxr. Given IVF, pepcid, maalox, bentyl. Will reassess. TC: Labs notable for WBC 15 (baseline ~12), AST/ALT 270s/90s which is baseline, Tbili 5.1 (increased from 3.8 yesterday), Dbili 3.5 (previously 2.9 on 6/9). Added RUQ US. Pt demanding for opiates. Will trial toradol first. TC: Added KUB to r/o toxic megacolon. Ekg with prolonged QTc. Given Mg. TC: No obstructive or colon enlargement on KUB. US with GB wall thickening and sludge c/w ascites. Offered admission for pain control and further workup however pt wants to leave AMA. Pt is awake, alert, interactive with normal mental status and neuro function. Pt denies SI/HI, demonstrates normal thought processes without evidence of intoxication, delirium, delusions, hallucinations. Pt requesting to leave against medical advice. Advised pt of potential risks of leaving AMA, including potential disability or death. Attempted to convince pt to stay and continue work up/tx and pt refused. Pt has capacity to make medical decisions at this time and will be signed out AMA. Instructed to f/u with PMD as outpt and is aware can return to the ED at any time for eval.

## 2020-06-13 NOTE — ED PROVIDER NOTE - OBJECTIVE STATEMENT
38 yo F with PMHx of anxiety, depression, etoh abuse, substance abuse who presents with acute on chronic, constant, moderate/severe, diffuse abd pain which started again today associated with 3-4 episodes of watery diarrhea. No alleviating/aggravating factors. No fever, chills, nausea, vomiting, dysuria, hematuria, hematochezia, melena. Admitted 5/28-6/12 for pseudocysts and possible sepsis 2/2 pancreatitis complicated by c.diff. Seen by GI/surg and no surgical intervention indicated at that time. CT abd 6/10 showed mild improvement of pancreatic fluid/inflammation and possible enterocolitis vs ascites and pt was d/c'ed yesterday. Denies recent etoh use. No hx of abd surgeries.

## 2020-06-13 NOTE — ED PROVIDER NOTE - PHYSICAL EXAMINATION
CONSTITUTIONAL: well developed, nontoxic appearing, in no acute distress, speaking in full sentences  SKIN: warm, dry, no rash, cap refill < 2 seconds, jaundiced  HEENT: normocephalic, atraumatic, no conjunctival erythema, moist mucous membranes, patent airway, scleral icterus  NECK: supple  CV:  mildly tachycardic rate, regular rhythm, 2+ radial pulses bilaterally  RESP: no wheezes, no rales, no rhonchi, normal work of breathing  ABD: soft, mild diffuse tenderness but easily distracted, mildly distended, no rebound, no guarding  MSK: normal ROM, no cyanosis, no edema  NEURO: alert, oriented, grossly unremarkable  PSYCH: tearful, agitated, intermittently uncooperative

## 2020-06-13 NOTE — ED ADULT TRIAGE NOTE - CHIEF COMPLAINT QUOTE
abdomen pain x 1 day, was recently discharged from hospital for pancreatitis, COVID (-) in may, + c.diff

## 2020-06-14 ENCOUNTER — INPATIENT (INPATIENT)
Facility: HOSPITAL | Age: 40
LOS: 11 days | Discharge: HOME | End: 2020-06-26
Attending: HOSPITALIST | Admitting: HOSPITALIST
Payer: MEDICAID

## 2020-06-14 PROCEDURE — 99285 EMERGENCY DEPT VISIT HI MDM: CPT

## 2020-06-14 NOTE — CHART NOTE - NSCHARTNOTEFT_GEN_A_CORE
I was informed by the  that patient's friend Shakila called and stated the patient did not receive the medications from pharmacy.  I called Ms Leslee Igor and Shakila; But was unable to reach and left voice message. I called [facility] pharmacy and I was informed that because of her insurance they are unable to prescribe the medications.  I then called Jeovanny Arteaga who told patient sometime uses [facility] pharmacy. So I send the prescriptions to that pharmacy today.  On reviewing the chart patient presented to ER yesterday and refused to get hospitalized.  I informed Jeovanny if patient continues to have worsening symptoms to report back to ER.

## 2020-06-15 VITALS
SYSTOLIC BLOOD PRESSURE: 107 MMHG | WEIGHT: 139.99 LBS | TEMPERATURE: 98 F | OXYGEN SATURATION: 100 % | RESPIRATION RATE: 20 BRPM | DIASTOLIC BLOOD PRESSURE: 71 MMHG | HEART RATE: 105 BPM

## 2020-06-15 LAB
ALBUMIN SERPL ELPH-MCNC: 2 G/DL — LOW (ref 3.5–5.2)
ALP SERPL-CCNC: 252 U/L — HIGH (ref 30–115)
ALP SERPL-CCNC: 268 U/L — HIGH (ref 30–115)
ALP SERPL-CCNC: 279 U/L — HIGH (ref 30–115)
ALT FLD-CCNC: 23 U/L — SIGNIFICANT CHANGE UP (ref 0–41)
ALT FLD-CCNC: 24 U/L — SIGNIFICANT CHANGE UP (ref 0–41)
ALT FLD-CCNC: 24 U/L — SIGNIFICANT CHANGE UP (ref 0–41)
ANION GAP SERPL CALC-SCNC: 15 MMOL/L — HIGH (ref 7–14)
ANION GAP SERPL CALC-SCNC: 16 MMOL/L — HIGH (ref 7–14)
ANION GAP SERPL CALC-SCNC: 17 MMOL/L — HIGH (ref 7–14)
APAP SERPL-MCNC: 9 UG/ML — LOW (ref 10–30)
APPEARANCE UR: ABNORMAL
APTT BLD: 35.5 SEC — SIGNIFICANT CHANGE UP (ref 27–39.2)
AST SERPL-CCNC: 91 U/L — HIGH (ref 0–41)
AST SERPL-CCNC: 96 U/L — HIGH (ref 0–41)
AST SERPL-CCNC: 97 U/L — HIGH (ref 0–41)
BACTERIA # UR AUTO: NEGATIVE — SIGNIFICANT CHANGE UP
BASE EXCESS BLDV CALC-SCNC: -1 MMOL/L — SIGNIFICANT CHANGE UP (ref -2–2)
BASE EXCESS BLDV CALC-SCNC: -1.6 MMOL/L — SIGNIFICANT CHANGE UP (ref -2–2)
BASOPHILS # BLD AUTO: 0.07 K/UL — SIGNIFICANT CHANGE UP (ref 0–0.2)
BASOPHILS # BLD AUTO: 0.09 K/UL — SIGNIFICANT CHANGE UP (ref 0–0.2)
BASOPHILS NFR BLD AUTO: 0.4 % — SIGNIFICANT CHANGE UP (ref 0–1)
BASOPHILS NFR BLD AUTO: 0.6 % — SIGNIFICANT CHANGE UP (ref 0–1)
BILIRUB DIRECT SERPL-MCNC: 2.3 MG/DL — HIGH (ref 0–0.2)
BILIRUB INDIRECT FLD-MCNC: 0.8 MG/DL — SIGNIFICANT CHANGE UP (ref 0.2–1.2)
BILIRUB SERPL-MCNC: 3 MG/DL — HIGH (ref 0.2–1.2)
BILIRUB SERPL-MCNC: 3.1 MG/DL — HIGH (ref 0.2–1.2)
BILIRUB SERPL-MCNC: 3.6 MG/DL — HIGH (ref 0.2–1.2)
BILIRUB UR-MCNC: ABNORMAL
BLD GP AB SCN SERPL QL: SIGNIFICANT CHANGE UP
BUN SERPL-MCNC: <3 MG/DL — LOW (ref 10–20)
CA-I SERPL-SCNC: 1.07 MMOL/L — LOW (ref 1.12–1.3)
CA-I SERPL-SCNC: 1.07 MMOL/L — LOW (ref 1.12–1.3)
CALCIUM SERPL-MCNC: 7.9 MG/DL — LOW (ref 8.5–10.1)
CALCIUM SERPL-MCNC: 7.9 MG/DL — LOW (ref 8.5–10.1)
CALCIUM SERPL-MCNC: 8 MG/DL — LOW (ref 8.5–10.1)
CHLORIDE SERPL-SCNC: 103 MMOL/L — SIGNIFICANT CHANGE UP (ref 98–110)
CHLORIDE SERPL-SCNC: 104 MMOL/L — SIGNIFICANT CHANGE UP (ref 98–110)
CHLORIDE SERPL-SCNC: 104 MMOL/L — SIGNIFICANT CHANGE UP (ref 98–110)
CO2 SERPL-SCNC: 20 MMOL/L — SIGNIFICANT CHANGE UP (ref 17–32)
CO2 SERPL-SCNC: 20 MMOL/L — SIGNIFICANT CHANGE UP (ref 17–32)
CO2 SERPL-SCNC: 23 MMOL/L — SIGNIFICANT CHANGE UP (ref 17–32)
COLOR SPEC: ABNORMAL
CREAT SERPL-MCNC: 0.5 MG/DL — LOW (ref 0.7–1.5)
DIFF PNL FLD: SIGNIFICANT CHANGE UP
EOSINOPHIL # BLD AUTO: 0.49 K/UL — SIGNIFICANT CHANGE UP (ref 0–0.7)
EOSINOPHIL # BLD AUTO: 0.55 K/UL — SIGNIFICANT CHANGE UP (ref 0–0.7)
EOSINOPHIL NFR BLD AUTO: 3 % — SIGNIFICANT CHANGE UP (ref 0–8)
EOSINOPHIL NFR BLD AUTO: 3.7 % — SIGNIFICANT CHANGE UP (ref 0–8)
EPI CELLS # UR: >27 /HPF — HIGH (ref 0–5)
ETHANOL SERPL-MCNC: 110 MG/DL — HIGH
GAS PNL BLDV: 140 MMOL/L — SIGNIFICANT CHANGE UP (ref 136–145)
GAS PNL BLDV: 140 MMOL/L — SIGNIFICANT CHANGE UP (ref 136–145)
GAS PNL BLDV: SIGNIFICANT CHANGE UP
GAS PNL BLDV: SIGNIFICANT CHANGE UP
GLUCOSE BLDC GLUCOMTR-MCNC: 108 MG/DL — HIGH (ref 70–99)
GLUCOSE SERPL-MCNC: 105 MG/DL — HIGH (ref 70–99)
GLUCOSE SERPL-MCNC: 121 MG/DL — HIGH (ref 70–99)
GLUCOSE SERPL-MCNC: 99 MG/DL — SIGNIFICANT CHANGE UP (ref 70–99)
GLUCOSE UR QL: NEGATIVE — SIGNIFICANT CHANGE UP
HCG SERPL QL: NEGATIVE — SIGNIFICANT CHANGE UP
HCO3 BLDV-SCNC: 23 MMOL/L — SIGNIFICANT CHANGE UP (ref 22–29)
HCO3 BLDV-SCNC: 23 MMOL/L — SIGNIFICANT CHANGE UP (ref 22–29)
HCT VFR BLD CALC: 26.7 % — LOW (ref 37–47)
HCT VFR BLD CALC: 27.3 % — LOW (ref 37–47)
HCT VFR BLDA CALC: 26.5 % — LOW (ref 34–44)
HCT VFR BLDA CALC: 27 % — LOW (ref 34–44)
HEV AB FLD QL: NEGATIVE — SIGNIFICANT CHANGE UP
HGB BLD CALC-MCNC: 8.6 G/DL — LOW (ref 14–18)
HGB BLD CALC-MCNC: 8.8 G/DL — LOW (ref 14–18)
HGB BLD-MCNC: 8.5 G/DL — LOW (ref 12–16)
HGB BLD-MCNC: 8.6 G/DL — LOW (ref 12–16)
HYALINE CASTS # UR AUTO: 91 /LPF — HIGH (ref 0–7)
IMM GRANULOCYTES NFR BLD AUTO: 0.5 % — HIGH (ref 0.1–0.3)
IMM GRANULOCYTES NFR BLD AUTO: 0.7 % — HIGH (ref 0.1–0.3)
INR BLD: 1.39 RATIO — HIGH (ref 0.65–1.3)
KETONES UR-MCNC: NEGATIVE — SIGNIFICANT CHANGE UP
LACTATE BLDV-MCNC: 5.7 MMOL/L — HIGH (ref 0.5–1.6)
LACTATE BLDV-MCNC: 6.4 MMOL/L — HIGH (ref 0.5–1.6)
LACTATE SERPL-SCNC: 5.1 MMOL/L — CRITICAL HIGH (ref 0.7–2)
LACTATE SERPL-SCNC: 5.5 MMOL/L — CRITICAL HIGH (ref 0.7–2)
LACTATE SERPL-SCNC: 6.7 MMOL/L — CRITICAL HIGH (ref 0.7–2)
LEUKOCYTE ESTERASE UR-ACNC: NEGATIVE — SIGNIFICANT CHANGE UP
LIDOCAIN IGE QN: 45 U/L — SIGNIFICANT CHANGE UP (ref 7–60)
LYMPHOCYTES # BLD AUTO: 1.12 K/UL — LOW (ref 1.2–3.4)
LYMPHOCYTES # BLD AUTO: 1.39 K/UL — SIGNIFICANT CHANGE UP (ref 1.2–3.4)
LYMPHOCYTES # BLD AUTO: 7.6 % — LOW (ref 20.5–51.1)
LYMPHOCYTES # BLD AUTO: 8.4 % — LOW (ref 20.5–51.1)
MAGNESIUM SERPL-MCNC: 2.2 MG/DL — SIGNIFICANT CHANGE UP (ref 1.8–2.4)
MAGNESIUM SERPL-MCNC: 2.2 MG/DL — SIGNIFICANT CHANGE UP (ref 1.8–2.4)
MAGNESIUM SERPL-MCNC: 2.4 MG/DL — SIGNIFICANT CHANGE UP (ref 1.8–2.4)
MCHC RBC-ENTMCNC: 31.5 G/DL — LOW (ref 32–37)
MCHC RBC-ENTMCNC: 31.8 G/DL — LOW (ref 32–37)
MCHC RBC-ENTMCNC: 32.6 PG — HIGH (ref 27–31)
MCHC RBC-ENTMCNC: 32.8 PG — HIGH (ref 27–31)
MCV RBC AUTO: 103.1 FL — HIGH (ref 81–99)
MCV RBC AUTO: 103.4 FL — HIGH (ref 81–99)
MONOCYTES # BLD AUTO: 0.86 K/UL — HIGH (ref 0.1–0.6)
MONOCYTES # BLD AUTO: 1.13 K/UL — HIGH (ref 0.1–0.6)
MONOCYTES NFR BLD AUTO: 5.9 % — SIGNIFICANT CHANGE UP (ref 1.7–9.3)
MONOCYTES NFR BLD AUTO: 6.8 % — SIGNIFICANT CHANGE UP (ref 1.7–9.3)
NEUTROPHILS # BLD AUTO: 12 K/UL — HIGH (ref 1.4–6.5)
NEUTROPHILS # BLD AUTO: 13.35 K/UL — HIGH (ref 1.4–6.5)
NEUTROPHILS NFR BLD AUTO: 80.7 % — HIGH (ref 42.2–75.2)
NEUTROPHILS NFR BLD AUTO: 81.7 % — HIGH (ref 42.2–75.2)
NITRITE UR-MCNC: NEGATIVE — SIGNIFICANT CHANGE UP
NRBC # BLD: 0 /100 WBCS — SIGNIFICANT CHANGE UP (ref 0–0)
NRBC # BLD: 0 /100 WBCS — SIGNIFICANT CHANGE UP (ref 0–0)
PCO2 BLDV: 34 MMHG — LOW (ref 41–51)
PCO2 BLDV: 35 MMHG — LOW (ref 41–51)
PH BLDV: 7.42 — SIGNIFICANT CHANGE UP (ref 7.26–7.43)
PH BLDV: 7.43 — SIGNIFICANT CHANGE UP (ref 7.26–7.43)
PH UR: 6.5 — SIGNIFICANT CHANGE UP (ref 5–8)
PHOSPHATE SERPL-MCNC: 2.8 MG/DL — SIGNIFICANT CHANGE UP (ref 2.1–4.9)
PLATELET # BLD AUTO: 324 K/UL — SIGNIFICANT CHANGE UP (ref 130–400)
PLATELET # BLD AUTO: 351 K/UL — SIGNIFICANT CHANGE UP (ref 130–400)
PO2 BLDV: 20 MMHG — SIGNIFICANT CHANGE UP (ref 20–40)
PO2 BLDV: 24 MMHG — SIGNIFICANT CHANGE UP (ref 20–40)
POTASSIUM BLDV-SCNC: 2.8 MMOL/L — LOW (ref 3.3–5.6)
POTASSIUM BLDV-SCNC: 3.4 MMOL/L — SIGNIFICANT CHANGE UP (ref 3.3–5.6)
POTASSIUM SERPL-MCNC: 2.9 MMOL/L — LOW (ref 3.5–5)
POTASSIUM SERPL-MCNC: 3.6 MMOL/L — SIGNIFICANT CHANGE UP (ref 3.5–5)
POTASSIUM SERPL-MCNC: 4.3 MMOL/L — SIGNIFICANT CHANGE UP (ref 3.5–5)
POTASSIUM SERPL-SCNC: 2.9 MMOL/L — LOW (ref 3.5–5)
POTASSIUM SERPL-SCNC: 3.6 MMOL/L — SIGNIFICANT CHANGE UP (ref 3.5–5)
POTASSIUM SERPL-SCNC: 4.3 MMOL/L — SIGNIFICANT CHANGE UP (ref 3.5–5)
PROT SERPL-MCNC: 5.2 G/DL — LOW (ref 6–8)
PROT SERPL-MCNC: 5.3 G/DL — LOW (ref 6–8)
PROT SERPL-MCNC: 5.7 G/DL — LOW (ref 6–8)
PROT UR-MCNC: ABNORMAL
PROTHROM AB SERPL-ACNC: 16 SEC — HIGH (ref 9.95–12.87)
RBC # BLD: 2.59 M/UL — LOW (ref 4.2–5.4)
RBC # BLD: 2.64 M/UL — LOW (ref 4.2–5.4)
RBC # FLD: 16.7 % — HIGH (ref 11.5–14.5)
RBC # FLD: 17.2 % — HIGH (ref 11.5–14.5)
RBC CASTS # UR COMP ASSIST: 4 /HPF — SIGNIFICANT CHANGE UP (ref 0–4)
SALICYLATES SERPL-MCNC: <0.3 MG/DL — LOW (ref 4–30)
SAO2 % BLDV: 23 % — SIGNIFICANT CHANGE UP
SAO2 % BLDV: 31 % — SIGNIFICANT CHANGE UP
SARS-COV-2 RNA SPEC QL NAA+PROBE: SIGNIFICANT CHANGE UP
SODIUM SERPL-SCNC: 139 MMOL/L — SIGNIFICANT CHANGE UP (ref 135–146)
SODIUM SERPL-SCNC: 140 MMOL/L — SIGNIFICANT CHANGE UP (ref 135–146)
SODIUM SERPL-SCNC: 143 MMOL/L — SIGNIFICANT CHANGE UP (ref 135–146)
SP GR SPEC: 1.04 — HIGH (ref 1.01–1.02)
TROPONIN T SERPL-MCNC: <0.01 NG/ML — SIGNIFICANT CHANGE UP
UROBILINOGEN FLD QL: SIGNIFICANT CHANGE UP
WBC # BLD: 14.69 K/UL — HIGH (ref 4.8–10.8)
WBC # BLD: 16.54 K/UL — HIGH (ref 4.8–10.8)
WBC # FLD AUTO: 14.69 K/UL — HIGH (ref 4.8–10.8)
WBC # FLD AUTO: 16.54 K/UL — HIGH (ref 4.8–10.8)
WBC UR QL: 49 /HPF — HIGH (ref 0–5)

## 2020-06-15 PROCEDURE — 93010 ELECTROCARDIOGRAM REPORT: CPT

## 2020-06-15 PROCEDURE — 71045 X-RAY EXAM CHEST 1 VIEW: CPT | Mod: 26

## 2020-06-15 PROCEDURE — 74177 CT ABD & PELVIS W/CONTRAST: CPT | Mod: 26

## 2020-06-15 PROCEDURE — 99233 SBSQ HOSP IP/OBS HIGH 50: CPT

## 2020-06-15 RX ORDER — LIDOCAINE HCL 20 MG/ML
10 VIAL (ML) INJECTION ONCE
Refills: 0 | Status: DISCONTINUED | OUTPATIENT
Start: 2020-06-15 | End: 2020-06-18

## 2020-06-15 RX ORDER — PANTOPRAZOLE SODIUM 20 MG/1
40 TABLET, DELAYED RELEASE ORAL DAILY
Refills: 0 | Status: DISCONTINUED | OUTPATIENT
Start: 2020-06-15 | End: 2020-06-16

## 2020-06-15 RX ORDER — FUROSEMIDE 40 MG
40 TABLET ORAL DAILY
Refills: 0 | Status: DISCONTINUED | OUTPATIENT
Start: 2020-06-15 | End: 2020-06-16

## 2020-06-15 RX ORDER — POTASSIUM CHLORIDE 20 MEQ
20 PACKET (EA) ORAL ONCE
Refills: 0 | Status: COMPLETED | OUTPATIENT
Start: 2020-06-15 | End: 2020-06-15

## 2020-06-15 RX ORDER — CEFTRIAXONE 500 MG/1
2000 INJECTION, POWDER, FOR SOLUTION INTRAMUSCULAR; INTRAVENOUS EVERY 24 HOURS
Refills: 0 | Status: DISCONTINUED | OUTPATIENT
Start: 2020-06-15 | End: 2020-06-17

## 2020-06-15 RX ORDER — CHLORHEXIDINE GLUCONATE 213 G/1000ML
1 SOLUTION TOPICAL
Refills: 0 | Status: DISCONTINUED | OUTPATIENT
Start: 2020-06-15 | End: 2020-06-26

## 2020-06-15 RX ORDER — POTASSIUM CHLORIDE 20 MEQ
20 PACKET (EA) ORAL
Refills: 0 | Status: COMPLETED | OUTPATIENT
Start: 2020-06-15 | End: 2020-06-15

## 2020-06-15 RX ORDER — VANCOMYCIN HCL 1 G
125 VIAL (EA) INTRAVENOUS EVERY 6 HOURS
Refills: 0 | Status: DISCONTINUED | OUTPATIENT
Start: 2020-06-15 | End: 2020-06-26

## 2020-06-15 RX ORDER — SODIUM CHLORIDE 9 MG/ML
1000 INJECTION INTRAMUSCULAR; INTRAVENOUS; SUBCUTANEOUS ONCE
Refills: 0 | Status: COMPLETED | OUTPATIENT
Start: 2020-06-15 | End: 2020-06-15

## 2020-06-15 RX ORDER — FOLIC ACID 0.8 MG
1 TABLET ORAL DAILY
Refills: 0 | Status: DISCONTINUED | OUTPATIENT
Start: 2020-06-15 | End: 2020-06-26

## 2020-06-15 RX ORDER — MORPHINE SULFATE 50 MG/1
2 CAPSULE, EXTENDED RELEASE ORAL
Refills: 0 | Status: DISCONTINUED | OUTPATIENT
Start: 2020-06-15 | End: 2020-06-15

## 2020-06-15 RX ORDER — THIAMINE MONONITRATE (VIT B1) 100 MG
100 TABLET ORAL DAILY
Refills: 0 | Status: DISCONTINUED | OUTPATIENT
Start: 2020-06-15 | End: 2020-06-26

## 2020-06-15 RX ORDER — MAGNESIUM SULFATE 500 MG/ML
2 VIAL (ML) INJECTION ONCE
Refills: 0 | Status: COMPLETED | OUTPATIENT
Start: 2020-06-15 | End: 2020-06-15

## 2020-06-15 RX ORDER — MORPHINE SULFATE 50 MG/1
4 CAPSULE, EXTENDED RELEASE ORAL EVERY 4 HOURS
Refills: 0 | Status: DISCONTINUED | OUTPATIENT
Start: 2020-06-15 | End: 2020-06-18

## 2020-06-15 RX ORDER — SODIUM CHLORIDE 9 MG/ML
1000 INJECTION, SOLUTION INTRAVENOUS
Refills: 0 | Status: DISCONTINUED | OUTPATIENT
Start: 2020-06-15 | End: 2020-06-15

## 2020-06-15 RX ORDER — MORPHINE SULFATE 50 MG/1
2 CAPSULE, EXTENDED RELEASE ORAL EVERY 4 HOURS
Refills: 0 | Status: DISCONTINUED | OUTPATIENT
Start: 2020-06-15 | End: 2020-06-15

## 2020-06-15 RX ORDER — CEFEPIME 1 G/1
2000 INJECTION, POWDER, FOR SOLUTION INTRAMUSCULAR; INTRAVENOUS ONCE
Refills: 0 | Status: COMPLETED | OUTPATIENT
Start: 2020-06-15 | End: 2020-06-15

## 2020-06-15 RX ORDER — NICOTINE POLACRILEX 2 MG
1 GUM BUCCAL DAILY
Refills: 0 | Status: DISCONTINUED | OUTPATIENT
Start: 2020-06-15 | End: 2020-06-19

## 2020-06-15 RX ORDER — METRONIDAZOLE 500 MG
500 TABLET ORAL ONCE
Refills: 0 | Status: COMPLETED | OUTPATIENT
Start: 2020-06-15 | End: 2020-06-15

## 2020-06-15 RX ORDER — SODIUM CHLORIDE 9 MG/ML
1000 INJECTION, SOLUTION INTRAVENOUS ONCE
Refills: 0 | Status: COMPLETED | OUTPATIENT
Start: 2020-06-15 | End: 2020-06-15

## 2020-06-15 RX ORDER — MORPHINE SULFATE 50 MG/1
4 CAPSULE, EXTENDED RELEASE ORAL ONCE
Refills: 0 | Status: DISCONTINUED | OUTPATIENT
Start: 2020-06-15 | End: 2020-06-15

## 2020-06-15 RX ORDER — MORPHINE SULFATE 50 MG/1
2 CAPSULE, EXTENDED RELEASE ORAL ONCE
Refills: 0 | Status: DISCONTINUED | OUTPATIENT
Start: 2020-06-15 | End: 2020-06-15

## 2020-06-15 RX ORDER — METRONIDAZOLE 500 MG
500 TABLET ORAL EVERY 8 HOURS
Refills: 0 | Status: DISCONTINUED | OUTPATIENT
Start: 2020-06-15 | End: 2020-06-15

## 2020-06-15 RX ADMIN — Medication 100 MILLIGRAM(S): at 04:41

## 2020-06-15 RX ADMIN — SODIUM CHLORIDE 1000 MILLILITER(S): 9 INJECTION, SOLUTION INTRAVENOUS at 06:02

## 2020-06-15 RX ADMIN — CEFEPIME 100 MILLIGRAM(S): 1 INJECTION, POWDER, FOR SOLUTION INTRAMUSCULAR; INTRAVENOUS at 04:07

## 2020-06-15 RX ADMIN — Medication 125 MILLIGRAM(S): at 12:55

## 2020-06-15 RX ADMIN — Medication 1 MILLIGRAM(S): at 06:28

## 2020-06-15 RX ADMIN — CEFTRIAXONE 100 MILLIGRAM(S): 500 INJECTION, POWDER, FOR SOLUTION INTRAMUSCULAR; INTRAVENOUS at 06:28

## 2020-06-15 RX ADMIN — MORPHINE SULFATE 4 MILLIGRAM(S): 50 CAPSULE, EXTENDED RELEASE ORAL at 01:02

## 2020-06-15 RX ADMIN — Medication 50 MILLIEQUIVALENT(S): at 12:55

## 2020-06-15 RX ADMIN — MORPHINE SULFATE 2 MILLIGRAM(S): 50 CAPSULE, EXTENDED RELEASE ORAL at 06:02

## 2020-06-15 RX ADMIN — PANTOPRAZOLE SODIUM 40 MILLIGRAM(S): 20 TABLET, DELAYED RELEASE ORAL at 23:34

## 2020-06-15 RX ADMIN — Medication 50 GRAM(S): at 02:51

## 2020-06-15 RX ADMIN — SODIUM CHLORIDE 1000 MILLILITER(S): 9 INJECTION INTRAMUSCULAR; INTRAVENOUS; SUBCUTANEOUS at 01:02

## 2020-06-15 RX ADMIN — Medication 50 MILLIEQUIVALENT(S): at 04:07

## 2020-06-15 RX ADMIN — Medication 50 MILLIEQUIVALENT(S): at 09:57

## 2020-06-15 RX ADMIN — Medication 40 MILLIGRAM(S): at 13:31

## 2020-06-15 RX ADMIN — MORPHINE SULFATE 4 MILLIGRAM(S): 50 CAPSULE, EXTENDED RELEASE ORAL at 18:22

## 2020-06-15 RX ADMIN — MORPHINE SULFATE 4 MILLIGRAM(S): 50 CAPSULE, EXTENDED RELEASE ORAL at 22:36

## 2020-06-15 RX ADMIN — MORPHINE SULFATE 2 MILLIGRAM(S): 50 CAPSULE, EXTENDED RELEASE ORAL at 09:59

## 2020-06-15 RX ADMIN — Medication 1 PATCH: at 23:34

## 2020-06-15 RX ADMIN — MORPHINE SULFATE 2 MILLIGRAM(S): 50 CAPSULE, EXTENDED RELEASE ORAL at 14:52

## 2020-06-15 RX ADMIN — Medication 125 MILLIGRAM(S): at 17:21

## 2020-06-15 RX ADMIN — Medication 125 MILLIGRAM(S): at 06:28

## 2020-06-15 RX ADMIN — Medication 50 MILLIEQUIVALENT(S): at 06:28

## 2020-06-15 RX ADMIN — SODIUM CHLORIDE 1000 MILLILITER(S): 9 INJECTION, SOLUTION INTRAVENOUS at 07:15

## 2020-06-15 RX ADMIN — Medication 100 MILLIGRAM(S): at 12:55

## 2020-06-15 RX ADMIN — Medication 125 MILLIGRAM(S): at 23:34

## 2020-06-15 NOTE — H&P ADULT - NSHPPHYSICALEXAM_GEN_ALL_CORE
PHYSICAL EXAM:  GENERAL: NAD, well-developed  HEAD:  Atraumatic, Normocephalic  EYES: EOMI, PERRLA, conjunctiva and sclera clear  NECK: Supple, No JVD  CHEST/LUNG: Clear to auscultation bilaterally; No wheeze  HEART: Regular rate and rhythm; No murmurs, rubs, or gallops  ABDOMEN: distended, tender abdomen with fluid shift.   EXTREMITIES:  Pitting edema B/L.   PSYCH: AAOx3  NEUROLOGY: non-focal  SKIN: No rashes or lesions PHYSICAL EXAM:  GENERAL: NAD  HEAD:  Atraumatic, Normocephalic  EYES: EOMI, PERRLA, conjunctiva and sclera clear  NECK: Supple, No JVD  CHEST/LUNG: Clear to auscultation bilaterally; No wheeze  HEART: Regular rate and rhythm; No murmurs, rubs, or gallops  ABDOMEN: distended, tender abdomen with fluid shift.   EXTREMITIES:  Pitting edema B/L.   PSYCH: AAOx3  NEUROLOGY: non-focal  SKIN: No rashes or lesions

## 2020-06-15 NOTE — ED PROVIDER NOTE - OBJECTIVE STATEMENT
39 year old female with a history of ETOH abuse, pancreatitis, depression, anxiety 39 year old female with a history of ETOH abuse, pancreatitis, depression, anxiety, recent admission for pancreatitis presenting for abdominal pain. Patient was discharge on 6/12 for pancreatitis, pancreatic pseudocysts, admission complicated by +c diff. Patient signed out AMA. States she drank 2 glasses of wine yesterday.

## 2020-06-15 NOTE — ED PROVIDER NOTE - NS ED ROS FT
Eyes:  No visual changes, eye pain or discharge.  ENMT:  No hearing changes, pain, discharge or infections. No neck pain or stiffness.  Cardiac:  No chest pain, SOB or edema. No chest pain with exertion.  Respiratory:  No cough or respiratory distress. No hemoptysis. No history of asthma or RAD.  GI:  +nausea, no vomiting, +diarrhea  +abdominal pain.  :  No dysuria, frequency or burning.  MS:  No myalgia, muscle weakness, joint pain or back pain.  Neuro:  No headache or weakness.  No LOC.  Skin:  No skin rash.   Endocrine: No history of thyroid disease or diabetes.

## 2020-06-15 NOTE — CONSULT NOTE ADULT - SUBJECTIVE AND OBJECTIVE BOX
Gastroenterology Consultation:    Patient is a 39y old  Female who presents with a chief complaint of Abdominal Pain, Leg swelling. (15 Aaron 2020 06:12)      Admitted on: 06-15-20    HPI:    Pt is a 39 year old female with a history of ETOH abuse, Alcoholic liver disease,  pancreatitis, depression, anxiety, Recent C diff infection who comes to the ED with chief c/o Abdominal pain, distension and leg swelling.   Pt was recently admitted to the hospital from May end- June 12th when she was Dx with C diff colitis ( supposed to complete rx on 6/19), Recurrent pancreatitis with acute pancreatic collection/ pseudocyst. Pt left AMA on last admission?. Pt states that she hasnt picked up her medications from the pharmacy and has not been taking any medicines since discharge.   Pt is an active drinker- states her last alcohol drink was yesterday. States her last episode of diarrhea was yesterday.   Pt today comes to the ED with abdominal swelling and pain. Pt states she has persistent pain in the abdomen- generalized, non radiating a/w worsening abdominal distension since 2 days. Denies any nausea/ vomiting. Denies any fever, chills.   VS on arrival noted for , /71, sat 100 on room air. Admission labs significant for Leukocytosis 16K, Macrocytic Anemia, K 3.2,   A gap 17, BUN <3, Low albumin, Bili 3.1, elevated Alk phos, Lactate 6.7, elevated blood alcohol level.     CT abdomen showed - Persistent  and slightly increased colonic thickening from cecum through transverse colon, compatible with known colitis; no evidence of abscess. Unchanged generalized mesenteric edema, limiting assessment for peripancreatic inflammatory change. Unchanged multiple pancreatic fluid collections, measuring up to 2.4 cm, which may be represent pancreatic pseudocysts. Stable mild to moderate ascites.  Hepatic steatosis.    Pt received IV abx, 2L in the ED and is being admitted to ICU for persistent lactemia after fluid resuscitation. (15 Aaron 2020 06:12)      COVID 19: Pending    Prior records Reviewed (Y/N): Y  History obtained from person other than patient (Y/N): N    Prior EGD:  Prior Colonoscopy:      PAST MEDICAL & SURGICAL HISTORY:  ETOH abuse  Postpartum depression  Substance abuse  Anxiety  Depression  No significant past surgical history      FAMILY HISTORY:      Social History:  Tobacco:  Alcohol:  Drugs:    Home Medications:    MEDICATIONS  (STANDING):  cefTRIAXone   IVPB 2000 milliGRAM(s) IV Intermittent every 24 hours  chlorhexidine 4% Liquid 1 Application(s) Topical <User Schedule>  folic acid 1 milliGRAM(s) Oral daily  furosemide   Injectable 40 milliGRAM(s) IV Push daily  potassium chloride  20 mEq/100 mL IVPB 20 milliEquivalent(s) IV Intermittent every 2 hours  thiamine 100 milliGRAM(s) Oral daily  vancomycin    Solution 125 milliGRAM(s) Oral every 6 hours    MEDICATIONS  (PRN):  morphine  - Injectable 2 milliGRAM(s) IV Push every 4 hours PRN Severe Pain (7 - 10)      Allergies  No Known Allergies      Review of Systems:   Constitutional:  No Fever, No Chills  ENT/Mouth:  No Hearing Changes,  No Difficulty Swallowing  Eyes:  No Eye Pain, No Vision Changes  Cardiovascular:  No Chest Pain, No Palpitations  Respiratory:  No Cough, No Dyspnea  Gastrointestinal:  As described in HPI  Musculoskeletal:  No Joint Swelling, No Back Pain  Skin:  No Skin Lesions, No Jaundice  Neuro:  No Syncope, No Dizziness  Heme/Lymph:  No Bruising, No Bleeding.          Physical Examination:  T(C): 36.6 (06-15-20 @ 09:45), Max: 36.8 (06-15-20 @ 00:05)  HR: 100 (06-15-20 @ 12:30) (98 - 111)  BP: 91/45 (06-15-20 @ 12:30) (91/45 - 123/69)  RR: 16 (06-15-20 @ 12:30) (16 - 21)  SpO2: 95% (06-15-20 @ 12:30) (95% - 100%)  Height (cm): 162.6 (06-15-20 @ 09:45)  Weight (kg): 74.2 (06-15-20 @ 09:45)    06-15-20 @ 07:01  -  06-15-20 @ 12:46  --------------------------------------------------------  IN: 0 mL / OUT: 225 mL / NET: -225 mL        Constitutional: No acute distress.  Eyes:. Conjunctivae are clear, Sclera is non-icteric.  Ears Nose and Throat: The external ears are normal appearing,  Oral mucosa is pink and moist.  Respiratory:  No signs of respiratory distress. Lung sounds are clear bilaterally.  Cardiovascular:  S1 S2, Regular rate and rhythm.  GI: Abdomen is soft, symmetric, and non-tender without distention. There are no visible lesions. Bowel sounds are present and normoactive in all four quadrants. No masses, hepatomegaly, or splenomegaly are noted.   Neuro: No Tremor, No involuntary movements  Skin: No rashes, No Jaundice.          Data: (reviewed by attending)                        8.6    14.69 )-----------( 324      ( 15 Aaron 2020 11:11 )             27.3     Hgb Trend:  8.6  06-15-20 @ 11:11  8.5  06-15-20 @ 00:56  9.8  06-13-20 @ 13:43        06-15    139  |  104  |  <3<L>  ----------------------------<  99  3.6   |  20  |  0.5<L>    Ca    7.9<L>      15 Aaron 2020 11:11  Phos  2.8     06-15  Mg     2.4     06-15    TPro  5.3<L>  /  Alb  2.0<L>  /  TBili  3.0<H>  /  DBili  x   /  AST  91<H>  /  ALT  23  /  AlkPhos  252<H>  06-15    Liver panel trend:  TBili 3.0   /   AST 91   /   ALT 23   /   AlkP 252   /   Tptn 5.3   /   Alb 2.0    /   DBili --      06-15  TBili 3.1   /   AST 97   /   ALT 24   /   AlkP 268   /   Tptn 5.2   /   Alb 2.0    /   DBili 2.3      06-15  TBili 5.1   /   AST 90   /   ALT 23   /   AlkP 273   /   Tptn 5.9   /   Alb 2.2    /   DBili 3.5      06-13  TBili 3.8   /   AST 92   /   ALT 21   /   AlkP 249   /   Tptn 5.1   /   Alb 2.1    /   DBili --      06-12  TBili 3.3   /   AST 98   /   ALT 21   /   AlkP 247   /   Tptn 5.0   /   Alb 2.1    /   DBili --      06-11  TBili 3.5   /      /   ALT 22   /   AlkP 257   /   Tptn 5.1   /   Alb 2.1    /   DBili --      06-10  TBili 3.6   /      /   ALT 23   /   AlkP 238   /   Tptn 4.8   /   Alb 2.1    /   DBili 2.9      06-09  TBili 3.2   /      /   ALT 22   /   AlkP 232   /   Tptn 4.6   /   Alb 2.1    /   DBili --      06-08  TBili 4.0   /      /   ALT 24   /   AlkP 254   /   Tptn 5.0   /   Alb 2.2    /   DBili --      06-07  TBili 3.0   /      /   ALT 22   /   AlkP 235   /   Tptn 4.7   /   Alb 1.9    /   DBili --      06-06      PT/INR - ( 15 Aaron 2020 00:56 )   PT: 16.00 sec;   INR: 1.39 ratio         PTT - ( 15 Aaron 2020 00:56 )  PTT:35.5 sec        Radiology:(reviewed by attending)  CT Abdomen and Pelvis w/ IV Cont:   EXAM:  CT ABDOMEN AND PELVIS IC            PROCEDURE DATE:  06/15/2020            INTERPRETATION:  CLINICAL STATEMENT: Abdominal pain. Pancreatitis  . C. difficile colitis.    TECHNIQUE: Contiguous axial CT images were obtained from the lower chestto the pubic symphysis following administration of 100cc Optiray 320 intravenous contrast.  Oral contrast was not administered.  Reformatted images in the coronal and sagittal planes were acquired.    Comparison made with CT abdomen and pelvis Aida 10, 2020.    FINDINGS:    LOWER CHEST: Small left pleural effusion, stable with bibasilar subsegmental atelectasis    HEPATOBILIARY: Hepatic steatosis. Hepatomegaly, 25 cm in length. No biliary dilation. Gallbladder partially distended..    SPLEEN: Unremarkable.    PANCREAS: Unchanged generalized mesenteric edema, limiting evaluation for peripancreatic inflammatory change. Unchanged multiple pancreatic fluid collections, measuring up to 2.4 cm. Atrophic pancreas. No evidence of hemorrhage. Patent splenic vein.    ADRENAL GLANDS: Unremarkable.    KIDNEYS: Unremarkable.    ABDOMINOPELVIC NODES: Unremarkable.    PELVIC ORGANS: Unremarkable.    PERITONEUM/MESENTERY/BOWEL: Persistent and slightly increased colonic thickening from cecum through transverse colon; no evidence of abscess. Stable mild to moderate ascites.    BONES/SOFT TISSUES: Unremarkable.        IMPRESSION:   Since Aida 10, 2020:    1. Persistent  and slightly increased colonic thickening from cecum through transverse colon, compatible with known colitis; no evidence of abscess.     2. Unchanged generalized mesenteric edema, limiting assessment for peripancreatic inflammatory change. Unchanged multiple pancreatic fluid collections, measuring up to 2.4 cm, which may be represent pancreatic pseudocysts.    3. Stable mild to moderate ascites.    4. Hepatic steatosis.                  AMILCAR ROJAS M.D., ATTENDING RADIOLOGIST  This document has been electronically signed. Aaron 15 2020  5:08AM             (06-15-20 @ 04:52)    US Abdomen Limited:   EXAM:  US ABDOMEN LIMITED            PROCEDURE DATE:  06/13/2020            INTERPRETATION:  CLINICAL HISTORY: Right lower quadrant abdominal pain, transaminitis.    COMPARISON: Right upper quadrant abdominal ultrasound dated 5/29/2020. CT of the abdomen and pelvis dated 6/10/2020.    PROCEDURE: Ultrasound of the right upper quadrant was performed.    FINDINGS:    LIVER:  Normal in contour and increased in echogenicity measuring 21.1 cm in length. No focal mass.    GALLBLADDER/BILIARY TREE:  Gallbladder sludge, no calculi. Gallbladder wall thickening, measuring up to 0.5 cm. Reported negative sonographic Patton's sign. No intrahepatic biliary ductal dilatation. The common bile duct measures 5 mm, which is normal.     PANCREAS: Poorly visualized due to overlying bowel gas and ascites.    KIDNEY:  Right kidney measures 11.4 cm in length. No hydronephrosis, calculi or solid mass.    AORTA/IVC:  Visualized proximal portions unremarkable.    ASCITES:  Moderate abdominopelvic ascites is noted.    IMPRESSION:    Echogenic, enlarged liver is compatible with hepatic steatosis or hepatocellular disease.    Gallbladder wall thickening with intraluminal sludge. In light of patient's recent CT scan findings consistent with pancreatitis, follow-up imaging to resolution is recommended.    Moderate abdominopelvic ascites.              DONYA BENAVIDES M.D., RESIDENT RADIOLOGIST  This document has been electronically signed.  TRINITY EWING M.D., ATTENDING RADIOLOGIST  This document has been electronically signed. Jun 13 2020  4:21PM             (06-13-20 @ 15:37) Gastroenterology Consultation:    Patient is a 39y old  Female who presents with a chief complaint of Abdominal Pain, Leg swelling. (15 Aaron 2020 06:12)      Admitted on: 06-15-20    HPI:    Pt is a 39 year old female with a history of ETOH abuse, Alcoholic liver disease,  pancreatitis, depression, anxiety, Recent C diff infection who comes to the ED with chief c/o Abdominal pain, distension and leg swelling.   Pt was recently admitted to the hospital from May end- June 12th when she was Dx with C diff colitis ( supposed to complete rx on 6/19), Recurrent pancreatitis with acute pancreatic collection/ pseudocyst. Pt left AMA on last admission?. Pt states that she hasnt picked up her medications from the pharmacy and has not been taking any medicines since discharge.   Pt is an active drinker- states her last alcohol drink was yesterday. States her last episode of diarrhea was yesterday.   Pt today comes to the ED with abdominal swelling and pain. Pt states she has persistent pain in the abdomen- generalized, non radiating a/w worsening abdominal distension since 2 days. Denies any nausea/ vomiting. Denies any fever, chills.   VS on arrival noted for , /71, sat 100 on room air. Admission labs significant for Leukocytosis 16K, Macrocytic Anemia, K 3.2,   A gap 17, BUN <3, Low albumin, Bili 3.1, elevated Alk phos, Lactate 6.7, elevated blood alcohol level.     CT abdomen showed - Persistent  and slightly increased colonic thickening from cecum through transverse colon, compatible with known colitis; no evidence of abscess. Unchanged generalized mesenteric edema, limiting assessment for peripancreatic inflammatory change. Unchanged multiple pancreatic fluid collections, measuring up to 2.4 cm, which may be represent pancreatic pseudocysts. Stable mild to moderate ascites.  Hepatic steatosis.    Pt received IV abx, 2L in the ED and is being admitted to ICU for persistent lactemia after fluid resuscitation. (15 Aaron 2020 06:12)      COVID 19: Pending    Prior records Reviewed (Y/N): Y  History obtained from person other than patient (Y/N): N        PAST MEDICAL & SURGICAL HISTORY:  ETOH abuse  Postpartum depression  Substance abuse  Anxiety  Depression  No significant past surgical history      FAMILY HISTORY: No FH of pancreatitis      Social History:  Alcohol abuse  Opioid abuse    Home Medications:    MEDICATIONS  (STANDING):  cefTRIAXone   IVPB 2000 milliGRAM(s) IV Intermittent every 24 hours  chlorhexidine 4% Liquid 1 Application(s) Topical <User Schedule>  folic acid 1 milliGRAM(s) Oral daily  furosemide   Injectable 40 milliGRAM(s) IV Push daily  potassium chloride  20 mEq/100 mL IVPB 20 milliEquivalent(s) IV Intermittent every 2 hours  thiamine 100 milliGRAM(s) Oral daily  vancomycin    Solution 125 milliGRAM(s) Oral every 6 hours    MEDICATIONS  (PRN):  morphine  - Injectable 2 milliGRAM(s) IV Push every 4 hours PRN Severe Pain (7 - 10)      Allergies  No Known Allergies      Review of Systems:   Constitutional:  No Fever, No Chills  ENT/Mouth:  No Hearing Changes,  No Difficulty Swallowing  Eyes:  No Eye Pain, No Vision Changes  Cardiovascular:  No Chest Pain, No Palpitations  Respiratory:  No Cough, No Dyspnea  Gastrointestinal:  As described in HPI  Musculoskeletal:  No Joint Swelling, No Back Pain  Skin:  jaundice+  Neuro:  No Syncope, No Dizziness  Heme/Lymph:  No Bruising, No Bleeding.          Physical Examination:  T(C): 36.6 (06-15-20 @ 09:45), Max: 36.8 (06-15-20 @ 00:05)  HR: 100 (06-15-20 @ 12:30) (98 - 111)  BP: 91/45 (06-15-20 @ 12:30) (91/45 - 123/69)  RR: 16 (06-15-20 @ 12:30) (16 - 21)  SpO2: 95% (06-15-20 @ 12:30) (95% - 100%)  Height (cm): 162.6 (06-15-20 @ 09:45)  Weight (kg): 74.2 (06-15-20 @ 09:45)    06-15-20 @ 07:01  -  06-15-20 @ 12:46  --------------------------------------------------------  IN: 0 mL / OUT: 225 mL / NET: -225 mL        Constitutional: No acute distress.  Eyes:. Sclera is icteric  Ears Nose and Throat: The external ears are normal appearing,  Oral mucosa is pink and moist.  Respiratory:  No signs of respiratory distress. \\  Cardiovascular:  S1 S2, Regular rate and rhythm.  GI: Abdomen is soft, symmetric, distended with mild diffuse tenderness   Neuro: No Tremor, No involuntary movements  Skin: Jaundice+          Data: (reviewed by attending)                        8.6    14.69 )-----------( 324      ( 15 Aaron 2020 11:11 )             27.3     Hgb Trend:  8.6  06-15-20 @ 11:11  8.5  06-15-20 @ 00:56  9.8  06-13-20 @ 13:43        06-15    139  |  104  |  <3<L>  ----------------------------<  99  3.6   |  20  |  0.5<L>    Ca    7.9<L>      15 Aaron 2020 11:11  Phos  2.8     06-15  Mg     2.4     06-15    TPro  5.3<L>  /  Alb  2.0<L>  /  TBili  3.0<H>  /  DBili  x   /  AST  91<H>  /  ALT  23  /  AlkPhos  252<H>  06-15    Liver panel trend:  TBili 3.0   /   AST 91   /   ALT 23   /   AlkP 252   /   Tptn 5.3   /   Alb 2.0    /   DBili --      06-15  TBili 3.1   /   AST 97   /   ALT 24   /   AlkP 268   /   Tptn 5.2   /   Alb 2.0    /   DBili 2.3      06-15  TBili 5.1   /   AST 90   /   ALT 23   /   AlkP 273   /   Tptn 5.9   /   Alb 2.2    /   DBili 3.5      06-13  TBili 3.8   /   AST 92   /   ALT 21   /   AlkP 249   /   Tptn 5.1   /   Alb 2.1    /   DBili --      06-12  TBili 3.3   /   AST 98   /   ALT 21   /   AlkP 247   /   Tptn 5.0   /   Alb 2.1    /   DBili --      06-11  TBili 3.5   /      /   ALT 22   /   AlkP 257   /   Tptn 5.1   /   Alb 2.1    /   DBili --      06-10  TBili 3.6   /      /   ALT 23   /   AlkP 238   /   Tptn 4.8   /   Alb 2.1    /   DBili 2.9      06-09  TBili 3.2   /      /   ALT 22   /   AlkP 232   /   Tptn 4.6   /   Alb 2.1    /   DBili --      06-08  TBili 4.0   /      /   ALT 24   /   AlkP 254   /   Tptn 5.0   /   Alb 2.2    /   DBili --      06-07  TBili 3.0   /      /   ALT 22   /   AlkP 235   /   Tptn 4.7   /   Alb 1.9    /   DBili --      06-06      PT/INR - ( 15 Aaron 2020 00:56 )   PT: 16.00 sec;   INR: 1.39 ratio         PTT - ( 15 Aaron 2020 00:56 )  PTT:35.5 sec        Radiology:(reviewed by attending)  CT Abdomen and Pelvis w/ IV Cont:   EXAM:  CT ABDOMEN AND PELVIS IC            PROCEDURE DATE:  06/15/2020            INTERPRETATION:  CLINICAL STATEMENT: Abdominal pain. Pancreatitis  . C. difficile colitis.    TECHNIQUE: Contiguous axial CT images were obtained from the lower chestto the pubic symphysis following administration of 100cc Optiray 320 intravenous contrast.  Oral contrast was not administered.  Reformatted images in the coronal and sagittal planes were acquired.    Comparison made with CT abdomen and pelvis Aida 10, 2020.    FINDINGS:    LOWER CHEST: Small left pleural effusion, stable with bibasilar subsegmental atelectasis    HEPATOBILIARY: Hepatic steatosis. Hepatomegaly, 25 cm in length. No biliary dilation. Gallbladder partially distended..    SPLEEN: Unremarkable.    PANCREAS: Unchanged generalized mesenteric edema, limiting evaluation for peripancreatic inflammatory change. Unchanged multiple pancreatic fluid collections, measuring up to 2.4 cm. Atrophic pancreas. No evidence of hemorrhage. Patent splenic vein.    ADRENAL GLANDS: Unremarkable.    KIDNEYS: Unremarkable.    ABDOMINOPELVIC NODES: Unremarkable.    PELVIC ORGANS: Unremarkable.    PERITONEUM/MESENTERY/BOWEL: Persistent and slightly increased colonic thickening from cecum through transverse colon; no evidence of abscess. Stable mild to moderate ascites.    BONES/SOFT TISSUES: Unremarkable.        IMPRESSION:   Since Aida 10, 2020:    1. Persistent  and slightly increased colonic thickening from cecum through transverse colon, compatible with known colitis; no evidence of abscess.     2. Unchanged generalized mesenteric edema, limiting assessment for peripancreatic inflammatory change. Unchanged multiple pancreatic fluid collections, measuring up to 2.4 cm, which may be represent pancreatic pseudocysts.    3. Stable mild to moderate ascites.    4. Hepatic steatosis.                  AMILCAR ROJAS M.D., ATTENDING RADIOLOGIST  This document has been electronically signed. Aaron 15 2020  5:08AM             (06-15-20 @ 04:52)    US Abdomen Limited:   EXAM:  US ABDOMEN LIMITED            PROCEDURE DATE:  06/13/2020            INTERPRETATION:  CLINICAL HISTORY: Right lower quadrant abdominal pain, transaminitis.    COMPARISON: Right upper quadrant abdominal ultrasound dated 5/29/2020. CT of the abdomen and pelvis dated 6/10/2020.    PROCEDURE: Ultrasound of the right upper quadrant was performed.    FINDINGS:    LIVER:  Normal in contour and increased in echogenicity measuring 21.1 cm in length. No focal mass.    GALLBLADDER/BILIARY TREE:  Gallbladder sludge, no calculi. Gallbladder wall thickening, measuring up to 0.5 cm. Reported negative sonographic Patton's sign. No intrahepatic biliary ductal dilatation. The common bile duct measures 5 mm, which is normal.     PANCREAS: Poorly visualized due to overlying bowel gas and ascites.    KIDNEY:  Right kidney measures 11.4 cm in length. No hydronephrosis, calculi or solid mass.    AORTA/IVC:  Visualized proximal portions unremarkable.    ASCITES:  Moderate abdominopelvic ascites is noted.    IMPRESSION:    Echogenic, enlarged liver is compatible with hepatic steatosis or hepatocellular disease.    Gallbladder wall thickening with intraluminal sludge. In light of patient's recent CT scan findings consistent with pancreatitis, follow-up imaging to resolution is recommended.    Moderate abdominopelvic ascites.              DONYA BENAVIDES M.D., RESIDENT RADIOLOGIST  This document has been electronically signed.  TRINITY EWING M.D., ATTENDING RADIOLOGIST  This document has been electronically signed. Jun 13 2020  4:21PM             (06-13-20 @ 15:37)

## 2020-06-15 NOTE — CONSULT NOTE ADULT - ASSESSMENT
39 year old female with a history of acute alcoholic pancreatitis, alcohol abuse,  presents with abdominal pain, leukocytosis, acute pancreatitis with developing pseudocysts, elevated LFTs and C diff        Recurrent pancreatitis with pseudocyst formation secondary to alcohol abuse  Persistent pain, no fever  CA/TGs are normal.    Rec  -advance diet as tolerated  -Pain control as needed (although component of opioid seeking behaviour possible)  -IV lasix 40 mg daily.  -Maintain hemodynamics    # First known episode of C diff :  On oral vancomycin 125 q6hrs (complete for 10 days)  KUB was normal for abdominal distension (likely secondary to ascites and bowel edema secondary to pancreatitis)    # Alcoholic liver disease: Elevated LFTs are likely secondary to alcoholic hepatitis (>2:1 ratio between AST: ALT)   MADDREY score: 21 so no role of steroids.  MRCP: CBD N, Sludge+ (last admission)  HBsAg, HBsAb, IgM/IgG, Anti HEV, Transferrin Saturation, Ceruloplasmin level, [place], SMA, AMA are all negative    Rec:  Complete alcohol abstinence   Daily LFTS and INR  Patient has ascites so please get a diagnostic tap and send for amylase, lipase, TP, ALB, cell count, glucose, culture, LDH, cytology. This will give an idea for SBP and also the etiology of ascites.

## 2020-06-15 NOTE — ED PROVIDER NOTE - PHYSICAL EXAMINATION
CONSTITUTIONAL: appears to be in pain; poor hygiene  SKIN: warm, dry  HEAD: Normocephalic; atraumatic.  EYES: PERRL, EOMI, normal sclera and conjunctiva   ENT: No nasal discharge; airway clear.  NECK: Supple; non tender.  CARD: S1, S2 normal; no murmurs, gallops, or rubs.   RESP: No wheezes, rales or rhonchi.  ABD: distended, +epigastric ttp. no guarding/rebound   EXT: Normal ROM.  +b/l 3+ pitting edema   LYMPH: No acute cervical adenopathy.  NEURO: Alert, oriented, grossly unremarkable  PSYCH: Cooperative, appropriate.

## 2020-06-15 NOTE — ED PROVIDER NOTE - CLINICAL SUMMARY MEDICAL DECISION MAKING FREE TEXT BOX
Pt with h/o ETOH abuse, recent admission for pancreatitis/sepsis/cdif colitis/pancreatic pseudocyts, s/o AMA from hospital 6/12 and AMA from ER on 6/13.  Pt now with c/o continued pain going on since prior admission and increasing episodes of diarrhea - watery, no blood - did fill rx for cdiff meds.  no f/c.  no n/v.  labs with WBC 16K - was 15K on 6/13, K+ 2.9,  AST 97 ALT 23 (c/w baseline), lipase 45. initial lactate 6.7 > 5.7 on repeat.  EKG with prolonged QTc.  Pt given K+ and mag, IVF, IV abx.  CT abd - increased colonic thickening c/w colitis, no abscess, unchanged mesenteric edema, unchanged hernan-pancreatic collections, stable mild-mod ascites.  Repeat VS , /69. Case d/w ICU, pt accepted for ICU admission. Pt with h/o ETOH abuse, recent admission for pancreatitis/sepsis/cdif colitis/pancreatic pseudocyts, s/o AMA from hospital 6/12 and AMA from ER on 6/13.  Pt now with c/o continued pain going on since prior admission and increasing episodes of diarrhea - watery, no blood - did fill rx for cdiff meds.  no f/c.  no n/v.  afebrile in ER, labs with WBC 16K - was 15K on 6/13, K+ 2.9,  AST 97 ALT 23 (c/w baseline), lipase 45. initial lactate 6.7 > 5.7 on repeat.  EKG with prolonged QTc.  Pt given K+ and mag, IVF, IV abx.  CT abd - increased colonic thickening c/w colitis, no abscess, unchanged mesenteric edema, unchanged hernan-pancreatic collections, stable mild-mod ascites.  Repeat VS , /69. Case d/w ICU, pt accepted for ICU admission.

## 2020-06-15 NOTE — ED PROVIDER NOTE - CARE PLAN
Principal Discharge DX:	Abdominal pain Principal Discharge DX:	Abdominal pain  Secondary Diagnosis:	Colitis  Secondary Diagnosis:	Pancreatic pseudocyst  Secondary Diagnosis:	Hypokalemia  Secondary Diagnosis:	Lactic acidosis  Secondary Diagnosis:	ETOH abuse

## 2020-06-15 NOTE — H&P ADULT - ATTENDING COMMENTS
patient seen and examined, agree with above, except as noted, abd pain, colitis, active drinker, ansarca, po vanco, diagnostic para, repeat la, ua/ lytes, lasix, GI eval, ativan per [facility] protocol, will follow

## 2020-06-15 NOTE — H&P ADULT - HISTORY OF PRESENT ILLNESS
Pt is a 39 year old female with a history of ETOH abuse, Alcoholic liver disease,  pancreatitis, depression, anxiety, Recent C diff infection who comes to the ED with chief c/o Abdominal pain, distension and leg swelling.   Pt was recently admitted to the hospital from May end- June 12th when she was Dx with C diff colitis ( supposed to complete rx on 6/19), Recurrent pancreatitis with acute pancreatic collection/ pseudocyst. Pt left AMA on last admission?. Pt states that she hasnt picked up her medications from the pharmacy and hasnot been taking any medicines since discharge.   Pt is an active drinker- states her last alcohol drink was yesterday. States her last episode of diarrhea was yesterday.   Pt today comes to the ED with abdominal swelling and pain. Pt states she has persistent pain in the abdomen- generalized, non radiating a/w worsening abdominal distension since 2 days. Denies any nausea/ vomiting. Denies any fever, chills. Pt is a 39 year old female with a history of ETOH abuse, Alcoholic liver disease,  pancreatitis, depression, anxiety, Recent C diff infection who comes to the ED with chief c/o Abdominal pain, distension and leg swelling.   Pt was recently admitted to the hospital from May end- June 12th when she was Dx with C diff colitis ( supposed to complete rx on 6/19), Recurrent pancreatitis with acute pancreatic collection/ pseudocyst. Pt left AMA on last admission?. Pt states that she hasnt picked up her medications from the pharmacy and hasnot been taking any medicines since discharge.   Pt is an active drinker- states her last alcohol drink was yesterday. States her last episode of diarrhea was yesterday.   Pt today comes to the ED with abdominal swelling and pain. Pt states she has persistent pain in the abdomen- generalized, non radiating a/w worsening abdominal distension since 2 days. Denies any nausea/ vomiting. Denies any fever, chills.   VS on arrival noted for , /71, sat 100 on room air. Admission labs significant for Leukocytosis 16K, Macrocytic Anemia, K 3.2,   A gap 17, BUN <3, Low albumin, Bili 3.1, elevated Alk phos, Lactate 6.7, elevated blood alcohol level.     CT abdomen showed - Persistent  and slightly increased colonic thickening from cecum through transverse colon, compatible with known colitis; no evidence of abscess. Unchanged generalized mesenteric edema, limiting assessment for peripancreatic inflammatory change. Unchanged multiple pancreatic fluid collections, measuring up to 2.4 cm, which may be represent pancreatic pseudocysts. Stable mild to moderate ascites.  Hepatic steatosis.    Pt received IV abx, 2L in the ED and is being admitted to ICU for persistent lactemia after fluid resuscitation.

## 2020-06-15 NOTE — ED PROVIDER NOTE - MDM PATIENT STATEMENT FOR ADDL TREATMENT
Chief Complaint   Patient presents with   • Well Child     14 year exam        Leroy Powell male 14  y.o. 3  m.o.      History was provided by the parents.    Immunization History   Administered Date(s) Administered   • DTaP 2007, 2007, 2007, 10/01/2008, 03/14/2011   • FLUARIX/FLUZONE/AFLURIA/FLULAVAL QUAD 11/21/2018   • Hep A, 2 Dose 10/04/2010   • Hepatitis A 10/01/2008, 07/09/2009   • Hepatitis B 2007, 2007, 2007   • HiB 2007, 2007   • Hpv9 02/19/2018, 11/21/2018   • IPV 2007, 2007, 2007, 03/14/2011   • MMR 10/01/2008, 03/14/2011   • Meningococcal MCV4P (Menactra) 02/19/2018   • PEDS-Pneumococcal Conjugate (PCV7) 2007, 2007, 2007, 10/01/2008   • Pneumococcal Conjugate 13-Valent (PCV13) 10/04/2010   • Tdap 02/19/2018   • Varicella 10/01/2008, 03/14/2011       The following portions of the patient's history were reviewed and updated as appropriate: allergies, current medications, past family history, past medical history, past social history, past surgical history and problem list.     Current Outpatient Medications   Medication Sig Dispense Refill   • cetirizine (zyrTEC) 5 MG tablet Take 5 mg by mouth Daily.       No current facility-administered medications for this visit.        No Known Allergies    Past Medical History:   Diagnosis Date   • ADHD (attention deficit hyperactivity disorder)    • Eye exam normal 02/28/2014    O/E - general eye examination - normal       Current Issues:    Current concerns include allergic rhinitis, well controlled on zyrtec.  ADHD managed by Dr. Archer, Family practice, well controlled on Tenex  No recent hospitalizations     Review of Nutrition:  Current diet: Variety of foods, including meats, fruits, vegetables, and grains. Drinks water, milk and soft drinks   Balanced diet? yes  Exercise: Active, band  Screen Time:  Discussed limiting to 1-2 hrs daily  Dentist: Dental home, brushes teeth  "daily     Social Screening:  Discipline concerns? no  Concerns regarding behavior with peers? no  School performance: doing well; no concerns  Grade: 7th grade at [employer]  Secondhand smoke exposure? no    Helmet Use:  yes  Seat Belt Use: yes  Sunscreen Use:  yes    Smoke Detectors:  yes    PHQ-2 Depression Screening  Little interest or pleasure in doing things?  0   Feeling down, depressed, or hopeless?  0   PHQ-2 Total Score  0             /64   Ht 163.2 cm (64.25\")   Wt 48.1 kg (106 lb)   BMI 18.05 kg/m²     52 %ile (Z= 0.04) based on CDC (Boys, 2-20 Years) BMI-for-age based on BMI available as of 5/24/2019.    Growth parameters are noted and are appropriate for age.     Physical Exam   Constitutional: He appears well-developed and well-nourished. He is active and cooperative.   HENT:   Head: Atraumatic.   Right Ear: A middle ear effusion is present.   Left Ear: Tympanic membrane normal.   Nose: Nose normal.   Mouth/Throat: Mucous membranes are moist. Oropharynx is clear.   Eyes: Conjunctivae, EOM and lids are normal. Visual tracking is normal. Pupils are equal, round, and reactive to light.   Neck: Normal range of motion. Neck supple. No neck rigidity.   Cardiovascular: Normal rate and regular rhythm. Pulses are strong and palpable.   Pulmonary/Chest: Effort normal and breath sounds normal. There is normal air entry. No accessory muscle usage, nasal flaring or stridor. No respiratory distress. Air movement is not decreased. No transmitted upper airway sounds. He has no decreased breath sounds. He has no wheezes. He has no rhonchi. He has no rales. He exhibits no retraction.   Abdominal: Soft. Bowel sounds are normal. He exhibits no mass. There is no rigidity.   Musculoskeletal: Normal range of motion.   Negative scoliosis    Lymphadenopathy:     He has no cervical adenopathy.   Neurological: He is alert and oriented for age. He has normal strength and normal reflexes. No cranial nerve deficit. He " exhibits normal muscle tone. He displays a negative Romberg sign. Coordination and gait normal.   Skin: Skin is warm and dry. No rash noted. No pallor.   Psychiatric: He has a normal mood and affect. His behavior is normal.   Nursing note and vitals reviewed.              Healthy 14 y.o.  well child.        1. Anticipatory guidance discussed.  Gave handout on well-child issues at this age.    The patient and parent(s) were instructed in water safety, burn safety, firearm safety, and stranger safety.  Helmet use was indicated for any bike riding, scooter, rollerblades, skateboards, or skiing. They were instructed that children should sit  in the back seat of the car, if there is an air bag, until age 13.  Encouraged annual dental visits and appropriate dental hygiene.  Encouraged participation in household chores. Recommended limiting screen time to <2hrs daily and encouraging at least one hour of active play daily.  If participating in sports, use proper personal safety equipment.    Age appropriate counseling provided on smoking, alcohol use, illicit drug use, and sexual activity.    2.  Weight management:  The patient was counseled regarding nutrition and physical activity.    3. Development: appropriate for age    4. Continue zyrtec nightly as needed for rhinitis.     5. Immunizations UTD.        No orders of the defined types were placed in this encounter.      Return in about 1 year (around 5/24/2020), or if symptoms worsen or fail to improve, for Annual physical.     Patient with one or more new problems requiring additional work-up/treatment.

## 2020-06-15 NOTE — ED PROVIDER NOTE - ATTENDING CONTRIBUTION TO CARE
40 y/o female with h/o ETOH abuse, substance abuse, anxiety, depression, recent admission for pancreatitis, in ER with c/o abd pain.  Pt was admitted to [facility] 5/28-6/12 for pancreatitis, pancreatic pseudocysts, possible sepsis, stool was + for c diff.  pt s/o from hospital AMA on 6/12, came to ER on 6/13 for worsening pain, s/o AMA again.  Pt returns now c/o continued pain, + abdominal distension, + loose frequent watery brown stool - states she has not gone to pharmacy to  meds, so has not been taking cdiff meds.  No N/V.  no f/c.  Denies any CP/SOB.  c/o b/l LE edema for 1 week.  No HA/dizziness/loc.  + generalized weakness. no h/o abdominal surgery.  Pt states she drank alcohol earlier today.  PE - nad, nc/at, eomi, perrl, op - clear, cta b/l, no w/r/r, rrr, abd - distended, soft, diffusely tender, no guarding/rebound, + b/l LE edema, A&O x 3, cn 2-12 intact, no motor/sensory deficits.  -check labs, ct abd 40 y/o female with h/o ETOH abuse, substance abuse, anxiety, depression, recent admission for pancreatitis, in ER with c/o abd pain.  Pt was admitted to [facility] 5/28-6/12 for pancreatitis, pancreatic pseudocysts, possible sepsis, stool was + for c diff.  pt s/o from hospital AMA on 6/12, came to ER on 6/13 for worsening pain, s/o AMA again.  Pt returns now c/o continued pain, + continued abdominal distension, + loose frequent watery brown stool - states she has not gone to pharmacy to  meds, so has not been taking cdiff meds.  No N/V.  no f/c.  Denies any CP/SOB.  c/o b/l LE edema for 1 week.  No HA/dizziness/loc.  + generalized weakness. no h/o abdominal surgery.  Pt states she drank alcohol earlier today.  PE - nad, nc/at, eomi, perrl, op - clear, cta b/l, no w/r/r, rrr, abd - distended, soft, diffusely tender, no guarding/rebound, + b/l LE edema, A&O x 3, cn 2-12 intact, no motor/sensory deficits.  -check labs, ct abd

## 2020-06-15 NOTE — CHART NOTE - NSCHARTNOTEFT_GEN_A_CORE
bedside assessment for volume shows prominent JVD, large 2+ pitting edema in bilateral lower extremities. US of IVF shows noncompressible IVC.

## 2020-06-15 NOTE — H&P ADULT - NSHPLABSRESULTS_GEN_ALL_CORE
8.5    16.54 )-----------( 351      ( 15 Aaron 2020 00:56 )             26.7       06-15    140  |  103  |  <3<L>  ----------------------------<  121<H>  2.9<L>   |  20  |  0.5<L>    Ca    7.9<L>      15 Aaron 2020 00:56  Mg     2.2     06-15    TPro  5.2<L>  /  Alb  2.0<L>  /  TBili  3.1<H>  /  DBili  2.3<H>  /  AST  97<H>  /  ALT  24  /  AlkPhos  268<H>  06-15              Urinalysis Basic - ( 15 Aaron 2020 05:45 )    Color: Jennifer / Appearance: Slightly Turbid / S.042 / pH: x  Gluc: x / Ketone: Negative  / Bili: Small / Urobili: <2 mg/dL   Blood: x / Protein: 30 mg/dL / Nitrite: Negative   Leuk Esterase: Negative / RBC: 4 /HPF / WBC 49 /HPF   Sq Epi: x / Non Sq Epi: >27 /HPF / Bacteria: Negative        PT/INR - ( 15 Aaron 2020 00:56 )   PT: 16.00 sec;   INR: 1.39 ratio         PTT - ( 15 Aaron 2020 00:56 )  PTT:35.5 sec    Lactate Trend  06-15 @ 00:56 Lactate:6.7   06-13 @ 13:43 Lactate:2.0       CARDIAC MARKERS ( 15 Aaron 2020 00:56 )  x     / <0.01 ng/mL / x     / x     / x          Culture Results:   No Growth Final ( @ 22:15)  Culture Results:   No Growth Final ( @ 22:15)  Culture Results:   <10,000 CFU/mL Normal Urogenital Nika ( @ 22:10)    < from: CT Abdomen and Pelvis w/ IV Cont (06.15.20 @ 04:52) >    Since Aida 10, 2020:    1. Persistent  and slightly increased colonic thickening from cecum through transverse colon, compatible with known colitis; no evidence of abscess.     2. Unchanged generalized mesenteric edema, limiting assessment for peripancreatic inflammatory change. Unchanged multiple pancreatic fluid collections, measuring up to 2.4 cm, which may be represent pancreatic pseudocysts.    3. Stable mild to moderate ascites.    4. Hepatic steatosis.    < end of copied text >

## 2020-06-15 NOTE — H&P ADULT - ASSESSMENT
39 year old female with a history of ETOH abuse, Alcoholic liver disease,  pancreatitis, depression, anxiety, Recent C diff infection who comes to the ED with chief c/o Abdominal pain, distension and leg swelling.       #) Severe sepsis in setting of C diff colitis ( vs r/o SBP- unlikely)  - Tachycardic, elevated lactate on admission- s/p 2L iv fluids ( possible type 2 lactemia in setting of liver dysfunction)  - start on po vanc, check C diff, isolation precautions  - Clinically not hypoperfusing- pressors if needed to keep MAP > 65  - follow up ID, GI cx  - check blood cx  - Ascites on exam- may need therapeutic/ diagnostic tap  - Will start on IV rocephin for now    #) h/o Alcohol use  #) Alcoholic hepatits/ ALD  #) h/o Pancreatitis with pancreatic pseudocyst cx by ascites  #) Bilirubinemia with Cholestatic transaminitis, AST:ALT > 2:1- possible alcoholic with biliary pancreatitis as GB sluge on last admission   #) Ascites with B/L leg edema with hypoalbuminemia  - Alcohol level elevated on admission, monitor CIWA score  - Recent US abdomen on last admission with Hepatomegaly, hepatic steatosis, no evidence of cirrhosis  - Normal lipase, No infection of pancreatic pseudocyst  - GI eval  - Keep NPO, Start clear liquids if no intervention  - If remains HD stable and sepsis ruled out will need IV lasix.   - check US abdomen  - Malnutrition, Low BUN, hypoalbuminemia with third spacing fluid- ensure adequate protein intake.     #) Hypokalemia   - 2/2 poor intake and C diff diarrhea  - repleted, follow up    #) macrocytic anemia  - 2/2 Alcohol use  - start on folate, thiamine.

## 2020-06-16 DIAGNOSIS — F14.90 COCAINE USE, UNSPECIFIED, UNCOMPLICATED: ICD-10-CM

## 2020-06-16 DIAGNOSIS — F32.5 MAJOR DEPRESSIVE DISORDER, SINGLE EPISODE, IN FULL REMISSION: ICD-10-CM

## 2020-06-16 DIAGNOSIS — F41.9 ANXIETY DISORDER, UNSPECIFIED: ICD-10-CM

## 2020-06-16 DIAGNOSIS — F10.20 ALCOHOL DEPENDENCE, UNCOMPLICATED: ICD-10-CM

## 2020-06-16 DIAGNOSIS — F32.9 MAJOR DEPRESSIVE DISORDER, SINGLE EPISODE, UNSPECIFIED: ICD-10-CM

## 2020-06-16 DIAGNOSIS — F19.20 OTHER PSYCHOACTIVE SUBSTANCE DEPENDENCE, UNCOMPLICATED: ICD-10-CM

## 2020-06-16 LAB
ALBUMIN SERPL ELPH-MCNC: 1.9 G/DL — LOW (ref 3.5–5.2)
ALP SERPL-CCNC: 252 U/L — HIGH (ref 30–115)
ALT FLD-CCNC: 22 U/L — SIGNIFICANT CHANGE UP (ref 0–41)
ANION GAP SERPL CALC-SCNC: 13 MMOL/L — SIGNIFICANT CHANGE UP (ref 7–14)
APTT BLD: 32.5 SEC — SIGNIFICANT CHANGE UP (ref 27–39.2)
APTT BLD: 33.5 SEC — SIGNIFICANT CHANGE UP (ref 27–39.2)
AST SERPL-CCNC: 86 U/L — HIGH (ref 0–41)
B PERT IGG+IGM PNL SER: CLEAR — SIGNIFICANT CHANGE UP
BASOPHILS # BLD AUTO: 0.09 K/UL — SIGNIFICANT CHANGE UP (ref 0–0.2)
BASOPHILS NFR BLD AUTO: 0.6 % — SIGNIFICANT CHANGE UP (ref 0–1)
BILIRUB DIRECT SERPL-MCNC: 2.7 MG/DL — HIGH (ref 0–0.2)
BILIRUB INDIRECT FLD-MCNC: 0.8 MG/DL — SIGNIFICANT CHANGE UP (ref 0.2–1.2)
BILIRUB SERPL-MCNC: 3.5 MG/DL — HIGH (ref 0.2–1.2)
BLD GP AB SCN SERPL QL: SIGNIFICANT CHANGE UP
BUN SERPL-MCNC: <3 MG/DL — LOW (ref 10–20)
CALCIUM SERPL-MCNC: 7.6 MG/DL — LOW (ref 8.5–10.1)
CHLORIDE SERPL-SCNC: 103 MMOL/L — SIGNIFICANT CHANGE UP (ref 98–110)
CO2 SERPL-SCNC: 24 MMOL/L — SIGNIFICANT CHANGE UP (ref 17–32)
COLOR FLD: YELLOW — SIGNIFICANT CHANGE UP
CREAT SERPL-MCNC: <0.5 MG/DL — LOW (ref 0.7–1.5)
D DIMER BLD IA.RAPID-MCNC: 3969 NG/ML DDU — HIGH (ref 0–230)
EOSINOPHIL # BLD AUTO: 0.31 K/UL — SIGNIFICANT CHANGE UP (ref 0–0.7)
EOSINOPHIL NFR BLD AUTO: 2 % — SIGNIFICANT CHANGE UP (ref 0–8)
FLUID INTAKE SUBSTANCE CLASS: SIGNIFICANT CHANGE UP
FLUID SEGMENTED GRANULOCYTES: SIGNIFICANT CHANGE UP %
GLUCOSE SERPL-MCNC: 102 MG/DL — HIGH (ref 70–99)
HCT VFR BLD CALC: 26.9 % — LOW (ref 37–47)
HGB BLD-MCNC: 8.2 G/DL — LOW (ref 12–16)
IMM GRANULOCYTES NFR BLD AUTO: 0.4 % — HIGH (ref 0.1–0.3)
INR BLD: 1.42 RATIO — HIGH (ref 0.65–1.3)
INR BLD: 1.43 RATIO — HIGH (ref 0.65–1.3)
LYMPHOCYTES # BLD AUTO: 1.3 K/UL — SIGNIFICANT CHANGE UP (ref 1.2–3.4)
LYMPHOCYTES # BLD AUTO: 8.3 % — LOW (ref 20.5–51.1)
MAGNESIUM SERPL-MCNC: 2.2 MG/DL — SIGNIFICANT CHANGE UP (ref 1.8–2.4)
MCHC RBC-ENTMCNC: 30.5 G/DL — LOW (ref 32–37)
MCHC RBC-ENTMCNC: 31.2 PG — HIGH (ref 27–31)
MCV RBC AUTO: 102.3 FL — HIGH (ref 81–99)
MESOTHL CELL # FLD: SIGNIFICANT CHANGE UP %
MONOCYTES # BLD AUTO: 1.09 K/UL — HIGH (ref 0.1–0.6)
MONOCYTES NFR BLD AUTO: 6.9 % — SIGNIFICANT CHANGE UP (ref 1.7–9.3)
MONOS+MACROS # FLD: SIGNIFICANT CHANGE UP %
NEUTROPHILS # BLD AUTO: 12.85 K/UL — HIGH (ref 1.4–6.5)
NEUTROPHILS NFR BLD AUTO: 81.8 % — HIGH (ref 42.2–75.2)
NRBC # BLD: 0 /100 WBCS — SIGNIFICANT CHANGE UP (ref 0–0)
PLATELET # BLD AUTO: 341 K/UL — SIGNIFICANT CHANGE UP (ref 130–400)
POTASSIUM SERPL-MCNC: 3.8 MMOL/L — SIGNIFICANT CHANGE UP (ref 3.5–5)
POTASSIUM SERPL-SCNC: 3.8 MMOL/L — SIGNIFICANT CHANGE UP (ref 3.5–5)
PROT SERPL-MCNC: 5.2 G/DL — LOW (ref 6–8)
PROTHROM AB SERPL-ACNC: 16.3 SEC — HIGH (ref 9.95–12.87)
PROTHROM AB SERPL-ACNC: 16.4 SEC — HIGH (ref 9.95–12.87)
RBC # BLD: 2.63 M/UL — LOW (ref 4.2–5.4)
RBC # FLD: 17.1 % — HIGH (ref 11.5–14.5)
RCV VOL RI: 1000 /UL — HIGH (ref 0–0)
SODIUM SERPL-SCNC: 140 MMOL/L — SIGNIFICANT CHANGE UP (ref 135–146)
SPECIMEN SOURCE FLD: SIGNIFICANT CHANGE UP
TOTAL NUCLEATED CELL COUNT, BODY FLUID: 149 /UL — SIGNIFICANT CHANGE UP
TROPONIN T SERPL-MCNC: <0.01 NG/ML — SIGNIFICANT CHANGE UP
TUBE TYPE: SIGNIFICANT CHANGE UP
WBC # BLD: 15.71 K/UL — HIGH (ref 4.8–10.8)
WBC # FLD AUTO: 15.71 K/UL — HIGH (ref 4.8–10.8)

## 2020-06-16 PROCEDURE — 93010 ELECTROCARDIOGRAM REPORT: CPT

## 2020-06-16 PROCEDURE — 71045 X-RAY EXAM CHEST 1 VIEW: CPT | Mod: 26

## 2020-06-16 PROCEDURE — 99232 SBSQ HOSP IP/OBS MODERATE 35: CPT | Mod: GC

## 2020-06-16 PROCEDURE — 74018 RADEX ABDOMEN 1 VIEW: CPT | Mod: 26

## 2020-06-16 PROCEDURE — 71275 CT ANGIOGRAPHY CHEST: CPT | Mod: 26

## 2020-06-16 RX ORDER — LACTOBACILLUS ACIDOPHILUS 100MM CELL
1 CAPSULE ORAL
Refills: 0 | Status: DISCONTINUED | OUTPATIENT
Start: 2020-06-16 | End: 2020-06-26

## 2020-06-16 RX ORDER — SODIUM CHLORIDE 9 MG/ML
500 INJECTION, SOLUTION INTRAVENOUS ONCE
Refills: 0 | Status: COMPLETED | OUTPATIENT
Start: 2020-06-16 | End: 2020-06-16

## 2020-06-16 RX ORDER — PANTOPRAZOLE SODIUM 20 MG/1
40 TABLET, DELAYED RELEASE ORAL
Refills: 0 | Status: DISCONTINUED | OUTPATIENT
Start: 2020-06-17 | End: 2020-06-18

## 2020-06-16 RX ORDER — MORPHINE SULFATE 50 MG/1
4 CAPSULE, EXTENDED RELEASE ORAL ONCE
Refills: 0 | Status: DISCONTINUED | OUTPATIENT
Start: 2020-06-16 | End: 2020-06-16

## 2020-06-16 RX ORDER — ENOXAPARIN SODIUM 100 MG/ML
40 INJECTION SUBCUTANEOUS DAILY
Refills: 0 | Status: DISCONTINUED | OUTPATIENT
Start: 2020-06-16 | End: 2020-06-26

## 2020-06-16 RX ADMIN — Medication 125 MILLIGRAM(S): at 17:36

## 2020-06-16 RX ADMIN — Medication 1 PATCH: at 12:14

## 2020-06-16 RX ADMIN — Medication 1 PATCH: at 19:27

## 2020-06-16 RX ADMIN — PANTOPRAZOLE SODIUM 40 MILLIGRAM(S): 20 TABLET, DELAYED RELEASE ORAL at 12:14

## 2020-06-16 RX ADMIN — CHLORHEXIDINE GLUCONATE 1 APPLICATION(S): 213 SOLUTION TOPICAL at 05:00

## 2020-06-16 RX ADMIN — SODIUM CHLORIDE 500 MILLILITER(S): 9 INJECTION, SOLUTION INTRAVENOUS at 09:57

## 2020-06-16 RX ADMIN — MORPHINE SULFATE 4 MILLIGRAM(S): 50 CAPSULE, EXTENDED RELEASE ORAL at 10:27

## 2020-06-16 RX ADMIN — MORPHINE SULFATE 4 MILLIGRAM(S): 50 CAPSULE, EXTENDED RELEASE ORAL at 06:56

## 2020-06-16 RX ADMIN — Medication 1 TABLET(S): at 17:36

## 2020-06-16 RX ADMIN — Medication 40 MILLIGRAM(S): at 05:19

## 2020-06-16 RX ADMIN — Medication 125 MILLIGRAM(S): at 05:19

## 2020-06-16 RX ADMIN — Medication 100 MILLIGRAM(S): at 12:14

## 2020-06-16 RX ADMIN — Medication 1 MILLIGRAM(S): at 12:14

## 2020-06-16 RX ADMIN — MORPHINE SULFATE 4 MILLIGRAM(S): 50 CAPSULE, EXTENDED RELEASE ORAL at 11:40

## 2020-06-16 RX ADMIN — ENOXAPARIN SODIUM 40 MILLIGRAM(S): 100 INJECTION SUBCUTANEOUS at 12:15

## 2020-06-16 RX ADMIN — Medication 1 MILLIGRAM(S): at 16:47

## 2020-06-16 RX ADMIN — MORPHINE SULFATE 4 MILLIGRAM(S): 50 CAPSULE, EXTENDED RELEASE ORAL at 02:54

## 2020-06-16 RX ADMIN — Medication 1 PATCH: at 23:21

## 2020-06-16 RX ADMIN — Medication 125 MILLIGRAM(S): at 12:15

## 2020-06-16 RX ADMIN — CEFTRIAXONE 100 MILLIGRAM(S): 500 INJECTION, POWDER, FOR SOLUTION INTRAMUSCULAR; INTRAVENOUS at 05:33

## 2020-06-16 RX ADMIN — MORPHINE SULFATE 4 MILLIGRAM(S): 50 CAPSULE, EXTENDED RELEASE ORAL at 19:58

## 2020-06-16 RX ADMIN — MORPHINE SULFATE 4 MILLIGRAM(S): 50 CAPSULE, EXTENDED RELEASE ORAL at 15:52

## 2020-06-16 RX ADMIN — Medication 1 MILLIGRAM(S): at 09:56

## 2020-06-16 NOTE — BEHAVIORAL HEALTH ASSESSMENT NOTE - NSBHCHARTREVIEWVS_PSY_A_CORE FT
Vital Signs Last 24 Hrs  T(C): 36.9 (16 Jun 2020 12:00), Max: 37.3 (16 Jun 2020 08:00)  T(F): 98.5 (16 Jun 2020 12:00), Max: 99.1 (16 Jun 2020 08:00)  HR: 122 (16 Jun 2020 17:30) (104 - 126)  BP: 105/50 (16 Jun 2020 17:00) (93/49 - 113/54)  BP(mean): 65 (16 Jun 2020 17:00) (63 - 81)  RR: 18 (16 Jun 2020 17:30) (16 - 37)  SpO2: 96% (16 Jun 2020 17:30) (95% - 99%)

## 2020-06-16 NOTE — PROGRESS NOTE ADULT - ASSESSMENT
39 year old female with a history of ETOH abuse, Alcoholic liver disease,  pancreatitis, depression, anxiety, Recent C diff infection who comes to the ED with chief c/o Abdominal pain, distension and leg swelling.     #) Severe sepsis in setting of C diff colitis ( vs r/o SBP- unlikely), improving  - Tachycardic, elevated lactate on admission- s/p 2L iv fluids ( possible type 2 lactemia in setting of liver dysfunction)  - start on po vanc, check C diff, isolation precautions  - check blood cx  -f/u diagnostic paracentesis   - IV rocephin for now until paracentesis results known  -GI recs appreciated  -ID recs appreciated  -Start diet  -no diarreha since admission    #) h/o Alcohol use active drinker 4cups of vodka daily  -last drink 6/14  -CIWA protocol  -f/u psych consult     #) Alcoholic hepatits/ ALD  #) h/o Pancreatitis with pancreatic pseudocyst cx by ascites  #) Bilirubinemia with Cholestatic transaminitis, AST:ALT > 2:1- possible alcoholic with biliary pancreatitis as GB sluge on last admission   #) Ascites with B/L leg edema with hypoalbuminemia  - Alcohol level elevated on admission, monitor CIWA score  - Recent US abdomen on last admission with Hepatomegaly, hepatic steatosis, no evidence of cirrhosis  - Normal lipase, No infection of pancreatic pseudocyst  - GI eval  - check US abdomen  - Malnutrition, Low BUN, hypoalbuminemia with third spacing fluid- ensure adequate protein intake.     #) Hypokalemia   - 2/2 poor intake and C diff diarrhea  - repleted, follow up    #) macrocytic anemia  - 2/2 Alcohol use  - start on folate, thiamine.     diet regular  lovenox 40 subq for dvt ppt  Out of bed to chair  from home    possible downgrade in pm    pending  thoracentesis
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
## 2020-06-16 NOTE — BEHAVIORAL HEALTH ASSESSMENT NOTE - NSBHCONSULTFOLLOWAFTERCARE_PSY_A_CORE FT
Upon discharge, please provide patient with information for [facility] dual-focused clinic Upon discharge, please make an appointment for patient at [facility] dual-focused [facility], [address] [place] [address], phone number- [phone]  Patient can also be referred to inpatient substance use disorder rehab as per medical team

## 2020-06-16 NOTE — CONSULT NOTE ADULT - ASSESSMENT
39 year old female with a history of ETOH abuse, Alcoholic liver disease,  pancreatitis, depression, anxiety, Recent C diff infection who comes to the ED with chief c/o Abdominal pain, distension and leg swelling.     IMPRESSION;   Pancolitis secondary to CD colitis which seems quiescent presently as pt has not had a BM>48H. Po vanomycin was supposed to go through 6/19  pt is non compliant to meds.  Abdominal pain seems multifactorial in etiology : chronic pancreatitis with multiple pseudocysts. Recent CD colitis with pancolitis on CT. Doubt SBP but always in DDx. Ischemic disease ?  No evidence of cholecystitis/cholangitis  CT 6/15 : Persistent  and slightly increased colonic thickening from cecum through transverse colon, compatible with known colitis; no evidence of abscess.   Unchanged generalized mesenteric edema, limiting assessment for peripancreatic inflammatory change. Unchanged multiple pancreatic fluid collections, measuring up to 2.4 cm, which may be represent pancreatic pseudocysts.    COVID-19 NG    RECOMMENDATIONS;   Diagnostic paracentesis  BCx  Rocephin 2 gm iv q24h till above known  po Vancomycin 125 mg q6h

## 2020-06-16 NOTE — CONSULT NOTE ADULT - ASSESSMENT
IMPRESSION:    CDiff present on admission   HO ETOH cirrhotics   MSA    PLAN:    CNS: CIWA.  Thiamine and Folate     HEENT: Oral care    PULMONARY:  HOB @ 45 degrees.  Aspiration precautions     CARDIOVASCULAR:  LR bolus.  Hold Lasix for now     GI: GI prophylaxis.  Feeding.  Bowel regimen.  Diagnotic para     RENAL:  Follow up lytes.  Correct as needed    INFECTIOUS DISEASE: Follow up cultures.  Continue VAnc Oral.  Rocephin iof positive SBP     HEMATOLOGICAL:  DVT prophylaxis. FU CBC and Coags     ENDOCRINE:  Follow up FS.  Insulin protocol if needed.    MUSCULOSKELETAL:  OOB to chair     UDS SDS\    Detox

## 2020-06-16 NOTE — BEHAVIORAL HEALTH ASSESSMENT NOTE - NSBHCHARTREVIEWINVESTIGATE_PSY_A_CORE FT
< from: 12 Lead ECG (06.15.20 @ 02:08) >      Ventricular Rate 93 BPM    Atrial Rate 93 BPM    P-R Interval 140 ms    QRS Duration 92 ms    Q-T Interval 460 ms    QTC Calculation(Bezet) 571 ms    P Axis 45 degrees    R Axis 18 degrees    T Axis 73 degrees    Diagnosis Line Normal sinus rhythm  Cannot rule out Anterior infarct , age undetermined  T wave abnormality, consider anterior ischemia  Prolonged QT  Abnormal ECG    Confirmed by Radha Tovar MD ([id number]) on 6/15/2020 8:08:19 AM    < end of copied text >

## 2020-06-16 NOTE — BEHAVIORAL HEALTH ASSESSMENT NOTE - SUMMARY
ADRIAN PRUETT is a 39y old  Female, single with no children, living with a friend, unemployed on welfare, with a psychiatric history of depression and anxiety, 1 IPP admission 6 years ago for depression, who was admitted for sepsis. Psychiatry was consulted for psychiatric evaluation.    On evaluation, patient has recurrent polysubstance use consistent with alcohol and cocaine use disorders. She is not acutely suicidal, depressed, manic or psychotic. She does have some level of anxiety which is likely multifactorial and is likely being exacerbated by her alcohol withdrawal. She does not meet criteria for now would she be appropriate for inpatient psychiatric hospitalization at this time. However, patient is amenable to outpatient follow-up and possible rehab treatment as well upon discharge.    Recommendations:  - continue CIWA protocol for alcohol withdrawal symptoms; recommend initiating full order set  - continue folate, thiamine supplementation  - on discharge, please provide patient with information for [facility] Dual-Focused clinic for treatment of comorbid psychiatric and substance use disorders ADRIAN PRUETT is a 39y old  Female, single with no children, living with a friend, unemployed on welfare, with a psychiatric history of depression and anxiety and polysubstance dependence , 1 IPP admission 6 years ago for depression, who was admitted for sepsis. Psychiatry was consulted for psychiatric evaluation.    On evaluation, patient has recurrent polysubstance use consistent with alcohol and cocaine use disorders. She is not acutely suicidal, depressed, manic or psychotic. She does have some level of anxiety which is likely multifactorial and is likely being exacerbated by her alcohol withdrawal. She does not meet criteria for now would she be appropriate for inpatient psychiatric hospitalization at this time. However, patient is amenable to outpatient follow-up and possible rehab treatment as well upon discharge.    Recommendations:  - continue CIWA protocol for alcohol withdrawal symptoms; recommend initiating full order set  - continue folate, thiamine supplementation  - on discharge, please provide patient with information for [facility] Dual-Focused clinic for treatment of comorbid psychiatric and substance use disorders

## 2020-06-16 NOTE — CHART NOTE - NSCHARTNOTEFT_GEN_A_CORE
ICU DOWNGRADE NOTE:    39y Female transferred to floor from ICU    Patient is a 39y old Female who presents with a chief complaint of Abdominal Pain, Leg swelling.     The patient is currently admitted for the primary diagnosis of sepsis secondary to cdiff colitis dx on 6/7    The patient was admitted to the unit for 2 Days.    The patient was never intubated , and was never on pressors     Indwelling vascular catheters: peripheral IV    Urinary Catheter: primafit    Disposition: [facility]    Code Status: full code    ICU COURSE OF EVENTS:  -------------------------------------------------------------------------------------------        -------------------------------------------------------------------------------------------    Current workup in progress:    SIGN OUT AT 06-16-20 @ 12:46 GIVEN TO: ICU DOWNGRADE NOTE:    39y Female transferred to floor from ICU    Patient is a 39y old Female who presents with a chief complaint of Abdominal Pain, Leg swelling.     The patient is currently admitted for the primary diagnosis of sepsis secondary to cdiff colitis dx on 6/7    The patient was admitted to the unit for 2 Days.    The patient was never intubated , and was never on pressors     Indwelling vascular catheters: peripheral IV    Urinary Catheter: primafit    Disposition: [facility]    Code Status: full code    ICU COURSE OF EVENTS:  -------------------------------------------------------------------------------------------  Pt is a 39 year old female with a history of ETOH abuse, Alcoholic liver disease,  pancreatitis, depression, anxiety, Recent C diff infection who comes to the ED with chief c/o Abdominal pain, distension and leg swelling.     She was then admitted for sepsis secondary to c diff colitis dx on 6/7 in previous hospitalization and patient neglected to take antibx at home. Was started on PO vanc for c diff and ceftriaxone for possible sbp (less likely). Never intubated or on presser. Had a paracentesis on 6/16, tolerated well which showed 149 PMN. Patient was started on feeds and tolerated well. Was made ready for downgrade to [facility].      -------------------------------------------------------------------------------------------    Current workup in progress:  f/u final paracentesis results  f/u psych for poly substance abuse

## 2020-06-16 NOTE — BEHAVIORAL HEALTH ASSESSMENT NOTE - DESCRIPTION (FIRST USE, LAST USE, QUANTITY, FREQUENCY, DURATION)
1/4 ppd since age 14 started drinking at 16, drinks 1 glass of vodka+orange juice twice a day every day for past 2 years reports occasional use reports monthly use tried LSD previously

## 2020-06-16 NOTE — BEHAVIORAL HEALTH ASSESSMENT NOTE - CASE SUMMARY
Ms Costa is a 39 year old woman with a history of Depression , Anxiety and polysubstance dependence who was admitted to the ICU for sepsis. Psychiatry consult was called for a mental health evaluation.   Patient appears to be anxious and it is unclear if her anxiety is as a result of a General Anxiety Disorder verus anxiety secondary to possible trauma in her past which she has not addressed. It appears patient overtime has developed maladaptive coping skills by abusing illicit drugs and alcohol to the point of developing significant medical consequences.  She does not appear to have symptoms of alcohol withdrawal and is currently on the CIWA protocol. Although patient seems to be  minimizing her substance use, she seems to be amenable to treatment and requests referral for outpatient treatment.   At this time, patient is not considered an imminent danger to herself or others and does not need inpatient psychiatric hospitalization. Patient will benefit from outpatient psychiatric and psychotherapy services in addition substance use disorder services if she is agreeable to help treat her anxiety and possibly develop skills to help maintain sobriety. We are also not opposed to a referral to inpatient substance use disorder rehab as per medical team. for now , there is no acute indication for psychotropic medication.

## 2020-06-16 NOTE — CONSULT NOTE ADULT - SUBJECTIVE AND OBJECTIVE BOX
ADRIAN PRUETT  39y, Female  Allergy: No Known Allergies      All historical available data reviewed.    HPI:  Pt is a 39 year old female with a history of ETOH abuse, Alcoholic liver disease,  pancreatitis, depression, anxiety, Recent C diff infection who comes to the ED with chief c/o Abdominal pain, distension and leg swelling.   Pt was recently admitted to the hospital from May end-  when she was Dx with C diff colitis ( supposed to complete rx on ), Recurrent pancreatitis with acute pancreatic collection/ pseudocyst. Pt left AMA on last admission?. Pt states that she hasnt picked up her medications from the pharmacy and hasnot been taking any medicines since discharge.   Pt is an active drinker- states her last alcohol drink was yesterday. States her last episode of diarrhea was yesterday.   Pt today comes to the ED with abdominal swelling and pain. Pt states she has persistent pain in the abdomen- generalized, non radiating a/w worsening abdominal distension since 2 days. Denies any nausea/ vomiting. Denies any fever, chills.   VS on arrival noted for , /71, sat 100 on room air. Admission labs significant for Leukocytosis 16K, Macrocytic Anemia, K 3.2,   A gap 17, BUN <3, Low albumin, Bili 3.1, elevated Alk phos, Lactate 6.7, elevated blood alcohol level.     CT abdomen showed - Persistent  and slightly increased colonic thickening from cecum through transverse colon, compatible with known colitis; no evidence of abscess. Unchanged generalized mesenteric edema, limiting assessment for peripancreatic inflammatory change. Unchanged multiple pancreatic fluid collections, measuring up to 2.4 cm, which may be represent pancreatic pseudocysts. Stable mild to moderate ascites.  Hepatic steatosis.    Pt received IV abx, 2L in the ED and is being admitted to ICU for persistent lactemia after fluid resuscitation. (15 Aaron 2020 06:12)  ID called for evaluation    FAMILY HISTORY:    PAST MEDICAL & SURGICAL HISTORY:  ETOH abuse  Postpartum depression  Substance abuse  Anxiety  Depression  No significant past surgical history        VITALS:  T(F): 99.1, Max: 99.1 (06-15-20 @ 16:00)  HR: 112  BP: 113/47  RR: 17Vital Signs Last 24 Hrs  T(C): 37.3 (2020 08:00), Max: 37.3 (15 Aaron 2020 16:00)  T(F): 99.1 (2020 08:00), Max: 99.1 (15 Aaron 2020 16:00)  HR: 112 (2020 08:00) (98 - 118)  BP: 113/47 (2020 08:00) (91/45 - 115/54)  BP(mean): 70 (2020 08:00) (56 - 79)  RR: 17 (2020 08:00) (16 - 37)  SpO2: 95% (2020 08:00) (95% - 99%)    TESTS & MEASUREMENTS:                        8.2    15.71 )-----------( 341      ( 2020 04:30 )             26.9     06-16    140  |  103  |  <3<L>  ----------------------------<  102<H>  3.8   |  24  |  <0.5<L>    Ca    7.6<L>      2020 04:30  Phos  2.8     06-15  Mg     2.2     06-16    TPro  5.2<L>  /  Alb  1.9<L>  /  TBili  3.5<H>  /  DBili  2.7<H>  /  AST  86<H>  /  ALT  22  /  AlkPhos  252<H>  06-16    LIVER FUNCTIONS - ( 2020 04:30 )  Alb: 1.9 g/dL / Pro: 5.2 g/dL / ALK PHOS: 252 U/L / ALT: 22 U/L / AST: 86 U/L / GGT: x             Urinalysis Basic - ( 15 Aaron 2020 05:45 )    Color: Jennifer / Appearance: Slightly Turbid / S.042 / pH: x  Gluc: x / Ketone: Negative  / Bili: Small / Urobili: <2 mg/dL   Blood: x / Protein: 30 mg/dL / Nitrite: Negative   Leuk Esterase: Negative / RBC: 4 /HPF / WBC 49 /HPF   Sq Epi: x / Non Sq Epi: >27 /HPF / Bacteria: Negative          RADIOLOGY & ADDITIONAL TESTS:  Personal review of radiological diagnostics performed  Echo and EKG results noted when applicable.     MEDICATIONS:  cefTRIAXone   IVPB 2000 milliGRAM(s) IV Intermittent every 24 hours  chlorhexidine 4% Liquid 1 Application(s) Topical <User Schedule>  folic acid 1 milliGRAM(s) Oral daily  lactated ringers Bolus 500 milliLiter(s) IV Bolus once  lidocaine 1% Injectable 10 milliLiter(s) Local Injection once  LORazepam   Injectable 1 milliGRAM(s) IV Push every 4 hours PRN  morphine  - Injectable 4 milliGRAM(s) IV Push every 4 hours PRN  nicotine - 21 mG/24Hr(s) Patch 1 patch Transdermal daily  pantoprazole  Injectable 40 milliGRAM(s) IV Push daily  thiamine 100 milliGRAM(s) Oral daily  vancomycin    Solution 125 milliGRAM(s) Oral every 6 hours      ANTIBIOTICS:  cefTRIAXone   IVPB 2000 milliGRAM(s) IV Intermittent every 24 hours  vancomycin    Solution 125 milliGRAM(s) Oral every 6 hours

## 2020-06-16 NOTE — PROGRESS NOTE ADULT - SUBJECTIVE AND OBJECTIVE BOX
ADRIAN PRUETT 39y Female  MRN#: [id number]     SUBJECTIVE  Patient is a 39y old Female who presents with a chief complaint of Abdominal Pain, Leg swelling. (2020 08:38)  Today is hospital day 1d, and this morning she is lying in bed without distress.   No acute overnight events.   feels better  asks for more morphine  no diarrhea but passing gas    OBJECTIVE  PAST MEDICAL & SURGICAL HISTORY  ETOH abuse  Postpartum depression  Substance abuse  Anxiety  Depression  No significant past surgical history    ALLERGIES:  No Known Allergies    MEDICATIONS:  STANDING MEDICATIONS  cefTRIAXone   IVPB 2000 milliGRAM(s) IV Intermittent every 24 hours  chlorhexidine 4% Liquid 1 Application(s) Topical <User Schedule>  folic acid 1 milliGRAM(s) Oral daily  lactated ringers Bolus 500 milliLiter(s) IV Bolus once  lidocaine 1% Injectable 10 milliLiter(s) Local Injection once  nicotine - 21 mG/24Hr(s) Patch 1 patch Transdermal daily  pantoprazole  Injectable 40 milliGRAM(s) IV Push daily  thiamine 100 milliGRAM(s) Oral daily  vancomycin    Solution 125 milliGRAM(s) Oral every 6 hours    PRN MEDICATIONS  LORazepam   Injectable 1 milliGRAM(s) IV Push every 4 hours PRN  morphine  - Injectable 4 milliGRAM(s) IV Push every 4 hours PRN    HOME MEDICATIONS  Home Medications:      VITAL SIGNS: Last 24 Hours  T(C): 37.3 (2020 08:00), Max: 37.3 (15 Aaron 2020 16:00)  T(F): 99.1 (2020 08:00), Max: 99.1 (15 Aaron 2020 16:00)  HR: 112 (2020 08:00) (98 - 118)  BP: 113/47 (2020 08:00) (91/45 - 115/54)  BP(mean): 70 (2020 08:00) (56 - 79)  RR: 17 (2020 08:00) (16 - 37)  SpO2: 95% (2020 08:00) (95% - 99%)    06-15-20 @ 07:01  -  - @ 07:00  --------------------------------------------------------  IN: 170 mL / OUT: 3100 mL / NET: -2930 mL        LABS:                        8.2    15.71 )-----------( 341      ( 2020 04:30 )             26.9     06-16    140  |  103  |  <3<L>  ----------------------------<  102<H>  3.8   |  24  |  <0.5<L>    Ca    7.6<L>      2020 04:30  Phos  2.8     06-15  Mg     2.2         TPro  5.2<L>  /  Alb  1.9<L>  /  TBili  3.5<H>  /  DBili  2.7<H>  /  AST  86<H>  /  ALT  22  /  AlkPhos  252<H>      LIVER FUNCTIONS - ( 2020 04:30 )  Alb: 1.9 g/dL / Pro: 5.2 g/dL / ALK PHOS: 252 U/L / ALT: 22 U/L / AST: 86 U/L / GGT: x           PT/INR - ( 2020 04:30 )   PT: 16.30 sec;   INR: 1.42 ratio         PTT - ( 2020 04:30 )  PTT:33.5 sec  Urinalysis Basic - ( 15 Aaron 2020 05:45 )    Color: Jennifer / Appearance: Slightly Turbid / S.042 / pH: x  Gluc: x / Ketone: Negative  / Bili: Small / Urobili: <2 mg/dL   Blood: x / Protein: 30 mg/dL / Nitrite: Negative   Leuk Esterase: Negative / RBC: 4 /HPF / WBC 49 /HPF   Sq Epi: x / Non Sq Epi: >27 /HPF / Bacteria: Negative        Lactate, Blood: 5.1 mmol/L <HH> (06-15-20 @ 16:48)  Lactate, Blood: 5.5 mmol/L <HH> (06-15-20 @ 11:11)      CARDIAC MARKERS ( 15 Aaron 2020 00:56 )  x     / <0.01 ng/mL / x     / x     / x          CAPILLARY BLOOD GLUCOSE      POCT Blood Glucose.: 108 mg/dL (15 Aaron 2020 07:40)      RADIOLOGY:    PHYSICAL EXAM:  PHYSICAL EXAM:  GENERAL: NAD, AAO x 4, 39y F  HEAD:  Atraumatic, Normocephalic  EYES: EOMI, conjunctiva clear and sclera white  NECK: Supple, No JVD  CHEST/LUNG: decrease sounds at bases  HEART: Regular rythm tachycardic   ABDOMEN: tender to palpation, distended  EXTREMITIES: some pitting edema in bilateral lower extremities   NEUROLOGY: non-focal    ADMISSION SUMMARY  Patient is a 39y old Female who presents with a chief complaint of Abdominal Pain, Leg swelling. (2020 08:38)

## 2020-06-16 NOTE — BEHAVIORAL HEALTH ASSESSMENT NOTE - NSBHCHARTREVIEWLAB_PSY_A_CORE FT
8.2    15.71 )-----------( 341      ( 2020 04:30 )             26.9     06-16    140  |  103  |  <3<L>  ----------------------------<  102<H>  3.8   |  24  |  <0.5<L>    Ca    7.6<L>      2020 04:30  Phos  2.8     -15  Mg     2.2     -16    TPro  5.2<L>  /  Alb  1.9<L>  /  TBili  3.5<H>  /  DBili  2.7<H>  /  AST  86<H>  /  ALT  22  /  AlkPhos  252<H>  -16      Urinalysis Basic - ( 15 Aaron 2020 05:45 )    Color: Jennifer / Appearance: Slightly Turbid / S.042 / pH: x  Gluc: x / Ketone: Negative  / Bili: Small / Urobili: <2 mg/dL   Blood: x / Protein: 30 mg/dL / Nitrite: Negative   Leuk Esterase: Negative / RBC: 4 /HPF / WBC 49 /HPF   Sq Epi: x / Non Sq Epi: >27 /HPF / Bacteria: Negative    Alcohol, Blood (06.15.20 @ 02:30)    Alcohol, Blood: 110 mg/dL

## 2020-06-16 NOTE — CONSULT NOTE ADULT - SUBJECTIVE AND OBJECTIVE BOX
Patient is a 39y old  Female who presents with a chief complaint of Abdominal Pain, Leg swelling. (15 Aaron 2020 12:42)      HPI:  Pt is a 39 year old female with a history of ETOH abuse, Alcoholic liver disease,  pancreatitis, depression, anxiety, Recent C diff infection who comes to the ED with chief c/o Abdominal pain, distension and leg swelling.   Pt was recently admitted to the hospital from May end-  when she was Dx with C diff colitis ( supposed to complete rx on ), Recurrent pancreatitis with acute pancreatic collection/ pseudocyst. Pt left AMA on last admission?. Pt states that she hasnt picked up her medications from the pharmacy and hasnot been taking any medicines since discharge.   Pt is an active drinker- states her last alcohol drink was yesterday. States her last episode of diarrhea was yesterday.   Pt today comes to the ED with abdominal swelling and pain. Pt states she has persistent pain in the abdomen- generalized, non radiating a/w worsening abdominal distension since 2 days. Denies any nausea/ vomiting. Denies any fever, chills.   VS on arrival noted for , /71, sat 100 on room air. Admission labs significant for Leukocytosis 16K, Macrocytic Anemia, K 3.2,   A gap 17, BUN <3, Low albumin, Bili 3.1, elevated Alk phos, Lactate 6.7, elevated blood alcohol level.     CT abdomen showed - Persistent  and slightly increased colonic thickening from cecum through transverse colon, compatible with known colitis; no evidence of abscess. Unchanged generalized mesenteric edema, limiting assessment for peripancreatic inflammatory change. Unchanged multiple pancreatic fluid collections, measuring up to 2.4 cm, which may be represent pancreatic pseudocysts. Stable mild to moderate ascites.  Hepatic steatosis.    Pt received IV abx, 2L in the ED and is being admitted to ICU for persistent lactemia after fluid resuscitation. (15 Aaron 2020 06:12)      PAST MEDICAL & SURGICAL HISTORY:  ETOH abuse  Postpartum depression  Substance abuse  Anxiety  Depression  No significant past surgical history      SOCIAL HX:   Smoking    Smoking                      ETOH     ETOH                        Other  Cocaine      FAMILY HISTORY:  :  No known cardiovacular family hisotry     Review Of Systems:     All ROS are negative except per HPI       Allergies    No Known Allergies    Intolerances          PHYSICAL EXAM    ICU Vital Signs Last 24 Hrs  T(C): 37.3 (2020 08:00), Max: 37.3 (15 Aaron 2020 16:00)  T(F): 99.1 (2020 08:00), Max: 99.1 (15 Aaron 2020 16:00)  HR: 112 (2020 08:00) (98 - 118)  BP: 113/47 (2020 08:00) (91/45 - 115/54)  BP(mean): 70 (2020 08:00) (56 - 79)  ABP: --  ABP(mean): --  RR: 17 (2020 08:00) (16 - 37)  SpO2: 95% (2020 08:00) (95% - 99%)      CONSTITUTIONAL:  Well nourished.  NAD    ENT:   Airway patent,   Mouth with normal mucosa.   No thrush    EYES:   pupils equal,   round and reactive to light.    CARDIAC:   Tachycardic   Regular rhythm.    Heart sounds S1, S2.   No edema      Vascular:   normal systolic impulse  no bruits    RESPIRATORY:   No wheezing   Normal chest expansion  No use of accessory muscles    GASTROINTESTINAL:  Abdomen soft   Distended   Non-tender,   No guarding,   + BS    GENITOURINARY  normal genitalia for sex  no edema    MUSCULOSKELETAL:   Range of motion is not limited,  No clubbing, cyanosis    NEUROLOGICAL:   Alert and oriented   No motor or sensory deficits.      SKIN:   Skin normal color for race,   Warm and dry  No evidence of rash.    PSYCHIATRIC:   Normal mood and affect.   No apparent risk to self or others.    HEME LYMPH:   No cervical  lymphadenopathy.  No inguinal lymphadenopathy            06-15-20 @ 07:01  -  20 @ 07:00  --------------------------------------------------------  IN:    IV PiggyBack: 150 mL    Oral Fluid: 20 mL  Total IN: 170 mL    OUT:    Voided: 3100 mL  Total OUT: 3100 mL    Total NET: -2930 mL          LABS:                          8.2    15.71 )-----------( 341      ( 2020 04:30 )             26.9                                               06-16    140  |  103  |  <3<L>  ----------------------------<  102<H>  3.8   |  24  |  <0.5<L>    Ca    7.6<L>      2020 04:30  Phos  2.8     06-15  Mg     2.2     06-16    TPro  5.2<L>  /  Alb  1.9<L>  /  TBili  3.5<H>  /  DBili  2.7<H>  /  AST  86<H>  /  ALT  22  /  AlkPhos  252<H>  06-16      PT/INR - ( 2020 04:30 )   PT: 16.30 sec;   INR: 1.42 ratio         PTT - ( 2020 04:30 )  PTT:33.5 sec                                       Urinalysis Basic - ( 15 Aaron 2020 05:45 )    Color: Jennifer / Appearance: Slightly Turbid / S.042 / pH: x  Gluc: x / Ketone: Negative  / Bili: Small / Urobili: <2 mg/dL   Blood: x / Protein: 30 mg/dL / Nitrite: Negative   Leuk Esterase: Negative / RBC: 4 /HPF / WBC 49 /HPF   Sq Epi: x / Non Sq Epi: >27 /HPF / Bacteria: Negative        CARDIAC MARKERS ( 15 Aaron 2020 00:56 )  x     / <0.01 ng/mL / x     / x     / x                                                LIVER FUNCTIONS - ( 2020 04:30 )  Alb: 1.9 g/dL / Pro: 5.2 g/dL / ALK PHOS: 252 U/L / ALT: 22 U/L / AST: 86 U/L / GGT: x                                                                                                                                       X-Rays reviewed:                                                                                    ECHO    CXR interpreted by me:    MEDICATIONS  (STANDING):  cefTRIAXone   IVPB 2000 milliGRAM(s) IV Intermittent every 24 hours  chlorhexidine 4% Liquid 1 Application(s) Topical <User Schedule>  folic acid 1 milliGRAM(s) Oral daily  furosemide   Injectable 40 milliGRAM(s) IV Push daily  lidocaine 1% Injectable 10 milliLiter(s) Local Injection once  nicotine - 21 mG/24Hr(s) Patch 1 patch Transdermal daily  pantoprazole  Injectable 40 milliGRAM(s) IV Push daily  thiamine 100 milliGRAM(s) Oral daily  vancomycin    Solution 125 milliGRAM(s) Oral every 6 hours    MEDICATIONS  (PRN):  morphine  - Injectable 4 milliGRAM(s) IV Push every 4 hours PRN Severe Pain (7 - 10)

## 2020-06-16 NOTE — BEHAVIORAL HEALTH ASSESSMENT NOTE - RISK ASSESSMENT
Low Acute Suicide Risk Patient's risk of self-harm is mitigated by her lack of suicidal ideation, lack of previous suicidal behavior, access to care, hopefulness towards the future, and strong Jew beliefs. Thus, she does not warrant inpatient psychiatric hospitalization at this time.

## 2020-06-16 NOTE — BEHAVIORAL HEALTH ASSESSMENT NOTE - NSBHCHARTREVIEWIMAGING_PSY_A_CORE FT
< from: CT Abdomen and Pelvis w/ IV Cont (06.15.20 @ 04:52) >    EXAM:  CT ABDOMEN AND PELVIS IC          PROCEDURE DATE:  06/15/2020    INTERPRETATION:  CLINICAL STATEMENT: Abdominal pain. Pancreatitis  . C. difficile colitis.    TECHNIQUE: Contiguous axial CT images were obtained from the lower chestto the pubic symphysis following administration of 100cc Optiray 320 intravenous contrast.  Oral contrast was not administered.  Reformatted images in the coronal and sagittal planes were acquired.    Comparison made with CT abdomen and pelvis Aida 10, 2020.    FINDINGS:  LOWER CHEST: Small left pleural effusion, stable with bibasilar subsegmental atelectasis  HEPATOBILIARY: Hepatic steatosis. Hepatomegaly, 25 cm in length. No biliary dilation. Gallbladder partially distended..  SPLEEN: Unremarkable.  PANCREAS: Unchanged generalized mesenteric edema, limiting evaluation for peripancreatic inflammatory change. Unchanged multiple pancreatic fluid collections, measuring up to 2.4 cm. Atrophic pancreas. No evidence of hemorrhage. Patent splenic vein.  ADRENAL GLANDS: Unremarkable.  KIDNEYS: Unremarkable.  ABDOMINOPELVIC NODES: Unremarkable.  PELVIC ORGANS: Unremarkable.  PERITONEUM/MESENTERY/BOWEL: Persistent and slightly increased colonic thickening from cecum through transverse colon; no evidence of abscess. Stable mild to moderate ascites.  BONES/SOFT TISSUES: Unremarkable.    IMPRESSION:   Since Aida 10, 2020:    1. Persistent  and slightly increased colonic thickening from cecum through transverse colon, compatible with known colitis; no evidence of abscess.     2. Unchanged generalized mesenteric edema, limiting assessment for peripancreatic inflammatory change. Unchanged multiple pancreatic fluid collections, measuring up to 2.4 cm, which may be represent pancreatic pseudocysts.    3. Stable mild to moderate ascites.    4. Hepatic steatosis.    AMILCAR ROJAS M.D., ATTENDING RADIOLOGIST  This document has been electronically signed. Aaron 15 2020  5:08AM    < end of copied text >

## 2020-06-17 DIAGNOSIS — K76.0 FATTY (CHANGE OF) LIVER, NOT ELSEWHERE CLASSIFIED: ICD-10-CM

## 2020-06-17 DIAGNOSIS — T50.905A ADVERSE EFFECT OF UNSPECIFIED DRUGS, MEDICAMENTS AND BIOLOGICAL SUBSTANCES, INITIAL ENCOUNTER: ICD-10-CM

## 2020-06-17 DIAGNOSIS — N39.0 URINARY TRACT INFECTION, SITE NOT SPECIFIED: ICD-10-CM

## 2020-06-17 DIAGNOSIS — Z91.19 PATIENT'S NONCOMPLIANCE WITH OTHER MEDICAL TREATMENT AND REGIMEN: ICD-10-CM

## 2020-06-17 DIAGNOSIS — R10.9 UNSPECIFIED ABDOMINAL PAIN: ICD-10-CM

## 2020-06-17 DIAGNOSIS — Z76.5 MALINGERER [CONSCIOUS SIMULATION]: ICD-10-CM

## 2020-06-17 DIAGNOSIS — E87.2 ACIDOSIS: ICD-10-CM

## 2020-06-17 DIAGNOSIS — K86.3 PSEUDOCYST OF PANCREAS: ICD-10-CM
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
## 2020-06-17 DIAGNOSIS — R53.83 OTHER FATIGUE: ICD-10-CM

## 2020-06-17 DIAGNOSIS — D53.9 NUTRITIONAL ANEMIA, UNSPECIFIED: ICD-10-CM

## 2020-06-17 DIAGNOSIS — F10.180 ALCOHOL ABUSE WITH ALCOHOL-INDUCED ANXIETY DISORDER: ICD-10-CM

## 2020-06-17 DIAGNOSIS — F17.210 NICOTINE DEPENDENCE, CIGARETTES, UNCOMPLICATED: ICD-10-CM

## 2020-06-17 DIAGNOSIS — K70.11 ALCOHOLIC HEPATITIS WITH ASCITES: ICD-10-CM

## 2020-06-17 DIAGNOSIS — K86.0 ALCOHOL-INDUCED CHRONIC PANCREATITIS: ICD-10-CM

## 2020-06-17 DIAGNOSIS — E83.42 HYPOMAGNESEMIA: ICD-10-CM

## 2020-06-17 DIAGNOSIS — I95.89 OTHER HYPOTENSION: ICD-10-CM

## 2020-06-17 DIAGNOSIS — F19.19 OTHER PSYCHOACTIVE SUBSTANCE ABUSE WITH UNSPECIFIED PSYCHOACTIVE SUBSTANCE-INDUCED DISORDER: ICD-10-CM

## 2020-06-17 DIAGNOSIS — E83.39 OTHER DISORDERS OF PHOSPHORUS METABOLISM: ICD-10-CM

## 2020-06-17 DIAGNOSIS — K85.20 ALCOHOL INDUCED ACUTE PANCREATITIS WITHOUT NECROSIS OR INFECTION: ICD-10-CM

## 2020-06-17 DIAGNOSIS — E51.9 THIAMINE DEFICIENCY, UNSPECIFIED: ICD-10-CM

## 2020-06-17 DIAGNOSIS — E53.8 DEFICIENCY OF OTHER SPECIFIED B GROUP VITAMINS: ICD-10-CM

## 2020-06-17 DIAGNOSIS — E87.6 HYPOKALEMIA: ICD-10-CM

## 2020-06-17 DIAGNOSIS — A04.72 ENTEROCOLITIS DUE TO CLOSTRIDIUM DIFFICILE, NOT SPECIFIED AS RECURRENT: ICD-10-CM
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
## 2020-06-17 DIAGNOSIS — L02.91 CUTANEOUS ABSCESS, UNSPECIFIED: ICD-10-CM

## 2020-06-17 LAB
ALBUMIN FLD-MCNC: 0.5 G/DL — SIGNIFICANT CHANGE UP
ALBUMIN SERPL ELPH-MCNC: 1.6 G/DL — LOW (ref 3.5–5.2)
ALP SERPL-CCNC: 234 U/L — HIGH (ref 30–115)
ALT FLD-CCNC: 21 U/L — SIGNIFICANT CHANGE UP (ref 0–41)
AMPHET UR-MCNC: NEGATIVE — SIGNIFICANT CHANGE UP
AMYLASE FLD-CCNC: 15 U/L — SIGNIFICANT CHANGE UP
ANION GAP SERPL CALC-SCNC: 11 MMOL/L — SIGNIFICANT CHANGE UP (ref 7–14)
APTT BLD: 34.3 SEC — SIGNIFICANT CHANGE UP (ref 27–39.2)
AST SERPL-CCNC: 98 U/L — HIGH (ref 0–41)
BARBITURATES UR SCN-MCNC: NEGATIVE — SIGNIFICANT CHANGE UP
BASOPHILS # BLD AUTO: 0.09 K/UL — SIGNIFICANT CHANGE UP (ref 0–0.2)
BASOPHILS NFR BLD AUTO: 0.6 % — SIGNIFICANT CHANGE UP (ref 0–1)
BENZODIAZ UR-MCNC: NEGATIVE — SIGNIFICANT CHANGE UP
BILIRUB DIRECT SERPL-MCNC: 2.3 MG/DL — HIGH (ref 0–0.2)
BILIRUB INDIRECT FLD-MCNC: 1 MG/DL — SIGNIFICANT CHANGE UP (ref 0.2–1.2)
BILIRUB SERPL-MCNC: 3.3 MG/DL — HIGH (ref 0.2–1.2)
BUN SERPL-MCNC: 3 MG/DL — LOW (ref 10–20)
CALCIUM SERPL-MCNC: 7.2 MG/DL — LOW (ref 8.5–10.1)
CHLORIDE SERPL-SCNC: 103 MMOL/L — SIGNIFICANT CHANGE UP (ref 98–110)
CO2 SERPL-SCNC: 26 MMOL/L — SIGNIFICANT CHANGE UP (ref 17–32)
COCAINE METAB.OTHER UR-MCNC: POSITIVE
CREAT SERPL-MCNC: <0.5 MG/DL — LOW (ref 0.7–1.5)
EOSINOPHIL # BLD AUTO: 0.38 K/UL — SIGNIFICANT CHANGE UP (ref 0–0.7)
EOSINOPHIL NFR BLD AUTO: 2.6 % — SIGNIFICANT CHANGE UP (ref 0–8)
GLUCOSE FLD-MCNC: 128 MG/DL — SIGNIFICANT CHANGE UP
GLUCOSE SERPL-MCNC: 103 MG/DL — HIGH (ref 70–99)
GRAM STN FLD: SIGNIFICANT CHANGE UP
HCT VFR BLD CALC: 25.5 % — LOW (ref 37–47)
HGB BLD-MCNC: 8 G/DL — LOW (ref 12–16)
IMM GRANULOCYTES NFR BLD AUTO: 0.5 % — HIGH (ref 0.1–0.3)
INR BLD: 1.58 RATIO — HIGH (ref 0.65–1.3)
LYMPHOCYTES # BLD AUTO: 1.2 K/UL — SIGNIFICANT CHANGE UP (ref 1.2–3.4)
LYMPHOCYTES # BLD AUTO: 8.3 % — LOW (ref 20.5–51.1)
MAGNESIUM SERPL-MCNC: 2 MG/DL — SIGNIFICANT CHANGE UP (ref 1.8–2.4)
MCHC RBC-ENTMCNC: 31.4 G/DL — LOW (ref 32–37)
MCHC RBC-ENTMCNC: 32.4 PG — HIGH (ref 27–31)
MCV RBC AUTO: 103.2 FL — HIGH (ref 81–99)
METHADONE UR-MCNC: NEGATIVE — SIGNIFICANT CHANGE UP
MONOCYTES # BLD AUTO: 0.94 K/UL — HIGH (ref 0.1–0.6)
MONOCYTES NFR BLD AUTO: 6.5 % — SIGNIFICANT CHANGE UP (ref 1.7–9.3)
NEUTROPHILS # BLD AUTO: 11.86 K/UL — HIGH (ref 1.4–6.5)
NEUTROPHILS NFR BLD AUTO: 81.5 % — HIGH (ref 42.2–75.2)
NRBC # BLD: 0 /100 WBCS — SIGNIFICANT CHANGE UP (ref 0–0)
OPIATES UR-MCNC: POSITIVE
PCP SPEC-MCNC: SIGNIFICANT CHANGE UP
PLATELET # BLD AUTO: 300 K/UL — SIGNIFICANT CHANGE UP (ref 130–400)
POTASSIUM SERPL-MCNC: 3.4 MMOL/L — LOW (ref 3.5–5)
POTASSIUM SERPL-SCNC: 3.4 MMOL/L — LOW (ref 3.5–5)
PROCALCITONIN SERPL-MCNC: 0.33 NG/ML — HIGH (ref 0.02–0.1)
PROPOXYPHENE QUALITATIVE URINE RESULT: NEGATIVE — SIGNIFICANT CHANGE UP
PROT FLD-MCNC: <1 G/DL — SIGNIFICANT CHANGE UP
PROT SERPL-MCNC: 4.7 G/DL — LOW (ref 6–8)
PROTHROM AB SERPL-ACNC: 18.2 SEC — HIGH (ref 9.95–12.87)
RBC # BLD: 2.47 M/UL — LOW (ref 4.2–5.4)
RBC # FLD: 17.3 % — HIGH (ref 11.5–14.5)
SODIUM SERPL-SCNC: 140 MMOL/L — SIGNIFICANT CHANGE UP (ref 135–146)
SPECIMEN SOURCE: SIGNIFICANT CHANGE UP
WBC # BLD: 14.54 K/UL — HIGH (ref 4.8–10.8)
WBC # FLD AUTO: 14.54 K/UL — HIGH (ref 4.8–10.8)

## 2020-06-17 PROCEDURE — 93970 EXTREMITY STUDY: CPT | Mod: 26

## 2020-06-17 PROCEDURE — 76705 ECHO EXAM OF ABDOMEN: CPT | Mod: 26

## 2020-06-17 PROCEDURE — 93010 ELECTROCARDIOGRAM REPORT: CPT

## 2020-06-17 PROCEDURE — 99233 SBSQ HOSP IP/OBS HIGH 50: CPT

## 2020-06-17 RX ORDER — SPIRONOLACTONE 25 MG/1
50 TABLET, FILM COATED ORAL DAILY
Refills: 0 | Status: DISCONTINUED | OUTPATIENT
Start: 2020-06-17 | End: 2020-06-19

## 2020-06-17 RX ORDER — POTASSIUM CHLORIDE 20 MEQ
40 PACKET (EA) ORAL ONCE
Refills: 0 | Status: COMPLETED | OUTPATIENT
Start: 2020-06-17 | End: 2020-06-17

## 2020-06-17 RX ORDER — FUROSEMIDE 40 MG
20 TABLET ORAL DAILY
Refills: 0 | Status: DISCONTINUED | OUTPATIENT
Start: 2020-06-17 | End: 2020-06-19

## 2020-06-17 RX ORDER — POTASSIUM CHLORIDE 20 MEQ
40 PACKET (EA) ORAL ONCE
Refills: 0 | Status: DISCONTINUED | OUTPATIENT
Start: 2020-06-17 | End: 2020-06-17

## 2020-06-17 RX ORDER — KETOROLAC TROMETHAMINE 30 MG/ML
15 SYRINGE (ML) INJECTION ONCE
Refills: 0 | Status: DISCONTINUED | OUTPATIENT
Start: 2020-06-17 | End: 2020-06-17

## 2020-06-17 RX ADMIN — Medication 1 TABLET(S): at 17:24

## 2020-06-17 RX ADMIN — Medication 40 MILLIEQUIVALENT(S): at 21:20

## 2020-06-17 RX ADMIN — Medication 1 PATCH: at 06:11

## 2020-06-17 RX ADMIN — Medication 1 PATCH: at 11:23

## 2020-06-17 RX ADMIN — MORPHINE SULFATE 4 MILLIGRAM(S): 50 CAPSULE, EXTENDED RELEASE ORAL at 00:00

## 2020-06-17 RX ADMIN — MORPHINE SULFATE 4 MILLIGRAM(S): 50 CAPSULE, EXTENDED RELEASE ORAL at 06:15

## 2020-06-17 RX ADMIN — Medication 1 TABLET(S): at 09:04

## 2020-06-17 RX ADMIN — Medication 125 MILLIGRAM(S): at 06:14

## 2020-06-17 RX ADMIN — PANTOPRAZOLE SODIUM 40 MILLIGRAM(S): 20 TABLET, DELAYED RELEASE ORAL at 06:14

## 2020-06-17 RX ADMIN — MORPHINE SULFATE 4 MILLIGRAM(S): 50 CAPSULE, EXTENDED RELEASE ORAL at 19:19

## 2020-06-17 RX ADMIN — MORPHINE SULFATE 4 MILLIGRAM(S): 50 CAPSULE, EXTENDED RELEASE ORAL at 14:55

## 2020-06-17 RX ADMIN — Medication 125 MILLIGRAM(S): at 23:21

## 2020-06-17 RX ADMIN — Medication 2 MILLIGRAM(S): at 21:20

## 2020-06-17 RX ADMIN — Medication 1 MILLIGRAM(S): at 17:22

## 2020-06-17 RX ADMIN — ENOXAPARIN SODIUM 40 MILLIGRAM(S): 100 INJECTION SUBCUTANEOUS at 13:28

## 2020-06-17 RX ADMIN — Medication 1 MILLIGRAM(S): at 09:04

## 2020-06-17 RX ADMIN — Medication 1 PATCH: at 19:56

## 2020-06-17 RX ADMIN — Medication 15 MILLIGRAM(S): at 13:28

## 2020-06-17 RX ADMIN — CHLORHEXIDINE GLUCONATE 1 APPLICATION(S): 213 SOLUTION TOPICAL at 05:01

## 2020-06-17 RX ADMIN — Medication 100 MILLIGRAM(S): at 11:23

## 2020-06-17 RX ADMIN — Medication 125 MILLIGRAM(S): at 17:24

## 2020-06-17 RX ADMIN — Medication 1 MILLIGRAM(S): at 13:27

## 2020-06-17 RX ADMIN — MORPHINE SULFATE 4 MILLIGRAM(S): 50 CAPSULE, EXTENDED RELEASE ORAL at 23:19

## 2020-06-17 RX ADMIN — Medication 125 MILLIGRAM(S): at 00:00

## 2020-06-17 RX ADMIN — Medication 125 MILLIGRAM(S): at 11:29

## 2020-06-17 RX ADMIN — MORPHINE SULFATE 4 MILLIGRAM(S): 50 CAPSULE, EXTENDED RELEASE ORAL at 10:40

## 2020-06-17 RX ADMIN — Medication 1 PATCH: at 11:22

## 2020-06-17 NOTE — PHYSICAL THERAPY INITIAL EVALUATION ADULT - GENERAL OBSERVATIONS, REHAB EVAL
14:40-Chart reviewed. Attempted to see pt for PT IE however pt c/o increased pain at this time. PT to f/u.

## 2020-06-17 NOTE — CONSULT NOTE ADULT - ATTENDING COMMENTS
Patient with Acute pancreatitis, Alcoholic hepatitis, Cl Difficle diarrhea and Ascites.  Rec Diuretics , can do a diagnostic tap to r/o SBP also to send for fluid amylase.
Agree with above    Please limit pain medications.  If the patient does not have relief can consider celiac plexus block

## 2020-06-17 NOTE — CONSULT NOTE ADULT - SUBJECTIVE AND OBJECTIVE BOX
Chief Complaint:    Pt is a 39 year old female with a history of ETOH abuse, Alcoholic liver disease,  pancreatitis, depression, anxiety, Recent C diff infection. Presented to the ED with Abd pain and abd distention secondary to pancreatitis for 2 1/2 months. States pain is 10/10 now with no non-verbal cues of pain.  who comes to the ED with chief c/o Abdominal pain, distension and leg swelling.       Allergies    No Known Allergies    Intolerances        PAST MEDICAL & SURGICAL HISTORY:  ETOH abuse  Postpartum depression  Substance abuse  Anxiety  Depression  No significant past surgical history        SOCIAL HISTORY:  Denies Smoking, Alcohol, or Drug Use    No Known Allergies      Current PAIN MEDICATIONS:  LORazepam   Injectable 1 milliGRAM(s) IV Push every 4 hours PRN  morphine  - Injectable 4 milliGRAM(s) IV Push every 4 hours PRN    Heme:  enoxaparin Injectable 40 milliGRAM(s) SubCutaneous daily    Antibiotics:  vancomycin    Solution 125 milliGRAM(s) Oral every 6 hours    Cardiovascular:  furosemide    Tablet 20 milliGRAM(s) Oral daily  spironolactone 50 milliGRAM(s) Oral daily    GI:  pantoprazole    Tablet 40 milliGRAM(s) Oral before breakfast    Endocrine:    All Other Medications:  chlorhexidine 4% Liquid 1 Application(s) Topical <User Schedule>  folic acid 1 milliGRAM(s) Oral daily  lactobacillus acidophilus 1 Tablet(s) Oral two times a day with meals  lidocaine 1% Injectable 10 milliLiter(s) Local Injection once  nicotine - 21 mG/24Hr(s) Patch 1 patch Transdermal daily  thiamine 100 milliGRAM(s) Oral daily      Vital Signs Last 24 Hrs  T(C): 37.5 (17 Jun 2020 12:19), Max: 37.5 (17 Jun 2020 12:19)  T(F): 99.5 (17 Jun 2020 12:19), Max: 99.5 (17 Jun 2020 12:19)  HR: 114 (17 Jun 2020 12:19) (98 - 124)  BP: 110/55 (17 Jun 2020 12:19) (92/47 - 112/56)  BP(mean): 69 (17 Jun 2020 11:00) (59 - 78)  RR: 18 (17 Jun 2020 12:19) (9 - 25)  SpO2: 96% (17 Jun 2020 11:00) (95% - 98%)      06-16-20 @ 07:01  -  06-17-20 @ 07:00  --------------------------------------------------------  IN: 500 mL / OUT: 1350 mL / NET: -850 mL    06-17-20 @ 07:01  -  06-17-20 @ 16:34  --------------------------------------------------------  IN: 120 mL / OUT: 200 mL / NET: -80 mL        LABS:                          8.0    14.54 )-----------( 300      ( 17 Jun 2020 04:25 )             25.5     06-17    140  |  103  |  3<L>  ----------------------------<  103<H>  3.4<L>   |  26  |  <0.5<L>    Ca    7.2<L>      17 Jun 2020 04:25  Mg     2.0     06-17    TPro  4.7<L>  /  Alb  1.6<L>  /  TBili  3.3<H>  /  DBili  2.3<H>  /  AST  98<H>  /  ALT  21  /  AlkPhos  234<H>  06-17    PT/INR - ( 17 Jun 2020 04:25 )   PT: 18.20 sec;   INR: 1.58 ratio         PTT - ( 17 Jun 2020 04:25 )  PTT:34.3 sec      RADIOLOGY:  EXAM:  CT ANGIO CHEST (W)AW IC            PROCEDURE DATE:  06/16/2020            INTERPRETATION:  CLINICAL HISTORY/REASON FOR EXAM: Sepsis. Pleural effusion..    TECHNIQUE: Multislice helical sections were obtained from the thoracic inlet to the lung bases during rapid administration of intravenous contrast. Thin sections were reconstructed through the pulmonary vasculature. 3D (MIP) reformats obtained.    COMPARISON: CT abdomen and pelvis Aida 15, 2020, CT chest October 9, 2018.    FINDINGS:    PULMONARY EMBOLUS: No evidence of acute pulmonary embolism.    LUNGS, PLEURA, AIRWAYS: Scattered areas of predominantly subpleural groundglass and consolidative airspace opacities. Small bilateral pleural effusions, stable. Overlying compressive atelectasis. Bibasilar subsegmental atelectasis.    THORACIC NODES: No mediastinal, hilar, supraclavicular, or axillary lymphadenopathy.    MEDIASTINUM/GREAT VESSELS: No pericardial effusion. Heart size is within normal limits. The aorta and main pulmonary artery are of normal caliber.    BONES/SOFT TISSUES: T6 sclerotic vertebral body rounded lesion, stable since 2018.    VISUALIZED UPPER ABDOMEN: Partially imaged abdominal ascites, hepatic steatosis.      IMPRESSION:    1. No evidence of acute pulmonary embolism.    2. Scattered areas of predominantly subpleural groundglass and consolidative airspace opacities; possibly infectious in etiology, in appropriate clinical setting.    3. Stable bilateral small pleural effusions.    4. Partially imaged abdominal ascites, hepatic steatosis.        AMILCAR ROJAS M.D., ATTENDING RADIOLOGIST  This document has been electronically signed. Jun 16 2020  9:32PM        Drug Screen:  Drug Screen W/PCP, Urine: Done (06-17 @ 10:55)        [ ]  [facility] Reviewed on 6/17/20, 4:56P  Patient Name: Leslee Aggarwal Date: 1980  Address: [address] [place] [id number]: Female  Rx Written	Rx Dispensed	Drug	Quantity	Days Supply	Prescriber Name  08/05/2019	11/04/2019	oxycodone-acetaminophen 7.5-325 mg tablet	90	30	Maci, Kenji  [facility]	09/09/2019	oxycodone-acetaminophen  mg tab	90	30	Maci, Kenji  Payment Method Insurance  [facility]  09/09/2019	09/09/2019	alprazolam 1 mg tablet	90	30	Maci, Kenji  Payment Method Insurance  [facility]  08/05/2019	08/05/2019	alprazolam 1 mg tablet	90	30	Maci, Kenji  Payment Method Insurance  [facility]  06/25/2019	06/25/2019	oxycodone-acetaminophen 7.5-325 mg tablet	90	30	Maci, Kenji  [facility]  [facility]  06/17/2019	06/20/2019	hydrocodone-acetaminophen 7.5-325 mg tablet	21	7	Maci, Kenji  [facility] Chief Complaint:    Pt is a 39 year old female with a history of ETOH abuse, Alcoholic liver disease,  pancreatitis, depression, anxiety, Recent C diff infection. Presented to the ED with Abd pain and abd distention secondary to pancreatitis for 2 1/2 months. States pain is 10/10 now with no non-verbal cues of pain.  who comes to the ED with chief c/o Abdominal pain, distension and leg swelling.       Allergies    No Known Allergies    Intolerances        PAST MEDICAL & SURGICAL HISTORY:  ETOH abuse  Postpartum depression  Substance abuse  Anxiety  Depression  No significant past surgical history        SOCIAL HISTORY:  Denies Smoking, Alcohol, or Drug Use    No Known Allergies      Current PAIN MEDICATIONS:  LORazepam   Injectable 1 milliGRAM(s) IV Push every 4 hours PRN  morphine  - Injectable 4 milliGRAM(s) IV Push every 4 hours PRN    Heme:  enoxaparin Injectable 40 milliGRAM(s) SubCutaneous daily    Antibiotics:  vancomycin    Solution 125 milliGRAM(s) Oral every 6 hours    Cardiovascular:  furosemide    Tablet 20 milliGRAM(s) Oral daily  spironolactone 50 milliGRAM(s) Oral daily    GI:  pantoprazole    Tablet 40 milliGRAM(s) Oral before breakfast    Endocrine:    All Other Medications:  chlorhexidine 4% Liquid 1 Application(s) Topical <User Schedule>  folic acid 1 milliGRAM(s) Oral daily  lactobacillus acidophilus 1 Tablet(s) Oral two times a day with meals  lidocaine 1% Injectable 10 milliLiter(s) Local Injection once  nicotine - 21 mG/24Hr(s) Patch 1 patch Transdermal daily  thiamine 100 milliGRAM(s) Oral daily      Vital Signs Last 24 Hrs  T(C): 37.5 (17 Jun 2020 12:19), Max: 37.5 (17 Jun 2020 12:19)  T(F): 99.5 (17 Jun 2020 12:19), Max: 99.5 (17 Jun 2020 12:19)  HR: 114 (17 Jun 2020 12:19) (98 - 124)  BP: 110/55 (17 Jun 2020 12:19) (92/47 - 112/56)  BP(mean): 69 (17 Jun 2020 11:00) (59 - 78)  RR: 18 (17 Jun 2020 12:19) (9 - 25)  SpO2: 96% (17 Jun 2020 11:00) (95% - 98%)      06-16-20 @ 07:01  -  06-17-20 @ 07:00  --------------------------------------------------------  IN: 500 mL / OUT: 1350 mL / NET: -850 mL    06-17-20 @ 07:01  -  06-17-20 @ 16:34  --------------------------------------------------------  IN: 120 mL / OUT: 200 mL / NET: -80 mL        LABS:                          8.0    14.54 )-----------( 300      ( 17 Jun 2020 04:25 )             25.5     06-17    140  |  103  |  3<L>  ----------------------------<  103<H>  3.4<L>   |  26  |  <0.5<L>    Ca    7.2<L>      17 Jun 2020 04:25  Mg     2.0     06-17    TPro  4.7<L>  /  Alb  1.6<L>  /  TBili  3.3<H>  /  DBili  2.3<H>  /  AST  98<H>  /  ALT  21  /  AlkPhos  234<H>  06-17    PT/INR - ( 17 Jun 2020 04:25 )   PT: 18.20 sec;   INR: 1.58 ratio         PTT - ( 17 Jun 2020 04:25 )  PTT:34.3 sec      RADIOLOGY:  EXAM:  CT ANGIO CHEST (W)AW IC            PROCEDURE DATE:  06/16/2020            INTERPRETATION:  CLINICAL HISTORY/REASON FOR EXAM: Sepsis. Pleural effusion..    TECHNIQUE: Multislice helical sections were obtained from the thoracic inlet to the lung bases during rapid administration of intravenous contrast. Thin sections were reconstructed through the pulmonary vasculature. 3D (MIP) reformats obtained.    COMPARISON: CT abdomen and pelvis Aida 15, 2020, CT chest October 9, 2018.    FINDINGS:    PULMONARY EMBOLUS: No evidence of acute pulmonary embolism.    LUNGS, PLEURA, AIRWAYS: Scattered areas of predominantly subpleural groundglass and consolidative airspace opacities. Small bilateral pleural effusions, stable. Overlying compressive atelectasis. Bibasilar subsegmental atelectasis.    THORACIC NODES: No mediastinal, hilar, supraclavicular, or axillary lymphadenopathy.    MEDIASTINUM/GREAT VESSELS: No pericardial effusion. Heart size is within normal limits. The aorta and main pulmonary artery are of normal caliber.    BONES/SOFT TISSUES: T6 sclerotic vertebral body rounded lesion, stable since 2018.    VISUALIZED UPPER ABDOMEN: Partially imaged abdominal ascites, hepatic steatosis.      IMPRESSION:    1. No evidence of acute pulmonary embolism.    2. Scattered areas of predominantly subpleural groundglass and consolidative airspace opacities; possibly infectious in etiology, in appropriate clinical setting.    3. Stable bilateral small pleural effusions.    4. Partially imaged abdominal ascites, hepatic steatosis.      AMILCAR ROJAS M.D., ATTENDING RADIOLOGIST  This document has been electronically signed. Jun 16 2020  9:32PM    ------------------------------------------------------------------------------------------  EXAM:  US ABD LIVER FOLLOWUP            PROCEDURE DATE:  06/17/2020            INTERPRETATION:  Clinical History/Reason For Exam: hepatomegaly    Technique: Doppler ultrasound of the liver.    Comparison: CT abdomen pelvis 6/15/2020.    Findings:  Middle, right, and left hepatic veins are all visualized, appearing small in caliber with reduced velocity of flow however they all appear patent.    Portal venous flow is also seen, with increased pulsatility.    Impression:  No evidence of hepatic or portal vein occlusion.    Small caliber hepatic veins with reduced velocity of flow.    Pulsatile portal and splenic vein flow.                  TIFFANY VAZQUEZ M.D., ATTENDING RADIOLOGIST  This document has been electronically signed. Jun 17 2020  3:12PM          Drug Screen:  Drug Screen W/PCP, Urine: Done (06-17 @ 10:55)        [ ]  [facility]  Reviewed on 6/17/20, 4:56P  Patient Name: Leslee Aggarwal Date: 1980  Address: [address] [place] [id number]: Female  Rx Written	Rx Dispensed	Drug	Quantity	Days Supply	Prescriber Name  08/05/2019	11/04/2019	oxycodone-acetaminophen 7.5-325 mg tablet	90	30	Kenji Lux  Payment Method Insurance  [facility]  09/09/2019	09/09/2019	oxycodone-acetaminophen  mg tab	90	30	Kenji Lux  Payment Method Insurance  [facility]  09/09/2019	09/09/2019	alprazolam 1 mg tablet	90	30	Kenji Lux  Payment Method Insurance  [facility]  08/05/2019	08/05/2019	alprazolam 1 mg tablet	90	30	MaciKenji patel  Payment Method Insurance  [facility]  06/25/2019	06/25/2019	oxycodone-acetaminophen 7.5-325 mg tablet	90	30	Kenji Lux  Payment Method Insurance  [facility]  06/17/2019	06/20/2019	hydrocodone-acetaminophen 7.5-325 mg tablet	21	7	Kenji Lux  Payment Method Insurance  [facility]

## 2020-06-17 NOTE — CONSULT NOTE ADULT - ASSESSMENT
REVIEW OF SYSTEMS    General:	NAD   Skin/Breast:	Neg  Ophthalmologic:	Neg  ENMT: Neg	  Respiratory and Thorax: Neg	  Cardiovascular:	Neg  Gastrointestinal:	chronic pancreatitis  Genitourinary:	Neg  Musculoskeletal:	Neg  Neurological:	Neg  Psychiatric:	Neg  Hematology/Lymphatics:	Neg  Endocrine:	Neg        PHYSICAL EXAM:    GENERAL: NAD, well-groomed, well-developed  HEAD:  Atraumatic, Normocephalic  EYES: EOMI, PERRLA, conjunctiva and sclera clear  ENMT: No tonsillar erythema, exudates, or enlargement; Moist mucous membranes, Good dentition, No lesions  NECK: Supple, No JVD, Normal thyroid  NERVOUS SYSTEM:  Alert & Oriented X3, Good concentration; Motor Strength 5/5 B/L upper and lower extremities  CHEST/LUNG: Clear to percussion bilaterally; No rales, rhonchi, wheezing, or rubs  HEART: Regular rate and rhythm; No murmurs, rubs, or gallops  ABDOMEN: Soft, Nontender, Nondistended; Bowel sounds present  EXTREMITIES:  No clubbing, cyanosis, or edema  LYMPH: No lymphadenopathy noted  SKIN: No rashes or lesions      Patient lying in bed supine. Noted abd to be dissented. Patient with back pain as well. States the pain to be 10/10 sharp, achy and dull. Patient last drank 2 days ago. Patient smokes QD.

## 2020-06-17 NOTE — CONSULT NOTE ADULT - PROBLEM SELECTOR RECOMMENDATION 9
Con't LORazepam   Injectable 1 milliGRAM(s) IV Push every 4 hours PRN  DC morphine  - Injectable 4 milliGRAM(s) IV Push every 4 hours PRN  Start Acetaminophen 325mg PO Q6hrs standing  No Opioids  No NSAID's

## 2020-06-17 NOTE — CHART NOTE - NSCHARTNOTEFT_GEN_A_CORE
ICU DOWNGRADE NOTE:    39y Female transferred to floor from ICU    Patient is a 39y old Female who presents with a chief complaint of Abdominal Pain, Leg swelling.     The patient is currently admitted for the primary diagnosis of sepsis secondary to cdiff colitis dx on 6/7    The patient was admitted to the unit for 2 Days.    The patient was never intubated , and was never on pressors     Indwelling vascular catheters: peripheral IV    Urinary Catheter: primafit    Disposition: [facility]    Code Status: full code    ICU COURSE OF EVENTS:  -------------------------------------------------------------------------------------------  Pt is a 39 year old female with a history of ETOH abuse, Alcoholic liver disease,  pancreatitis, depression, anxiety, Recent C diff infection who comes to the ED with chief c/o Abdominal pain, distension and leg swelling.     She was then admitted for sepsis secondary to c diff colitis dx on 6/7 in previous hospitalization and patient neglected to take antibx at home. Was started on PO vanc for c diff and ceftriaxone for possible sbp (less likely). Never intubated or on presser. Had a paracentesis on 6/16, tolerated well which showed 149 PMN. Patient was started on feeds and tolerated well. Diagnostic paracentesis performed and appears to be cirrhotic in nature; no need for any antibiotics. Will need pain management follow up a in the hospital and outpatient follow up for GI.     -------------------------------------------------------------------------------------------    #Severe sepsis in setting of C diff colitis ( vs r/o SBP- unlikely), improving  - Tachycardic, elevated lactate on admission- s/p 2L iv fluids ( possible type 2 lactemia in setting of liver dysfunction)  - Continue on PO Vanc  - cultures negative  - Diagnostic paracentesis negative for SBP; likely just   - GI advised outpatient follow up for cirrhosis   - Tolerating PO diet    #h/o Alcohol use active drinker 4cups of vodka daily and h/o recreational drug use  - last drink 6/14  - [facility] protocol  - Folate and thiamine  - Urine drug screen being sent; follow up     #Alcoholic hepatitis/ ALD; likely Cirrhosis  #h/o Pancreatitis with pancreatic pseudocyst cx by ascites  - Keep equal balance today   - Will likely need to be started on Spironolactone and lasix but BP borderline today.     #Ascites with B/L leg edema with hypoalbuminemia  - Alcohol level elevated on admission, monitor CIWA score  - Recent US abdomen on last admission with Hepatomegaly, hepatic steatosis, no evidence of cirrhosis  - Normal lipase, No infection of pancreatic pseudocyst  - US abdomen noted for enlarged liver compatible with hepatic steatosis or hepatocellular disease   - Gallbladder thickening with intraluminal sludge   - Malnutrition, Low BUN, hypoalbuminemia with third spacing fluid- ensure adequate protein intake.     #Hypokalemia   - Poor intake and C diff diarrhea  - Continue to monitor    #Macrocytic anemia  - 2/2 Alcohol use  - Continue  on folate, thiamine.     Activity: As tolerated  Diet: Regular  DVT ppx: Lovenox  Code Status: Full Code  Dispo: From home; medical floor downgrade ICU DOWNGRADE NOTE:    39y Female transferred to floor from ICU    Patient is a 39y old Female who presents with a chief complaint of Abdominal Pain, Leg swelling.     The patient is currently admitted for the primary diagnosis of sepsis secondary to cdiff colitis dx on 6/7    The patient was admitted to the unit for 3 Days.    The patient was never intubated, and was never on pressors     Indwelling vascular catheters: peripheral IV    Urinary Catheter: primafit    Disposition: Medical Floor    Code Status: Full Code    ICU COURSE OF EVENTS:  -------------------------------------------------------------------------------------------  Pt is a 39 year old female with a history of ETOH abuse, Alcoholic liver disease,  pancreatitis, depression, anxiety, Recent C diff infection who comes to the ED with chief c/o Abdominal pain, distension and leg swelling.     She was then admitted for sepsis secondary to c diff colitis dx on 6/7 in previous hospitalization and patient neglected to take antibx at home. Was started on PO vanc for c diff and ceftriaxone for possible sbp (less likely). Never intubated or on presser. Had a paracentesis on 6/16, tolerated well which showed 149 PMN. Patient was started on feeds and tolerated well. Diagnostic paracentesis performed and appears to be cirrhotic in nature; no need for any antibiotics. Will need pain management follow up a in the hospital and outpatient follow up for GI.     -------------------------------------------------------------------------------------------    #Severe sepsis in setting of C diff colitis ( vs r/o SBP- unlikely), improving  - Tachycardic, elevated lactate on admission- s/p 2L iv fluids ( possible type 2 lactemia in setting of liver dysfunction)  - Continue on PO Vanc  - cultures negative  - Diagnostic paracentesis negative for SBP; likely just   - GI advised outpatient follow up for cirrhosis   - Tolerating PO diet    #h/o Alcohol use active drinker 4cups of vodka daily and h/o recreational drug use  - last drink 6/14  - [facility] protocol  - Folate and thiamine  - Urine drug screen being sent; follow up     #Alcoholic hepatitis/ ALD; likely Cirrhosis  #h/o Pancreatitis with pancreatic pseudocyst cx by ascites  - Keep equal balance today   - Will likely need to be started on Spironolactone and lasix but BP borderline today.     #Ascites with B/L leg edema with hypoalbuminemia  - Alcohol level elevated on admission, monitor CIWA score  - Recent US abdomen on last admission with Hepatomegaly, hepatic steatosis, no evidence of cirrhosis  - Normal lipase, No infection of pancreatic pseudocyst  - US abdomen noted for enlarged liver compatible with hepatic steatosis or hepatocellular disease   - Gallbladder thickening with intraluminal sludge   - Malnutrition, Low BUN, hypoalbuminemia with third spacing fluid- ensure adequate protein intake.     #Hypokalemia   - Poor intake and C diff diarrhea  - Continue to monitor    #Macrocytic anemia  - 2/2 Alcohol use  - Continue  on folate, thiamine.     Activity: As tolerated  Diet: Regular  DVT ppx: Lovenox  Code Status: Full Code  Dispo: From home; medical floor downgrade

## 2020-06-17 NOTE — PROGRESS NOTE ADULT - SUBJECTIVE AND OBJECTIVE BOX
Patient is a 39y old  Female who presents with a chief complaint of Abdominal Pain, Leg swelling. (16 Jun 2020 09:28)        Over Night Events: No events overnight        ROS:     All pertinent ROS are negative except HPI         PHYSICAL EXAM    ICU Vital Signs Last 24 Hrs  T(C): 37.1 (17 Jun 2020 08:00), Max: 37.4 (16 Jun 2020 16:00)  T(F): 98.8 (17 Jun 2020 08:00), Max: 99.3 (16 Jun 2020 16:00)  HR: 106 (17 Jun 2020 09:00) (98 - 126)  BP: 102/49 (17 Jun 2020 09:00) (92/47 - 113/54)  BP(mean): 65 (17 Jun 2020 09:00) (59 - 81)  RR: 20 (17 Jun 2020 09:00) (9 - 27)  SpO2: 97% (17 Jun 2020 09:00) (95% - 99%)      CONSTITUTIONAL:  Well nourished.  NAD    ENT:   Airway patent,   Mouth with normal mucosa.   No thrush    EYES:   Pupils equal,   Round and reactive to light.    CARDIAC:   Normal rate,   Regular rhythm.    No edema      Vascular:  Normal systolic impulse  No Carotid bruits    RESPIRATORY:   No wheezing  Bilateral BS  Normal chest expansion  Not tachypneic,  No use of accessory muscles    GASTROINTESTINAL:  Abdomen soft,   Mildly tender,   No guarding,   + BS    MUSCULOSKELETAL:   Range of motion is not limited,  No clubbing, cyanosis    NEUROLOGICAL:   Alert and oriented   No motor  deficits.  pertinent DTRs normal    SKIN:   Skin normal color for race,   Warm and dry  No evidence of rash.        06-16-20 @ 07:01  -  06-17-20 @ 07:00  --------------------------------------------------------  IN:    Lactated Ringers IV Bolus: 500 mL  Total IN: 500 mL    OUT:    Voided: 1350 mL  Total OUT: 1350 mL    Total NET: -850 mL      06-17-20 @ 07:01  -  06-17-20 @ 09:24  --------------------------------------------------------  IN:    Oral Fluid: 120 mL  Total IN: 120 mL    OUT:  Total OUT: 0 mL    Total NET: 120 mL          LABS:                            8.0    14.54 )-----------( 300      ( 17 Jun 2020 04:25 )             25.5                                               06-17    140  |  103  |  3<L>  ----------------------------<  103<H>  3.4<L>   |  26  |  <0.5<L>    Ca    7.2<L>      17 Jun 2020 04:25  Phos  2.8     06-15  Mg     2.0     06-17    TPro  4.7<L>  /  Alb  1.6<L>  /  TBili  3.3<H>  /  DBili  2.3<H>  /  AST  98<H>  /  ALT  21  /  AlkPhos  234<H>  06-17      PT/INR - ( 17 Jun 2020 04:25 )   PT: 18.20 sec;   INR: 1.58 ratio         PTT - ( 17 Jun 2020 04:25 )  PTT:34.3 sec                                           CARDIAC MARKERS ( 16 Jun 2020 18:21 )  x     / <0.01 ng/mL / x     / x     / x        LIVER FUNCTIONS - ( 17 Jun 2020 04:25 )  Alb: 1.6 g/dL / Pro: 4.7 g/dL / ALK PHOS: 234 U/L / ALT: 21 U/L / AST: 98 U/L / GGT: x                                                  Culture - Fungal, Body Fluid (collected 16 Jun 2020 12:20)  Source: .Body Fluid Peritoneal Fluid  Preliminary Report (17 Jun 2020 08:59):    Testing in progress    Culture - Body Fluid with Gram Stain (collected 16 Jun 2020 12:20)  Source: .Body Fluid Peritoneal Fluid  Gram Stain (17 Jun 2020 04:14):    polymorphonuclear leukocytes seen    No organisms seen    by cytocentrifuge    Culture - Blood (collected 15 Aaron 2020 11:11)  Source: .Blood None  Preliminary Report (16 Jun 2020 23:01):    No growth to date.                                                                                           MEDICATIONS  (STANDING):  chlorhexidine 4% Liquid 1 Application(s) Topical <User Schedule>  enoxaparin Injectable 40 milliGRAM(s) SubCutaneous daily  folic acid 1 milliGRAM(s) Oral daily  lactobacillus acidophilus 1 Tablet(s) Oral two times a day with meals  lidocaine 1% Injectable 10 milliLiter(s) Local Injection once  nicotine - 21 mG/24Hr(s) Patch 1 patch Transdermal daily  pantoprazole    Tablet 40 milliGRAM(s) Oral before breakfast  thiamine 100 milliGRAM(s) Oral daily  vancomycin    Solution 125 milliGRAM(s) Oral every 6 hours    MEDICATIONS  (PRN):  LORazepam   Injectable 1 milliGRAM(s) IV Push every 4 hours PRN CIWA/signs of EtOH WD  morphine  - Injectable 4 milliGRAM(s) IV Push every 4 hours PRN Severe Pain (7 - 10)      New X-rays reviewed:                                                                                  ECHO

## 2020-06-17 NOTE — OCCUPATIONAL THERAPY INITIAL EVALUATION ADULT - SPECIFY REASON(S)
Pt refused OT eval at this time secondary to pain, said would be willing to partake in eval tomorrow

## 2020-06-17 NOTE — PROGRESS NOTE ADULT - ASSESSMENT
· Assessment		  39 year old female with a history of ETOH abuse, Alcoholic liver disease,  pancreatitis, depression, anxiety, Recent C diff infection who comes to the ED with chief c/o Abdominal pain, distension and leg swelling.     IMPRESSION;   Pancolitis secondary to CD colitis which seems quiescent presently as pt has not had diarrhea.  Po vanomycin was supposed to go through 6/19  pt is non compliant to meds.  Abdominal pain seems multifactorial in etiology : chronic pancreatitis with multiple pseudocysts. Recent CD colitis with pancolitis on CT.   S/p paracentesis 6/16 with no SBP  No evidence of cholecystitis/cholangitis  CT 6/15 : Persistent  and slightly increased colonic thickening from cecum through transverse colon, compatible with known colitis; no evidence of abscess.   Unchanged generalized mesenteric edema, limiting assessment for peripancreatic inflammatory change. Unchanged multiple pancreatic fluid collections, measuring up to 2.4 cm, which may be represent pancreatic pseudocysts.    COVID-19 NG    RECOMMENDATIONS;   D/c Rocephin   po Vancomycin 125 mg q6h to complete er 14 d course

## 2020-06-17 NOTE — PROGRESS NOTE ADULT - SUBJECTIVE AND OBJECTIVE BOX
Gastroenterology progress note:     Patient is a 39y old  Female who presents with a chief complaint of Abdominal Pain, Leg swelling. (17 Jun 2020 09:24)       Admitted on: 06-15-20    We are following the patient for pancreatitis and alcoholic liver disease     Interval History: Patient is lying on bed comfortably. Eating regular breakfast. Still c/o abdominal pain    Patient's medical problems are improving   Prior records reviewed (Y/N): Y  History obtained from someone other than patient (Y/N): N      PAST MEDICAL & SURGICAL HISTORY:  ETOH abuse  Postpartum depression  Substance abuse  Anxiety  Depression  No significant past surgical history      MEDICATIONS  (STANDING):  chlorhexidine 4% Liquid 1 Application(s) Topical <User Schedule>  enoxaparin Injectable 40 milliGRAM(s) SubCutaneous daily  folic acid 1 milliGRAM(s) Oral daily  lactobacillus acidophilus 1 Tablet(s) Oral two times a day with meals  lidocaine 1% Injectable 10 milliLiter(s) Local Injection once  nicotine - 21 mG/24Hr(s) Patch 1 patch Transdermal daily  pantoprazole    Tablet 40 milliGRAM(s) Oral before breakfast  thiamine 100 milliGRAM(s) Oral daily  vancomycin    Solution 125 milliGRAM(s) Oral every 6 hours    MEDICATIONS  (PRN):  LORazepam   Injectable 1 milliGRAM(s) IV Push every 4 hours PRN CIWA/signs of EtOH WD  morphine  - Injectable 4 milliGRAM(s) IV Push every 4 hours PRN Severe Pain (7 - 10)      Allergies  No Known Allergies      Review of Systems:   Cardiovascular:  No Chest Pain, No Palpitations  Respiratory:  No Cough, No Dyspnea  Gastrointestinal:  As described in HPI    Physical Examination:  T(C): 37.1 (06-17-20 @ 08:00), Max: 37.4 (06-16-20 @ 16:00)  HR: 114 (06-17-20 @ 10:00) (98 - 126)  BP: 110/55 (06-17-20 @ 10:00) (92/47 - 112/56)  RR: 24 (06-17-20 @ 10:00) (9 - 27)  SpO2: 97% (06-17-20 @ 10:00) (95% - 98%)      06-16-20 @ 07:01  -  06-17-20 @ 07:00  --------------------------------------------------------  IN: 500 mL / OUT: 1350 mL / NET: -850 mL    06-17-20 @ 07:01  -  06-17-20 @ 11:46  --------------------------------------------------------  IN: 120 mL / OUT: 200 mL / NET: -80 mL      Constitutional: No acute distress.  Respiratory:  No signs of respiratory distress.  Cardiovascular:  S1 S2, Regular rate and rhythm.  Abdominal: Abdomen is soft, symmetric, and distended with ascites.   Skin: Jaundice+        Data: (reviewed by attending)                        8.0    14.54 )-----------( 300      ( 17 Jun 2020 04:25 )             25.5     Hgb trend:  8.0  06-17-20 @ 04:25  8.2  06-16-20 @ 04:30  8.6  06-15-20 @ 11:11  8.5  06-15-20 @ 00:56        06-17    140  |  103  |  3<L>  ----------------------------<  103<H>  3.4<L>   |  26  |  <0.5<L>    Ca    7.2<L>      17 Jun 2020 04:25  Mg     2.0     06-17    TPro  4.7<L>  /  Alb  1.6<L>  /  TBili  3.3<H>  /  DBili  2.3<H>  /  AST  98<H>  /  ALT  21  /  AlkPhos  234<H>  06-17    Liver panel trend:  TBili 3.3   /   AST 98   /   ALT 21   /   AlkP 234   /   Tptn 4.7   /   Alb 1.6    /   DBili 2.3      06-17  TBili 3.5   /   AST 86   /   ALT 22   /   AlkP 252   /   Tptn 5.2   /   Alb 1.9    /   DBili 2.7      06-16  TBili 3.6   /   AST 96   /   ALT 24   /   AlkP 279   /   Tptn 5.7   /   Alb 2.0    /   DBili --      06-15  TBili 3.0   /   AST 91   /   ALT 23   /   AlkP 252   /   Tptn 5.3   /   Alb 2.0    /   DBili --      06-15  TBili 3.1   /   AST 97   /   ALT 24   /   AlkP 268   /   Tptn 5.2   /   Alb 2.0    /   DBili 2.3      06-15  TBili 5.1   /   AST 90   /   ALT 23   /   AlkP 273   /   Tptn 5.9   /   Alb 2.2    /   DBili 3.5      06-13  TBili 3.8   /   AST 92   /   ALT 21   /   AlkP 249   /   Tptn 5.1   /   Alb 2.1    /   DBili --      06-12  TBili 3.3   /   AST 98   /   ALT 21   /   AlkP 247   /   Tptn 5.0   /   Alb 2.1    /   DBili --      06-11  TBili 3.5   /      /   ALT 22   /   AlkP 257   /   Tptn 5.1   /   Alb 2.1    /   DBili --      06-10  TBili 3.6   /      /   ALT 23   /   AlkP 238   /   Tptn 4.8   /   Alb 2.1    /   DBili 2.9      06-09  TBili 3.2   /      /   ALT 22   /   AlkP 232   /   Tptn 4.6   /   Alb 2.1    /   DBili --      06-08      PT/INR - ( 17 Jun 2020 04:25 )   PT: 18.20 sec;   INR: 1.58 ratio         PTT - ( 17 Jun 2020 04:25 )  PTT:34.3 sec    Culture - Fungal, Body Fluid (collected 16 Jun 2020 12:20)  Source: .Body Fluid Peritoneal Fluid  Preliminary Report (17 Jun 2020 08:59):    Testing in progress    Culture - Body Fluid with Gram Stain (collected 16 Jun 2020 12:20)  Source: .Body Fluid Peritoneal Fluid  Gram Stain (17 Jun 2020 04:14):    polymorphonuclear leukocytes seen    No organisms seen    by cytocentrifuge    Culture - Blood (collected 15 Aaron 2020 11:11)  Source: .Blood None  Preliminary Report (16 Jun 2020 23:01):    No growth to date.         Radiology: (reviewed by attending)

## 2020-06-17 NOTE — PROGRESS NOTE ADULT - ASSESSMENT
39 year old female with a history of acute alcoholic pancreatitis, alcohol abuse,  presents with abdominal pain, leukocytosis, acute pancreatitis with developing pseudocysts, elevated LFTs and C diff    Recurrent pancreatitis with pseudocyst formation secondary to alcohol abuse  Persistent pain, no fever  CA/TGs are normal.    Rec  -can have low fat diet  -Pain control as needed (although component of opioid seeking behaviour possible)  -Maintain hemodynamics    # First known episode of C diff :  On oral vancomycin 125 q6hrs (complete for 10 days), f/u with ID      # Alcoholic liver disease: Elevated LFTs are likely secondary to alcoholic hepatitis (>2:1 ratio between AST: ALT) with possible liver cirrhosis  MADDREY score: 21 so no role of steroids.  MRCP: CBD N, Sludge+ (last admission)  HBsAg, HBsAb, IgM/IgG, Anti HEV, Transferrin Saturation, Ceruloplasmin level, [place], SMA, AMA are all negative  Diagnostic tap : No SBP, consistent with liver cirrhosis    Rec:  Complete alcohol abstinence   Daily LFTS and INR  F/U amylase, lipase in fluid to R/O ascitic fluid secondary to pancreatitis   Can start aldactone: lasix ratio of 50:20 if BP tolerates, then titrate it up.  get US duplex to R/O Budd chiari  Repeat lactate if its trending down  Pain control, consult pain management  Can follow up in Liver clinic after discharge ([phone]) in 2 weeks.

## 2020-06-17 NOTE — PROGRESS NOTE ADULT - ASSESSMENT
IMPRESSION:    CDiff present on admission   HO ETOH cirrhotics   MSA    PLAN:    CNS: CIWA.  Thiamine and Folate     HEENT: Oral care    PULMONARY:  HOB @ 45 degrees.  Aspiration precautions     CARDIOVASCULAR:  maintain Even balance    GI: GI prophylaxis.  Feeding.  Bowel regimen.    RENAL:  Follow up lytes.  Correct as needed    INFECTIOUS DISEASE: Follow up cultures.  Continue VAnc Oral.     HEMATOLOGICAL:  DVT prophylaxis.    ENDOCRINE:  Follow up FS.  Insulin protocol if needed.    MUSCULOSKELETAL:  OOB to chair     Downgrade to medical floor

## 2020-06-17 NOTE — PROGRESS NOTE ADULT - SUBJECTIVE AND OBJECTIVE BOX
ADRIAN PRUETT  39y, Female    All available historical data reviewed    OVERNIGHT EVENTS:  no fevers  no BM  has abdominal pain  no pressors    ROS:  General: Denies rigors, night sweats  HEENT: Denies headache, rhinorrhea, sore throat, eye pain  CV: Denies CP, palpitations  PULM: Denies wheezing, hemoptysis  GI: Denies hematemesis, hematochezia, melena  : Denies discharge, hematuria  MSK: Denies arthralgias, myalgias  SKIN: Denies rash, lesions  NEURO: Denies paresthesias, weakness  PSYCH: Denies depression, anxiety    VITALS:  T(F): 99.5, Max: 99.5 (06-17-20 @ 12:19)  HR: 114  BP: 110/55  RR: 18Vital Signs Last 24 Hrs  T(C): 37.5 (17 Jun 2020 12:19), Max: 37.5 (17 Jun 2020 12:19)  T(F): 99.5 (17 Jun 2020 12:19), Max: 99.5 (17 Jun 2020 12:19)  HR: 114 (17 Jun 2020 12:19) (98 - 126)  BP: 110/55 (17 Jun 2020 12:19) (92/47 - 112/56)  BP(mean): 69 (17 Jun 2020 11:00) (59 - 78)  RR: 18 (17 Jun 2020 12:19) (9 - 27)  SpO2: 96% (17 Jun 2020 11:00) (95% - 98%)    TESTS & MEASUREMENTS:                        8.0    14.54 )-----------( 300      ( 17 Jun 2020 04:25 )             25.5     06-17    140  |  103  |  3<L>  ----------------------------<  103<H>  3.4<L>   |  26  |  <0.5<L>    Ca    7.2<L>      17 Jun 2020 04:25  Mg     2.0     06-17    TPro  4.7<L>  /  Alb  1.6<L>  /  TBili  3.3<H>  /  DBili  2.3<H>  /  AST  98<H>  /  ALT  21  /  AlkPhos  234<H>  06-17    LIVER FUNCTIONS - ( 17 Jun 2020 04:25 )  Alb: 1.6 g/dL / Pro: 4.7 g/dL / ALK PHOS: 234 U/L / ALT: 21 U/L / AST: 98 U/L / GGT: x             Culture - Fungal, Body Fluid (collected 06-16-20 @ 12:20)  Source: .Body Fluid Peritoneal Fluid  Preliminary Report (06-17-20 @ 08:59):    Testing in progress    Culture - Body Fluid with Gram Stain (collected 06-16-20 @ 12:20)  Source: .Body Fluid Peritoneal Fluid  Gram Stain (06-17-20 @ 04:14):    polymorphonuclear leukocytes seen    No organisms seen    by cytocentrifuge    Culture - Blood (collected 06-15-20 @ 11:11)  Source: .Blood None  Preliminary Report (06-16-20 @ 23:01):    No growth to date.            RADIOLOGY & ADDITIONAL TESTS:  Personal review of radiological diagnostics performed  Echo and EKG results noted when applicable.     MEDICATIONS:  chlorhexidine 4% Liquid 1 Application(s) Topical <User Schedule>  enoxaparin Injectable 40 milliGRAM(s) SubCutaneous daily  folic acid 1 milliGRAM(s) Oral daily  lactobacillus acidophilus 1 Tablet(s) Oral two times a day with meals  lidocaine 1% Injectable 10 milliLiter(s) Local Injection once  LORazepam   Injectable 1 milliGRAM(s) IV Push every 4 hours PRN  morphine  - Injectable 4 milliGRAM(s) IV Push every 4 hours PRN  nicotine - 21 mG/24Hr(s) Patch 1 patch Transdermal daily  pantoprazole    Tablet 40 milliGRAM(s) Oral before breakfast  thiamine 100 milliGRAM(s) Oral daily  vancomycin    Solution 125 milliGRAM(s) Oral every 6 hours      ANTIBIOTICS:  vancomycin    Solution 125 milliGRAM(s) Oral every 6 hours

## 2020-06-18 LAB
ALBUMIN SERPL ELPH-MCNC: 1.7 G/DL — LOW (ref 3.5–5.2)
ALP SERPL-CCNC: 253 U/L — HIGH (ref 30–115)
ALT FLD-CCNC: 22 U/L — SIGNIFICANT CHANGE UP (ref 0–41)
AMMONIA BLD-MCNC: 42 UMOL/L — SIGNIFICANT CHANGE UP (ref 11–55)
ANION GAP SERPL CALC-SCNC: 11 MMOL/L — SIGNIFICANT CHANGE UP (ref 7–14)
AST SERPL-CCNC: 114 U/L — HIGH (ref 0–41)
BASOPHILS # BLD AUTO: 0.07 K/UL — SIGNIFICANT CHANGE UP (ref 0–0.2)
BASOPHILS NFR BLD AUTO: 0.5 % — SIGNIFICANT CHANGE UP (ref 0–1)
BILIRUB DIRECT SERPL-MCNC: 2.2 MG/DL — HIGH (ref 0–0.2)
BILIRUB INDIRECT FLD-MCNC: 0.7 MG/DL — SIGNIFICANT CHANGE UP (ref 0.2–1.2)
BILIRUB SERPL-MCNC: 2.9 MG/DL — HIGH (ref 0.2–1.2)
BUN SERPL-MCNC: 4 MG/DL — LOW (ref 10–20)
CALCIUM SERPL-MCNC: 7.5 MG/DL — LOW (ref 8.5–10.1)
CHLORIDE SERPL-SCNC: 105 MMOL/L — SIGNIFICANT CHANGE UP (ref 98–110)
CO2 SERPL-SCNC: 24 MMOL/L — SIGNIFICANT CHANGE UP (ref 17–32)
CREAT SERPL-MCNC: <0.5 MG/DL — LOW (ref 0.7–1.5)
EOSINOPHIL # BLD AUTO: 0.36 K/UL — SIGNIFICANT CHANGE UP (ref 0–0.7)
EOSINOPHIL NFR BLD AUTO: 2.3 % — SIGNIFICANT CHANGE UP (ref 0–8)
GLUCOSE SERPL-MCNC: 139 MG/DL — HIGH (ref 70–99)
HCT VFR BLD CALC: 25.3 % — LOW (ref 37–47)
HCT VFR BLD CALC: 26.4 % — LOW (ref 37–47)
HGB BLD-MCNC: 7.6 G/DL — LOW (ref 12–16)
HGB BLD-MCNC: 8 G/DL — LOW (ref 12–16)
IMM GRANULOCYTES NFR BLD AUTO: 0.7 % — HIGH (ref 0.1–0.3)
LACTATE SERPL-SCNC: 3.4 MMOL/L — HIGH (ref 0.7–2)
LDH SERPL L TO P-CCNC: 71 U/L — SIGNIFICANT CHANGE UP
LIDOCAIN IGE QN: 30 U/L — SIGNIFICANT CHANGE UP (ref 7–60)
LYMPHOCYTES # BLD AUTO: 1.31 K/UL — SIGNIFICANT CHANGE UP (ref 1.2–3.4)
LYMPHOCYTES # BLD AUTO: 8.5 % — LOW (ref 20.5–51.1)
MAGNESIUM SERPL-MCNC: 1.9 MG/DL — SIGNIFICANT CHANGE UP (ref 1.8–2.4)
MCHC RBC-ENTMCNC: 30 G/DL — LOW (ref 32–37)
MCHC RBC-ENTMCNC: 30.3 G/DL — LOW (ref 32–37)
MCHC RBC-ENTMCNC: 31 PG — SIGNIFICANT CHANGE UP (ref 27–31)
MCHC RBC-ENTMCNC: 31.5 PG — HIGH (ref 27–31)
MCV RBC AUTO: 103.3 FL — HIGH (ref 81–99)
MCV RBC AUTO: 103.9 FL — HIGH (ref 81–99)
MONOCYTES # BLD AUTO: 0.99 K/UL — HIGH (ref 0.1–0.6)
MONOCYTES NFR BLD AUTO: 6.4 % — SIGNIFICANT CHANGE UP (ref 1.7–9.3)
NEUTROPHILS # BLD AUTO: 12.55 K/UL — HIGH (ref 1.4–6.5)
NEUTROPHILS NFR BLD AUTO: 81.6 % — HIGH (ref 42.2–75.2)
NRBC # BLD: 0 /100 WBCS — SIGNIFICANT CHANGE UP (ref 0–0)
NRBC # BLD: 0 /100 WBCS — SIGNIFICANT CHANGE UP (ref 0–0)
PLATELET # BLD AUTO: 303 K/UL — SIGNIFICANT CHANGE UP (ref 130–400)
PLATELET # BLD AUTO: 304 K/UL — SIGNIFICANT CHANGE UP (ref 130–400)
POTASSIUM SERPL-MCNC: 3.8 MMOL/L — SIGNIFICANT CHANGE UP (ref 3.5–5)
POTASSIUM SERPL-SCNC: 3.8 MMOL/L — SIGNIFICANT CHANGE UP (ref 3.5–5)
PROT SERPL-MCNC: 4.9 G/DL — LOW (ref 6–8)
RBC # BLD: 2.45 M/UL — LOW (ref 4.2–5.4)
RBC # BLD: 2.54 M/UL — LOW (ref 4.2–5.4)
RBC # FLD: 17.2 % — HIGH (ref 11.5–14.5)
RBC # FLD: 17.5 % — HIGH (ref 11.5–14.5)
SODIUM SERPL-SCNC: 140 MMOL/L — SIGNIFICANT CHANGE UP (ref 135–146)
WBC # BLD: 15.39 K/UL — HIGH (ref 4.8–10.8)
WBC # BLD: 16.32 K/UL — HIGH (ref 4.8–10.8)
WBC # FLD AUTO: 15.39 K/UL — HIGH (ref 4.8–10.8)
WBC # FLD AUTO: 16.32 K/UL — HIGH (ref 4.8–10.8)

## 2020-06-18 PROCEDURE — 99232 SBSQ HOSP IP/OBS MODERATE 35: CPT | Mod: GC

## 2020-06-18 PROCEDURE — 99233 SBSQ HOSP IP/OBS HIGH 50: CPT

## 2020-06-18 RX ORDER — CEFTRIAXONE 500 MG/1
INJECTION, POWDER, FOR SOLUTION INTRAMUSCULAR; INTRAVENOUS
Refills: 0 | Status: DISCONTINUED | OUTPATIENT
Start: 2020-06-18 | End: 2020-06-18

## 2020-06-18 RX ORDER — SODIUM CHLORIDE 9 MG/ML
1000 INJECTION INTRAMUSCULAR; INTRAVENOUS; SUBCUTANEOUS
Refills: 0 | Status: DISCONTINUED | OUTPATIENT
Start: 2020-06-18 | End: 2020-06-18

## 2020-06-18 RX ORDER — FAMOTIDINE 10 MG/ML
20 INJECTION INTRAVENOUS
Refills: 0 | Status: DISCONTINUED | OUTPATIENT
Start: 2020-06-18 | End: 2020-06-26

## 2020-06-18 RX ORDER — SODIUM CHLORIDE 9 MG/ML
1500 INJECTION INTRAMUSCULAR; INTRAVENOUS; SUBCUTANEOUS
Refills: 0 | Status: COMPLETED | OUTPATIENT
Start: 2020-06-18 | End: 2020-06-18

## 2020-06-18 RX ORDER — LIDOCAINE 4 G/100G
1 CREAM TOPICAL ONCE
Refills: 0 | Status: COMPLETED | OUTPATIENT
Start: 2020-06-18 | End: 2020-06-18

## 2020-06-18 RX ORDER — IBUPROFEN 200 MG
600 TABLET ORAL ONCE
Refills: 0 | Status: COMPLETED | OUTPATIENT
Start: 2020-06-18 | End: 2020-06-18

## 2020-06-18 RX ORDER — IBUPROFEN 200 MG
600 TABLET ORAL EVERY 8 HOURS
Refills: 0 | Status: DISCONTINUED | OUTPATIENT
Start: 2020-06-18 | End: 2020-06-18

## 2020-06-18 RX ORDER — GABAPENTIN 400 MG/1
100 CAPSULE ORAL THREE TIMES A DAY
Refills: 0 | Status: DISCONTINUED | OUTPATIENT
Start: 2020-06-18 | End: 2020-06-26

## 2020-06-18 RX ORDER — CEFTRIAXONE 500 MG/1
2000 INJECTION, POWDER, FOR SOLUTION INTRAMUSCULAR; INTRAVENOUS ONCE
Refills: 0 | Status: COMPLETED | OUTPATIENT
Start: 2020-06-18 | End: 2020-06-18

## 2020-06-18 RX ORDER — HYDROMORPHONE HYDROCHLORIDE 2 MG/ML
2 INJECTION INTRAMUSCULAR; INTRAVENOUS; SUBCUTANEOUS EVERY 4 HOURS
Refills: 0 | Status: DISCONTINUED | OUTPATIENT
Start: 2020-06-18 | End: 2020-06-19

## 2020-06-18 RX ADMIN — Medication 2 MILLIGRAM(S): at 05:38

## 2020-06-18 RX ADMIN — Medication 600 MILLIGRAM(S): at 15:44

## 2020-06-18 RX ADMIN — GABAPENTIN 100 MILLIGRAM(S): 400 CAPSULE ORAL at 10:01

## 2020-06-18 RX ADMIN — HYDROMORPHONE HYDROCHLORIDE 2 MILLIGRAM(S): 2 INJECTION INTRAMUSCULAR; INTRAVENOUS; SUBCUTANEOUS at 13:14

## 2020-06-18 RX ADMIN — Medication 1 TABLET(S): at 17:30

## 2020-06-18 RX ADMIN — LIDOCAINE 1 PATCH: 4 CREAM TOPICAL at 22:31

## 2020-06-18 RX ADMIN — LIDOCAINE 1 PATCH: 4 CREAM TOPICAL at 17:30

## 2020-06-18 RX ADMIN — LIDOCAINE 1 PATCH: 4 CREAM TOPICAL at 10:01

## 2020-06-18 RX ADMIN — Medication 100 MILLIGRAM(S): at 12:16

## 2020-06-18 RX ADMIN — GABAPENTIN 100 MILLIGRAM(S): 400 CAPSULE ORAL at 13:14

## 2020-06-18 RX ADMIN — Medication 1 MILLIGRAM(S): at 12:17

## 2020-06-18 RX ADMIN — Medication 1 PATCH: at 12:17

## 2020-06-18 RX ADMIN — MORPHINE SULFATE 4 MILLIGRAM(S): 50 CAPSULE, EXTENDED RELEASE ORAL at 03:28

## 2020-06-18 RX ADMIN — Medication 1 TABLET(S): at 08:32

## 2020-06-18 RX ADMIN — GABAPENTIN 100 MILLIGRAM(S): 400 CAPSULE ORAL at 22:24

## 2020-06-18 RX ADMIN — ENOXAPARIN SODIUM 40 MILLIGRAM(S): 100 INJECTION SUBCUTANEOUS at 12:14

## 2020-06-18 RX ADMIN — HYDROMORPHONE HYDROCHLORIDE 2 MILLIGRAM(S): 2 INJECTION INTRAMUSCULAR; INTRAVENOUS; SUBCUTANEOUS at 19:45

## 2020-06-18 RX ADMIN — Medication 0.5 MILLIGRAM(S): at 12:13

## 2020-06-18 RX ADMIN — CHLORHEXIDINE GLUCONATE 1 APPLICATION(S): 213 SOLUTION TOPICAL at 05:09

## 2020-06-18 RX ADMIN — Medication 1 PATCH: at 06:43

## 2020-06-18 RX ADMIN — Medication 1 PATCH: at 20:31

## 2020-06-18 RX ADMIN — Medication 125 MILLIGRAM(S): at 05:38

## 2020-06-18 RX ADMIN — CEFTRIAXONE 100 MILLIGRAM(S): 500 INJECTION, POWDER, FOR SOLUTION INTRAMUSCULAR; INTRAVENOUS at 17:45

## 2020-06-18 RX ADMIN — Medication 125 MILLIGRAM(S): at 12:16

## 2020-06-18 RX ADMIN — Medication 1 PATCH: at 12:15

## 2020-06-18 RX ADMIN — PANTOPRAZOLE SODIUM 40 MILLIGRAM(S): 20 TABLET, DELAYED RELEASE ORAL at 05:38

## 2020-06-18 RX ADMIN — Medication 20 MILLIGRAM(S): at 05:38

## 2020-06-18 RX ADMIN — Medication 1 TABLET(S): at 12:14

## 2020-06-18 RX ADMIN — FAMOTIDINE 20 MILLIGRAM(S): 10 INJECTION INTRAVENOUS at 17:30

## 2020-06-18 RX ADMIN — Medication 125 MILLIGRAM(S): at 17:30

## 2020-06-18 RX ADMIN — SODIUM CHLORIDE 100 MILLILITER(S): 9 INJECTION INTRAMUSCULAR; INTRAVENOUS; SUBCUTANEOUS at 19:43

## 2020-06-18 RX ADMIN — SODIUM CHLORIDE 1500 MILLILITER(S): 9 INJECTION INTRAMUSCULAR; INTRAVENOUS; SUBCUTANEOUS at 20:52

## 2020-06-18 RX ADMIN — Medication 125 MILLIGRAM(S): at 23:16

## 2020-06-18 NOTE — PROGRESS NOTE ADULT - SUBJECTIVE AND OBJECTIVE BOX
SUBJECTIVE:    Patient is a 39y old Female who presents with a chief complaint of Abdominal Pain, Leg swelling. (17 Jun 2020 16:32)    Currently admitted to medicine with the primary diagnosis of Abdominal pain     Today is hospital day 3d. This morning she is resting comfortably in bed when seen and examined. Patient initially lethargic and not answering questions but on re-examination states she was experiencing R-sided abdominal pain at site of paracentesis yesterday. Also complaining of burning pain to bilateral legs and feet. Ascitic amylase suggestive of pancreatitis. No acute overnight events. UDS notable for cocaine.    PAST MEDICAL & SURGICAL HISTORY  ETOH abuse  Postpartum depression  Substance abuse  Anxiety  Depression  No significant past surgical history      ALLERGIES:  No Known Allergies    MEDICATIONS:  STANDING MEDICATIONS  chlorhexidine 4% Liquid 1 Application(s) Topical <User Schedule>  enoxaparin Injectable 40 milliGRAM(s) SubCutaneous daily  folic acid 1 milliGRAM(s) Oral daily  furosemide    Tablet 20 milliGRAM(s) Oral daily  gabapentin 100 milliGRAM(s) Oral three times a day  lactobacillus acidophilus 1 Tablet(s) Oral two times a day with meals  nicotine - 21 mG/24Hr(s) Patch 1 patch Transdermal daily  pantoprazole    Tablet 40 milliGRAM(s) Oral before breakfast  spironolactone 50 milliGRAM(s) Oral daily  thiamine 100 milliGRAM(s) Oral daily  vancomycin    Solution 125 milliGRAM(s) Oral every 6 hours    PRN MEDICATIONS  LORazepam     Tablet 0.5 milliGRAM(s) Oral every 6 hours PRN    VITALS:   T(F): 98.1  HR: 105  BP: 107/55  RR: 18  SpO2: 96%    LABS:                        7.6    15.39 )-----------( 304      ( 18 Jun 2020 06:15 )             25.3     06-18    140  |  105  |  4<L>  ----------------------------<  139<H>  3.8   |  24  |  <0.5<L>    Ca    7.5<L>      18 Jun 2020 06:15  Mg     1.9     06-18    TPro  4.9<L>  /  Alb  1.7<L>  /  TBili  2.9<H>  /  DBili  2.2<H>  /  AST  114<H>  /  ALT  22  /  AlkPhos  253<H>  06-18    PT/INR - ( 17 Jun 2020 04:25 )   PT: 18.20 sec;   INR: 1.58 ratio         PTT - ( 17 Jun 2020 04:25 )  PTT:34.3 sec          Culture - Fungal, Body Fluid (collected 16 Jun 2020 12:20)  Source: .Body Fluid Peritoneal Fluid  Preliminary Report (17 Jun 2020 08:59):    Testing in progress    Culture - Body Fluid with Gram Stain (collected 16 Jun 2020 12:20)  Source: .Body Fluid Peritoneal Fluid  Gram Stain (17 Jun 2020 04:14):    polymorphonuclear leukocytes seen    No organisms seen    by cytocentrifuge  Preliminary Report (17 Jun 2020 20:20):    No growth    Culture - Blood (collected 15 Aaron 2020 11:11)  Source: .Blood None  Preliminary Report (16 Jun 2020 23:01):    No growth to date.      CARDIAC MARKERS ( 16 Jun 2020 18:21 )  x     / <0.01 ng/mL / x     / x     / x          RADIOLOGY:    PHYSICAL EXAM:  GEN: No acute distress  PULM/CHEST: Clear to auscultation bilaterally, no rales, rhonchi or wheezes   CVS: Regular rate and rhythm, S1-S2, no murmurs  ABD: Soft, non-tender, non-distended, +BS  EXT: No edema  NEURO: AAOx3    Benavides Catheter: SUBJECTIVE:    Patient is a 39y old Female who presents with a chief complaint of Abdominal Pain, Leg swelling. (17 Jun 2020 16:32)    Currently admitted to medicine with the primary diagnosis of Abdominal pain     Today is hospital day 3d. This morning she is resting comfortably in bed when seen and examined. Patient initially lethargic and not answering questions but on re-examination states she was experiencing R-sided abdominal pain at site of paracentesis yesterday. Also complaining of burning pain to bilateral legs and feet. Ascitic amylase suggestive of pancreatitis. No acute overnight events. UDS notable for cocaine.    PAST MEDICAL & SURGICAL HISTORY  ETOH abuse  Postpartum depression  Substance abuse  Anxiety  Depression  No significant past surgical history    ALLERGIES:  No Known Allergies    MEDICATIONS:  STANDING MEDICATIONS  chlorhexidine 4% Liquid 1 Application(s) Topical <User Schedule>  enoxaparin Injectable 40 milliGRAM(s) SubCutaneous daily  folic acid 1 milliGRAM(s) Oral daily  furosemide    Tablet 20 milliGRAM(s) Oral daily  gabapentin 100 milliGRAM(s) Oral three times a day  lactobacillus acidophilus 1 Tablet(s) Oral two times a day with meals  nicotine - 21 mG/24Hr(s) Patch 1 patch Transdermal daily  pantoprazole    Tablet 40 milliGRAM(s) Oral before breakfast  spironolactone 50 milliGRAM(s) Oral daily  thiamine 100 milliGRAM(s) Oral daily  vancomycin    Solution 125 milliGRAM(s) Oral every 6 hours    PRN MEDICATIONS  LORazepam     Tablet 0.5 milliGRAM(s) Oral every 6 hours PRN    VITALS:   T(F): 98.1  HR: 105  BP: 107/55  RR: 18  SpO2: 96%    LABS:                        7.6    15.39 )-----------( 304      ( 18 Jun 2020 06:15 )             25.3     06-18    140  |  105  |  4<L>  ----------------------------<  139<H>  3.8   |  24  |  <0.5<L>    Ca    7.5<L>      18 Jun 2020 06:15  Mg     1.9     06-18    TPro  4.9<L>  /  Alb  1.7<L>  /  TBili  2.9<H>  /  DBili  2.2<H>  /  AST  114<H>  /  ALT  22  /  AlkPhos  253<H>  06-18    PT/INR - ( 17 Jun 2020 04:25 )   PT: 18.20 sec;   INR: 1.58 ratio    PTT - ( 17 Jun 2020 04:25 )  PTT:34.3 sec    Culture - Fungal, Body Fluid (collected 16 Jun 2020 12:20)  Source: .Body Fluid Peritoneal Fluid  Preliminary Report (17 Jun 2020 08:59):    Testing in progress    Culture - Body Fluid with Gram Stain (collected 16 Jun 2020 12:20)  Source: .Body Fluid Peritoneal Fluid  Gram Stain (17 Jun 2020 04:14):    polymorphonuclear leukocytes seen    No organisms seen    by cytocentrifuge  Preliminary Report (17 Jun 2020 20:20):    No growth    Culture - Blood (collected 15 Aaron 2020 11:11)  Source: .Blood None  Preliminary Report (16 Jun 2020 23:01):    No growth to date.    CARDIAC MARKERS ( 16 Jun 2020 18:21 )  x     / <0.01 ng/mL / x     / x     / x        RADIOLOGY:  < from: US Abdomen Liver Followup (06.17.20 @ 14:57) >    EXAM:  US ABD LIVER FOLLOWUP          PROCEDURE DATE:  06/17/2020      INTERPRETATION:  Clinical History/Reason For Exam: hepatomegaly    Technique: Doppler ultrasound of the liver.    Comparison: CT abdomen pelvis 6/15/2020.    Findings:  Middle, right, and left hepatic veins are all visualized, appearing small in caliber with reduced velocity of flow however they all appear patent.    Portal venous flow is also seen, with increased pulsatility.    Impression:  No evidence of hepatic or portal vein occlusion.    Small caliber hepatic veins with reduced velocity of flow.    Pulsatile portal and splenic vein flow.    PHYSICAL EXAM:  GEN: No acute distress, breathing comfortably on room air, lethargic  PULM/CHEST: Clear to auscultation bilaterally, no rales, rhonchi or wheezes   CVS: Regular rate and rhythm, S1-S2, no murmurs  ABD: Soft, non-tender, distended, +BS  EXT: Bilateral 2+ pitting edema without erythema or open wounds  NEURO: AAOx3, no focal deficits SUBJECTIVE:    Patient is a 39y old Female who presents with a chief complaint of Abdominal Pain, Leg swelling. (17 Jun 2020 16:32)    Currently admitted to medicine with the primary diagnosis of Abdominal pain     Today is hospital day 3d. This morning she is resting comfortably in bed when seen and examined. Patient initially lethargic and not answering questions but on re-examination states she was experiencing R-sided abdominal pain at site of paracentesis yesterday. Also complaining of burning pain to bilateral legs and feet. Ascitic amylase suggestive of pancreatitis. No acute overnight events. UDS notable for cocaine.    PAST MEDICAL & SURGICAL HISTORY  ETOH abuse  Postpartum depression  Substance abuse  Anxiety  Depression  No significant past surgical history    ALLERGIES:  No Known Allergies    MEDICATIONS:  STANDING MEDICATIONS  chlorhexidine 4% Liquid 1 Application(s) Topical <User Schedule>  enoxaparin Injectable 40 milliGRAM(s) SubCutaneous daily  folic acid 1 milliGRAM(s) Oral daily  furosemide    Tablet 20 milliGRAM(s) Oral daily  gabapentin 100 milliGRAM(s) Oral three times a day  lactobacillus acidophilus 1 Tablet(s) Oral two times a day with meals  nicotine - 21 mG/24Hr(s) Patch 1 patch Transdermal daily  pantoprazole    Tablet 40 milliGRAM(s) Oral before breakfast  spironolactone 50 milliGRAM(s) Oral daily  thiamine 100 milliGRAM(s) Oral daily  vancomycin    Solution 125 milliGRAM(s) Oral every 6 hours    PRN MEDICATIONS  LORazepam     Tablet 0.5 milliGRAM(s) Oral every 6 hours PRN    VITALS:   T(F): 98.1  HR: 105  BP: 107/55  RR: 18  SpO2: 96%    LABS:                        7.6    15.39 )-----------( 304      ( 18 Jun 2020 06:15 )             25.3     06-18    140  |  105  |  4<L>  ----------------------------<  139<H>  3.8   |  24  |  <0.5<L>    Ca    7.5<L>      18 Jun 2020 06:15  Mg     1.9     06-18    TPro  4.9<L>  /  Alb  1.7<L>  /  TBili  2.9<H>  /  DBili  2.2<H>  /  AST  114<H>  /  ALT  22  /  AlkPhos  253<H>  06-18    PT/INR - ( 17 Jun 2020 04:25 )   PT: 18.20 sec;   INR: 1.58 ratio    PTT - ( 17 Jun 2020 04:25 )  PTT:34.3 sec    Culture - Fungal, Body Fluid (collected 16 Jun 2020 12:20)  Source: .Body Fluid Peritoneal Fluid  Preliminary Report (17 Jun 2020 08:59):    Testing in progress    Culture - Body Fluid with Gram Stain (collected 16 Jun 2020 12:20)  Source: .Body Fluid Peritoneal Fluid  Gram Stain (17 Jun 2020 04:14):    polymorphonuclear leukocytes seen    No organisms seen    by cytocentrifuge  Preliminary Report (17 Jun 2020 20:20):    No growth    Culture - Blood (collected 15 Aaron 2020 11:11)  Source: .Blood None  Preliminary Report (16 Jun 2020 23:01):    No growth to date.    CARDIAC MARKERS ( 16 Jun 2020 18:21 )  x     / <0.01 ng/mL / x     / x     / x        RADIOLOGY:  < from: US Abdomen Liver Followup (06.17.20 @ 14:57) >    EXAM:  US ABD LIVER FOLLOWUP          PROCEDURE DATE:  06/17/2020      INTERPRETATION:  Clinical History/Reason For Exam: hepatomegaly    Technique: Doppler ultrasound of the liver.    Comparison: CT abdomen pelvis 6/15/2020.    Findings:  Middle, right, and left hepatic veins are all visualized, appearing small in caliber with reduced velocity of flow however they all appear patent.    Portal venous flow is also seen, with increased pulsatility.    Impression:  No evidence of hepatic or portal vein occlusion.    Small caliber hepatic veins with reduced velocity of flow.    Pulsatile portal and splenic vein flow.    PHYSICAL EXAM:  GEN: No acute distress, breathing comfortably on room air, lethargic  PULM/CHEST: Clear to auscultation bilaterally, no rales, rhonchi or wheezes   CVS: Regular rate and rhythm, S1-S2, no murmurs  ABD: Soft, non-tender, distended, +BS  EXT: Bilateral 2+ pitting edema without erythema or open wounds  NEURO: AAOx2, no focal deficits

## 2020-06-18 NOTE — OCCUPATIONAL THERAPY INITIAL EVALUATION ADULT - GENERAL OBSERVATIONS, REHAB EVAL
Pt encountered asleep and OT able to arouse Pt. Pt agreeable to OT and C/O 10/10 pain in abdomen. RN notified and pt was provided with Meds. Pt. did not anticipate meds working.

## 2020-06-18 NOTE — PHYSICAL THERAPY INITIAL EVALUATION ADULT - GENERAL OBSERVATIONS, REHAB EVAL
Pt sitting upright in b/s chair. Recently completed session c WILTON Skinner. Pt agreeable to PT IE, despite c/o abdominal pain.

## 2020-06-18 NOTE — PHYSICAL THERAPY INITIAL EVALUATION ADULT - PERTINENT HX OF CURRENT PROBLEM, REHAB EVAL
Pt presents to [facility] c abdominal pain; 39 year old female with a history of ETOH abuse, Alcoholic liver disease, pancreatitis, depression, anxiety

## 2020-06-18 NOTE — PROGRESS NOTE ADULT - ASSESSMENT
39 year old female with a history of EtOH abuse, Alcoholic liver disease, pancreatitis, depression, anxiety, and Recent C diff infection presented to the ED with chief c/o abdominal pain, distension and leg swelling     # Downgrade from ICU    # Severe sepsis on admission suspected to be 2/2 C diff colitis (no diarrhea) - not tx'd well 2/2 pt non-compliance  case d/w ID: Dr Peterson feels pt is improving  however, WBC incr to >16K; HR >100; Tmax 100.1 - request GI eval to look at pancreatic bed and peripanc fluid collections (as a poss source of infection)  Ascites Cx is neg and tap is NOT c/w SBP  Bld cx neg  Abx: vanco 125mg po q6 for 2 wks for now (end 6/28) + lactobacillus  Lactate level c/w lactic acidosis - need repeat level    # Chronic Pancreatitis 2/2 active alcohol abuse; severe malnutrition (BUN 4; Alb 1.6); anasarca and ascites  Nutrition eval - Dr Evans  check zinc level; thiamine level; folate; B12; 25-OH Vit D; lipid panel  Ascites amylase:serum amylase 0.4, c/w pancreatitis, although serum lipase WNL (prog b/o atrophic panc)  use creon when eating  diet: full liquids for now  dilaudid 2mg po q4 prn pain (avoid IV pain meds, unless vomiting)  d/c NSAIDs    # Hepatomegaly from alcoholic liver dz  GI eval  Hep A, B, C neg  CLD w/u recently is neg  Drinks 4 cups of vodka daily in addition to recreational drug use (cocaine positive on UDS).   U/s abdomen negative for Budd Chiari  c/w Aldactone 50 mg qD + Lasix 20 mg qD per GI recs 6/17. Hold if SBP <90.  check NH3 level   monitor daily weights    # Continuous Alcoholism - severe; polysub abuse (see prior UDS)  Last drink 6/14  cont CIWA assessments q4-6 hrs  ativan 0.5mg po q6 prn symptoms  C/w folate, thiamine, MVI    # Macrocytic anemia likely 2/2 alcohol abuse and liver dz  check B12 and Fol and TSH  keep hgb >7    # tobacco abuse  demetrio patch    # DVT ppx: Lovenox 40 mg qD    # GI ppx: protonix 40mg po q24    # Activity: ambulate w/ asst (little unsteady); PT/OT eval    # Code status: FULL CODE    # Dispo: tx c diff; eval on going SIRS vs ongoing sepsis; F/U GI and ID; Nutr eval for malnutrition; lab w/u as above; CIWA monitoring; recheck lactate & NH3; daily wts;     Prog is quite guarded and this pt is still quite sick. She may need re-eval from crit care. Watch very carefully. 39 year old female with a history of EtOH abuse, Alcoholic liver disease, pancreatitis, depression, anxiety, and Recent C diff infection presented to the ED with chief c/o abdominal pain, distension and leg swelling     # Downgrade from ICU    # Severe sepsis on admission suspected to be 2/2 C diff colitis (no diarrhea) - not tx'd well 2/2 pt non-compliance  case d/w ID: Dr Peterson feels pt is improving  however, WBC incr to >16K; HR >100; Tmax 100.1 - request GI eval to look at pancreatic bed and peripanc fluid collections (as a poss source of infection)  Ascites Cx is neg and tap is NOT c/w SBP  Bld cx neg  Abx: vanco 125mg po q6 for 2 wks for now (end 6/28) + lactobacillus  Lactate level c/w lactic acidosis - need repeat level    # Chronic Pancreatitis 2/2 active alcohol abuse; severe malnutrition (BUN 4; Alb 1.6); anasarca and ascites  Nutrition eval - Dr Evans  check zinc level; thiamine level; folate; B12; 25-OH Vit D; lipid panel  Ascites amylase:serum amylase 0.4, c/w pancreatitis, although serum lipase WNL (prog b/o atrophic panc)  use creon when eating  diet: full liquids for now  dilaudid 2mg po q4 prn pain (avoid IV pain meds, unless vomiting)  d/c NSAIDs    # Hepatomegaly from alcoholic liver dz  GI eval  Hep A, B, C neg  CLD w/u recently is neg  Drinks 4 cups of vodka daily in addition to recreational drug use (cocaine positive on UDS).   U/s abdomen negative for Budd Chiari  c/w Aldactone 50 mg qD + Lasix 20 mg qD per GI recs 6/17. Hold if SBP <90.  check NH3 level   monitor daily weights    # Continuous Alcoholism - severe; polysub abuse (see prior UDS)  Last drink 6/14  cont CIWA assessments q4-6 hrs  ativan 0.5mg po q6 prn symptoms  C/w folate, thiamine, MVI    # Macrocytic anemia likely 2/2 alcohol abuse and liver dz  check B12 and Fol and TSH  keep hgb >7    # tobacco abuse  demetrio patch    # DVT ppx: Lovenox 40 mg qD    # GI ppx: pepcid 20mg po q12 (avoid PPI w/ C Diff hx)    # Activity: ambulate w/ asst (little unsteady); PT/OT eval    # Code status: FULL CODE    # Dispo: tx c diff; eval on going SIRS vs ongoing sepsis; F/U GI and ID; Nutr eval for malnutrition; lab w/u as above; CIWA monitoring; recheck lactate & NH3; daily wts;     Prog is quite guarded and this pt is still quite sick. She may need re-eval from crit care. Watch very carefully.

## 2020-06-18 NOTE — PROGRESS NOTE ADULT - SUBJECTIVE AND OBJECTIVE BOX
SEBLEADRIAN  39y  Female  ***My note supersedes ALL resident notes that I sign.  My corrections for their notes are in my note.***    I can be reached directly on "[employer] [id number]. My office number is [phone]. My personal cell number is [phone].    INTERVAL EVENTS: Here for f/u of abd pain. Pt says she has a lot of abd pain.  She denies SOB. She was mostly awake and mostly oriented (said it was April not June).  She drank up admission. She denies alcohol w/drawal symptoms.  She is not hungry. She has no diarrhea for 3 days.    T(F): 100.1 (06-18-20 @ 12:51), Max: 100.1 (06-18-20 @ 12:51)  HR: 112 (06-18-20 @ 12:51) (105 - 125)  BP: 122/56 (06-18-20 @ 12:51) (98/52 - 122/56)  RR: 19 (06-18-20 @ 12:51) (18 - 19)  SpO2: 97% (06-18-20 @ 12:15) (96% - 97%)    Gen: uncomfortable from abd pain; no SOB; a little sleepy  HEENT: PERRL, EOMI, mouth clr, nose clr  Neck: no nodes, no JVD, thyroid nl  lungs: clr  hrt: s1 s2 rrr no murmur  abd: fairly soft, mild distension; mild ascites; diffuse tenderness; + hepatomeg; no splenomeg   ext: no edema, no c/c  neuro: aa ox2-3 (knew year, but not month), cn intact, can move all 4 ext    LABS:                      8.0     (    103.9  16.32 )-----------( ---------      303      ( 18 Jun 2020 11:16 )             26.4    (    17.5     WBC Count: 16.32 K/uL (06-18-20 @ 11:16)  WBC Count: 15.39 K/uL (06-18-20 @ 06:15)  WBC Count: 14.54 K/uL (06-17-20 @ 04:25)  WBC Count: 15.71 K/uL (06-16-20 @ 04:30)  WBC Count: 14.69 K/uL (06-15-20 @ 11:11)  WBC Count: 16.54 K/uL (06-15-20 @ 00:56)    Hemoglobin: 8.0 g/dL (06-18 @ 11:16)  Hemoglobin: 7.6 g/dL (06-18 @ 06:15)  Hemoglobin: 8.0 g/dL (06-17 @ 04:25)  Hemoglobin: 8.2 g/dL (06-16 @ 04:30)  Hemoglobin: 8.6 g/dL (06-15 @ 11:11)  Hemoglobin: 8.5 g/dL (06-15 @ 00:56)    140   (   105   (   139      06-18-20 @ 06:15  ----------------------               3.8   (   24   (   4                             -----                        <0.5  Ca  7.5   Mg  1.9    P   --     LFT  4.9  (  2.9  (  114       06-18-20 @ 06:15  -------------------------  1.7  (  253  (  22    PT/INR - ( 17 Jun 2020 04:25 )   PT: 18.20 sec;   INR: 1.58 ratio    PTT - ( 17 Jun 2020 04:25 )  PTT:34.3 sec    CARDIAC MARKERS ( 16 Jun 2020 18:21 )  x     / <0.01 ng/mL / x     / x     / x        Culture - Body Fluid with Gram Stain (collected 06-16-20 @ 12:20)  Source: .Body Fluid Peritoneal Fluid  Gram Stain (06-17-20 @ 04:14):    polymorphonuclear leukocytes seen    No organisms seen    by cytocentrifuge  Preliminary Report (06-17-20 @ 20:20):    No growth    Culture - Blood (collected 06-15-20 @ 11:11)  Source: .Blood None  Preliminary Report (06-16-20 @ 23:01):    No growth to date.    RADIOLOGY & ADDITIONAL TESTS:  < from: US Abdomen Liver Followup (06.17.20 @ 14:57) >  Impression:  No evidence of hepatic or portal vein occlusion.    Small caliber hepatic veins with reduced velocity of flow.    Pulsatile portal and splenic vein flow.    < end of copied text >    < from: VA Duplex Lower Ext Vein Scan, Bilat (06.17.20 @ 09:47) >  Impression:    No evidence of deep venous thrombosis in the bilateral lower extremities.    < end of copied text >    < from: CT Angio Chest w/ IV Cont (06.16.20 @ 21:30) >  IMPRESSION:    1. No evidence of acute pulmonary embolism.    2. Scattered areas of predominantly subpleural groundglass and consolidative airspace opacities; possibly infectious in etiology, in appropriate clinical setting.    3. Stable bilateral small pleural effusions.    4. Partially imaged abdominal ascites, hepatic steatosis.    < end of copied text >    < from: CT Abdomen and Pelvis w/ IV Cont (06.15.20 @ 04:52) >  IMPRESSION:   Since Aida 10, 2020:    1. Persistent and slightly increased colonic thickening from cecum through transverse colon, compatible with known colitis; no evidence of abscess.     2. Unchanged generalized mesenteric edema, limiting assessment for peripancreatic inflammatory change. Unchanged multiple pancreatic fluid collections, measuring up to 2.4 cm, which may be represent pancreatic pseudocysts.    3. Stable mild to moderate ascites.    4. Hepatic steatosis.    < end of copied text >    MEDICATIONS:  vancomycin    Solution 125 milliGRAM(s) Oral every 6 hours    chlorhexidine 4% Liquid 1 Application(s) Topical <User Schedule>  enoxaparin Injectable 40 milliGRAM(s) SubCutaneous daily  folic acid 1 milliGRAM(s) Oral daily  furosemide    Tablet 20 milliGRAM(s) Oral daily  gabapentin 100 milliGRAM(s) Oral three times a day  HYDROmorphone   Tablet 2 milliGRAM(s) Oral every 4 hours PRN  ibuprofen  Tablet. 600 milliGRAM(s) Oral every 8 hours PRN  lactobacillus acidophilus 1 Tablet(s) Oral two times a day with meals  LORazepam     Tablet 0.5 milliGRAM(s) Oral every 6 hours PRN  multivitamin 1 Tablet(s) Oral daily  nicotine - 21 mG/24Hr(s) Patch 1 patch Transdermal daily  pantoprazole    Tablet 40 milliGRAM(s) Oral before breakfast  spironolactone 50 milliGRAM(s) Oral daily  thiamine 100 milliGRAM(s) Oral daily

## 2020-06-18 NOTE — OCCUPATIONAL THERAPY INITIAL EVALUATION ADULT - PATIENT/FAMILY/SIGNIFICANT OTHER GOALS STATEMENT, OT EVAL
Order canceled at [facility] in [place] [address] and new script in for you to approve  return to [facility]

## 2020-06-18 NOTE — PHYSICAL THERAPY INITIAL EVALUATION ADULT - ADDITIONAL COMMENTS
PTA: pt I s AD prior to admission. As per pt, she was cooking, cleaning, grooming, bathing, dressing I. Pt did not drive or work. 1 GEORGE c HR to 1st level. No 2nd floor.

## 2020-06-18 NOTE — PROGRESS NOTE ADULT - ASSESSMENT
39 year old female with a history of EtOH abuse, Alcoholic liver disease, pancreatitis, depression, anxiety, and Recent C diff infection presented to the ED with chief c/o abdominal pain, distension and leg swelling found to have sepsis 2/2 persistent colitis    After previous recent hospitalization patient neglected to complete course of antibiotics at home. Was started on PO vanc for C. diff and empirically on IV Rocephin for possible SBP (found to have ascites on presentation) but this was discontinued as paracentesis yielded 149 PMNs. Was admitted to ICU initially and downgraded to 3B 6/17. Never intubated or on presser. Patient was started on feeds and tolerated well. Diagnostic paracentesis performed and appears to be cirrhotic in nature; no need for any antibiotics.     # Severe sepsis in setting of C diff colitis, improving  - Tachycardic, elevated lactate on admission with appropriate resuscitation. Still with leukocytosis but diarrhea has resolved. Afebrile but tachycardic  - Source: had diagnostic para 6/16 which only yielded 149 PMNs, SBP ruled out. Source believed to be inadequately treated C. diff colitis as patient was non-compliant with antibiotic regimen from prior hospitalization. BCx NGTD  - C/w PO Vanc 125 mL q6h. Per ID, will require 2 weeks of therapy (end date 6/28/20)  - Now tolerating PO diet    # Pancreatitis in setting of active alcohol abuse with ascites, likely cirrhosis present, pancreatitis resolved  - Drinks 4 cups of vodka daily in addition to recreational drug use (cocaine positive on UDS). Last drink 6/14  - CT Abdomen and Pelvis w/ IV Cont 6/15: persistent and slightly increased colonic thickening from cecum through transverse colon, compatible with known colitis; no evidence of abscess. Unchanged generalized mesenteric edema, limiting assessment for hernan-pancreatic inflammatory change. Unchanged multiple pancreatic fluid collections, measuring up to 2.4 cm, which may represent pancreatic pseudocysts. Stable mild to moderate ascites. Hepatic steatosis present  - Ascites amylase:serum amylase 0.4, consistent with pancreatitis although serum lipase WNL  - U/s abdomen negative for Budd Chiari  - Hypoalbuminemia present, likely result of poor nutrition mixed with synthesis issue. INR 1.5, platelets 305  - Tolerating diet now  - CIWA protocol initiated. Has been 1-3 last 24 hours. IV Ativan discontinued. C/w PO Ativan 0.5 mg PRN for agitation  - C/w folate, thiamine, MVI  - Started on Aldactone 50 mg qD + Lasix 20 mg qD per GI recs 6/17. Hold if SBP <90. Will titrate up as able  - Will need follow-up in Liver clinic after discharge in 2 weeks ([phone]). Will be discharged with recs for adequate protein intake    # Macrocytic anemia likely 2/2 alcohol abuse  - Continue on folate, thiamine    Activity: As tolerated  Diet: Regular  DVT ppx: Lovenox  Code Status: Full Code  Dispo: From home; medical floor downgrade. 39 year old female with a history of EtOH abuse, Alcoholic liver disease, pancreatitis, depression, anxiety, and Recent C diff infection presented to the ED with chief c/o abdominal pain, distension and leg swelling found to have sepsis 2/2 persistent colitis    After previous recent hospitalization patient neglected to complete course of antibiotics at home. Was started on PO vanc for C. diff and empirically on IV Rocephin for possible SBP (found to have ascites on presentation) but this was discontinued as paracentesis yielded 149 PMNs. Was admitted to ICU initially and downgraded to 3B 6/17. Never intubated or on presser. Patient was started on feeds and tolerated well. Diagnostic paracentesis performed and appears to be cirrhotic in nature; no need for any antibiotics.     # Severe sepsis in setting of C diff colitis, improving  - Tachycardic, elevated lactate on admission with appropriate resuscitation. Still with leukocytosis but diarrhea has resolved. Afebrile but tachycardic  - Source: had diagnostic para 6/16 which only yielded 149 PMNs, SBP ruled out. Source believed to be inadequately treated C. diff colitis as patient was non-compliant with antibiotic regimen from prior hospitalization. BCx NGTD  - C/w PO Vanc 125 mL q6h. Per ID, will require 2 weeks of therapy (end date 6/28/20)  - Now tolerating PO diet    # Pancreatitis in setting of active alcohol abuse with ascites, likely cirrhosis present, pancreatitis resolved  - Drinks 4 cups of vodka daily in addition to recreational drug use (cocaine positive on UDS). Last drink 6/14  - CT Abdomen and Pelvis w/ IV Cont 6/15: persistent and slightly increased colonic thickening from cecum through transverse colon, compatible with known colitis; no evidence of abscess. Unchanged generalized mesenteric edema, limiting assessment for hernan-pancreatic inflammatory change. Unchanged multiple pancreatic fluid collections, measuring up to 2.4 cm, which may represent pancreatic pseudocysts. Stable mild to moderate ascites. Hepatic steatosis present  - Ascites amylase:serum amylase 0.4, consistent with pancreatitis although serum lipase WNL  - U/s abdomen negative for Budd Chiari  - Hypoalbuminemia present, likely result of poor nutrition mixed with synthesis issue. INR 1.5, platelets 305  - Tolerating diet now  - CIWA protocol initiated. Has been 1-3 last 24 hours. IV Ativan discontinued. C/w PO Ativan 0.5 mg PRN for agitation  - C/w folate, thiamine, MVI  - Started on Aldactone 50 mg qD + Lasix 20 mg qD per GI recs 6/17. Hold if SBP <90. Will titrate up as able  - Will need follow-up in Liver clinic after discharge in 2 weeks ([phone]). Will be discharged with recs for adequate protein intake    # Macrocytic anemia likely 2/2 alcohol abuse  - Continue on folate, thiamine  - Hgb 7.6 this AM. Will repeat CBC and transfuse PRN    #DVT ppx: Lovenox 40 mg qD  #GI ppx: not indicated  #Activity: As tolerated. Able to walk but unsteady  #Diet: Regular  #Code status: FULL CODE  #Dispo: from home, likely will require substance abuse program on d/c  #Follow-up: ___11 AM CBC, 11AM ammonia, GI recs, CIWA monitoring_________

## 2020-06-18 NOTE — PROGRESS NOTE ADULT - SUBJECTIVE AND OBJECTIVE BOX
Gastroenterology progress note:     Patient is a 39y old  Female who presents with a chief complaint of Abdominal Pain, Leg swelling. (18 Jun 2020 13:30)       Admitted on: 06-15-20    We are following the patient for pancreatitis and alcoholic liver disease     Interval History: Patient still c/o abdominal pain. Her diarrhea has resolved. Patient did develop Fever 101F    Patient's medical problems are improving   Prior records reviewed (Y/N): Y  History obtained from someone other than patient (Y/N): N      PAST MEDICAL & SURGICAL HISTORY:  ETOH abuse  Postpartum depression  Substance abuse  Anxiety  Depression  No significant past surgical history      MEDICATIONS  (STANDING):  chlorhexidine 4% Liquid 1 Application(s) Topical <User Schedule>  enoxaparin Injectable 40 milliGRAM(s) SubCutaneous daily  famotidine    Tablet 20 milliGRAM(s) Oral two times a day  folic acid 1 milliGRAM(s) Oral daily  furosemide    Tablet 20 milliGRAM(s) Oral daily  gabapentin 100 milliGRAM(s) Oral three times a day  lactobacillus acidophilus 1 Tablet(s) Oral two times a day with meals  multivitamin 1 Tablet(s) Oral daily  nicotine - 21 mG/24Hr(s) Patch 1 patch Transdermal daily  spironolactone 50 milliGRAM(s) Oral daily  thiamine 100 milliGRAM(s) Oral daily  vancomycin    Solution 125 milliGRAM(s) Oral every 6 hours    MEDICATIONS  (PRN):  HYDROmorphone   Tablet 2 milliGRAM(s) Oral every 4 hours PRN Severe Pain (7 - 10)  LORazepam     Tablet 0.5 milliGRAM(s) Oral every 6 hours PRN Agitation      Allergies  No Known Allergies      Review of Systems:   Cardiovascular:  No Chest Pain, No Palpitations  Respiratory:  No Cough, No Dyspnea  Gastrointestinal:  As described in HPI    Physical Examination:  T(C): 38.3 (06-18-20 @ 14:43), Max: 38.3 (06-18-20 @ 14:43)  HR: 112 (06-18-20 @ 12:51) (105 - 125)  BP: 122/56 (06-18-20 @ 12:51) (98/52 - 122/56)  RR: 19 (06-18-20 @ 12:51) (18 - 19)  SpO2: 97% (06-18-20 @ 12:15) (96% - 97%)      06-17-20 @ 07:01  -  06-18-20 @ 07:00  --------------------------------------------------------  IN: 120 mL / OUT: 200 mL / NET: -80 mL      Constitutional: No acute distress.  Respiratory:  No signs of respiratory distress.   Cardiovascular:  S1 S2, Regular rate and rhythm.  Abdominal: Abdomen is soft, symmetric, and diffuse tenderness with distension  Skin: No rashes,     Data: (reviewed by attending)                        8.0    16.32 )-----------( 303      ( 18 Jun 2020 11:16 )             26.4     Hgb trend:  8.0  06-18-20 @ 11:16  7.6  06-18-20 @ 06:15  8.0  06-17-20 @ 04:25  8.2  06-16-20 @ 04:30        06-18    140  |  105  |  4<L>  ----------------------------<  139<H>  3.8   |  24  |  <0.5<L>    Ca    7.5<L>      18 Jun 2020 06:15  Mg     1.9     06-18    TPro  4.9<L>  /  Alb  1.7<L>  /  TBili  2.9<H>  /  DBili  2.2<H>  /  AST  114<H>  /  ALT  22  /  AlkPhos  253<H>  06-18    Liver panel trend:  TBili 2.9   /      /   ALT 22   /   AlkP 253   /   Tptn 4.9   /   Alb 1.7    /   DBili 2.2      06-18  TBili 3.3   /   AST 98   /   ALT 21   /   AlkP 234   /   Tptn 4.7   /   Alb 1.6    /   DBili 2.3      06-17  TBili 3.5   /   AST 86   /   ALT 22   /   AlkP 252   /   Tptn 5.2   /   Alb 1.9    /   DBili 2.7      06-16  TBili 3.6   /   AST 96   /   ALT 24   /   AlkP 279   /   Tptn 5.7   /   Alb 2.0    /   DBili --      06-15  TBili 3.0   /   AST 91   /   ALT 23   /   AlkP 252   /   Tptn 5.3   /   Alb 2.0    /   DBili --      06-15  TBili 3.1   /   AST 97   /   ALT 24   /   AlkP 268   /   Tptn 5.2   /   Alb 2.0    /   DBili 2.3      06-15  TBili 5.1   /   AST 90   /   ALT 23   /   AlkP 273   /   Tptn 5.9   /   Alb 2.2    /   DBili 3.5      06-13  TBili 3.8   /   AST 92   /   ALT 21   /   AlkP 249   /   Tptn 5.1   /   Alb 2.1    /   DBili --      06-12  TBili 3.3   /   AST 98   /   ALT 21   /   AlkP 247   /   Tptn 5.0   /   Alb 2.1    /   DBili --      06-11  TBili 3.5   /      /   ALT 22   /   AlkP 257   /   Tptn 5.1   /   Alb 2.1    /   DBili --      06-10  TBili 3.6   /      /   ALT 23   /   AlkP 238   /   Tptn 4.8   /   Alb 2.1    /   DBili 2.9      06-09      PT/INR - ( 17 Jun 2020 04:25 )   PT: 18.20 sec;   INR: 1.58 ratio         PTT - ( 17 Jun 2020 04:25 )  PTT:34.3 sec    Culture - Fungal, Body Fluid (collected 16 Jun 2020 12:20)  Source: .Body Fluid Peritoneal Fluid  Preliminary Report (17 Jun 2020 08:59):    Testing in progress    Culture - Body Fluid with Gram Stain (collected 16 Jun 2020 12:20)  Source: .Body Fluid Peritoneal Fluid  Gram Stain (17 Jun 2020 04:14):    polymorphonuclear leukocytes seen    No organisms seen    by cytocentrifuge  Preliminary Report (17 Jun 2020 20:20):    No growth         Radiology: (reviewed by attending)

## 2020-06-18 NOTE — PROGRESS NOTE ADULT - ASSESSMENT
39 year old female with a history of acute alcoholic pancreatitis, alcohol abuse,  presents with abdominal pain, leukocytosis, acute pancreatitis with developing pseudocysts, elevated LFTs and C diff    Recurrent pancreatitis with pseudocyst formation secondary to alcohol abuse  Persistent pain, Fever of 101, tachycardia  CA/TGs are normal.    Rec  -can have low fat diet  -Pain control as needed (although component of opioid seeking behaviour possible)  -Maintain hemodynamics  -Get CT abdomen and pelvis with IV con for R/O infected pseuodocyst, necrosis  -Make sure to look for other source of infection as well.  -Repeat Blood culture.    # First known episode of C diff :  On oral vancomycin 125 q6hrs (complete for 10 days), f/u with ID      # Alcoholic liver disease: Elevated LFTs are likely secondary to alcoholic hepatitis (>2:1 ratio between AST: ALT) with possible liver cirrhosis  MADDREY score: 21 so no role of steroids.  MRCP: CBD N, Sludge+ (last admission)  HBsAg, HBsAb, IgM/IgG, Anti HEV, Transferrin Saturation, Ceruloplasmin level, [place], SMA, AMA are all negative  Diagnostic tap : No SBP, consistent with liver cirrhosis, amylase 15 which is negative, so pancreatic duct leak.\  get US duplex does not show Budd chiari    Rec:  Complete alcohol abstinence   Daily LFTS and INR  Can increase aldactone to 100, lasix 40mg daily  Repeat lactate if its trending down  Pain control, consult pain management      will f/u after CT abdomen with IV contrast.

## 2020-06-18 NOTE — PHYSICAL THERAPY INITIAL EVALUATION ADULT - CRITERIA FOR SKILLED THERAPEUTIC INTERVENTIONS
rehab potential/anticipated discharge recommendation/predicted duration of therapy intervention/therapy frequency/anticipated equipment needs at discharge/functional limitations in following categories/impairments found

## 2020-06-18 NOTE — PROGRESS NOTE ADULT - ASSESSMENT
39 year old female with a history of ETOH abuse, Alcoholic liver disease,  pancreatitis, depression, anxiety, Recent C diff infection who comes to the ED with chief c/o Abdominal pain, distension and leg swelling.     IMPRESSION;   Pancolitis secondary to CD colitis which seems quiescent presently as pt has not had diarrhea.  Po vanomycin was supposed to go through 6/19  pt is non compliant to meds.  Abdominal pain seems multifactorial in etiology : chronic pancreatitis with multiple pseudocysts. Recent CD colitis with pancolitis on CT.   S/p paracentesis 6/16 with no SBP. Ascitic fluid cultures NG  No evidence of cholecystitis/cholangitis  CT 6/15 : Persistent  and slightly increased colonic thickening from cecum through transverse colon, compatible with known colitis; no evidence of abscess.   Unchanged generalized mesenteric edema, limiting assessment for peripancreatic inflammatory change. Unchanged multiple pancreatic fluid collections, measuring up to 2.4 cm, which may be represent pancreatic pseudocysts.    COVID-19 NG    RECOMMENDATIONS;   Off rocephin   po Vancomycin 125 mg q6h to complete a 14 d course

## 2020-06-18 NOTE — PHYSICAL THERAPY INITIAL EVALUATION ADULT - PLANNED THERAPY INTERVENTIONS, PT EVAL
gait training/neuromuscular re-education/postural re-education/stretching/balance training/bed mobility training/strengthening/lumbar stabilization/transfer training

## 2020-06-18 NOTE — OCCUPATIONAL THERAPY INITIAL EVALUATION ADULT - PERTINENT HX OF CURRENT PROBLEM, REHAB EVAL
Pt with h/o ETOH abuse, recent admission for pancreatitis/sepsis/cdif colitis/pancreatic pseudocyts, s/o AMA from hospital 6/12 and AMA from ER on 6/13.

## 2020-06-18 NOTE — PROGRESS NOTE ADULT - SUBJECTIVE AND OBJECTIVE BOX
ADRIAN PRUETT  39y, Female    All available historical data reviewed    OVERNIGHT EVENTS:  no fevers  no diarrhea  abdominal pain  generalized aches    ROS:  General: Denies rigors, night sweats  HEENT: Denies headache, rhinorrhea, sore throat, eye pain  CV: Denies CP, palpitations  PULM: Denies wheezing, hemoptysis  GI: Denies hematemesis, hematochezia, melena  : Denies discharge, hematuria  MSK: generalized aches  SKIN: Denies rash, lesions  NEURO:  weakness  PSYCH: anxiety    VITALS:  T(F): 98.1, Max: 98.9 (06-17-20 @ 21:00)  HR: 105  BP: 107/55  RR: 18Vital Signs Last 24 Hrs  T(C): 36.7 (18 Jun 2020 04:46), Max: 37.2 (17 Jun 2020 21:00)  T(F): 98.1 (18 Jun 2020 04:46), Max: 98.9 (17 Jun 2020 21:00)  HR: 105 (18 Jun 2020 08:36) (105 - 125)  BP: 107/55 (18 Jun 2020 04:46) (98/52 - 108/58)  BP(mean): --  RR: 18 (18 Jun 2020 04:46) (18 - 18)  SpO2: 96% (18 Jun 2020 08:36) (96% - 96%)    TESTS & MEASUREMENTS:                        8.0    16.32 )-----------( 303      ( 18 Jun 2020 11:16 )             26.4     06-18    140  |  105  |  4<L>  ----------------------------<  139<H>  3.8   |  24  |  <0.5<L>    Ca    7.5<L>      18 Jun 2020 06:15  Mg     1.9     06-18    TPro  4.9<L>  /  Alb  1.7<L>  /  TBili  2.9<H>  /  DBili  2.2<H>  /  AST  114<H>  /  ALT  22  /  AlkPhos  253<H>  06-18    LIVER FUNCTIONS - ( 18 Jun 2020 06:15 )  Alb: 1.7 g/dL / Pro: 4.9 g/dL / ALK PHOS: 253 U/L / ALT: 22 U/L / AST: 114 U/L / GGT: x             Culture - Fungal, Body Fluid (collected 06-16-20 @ 12:20)  Source: .Body Fluid Peritoneal Fluid  Preliminary Report (06-17-20 @ 08:59):    Testing in progress    Culture - Body Fluid with Gram Stain (collected 06-16-20 @ 12:20)  Source: .Body Fluid Peritoneal Fluid  Gram Stain (06-17-20 @ 04:14):    polymorphonuclear leukocytes seen    No organisms seen    by cytocentrifuge  Preliminary Report (06-17-20 @ 20:20):    No growth    Culture - Blood (collected 06-15-20 @ 11:11)  Source: .Blood None  Preliminary Report (06-16-20 @ 23:01):    No growth to date.            RADIOLOGY & ADDITIONAL TESTS:  Personal review of radiological diagnostics performed  Echo and EKG results noted when applicable.     MEDICATIONS:  chlorhexidine 4% Liquid 1 Application(s) Topical <User Schedule>  enoxaparin Injectable 40 milliGRAM(s) SubCutaneous daily  folic acid 1 milliGRAM(s) Oral daily  furosemide    Tablet 20 milliGRAM(s) Oral daily  gabapentin 100 milliGRAM(s) Oral three times a day  lactobacillus acidophilus 1 Tablet(s) Oral two times a day with meals  LORazepam     Tablet 0.5 milliGRAM(s) Oral every 6 hours PRN  multivitamin 1 Tablet(s) Oral daily  nicotine - 21 mG/24Hr(s) Patch 1 patch Transdermal daily  pantoprazole    Tablet 40 milliGRAM(s) Oral before breakfast  spironolactone 50 milliGRAM(s) Oral daily  thiamine 100 milliGRAM(s) Oral daily  vancomycin    Solution 125 milliGRAM(s) Oral every 6 hours      ANTIBIOTICS:  vancomycin    Solution 125 milliGRAM(s) Oral every 6 hours

## 2020-06-19 LAB
24R-OH-CALCIDIOL SERPL-MCNC: 30 NG/ML — SIGNIFICANT CHANGE UP (ref 30–80)
ALBUMIN SERPL ELPH-MCNC: 1.8 G/DL — LOW (ref 3.5–5.2)
ALBUMIN SERPL ELPH-MCNC: 1.9 G/DL — LOW (ref 3.5–5.2)
ALP SERPL-CCNC: 243 U/L — HIGH (ref 30–115)
ALP SERPL-CCNC: 267 U/L — HIGH (ref 30–115)
ALT FLD-CCNC: 20 U/L — SIGNIFICANT CHANGE UP (ref 0–41)
ALT FLD-CCNC: 22 U/L — SIGNIFICANT CHANGE UP (ref 0–41)
ANION GAP SERPL CALC-SCNC: 12 MMOL/L — SIGNIFICANT CHANGE UP (ref 7–14)
ANION GAP SERPL CALC-SCNC: 12 MMOL/L — SIGNIFICANT CHANGE UP (ref 7–14)
AST SERPL-CCNC: 105 U/L — HIGH (ref 0–41)
AST SERPL-CCNC: 113 U/L — HIGH (ref 0–41)
B PERT IGG+IGM PNL SER: CLEAR — SIGNIFICANT CHANGE UP
BILIRUB DIRECT SERPL-MCNC: 2 MG/DL — HIGH (ref 0–0.2)
BILIRUB DIRECT SERPL-MCNC: 2.4 MG/DL — HIGH (ref 0–0.2)
BILIRUB INDIRECT FLD-MCNC: 0.9 MG/DL — SIGNIFICANT CHANGE UP (ref 0.2–1.2)
BILIRUB INDIRECT FLD-MCNC: 1 MG/DL — SIGNIFICANT CHANGE UP (ref 0.2–1.2)
BILIRUB SERPL-MCNC: 3 MG/DL — HIGH (ref 0.2–1.2)
BILIRUB SERPL-MCNC: 3.3 MG/DL — HIGH (ref 0.2–1.2)
BUN SERPL-MCNC: 4 MG/DL — LOW (ref 10–20)
BUN SERPL-MCNC: <3 MG/DL — LOW (ref 10–20)
CALCIUM SERPL-MCNC: 7.1 MG/DL — LOW (ref 8.5–10.1)
CALCIUM SERPL-MCNC: 7.2 MG/DL — LOW (ref 8.5–10.1)
CHLORIDE SERPL-SCNC: 103 MMOL/L — SIGNIFICANT CHANGE UP (ref 98–110)
CHLORIDE SERPL-SCNC: 99 MMOL/L — SIGNIFICANT CHANGE UP (ref 98–110)
CO2 SERPL-SCNC: 25 MMOL/L — SIGNIFICANT CHANGE UP (ref 17–32)
CO2 SERPL-SCNC: 26 MMOL/L — SIGNIFICANT CHANGE UP (ref 17–32)
COLOR FLD: YELLOW — SIGNIFICANT CHANGE UP
CREAT SERPL-MCNC: <0.5 MG/DL — LOW (ref 0.7–1.5)
CREAT SERPL-MCNC: <0.5 MG/DL — LOW (ref 0.7–1.5)
FLUID INTAKE SUBSTANCE CLASS: SIGNIFICANT CHANGE UP
FLUID SEGMENTED GRANULOCYTES: 12 % — SIGNIFICANT CHANGE UP
FOLATE SERPL-MCNC: 18.3 NG/ML — SIGNIFICANT CHANGE UP
GLUCOSE SERPL-MCNC: 101 MG/DL — HIGH (ref 70–99)
GLUCOSE SERPL-MCNC: 84 MG/DL — SIGNIFICANT CHANGE UP (ref 70–99)
HCT VFR BLD CALC: 24.2 % — LOW (ref 37–47)
HGB BLD-MCNC: 7.4 G/DL — LOW (ref 12–16)
INR BLD: 1.5 RATIO — HIGH (ref 0.65–1.3)
LACTATE SERPL-SCNC: 2.8 MMOL/L — HIGH (ref 0.7–2)
LACTATE SERPL-SCNC: 3 MMOL/L — HIGH (ref 0.7–2)
LIPASE FLD-CCNC: 52 U/L — HIGH
LYMPHOCYTES # FLD: 60 — SIGNIFICANT CHANGE UP
MAGNESIUM SERPL-MCNC: 1.7 MG/DL — LOW (ref 1.8–2.4)
MAGNESIUM SERPL-MCNC: 1.7 MG/DL — LOW (ref 1.8–2.4)
MCHC RBC-ENTMCNC: 30.6 G/DL — LOW (ref 32–37)
MCHC RBC-ENTMCNC: 32 PG — HIGH (ref 27–31)
MCV RBC AUTO: 104.8 FL — HIGH (ref 81–99)
MESOTHL CELL # FLD: 12 % — SIGNIFICANT CHANGE UP
MONOS+MACROS # FLD: 16 % — SIGNIFICANT CHANGE UP
NRBC # BLD: 0 /100 WBCS — SIGNIFICANT CHANGE UP (ref 0–0)
NRBC # FLD: 0 % — SIGNIFICANT CHANGE UP (ref 0–0)
PHOSPHATE SERPL-MCNC: 2.7 MG/DL — SIGNIFICANT CHANGE UP (ref 2.1–4.9)
PHOSPHATE SERPL-MCNC: 2.9 MG/DL — SIGNIFICANT CHANGE UP (ref 2.1–4.9)
PLATELET # BLD AUTO: 238 K/UL — SIGNIFICANT CHANGE UP (ref 130–400)
POTASSIUM SERPL-MCNC: 3.4 MMOL/L — LOW (ref 3.5–5)
POTASSIUM SERPL-MCNC: 3.9 MMOL/L — SIGNIFICANT CHANGE UP (ref 3.5–5)
POTASSIUM SERPL-SCNC: 3.4 MMOL/L — LOW (ref 3.5–5)
POTASSIUM SERPL-SCNC: 3.9 MMOL/L — SIGNIFICANT CHANGE UP (ref 3.5–5)
PROT SERPL-MCNC: 4.7 G/DL — LOW (ref 6–8)
PROT SERPL-MCNC: 5.2 G/DL — LOW (ref 6–8)
PROTHROM AB SERPL-ACNC: 17.3 SEC — HIGH (ref 9.95–12.87)
RBC # BLD: 2.31 M/UL — LOW (ref 4.2–5.4)
RBC # FLD: 17.3 % — HIGH (ref 11.5–14.5)
RCV VOL RI: 0 /UL — SIGNIFICANT CHANGE UP (ref 0–0)
SODIUM SERPL-SCNC: 137 MMOL/L — SIGNIFICANT CHANGE UP (ref 135–146)
SODIUM SERPL-SCNC: 140 MMOL/L — SIGNIFICANT CHANGE UP (ref 135–146)
T4 FREE SERPL-MCNC: 1 NG/DL — SIGNIFICANT CHANGE UP (ref 0.9–1.8)
TOTAL NUCLEATED CELL COUNT, BODY FLUID: 33 /UL — SIGNIFICANT CHANGE UP
TSH SERPL-MCNC: 25.3 UIU/ML — HIGH (ref 0.27–4.2)
TUBE TYPE: SIGNIFICANT CHANGE UP
WBC # BLD: 14.78 K/UL — HIGH (ref 4.8–10.8)
WBC # FLD AUTO: 14.78 K/UL — HIGH (ref 4.8–10.8)

## 2020-06-19 PROCEDURE — 99233 SBSQ HOSP IP/OBS HIGH 50: CPT

## 2020-06-19 PROCEDURE — 74177 CT ABD & PELVIS W/CONTRAST: CPT | Mod: 26

## 2020-06-19 PROCEDURE — 93010 ELECTROCARDIOGRAM REPORT: CPT

## 2020-06-19 RX ORDER — MAGNESIUM OXIDE 400 MG ORAL TABLET 241.3 MG
400 TABLET ORAL ONCE
Refills: 0 | Status: COMPLETED | OUTPATIENT
Start: 2020-06-19 | End: 2020-06-19

## 2020-06-19 RX ORDER — MORPHINE SULFATE 50 MG/1
15 CAPSULE, EXTENDED RELEASE ORAL EVERY 4 HOURS
Refills: 0 | Status: DISCONTINUED | OUTPATIENT
Start: 2020-06-19 | End: 2020-06-24

## 2020-06-19 RX ORDER — SPIRONOLACTONE 25 MG/1
100 TABLET, FILM COATED ORAL DAILY
Refills: 0 | Status: DISCONTINUED | OUTPATIENT
Start: 2020-06-20 | End: 2020-06-21

## 2020-06-19 RX ORDER — FUROSEMIDE 40 MG
20 TABLET ORAL ONCE
Refills: 0 | Status: COMPLETED | OUTPATIENT
Start: 2020-06-19 | End: 2020-06-19

## 2020-06-19 RX ORDER — CEFTRIAXONE 500 MG/1
2000 INJECTION, POWDER, FOR SOLUTION INTRAMUSCULAR; INTRAVENOUS EVERY 24 HOURS
Refills: 0 | Status: DISCONTINUED | OUTPATIENT
Start: 2020-06-19 | End: 2020-06-20

## 2020-06-19 RX ORDER — SPIRONOLACTONE 25 MG/1
50 TABLET, FILM COATED ORAL ONCE
Refills: 0 | Status: COMPLETED | OUTPATIENT
Start: 2020-06-19 | End: 2020-06-19

## 2020-06-19 RX ORDER — LEVOTHYROXINE SODIUM 125 MCG
50 TABLET ORAL DAILY
Refills: 0 | Status: DISCONTINUED | OUTPATIENT
Start: 2020-06-19 | End: 2020-06-26

## 2020-06-19 RX ORDER — IBUPROFEN 200 MG
400 TABLET ORAL ONCE
Refills: 0 | Status: COMPLETED | OUTPATIENT
Start: 2020-06-19 | End: 2020-06-19

## 2020-06-19 RX ORDER — MAGNESIUM SULFATE 500 MG/ML
2 VIAL (ML) INJECTION ONCE
Refills: 0 | Status: COMPLETED | OUTPATIENT
Start: 2020-06-19 | End: 2020-06-19

## 2020-06-19 RX ORDER — NICOTINE POLACRILEX 2 MG
1 GUM BUCCAL DAILY
Refills: 0 | Status: DISCONTINUED | OUTPATIENT
Start: 2020-06-19 | End: 2020-06-26

## 2020-06-19 RX ORDER — HYDROMORPHONE HYDROCHLORIDE 2 MG/ML
4 INJECTION INTRAMUSCULAR; INTRAVENOUS; SUBCUTANEOUS EVERY 4 HOURS
Refills: 0 | Status: DISCONTINUED | OUTPATIENT
Start: 2020-06-19 | End: 2020-06-19

## 2020-06-19 RX ORDER — MORPHINE SULFATE 50 MG/1
15 CAPSULE, EXTENDED RELEASE ORAL EVERY 4 HOURS
Refills: 0 | Status: DISCONTINUED | OUTPATIENT
Start: 2020-06-19 | End: 2020-06-19

## 2020-06-19 RX ORDER — ASCORBIC ACID 60 MG
500 TABLET,CHEWABLE ORAL DAILY
Refills: 0 | Status: DISCONTINUED | OUTPATIENT
Start: 2020-06-19 | End: 2020-06-26

## 2020-06-19 RX ORDER — FUROSEMIDE 40 MG
40 TABLET ORAL DAILY
Refills: 0 | Status: DISCONTINUED | OUTPATIENT
Start: 2020-06-20 | End: 2020-06-21

## 2020-06-19 RX ADMIN — Medication 1 MILLIGRAM(S): at 20:44

## 2020-06-19 RX ADMIN — Medication 1 MILLIGRAM(S): at 18:38

## 2020-06-19 RX ADMIN — ENOXAPARIN SODIUM 40 MILLIGRAM(S): 100 INJECTION SUBCUTANEOUS at 11:50

## 2020-06-19 RX ADMIN — HYDROMORPHONE HYDROCHLORIDE 2 MILLIGRAM(S): 2 INJECTION INTRAMUSCULAR; INTRAVENOUS; SUBCUTANEOUS at 06:07

## 2020-06-19 RX ADMIN — Medication 0.5 MILLIGRAM(S): at 17:55

## 2020-06-19 RX ADMIN — CEFTRIAXONE 100 MILLIGRAM(S): 500 INJECTION, POWDER, FOR SOLUTION INTRAMUSCULAR; INTRAVENOUS at 18:13

## 2020-06-19 RX ADMIN — Medication 20 MILLIGRAM(S): at 00:40

## 2020-06-19 RX ADMIN — Medication 1 TABLET(S): at 08:44

## 2020-06-19 RX ADMIN — Medication 1 MILLIGRAM(S): at 11:46

## 2020-06-19 RX ADMIN — Medication 1 PATCH: at 18:28

## 2020-06-19 RX ADMIN — SPIRONOLACTONE 50 MILLIGRAM(S): 25 TABLET, FILM COATED ORAL at 08:44

## 2020-06-19 RX ADMIN — GABAPENTIN 100 MILLIGRAM(S): 400 CAPSULE ORAL at 13:52

## 2020-06-19 RX ADMIN — Medication 1 PATCH: at 11:46

## 2020-06-19 RX ADMIN — Medication 20 MILLIGRAM(S): at 08:44

## 2020-06-19 RX ADMIN — Medication 0.5 MILLIGRAM(S): at 02:37

## 2020-06-19 RX ADMIN — Medication 100 MILLIGRAM(S): at 11:46

## 2020-06-19 RX ADMIN — SPIRONOLACTONE 50 MILLIGRAM(S): 25 TABLET, FILM COATED ORAL at 00:40

## 2020-06-19 RX ADMIN — Medication 0.5 MILLIGRAM(S): at 14:31

## 2020-06-19 RX ADMIN — HYDROMORPHONE HYDROCHLORIDE 2 MILLIGRAM(S): 2 INJECTION INTRAMUSCULAR; INTRAVENOUS; SUBCUTANEOUS at 00:39

## 2020-06-19 RX ADMIN — CHLORHEXIDINE GLUCONATE 1 APPLICATION(S): 213 SOLUTION TOPICAL at 06:03

## 2020-06-19 RX ADMIN — MAGNESIUM OXIDE 400 MG ORAL TABLET 400 MILLIGRAM(S): 241.3 TABLET ORAL at 13:51

## 2020-06-19 RX ADMIN — FAMOTIDINE 20 MILLIGRAM(S): 10 INJECTION INTRAVENOUS at 06:03

## 2020-06-19 RX ADMIN — MORPHINE SULFATE 15 MILLIGRAM(S): 50 CAPSULE, EXTENDED RELEASE ORAL at 09:05

## 2020-06-19 RX ADMIN — Medication 1 TABLET(S): at 11:47

## 2020-06-19 RX ADMIN — GABAPENTIN 100 MILLIGRAM(S): 400 CAPSULE ORAL at 21:39

## 2020-06-19 RX ADMIN — Medication 125 MILLIGRAM(S): at 06:03

## 2020-06-19 RX ADMIN — Medication 125 MILLIGRAM(S): at 11:46

## 2020-06-19 RX ADMIN — Medication 50 MICROGRAM(S): at 11:51

## 2020-06-19 RX ADMIN — FAMOTIDINE 20 MILLIGRAM(S): 10 INJECTION INTRAVENOUS at 17:56

## 2020-06-19 RX ADMIN — SPIRONOLACTONE 50 MILLIGRAM(S): 25 TABLET, FILM COATED ORAL at 06:03

## 2020-06-19 RX ADMIN — Medication 125 MILLIGRAM(S): at 17:56

## 2020-06-19 RX ADMIN — Medication 20 MILLIGRAM(S): at 06:03

## 2020-06-19 RX ADMIN — Medication 1 PATCH: at 11:45

## 2020-06-19 RX ADMIN — Medication 1 TABLET(S): at 17:56

## 2020-06-19 RX ADMIN — Medication 400 MILLIGRAM(S): at 21:38

## 2020-06-19 RX ADMIN — Medication 500 MILLIGRAM(S): at 17:55

## 2020-06-19 RX ADMIN — MORPHINE SULFATE 15 MILLIGRAM(S): 50 CAPSULE, EXTENDED RELEASE ORAL at 13:51

## 2020-06-19 RX ADMIN — GABAPENTIN 100 MILLIGRAM(S): 400 CAPSULE ORAL at 06:03

## 2020-06-19 RX ADMIN — Medication 50 GRAM(S): at 08:44

## 2020-06-19 NOTE — PROGRESS NOTE ADULT - ASSESSMENT
IMPRESSION:    Sepsis likely intra-abdominal source  CDiff present on admission   HO ETOH cirrhosis  MSA    PLAN:    CNS: Thiamine and Folate pain control     HEENT: Oral care    PULMONARY:  HOB @ 45 degrees.  Aspiration precautions     CARDIOVASCULAR:  500cc bolus, hold diuretic therapy for now    GI: GI prophylaxis.  Feeding.  Bowel regimen. F/U imaging GI on board    RENAL:  Follow up lytes.  Correct as needed    INFECTIOUS DISEASE: Follow up cultures.  Continue VAnc Oral. continue ceftriaxone, consider adding flagyl or switching to meropenem    HEMATOLOGICAL:  DVT prophylaxis.    ENDOCRINE:  Follow up FS.  Insulin protocol if needed.    MUSCULOSKELETAL:  OOB to chair    At this time patient does not require continuous monitoring in the MICU recall if status changes. IMPRESSION:    CDiff present on admission   HO ETOH cirrhosis  HO MSA  Possible intravascular volume depletion     PLAN:    CNS: Thiamine and Folate pain control     HEENT: Oral care    PULMONARY:  HOB @ 45 degrees.  Aspiration precautions     CARDIOVASCULAR:  500cc bolus, hold diuretic therapy for now.      GI: GI prophylaxis.  Feeding.  Bowel regimen. F/U imaging GI on board    RENAL:  Follow up lytes.  Correct as needed    INFECTIOUS DISEASE: Follow up cultures.  Continue VAnc Oral.     HEMATOLOGICAL:  DVT prophylaxis.    ENDOCRINE:  Follow up FS.    MUSCULOSKELETAL:  OOB to chair    At this time patient does not require continuous monitoring in the MICU. Please recall if status changes.

## 2020-06-19 NOTE — PROGRESS NOTE ADULT - SUBJECTIVE AND OBJECTIVE BOX
Patient is a 39y old  Female who presents with a chief complaint of Abdominal Pain, Leg swelling. (19 Jun 2020 12:39)        Over Night Events: Became tachycardic and complained of worsening abdominal pain        ROS:     CONSTITUTIONAL:   + fever   no chills.  no weight gain   no weight loss    EYES:   no discharge,   no pain  no redness,   no visual changes.    ENT:   Ears: no ear pain and no hearing problems.  Nose: no nasal congestion and no nasal drainage.  Mouth/Throat: no dysphagia,  no hoarseness and no throat pain.  Neck: no lumps, no pain, no stiffness and no swollen glands.     CARDIOVASCULAR:   no chest pain,   no swelling  no palpitaions  no syncope    RESPIRATORY:  no SOB,  no wheezing ,  no respiratory difficulty  no sputum production    GASTROINTESTINAL:   + abdominal pain    GENITOURINARY:  no dysuria,   no frequency,   no urgency  no hematuria.    MUSCULOSKELETAL:   no back pain,   no musculoskeletal pain,  no weakness.    SKIN:   no jaundice,   no lesions,   no pruritis,   no rashes.    NEURO:   no loss of consciousness,   no gait abnormality,   no headache,   no sensory deficits,   no weakness.    PSYCHIATRIC:   no known mental health issues  no anxiety  no depression    ALLERGIC/IMMUNOLOGIC:   No active allergic or immunologic issues        PHYSICAL EXAM    ICU Vital Signs Last 24 Hrs  T(C): 36.8 (19 Jun 2020 08:00), Max: 38.3 (18 Jun 2020 14:43)  T(F): 98.2 (19 Jun 2020 08:00), Max: 101 (18 Jun 2020 14:43)  HR: 115 (19 Jun 2020 08:00) (112 - 121)  BP: 116/57 (19 Jun 2020 08:00) (104/55 - 122/56)  BP(mean): 80 (19 Jun 2020 08:00) (77 - 80)  RR: 18 (19 Jun 2020 08:00) (18 - 19)  SpO2: 98% (19 Jun 2020 08:00) (98% - 98%)      CONSTITUTIONAL:  Well nourished.  NAD    ENT:   Airway patent,   Mouth with normal mucosa.   No thrush    EYES:   Pupils equal,   Round and reactive to light.    CARDIAC:   Tachycardic  Regular rhythm.    No edema      Vascular:  Normal systolic impulse  No Carotid bruits    RESPIRATORY:   No wheezing  Bilateral BS  Normal chest expansion  Not tachypneic,  No use of accessory muscles    GASTROINTESTINAL:  Abdomen distended, but soft, tender  + BS    GENITOURINARY  normal genitalia for sex  B/L le edema    MUSCULOSKELETAL:   Range of motion is not limited,  No muscle or joint tenderness  No clubbing, cyanosis    NEUROLOGICAL:   Alert and oriented   No motor  deficits.  pertinent DTRs normal    SKIN:   Skin normal color for race,   Warm and dry and intact.   No evidence of rash.    PSYCHIATRIC:   Normal mood and affect.   No apparent risk to self or others.        06-19-20 @ 07:01  -  06-19-20 @ 12:49  --------------------------------------------------------  IN:    IV PiggyBack: 50 mL  Total IN: 50 mL    OUT:  Total OUT: 0 mL    Total NET: 50 mL          LABS:                            7.4    14.78 )-----------( 238      ( 19 Jun 2020 05:55 )             24.2                                               06-19    140  |  103  |  4<L>  ----------------------------<  84  3.9   |  25  |  <0.5<L>    Ca    7.2<L>      19 Jun 2020 05:55  Phos  2.9     06-19  Mg     1.7     06-19    TPro  4.7<L>  /  Alb  1.8<L>  /  TBili  3.0<H>  /  DBili  2.0<H>  /  AST  105<H>  /  ALT  20  /  AlkPhos  243<H>  06-19      PT/INR - ( 19 Jun 2020 05:55 )   PT: 17.30 sec;   INR: 1.50 ratio                                                      LIVER FUNCTIONS - ( 19 Jun 2020 10:37 )  Alb: 1.8 g/dL / Pro: 4.7 g/dL / ALK PHOS: 243 U/L / ALT: 20 U/L / AST: 105 U/L / GGT: x                                                                                                                                       MEDICATIONS  (STANDING):  chlorhexidine 4% Liquid 1 Application(s) Topical <User Schedule>  enoxaparin Injectable 40 milliGRAM(s) SubCutaneous daily  famotidine    Tablet 20 milliGRAM(s) Oral two times a day  folic acid 1 milliGRAM(s) Oral daily  gabapentin 100 milliGRAM(s) Oral three times a day  lactobacillus acidophilus 1 Tablet(s) Oral two times a day with meals  levothyroxine 50 MICROGram(s) Oral daily  magnesium oxide 400 milliGRAM(s) Oral once  multivitamin 1 Tablet(s) Oral daily  nicotine - 21 mG/24Hr(s) Patch 1 patch Transdermal daily  thiamine 100 milliGRAM(s) Oral daily  vancomycin    Solution 125 milliGRAM(s) Oral every 6 hours    MEDICATIONS  (PRN):  LORazepam     Tablet 0.5 milliGRAM(s) Oral every 6 hours PRN Agitation  morphine   Solution 15 milliGRAM(s) Oral every 4 hours PRN Severe Pain (7 - 10)

## 2020-06-19 NOTE — CHART NOTE - NSCHARTNOTEFT_GEN_A_CORE
Patient received fluid 1500 cc over an hour for elevated lactate and sepsis. Lactate improved 3.4 --> 2.8.  Repeated lipase as per GI but its normal most likely because of chronic pancreatitis.  Vitals have remained stable and patient has been afebrile.  Patient has worsening ascites and would need paracentecis/Albumin in AM to releive discomfort.  Also gave an extra dose of lasix and aldactone.

## 2020-06-19 NOTE — PROGRESS NOTE ADULT - SUBJECTIVE AND OBJECTIVE BOX
SUBJECTIVE:    Patient is a 39y old Female who presents with a chief complaint of Abdominal Pain, Leg swelling. (18 Jun 2020 16:55)    Currently admitted to medicine with the primary diagnosis of Abdominal pain     Today is hospital day 4d. S/p 1.5 L IV fluids, not hypotensive. Febrile yesterday. Called ICU for consult.    PAST MEDICAL & SURGICAL HISTORY  ETOH abuse  Postpartum depression  Substance abuse  Anxiety  Depression  No significant past surgical history    ALLERGIES:  No Known Allergies    MEDICATIONS:  STANDING MEDICATIONS  chlorhexidine 4% Liquid 1 Application(s) Topical <User Schedule>  enoxaparin Injectable 40 milliGRAM(s) SubCutaneous daily  famotidine    Tablet 20 milliGRAM(s) Oral two times a day  folic acid 1 milliGRAM(s) Oral daily  gabapentin 100 milliGRAM(s) Oral three times a day  lactobacillus acidophilus 1 Tablet(s) Oral two times a day with meals  levothyroxine 50 MICROGram(s) Oral daily  multivitamin 1 Tablet(s) Oral daily  nicotine - 21 mG/24Hr(s) Patch 1 patch Transdermal daily  thiamine 100 milliGRAM(s) Oral daily  vancomycin    Solution 125 milliGRAM(s) Oral every 6 hours    PRN MEDICATIONS  LORazepam     Tablet 0.5 milliGRAM(s) Oral every 6 hours PRN  morphine   Solution 15 milliGRAM(s) Oral every 4 hours PRN    VITALS:   T(F): 98.2  HR: 115  BP: 116/57  RR: 18  SpO2: 98%    LABS:                        7.4    14.78 )-----------( 238      ( 19 Jun 2020 05:55 )             24.2     06-19    140  |  103  |  4<L>  ----------------------------<  84  3.9   |  25  |  <0.5<L>    Ca    7.2<L>      19 Jun 2020 05:55  Phos  2.9     06-19  Mg     1.7     06-19    TPro  4.7<L>  /  Alb  1.8<L>  /  TBili  3.0<H>  /  DBili  2.0<H>  /  AST  105<H>  /  ALT  20  /  AlkPhos  243<H>  06-19    PT/INR - ( 19 Jun 2020 05:55 )   PT: 17.30 sec;   INR: 1.50 ratio               Lactate, Blood: 3.0 mmol/L <H> (06-19-20 @ 05:55)  Lactate, Blood: 2.8 mmol/L <H> (06-19-20 @ 00:52)  Lactate, Blood: 3.4 mmol/L <H> (06-18-20 @ 16:00)      Culture - Fungal, Body Fluid (collected 16 Jun 2020 12:20)  Source: .Body Fluid Peritoneal Fluid  Preliminary Report (17 Jun 2020 08:59):    Testing in progress    Culture - Body Fluid with Gram Stain (collected 16 Jun 2020 12:20)  Source: .Body Fluid Peritoneal Fluid  Gram Stain (17 Jun 2020 04:14):    polymorphonuclear leukocytes seen    No organisms seen    by cytocentrifuge  Preliminary Report (17 Jun 2020 20:20):    No growth          RADIOLOGY:  < from: CT Angio Chest w/ IV Cont (06.16.20 @ 21:30) >  IMPRESSION:    1. No evidence of acute pulmonary embolism.    2. Scattered areas of predominantly subpleural groundglass and consolidative airspace opacities; possibly infectious in etiology, in appropriate clinical setting.    3. Stable bilateral small pleural effusions.    4. Partially imaged abdominal ascites, hepatic steatosis.    < end of copied text >      < from: CT Abdomen and Pelvis w/ IV Cont (06.15.20 @ 04:52) >    IMPRESSION:   Since Aida 10, 2020:    1. Persistent  and slightly increased colonic thickening from cecum through transverse colon, compatible with known colitis; no evidence of abscess.     2. Unchanged generalized mesenteric edema, limiting assessment for peripancreatic inflammatory change. Unchanged multiple pancreatic fluid collections, measuring up to 2.4 cm, which may be represent pancreatic pseudocysts.    3. Stable mild to moderate ascites.    4. Hepatic steatosis.    < end of copied text >      PHYSICAL EXAM:  GENERAL:  sitting in chair, appears uncomfortable  HEAD:  Atraumatic, Normocephalic  ENT: Moist mucous membranes  CHEST/LUNG: Clear to auscultation bilaterally  HEART: Regular rate and rhythm;  ABDOMEN: Abdomen distended, mild diffuse tenderness is appreciated.  EXTREMITIES:  2+ Peripheral Pulses, brisk capillary refill. No clubbing, cyanosis, or edema  NERVOUS SYSTEM:  Alert & Oriented X3, speech clear. No deficits   MSK: Moves all 4 extremities

## 2020-06-19 NOTE — PROGRESS NOTE ADULT - SUBJECTIVE AND OBJECTIVE BOX
Gastroenterology progress note:     Patient is a 39y old  Female who presents with a chief complaint of Abdominal Pain, Leg swelling. (19 Jun 2020 16:26)       Admitted on: 06-15-20    We are following the patient for pancreatitis and alcoholic liver disease     Interval History: Patient is still c/o persistent abdominal pain, distension worse, tolerating regular diet. No vomiting and having regular BM. Had yesterday evening 101F    Patient's medical problems are stable         PAST MEDICAL & SURGICAL HISTORY:  ETOH abuse  Postpartum depression  Substance abuse  Anxiety  Depression  No significant past surgical history      MEDICATIONS  (STANDING):  ascorbic acid 500 milliGRAM(s) Oral daily  cefTRIAXone   IVPB 2000 milliGRAM(s) IV Intermittent every 24 hours  chlorhexidine 4% Liquid 1 Application(s) Topical <User Schedule>  enoxaparin Injectable 40 milliGRAM(s) SubCutaneous daily  famotidine    Tablet 20 milliGRAM(s) Oral two times a day  folic acid 1 milliGRAM(s) Oral daily  gabapentin 100 milliGRAM(s) Oral three times a day  lactobacillus acidophilus 1 Tablet(s) Oral two times a day with meals  levothyroxine 50 MICROGram(s) Oral daily  multivitamin 1 Tablet(s) Oral daily  nicotine - 21 mG/24Hr(s) Patch 1 patch Transdermal daily  thiamine 100 milliGRAM(s) Oral daily  vancomycin    Solution 125 milliGRAM(s) Oral every 6 hours    MEDICATIONS  (PRN):  LORazepam     Tablet 0.5 milliGRAM(s) Oral every 4 hours PRN Agitation  morphine   Solution 15 milliGRAM(s) Oral every 4 hours PRN Severe Pain (7 - 10)      Allergies  No Known Allergies      Review of Systems:   Cardiovascular:  No Chest Pain, No Palpitations  Respiratory:  No Cough, No Dyspnea  Gastrointestinal:  As described in HPI    Physical Examination:  T(C): 35.8 (06-19-20 @ 13:47), Max: 37.1 (06-18-20 @ 21:00)  HR: 125 (06-19-20 @ 14:15) (115 - 125)  BP: 112/55 (06-19-20 @ 13:47) (108/58 - 116/57)  RR: 20 (06-19-20 @ 14:15) (17 - 20)  SpO2: 99% (06-19-20 @ 14:15) (98% - 99%)      06-19-20 @ 07:01  -  06-19-20 @ 17:13  --------------------------------------------------------  IN: 50 mL / OUT: 1000 mL / NET: -950 mL      Constitutional: No acute distress.  Respiratory:  No signs of respiratory distress.   Cardiovascular:  S1 S2, Regular rate and rhythm.  Abdominal: Abdomen is soft, symmetric, and significantly distended. diffuse tenderness  Skin: jaundice        Data: (reviewed by attending)                        7.4    14.78 )-----------( 238      ( 19 Jun 2020 05:55 )             24.2     Hgb trend:  7.4  06-19-20 @ 05:55  8.0  06-18-20 @ 11:16  7.6  06-18-20 @ 06:15  8.0  06-17-20 @ 04:25        06-19    140  |  103  |  4<L>  ----------------------------<  84  3.9   |  25  |  <0.5<L>    Ca    7.2<L>      19 Jun 2020 05:55  Phos  2.9     06-19  Mg     1.7     06-19    TPro  4.7<L>  /  Alb  1.8<L>  /  TBili  3.0<H>  /  DBili  2.0<H>  /  AST  105<H>  /  ALT  20  /  AlkPhos  243<H>  06-19    Liver panel trend:  TBili 3.0   /      /   ALT 20   /   AlkP 243   /   Tptn 4.7   /   Alb 1.8    /   DBili 2.0      06-19  TBili 2.9   /      /   ALT 22   /   AlkP 253   /   Tptn 4.9   /   Alb 1.7    /   DBili 2.2      06-18  TBili 3.3   /   AST 98   /   ALT 21   /   AlkP 234   /   Tptn 4.7   /   Alb 1.6    /   DBili 2.3      06-17  TBili 3.5   /   AST 86   /   ALT 22   /   AlkP 252   /   Tptn 5.2   /   Alb 1.9    /   DBili 2.7      06-16  TBili 3.6   /   AST 96   /   ALT 24   /   AlkP 279   /   Tptn 5.7   /   Alb 2.0    /   DBili --      06-15  TBili 3.0   /   AST 91   /   ALT 23   /   AlkP 252   /   Tptn 5.3   /   Alb 2.0    /   DBili --      06-15  TBili 3.1   /   AST 97   /   ALT 24   /   AlkP 268   /   Tptn 5.2   /   Alb 2.0    /   DBili 2.3      06-15  TBili 5.1   /   AST 90   /   ALT 23   /   AlkP 273   /   Tptn 5.9   /   Alb 2.2    /   DBili 3.5      06-13  TBili 3.8   /   AST 92   /   ALT 21   /   AlkP 249   /   Tptn 5.1   /   Alb 2.1    /   DBili --      06-12  TBili 3.3   /   AST 98   /   ALT 21   /   AlkP 247   /   Tptn 5.0   /   Alb 2.1    /   DBili --      06-11  TBili 3.5   /      /   ALT 22   /   AlkP 257   /   Tptn 5.1   /   Alb 2.1    /   DBili --      06-10      PT/INR - ( 19 Jun 2020 05:55 )   PT: 17.30 sec;   INR: 1.50 ratio                Radiology: (reviewed by attending)  CT Abdomen and Pelvis w/ IV Cont:   EXAM:  CT ABDOMEN AND PELVIS IC            PROCEDURE DATE:  06/19/2020            INTERPRETATION:  CLINICAL STATEMENT: Pancreatitis. Clinically not improving.     TECHNIQUE: Contiguous axial CT images were obtained from the lower chest to the pubicsymphysis following administration of 100cc Optiray 320 intravenous contrast.  Oral contrast was not administered.  Reformatted images in the coronal and sagittal planes were acquired.    COMPARISON CT: Aida 15, 2020    OTHER STUDIES USED FOR CORRELATION: CT chest June 16, 2020.      FINDINGS:    LOWER CHEST: Since prior CT chest, unchanged trace right and small left pleural effusions with bibasilar compressive atelectasis. Right lower lobe 0.7 cm solid nodule, obscured by atelectasis on prior CTchest. Unchanged patchy subpleural right middle lobe groundglass opacities, unchanged since prior CT chest.    HEPATOBILIARY: Heterogeneous hepatic steatosis, predominantly involving the right hepatic lobe. Gallbladder sludge. Patent hepatic and portal veins.    SPLEEN: Unremarkable.    PANCREAS: Unchanged pancreatic fluid collections. For example, a pancreatic head collection measures 2.4 x 1.9 cm and a smaller collection in the pancreatic neck measures 1.1 x 1.0 cm. No foci of gas within the fluid. Patent splenic vein. No well-defined peripancreatic fluid collections. Remaining pancreatic parenchyma enhances homogeneously.    ADRENAL GLANDS: Unchanged.    KIDNEYS: Unchanged.    ABDOMINOPELVIC NODES: Unchanged.    PELVIC ORGANS: Unchanged.    PERITONEUM/MESENTERY/BOWEL: Unchanged moderate volume ascites. No bowel obstruction or pneumoperitoneum. Improved colonic wall thickening.    BONES/SOFT TISSUES: Increased soft tissue anasarca. Unchanged visualized osseous structures.    IMPRESSION:  1.  Since Aida 15, 2020, unchanged pancreatic head and neck fluid collections, largest measuring 2.4 x 1.9 cm.     2.  Increased soft tissue anasarca.    3.  Unchanged moderate volume abdominopelvic ascites.    4.  Additional findings are unchangedon this short-term follow-up examination.                      MIGUEL ÁNGEL RIVERA M.D., ATTENDING RADIOLOGIST  This document has been electronically signed. Jun 19 2020  1:14PM             (06-19-20 @ 12:39)

## 2020-06-19 NOTE — PROGRESS NOTE ADULT - ASSESSMENT
39 year old female with a history of acute alcoholic pancreatitis, alcohol abuse,  presents with abdominal pain, leukocytosis, acute pancreatitis with developing pseudocysts, elevated LFTs and C diff    Recurrent pancreatitis with pseudocyst formation secondary to alcohol abuse  Persistent pain, Fever of 101, tachycardia  CA/TGs are normal.  repeat CT scan shows no foci of gas on pseudocyst, no hemorrhage, same ascites, increase anasarca    Rec  -can have low fat diet  -Pain control as needed (although component of opioid seeking behaviour possible)  -Maintain hemodynamics  -F/U Blood culture and ascitic fluid tap (drained 1 L today)  -needs MICU monitoring   -F/U with ID , currently on Rocephin      # First known episode of C diff :  On oral vancomycin 125 q6hrs (complete for 10 days), f/u with ID      # Alcoholic liver disease: Elevated LFTs are likely secondary to alcoholic hepatitis (>2:1 ratio between AST: ALT) with possible liver cirrhosis  MADDREY score: 21 so no role of steroids.  MRCP: CBD N, Sludge+ (last admission)  HBsAg, HBsAb, IgM/IgG, Anti HEV, Transferrin Saturation, Ceruloplasmin level, [place], SMA, AMA are all negative  Diagnostic tap : No SBP, consistent with liver cirrhosis, amylase 15 which is negative, so pancreatic duct leak.\  get US duplex does not show Budd chiari    Rec:  Complete alcohol abstinence   Daily LFTS and INR  Can increase aldactone to 100, lasix 40mg daily  Pain control, consult pain management

## 2020-06-19 NOTE — PROGRESS NOTE ADULT - SUBJECTIVE AND OBJECTIVE BOX
ADRIAN PRUETT  39y, Female    All available historical data reviewed    OVERNIGHT EVENTS:  isolated fevers  clinically no change  has generalized aches, has abdominal pain  no diarrhea  no  complaints    ROS:      VITALS:  T(F): 98.2, Max: 101 (06-18-20 @ 14:43)  HR: 115  BP: 116/57  RR: 18Vital Signs Last 24 Hrs  T(C): 36.8 (19 Jun 2020 08:00), Max: 38.3 (18 Jun 2020 14:43)  T(F): 98.2 (19 Jun 2020 08:00), Max: 101 (18 Jun 2020 14:43)  HR: 115 (19 Jun 2020 08:00) (112 - 121)  BP: 116/57 (19 Jun 2020 08:00) (104/55 - 122/56)  BP(mean): 80 (19 Jun 2020 08:00) (77 - 80)  RR: 18 (19 Jun 2020 08:00) (18 - 19)  SpO2: 98% (19 Jun 2020 08:00) (98% - 98%)    TESTS & MEASUREMENTS:                        7.4    14.78 )-----------( 238      ( 19 Jun 2020 05:55 )             24.2     06-19    140  |  103  |  4<L>  ----------------------------<  84  3.9   |  25  |  <0.5<L>    Ca    7.2<L>      19 Jun 2020 05:55  Phos  2.9     06-19  Mg     1.7     06-19    TPro  4.7<L>  /  Alb  1.8<L>  /  TBili  3.0<H>  /  DBili  2.0<H>  /  AST  105<H>  /  ALT  20  /  AlkPhos  243<H>  06-19    LIVER FUNCTIONS - ( 19 Jun 2020 10:37 )  Alb: 1.8 g/dL / Pro: 4.7 g/dL / ALK PHOS: 243 U/L / ALT: 20 U/L / AST: 105 U/L / GGT: x             Culture - Fungal, Body Fluid (collected 06-16-20 @ 12:20)  Source: .Body Fluid Peritoneal Fluid  Preliminary Report (06-17-20 @ 08:59):    Testing in progress    Culture - Body Fluid with Gram Stain (collected 06-16-20 @ 12:20)  Source: .Body Fluid Peritoneal Fluid  Gram Stain (06-17-20 @ 04:14):    polymorphonuclear leukocytes seen    No organisms seen    by cytocentrifuge  Preliminary Report (06-17-20 @ 20:20):    No growth    Culture - Blood (collected 06-15-20 @ 11:11)  Source: .Blood None  Preliminary Report (06-16-20 @ 23:01):    No growth to date.            RADIOLOGY & ADDITIONAL TESTS:  Personal review of radiological diagnostics performed  Echo and EKG results noted when applicable.     MEDICATIONS:  chlorhexidine 4% Liquid 1 Application(s) Topical <User Schedule>  enoxaparin Injectable 40 milliGRAM(s) SubCutaneous daily  famotidine    Tablet 20 milliGRAM(s) Oral two times a day  folic acid 1 milliGRAM(s) Oral daily  gabapentin 100 milliGRAM(s) Oral three times a day  lactobacillus acidophilus 1 Tablet(s) Oral two times a day with meals  levothyroxine 50 MICROGram(s) Oral daily  LORazepam     Tablet 0.5 milliGRAM(s) Oral every 6 hours PRN  magnesium oxide 400 milliGRAM(s) Oral once  morphine   Solution 15 milliGRAM(s) Oral every 4 hours PRN  multivitamin 1 Tablet(s) Oral daily  nicotine - 21 mG/24Hr(s) Patch 1 patch Transdermal daily  thiamine 100 milliGRAM(s) Oral daily  vancomycin    Solution 125 milliGRAM(s) Oral every 6 hours      ANTIBIOTICS:  vancomycin    Solution 125 milliGRAM(s) Oral every 6 hours

## 2020-06-19 NOTE — CONSULT NOTE ADULT - ASSESSMENT
ASSESSMENT  39 year old female with a history of EtOH abuse, Alcoholic liver disease, pancreatitis, depression, anxiety, and Recent C diff infection presented to the ED with chief c/o abdominal pain, distension and leg swelling found to have sepsis 2/2 persistent colitis  -Sepsis in setting of C diff colitis, improving  -Pancreatitis in setting of active alcohol abuse with ascites, likely cirrhosis present, pancreatitis resolved  -Macrocytic anemia likely 2/2 alcohol abuse  -hypomagnesemia  -elevated WBC    PLAN ASSESSMENT  39 year old female with a history of EtOH abuse, Alcoholic liver disease, pancreatitis, depression, anxiety, and Recent C diff infection presented to the ED with chief c/o abdominal pain, distension and leg swelling found to have sepsis 2/2 persistent colitis  -Sepsis in setting of C diff colitis, improving  -Pancreatitis in setting of active alcohol abuse with ascites, likely cirrhosis present, pancreatitis resolved  -Macrocytic anemia likely 2/2 alcohol abuse  -hypomagnesemia, borderline low phos  -elevated WBC  -anemia    PLAN  - monitor and document BMs  - consider adding vitamin C 500 mg daily po  - watch and replete Mg and phos - if given via IV "rider" do so slowly, please  - if po intake poor or should abdominal exam worsen, should consider placing CVC for TPN. PPN would require too large a volume and provide insufficient protein and nutrient content for her needs.

## 2020-06-19 NOTE — PROGRESS NOTE ADULT - SUBJECTIVE AND OBJECTIVE BOX
ADRIAN PRUETT  39y  Female  ***My note supersedes ALL resident notes that I sign.  My corrections for their notes are in my note.***    I can be reached directly on [id number]. My office number is [phone]. My personal cell number is [phone].    INTERVAL EVENTS:    T(F): 98.2 (06-19-20 @ 08:00), Max: 101 (06-18-20 @ 14:43)  HR: 115 (06-19-20 @ 08:00) (110 - 121)  BP: 116/57 (06-19-20 @ 08:00) (104/55 - 122/56)  RR: 18 (06-19-20 @ 08:00) (18 - 19)  SpO2: 98% (06-19-20 @ 08:00) (97% - 98%)    Gen: uncomfortable from abd pain; no SOB; more awake and alert; walking better; asking to smoke outside  HEENT: PERRL, EOMI, mouth clr, nose clr  Neck: no nodes, no JVD, thyroid nl  lungs: clr  hrt: s1 s2 rrr no murmur  abd: mod distension; mod ascites; diffuse tenderness; + hepatomeg; no splenomeg   ext: no edema, no c/c  neuro: aa ox2-3 (not well to time), cn intact, can move all 4 ext    LABS:                     7.4     (    104.8  14.78 )-----------( ---------      238      ( 19 Jun 2020 05:55 )             24.2    (    17.3     WBC Count: 14.78 K/uL (06-19-20 @ 05:55) a little improved  WBC Count: 16.32 K/uL (06-18-20 @ 11:16)  WBC Count: 15.39 K/uL (06-18-20 @ 06:15)  WBC Count: 14.54 K/uL (06-17-20 @ 04:25)  WBC Count: 15.71 K/uL (06-16-20 @ 04:30)  WBC Count: 14.69 K/uL (06-15-20 @ 11:11)  WBC Count: 16.54 K/uL (06-15-20 @ 00:56)    Hemoglobin: 7.4 g/dL (06-19 @ 05:55) - got fluid last night, fairly stable  Hemoglobin: 8.0 g/dL (06-18 @ 11:16)  Hemoglobin: 7.6 g/dL (06-18 @ 06:15)  Hemoglobin: 8.0 g/dL (06-17 @ 04:25)  Hemoglobin: 8.2 g/dL (06-16 @ 04:30)  Hemoglobin: 8.6 g/dL (06-15 @ 11:11)  Hemoglobin: 8.5 g/dL (06-15 @ 00:56)    140   (   103   (   84      06-19-20 @ 05:55  ----------------------               3.9   (   25   (   4                             -----                        <0.5  Ca  7.2   Mg  1.7    P   2.9     LFT  4.7  (  3.0  (  105       06-19-20 @ 10:37  -------------------------  1.8  (  243  (  20    T drake 3.0       ALT 20  06-19-20 @ 10:37 - stable  T drake 2.9       ALT 22  06-18-20 @ 06:15  T drake 3.3     AST 98  ALT 21  06-17-20 @ 04:25  T drake 3.5     AST 86  ALT 22  06-16-20 @ 04:30  T drake 3.6     AST 96  ALT 24  06-15-20 @ 16:48    PT/INR - ( 19 Jun 2020 05:55 )   PT: 17.30 sec;   INR: 1.50 ratio      Culture - Body Fluid with Gram Stain (collected 06-16-20 @ 12:20)  Source: .Body Fluid Peritoneal Fluid  Gram Stain (06-17-20 @ 04:14):    polymorphonuclear leukocytes seen    No organisms seen    by cytocentrifuge  Preliminary Report (06-17-20 @ 20:20):    No growth    Culture - Blood (collected 06-15-20 @ 11:11)  Source: .Blood None  Preliminary Report (06-16-20 @ 23:01):    No growth to date.    RADIOLOGY & ADDITIONAL TESTS:      MEDICATIONS:  vancomycin    Solution 125 milliGRAM(s) Oral every 6 hours    chlorhexidine 4% Liquid 1 Application(s) Topical <User Schedule>  enoxaparin Injectable 40 milliGRAM(s) SubCutaneous daily  famotidine    Tablet 20 milliGRAM(s) Oral two times a day  folic acid 1 milliGRAM(s) Oral daily  gabapentin 100 milliGRAM(s) Oral three times a day  lactobacillus acidophilus 1 Tablet(s) Oral two times a day with meals  levothyroxine 50 MICROGram(s) Oral daily  LORazepam     Tablet 0.5 milliGRAM(s) Oral every 6 hours PRN  morphine   Solution 15 milliGRAM(s) Oral every 4 hours PRN  multivitamin 1 Tablet(s) Oral daily  nicotine - 21 mG/24Hr(s) Patch 1 patch Transdermal daily  thiamine 100 milliGRAM(s) Oral daily

## 2020-06-19 NOTE — PROGRESS NOTE ADULT - SUBJECTIVE AND OBJECTIVE BOX
Pain Management Progress Note -     Pt is a 39 year old female with a history of ETOH abuse, Alcoholic liver disease,  pancreatitis, depression, anxiety, Recent C diff infection. Presented to the ED with abd pain, distention. Patient ambulated back to her bed independently. patient states her pain is fine, but states her pain level to be 8/10 now. When I stated her pain level is high for a controlled pain she lowered it to 7/10. When I asked if the pain medication takes away her pain, she replied yes it takes away her pain. When I pointed out she stated her pain level at 7/10, she stated it relieves the pressure. Consult done yesterday recommended NO opioids and NO NSAIDs secondary to liver and kidney abnormalities, and cocaine use, making this patient a HR for overdose. Team has taken over pain regimen for the patient. Will sign off. Recall for any uncontrolled pain.          No Known Allergies        ETOH abuse  Postpartum depression  Substance abuse  Anxiety  Depression  No pertinent past medical history  Abdominal pain  Abdominal pain  Depression  Polysubstance dependence  Alcohol use disorder, severe, dependence  Cocaine use  Depression, major, in remission  Anxiety  Alcohol use disorder, moderate, dependence  Paracentesis, with US guidance  No significant past surgical history  ABDOMINAL PAIN  1  ETOH abuse  Lactic acidosis  Hypokalemia  Pancreatic pseudocyst  Colitis      chlorhexidine 4% Liquid  folic acid  thiamine  vancomycin    Solution  lactobacillus acidophilus  enoxaparin Injectable  gabapentin  multivitamin  famotidine    Tablet  nicotine - 21 mG/24Hr(s) Patch  levothyroxine  morphine   Solution  ascorbic acid  LORazepam     Tablet  LORazepam     Tablet      ascorbic acid 500 milliGRAM(s) Oral daily  chlorhexidine 4% Liquid 1 Application(s) Topical <User Schedule>  enoxaparin Injectable 40 milliGRAM(s) SubCutaneous daily  famotidine    Tablet 20 milliGRAM(s) Oral two times a day  folic acid 1 milliGRAM(s) Oral daily  gabapentin 100 milliGRAM(s) Oral three times a day  lactobacillus acidophilus 1 Tablet(s) Oral two times a day with meals  levothyroxine 50 MICROGram(s) Oral daily  LORazepam     Tablet 0.5 milliGRAM(s) Oral every 4 hours PRN  LORazepam     Tablet 1 milliGRAM(s) Oral every 4 hours  morphine   Solution 15 milliGRAM(s) Oral every 4 hours PRN  multivitamin 1 Tablet(s) Oral daily  nicotine - 21 mG/24Hr(s) Patch 1 patch Transdermal daily  thiamine 100 milliGRAM(s) Oral daily  vancomycin    Solution 125 milliGRAM(s) Oral every 6 hours      06-19 @ 10:66980 U/L<H>      06-19 @ 05:64385 mL/min/1.73M2          Hemoglobin: 7.4 g/dL (06-19 @ 05:55)  Hemoglobin: 8.0 g/dL (06-18 @ 11:16)  Hemoglobin: 7.6 g/dL (06-18 @ 06:15)        T(C): 35.8 (06-19-20 @ 13:47), Max: 37.9 (06-18-20 @ 17:00)  HR: 125 (06-19-20 @ 14:15) (115 - 125)  BP: 112/55 (06-19-20 @ 13:47) (104/55 - 116/57)  RR: 20 (06-19-20 @ 14:15) (17 - 20)  SpO2: 99% (06-19-20 @ 14:15) (98% - 99%)  Wt(kg): --     REVIEW OF SYSTEMS    General:	NAD   Skin/Breast:	Neg  Ophthalmologic:	Neg  ENMT: Neg	  Respiratory and Thorax: Neg	  Cardiovascular:	Neg  Gastrointestinal:	Neg  Genitourinary:	Neg  Musculoskeletal:	Neg  Neurological:	Neg  Psychiatric:	Neg  Hematology/Lymphatics:	Neg  Endocrine:	Neg        PHYSICAL EXAM:    GENERAL: NAD, well-groomed, well-developed  HEAD:  Atraumatic, Normocephalic  EYES: EOMI, PERRLA, conjunctiva and sclera clear  ENMT: No tonsillar erythema, exudates, or enlargement; Moist mucous membranes, Good dentition, No lesions  NECK: Supple, No JVD, Normal thyroid  NERVOUS SYSTEM:  Alert & Oriented X3, Good concentration; Motor Strength 5/5 B/L upper and lower extremities; DTRs 2+ intact and symmetric  CHEST/LUNG: Clear to percussion bilaterally; No rales, rhonchi, wheezing, or rubs  HEART: Regular rate and rhythm; No murmurs, rubs, or gallops  ABDOMEN: Soft, Nontender, Distended; Bowel sounds present  EXTREMITIES:  No clubbing, cyanosis, or edema  LYMPH: No lymphadenopathy noted  SKIN: No rashes or lesions

## 2020-06-19 NOTE — CONSULT NOTE ADULT - REASON FOR ADMISSION
Abdominal Pain, Leg swelling.

## 2020-06-19 NOTE — PROGRESS NOTE ADULT - ASSESSMENT
39 year old woman with a history of EtOH abuse, Alcoholic liver disease, pancreatitis, depression, anxiety, and Recent C diff infection presented to the ED with chief c/o abdominal pain, distension and leg swelling     # Downgrade from ICU    # Severe sepsis on admission suspected to be 2/2 C diff colitis (no diarrhea) - not tx'd well 2/2 pt non-compliance  f/u ID  WBC a little elevated; HR >100; Tmax 101 - still SIRS/Sepsis  GI eval: obtain CT A/P w/ IV contrast to eval panc bed and peripanc fluid jeny (as a poss source of infection)  if there is necrosis or collection, consider IR eval for tap/drain, patrice if getting worse or remains febrile  consider therapeutic (to relieve pain) tap of ascites and diagnostic: send for SBP analysis and rpt Cx (prior tap is NOT c/w SBP and Cx neg)  Bld cx neg  Abx: Rocephin 2gm IV q24 + vanco 125mg po q6 for 2 wks for now (end 6/28) + lactobacillus  Lactate: 3.4 -> 2.8 -> 3 (BP good, currently off IVFs b/o anasarca)    # Chronic Pancreatitis 2/2 active alcohol abuse; severe malnutrition (BUN 4; Alb 1.6); anasarca and ascites  Nutrition eval - Dr Evans  check zinc level; thiamine level; B12; lipids  folate nl; 25-OH Vit D 30  B12 1122 (5/29)  Ascites amylase:serum amylase NOT c/w active pancreatitis, also serum lipase WNL (but has atrophic panc)  use creon when eating  diet: full liquids for now (f/u GI and Nutrition)  Pain: Morphine 15mg po q4 prn pain  bowel reg: need to be careful because pt being tx'd for c diff  d/c NSAIDs    # Hepatomegaly from alcoholic liver dz  GI eval  Hep A, B, C neg  CLD w/u recently is neg  Drinks 4 cups of vodka daily in addition to recreational drug use (cocaine positive on UDS).   U/s abdomen negative for Budd Chiari  incr Aldactone 100 mg qD + Lasix 40 mg qD per GI. Hold if SBP <90.  NH3 level 42  monitor daily weights    # Continuous Alcoholism - severe; polysub abuse (see prior UDS)  Last drink 6/14  cont CIWA assessments q4-6 hrs  ativan 0.5mg po q6 prn symptoms  C/w folate, thiamine, MVI    # Macrocytic anemia likely 2/2 alcohol abuse and liver dz and hypothyroidism  TSH elevated  keep hgb >7    # hypothyroidism  check Anti-TPO Ab and Free T4  start synthroid 50mcg po q24  recheck TSH and FT4 in 6 wks (1st week Aug)    # tobacco abuse  c/w demetrio patch (21mg/day)  would not allow pt to go outside to smoke just yet    # DVT ppx: Lovenox 40 mg qD    # GI ppx: pepcid 20mg po q12 (avoid PPI w/ C Diff hx)    # Activity: ambulate w/ asst (little unsteady); PT/OT eval; did walk today on her own fairly well and without walker    # Code status: FULL CODE    # Dispo: tx c diff; eval on going SIRS/sepsis; obtain CT A/P w/ IV contrast; cont abd tap; F/U GI and ID; Nutr eval for malnutrition; lab w/u as above; CIWA monitoring; daily wts;     Prog is quite guarded and this pt is still quite sick. She may need crit care again (Crit Care did see pt today and said pt should remain on the floor). Watch very carefully.

## 2020-06-19 NOTE — CONSULT NOTE ADULT - SUBJECTIVE AND OBJECTIVE BOX
HPI:  Pt is a 39 year old female with a history of ETOH abuse, Alcoholic liver disease,  pancreatitis, depression, anxiety, Recent C diff infection who comes to the ED with chief c/o Abdominal pain, distension and leg swelling.   Pt was recently admitted to the hospital from May end- June 12th when she was Dx with C diff colitis ( supposed to complete rx on 6/19), Recurrent pancreatitis with acute pancreatic collection/ pseudocyst. Pt left AMA on last admission?. Pt states that she hasnt picked up her medications from the pharmacy and hasnot been taking any medicines since discharge.   Pt is an active drinker- states her last alcohol drink was yesterday. States her last episode of diarrhea was yesterday.   Pt today comes to the ED with abdominal swelling and pain. Pt states she has persistent pain in the abdomen- generalized, non radiating a/w worsening abdominal distension since 2 days. Denies any nausea/ vomiting. Denies any fever, chills.   VS on arrival noted for , /71, sat 100 on room air. Admission labs significant for Leukocytosis 16K, Macrocytic Anemia, K 3.2,   A gap 17, BUN <3, Low albumin, Bili 3.1, elevated Alk phos, Lactate 6.7, elevated blood alcohol level.     CT abdomen showed - Persistent  and slightly increased colonic thickening from cecum through transverse colon, compatible with known colitis; no evidence of abscess. Unchanged generalized mesenteric edema, limiting assessment for peripancreatic inflammatory change. Unchanged multiple pancreatic fluid collections, measuring up to 2.4 cm, which may be represent pancreatic pseudocysts. Stable mild to moderate ascites.  Hepatic steatosis.  Pt received IV abx, 2L in the ED and is being admitted to ICU for persistent lactemia after fluid resuscitation. (15 Aaron 2020 06:12)      PAST MEDICAL & SURGICAL HISTORY:  ETOH abuse  Postpartum depression  Substance abuse  Anxiety  Depression  No significant past surgical history     ICU Vital Signs Last 24 Hrs  T(C): 36.8 (19 Jun 2020 08:00), Max: 38.3 (18 Jun 2020 14:43)  T(F): 98.2 (19 Jun 2020 08:00), Max: 101 (18 Jun 2020 14:43)  HR: 115 (19 Jun 2020 08:00) (112 - 121)  BP: 116/57 (19 Jun 2020 08:00) (104/55 - 122/56)  BP(mean): 80 (19 Jun 2020 08:00) (77 - 80)  RR: 18 (19 Jun 2020 08:00) (18 - 19)  SpO2: 98% (19 Jun 2020 08:00) (98% - 98%)    Height (cm): 162.6 (06-15-20 @ 09:45), 162.6 (05-28-20 @ 23:26), 160.02 (05-04-20 @ 14:49)  Weight (kg): 74.2 (06-15-20 @ 09:45), 60.7 (05-28-20 @ 23:26), 61.2 (05-04-20 @ 14:49)  BMI (kg/m2): 28.1 (06-15-20 @ 09:45), 23 (05-28-20 @ 23:26), 23.9 (05-04-20 @ 14:49)  BSA (m2): 1.8 (06-15-20 @ 09:45), 1.65 (05-28-20 @ 23:26), 1.64 (05-04-20 @ 14:49)    I&O's Detail    19 Jun 2020 07:01  -  19 Jun 2020 12:42  --------------------------------------------------------  IN:    IV PiggyBack: 50 mL  Total IN: 50 mL    OUT:  Total OUT: 0 mL    Total NET: 50 mL    PHYSICAL EXAM:  GENERAL: Alert & Oriented X3, talkative  HEENT: Moist mucous membranes, Good dentition, No lesions,  ABDOMEN: distended , moderate ascites. +tenderness   EXTREMITIES:  + edema  SKIN: No lesions  IV ACCESS:  FEEDING ACCESS: po diet    MEDICATIONS  (STANDING):  chlorhexidine 4% Liquid 1 Application(s) Topical <User Schedule>  enoxaparin Injectable 40 milliGRAM(s) SubCutaneous daily  famotidine    Tablet 20 milliGRAM(s) Oral two times a day  folic acid 1 milliGRAM(s) Oral daily  gabapentin 100 milliGRAM(s) Oral three times a day  lactobacillus acidophilus 1 Tablet(s) Oral two times a day with meals  levothyroxine 50 MICROGram(s) Oral daily  magnesium oxide 400 milliGRAM(s) Oral once  multivitamin 1 Tablet(s) Oral daily  nicotine - 21 mG/24Hr(s) Patch 1 patch Transdermal daily  thiamine 100 milliGRAM(s) Oral daily  vancomycin    Solution 125 milliGRAM(s) Oral every 6 hours    MEDICATIONS  (PRN):  LORazepam     Tablet 0.5 milliGRAM(s) Oral every 6 hours PRN Agitation  morphine   Solution 15 milliGRAM(s) Oral every 4 hours PRN Severe Pain (7 - 10)    Allergies: NKDA    LABS:    06-19    140  |  103  |  4<L>  ----------------------------<  84  3.9   |  25  |  <0.5<L>    Ca    7.2<L>      19 Jun 2020 05:55  Phos  2.9     06-19  Mg     1.7     06-19    TPro  4.7<L>  /  Alb  1.8<L>  /  TBili  3.0<H>  /  DBili  2.0<H>  /  AST  105<H>  /  ALT  20  /  AlkPhos  243<H>  06-19                          7.4    14.78 )-----------( 238      ( 19 Jun 2020 05:55 )             24.2     PT/INR - ( 19 Jun 2020 05:55 )   PT: 17.30 sec;   INR: 1.50 ratio    Clostridium difficile Toxin by PCR (06.07.20 @ 18:54)    Clostridium difficile Toxin by PCR: RESULT INTERPRETATION:  Detected   C Diff by PCR Result: Positive    COVID-19 PCR . (06.15.20 @ 07:50)    COVID-19 PCR: NotDetec:  RADIOLOGY:  < from: US Abdomen Liver Followup (06.17.20 @ 14:57) >  Impression:  No evidence of hepatic or portal vein occlusion.  Small caliber hepatic veins with reduced velocity of flow.  Pulsatile portal and splenic vein flow.  < from: CT Angio Chest w/ IV Cont (06.16.20 @ 21:30) >  IMPRESSION:  1. No evidence of acute pulmonary embolism.  2. Scattered areas of predominantly subpleural groundglass and consolidative airspace opacities; possibly infectious in etiology, in appropriate clinical setting.  3. Stable bilateral small pleural effusions.  4. Partially imaged abdominal ascites, hepatic steatosis.    DIET  Diet, Full Liquid (06-18-20 @ 13:39) HPI:  Pt is a 39 year old female with a history of ETOH abuse, Alcoholic liver disease,  pancreatitis, depression, anxiety, Recent C diff infection who comes to the ED with chief c/o Abdominal pain, distension and leg swelling.   Pt was recently admitted to the hospital from May end- June 12th when she was Dx with C diff colitis ( supposed to complete rx on 6/19), Recurrent pancreatitis with acute pancreatic collection/ pseudocyst. Pt left AMA on last admission?. Pt states that she hasnt picked up her medications from the pharmacy and hasnot been taking any medicines since discharge.   Pt is an active drinker- states her last alcohol drink was yesterday. States her last episode of diarrhea was yesterday.   Pt today comes to the ED with abdominal swelling and pain. Pt states she has persistent pain in the abdomen- generalized, non radiating a/w worsening abdominal distension since 2 days. Denies any nausea/ vomiting. Denies any fever, chills.   VS on arrival noted for , /71, sat 100 on room air. Admission labs significant for Leukocytosis 16K, Macrocytic Anemia, K 3.2,   A gap 17, BUN <3, Low albumin, Bili 3.1, elevated Alk phos, Lactate 6.7, elevated blood alcohol level.     CT abdomen showed - Persistent  and slightly increased colonic thickening from cecum through transverse colon, compatible with known colitis; no evidence of abscess. Unchanged generalized mesenteric edema, limiting assessment for peripancreatic inflammatory change. Unchanged multiple pancreatic fluid collections, measuring up to 2.4 cm, which may be represent pancreatic pseudocysts. Stable mild to moderate ascites.  Hepatic steatosis.  Pt received IV abx, 2L in the ED and is being admitted to ICU for persistent lactemia after fluid resuscitation. (15 Aaron 2020 06:12)      PAST MEDICAL & SURGICAL HISTORY:  ETOH abuse  Postpartum depression  Substance abuse  Anxiety  Depression  No significant past surgical history     ICU Vital Signs Last 24 Hrs  T(C): 36.8 (19 Jun 2020 08:00), Max: 38.3 (18 Jun 2020 14:43)  T(F): 98.2 (19 Jun 2020 08:00), Max: 101 (18 Jun 2020 14:43)  HR: 115 (19 Jun 2020 08:00) (112 - 121)  BP: 116/57 (19 Jun 2020 08:00) (104/55 - 122/56)  BP(mean): 80 (19 Jun 2020 08:00) (77 - 80)  RR: 18 (19 Jun 2020 08:00) (18 - 19)  SpO2: 98% (19 Jun 2020 08:00) (98% - 98%)    Height (cm): 162.6 (06-15-20 @ 09:45), 162.6 (05-28-20 @ 23:26), 160.02 (05-04-20 @ 14:49)  Weight (kg): 74.2 (06-15-20 @ 09:45), 60.7 (05-28-20 @ 23:26), 61.2 (05-04-20 @ 14:49)  BMI (kg/m2): 28.1 (06-15-20 @ 09:45), 23 (05-28-20 @ 23:26), 23.9 (05-04-20 @ 14:49)  BSA (m2): 1.8 (06-15-20 @ 09:45), 1.65 (05-28-20 @ 23:26), 1.64 (05-04-20 @ 14:49)    I&O's Detail    19 Jun 2020 07:01  -  19 Jun 2020 12:42  --------------------------------------------------------  IN:    IV PiggyBack: 50 mL  Total IN: 50 mL    OUT:  Total OUT: 0 mL    Total NET: 50 mL    PHYSICAL EXAM:  GENERAL: Alert & Oriented X3, talkative  HEENT: Moist mucous membranes, Good dentition, No lesions,  ABDOMEN: distended , moderate ascites. +tenderness   EXTREMITIES:  + edema  SKIN: No lesions  IV ACCESS:  FEEDING ACCESS: po diet    MEDICATIONS  (STANDING):  chlorhexidine 4% Liquid 1 Application(s) Topical <User Schedule>  enoxaparin Injectable 40 milliGRAM(s) SubCutaneous daily  famotidine    Tablet 20 milliGRAM(s) Oral two times a day  folic acid 1 milliGRAM(s) Oral daily  gabapentin 100 milliGRAM(s) Oral three times a day  lactobacillus acidophilus 1 Tablet(s) Oral two times a day with meals  levothyroxine 50 MICROGram(s) Oral daily  magnesium oxide 400 milliGRAM(s) Oral once  multivitamin 1 Tablet(s) Oral daily  nicotine - 21 mG/24Hr(s) Patch 1 patch Transdermal daily  thiamine 100 milliGRAM(s) Oral daily  vancomycin    Solution 125 milliGRAM(s) Oral every 6 hours    MEDICATIONS  (PRN):  LORazepam     Tablet 0.5 milliGRAM(s) Oral every 6 hours PRN Agitation  morphine   Solution 15 milliGRAM(s) Oral every 4 hours PRN Severe Pain (7 - 10)    Allergies: NKDA    LABS:    06-19    140  |  103  |  4<L>  ----------------------------<  84  3.9   |  25  |  <0.5<L>    Ca    7.2<L>      19 Jun 2020 05:55  Phos  2.9     06-19  Mg     1.7     06-19    TPro  4.7<L>  /  Alb  1.8<L>  /  TBili  3.0<H>  /  DBili  2.0<H>  /  AST  105<H>  /  ALT  20  /  AlkPhos  243<H>  06-19                          7.4    14.78 )-----------( 238      ( 19 Jun 2020 05:55 )             24.2     PT/INR - ( 19 Jun 2020 05:55 )   PT: 17.30 sec;   INR: 1.50 ratio    Clostridium difficile Toxin by PCR (06.07.20 @ 18:54)    Clostridium difficile Toxin by PCR: RESULT INTERPRETATION:  Detected   C Diff by PCR Result: Positive    COVID-19 PCR . (06.15.20 @ 07:50)    COVID-19 PCR: NotDetec:  RADIOLOGY:  < from: US Abdomen Liver Followup (06.17.20 @ 14:57) >  Impression:  No evidence of hepatic or portal vein occlusion.  Small caliber hepatic veins with reduced velocity of flow.  Pulsatile portal and splenic vein flow.  < from: CT Angio Chest w/ IV Cont (06.16.20 @ 21:30) >  IMPRESSION:  1. No evidence of acute pulmonary embolism.  2. Scattered areas of predominantly subpleural groundglass and consolidative airspace opacities; possibly infectious in etiology, in appropriate clinical setting.  3. Stable bilateral small pleural effusions.  4. Partially imaged abdominal ascites, hepatic steatosis.    DIET  Diet, Full Liquid (06-18-20 @ 13:39)    < from: CT Abdomen and Pelvis w/ IV Cont (06.19.20 @ 12:39) >    LOWER CHEST: Since prior CT chest, unchanged trace right and small left pleural effusions with bibasilar compressive atelectasis. Right lower lobe 0.7 cm solid nodule, obscured by atelectasis on prior CTchest. Unchanged patchy subpleural right middle lobe groundglass opacities, unchanged since prior CT chest.    HEPATOBILIARY: Heterogeneous hepatic steatosis, predominantly involving the right hepatic lobe. Gallbladder sludge. Patent hepatic and portal veins.    SPLEEN: Unremarkable.    PANCREAS: Unchanged pancreatic fluid collections. For example, a pancreatic head collection measures 2.4 x 1.9 cm and a smaller collection in the pancreatic neck measures 1.1 x 1.0 cm. No foci of gas within the fluid. Patent splenic vein. No well-defined peripancreatic fluid collections. Remaining pancreatic parenchyma enhances homogeneously.    ADRENAL GLANDS: Unchanged.    KIDNEYS: Unchanged.    ABDOMINOPELVIC NODES: Unchanged.    PELVIC ORGANS: Unchanged.    PERITONEUM/MESENTERY/BOWEL: Unchanged moderate volume ascites. No bowel obstruction or pneumoperitoneum. Improved colonic wall thickening.    BONES/SOFT TISSUES: Increased soft tissue anasarca. Unchanged visualized osseous structures.    < end of copied text >

## 2020-06-19 NOTE — PROGRESS NOTE ADULT - ASSESSMENT
39 year old female with a history of EtOH abuse, Alcoholic liver disease, pancreatitis, depression, anxiety, and Recent C diff infection presented to the ED with chief c/o abdominal pain, distension and leg swelling found to have sepsis 2/2 persistent colitis    After previous recent hospitalization patient neglected to complete course of antibiotics at home. Was started on PO vanc for C. diff and empirically on IV Rocephin for possible SBP (found to have ascites on presentation) but this was discontinued as paracentesis yielded 149 PMNs. Was admitted to ICU initially and downgraded to 3B 6/17. Never intubated or on presser. Patient was started on feeds and tolerated well. Diagnostic paracentesis performed and appears to be cirrhotic in nature;    # Sepsis in setting of C diff colitis, improving  - Tachycardic, elevated lactate on admission with appropriate resuscitation. Still with leukocytosis but diarrhea has resolved. Afebrile but tachycardic  - Source: had diagnostic para 6/16 which only yielded 149 PMNs, SBP ruled out. Source believed to be inadequately treated C. diff colitis as patient was non-compliant with antibiotic regimen from prior hospitalization. BCx NGTD  - C/w PO Vanc 125 mL q6h. Per ID, will require 2 weeks of therapy (end date 6/28/20)  - Now tolerating PO diet, s/p 1.5 L IVF overnight, trending lactate 2.8-> 3.0  - Dr. Sanders GI and Dr. Chase ( IM) recommend re-upgrade to ICU, CT Abd/Pelvis with IV contrast to be performed  - Critical Care: Pt tachy but stable, f/u with attending but likely not candidate for ICU at this time  - High risk of decompensation, monitor for hemodynamic instability    # Pancreatitis in setting of active alcohol abuse with ascites, likely cirrhosis present, pancreatitis resolved  - Drinks 4 cups of vodka daily in addition to recreational drug use (cocaine positive on UDS). Last drink 6/14  - CT Abdomen and Pelvis w/ IV Cont 6/15: persistent and slightly increased colonic thickening from cecum through transverse colon, compatible with known colitis; no evidence of abscess. Unchanged generalized mesenteric edema, limiting assessment for hernan-pancreatic inflammatory change. Unchanged multiple pancreatic fluid collections, measuring up to 2.4 cm, which may represent pancreatic pseudocysts. Stable mild to moderate ascites. Hepatic steatosis present  - Ascites amylase:serum amylase 0.4, consistent with pancreatitis although serum lipase WNL  - U/s abdomen: negative for Budd Chiari  - Hypoalbuminemia present, likely result of poor nutrition mixed with synthesis issue. INR 1.5, platelets 305  - Tolerating diet now  - CIWA protocol initiated. Has been 1-3 last 24 hours. IV Ativan discontinued. C/w PO Ativan 0.5 mg PRN for agitation  - C/w folate, thiamine, MVI  - aldactone to 100, lasix 40mg daily. Do not hold if SBP >90   - Will need follow-up in Liver clinic after discharge in 2 weeks ([phone]). Will be discharged with recs for adequate protein intake    # Macrocytic anemia likely 2/2 alcohol abuse  - Continue on folate, thiamine  - Hgb 7.6 this AM. Will repeat CBC and transfuse PRN    #DVT ppx: Lovenox 40 mg qD  #GI ppx: not indicated  #Activity: As tolerated. Able to walk but unsteady  #Diet: Regular  #Code status: FULL CODE  #Dispo: from home, likely will require substance abuse program on d/c  #Follow-up: GI recs, CIWA monitoring__    Handoff: High risk of decompensation, ICU for any hypotension

## 2020-06-19 NOTE — PROGRESS NOTE ADULT - ASSESSMENT
39 year old female with a history of ETOH abuse, Alcoholic liver disease,  pancreatitis, depression, anxiety, Recent C diff infection who comes to the ED with chief c/o Abdominal pain, distension and leg swelling.     IMPRESSION;   Pancolitis secondary to CD colitis which seems quiescent presently as pt has not had diarrhea.  Po vanomycin was supposed to go through 6/19  pt is non compliant to meds.  Abdominal pain seems multifactorial in etiology : chronic pancreatitis with multiple pseudocysts. Recent CD colitis with pancolitis on CT.   S/p paracentesis 6/16 with no SBP. Ascitic fluid cultures NG  No evidence of cholecystitis/cholangitis  CT 6/15 : Persistent  and slightly increased colonic thickening from cecum through transverse colon, compatible with known colitis; no evidence of abscess.   Unchanged generalized mesenteric edema, limiting assessment for peripancreatic inflammatory change. Unchanged multiple pancreatic fluid collections, measuring up to 2.4 cm, which may be represent pancreatic pseudocysts.  CT 6/19 : results pending. has b/l pleural effusions, ascites    clinically see no worsening of symptoms    COVID-19 NG    RECOMMENDATIONS;   Repeat paracentesis  BCx  Rocephin 2 gm iv q24h  po Vancomycin 125 mg q6h

## 2020-06-19 NOTE — CHART NOTE - NSCHARTNOTEFT_GEN_A_CORE
Called by RN for patient "seeing and following bugs". Encountered the patient sitting on the floor, pointing towards non existent bugs and getting scared. Reassured the patient and sat her on bed. She is visibly agitated and anxious and started crying on small consolation. Vitally stable except for HR of 140s, stat ekg showed sinus tachycardia. Ordered to give patient her next due 0.5 mg ativan now, CIWA score calculated, doubled since this morning to 15. I went through patient's chart, cold be explained by alcohol withdrawal, delirium tremens and cocaine abuse. 1:1 sit ordered as patient is at risk of DT, falling and seriously hurting herself or others.     Spoke to ICU fellow at [phone], discussed at length, IV ativan protocol standing recommended, if patient does not improve on CIWA scoring or continues to worsen, will be upgraded to unit.

## 2020-06-20 LAB
ALBUMIN FLD-MCNC: 0.4 G/DL — SIGNIFICANT CHANGE UP
ALBUMIN SERPL ELPH-MCNC: 1.6 G/DL — LOW (ref 3.5–5.2)
ALP SERPL-CCNC: 236 U/L — HIGH (ref 30–115)
ALT FLD-CCNC: 21 U/L — SIGNIFICANT CHANGE UP (ref 0–41)
AMYLASE FLD-CCNC: 7 U/L — SIGNIFICANT CHANGE UP
ANION GAP SERPL CALC-SCNC: 12 MMOL/L — SIGNIFICANT CHANGE UP (ref 7–14)
APTT BLD: 37.2 SEC — SIGNIFICANT CHANGE UP (ref 27–39.2)
AST SERPL-CCNC: 110 U/L — HIGH (ref 0–41)
BILIRUB SERPL-MCNC: 3.2 MG/DL — HIGH (ref 0.2–1.2)
BUN SERPL-MCNC: <3 MG/DL — LOW (ref 10–20)
CALCIUM SERPL-MCNC: 7.3 MG/DL — LOW (ref 8.5–10.1)
CHLORIDE SERPL-SCNC: 100 MMOL/L — SIGNIFICANT CHANGE UP (ref 98–110)
CO2 SERPL-SCNC: 26 MMOL/L — SIGNIFICANT CHANGE UP (ref 17–32)
CREAT SERPL-MCNC: <0.5 MG/DL — LOW (ref 0.7–1.5)
CULTURE RESULTS: SIGNIFICANT CHANGE UP
GLUCOSE FLD-MCNC: 135 MG/DL — SIGNIFICANT CHANGE UP
GLUCOSE SERPL-MCNC: 79 MG/DL — SIGNIFICANT CHANGE UP (ref 70–99)
GRAM STN FLD: SIGNIFICANT CHANGE UP
HCT VFR BLD CALC: 27.4 % — LOW (ref 37–47)
HGB BLD-MCNC: 8.6 G/DL — LOW (ref 12–16)
INR BLD: 1.66 RATIO — HIGH (ref 0.65–1.3)
LDH SERPL L TO P-CCNC: 52 U/L — SIGNIFICANT CHANGE UP
MAGNESIUM SERPL-MCNC: 1.8 MG/DL — SIGNIFICANT CHANGE UP (ref 1.8–2.4)
MCHC RBC-ENTMCNC: 31.4 G/DL — LOW (ref 32–37)
MCHC RBC-ENTMCNC: 32.5 PG — HIGH (ref 27–31)
MCV RBC AUTO: 103.4 FL — HIGH (ref 81–99)
NRBC # BLD: 0 /100 WBCS — SIGNIFICANT CHANGE UP (ref 0–0)
PLATELET # BLD AUTO: 270 K/UL — SIGNIFICANT CHANGE UP (ref 130–400)
POTASSIUM SERPL-MCNC: 3.7 MMOL/L — SIGNIFICANT CHANGE UP (ref 3.5–5)
POTASSIUM SERPL-SCNC: 3.7 MMOL/L — SIGNIFICANT CHANGE UP (ref 3.5–5)
PROT FLD-MCNC: <1 G/DL — SIGNIFICANT CHANGE UP
PROT SERPL-MCNC: 4.7 G/DL — LOW (ref 6–8)
PROTHROM AB SERPL-ACNC: 19.1 SEC — HIGH (ref 9.95–12.87)
RBC # BLD: 2.65 M/UL — LOW (ref 4.2–5.4)
RBC # FLD: 17.2 % — HIGH (ref 11.5–14.5)
SODIUM SERPL-SCNC: 138 MMOL/L — SIGNIFICANT CHANGE UP (ref 135–146)
SPECIMEN SOURCE: SIGNIFICANT CHANGE UP
SPECIMEN SOURCE: SIGNIFICANT CHANGE UP
WBC # BLD: 16.97 K/UL — HIGH (ref 4.8–10.8)
WBC # FLD AUTO: 16.97 K/UL — HIGH (ref 4.8–10.8)

## 2020-06-20 PROCEDURE — 99233 SBSQ HOSP IP/OBS HIGH 50: CPT

## 2020-06-20 RX ORDER — MEROPENEM 1 G/30ML
1000 INJECTION INTRAVENOUS ONCE
Refills: 0 | Status: COMPLETED | OUTPATIENT
Start: 2020-06-20 | End: 2020-06-21

## 2020-06-20 RX ORDER — MORPHINE SULFATE 50 MG/1
5 CAPSULE, EXTENDED RELEASE ORAL ONCE
Refills: 0 | Status: DISCONTINUED | OUTPATIENT
Start: 2020-06-20 | End: 2020-06-20

## 2020-06-20 RX ORDER — MEROPENEM 1 G/30ML
INJECTION INTRAVENOUS
Refills: 0 | Status: DISCONTINUED | OUTPATIENT
Start: 2020-06-21 | End: 2020-06-22

## 2020-06-20 RX ORDER — IBUPROFEN 200 MG
600 TABLET ORAL ONCE
Refills: 0 | Status: COMPLETED | OUTPATIENT
Start: 2020-06-20 | End: 2020-06-20

## 2020-06-20 RX ORDER — MEROPENEM 1 G/30ML
1000 INJECTION INTRAVENOUS EVERY 8 HOURS
Refills: 0 | Status: DISCONTINUED | OUTPATIENT
Start: 2020-06-21 | End: 2020-06-22

## 2020-06-20 RX ADMIN — Medication 1 TABLET(S): at 09:27

## 2020-06-20 RX ADMIN — FAMOTIDINE 20 MILLIGRAM(S): 10 INJECTION INTRAVENOUS at 18:02

## 2020-06-20 RX ADMIN — Medication 1 MILLIGRAM(S): at 04:45

## 2020-06-20 RX ADMIN — Medication 1 TABLET(S): at 11:59

## 2020-06-20 RX ADMIN — FAMOTIDINE 20 MILLIGRAM(S): 10 INJECTION INTRAVENOUS at 05:56

## 2020-06-20 RX ADMIN — Medication 125 MILLIGRAM(S): at 05:57

## 2020-06-20 RX ADMIN — SPIRONOLACTONE 100 MILLIGRAM(S): 25 TABLET, FILM COATED ORAL at 05:55

## 2020-06-20 RX ADMIN — GABAPENTIN 100 MILLIGRAM(S): 400 CAPSULE ORAL at 21:40

## 2020-06-20 RX ADMIN — Medication 50 MICROGRAM(S): at 05:56

## 2020-06-20 RX ADMIN — Medication 125 MILLIGRAM(S): at 11:58

## 2020-06-20 RX ADMIN — Medication 1 MILLIGRAM(S): at 11:59

## 2020-06-20 RX ADMIN — Medication 1 PATCH: at 12:00

## 2020-06-20 RX ADMIN — Medication 0.5 MILLIGRAM(S): at 18:02

## 2020-06-20 RX ADMIN — Medication 125 MILLIGRAM(S): at 18:02

## 2020-06-20 RX ADMIN — MORPHINE SULFATE 5 MILLIGRAM(S): 50 CAPSULE, EXTENDED RELEASE ORAL at 15:57

## 2020-06-20 RX ADMIN — Medication 1 PATCH: at 20:08

## 2020-06-20 RX ADMIN — Medication 1 PATCH: at 11:57

## 2020-06-20 RX ADMIN — ENOXAPARIN SODIUM 40 MILLIGRAM(S): 100 INJECTION SUBCUTANEOUS at 12:00

## 2020-06-20 RX ADMIN — Medication 1 MILLIGRAM(S): at 20:11

## 2020-06-20 RX ADMIN — Medication 40 MILLIGRAM(S): at 05:56

## 2020-06-20 RX ADMIN — MORPHINE SULFATE 15 MILLIGRAM(S): 50 CAPSULE, EXTENDED RELEASE ORAL at 22:53

## 2020-06-20 RX ADMIN — Medication 100 MILLIGRAM(S): at 11:59

## 2020-06-20 RX ADMIN — MORPHINE SULFATE 15 MILLIGRAM(S): 50 CAPSULE, EXTENDED RELEASE ORAL at 18:04

## 2020-06-20 RX ADMIN — Medication 125 MILLIGRAM(S): at 00:31

## 2020-06-20 RX ADMIN — Medication 1 PATCH: at 05:52

## 2020-06-20 RX ADMIN — CEFTRIAXONE 100 MILLIGRAM(S): 500 INJECTION, POWDER, FOR SOLUTION INTRAMUSCULAR; INTRAVENOUS at 16:58

## 2020-06-20 RX ADMIN — MORPHINE SULFATE 15 MILLIGRAM(S): 50 CAPSULE, EXTENDED RELEASE ORAL at 12:18

## 2020-06-20 RX ADMIN — GABAPENTIN 100 MILLIGRAM(S): 400 CAPSULE ORAL at 05:55

## 2020-06-20 RX ADMIN — Medication 1 TABLET(S): at 16:59

## 2020-06-20 RX ADMIN — Medication 1 MILLIGRAM(S): at 00:30

## 2020-06-20 RX ADMIN — Medication 500 MILLIGRAM(S): at 11:58

## 2020-06-20 RX ADMIN — GABAPENTIN 100 MILLIGRAM(S): 400 CAPSULE ORAL at 13:01

## 2020-06-20 RX ADMIN — CHLORHEXIDINE GLUCONATE 1 APPLICATION(S): 213 SOLUTION TOPICAL at 05:55

## 2020-06-20 NOTE — PROGRESS NOTE ADULT - SUBJECTIVE AND OBJECTIVE BOX
ADRIAN PRUETT  39y  Female      Patient is a 39y old  Female who presents with a chief complaint of Abdominal Pain, Leg swelling. (20 Jun 2020 09:23)      INTERVAL HPI/OVERNIGHT EVENTS: patient reports diffuse abdominal discomfort, requesting pain medications. currently denies palpitations or hallucination but upon chart review had hallucination overnight. was given significant amount of benzo, and then per house staff was somewhat lethargic this morning. benzos later held, now patient awake and oriented      REVIEW OF SYSTEMS:  as above  All other review of systems negative    T(C): 36.4 (06-20-20 @ 12:38), Max: 38.4 (06-19-20 @ 20:51)  HR: 99 (06-20-20 @ 12:38) (92 - 145)  BP: 99/57 (06-20-20 @ 12:38) (99/57 - 126/74)  RR: 18 (06-20-20 @ 12:38) (16 - 24)  SpO2: 96% (06-20-20 @ 06:00) (96% - 99%)  Wt(kg): --Vital Signs Last 24 Hrs  T(C): 36.4 (20 Jun 2020 12:38), Max: 38.4 (19 Jun 2020 20:51)  T(F): 97.5 (20 Jun 2020 12:38), Max: 101.2 (19 Jun 2020 20:51)  HR: 99 (20 Jun 2020 12:38) (92 - 145)  BP: 99/57 (20 Jun 2020 12:38) (99/57 - 126/74)  BP(mean): 86 (19 Jun 2020 18:36) (86 - 86)  RR: 18 (20 Jun 2020 12:38) (16 - 24)  SpO2: 96% (20 Jun 2020 06:00) (96% - 99%)      06-19-20 @ 07:01  -  06-20-20 @ 07:00  --------------------------------------------------------  IN: 100 mL / OUT: 1000 mL / NET: -900 mL    PHYSICAL EXAM:  GENERAL: chronically ill appearing, looks unwell for stated age  PSYCH: no agitation, at this moment no hallucination  NERVOUS SYSTEM:  Alert & Oriented X3, no new focal deficits, mild tongue fasciculation, mild tremor, no nystagmus   PULMONARY: Clear to percussion bilaterally; No rales, rhonchi, wheezing, or rubs  CARDIOVASCULAR: Regular rate and rhythm; No murmurs, rubs, or gallops  GI: mild diffuse distention without focal tenderness, no rebound or guarding  EXTREMITIES:  2+ Peripheral Pulses, No clubbing, cyanosis, +anasarca    Consultant(s) Notes Reviewed:  [x ] YES  [ ] NO    Discussed with Consultants/Other Providers [ x] YES     LABS                          7.4    14.78 )-----------( 238      ( 19 Jun 2020 05:55 )             24.2     06-20    138  |  100  |  <3<L>  ----------------------------<  79  3.7   |  26  |  <0.5<L>    Ca    7.3<L>      20 Jun 2020 05:19  Phos  2.7     06-19  Mg     1.8     06-20    TPro  4.7<L>  /  Alb  1.6<L>  /  TBili  3.2<H>  /  DBili  x   /  AST  110<H>  /  ALT  21  /  AlkPhos  236<H>  06-20        PT/INR - ( 20 Jun 2020 05:19 )   PT: 19.10 sec;   INR: 1.66 ratio         PTT - ( 20 Jun 2020 05:19 )  PTT:37.2 sec  Lactate Trend  06-19 @ 05:55 Lactate:3.0   06-19 @ 00:52 Lactate:2.8   06-18 @ 16:00 Lactate:3.4   06-15 @ 16:48 Lactate:5.1         CAPILLARY BLOOD GLUCOSE          Culture - Body Fluid with Gram Stain (collected 06-19-20 @ 16:30)  Source: .Body Fluid Peritoneal Fluid  Gram Stain (06-20-20 @ 03:47):    No polymorphonuclear cells seen    No organisms seen    by cytocentrifuge        RADIOLOGY & ADDITIONAL TESTS:    Imaging Personally Reviewed:  [ ] YES  [ ] NO    HEALTH ISSUES - PROBLEM Dx:  Abdominal pain: Abdominal pain  Depression  Polysubstance dependence  Alcohol use disorder, severe, dependence  Cocaine use  Depression, major, in remission  Anxiety  Alcohol use disorder, moderate, dependence

## 2020-06-20 NOTE — PROGRESS NOTE ADULT - ASSESSMENT
38yo F c PMHx alcohol abuse, alcoholic liver disease, chronic pancreatitis, depression, anxiety here with severe sepsis poa 2/2 c diff colitis + alcohol withdrawal    can resume po ativan at 0.5mg q 6hr for now with 1mg PRN for breakthrough withdrawal symptoms, ciwa monitoring per floor nursing protocol  thiamine/ folate/ vitamin C for suspected vitamin deficiencies   c/w spironolactone / lasix in 5:2 ratio, monitor i/o  discriminant factor does not warrant steroid use  analgesia for abdominal pain with hold parameters for lethargy  oral vanco for c diff  contact precautions  repeat paracentesis not consistent with SBP, trend fever curve and wbc on current regimen  keeping ceftriaxone per ID recommendation for now  pancreatic imaging with pseudocysts but no necrosis or gas formation   appreciated GI, nutrition, ID, crit input and follow up  high risk but at least at this moment does not appear to be in DT's  low threshhold for re-escalation to icu care, agree with floor care at this moment

## 2020-06-20 NOTE — PROGRESS NOTE ADULT - ASSESSMENT
39 year old female with a history of EtOH abuse, Alcoholic liver disease, pancreatitis, depression, anxiety, and Recent C diff infection presented to the ED with chief c/o abdominal pain, distension and leg swelling found to have sepsis 2/2 persistent colitis    After previous recent hospitalization patient neglected to complete course of antibiotics at home. Was started on PO vanc for C. diff and empirically on IV Rocephin for possible SBP (found to have ascites on presentation) but this was discontinued as paracentesis yielded 149 PMNs. Was admitted to ICU initially and downgraded to 3B 6/17. Never intubated or on presser. Patient was started on feeds and tolerated well. Diagnostic paracentesis performed and appears to be cirrhotic in nature;    # Sepsis in setting of C diff colitis, improving  - Tachycardic, elevated lactate on admission with appropriate resuscitation. Still with leukocytosis but diarrhea has resolved. Afebrile but tachycardic  - Source: had diagnostic para 6/16 which only yielded 149 PMNs, SBP ruled out. Source believed to be inadequately treated C. diff colitis as patient was non-compliant with antibiotic regimen from prior hospitalization. BCx NGTD  - C/w PO Vanc 125 mL q6h. Per ID, will require 2 weeks of therapy (end date 6/28/20)  - Now tolerating PO diet, s/p 1.5 L IVF overnight, trending lactate 2.8-> 3.0  - Dr. Sanders GI and Dr. Chase ( IM) recommend re-upgrade to ICU, CT Abd/Pelvis with IV contrast to be performed  - Critical Care: Pt tachy but stable, f/u with attending but likely not candidate for ICU at this time  - High risk of decompensation, monitor for hemodynamic instability    # Pancreatitis in setting of active alcohol abuse with ascites, likely cirrhosis present, pancreatitis resolved  - Drinks 4 cups of vodka daily in addition to recreational drug use (cocaine positive on UDS). Last drink 6/14  - CT Abdomen and Pelvis w/ IV Cont 6/15: persistent and slightly increased colonic thickening from cecum through transverse colon, compatible with known colitis; no evidence of abscess. Unchanged generalized mesenteric edema, limiting assessment for hernan-pancreatic inflammatory change. Unchanged multiple pancreatic fluid collections, measuring up to 2.4 cm, which may represent pancreatic pseudocysts. Stable mild to moderate ascites. Hepatic steatosis present  - Ascites amylase:serum amylase 0.4, consistent with pancreatitis although serum lipase WNL  - U/s abdomen: negative for Budd Chiari  - Hypoalbuminemia present, likely result of poor nutrition mixed with synthesis issue. INR 1.5, platelets 305  - Tolerating diet now  - CIWA protocol initiated. Has been 1-3 last 24 hours. IV Ativan discontinued. C/w PO Ativan 0.5 mg PRN for agitation  - C/w folate, thiamine, MVI  - aldactone to 100, lasix 40mg daily. Do not hold if SBP >90   - Will need follow-up in Liver clinic after discharge in 2 weeks ([phone]). Will be discharged with recs for adequate protein intake    # Macrocytic anemia likely 2/2 alcohol abuse  - Continue on folate, thiamine  - Hgb 7.6 this AM. Will repeat CBC and transfuse PRN    #DVT ppx: Lovenox 40 mg qD  #GI ppx: not indicated  #Activity: As tolerated. Able to walk but unsteady  #Diet: Regular  #Code status: FULL CODE  #Dispo: from home, likely will require substance abuse program on d/c  #Follow-up: GI recs, CIWA monitoring__    Handoff: High risk of decompensation, ICU for any hypotension 39 year old female with a history of EtOH abuse, Alcoholic liver disease, pancreatitis, depression, anxiety, and Recent C diff infection presented to the ED with chief c/o abdominal pain, distension and leg swelling found to have sepsis 2/2 persistent colitis    After previous recent hospitalization patient neglected to complete course of antibiotics at home. Was started on PO vanc for C. diff and empirically on IV Rocephin for possible SBP (found to have ascites on presentation) but this was discontinued as paracentesis yielded 149 PMNs. Was admitted to ICU initially and downgraded to 3B 6/17. Never intubated or on presser. Patient was started on feeds and tolerated well. Diagnostic paracentesis performed and appears to be cirrhotic in nature;    # Sepsis in setting of C diff colitis, improving  - Tachycardic, elevated lactate on admission with appropriate resuscitation. Still with leukocytosis but diarrhea has resolved. Afebrile but tachycardic  - Source: had diagnostic para 6/16 which only yielded 149 PMNs, SBP ruled out. Source believed to be inadequately treated C. diff colitis as patient was non-compliant with antibiotic regimen from prior hospitalization. BCx NGTD  - C/w PO Vanc 125 mL q6h. Per ID, will require 2 weeks of therapy (end date 6/28/20)  - Now tolerating PO diet, s/p 1.5 L IVF (6/19) , trending lactate 2.8-> 3.0  - CT Abdomen/Pelvis (6/19): Unchanged  - Dr. Sanders GI and Dr. Chase ( IM) recommend re-upgrade to ICU  - Critical Care: Pt tachy but stable, f/u with attending but likely not candidate for ICU at this time  - High risk of decompensation, monitor for hemodynamic instability    # Pancreatitis in setting of active alcohol abuse with ascites, likely cirrhosis present, pancreatitis resolved  - Drinks 4 cups of vodka daily in addition to recreational drug use (cocaine positive on UDS). Last drink 6/14  - CT Abdomen and Pelvis w/ IV Cont 6/15: persistent and slightly increased colonic thickening from cecum through transverse colon, compatible with known colitis; no evidence of abscess. Unchanged generalized mesenteric edema, limiting assessment for hernan-pancreatic inflammatory change. Unchanged multiple pancreatic fluid collections, measuring up to 2.4 cm, which may represent pancreatic pseudocysts. Stable mild to moderate ascites. Hepatic steatosis present  - Ascites amylase:serum amylase 0.4, consistent with pancreatitis although serum lipase WNL  - U/s abdomen: negative for Budd Chiari  - Hypoalbuminemia present, likely result of poor nutrition mixed with synthesis issue. INR 1.5, platelets 305  - Tolerating diet now  - CIWA protocol initiated. Has been 1-3 last 24 hours. IV Ativan discontinued. C/w PO Ativan 0.5 mg PRN for agitation  - C/w folate, thiamine, MVI  - aldactone to 100, lasix 40mg daily. Do not hold if SBP >90   - Will need follow-up in Liver clinic after discharge in 2 weeks ([phone]). Will be discharged with recs for adequate protein intake    # Macrocytic anemia likely 2/2 alcohol abuse  - Continue on folate, thiamine  - Hgb 7.6 this AM. Will repeat CBC and transfuse PRN    #DVT ppx: Lovenox 40 mg qD  #GI ppx: not indicated  #Activity: As tolerated. Able to walk but unsteady  #Diet: Regular  #Code status: FULL CODE  #Dispo: from home, likely will require substance abuse program on d/c  #Follow-up: GI recs, CIWA monitoring__    Handoff: High risk of decompensation, ICU for any hypotension 39 year old female with a history of EtOH abuse, Alcoholic liver disease, pancreatitis, depression, anxiety, and Recent C diff infection presented to the ED with chief c/o abdominal pain, distension and leg swelling found to have sepsis 2/2 persistent colitis    After previous recent hospitalization patient neglected to complete course of antibiotics at home. Was started on PO vanc for C. diff and empirically on IV Rocephin for possible SBP (found to have ascites on presentation) but this was discontinued as paracentesis yielded 149 PMNs. Was admitted to ICU initially and downgraded to 3B 6/17. Never intubated or on presser. Patient was started on feeds and tolerated well. Diagnostic paracentesis performed and appears to be cirrhotic in nature;    # Sepsis in setting of C diff colitis, improving  - Tachycardic, elevated lactate on admission with appropriate resuscitation. Still with leukocytosis but diarrhea has resolved. Afebrile but tachycardic  - Source: had diagnostic para 6/16 which only yielded 149 PMNs, SBP ruled out. Source believed to be inadequately treated C. diff colitis as patient was non-compliant with antibiotic regimen from prior hospitalization. BCx NGTD  - C/w PO Vanc 125 mL q6h. Per ID, will require 2 weeks of therapy (end date 6/28/20)  - Blood cultures pending  - Now tolerating PO diet, s/p 1.5 L IVF (6/19) , trending lactate 2.8-> 3.0  - Critical Care: Pt tachy but stable, f/u with attending but likely not candidate for ICU at this time  - High risk of decompensation, monitor for hemodynamic instability    # Pancreatitis in setting of active alcohol abuse with ascites, likely cirrhosis present, pancreatitis resolved  - Drinks 4 cups of vodka daily in addition to recreational drug use (cocaine positive on UDS). Last drink 6/14  - CT Abdomen and Pelvis w/ IV Cont 6/15: persistent and slightly increased colonic thickening from cecum through transverse colon, compatible with known colitis; no evidence of abscess. Unchanged generalized mesenteric edema, limiting assessment for hernan-pancreatic inflammatory change. Unchanged multiple pancreatic fluid collections, measuring up to 2.4 cm, which may represent pancreatic pseudocysts. Stable mild to moderate ascites. Hepatic steatosis present  - Ascites amylase:serum amylase 0.4, consistent with pancreatitis although serum lipase WNL  - U/s abdomen: negative for Budd Chiari  - Hypoalbuminemia present, likely result of poor nutrition mixed with synthesis issue. INR 1.5, platelets 305  - Tolerating diet now  - CIWA protocol initiated. Has been 1-3 last 24 hours. IV Ativan discontinued. C/w PO Ativan 0.5 mg PRN for agitation  - C/w folate, thiamine, MVI  - aldactone to 100, lasix 40mg daily. Do not hold if SBP >90   - Will need follow-up in Liver clinic after discharge in 2 weeks ([phone]). Will be discharged with recs for adequate protein intake    # Macrocytic anemia likely 2/2 alcohol abuse  - Continue on folate, thiamine  - Hgb 7.6 this AM. Will repeat CBC and transfuse PRN    #DVT ppx: Lovenox 40 mg qD  #GI ppx: not indicated  #Activity: As tolerated. Able to walk but unsteady  #Diet: Regular  #Code status: FULL CODE  #Dispo: from home, likely will require substance abuse program on d/c  #Follow-up: GI recs, CIWA monitoring__    Handoff: High risk of decompensation, ICU for any hypotension 39 year old female with a history of EtOH abuse, Alcoholic liver disease, pancreatitis, depression, anxiety, and Recent C diff infection presented to the ED with chief c/o abdominal pain, distension and leg swelling found to have sepsis 2/2 persistent colitis    After previous recent hospitalization patient neglected to complete course of antibiotics at home. Was started on PO vanc for C. diff and empirically on IV Rocephin for possible SBP (found to have ascites on presentation) but this was discontinued as paracentesis yielded 149 PMNs. Was admitted to ICU initially and downgraded to 3B 6/17. Never intubated or on presser. Patient was started on feeds and tolerated well. Diagnostic paracentesis performed and appears to be cirrhotic in nature;    # Sepsis in setting of C diff colitis, improving  - Tachycardic, elevated lactate on admission with appropriate resuscitation. Still with leukocytosis but diarrhea has resolved. Afebrile but tachycardic  - Source: had diagnostic para 6/16 which only yielded 149 PMNs, SBP ruled out. Source believed to be inadequately treated C. diff colitis as patient was non-compliant with antibiotic regimen from prior hospitalization. BCx NGTD  - C/w PO Vanc 125 mL q6h. Per ID, will require 2 weeks of therapy (end date 6/28/20)  - Blood cultures pending  - Now tolerating PO diet, s/p 1.5 L IVF (6/19) , trending lactate 2.8-> 3.0  - Critical Care: Pt tachy but stable, f/u with attending but likely not candidate for ICU at this time  - High risk of decompensation, monitor for hemodynamic instability    # Pancreatitis in setting of active alcohol abuse with ascites, likely cirrhosis present, pancreatitis resolved  - Drinks 4 cups of vodka daily in addition to recreational drug use (cocaine positive on UDS). Last drink 6/14  - CT Abdomen and Pelvis w/ IV Cont 6/15: persistent and slightly increased colonic thickening from cecum through transverse colon, compatible with known colitis; no evidence of abscess. Unchanged generalized mesenteric edema, limiting assessment for hernan-pancreatic inflammatory change. Unchanged multiple pancreatic fluid collections, measuring up to 2.4 cm, which may represent pancreatic pseudocysts. Stable mild to moderate ascites. Hepatic steatosis present  - Ascites amylase:serum amylase 0.4, consistent with pancreatitis although serum lipase WNL  - U/s abdomen: negative for Budd Chiari  - Hypoalbuminemia present, likely result of poor nutrition mixed with synthesis issue. INR 1.6, platelets 238  - Tolerating diet now  -  C/w PO Ativan 0.5 mg q6hr, 1mg PRN for hallucination  - C/w folate, thiamine, MVI  - aldactone to 100, lasix 40mg daily. Do not hold if SBP >90   - Will need follow-up in Liver clinic after discharge in 2 weeks ([phone]). Will be discharged with recs for adequate protein intake    # Macrocytic anemia likely 2/2 alcohol abuse  - Continue on folate, thiamine  - Hgb 7.4 this AM. Will repeat CBC and transfuse PRN     #DVT ppx: Lovenox 40 mg qD  #GI ppx: not indicated  #Activity: As tolerated. Able to walk but unsteady  #Diet: Regular  #Code status: FULL CODE  #Dispo: from home, likely will require substance abuse program on d/c  #Follow-up: GI recs, CIWA monitoring__    Handoff: High risk of decompensation, ICU for any hypotension

## 2020-06-20 NOTE — PHARMACOTHERAPY INTERVENTION NOTE - COMMENTS
s/w md about multiple lorazepam orders- md will review & d/c orders that are not needed, leaving one standing and one PRN- but pt is to receive IV and po

## 2020-06-20 NOTE — PROGRESS NOTE ADULT - ASSESSMENT
39 year old female with a history of acute alcoholic pancreatitis, alcohol abuse,  presents with abdominal pain, leukocytosis, acute pancreatitis with developing pseudocysts, elevated LFTs and C diff    Recurrent pancreatitis with pseudocyst formation secondary to alcohol abuse  Persistent pain ? but tolerating diet   fever resolved , tachycardia resolved   CA/ TGs are normal.  repeat CT scan shows no foci of gas on pseudocyst, no hemorrhage, same ascites, increase anasarca  ascitic  fluid 6/19 ( no SBP) , no organism , blood culture negative     Rec  - low fat diet  -optimize  hemodynamics  -F/U with ID , currently on Rocephin      # First known episode of C diff :  On oral vancomycin 125 q6hrs (complete for 10 days), f/u with ID      # Alcoholic liver disease: Elevated LFTs are likely secondary to alcoholic hepatitis (>2:1 ratio between AST: ALT) with possible liver cirrhosis  MADDREY score: 21 so no role of steroids.  MRCP: CBD N, Sludge+ (last admission)  HBsAg, HBsAb, IgM/IgG, Anti HEV, Transferrin Saturation, Ceruloplasmin level, [place], SMA, AMA are all negative  Diagnostic tap : No SBP, consistent with liver cirrhosis  US duplex does not show Budd Chiari    Rec:  Complete alcohol abstinence   Daily LFTS and INR  aldactone to 100, lasix 40mg daily 39 year old female with a history of acute alcoholic pancreatitis, alcohol abuse,  presents with abdominal pain, leukocytosis, acute pancreatitis with developing pseudocysts, elevated LFTs and C diff    Recurrent pancreatitis with pseudocyst formation secondary to alcohol abuse  Persistent pain  but tolerating diet (full liquid)  fever resolved , tachycardia resolved   CA/ TGs are normal.  repeat CT scan shows no foci of gas on pseudocyst, no hemorrhage, same ascites, increase anasarca  ascitic  fluid 6/19 ( no SBP) , no organism , blood culture negative     Rec  - low fat solid diet  -optimize  hemodynamics  -F/U with ID , currently on Rocephin  - pain control      # First known episode of C diff :  On oral vancomycin 125 q6hrs (complete for 10 days), f/u with ID  probiotics bid x 2 months      # Alcoholic liver disease: Elevated LFTs are likely secondary to alcoholic hepatitis (>2:1 ratio between AST: ALT) with possible liver cirrhosis  MADDREY score: 21 so no role of steroids.  MRCP: CBD N, Sludge+ (last admission)  HBsAg, HBsAb, IgM/IgG, Anti HEV, Transferrin Saturation, Ceruloplasmin level, [place], SMA, AMA are all negative  Diagnostic tap : No SBP, consistent with portal hypertension  US duplex does not show Budd Chiari    Rec:  Complete alcohol abstinence   Daily LFTS and INR  aldactone  50, lasix 20mg daily

## 2020-06-20 NOTE — PROGRESS NOTE ADULT - SUBJECTIVE AND OBJECTIVE BOX
Gastroenterology progress note:     Patient is a 39y old  Female who presents with a chief complaint of Abdominal Pain, Leg swelling. (20 Jun 2020 12:53)       Admitted on: 06-15-20    We are following the patient for: alcoholic pancreatitis / alcoholic hepatitis      Interval History: Patient is still complaining of abdominal pain , but tolerating Full liquid diet without worsening of her pain, patient was sleeping , looks off pain when I awakens her up , no nausea or vomiting  , fever resolved , tachycardia resolved . Paracentesis not consistent with SBP      Patient's medical problems are improving/stable/not improving/unstable/   Prior records reviewed (Y/N):  History obtained from someone other than patient (Y/N):      PAST MEDICAL & SURGICAL HISTORY:  ETOH abuse  Postpartum depression  Substance abuse  Anxiety  Depression  No significant past surgical history      MEDICATIONS  (STANDING):  ascorbic acid 500 milliGRAM(s) Oral daily  cefTRIAXone   IVPB 2000 milliGRAM(s) IV Intermittent every 24 hours  chlorhexidine 4% Liquid 1 Application(s) Topical <User Schedule>  enoxaparin Injectable 40 milliGRAM(s) SubCutaneous daily  famotidine    Tablet 20 milliGRAM(s) Oral two times a day  folic acid 1 milliGRAM(s) Oral daily  furosemide    Tablet 40 milliGRAM(s) Oral daily  gabapentin 100 milliGRAM(s) Oral three times a day  lactobacillus acidophilus 1 Tablet(s) Oral two times a day with meals  levothyroxine 50 MICROGram(s) Oral daily  LORazepam     Tablet 0.5 milliGRAM(s) Oral every 6 hours  multivitamin 1 Tablet(s) Oral daily  nicotine - 21 mG/24Hr(s) Patch 1 patch Transdermal daily  spironolactone 100 milliGRAM(s) Oral daily  thiamine 100 milliGRAM(s) Oral daily  vancomycin    Solution 125 milliGRAM(s) Oral every 6 hours    MEDICATIONS  (PRN):  LORazepam     Tablet 1 milliGRAM(s) Oral every 4 hours PRN Agitation  morphine   Solution 15 milliGRAM(s) Oral every 4 hours PRN Severe Pain (7 - 10)      Allergies  No Known Allergies      Review of Systems:   Cardiovascular:  No Chest Pain, No Palpitations  Respiratory:  No Cough, No Dyspnea  Gastrointestinal:  As described in HPI    Physical Examination:  T(C): 36.4 (06-20-20 @ 12:38), Max: 38.4 (06-19-20 @ 20:51)  HR: 99 (06-20-20 @ 12:38) (92 - 145)  BP: 99/57 (06-20-20 @ 12:38) (99/57 - 126/74)  RR: 18 (06-20-20 @ 12:38) (16 - 24)  SpO2: 96% (06-20-20 @ 06:00) (96% - 98%)      06-19-20 @ 07:01  -  06-20-20 @ 07:00  --------------------------------------------------------  IN: 100 mL / OUT: 1000 mL / NET: -900 mL      Constitutional: No acute distress.  Respiratory:  No signs of respiratory distress. Lung sounds are clear bilaterally.  Cardiovascular:  S1 S2, Regular rate and rhythm.  Abdominal: Abdomen is soft, symmetric, and non-tender without distention.  Bowel sounds are present and normoactive in all four quadrants. No masses, hepatomegaly, or splenomegaly are noted.   Skin: No rashes, No Jaundice        Data: (reviewed by attending)                        7.4    14.78 )-----------( 238      ( 19 Jun 2020 05:55 )             24.2     Hgb trend:  7.4  06-19-20 @ 05:55  8.0  06-18-20 @ 11:16  7.6  06-18-20 @ 06:15        06-20    138  |  100  |  <3<L>  ----------------------------<  79  3.7   |  26  |  <0.5<L>    Ca    7.3<L>      20 Jun 2020 05:19  Phos  2.7     06-19  Mg     1.8     06-20    TPro  4.7<L>  /  Alb  1.6<L>  /  TBili  3.2<H>  /  DBili  x   /  AST  110<H>  /  ALT  21  /  AlkPhos  236<H>  06-20    Liver panel trend:  TBili 3.2   /      /   ALT 21   /   AlkP 236   /   Tptn 4.7   /   Alb 1.6    /   DBili --      06-20  TBili 3.3   /      /   ALT 22   /   AlkP 267   /   Tptn 5.2   /   Alb 1.9    /   DBili 2.4      06-19  TBili 3.0   /      /   ALT 20   /   AlkP 243   /   Tptn 4.7   /   Alb 1.8    /   DBili 2.0      06-19  TBili 2.9   /      /   ALT 22   /   AlkP 253   /   Tptn 4.9   /   Alb 1.7    /   DBili 2.2      06-18  TBili 3.3   /   AST 98   /   ALT 21   /   AlkP 234   /   Tptn 4.7   /   Alb 1.6    /   DBili 2.3      06-17  TBili 3.5   /   AST 86   /   ALT 22   /   AlkP 252   /   Tptn 5.2   /   Alb 1.9    /   DBili 2.7      06-16  TBili 3.6   /   AST 96   /   ALT 24   /   AlkP 279   /   Tptn 5.7   /   Alb 2.0    /   DBili --      06-15  TBili 3.0   /   AST 91   /   ALT 23   /   AlkP 252   /   Tptn 5.3   /   Alb 2.0    /   DBili --      06-15  TBili 3.1   /   AST 97   /   ALT 24   /   AlkP 268   /   Tptn 5.2   /   Alb 2.0    /   DBili 2.3      06-15  TBili 5.1   /   AST 90   /   ALT 23   /   AlkP 273   /   Tptn 5.9   /   Alb 2.2    /   DBili 3.5      06-13  TBili 3.8   /   AST 92   /   ALT 21   /   AlkP 249   /   Tptn 5.1   /   Alb 2.1    /   DBili --      06-12  TBili 3.3   /   AST 98   /   ALT 21   /   AlkP 247   /   Tptn 5.0   /   Alb 2.1    /   DBili --      06-11      PT/INR - ( 20 Jun 2020 05:19 )   PT: 19.10 sec;   INR: 1.66 ratio         PTT - ( 20 Jun 2020 05:19 )  PTT:37.2 sec    Culture - Body Fluid with Gram Stain (collected 19 Jun 2020 16:30)  Source: .Body Fluid Peritoneal Fluid  Gram Stain (20 Jun 2020 03:47):    No polymorphonuclear cells seen    No organisms seen    by cytocentrifuge    Culture - Blood (collected 18 Jun 2020 16:00)  Source: .Blood Blood  Preliminary Report (19 Jun 2020 23:01):    No growth to date.         Radiology: (reviewed by attending) Gastroenterology progress note:     Patient is a 39y old  Female who presents with a chief complaint of Abdominal Pain, Leg swelling. (20 Jun 2020 12:53)       Admitted on: 06-15-20    We are following the patient for: alcoholic pancreatitis / alcoholic hepatitis      Interval History: Patient is still complaining of abdominal pain , but tolerating Full liquid diet without worsening of her pain, patient was sleeping , looks painfree when I awakens her up but it is difficult to tell , no nausea or vomiting  , fever resolved , tachycardia resolved . Paracentesis not consistent with SBP      Patient's medical problems are improving   Prior records reviewed (Y/N): Y  History obtained from someone other than patient (Y/N):N      PAST MEDICAL & SURGICAL HISTORY:  ETOH abuse  Postpartum depression  Substance abuse  Anxiety  Depression  No significant past surgical history      MEDICATIONS  (STANDING):  ascorbic acid 500 milliGRAM(s) Oral daily  cefTRIAXone   IVPB 2000 milliGRAM(s) IV Intermittent every 24 hours  chlorhexidine 4% Liquid 1 Application(s) Topical <User Schedule>  enoxaparin Injectable 40 milliGRAM(s) SubCutaneous daily  famotidine    Tablet 20 milliGRAM(s) Oral two times a day  folic acid 1 milliGRAM(s) Oral daily  furosemide    Tablet 40 milliGRAM(s) Oral daily  gabapentin 100 milliGRAM(s) Oral three times a day  lactobacillus acidophilus 1 Tablet(s) Oral two times a day with meals  levothyroxine 50 MICROGram(s) Oral daily  LORazepam     Tablet 0.5 milliGRAM(s) Oral every 6 hours  multivitamin 1 Tablet(s) Oral daily  nicotine - 21 mG/24Hr(s) Patch 1 patch Transdermal daily  spironolactone 100 milliGRAM(s) Oral daily  thiamine 100 milliGRAM(s) Oral daily  vancomycin    Solution 125 milliGRAM(s) Oral every 6 hours    MEDICATIONS  (PRN):  LORazepam     Tablet 1 milliGRAM(s) Oral every 4 hours PRN Agitation  morphine   Solution 15 milliGRAM(s) Oral every 4 hours PRN Severe Pain (7 - 10)      Allergies  No Known Allergies      Review of Systems:   Cardiovascular:  No Chest Pain, No Palpitations  Respiratory:  No Cough, No Dyspnea  Gastrointestinal:  As described in HPI    Physical Examination:  T(C): 36.4 (06-20-20 @ 12:38), Max: 38.4 (06-19-20 @ 20:51)  HR: 99 (06-20-20 @ 12:38) (92 - 145)  BP: 99/57 (06-20-20 @ 12:38) (99/57 - 126/74)  RR: 18 (06-20-20 @ 12:38) (16 - 24)  SpO2: 96% (06-20-20 @ 06:00) (96% - 98%)      06-19-20 @ 07:01  -  06-20-20 @ 07:00  --------------------------------------------------------  IN: 100 mL / OUT: 1000 mL / NET: -900 mL      Constitutional: No acute distress.  Respiratory:  No signs of respiratory distress. Lung sounds are clear bilaterally.  Cardiovascular:  S1 S2, Regular rate and rhythm.  Abdominal: Abdomen is soft, symmetric, and non-tender without distention.  Bowel sounds are present and normoactive in all four quadrants. No masses, hepatomegaly, or splenomegaly are noted.   Skin: No rashes, No Jaundice        Data: (reviewed by attending)                        7.4    14.78 )-----------( 238      ( 19 Jun 2020 05:55 )             24.2     Hgb trend:  7.4  06-19-20 @ 05:55  8.0  06-18-20 @ 11:16  7.6  06-18-20 @ 06:15        06-20    138  |  100  |  <3<L>  ----------------------------<  79  3.7   |  26  |  <0.5<L>    Ca    7.3<L>      20 Jun 2020 05:19  Phos  2.7     06-19  Mg     1.8     06-20    TPro  4.7<L>  /  Alb  1.6<L>  /  TBili  3.2<H>  /  DBili  x   /  AST  110<H>  /  ALT  21  /  AlkPhos  236<H>  06-20    Liver panel trend:  TBili 3.2   /      /   ALT 21   /   AlkP 236   /   Tptn 4.7   /   Alb 1.6    /   DBili --      06-20  TBili 3.3   /      /   ALT 22   /   AlkP 267   /   Tptn 5.2   /   Alb 1.9    /   DBili 2.4      06-19  TBili 3.0   /      /   ALT 20   /   AlkP 243   /   Tptn 4.7   /   Alb 1.8    /   DBili 2.0      06-19  TBili 2.9   /      /   ALT 22   /   AlkP 253   /   Tptn 4.9   /   Alb 1.7    /   DBili 2.2      06-18  TBili 3.3   /   AST 98   /   ALT 21   /   AlkP 234   /   Tptn 4.7   /   Alb 1.6    /   DBili 2.3      06-17  TBili 3.5   /   AST 86   /   ALT 22   /   AlkP 252   /   Tptn 5.2   /   Alb 1.9    /   DBili 2.7      06-16  TBili 3.6   /   AST 96   /   ALT 24   /   AlkP 279   /   Tptn 5.7   /   Alb 2.0    /   DBili --      06-15  TBili 3.0   /   AST 91   /   ALT 23   /   AlkP 252   /   Tptn 5.3   /   Alb 2.0    /   DBili --      06-15  TBili 3.1   /   AST 97   /   ALT 24   /   AlkP 268   /   Tptn 5.2   /   Alb 2.0    /   DBili 2.3      06-15  TBili 5.1   /   AST 90   /   ALT 23   /   AlkP 273   /   Tptn 5.9   /   Alb 2.2    /   DBili 3.5      06-13  TBili 3.8   /   AST 92   /   ALT 21   /   AlkP 249   /   Tptn 5.1   /   Alb 2.1    /   DBili --      06-12  TBili 3.3   /   AST 98   /   ALT 21   /   AlkP 247   /   Tptn 5.0   /   Alb 2.1    /   DBili --      06-11      PT/INR - ( 20 Jun 2020 05:19 )   PT: 19.10 sec;   INR: 1.66 ratio         PTT - ( 20 Jun 2020 05:19 )  PTT:37.2 sec    Culture - Body Fluid with Gram Stain (collected 19 Jun 2020 16:30)  Source: .Body Fluid Peritoneal Fluid  Gram Stain (20 Jun 2020 03:47):    No polymorphonuclear cells seen    No organisms seen    by cytocentrifuge    Culture - Blood (collected 18 Jun 2020 16:00)  Source: .Blood Blood  Preliminary Report (19 Jun 2020 23:01):    No growth to date.

## 2020-06-20 NOTE — PROGRESS NOTE ADULT - SUBJECTIVE AND OBJECTIVE BOX
Patient is a 39y old Female who presents with a chief complaint of Abdominal Pain, Leg swelling. (18 Jun 2020 16:55)    Currently admitted to medicine with the primary diagnosis of Abdominal pain     Today is hospital day 5. Tactile hallucinations yesterday started on IV lorazepam. This AM stuporous but arousable to sternal rub. Lorazepam changed to PRN. Yesterday's paracentesis yielded 1L. Patient is a 39y old  Female who presents with a chief complaint of Abdominal Pain, Leg swelling. (20 Jun 2020 09:23)      Overnight events:    PAST MEDICAL & SURGICAL HISTORY:  ETOH abuse  Postpartum depression  Substance abuse  Anxiety  Depression  No significant past surgical history      MEDICATIONS  (STANDING):  ascorbic acid 500 milliGRAM(s) Oral daily  cefTRIAXone   IVPB 2000 milliGRAM(s) IV Intermittent every 24 hours  chlorhexidine 4% Liquid 1 Application(s) Topical <User Schedule>  enoxaparin Injectable 40 milliGRAM(s) SubCutaneous daily  famotidine    Tablet 20 milliGRAM(s) Oral two times a day  folic acid 1 milliGRAM(s) Oral daily  furosemide    Tablet 40 milliGRAM(s) Oral daily  gabapentin 100 milliGRAM(s) Oral three times a day  lactobacillus acidophilus 1 Tablet(s) Oral two times a day with meals  levothyroxine 50 MICROGram(s) Oral daily  multivitamin 1 Tablet(s) Oral daily  nicotine - 21 mG/24Hr(s) Patch 1 patch Transdermal daily  spironolactone 100 milliGRAM(s) Oral daily  thiamine 100 milliGRAM(s) Oral daily  vancomycin    Solution 125 milliGRAM(s) Oral every 6 hours    MEDICATIONS  (PRN):  LORazepam     Tablet 1 milliGRAM(s) Oral every 4 hours PRN Agitation  morphine   Solution 15 milliGRAM(s) Oral every 4 hours PRN Severe Pain (7 - 10)      Home medications      Vital Signs Last 24 Hrs  T(C): 36.2 (20 Jun 2020 06:00), Max: 38.4 (19 Jun 2020 20:51)  T(F): 97.2 (20 Jun 2020 06:00), Max: 101.2 (19 Jun 2020 20:51)  HR: 94 (20 Jun 2020 06:00) (92 - 145)  BP: 116/60 (20 Jun 2020 06:00) (106/56 - 126/74)  BP(mean): 86 (19 Jun 2020 18:36) (86 - 86)  RR: 18 (20 Jun 2020 06:00) (17 - 24)  SpO2: 96% (20 Jun 2020 06:00) (96% - 99%)  CAPILLARY BLOOD GLUCOSE        I&O's Summary    19 Jun 2020 07:01  -  20 Jun 2020 07:00  --------------------------------------------------------  IN: 100 mL / OUT: 1000 mL / NET: -900 mL        Physical Exam:    -     General :     -      HEENT:    -      Cardiac:    -      Pulm:    -      GI:    -      Musculoskeletal:    -      Neuro:        Labs:                        7.4    14.78 )-----------( 238      ( 19 Jun 2020 05:55 )             24.2             06-20    138  |  100  |  <3<L>  ----------------------------<  79  3.7   |  26  |  <0.5<L>    Ca    7.3<L>      20 Jun 2020 05:19  Phos  2.7     06-19  Mg     1.8     06-20    TPro  4.7<L>  /  Alb  1.6<L>  /  TBili  3.2<H>  /  DBili  x   /  AST  110<H>  /  ALT  21  /  AlkPhos  236<H>  06-20    LIVER FUNCTIONS - ( 20 Jun 2020 05:19 )  Alb: 1.6 g/dL / Pro: 4.7 g/dL / ALK PHOS: 236 U/L / ALT: 21 U/L / AST: 110 U/L / GGT: x                 PT/INR - ( 20 Jun 2020 05:19 )   PT: 19.10 sec;   INR: 1.66 ratio         PTT - ( 20 Jun 2020 05:19 )  PTT:37.2 sec            Culture - Body Fluid with Gram Stain (collected 19 Jun 2020 16:30)  Source: .Body Fluid Peritoneal Fluid  Gram Stain (20 Jun 2020 03:47):    No polymorphonuclear cells seen    No organisms seen    by cytocentrifuge    Culture - Blood (collected 18 Jun 2020 16:00)  Source: .Blood Blood  Preliminary Report (19 Jun 2020 23:01):    No growth to date.        Imaging:   CT Abd and Pelvis (6/19)  1.  Since Aida 15, 2020, unchanged pancreatic head and neck fluid collections, largest measuring 2.4 x 1.9 cm.     2.  Increased soft tissue anasarca.    3.  Unchanged moderate volume abdominopelvic ascites.    4.  Additional findings are unchangedon this short-term follow-up examination. Patient is a 39y old  Female who presents with a chief complaint of Abdominal Pain, Leg swelling. (20 Jun 2020 09:23)      Overnight events: Patient was agitated and experienced tactile hallucinations and started on IV ativan. Changed to PRN this morning. Patient spiked a 101.2 fever at 20:51.    PAST MEDICAL & SURGICAL HISTORY:  ETOH abuse  Postpartum depression  Substance abuse  Anxiety  Depression  No significant past surgical history      MEDICATIONS  (STANDING):  ascorbic acid 500 milliGRAM(s) Oral daily  cefTRIAXone   IVPB 2000 milliGRAM(s) IV Intermittent every 24 hours  chlorhexidine 4% Liquid 1 Application(s) Topical <User Schedule>  enoxaparin Injectable 40 milliGRAM(s) SubCutaneous daily  famotidine    Tablet 20 milliGRAM(s) Oral two times a day  folic acid 1 milliGRAM(s) Oral daily  furosemide    Tablet 40 milliGRAM(s) Oral daily  gabapentin 100 milliGRAM(s) Oral three times a day  lactobacillus acidophilus 1 Tablet(s) Oral two times a day with meals  levothyroxine 50 MICROGram(s) Oral daily  multivitamin 1 Tablet(s) Oral daily  nicotine - 21 mG/24Hr(s) Patch 1 patch Transdermal daily  spironolactone 100 milliGRAM(s) Oral daily  thiamine 100 milliGRAM(s) Oral daily  vancomycin    Solution 125 milliGRAM(s) Oral every 6 hours    MEDICATIONS  (PRN):  LORazepam     Tablet 1 milliGRAM(s) Oral every 4 hours PRN Agitation  morphine   Solution 15 milliGRAM(s) Oral every 4 hours PRN Severe Pain (7 - 10)      Vital Signs Last 24 Hrs  T(C): 36.2 (20 Jun 2020 06:00), Max: 38.4 (19 Jun 2020 20:51)  T(F): 97.2 (20 Jun 2020 06:00), Max: 101.2 (19 Jun 2020 20:51)  HR: 94 (20 Jun 2020 06:00) (92 - 145)  BP: 116/60 (20 Jun 2020 06:00) (106/56 - 126/74)  BP(mean): 86 (19 Jun 2020 18:36) (86 - 86)  RR: 18 (20 Jun 2020 06:00) (17 - 24)  SpO2: 96% (20 Jun 2020 06:00) (96% - 99%)  CAPILLARY BLOOD GLUCOSE    I&O's Summary    19 Jun 2020 07:01  -  20 Jun 2020 07:00  --------------------------------------------------------  IN: 100 mL / OUT: 1000 mL / NET: -900 mL        Physical Exam:    -     General : Stuporous but arousable to sternal rub    -      HEENT: Protecting airway    -      Cardiac: WNL    -      Pulm: WNL    -      GI: WNL    -      Musculoskeletal: WNL    -      Neuro: WNL        Labs:                        7.4    14.78 )-----------( 238      ( 19 Jun 2020 05:55 )             24.2             06-20    138  |  100  |  <3<L>  ----------------------------<  79  3.7   |  26  |  <0.5<L>    Ca    7.3<L>      20 Jun 2020 05:19  Phos  2.7     06-19  Mg     1.8     06-20    TPro  4.7<L>  /  Alb  1.6<L>  /  TBili  3.2<H>  /  DBili  x   /  AST  110<H>  /  ALT  21  /  AlkPhos  236<H>  06-20    LIVER FUNCTIONS - ( 20 Jun 2020 05:19 )  Alb: 1.6 g/dL / Pro: 4.7 g/dL / ALK PHOS: 236 U/L / ALT: 21 U/L / AST: 110 U/L / GGT: x                 PT/INR - ( 20 Jun 2020 05:19 )   PT: 19.10 sec;   INR: 1.66 ratio         PTT - ( 20 Jun 2020 05:19 )  PTT:37.2 sec            Culture - Body Fluid with Gram Stain (collected 19 Jun 2020 16:30)  Source: .Body Fluid Peritoneal Fluid  Gram Stain (20 Jun 2020 03:47):    No polymorphonuclear cells seen    No organisms seen    by cytocentrifuge    Culture - Blood (collected 18 Jun 2020 16:00)  Source: .Blood Blood  Preliminary Report (19 Jun 2020 23:01):    No growth to date.        Imaging:  CT Abd/ Pelvis (6/19)   1.  Since Aida 15, 2020, unchanged pancreatic head and neck fluid collections, largest measuring 2.4 x 1.9 cm.   2.  Increased soft tissue anasarca.  3.  Unchanged moderate volume abdominopelvic ascites.  4.  Additional findings are unchangedon this short-term follow-up examination. Patient is a 39y old  Female who presents with a chief complaint of Abdominal Pain, Leg swelling. (20 Jun 2020 09:23)      Overnight events: Patient was agitated and experienced tactile hallucinations and started on IV ativan. This morning started PO 0.5mg q6h. Patient spiked a 101.2 fever at 20:51.    PAST MEDICAL & SURGICAL HISTORY:  ETOH abuse  Postpartum depression  Substance abuse  Anxiety  Depression  No significant past surgical history      MEDICATIONS  (STANDING):  ascorbic acid 500 milliGRAM(s) Oral daily  cefTRIAXone   IVPB 2000 milliGRAM(s) IV Intermittent every 24 hours  chlorhexidine 4% Liquid 1 Application(s) Topical <User Schedule>  enoxaparin Injectable 40 milliGRAM(s) SubCutaneous daily  famotidine    Tablet 20 milliGRAM(s) Oral two times a day  folic acid 1 milliGRAM(s) Oral daily  furosemide    Tablet 40 milliGRAM(s) Oral daily  gabapentin 100 milliGRAM(s) Oral three times a day  lactobacillus acidophilus 1 Tablet(s) Oral two times a day with meals  levothyroxine 50 MICROGram(s) Oral daily  multivitamin 1 Tablet(s) Oral daily  nicotine - 21 mG/24Hr(s) Patch 1 patch Transdermal daily  spironolactone 100 milliGRAM(s) Oral daily  thiamine 100 milliGRAM(s) Oral daily  vancomycin    Solution 125 milliGRAM(s) Oral every 6 hours    MEDICATIONS  (PRN):  LORazepam     Tablet 1 milliGRAM(s) Oral every 4 hours PRN Agitation  morphine   Solution 15 milliGRAM(s) Oral every 4 hours PRN Severe Pain (7 - 10)      Vital Signs Last 24 Hrs  T(C): 36.2 (20 Jun 2020 06:00), Max: 38.4 (19 Jun 2020 20:51)  T(F): 97.2 (20 Jun 2020 06:00), Max: 101.2 (19 Jun 2020 20:51)  HR: 94 (20 Jun 2020 06:00) (92 - 145)  BP: 116/60 (20 Jun 2020 06:00) (106/56 - 126/74)  BP(mean): 86 (19 Jun 2020 18:36) (86 - 86)  RR: 18 (20 Jun 2020 06:00) (17 - 24)  SpO2: 96% (20 Jun 2020 06:00) (96% - 99%)  CAPILLARY BLOOD GLUCOSE    I&O's Summary    19 Jun 2020 07:01  -  20 Jun 2020 07:00  --------------------------------------------------------  IN: 100 mL / OUT: 1000 mL / NET: -900 mL        Physical Exam:    -     General : Stuporous but arousable to sternal rub    -      HEENT: Protecting airway    -      Cardiac: WNL    -      Pulm: WNL    -      GI: WNL    -      Musculoskeletal: WNL    -      Neuro: WNL        Labs:                        7.4    14.78 )-----------( 238      ( 19 Jun 2020 05:55 )             24.2             06-20    138  |  100  |  <3<L>  ----------------------------<  79  3.7   |  26  |  <0.5<L>    Ca    7.3<L>      20 Jun 2020 05:19  Phos  2.7     06-19  Mg     1.8     06-20    TPro  4.7<L>  /  Alb  1.6<L>  /  TBili  3.2<H>  /  DBili  x   /  AST  110<H>  /  ALT  21  /  AlkPhos  236<H>  06-20    LIVER FUNCTIONS - ( 20 Jun 2020 05:19 )  Alb: 1.6 g/dL / Pro: 4.7 g/dL / ALK PHOS: 236 U/L / ALT: 21 U/L / AST: 110 U/L / GGT: x                 PT/INR - ( 20 Jun 2020 05:19 )   PT: 19.10 sec;   INR: 1.66 ratio         PTT - ( 20 Jun 2020 05:19 )  PTT:37.2 sec            Culture - Body Fluid with Gram Stain (collected 19 Jun 2020 16:30)  Source: .Body Fluid Peritoneal Fluid  Gram Stain (20 Jun 2020 03:47):    No polymorphonuclear cells seen    No organisms seen    by cytocentrifuge    Culture - Blood (collected 18 Jun 2020 16:00)  Source: .Blood Blood  Preliminary Report (19 Jun 2020 23:01):    No growth to date.        Imaging:  CT Abd/ Pelvis (6/19)   1.  Since Aida 15, 2020, unchanged pancreatic head and neck fluid collections, largest measuring 2.4 x 1.9 cm.   2.  Increased soft tissue anasarca.  3.  Unchanged moderate volume abdominopelvic ascites.  4.  Additional findings are unchangedon this short-term follow-up examination.

## 2020-06-20 NOTE — PROGRESS NOTE ADULT - SUBJECTIVE AND OBJECTIVE BOX
SEBLE ADRIAN  39y, Female  Allergy: No Known Allergies      LOS  5d    CHIEF COMPLAINT: Abdominal Pain, Leg swelling. (20 Jun 2020 12:53)      INTERVAL EVENTS/HPI  - No acute events overnight, abd pain  - T(F): , Max: 101.2 (06-19-20 @ 20:51)  - WBC Count: 14.78 (06-19-20 @ 05:55)  WBC Count: 16.32 (06-18-20 @ 11:16)  - Creatinine, Serum: <0.5 (06-20-20 @ 05:19)  Creatinine, Serum: <0.5 (06-19-20 @ 21:30)       ROS  General: Denies rigors, nightsweats  HEENT: Denies headache, rhinorrhea, sore throat, eye pain  CV: Denies CP, palpitations  PULM: Denies wheezing, hemoptysis  GI: Denies hematemesis, hematochezia, melena  : Denies discharge, hematuria  MSK: Denies arthralgias, myalgias  SKIN: Denies rash, lesions  NEURO: Denies paresthesias, weakness  PSYCH: Denies depression, anxiety    VITALS:  T(F): 97.5, Max: 101.2 (06-19-20 @ 20:51)  HR: 99  BP: 99/57  RR: 18Vital Signs Last 24 Hrs  T(C): 36.4 (20 Jun 2020 12:38), Max: 38.4 (19 Jun 2020 20:51)  T(F): 97.5 (20 Jun 2020 12:38), Max: 101.2 (19 Jun 2020 20:51)  HR: 99 (20 Jun 2020 12:38) (92 - 145)  BP: 99/57 (20 Jun 2020 12:38) (99/57 - 126/74)  BP(mean): 86 (19 Jun 2020 18:36) (86 - 86)  RR: 18 (20 Jun 2020 12:38) (16 - 24)  SpO2: 96% (20 Jun 2020 06:00) (96% - 98%)    PHYSICAL EXAM:  Gen: appears uncomfortable jaundice  HEENT: Normocephalic, atraumatic  Neck: supple, no lymphadenopathy  CV: Regular rate & regular rhythm  Lungs: decreased BS at bases, no fremitus  Abdomen: distended tenderness to palpation   Ext: Warm, well perfused LE edema  Neuro: non focal, awake  Skin: no rash, no erythema  Lines: no phlebitis    FH: Non-contributory  Social Hx: Non-contributory    TESTS & MEASUREMENTS:                        7.4    14.78 )-----------( 238      ( 19 Jun 2020 05:55 )             24.2     06-20    138  |  100  |  <3<L>  ----------------------------<  79  3.7   |  26  |  <0.5<L>    Ca    7.3<L>      20 Jun 2020 05:19  Phos  2.7     06-19  Mg     1.8     06-20    TPro  4.7<L>  /  Alb  1.6<L>  /  TBili  3.2<H>  /  DBili  x   /  AST  110<H>  /  ALT  21  /  AlkPhos  236<H>  06-20    eGFR if Non : 146 mL/min/1.73M2 (06-20-20 @ 05:19)  eGFR if African American: 170 mL/min/1.73M2 (06-20-20 @ 05:19)  eGFR if Non African American: 133 mL/min/1.73M2 (06-19-20 @ 21:30)  eGFR if : 154 mL/min/1.73M2 (06-19-20 @ 21:30)    LIVER FUNCTIONS - ( 20 Jun 2020 05:19 )  Alb: 1.6 g/dL / Pro: 4.7 g/dL / ALK PHOS: 236 U/L / ALT: 21 U/L / AST: 110 U/L / GGT: x               Culture - Body Fluid with Gram Stain (collected 06-19-20 @ 16:30)  Source: .Body Fluid Peritoneal Fluid  Gram Stain (06-20-20 @ 03:47):    No polymorphonuclear cells seen    No organisms seen    by cytocentrifuge    Culture - Blood (collected 06-18-20 @ 16:00)  Source: .Blood Blood  Preliminary Report (06-19-20 @ 23:01):    No growth to date.    Culture - Fungal, Body Fluid (collected 06-16-20 @ 12:20)  Source: .Body Fluid Peritoneal Fluid  Preliminary Report (06-17-20 @ 08:59):    Testing in progress    Culture - Body Fluid with Gram Stain (collected 06-16-20 @ 12:20)  Source: .Body Fluid Peritoneal Fluid  Gram Stain (06-17-20 @ 04:14):    polymorphonuclear leukocytes seen    No organisms seen    by cytocentrifuge  Preliminary Report (06-17-20 @ 20:20):    No growth    Culture - Blood (collected 06-15-20 @ 11:11)  Source: .Blood None  Preliminary Report (06-16-20 @ 23:01):    No growth to date.    Culture - Blood (collected 05-28-20 @ 22:15)  Source: .Blood Blood  Final Report (06-03-20 @ 17:00):    No Growth Final    Culture - Blood (collected 05-28-20 @ 22:15)  Source: .Blood Blood  Final Report (06-03-20 @ 17:00):    No Growth Final    Culture - Urine (collected 05-28-20 @ 22:10)  Source: .Urine Clean Catch (Midstream)  Final Report (05-30-20 @ 12:40):    <10,000 CFU/mL Normal Urogenital Nika        Lactate, Blood: 3.0 mmol/L (06-19-20 @ 05:55)  Lactate, Blood: 2.8 mmol/L (06-19-20 @ 00:52)  Lactate, Blood: 3.4 mmol/L (06-18-20 @ 16:00)  Lactate, Blood: 5.1 mmol/L (06-15-20 @ 16:48)      INFECTIOUS DISEASES TESTING  Procalcitonin, Serum: 0.33 (06-17-20 @ 04:25)  COVID-19 PCR: NotDetec (06-15-20 @ 07:50)  COVID-19 PCR: NotDetec (05-28-20 @ 20:10)      INFLAMMATORY MARKERS      RADIOLOGY & ADDITIONAL TESTS:  I have personally reviewed the last available Chest xray  CXR      CT  CT Abdomen and Pelvis w/ IV Cont:   EXAM:  CT ABDOMEN AND PELVIS IC            PROCEDURE DATE:  06/19/2020            INTERPRETATION:  CLINICAL STATEMENT: Pancreatitis. Clinically not improving.     TECHNIQUE: Contiguous axial CT images were obtained from the lower chest to the pubicsymphysis following administration of 100cc Optiray 320 intravenous contrast.  Oral contrast was not administered.  Reformatted images in the coronal and sagittal planes were acquired.    COMPARISON CT: Aida 15, 2020    OTHER STUDIES USED FOR CORRELATION: CT chest June 16, 2020.      FINDINGS:    LOWER CHEST: Since prior CT chest, unchanged trace right and small left pleural effusions with bibasilar compressive atelectasis. Right lower lobe 0.7 cm solid nodule, obscured by atelectasis on prior CTchest. Unchanged patchy subpleural right middle lobe groundglass opacities, unchanged since prior CT chest.    HEPATOBILIARY: Heterogeneous hepatic steatosis, predominantly involving the right hepatic lobe. Gallbladder sludge. Patent hepatic and portal veins.    SPLEEN: Unremarkable.    PANCREAS: Unchanged pancreatic fluid collections. For example, a pancreatic head collection measures 2.4 x 1.9 cm and a smaller collection in the pancreatic neck measures 1.1 x 1.0 cm. No foci of gas within the fluid. Patent splenic vein. No well-defined peripancreatic fluid collections. Remaining pancreatic parenchyma enhances homogeneously.    ADRENAL GLANDS: Unchanged.    KIDNEYS: Unchanged.    ABDOMINOPELVIC NODES: Unchanged.    PELVIC ORGANS: Unchanged.    PERITONEUM/MESENTERY/BOWEL: Unchanged moderate volume ascites. No bowel obstruction or pneumoperitoneum. Improved colonic wall thickening.    BONES/SOFT TISSUES: Increased soft tissue anasarca. Unchanged visualized osseous structures.    IMPRESSION:  1.  Since Aida 15, 2020, unchanged pancreatic head and neck fluid collections, largest measuring 2.4 x 1.9 cm.     2.  Increased soft tissue anasarca.    3.  Unchanged moderate volume abdominopelvic ascites.    4.  Additional findings are unchangedon this short-term follow-up examination.                      MIGUEL ÁNGEL RIVERA M.D., ATTENDING RADIOLOGIST  This document has been electronically signed. Jun 19 2020  1:14PM             (06-19-20 @ 12:39)      CARDIOLOGY TESTING  12 Lead ECG:   Ventricular Rate 125 BPM    Atrial Rate 125 BPM    P-R Interval 132 ms    QRS Duration 78 ms    Q-T Interval 324 ms    QTC Calculation(Bezet) 467 ms    P Axis 49 degrees    R Axis 10 degrees    T Axis 65 degrees    Diagnosis Line Sinus tachycardia  Low voltage QRS  Abnormal ECG    Confirmed by Kris Garcia ([id number]) on 6/17/2020 10:31:29 PM (06-17-20 @ 22:23)  12 Lead ECG:   Ventricular Rate 125 BPM    Atrial Rate 125 BPM    P-R Interval 112 ms    QRS Duration 76 ms    Q-T Interval 414 ms    QTC Calculation(Bezet) 597 ms    P Axis 32 degrees    R Axis 17 degrees    T Axis 68 degrees    Diagnosis Line    Sinus tachycardia  Nonspecific ST and T wave abnormality  Abnormal ECG    Confirmed by Kris Garcia ([id number]) on 6/17/2020 10:31:52 PM (06-16-20 @ 17:12)      MEDICATIONS  ascorbic acid 500 Oral daily  cefTRIAXone   IVPB 2000 IV Intermittent every 24 hours  chlorhexidine 4% Liquid 1 Topical <User Schedule>  enoxaparin Injectable 40 SubCutaneous daily  famotidine    Tablet 20 Oral two times a day  folic acid 1 Oral daily  furosemide    Tablet 40 Oral daily  gabapentin 100 Oral three times a day  lactobacillus acidophilus 1 Oral two times a day with meals  levothyroxine 50 Oral daily  LORazepam     Tablet 0.5 Oral every 6 hours  multivitamin 1 Oral daily  nicotine - 21 mG/24Hr(s) Patch 1 Transdermal daily  spironolactone 100 Oral daily  thiamine 100 Oral daily  vancomycin    Solution 125 Oral every 6 hours      WEIGHT  Weight (kg): 74.2 (06-15-20 @ 09:45)  Creatinine, Serum: <0.5 mg/dL (06-20-20 @ 05:19)  Creatinine, Serum: <0.5 mg/dL (06-19-20 @ 21:30)      ANTIBIOTICS:  cefTRIAXone   IVPB 2000 milliGRAM(s) IV Intermittent every 24 hours  vancomycin    Solution 125 milliGRAM(s) Oral every 6 hours      All available historical records have been reviewed

## 2020-06-20 NOTE — PROGRESS NOTE ADULT - ASSESSMENT
39 year old female with a history of ETOH abuse, Alcoholic liver disease,  pancreatitis, depression, anxiety, Recent C diff infection who comes to the ED with chief c/o Abdominal pain, distension and leg swelling.     IMPRESSION;   Fevers  Pancolitis secondary to CD colitis which seems quiescent presently as pt has not had diarrhea.  Po vanomycin was supposed to go through 6/19  pt is non compliant to meds.  Abdominal pain seems multifactorial in etiology : chronic pancreatitis with multiple pseudocysts. Recent CD colitis with pancolitis on CT.   S/p paracentesis 6/16 with no SBP. Ascitic fluid cultures NG  No evidence of cholecystitis/cholangitis  CT 6/15 : Persistent  and slightly increased colonic thickening from cecum through transverse colon, compatible with known colitis; no evidence of abscess.   Unchanged generalized mesenteric edema, limiting assessment for peripancreatic inflammatory change. Unchanged multiple pancreatic fluid collections, measuring up to 2.4 cm, which may be represent pancreatic pseudocysts.  Paracentesis 6/19 : ascitic cx NG; WBC 33- no SBP    RECOMMENDATIONS;   Rocephin 2 gm iv q24h, if hemodynamic compromise, broaden to meropenem 1g q8h iv   po Vancomycin 125 mg q6h

## 2020-06-21 LAB
ALBUMIN SERPL ELPH-MCNC: 1.7 G/DL — LOW (ref 3.5–5.2)
ALP SERPL-CCNC: 239 U/L — HIGH (ref 30–115)
ALT FLD-CCNC: 22 U/L — SIGNIFICANT CHANGE UP (ref 0–41)
ANION GAP SERPL CALC-SCNC: 13 MMOL/L — SIGNIFICANT CHANGE UP (ref 7–14)
APTT BLD: 36.6 SEC — SIGNIFICANT CHANGE UP (ref 27–39.2)
AST SERPL-CCNC: 105 U/L — HIGH (ref 0–41)
BASOPHILS # BLD AUTO: 0.1 K/UL — SIGNIFICANT CHANGE UP (ref 0–0.2)
BASOPHILS NFR BLD AUTO: 0.6 % — SIGNIFICANT CHANGE UP (ref 0–1)
BILIRUB SERPL-MCNC: 3.1 MG/DL — HIGH (ref 0.2–1.2)
BUN SERPL-MCNC: 4 MG/DL — LOW (ref 10–20)
CALCIUM SERPL-MCNC: 7.6 MG/DL — LOW (ref 8.5–10.1)
CHLORIDE SERPL-SCNC: 101 MMOL/L — SIGNIFICANT CHANGE UP (ref 98–110)
CO2 SERPL-SCNC: 24 MMOL/L — SIGNIFICANT CHANGE UP (ref 17–32)
CREAT SERPL-MCNC: <0.5 MG/DL — LOW (ref 0.7–1.5)
CULTURE RESULTS: SIGNIFICANT CHANGE UP
EOSINOPHIL # BLD AUTO: 0.34 K/UL — SIGNIFICANT CHANGE UP (ref 0–0.7)
EOSINOPHIL NFR BLD AUTO: 2.2 % — SIGNIFICANT CHANGE UP (ref 0–8)
GLUCOSE SERPL-MCNC: 95 MG/DL — SIGNIFICANT CHANGE UP (ref 70–99)
HCT VFR BLD CALC: 24.6 % — LOW (ref 37–47)
HGB BLD-MCNC: 8 G/DL — LOW (ref 12–16)
IMM GRANULOCYTES NFR BLD AUTO: 0.6 % — HIGH (ref 0.1–0.3)
INR BLD: 1.54 RATIO — HIGH (ref 0.65–1.3)
LYMPHOCYTES # BLD AUTO: 1.37 K/UL — SIGNIFICANT CHANGE UP (ref 1.2–3.4)
LYMPHOCYTES # BLD AUTO: 8.9 % — LOW (ref 20.5–51.1)
MAGNESIUM SERPL-MCNC: 1.8 MG/DL — SIGNIFICANT CHANGE UP (ref 1.8–2.4)
MCHC RBC-ENTMCNC: 32.5 G/DL — SIGNIFICANT CHANGE UP (ref 32–37)
MCHC RBC-ENTMCNC: 33.2 PG — HIGH (ref 27–31)
MCV RBC AUTO: 102.1 FL — HIGH (ref 81–99)
MONOCYTES # BLD AUTO: 1.38 K/UL — HIGH (ref 0.1–0.6)
MONOCYTES NFR BLD AUTO: 8.9 % — SIGNIFICANT CHANGE UP (ref 1.7–9.3)
NEUTROPHILS # BLD AUTO: 12.15 K/UL — HIGH (ref 1.4–6.5)
NEUTROPHILS NFR BLD AUTO: 78.8 % — HIGH (ref 42.2–75.2)
NRBC # BLD: 0 /100 WBCS — SIGNIFICANT CHANGE UP (ref 0–0)
PLATELET # BLD AUTO: 234 K/UL — SIGNIFICANT CHANGE UP (ref 130–400)
POTASSIUM SERPL-MCNC: 3.7 MMOL/L — SIGNIFICANT CHANGE UP (ref 3.5–5)
POTASSIUM SERPL-SCNC: 3.7 MMOL/L — SIGNIFICANT CHANGE UP (ref 3.5–5)
PROT SERPL-MCNC: 5 G/DL — LOW (ref 6–8)
PROTHROM AB SERPL-ACNC: 17.7 SEC — HIGH (ref 9.95–12.87)
RBC # BLD: 2.41 M/UL — LOW (ref 4.2–5.4)
RBC # FLD: 17.1 % — HIGH (ref 11.5–14.5)
SODIUM SERPL-SCNC: 138 MMOL/L — SIGNIFICANT CHANGE UP (ref 135–146)
SPECIMEN SOURCE: SIGNIFICANT CHANGE UP
WBC # BLD: 15.44 K/UL — HIGH (ref 4.8–10.8)
WBC # FLD AUTO: 15.44 K/UL — HIGH (ref 4.8–10.8)

## 2020-06-21 PROCEDURE — 99233 SBSQ HOSP IP/OBS HIGH 50: CPT

## 2020-06-21 RX ORDER — SPIRONOLACTONE 25 MG/1
50 TABLET, FILM COATED ORAL DAILY
Refills: 0 | Status: DISCONTINUED | OUTPATIENT
Start: 2020-06-22 | End: 2020-06-22

## 2020-06-21 RX ORDER — FUROSEMIDE 40 MG
20 TABLET ORAL DAILY
Refills: 0 | Status: DISCONTINUED | OUTPATIENT
Start: 2020-06-22 | End: 2020-06-22

## 2020-06-21 RX ORDER — SODIUM CHLORIDE 9 MG/ML
250 INJECTION, SOLUTION INTRAVENOUS ONCE
Refills: 0 | Status: COMPLETED | OUTPATIENT
Start: 2020-06-21 | End: 2020-06-21

## 2020-06-21 RX ADMIN — MORPHINE SULFATE 15 MILLIGRAM(S): 50 CAPSULE, EXTENDED RELEASE ORAL at 21:17

## 2020-06-21 RX ADMIN — Medication 125 MILLIGRAM(S): at 05:10

## 2020-06-21 RX ADMIN — Medication 0.5 MILLIGRAM(S): at 05:18

## 2020-06-21 RX ADMIN — FAMOTIDINE 20 MILLIGRAM(S): 10 INJECTION INTRAVENOUS at 17:12

## 2020-06-21 RX ADMIN — Medication 1 MILLIGRAM(S): at 08:58

## 2020-06-21 RX ADMIN — Medication 1 MILLIGRAM(S): at 13:55

## 2020-06-21 RX ADMIN — Medication 1 PATCH: at 19:59

## 2020-06-21 RX ADMIN — Medication 1 MILLIGRAM(S): at 23:26

## 2020-06-21 RX ADMIN — Medication 0.5 MILLIGRAM(S): at 11:06

## 2020-06-21 RX ADMIN — Medication 50 MICROGRAM(S): at 05:11

## 2020-06-21 RX ADMIN — GABAPENTIN 100 MILLIGRAM(S): 400 CAPSULE ORAL at 13:51

## 2020-06-21 RX ADMIN — Medication 1 TABLET(S): at 11:07

## 2020-06-21 RX ADMIN — MORPHINE SULFATE 15 MILLIGRAM(S): 50 CAPSULE, EXTENDED RELEASE ORAL at 17:12

## 2020-06-21 RX ADMIN — GABAPENTIN 100 MILLIGRAM(S): 400 CAPSULE ORAL at 05:11

## 2020-06-21 RX ADMIN — Medication 40 MILLIGRAM(S): at 05:12

## 2020-06-21 RX ADMIN — Medication 1 TABLET(S): at 07:56

## 2020-06-21 RX ADMIN — Medication 1 TABLET(S): at 17:12

## 2020-06-21 RX ADMIN — MEROPENEM 100 MILLIGRAM(S): 1 INJECTION INTRAVENOUS at 05:19

## 2020-06-21 RX ADMIN — Medication 0.5 MILLIGRAM(S): at 00:10

## 2020-06-21 RX ADMIN — MEROPENEM 100 MILLIGRAM(S): 1 INJECTION INTRAVENOUS at 00:51

## 2020-06-21 RX ADMIN — SODIUM CHLORIDE 1000 MILLILITER(S): 9 INJECTION, SOLUTION INTRAVENOUS at 07:56

## 2020-06-21 RX ADMIN — MEROPENEM 100 MILLIGRAM(S): 1 INJECTION INTRAVENOUS at 21:16

## 2020-06-21 RX ADMIN — Medication 125 MILLIGRAM(S): at 00:11

## 2020-06-21 RX ADMIN — Medication 1 PATCH: at 11:07

## 2020-06-21 RX ADMIN — MORPHINE SULFATE 15 MILLIGRAM(S): 50 CAPSULE, EXTENDED RELEASE ORAL at 06:31

## 2020-06-21 RX ADMIN — Medication 125 MILLIGRAM(S): at 11:06

## 2020-06-21 RX ADMIN — Medication 500 MILLIGRAM(S): at 11:06

## 2020-06-21 RX ADMIN — SPIRONOLACTONE 100 MILLIGRAM(S): 25 TABLET, FILM COATED ORAL at 05:11

## 2020-06-21 RX ADMIN — MORPHINE SULFATE 15 MILLIGRAM(S): 50 CAPSULE, EXTENDED RELEASE ORAL at 11:05

## 2020-06-21 RX ADMIN — Medication 100 MILLIGRAM(S): at 11:06

## 2020-06-21 RX ADMIN — GABAPENTIN 100 MILLIGRAM(S): 400 CAPSULE ORAL at 21:16

## 2020-06-21 RX ADMIN — Medication 125 MILLIGRAM(S): at 17:12

## 2020-06-21 RX ADMIN — Medication 0.5 MILLIGRAM(S): at 23:50

## 2020-06-21 RX ADMIN — Medication 125 MILLIGRAM(S): at 23:26

## 2020-06-21 RX ADMIN — MEROPENEM 100 MILLIGRAM(S): 1 INJECTION INTRAVENOUS at 13:51

## 2020-06-21 RX ADMIN — Medication 1 MILLIGRAM(S): at 11:06

## 2020-06-21 RX ADMIN — Medication 0.5 MILLIGRAM(S): at 17:12

## 2020-06-21 RX ADMIN — CHLORHEXIDINE GLUCONATE 1 APPLICATION(S): 213 SOLUTION TOPICAL at 05:10

## 2020-06-21 RX ADMIN — Medication 1 PATCH: at 07:55

## 2020-06-21 RX ADMIN — ENOXAPARIN SODIUM 40 MILLIGRAM(S): 100 INJECTION SUBCUTANEOUS at 11:06

## 2020-06-21 RX ADMIN — Medication 600 MILLIGRAM(S): at 00:10

## 2020-06-21 RX ADMIN — FAMOTIDINE 20 MILLIGRAM(S): 10 INJECTION INTRAVENOUS at 05:11

## 2020-06-21 NOTE — PROGRESS NOTE ADULT - ASSESSMENT
38yo F c PMHx alcohol abuse, alcoholic liver disease, chronic pancreatitis, depression, anxiety here with severe sepsis poa 2/2 c diff colitis + alcohol withdrawal    continue po ativan at 0.5mg q 6hr for now with 1mg PRN for breakthrough withdrawal symptoms, ciwa monitoring per floor nursing protocol  thiamine/ folate/ vitamin C for suspected vitamin deficiencies   c/w spironolactone / lasix in 5:2 ratio, monitor i/o  discriminant factor does not warrant steroid use  analgesia for abdominal pain with hold parameters for lethargy  oral vanco for c diff  contact precautions  repeat paracentesis not consistent with SBP, trend fever curve and wbc on current regimen  appreciated ID f/u, abx escalated to IV merrem in light of borderline hypotension overnight  pancreatic imaging with pseudocysts but no necrosis or gas formation   appreciated GI, nutrition, ID, crit input and follow up  high risk but at least at this moment does not appear to be in DT's  low threshhold for re-escalation to icu care, agree with floor care at this moment\        #Progress Note Handoff    Pending (specify):  likely downtitration of benzo tomorrow, c/w IV abx for now and po vanco, SW f/u regarding etoh abuse, still medically acute/ must completely defervesce and having improved hemodynamics    Family discussion: plan of care discussed with patient    Disposition: ACUTE

## 2020-06-21 NOTE — PROGRESS NOTE ADULT - ASSESSMENT
39 year old female with a history of EtOH abuse, Alcoholic liver disease, pancreatitis, depression, anxiety, and Recent C diff infection presented to the ED with chief c/o abdominal pain, distension and leg swelling found to have sepsis 2/2 persistent colitis    After previous recent hospitalization patient did not complete course of antibiotics at home. Started on PO vanc for C. diff and empirically on IV Rocephin for possible SBP (found to have ascites on presentation) but this was discontinued as paracentesis yielded 149 PMNs. Was admitted to ICU initially and downgraded to 3B 6/17. Never intubated or on presser. Patient was started on feeds and tolerated well. Diagnostic paracentesis performed and appears to be cirrhotic in nature.    # Sepsis in setting of C diff colitis, improving  - Tachycardic, elevated lactate on admission with appropriate resuscitation. Still with leukocytosis but diarrhea has resolved.   - Dignostic para 6/16 which yielded 149 PMNs, SBP ruled out. Source believed to be inadequately treated C. diff colitis as patient was non-compliant with antibiotic regimen.  - BCx NGTD  - Meropenem added 6/21 due to overnight fever, c/w PO Vanc 125 mL q6h  - Now tolerating PO diet, s/p 1.5 L IVF (6/19) , trending lactate 2.8-> 3.0   - Critical Care: Pt tachy but stable, f/u with attending but likely not candidate for ICU at this time  - High risk of decompensation, monitor for hemodynamic instability    # Pancreatitis in setting of active alcohol abuse with ascites, likely cirrhosis present, pancreatitis resolved  - Drinks 4 cups of vodka daily in addition to recreational drug use (cocaine positive on UDS). Last drink 6/14  - CT Abd and Pelvis Contrast (6/15): colitis; no evidence of abscess. Pancreatic pseudocysts. Sscites. Hepatic steatosis  - Ascites amylase:serum amylase 0.4, consistent with pancreatitis although serum lipase WNL  - U/s abdomen: (-) for Budd Chiari  - Hypoalbuminemia present, likely result of poor nutrition mixed with synthesis issue. INR 1.54, platelets 270    - Tolerating diet now  -  C/w PO Ativan 0.5 mg q6hr, 1mg PRN for hallucination  - C/w folate, thiamine, MVI  - aldactone to 100, lasix 40mg daily. Do not hold if SBP >90   - Will need follow-up in Liver clinic after discharge in 2 weeks ([phone]). Will be discharged with recs for adequate protein intake      # Macrocytic anemia likely 2/2 alcohol abuse  - Continue on folate, thiamine  - Hgb 8.0 this AM    DVT ppx: Lovenox 40 mg qD  GI ppx: not indicated  Activity: As tolerated. Able to walk but unsteady  Diet: Regular  Code status: FULL CODE  Dispo: from home, likely will require substance abuse program on d/c  Follow-up: GI recs, CIWA monitoring    Handoff: High risk of decompensation, ICU for any hypotension

## 2020-06-21 NOTE — PROGRESS NOTE ADULT - SUBJECTIVE AND OBJECTIVE BOX
ADRIAN PRUETT  39y  Female      Patient is a 39y old  Female who presents with a chief complaint of Abdominal Pain, Leg swelling. (21 Jun 2020 08:57)      INTERVAL HPI/OVERNIGHT EVENTS: overnight hallucinations reported. feeling better today. expresses concern over when next dose of opiate medications due for abdominal pain but eating comfortably. no n/v      REVIEW OF SYSTEMS:  as above  All other review of systems negative    T(C): 36.8 (06-21-20 @ 13:52), Max: 38.2 (06-20-20 @ 21:38)  HR: 104 (06-21-20 @ 13:52) (100 - 123)  BP: 122/61 (06-21-20 @ 13:52) (77/38 - 122/61)  RR: 18 (06-21-20 @ 13:52) (18 - 18)  SpO2: 97% (06-20-20 @ 22:31) (97% - 97%)  Wt(kg): --Vital Signs Last 24 Hrs  T(C): 36.8 (21 Jun 2020 13:52), Max: 38.2 (20 Jun 2020 21:38)  T(F): 98.2 (21 Jun 2020 13:52), Max: 100.7 (20 Jun 2020 21:38)  HR: 104 (21 Jun 2020 13:52) (100 - 123)  BP: 122/61 (21 Jun 2020 13:52) (77/38 - 122/61)  BP(mean): --  RR: 18 (21 Jun 2020 13:52) (18 - 18)  SpO2: 97% (20 Jun 2020 22:31) (97% - 97%)      06-21-20 @ 07:01  -  06-21-20 @ 14:28  --------------------------------------------------------  IN: 120 mL / OUT: 0 mL / NET: 120 mL        PHYSICAL EXAM:  GENERAL: appears unwell for stated age/ chronically ill appearing   PSYCH: no agitation, baseline mentation currently no hallucinations  NERVOUS SYSTEM:  Alert & Oriented X3, no new focal deficits no tremor or tongue fasciculation  PULMONARY: Clear to percussion bilaterally; No rales, rhonchi, wheezing, or rubs  CARDIOVASCULAR: Regular rate and rhythm; No murmurs, rubs, or gallops  GI: Soft, Nontender, mildly distended; Bowel sounds present  EXTREMITIES:  2+ Peripheral Pulses, No clubbing, cyanosis,+ diffuse anasarca     Consultant(s) Notes Reviewed:  [x ] YES  [ ] NO    Discussed with Consultants/Other Providers [ x] YES     LABS                          8.0    15.44 )-----------( 234      ( 21 Jun 2020 04:30 )             24.6     06-21    138  |  101  |  4<L>  ----------------------------<  95  3.7   |  24  |  <0.5<L>    Ca    7.6<L>      21 Jun 2020 04:30  Phos  2.7     06-19  Mg     1.8     06-21    TPro  5.0<L>  /  Alb  1.7<L>  /  TBili  3.1<H>  /  DBili  x   /  AST  105<H>  /  ALT  22  /  AlkPhos  239<H>  06-21        PT/INR - ( 21 Jun 2020 04:30 )   PT: 17.70 sec;   INR: 1.54 ratio         PTT - ( 21 Jun 2020 04:30 )  PTT:36.6 sec  Lactate Trend  06-19 @ 05:55 Lactate:3.0   06-19 @ 00:52 Lactate:2.8   06-18 @ 16:00 Lactate:3.4         CAPILLARY BLOOD GLUCOSE            RADIOLOGY & ADDITIONAL TESTS:    Imaging Personally Reviewed:  [ ] YES  [ ] NO    HEALTH ISSUES - PROBLEM Dx:  Abdominal pain: Abdominal pain  Depression  Polysubstance dependence  Alcohol use disorder, severe, dependence  Cocaine use  Depression, major, in remission  Anxiety  Alcohol use disorder, moderate, dependence

## 2020-06-21 NOTE — PROGRESS NOTE ADULT - SUBJECTIVE AND OBJECTIVE BOX
DAILY PROGRESS NOTE  ===========================================================    Patient Information:  ADRIAN PRUETT  /  39y  /  Female  /  MRN#: [id number]    Hospital Day: 6d     |:::::::::::::::::::::::::::| SUBJECTIVE |:::::::::::::::::::::::::::|    OVERNIGHT EVENTS: Patient had 100.7 fever at 9:38 pm.  Endorsed continued visual hallucinations overnight.  TODAY: Patient complains of continued leg pain with movement and swelling. This AM hypotensive to 90/50 .    |:::::::::::::::::::::::::::| OBJECTIVE |:::::::::::::::::::::::::::|    VITAL SIGNS: Last 24 Hours  T(F): 97.2 (21 Jun 2020 07:19), Max: 100.7 (20 Jun 2020 21:38)  HR: 104 (21 Jun 2020 07:19) (97 - 123)  BP: 90/50 (21 Jun 2020 07:45) (77/38 - 102/54)  BP(mean): --  RR: 18 (21 Jun 2020 07:19) (16 - 18)  SpO2: 97% (20 Jun 2020 22:31) (97% - 97%)    PHYSICAL EXAM:  GENERAL:   Awake, alert; NAD.  HEENT:  Head NC/AT; Conjunctivae pink, Sclera anicteric; Oral mucosa moist.  CARDIO:   Regular rate; Regular rhythm; S1 & S2.  RESP:   No rales, wheezing, or rhonchi appreciated.  GI:   Soft; NT/ND; BS; No guarding; No rebound tenderness.  EXT:   Strength UE 5/5; Strength LE 5/5; 1+ pitting edema bilaterally in LE  SKIN:   Intact.    LAB RESULTS:                        8.0    15.44 )-----------( 234      ( 21 Jun 2020 04:30 )             24.6     06-21    138  |  101  |  4<L>  ----------------------------<  95  3.7   |  24  |  <0.5<L>    Ca    7.6<L>      21 Jun 2020 04:30  Phos  2.7     06-19  Mg     1.8     06-21    TPro  5.0<L>  /  Alb  1.7<L>  /  TBili  3.1<H>  /  DBili  x   /  AST  105<H>  /  ALT  22  /  AlkPhos  239<H>  06-21    PT/INR - ( 21 Jun 2020 04:30 )   PT: 17.70 sec;   INR: 1.54 ratio      PTT - ( 21 Jun 2020 04:30 )  PTT:36.6 sec    MICROBIOLOGY:    Culture - Body Fluid with Gram Stain (collected 19 Jun 2020 16:30)  Source: .Body Fluid Peritoneal Fluid  Gram Stain (20 Jun 2020 03:47):    No polymorphonuclear cells seen    No organisms seen    by cytocentrifuge  Preliminary Report (20 Jun 2020 19:11):    No growth    Culture - Blood (collected 18 Jun 2020 16:00)  Source: .Blood Blood  Preliminary Report (19 Jun 2020 23:01):    No growth to date.      RADIOLOGY:    ALLERGIES:  No Known Allergies      ===========================================================

## 2020-06-22 LAB
6-ACETYLMORPHINE, UR RESULT: NEGATIVE NG/ML — SIGNIFICANT CHANGE UP
6MAM UR CFM-MCNC: NEGATIVE NG/ML — SIGNIFICANT CHANGE UP
ALBUMIN SERPL ELPH-MCNC: 1.8 G/DL — LOW (ref 3.5–5.2)
ALP SERPL-CCNC: 248 U/L — HIGH (ref 30–115)
ALT FLD-CCNC: 22 U/L — SIGNIFICANT CHANGE UP (ref 0–41)
ANION GAP SERPL CALC-SCNC: 9 MMOL/L — SIGNIFICANT CHANGE UP (ref 7–14)
APTT BLD: 36 SEC — SIGNIFICANT CHANGE UP (ref 27–39.2)
AST SERPL-CCNC: 109 U/L — HIGH (ref 0–41)
BASOPHILS # BLD AUTO: 0.11 K/UL — SIGNIFICANT CHANGE UP (ref 0–0.2)
BASOPHILS NFR BLD AUTO: 0.7 % — SIGNIFICANT CHANGE UP (ref 0–1)
BENZOYLEGONINE, UR RESULT: 360 NG/ML — SIGNIFICANT CHANGE UP
BILIRUB SERPL-MCNC: 3.1 MG/DL — HIGH (ref 0.2–1.2)
BUN SERPL-MCNC: 3 MG/DL — LOW (ref 10–20)
BZE UR QL SCN: 360 NG/ML — SIGNIFICANT CHANGE UP
CALCIUM SERPL-MCNC: 7.9 MG/DL — LOW (ref 8.5–10.1)
CHLORIDE SERPL-SCNC: 100 MMOL/L — SIGNIFICANT CHANGE UP (ref 98–110)
CO2 SERPL-SCNC: 29 MMOL/L — SIGNIFICANT CHANGE UP (ref 17–32)
COCAINE IN-HOUSE INTERPRETATION: POSITIVE
COCAINE UR QL SCN: POSITIVE
CODEINE UR CFM-MCNC: NEGATIVE NG/ML — SIGNIFICANT CHANGE UP
CODEINE, UR RESULT: NEGATIVE NG/ML — SIGNIFICANT CHANGE UP
CREAT SERPL-MCNC: <0.5 MG/DL — LOW (ref 0.7–1.5)
EOSINOPHIL # BLD AUTO: 0.44 K/UL — SIGNIFICANT CHANGE UP (ref 0–0.7)
EOSINOPHIL NFR BLD AUTO: 2.9 % — SIGNIFICANT CHANGE UP (ref 0–8)
GLUCOSE SERPL-MCNC: 89 MG/DL — SIGNIFICANT CHANGE UP (ref 70–99)
HCT VFR BLD CALC: 28.8 % — LOW (ref 37–47)
HGB BLD-MCNC: 8.7 G/DL — LOW (ref 12–16)
HYDROCODONE UR QL CFM: NEGATIVE NG/ML — SIGNIFICANT CHANGE UP
HYDROCODONE, UR RESULT: NEGATIVE NG/ML — SIGNIFICANT CHANGE UP
HYDROMORPHONE UR QL CFM: NEGATIVE NG/ML — SIGNIFICANT CHANGE UP
HYDROMORPHONE, UR RESULT: NEGATIVE NG/ML — SIGNIFICANT CHANGE UP
IMM GRANULOCYTES NFR BLD AUTO: 0.5 % — HIGH (ref 0.1–0.3)
INR BLD: 1.53 RATIO — HIGH (ref 0.65–1.3)
LYMPHOCYTES # BLD AUTO: 1.55 K/UL — SIGNIFICANT CHANGE UP (ref 1.2–3.4)
LYMPHOCYTES # BLD AUTO: 10.3 % — LOW (ref 20.5–51.1)
MAGNESIUM SERPL-MCNC: 1.9 MG/DL — SIGNIFICANT CHANGE UP (ref 1.8–2.4)
MCHC RBC-ENTMCNC: 30.2 G/DL — LOW (ref 32–37)
MCHC RBC-ENTMCNC: 31.4 PG — HIGH (ref 27–31)
MCV RBC AUTO: 104 FL — HIGH (ref 81–99)
MONOCYTES # BLD AUTO: 1.17 K/UL — HIGH (ref 0.1–0.6)
MONOCYTES NFR BLD AUTO: 7.8 % — SIGNIFICANT CHANGE UP (ref 1.7–9.3)
MORPHINE UR QL CFM: >5000 NG/ML — SIGNIFICANT CHANGE UP
MORPHINE, UR RESULT: >5000 NG/ML — SIGNIFICANT CHANGE UP
NEUTROPHILS # BLD AUTO: 11.68 K/UL — HIGH (ref 1.4–6.5)
NEUTROPHILS NFR BLD AUTO: 77.8 % — HIGH (ref 42.2–75.2)
NOROXYCODONE (OPIATES), UR RESULT: NEGATIVE NG/ML — SIGNIFICANT CHANGE UP
NOROXYCODONE UR CFM-MCNC: NEGATIVE NG/ML — SIGNIFICANT CHANGE UP
NRBC # BLD: 0 /100 WBCS — SIGNIFICANT CHANGE UP (ref 0–0)
OPIATES IN-HOUSE INTERPRETATION: POSITIVE
OPIATES UR QL CFM: POSITIVE
OXYCODONE (OPIATES), UR RESULT: NEGATIVE NG/ML — SIGNIFICANT CHANGE UP
OXYCODONE UR-MCNC: NEGATIVE NG/ML — SIGNIFICANT CHANGE UP
OXYMORPHONE (OPIATES), UR RESULT: NEGATIVE NG/ML — SIGNIFICANT CHANGE UP
OXYMORPHONE UR CFM-MCNC: NEGATIVE NG/ML — SIGNIFICANT CHANGE UP
PLATELET # BLD AUTO: 251 K/UL — SIGNIFICANT CHANGE UP (ref 130–400)
POTASSIUM SERPL-MCNC: 4 MMOL/L — SIGNIFICANT CHANGE UP (ref 3.5–5)
POTASSIUM SERPL-SCNC: 4 MMOL/L — SIGNIFICANT CHANGE UP (ref 3.5–5)
PROT SERPL-MCNC: 5.3 G/DL — LOW (ref 6–8)
PROTHROM AB SERPL-ACNC: 17.6 SEC — HIGH (ref 9.95–12.87)
RBC # BLD: 2.77 M/UL — LOW (ref 4.2–5.4)
RBC # FLD: 17.2 % — HIGH (ref 11.5–14.5)
SODIUM SERPL-SCNC: 138 MMOL/L — SIGNIFICANT CHANGE UP (ref 135–146)
WBC # BLD: 15.02 K/UL — HIGH (ref 4.8–10.8)
WBC # FLD AUTO: 15.02 K/UL — HIGH (ref 4.8–10.8)

## 2020-06-22 PROCEDURE — 99232 SBSQ HOSP IP/OBS MODERATE 35: CPT

## 2020-06-22 PROCEDURE — 99233 SBSQ HOSP IP/OBS HIGH 50: CPT

## 2020-06-22 RX ORDER — OXYCODONE AND ACETAMINOPHEN 5; 325 MG/1; MG/1
2 TABLET ORAL ONCE
Refills: 0 | Status: DISCONTINUED | OUTPATIENT
Start: 2020-06-22 | End: 2020-06-22

## 2020-06-22 RX ORDER — FUROSEMIDE 40 MG
40 TABLET ORAL DAILY
Refills: 0 | Status: DISCONTINUED | OUTPATIENT
Start: 2020-06-22 | End: 2020-06-26

## 2020-06-22 RX ORDER — ASCORBIC ACID 60 MG
500 TABLET,CHEWABLE ORAL DAILY
Refills: 0 | Status: DISCONTINUED | OUTPATIENT
Start: 2020-06-22 | End: 2020-06-26

## 2020-06-22 RX ORDER — CEFTRIAXONE 500 MG/1
2000 INJECTION, POWDER, FOR SOLUTION INTRAMUSCULAR; INTRAVENOUS EVERY 24 HOURS
Refills: 0 | Status: DISCONTINUED | OUTPATIENT
Start: 2020-06-22 | End: 2020-06-26

## 2020-06-22 RX ORDER — SPIRONOLACTONE 25 MG/1
100 TABLET, FILM COATED ORAL DAILY
Refills: 0 | Status: DISCONTINUED | OUTPATIENT
Start: 2020-06-22 | End: 2020-06-26

## 2020-06-22 RX ADMIN — ENOXAPARIN SODIUM 40 MILLIGRAM(S): 100 INJECTION SUBCUTANEOUS at 12:11

## 2020-06-22 RX ADMIN — SPIRONOLACTONE 50 MILLIGRAM(S): 25 TABLET, FILM COATED ORAL at 05:31

## 2020-06-22 RX ADMIN — Medication 1 TABLET(S): at 08:29

## 2020-06-22 RX ADMIN — Medication 1 TABLET(S): at 12:10

## 2020-06-22 RX ADMIN — Medication 500 MILLIGRAM(S): at 12:10

## 2020-06-22 RX ADMIN — Medication 100 MILLIGRAM(S): at 12:10

## 2020-06-22 RX ADMIN — Medication 1 PATCH: at 20:15

## 2020-06-22 RX ADMIN — Medication 50 MICROGRAM(S): at 05:31

## 2020-06-22 RX ADMIN — FAMOTIDINE 20 MILLIGRAM(S): 10 INJECTION INTRAVENOUS at 17:19

## 2020-06-22 RX ADMIN — MORPHINE SULFATE 15 MILLIGRAM(S): 50 CAPSULE, EXTENDED RELEASE ORAL at 13:57

## 2020-06-22 RX ADMIN — GABAPENTIN 100 MILLIGRAM(S): 400 CAPSULE ORAL at 05:31

## 2020-06-22 RX ADMIN — Medication 1 TABLET(S): at 17:19

## 2020-06-22 RX ADMIN — OXYCODONE AND ACETAMINOPHEN 2 TABLET(S): 5; 325 TABLET ORAL at 23:37

## 2020-06-22 RX ADMIN — MORPHINE SULFATE 15 MILLIGRAM(S): 50 CAPSULE, EXTENDED RELEASE ORAL at 20:13

## 2020-06-22 RX ADMIN — Medication 1 PATCH: at 05:31

## 2020-06-22 RX ADMIN — Medication 1 MILLIGRAM(S): at 12:10

## 2020-06-22 RX ADMIN — GABAPENTIN 100 MILLIGRAM(S): 400 CAPSULE ORAL at 13:07

## 2020-06-22 RX ADMIN — Medication 0.5 MILLIGRAM(S): at 21:29

## 2020-06-22 RX ADMIN — Medication 20 MILLIGRAM(S): at 05:31

## 2020-06-22 RX ADMIN — Medication 1 PATCH: at 12:10

## 2020-06-22 RX ADMIN — Medication 125 MILLIGRAM(S): at 05:30

## 2020-06-22 RX ADMIN — Medication 125 MILLIGRAM(S): at 23:38

## 2020-06-22 RX ADMIN — FAMOTIDINE 20 MILLIGRAM(S): 10 INJECTION INTRAVENOUS at 05:31

## 2020-06-22 RX ADMIN — GABAPENTIN 100 MILLIGRAM(S): 400 CAPSULE ORAL at 21:29

## 2020-06-22 RX ADMIN — MEROPENEM 100 MILLIGRAM(S): 1 INJECTION INTRAVENOUS at 13:07

## 2020-06-22 RX ADMIN — Medication 125 MILLIGRAM(S): at 17:19

## 2020-06-22 RX ADMIN — Medication 125 MILLIGRAM(S): at 12:12

## 2020-06-22 RX ADMIN — Medication 0.5 MILLIGRAM(S): at 05:30

## 2020-06-22 RX ADMIN — CEFTRIAXONE 100 MILLIGRAM(S): 500 INJECTION, POWDER, FOR SOLUTION INTRAMUSCULAR; INTRAVENOUS at 18:25

## 2020-06-22 RX ADMIN — MEROPENEM 100 MILLIGRAM(S): 1 INJECTION INTRAVENOUS at 05:31

## 2020-06-22 NOTE — PROGRESS NOTE ADULT - ASSESSMENT
· Assessment		  39 year old female with a history of ETOH abuse, Alcoholic liver disease,  pancreatitis, depression, anxiety, Recent C diff infection who comes to the ED with chief c/o Abdominal pain, distension and leg swelling.     IMPRESSION;   Fevers : no SBP. Possibly secondary to pancreatitis  Pancolitis secondary to CD colitis which seems quiescent presently as pt has not had diarrhea.  Po vanomycin was supposed to go through 6/19  pt is non compliant to meds.  Abdominal pain seems multifactorial in etiology : chronic pancreatitis with multiple pseudocysts. Recent CD colitis with pancolitis on CT.   S/p paracentesis 6/16 with no SBP. Ascitic fluid cultures NG . Repeat paracentesis 6/18 no evidence of infection  No evidence of cholecystitis/cholangitis  CT 6/15 : Persistent  and slightly increased colonic thickening from cecum through transverse colon, compatible with known colitis; no evidence of abscess.   Unchanged generalized mesenteric edema, limiting assessment for peripancreatic inflammatory change. Unchanged multiple pancreatic fluid collections, measuring up to 2.4 cm, which may be represent pancreatic pseudocysts.  Paracentesis 6/19 : ascitic cx NG; WBC 33- no SBP  BCx 6/18 NGTD    RECOMMENDATIONS;   Rocephin 2 gm iv q24h.  po Vancomycin 125 mg q6h   will change to po ABx on discharge

## 2020-06-22 NOTE — PROGRESS NOTE ADULT - ASSESSMENT
39 year old female with a history of acute alcoholic pancreatitis, alcohol abuse,  presents with abdominal pain, leukocytosis, acute pancreatitis with developing pseudocysts, elevated LFTs and C diff    Recurrent pancreatitis with pseudocyst formation secondary to alcohol abuse  Persistent pain  but tolerating regular diet  fever resolved , tachycardia persistent  CA/ TGs are normal.  repeat CT scan shows no foci of gas on pseudocyst, no hemorrhage, same ascites, increase anasarca  ascitic  fluid 6/19 ( no SBP) , no organism , blood culture negative     Rec  - low fat solid diet  -optimize  hemodynamics  -F/U with ID , currently on Rocephin  - pain control      # First known episode of C diff :  On oral vancomycin 125 q6hrs (complete for 10 days), f/u with ID  probiotics bid x 2 months      # Alcoholic liver disease: Elevated LFTs are likely secondary to alcoholic hepatitis (>2:1 ratio between AST: ALT) with possible liver cirrhosis  MADDREY score: 21 so no role of steroids.  MRCP: CBD N, Sludge+ (last admission)  HBsAg, HBsAb, IgM/IgG, Anti HEV, Transferrin Saturation, Ceruloplasmin level, [place], SMA, AMA are all negative  Diagnostic tap : No SBP, consistent with portal hypertension  US duplex does not show Budd Chiari    Rec:  Complete alcohol abstinence   Daily LFTS and INR  aldactone  50, lasix 20mg daily, can titrate upto 100:40 if BP tolerates.    Can follow up in liver clinic after discharge. Please call us with any questions

## 2020-06-22 NOTE — PROGRESS NOTE ADULT - SUBJECTIVE AND OBJECTIVE BOX
ADRIAN PRUETT  39y  Female      Patient is a 39y old  Female who presents with a chief complaint of Abdominal Pain, Leg swelling. (22 Jun 2020 15:53)      INTERVAL HPI/OVERNIGHT EVENTS: c/o abdominal pain but was sleeping comfortably prior immediately prior to evaluation      REVIEW OF SYSTEMS:  as above  All other review of systems negative    T(C): 36.5 (06-22-20 @ 05:32), Max: 36.5 (06-22-20 @ 05:32)  HR: 101 (06-22-20 @ 12:19) (97 - 108)  BP: 106/56 (06-22-20 @ 12:19) (106/56 - 114/56)  RR: 18 (06-22-20 @ 12:19) (18 - 18)  SpO2: --  Wt(kg): --Vital Signs Last 24 Hrs  T(C): 36.5 (22 Jun 2020 05:32), Max: 36.5 (22 Jun 2020 05:32)  T(F): 97.7 (22 Jun 2020 05:32), Max: 97.7 (22 Jun 2020 05:32)  HR: 101 (22 Jun 2020 12:19) (97 - 108)  BP: 106/56 (22 Jun 2020 12:19) (106/56 - 114/56)  BP(mean): --  RR: 18 (22 Jun 2020 12:19) (18 - 18)  SpO2: --      06-21-20 @ 07:01  -  06-22-20 @ 07:00  --------------------------------------------------------  IN: 120 mL / OUT: 0 mL / NET: 120 mL        PHYSICAL EXAM:  GENERAL: appears ill for stated age  PSYCH: no agitation, baseline mentation  NERVOUS SYSTEM:  Alert & Oriented X3, no new focal deficits less tremor and fasciculations  PULMONARY: Clear to percussion bilaterally; No rales, rhonchi, wheezing, or rubs  CARDIOVASCULAR: Regular rate and rhythm; No murmurs, rubs, or gallops  GI: mildly distended, no rebound or guarding  EXTREMITIES:  2+ Peripheral Pulses, No clubbing, cyanosis, diffuse anasarca unchanged    Consultant(s) Notes Reviewed:  [x ] YES  [ ] NO    Discussed with Consultants/Other Providers [ x] YES     LABS                          8.7    15.02 )-----------( 251      ( 22 Jun 2020 05:39 )             28.8     06-22    138  |  100  |  3<L>  ----------------------------<  89  4.0   |  29  |  <0.5<L>    Ca    7.9<L>      22 Jun 2020 05:39  Mg     1.9     06-22    TPro  5.3<L>  /  Alb  1.8<L>  /  TBili  3.1<H>  /  DBili  x   /  AST  109<H>  /  ALT  22  /  AlkPhos  248<H>  06-22        PT/INR - ( 22 Jun 2020 05:39 )   PT: 17.60 sec;   INR: 1.53 ratio         PTT - ( 22 Jun 2020 05:39 )  PTT:36.0 sec  Lactate Trend  06-19 @ 05:55 Lactate:3.0   06-19 @ 00:52 Lactate:2.8   06-18 @ 16:00 Lactate:3.4         CAPILLARY BLOOD GLUCOSE            RADIOLOGY & ADDITIONAL TESTS:    Imaging Personally Reviewed:  [ ] YES  [ ] NO    HEALTH ISSUES - PROBLEM Dx:  Abdominal pain: Abdominal pain  Depression  Polysubstance dependence  Alcohol use disorder, severe, dependence  Cocaine use  Depression, major, in remission  Anxiety  Alcohol use disorder, moderate, dependence

## 2020-06-22 NOTE — PROGRESS NOTE ADULT - ASSESSMENT
ASSESSMENT/PLAN  39 year old female with a history of EtOH abuse, Alcoholic liver disease, pancreatitis, depression, anxiety, and Recent C diff infection presented to the ED with chief c/o abdominal pain, distension and leg swelling found to have sepsis 2/2 persistent colitis  -Sepsis in setting of C diff colitis, improving  -Pancreatitis in setting of active alcohol abuse with ascites, likely cirrhosis present, pancreatitis resolved  -Macrocytic anemia likely 2/2 alcohol abuse  -hypomagnesemia,   -elevated WBC  -anemia    PLAN  - monitor and document BMs  - consider adding vitamin C 500 mg daily po  --calorie count/ fluid volume restriction   check bmp/phos/mg and correct lytes

## 2020-06-22 NOTE — PROGRESS NOTE ADULT - SUBJECTIVE AND OBJECTIVE BOX
ADRIAN PRUETT  39y, Female    All available historical data reviewed    OVERNIGHT EVENTS:  no fevers  abdominal pain less  no other complaints    ROS:  General: Denies rigors, night sweats  HEENT: Denies headache, rhinorrhea, sore throat, eye pain  CV: Denies CP, palpitations  PULM: Denies wheezing, hemoptysis  GI: Denies hematemesis, hematochezia, melena  : Denies discharge, hematuria  MSK: Denies arthralgias, myalgias  SKIN: Denies rash, lesions  NEURO: Denies paresthesias, weakness  PSYCH: Denies depression, anxiety    VITALS:  T(F): 97.7, Max: 98.2 (06-21-20 @ 13:52)  HR: 108  BP: 114/56  RR: 18Vital Signs Last 24 Hrs  T(C): 36.5 (22 Jun 2020 05:32), Max: 36.8 (21 Jun 2020 13:52)  T(F): 97.7 (22 Jun 2020 05:32), Max: 98.2 (21 Jun 2020 13:52)  HR: 108 (22 Jun 2020 05:32) (97 - 108)  BP: 114/56 (22 Jun 2020 05:32) (112/58 - 122/61)  BP(mean): --  RR: 18 (22 Jun 2020 05:32) (18 - 18)  SpO2: --    TESTS & MEASUREMENTS:                        8.7    15.02 )-----------( 251      ( 22 Jun 2020 05:39 )             28.8     06-22    138  |  100  |  3<L>  ----------------------------<  89  4.0   |  29  |  <0.5<L>    Ca    7.9<L>      22 Jun 2020 05:39  Mg     1.9     06-22    TPro  5.3<L>  /  Alb  1.8<L>  /  TBili  3.1<H>  /  DBili  x   /  AST  109<H>  /  ALT  22  /  AlkPhos  248<H>  06-22    LIVER FUNCTIONS - ( 22 Jun 2020 05:39 )  Alb: 1.8 g/dL / Pro: 5.3 g/dL / ALK PHOS: 248 U/L / ALT: 22 U/L / AST: 109 U/L / GGT: x             Culture - Body Fluid with Gram Stain (collected 06-19-20 @ 16:30)  Source: .Body Fluid Peritoneal Fluid  Gram Stain (06-20-20 @ 03:47):    No polymorphonuclear cells seen    No organisms seen    by cytocentrifuge  Preliminary Report (06-20-20 @ 19:11):    No growth    Culture - Blood (collected 06-18-20 @ 16:00)  Source: .Blood Blood  Preliminary Report (06-19-20 @ 23:01):    No growth to date.    Culture - Fungal, Body Fluid (collected 06-16-20 @ 12:20)  Source: .Body Fluid Peritoneal Fluid  Preliminary Report (06-17-20 @ 08:59):    Testing in progress    Culture - Body Fluid with Gram Stain (collected 06-16-20 @ 12:20)  Source: .Body Fluid Peritoneal Fluid  Gram Stain (06-17-20 @ 04:14):    polymorphonuclear leukocytes seen    No organisms seen    by cytocentrifuge  Final Report (06-21-20 @ 17:48):    No growth at 5 days    Culture - Blood (collected 06-15-20 @ 11:11)  Source: .Blood None  Final Report (06-20-20 @ 23:00):    No Growth Final            RADIOLOGY & ADDITIONAL TESTS:  Personal review of radiological diagnostics performed  Echo and EKG results noted when applicable.     MEDICATIONS:  ascorbic acid 500 milliGRAM(s) Oral daily  chlorhexidine 4% Liquid 1 Application(s) Topical <User Schedule>  enoxaparin Injectable 40 milliGRAM(s) SubCutaneous daily  famotidine    Tablet 20 milliGRAM(s) Oral two times a day  folic acid 1 milliGRAM(s) Oral daily  furosemide    Tablet 20 milliGRAM(s) Oral daily  gabapentin 100 milliGRAM(s) Oral three times a day  lactobacillus acidophilus 1 Tablet(s) Oral two times a day with meals  levothyroxine 50 MICROGram(s) Oral daily  LORazepam     Tablet 1 milliGRAM(s) Oral every 4 hours PRN  LORazepam     Tablet 0.5 milliGRAM(s) Oral every 6 hours  meropenem  IVPB 1000 milliGRAM(s) IV Intermittent every 8 hours  meropenem  IVPB      morphine   Solution 15 milliGRAM(s) Oral every 4 hours PRN  multivitamin 1 Tablet(s) Oral daily  nicotine - 21 mG/24Hr(s) Patch 1 patch Transdermal daily  spironolactone 50 milliGRAM(s) Oral daily  thiamine 100 milliGRAM(s) Oral daily  vancomycin    Solution 125 milliGRAM(s) Oral every 6 hours      ANTIBIOTICS:  meropenem  IVPB 1000 milliGRAM(s) IV Intermittent every 8 hours  meropenem  IVPB      vancomycin    Solution 125 milliGRAM(s) Oral every 6 hours

## 2020-06-22 NOTE — PROGRESS NOTE ADULT - SUBJECTIVE AND OBJECTIVE BOX
DAILY PROGRESS NOTE  ===========================================================    Patient Information:  ADRIAN PRUETT  /  39y  /  Female  /  MRN#: [id number]    Hospital Day: 7d     |:::::::::::::::::::::::::::| SUBJECTIVE |:::::::::::::::::::::::::::|    OVERNIGHT EVENTS: C/o hallucination overnight 3mg ativan given.  TODAY: Patient was seen today at bedside. Review of systems is otherwise negative.    |:::::::::::::::::::::::::::| OBJECTIVE |:::::::::::::::::::::::::::|    VITAL SIGNS: Last 24 Hours  T(C): 36.5 (22 Jun 2020 05:32), Max: 36.8 (21 Jun 2020 13:52)  T(F): 97.7 (22 Jun 2020 05:32), Max: 98.2 (21 Jun 2020 13:52)  HR: 108 (22 Jun 2020 05:32) (97 - 108)  BP: 114/56 (22 Jun 2020 05:32) (112/58 - 122/61)  BP(mean): --  RR: 18 (22 Jun 2020 05:32) (18 - 18)  SpO2: --    06-21-20 @ 07:01  -  06-22-20 @ 07:00  --------------------------------------------------------  IN: 120 mL / OUT: 0 mL / NET: 120 mL      PHYSICAL EXAM:  GENERAL:   Awake, alert; NAD.  HEENT:  Head NC/AT; Conjunctivae pink, Sclera anicteric; Oral mucosa moist.  CARDIO:   Regular rate; Regular rhythm; S1 & S2.  RESP:   No rales, wheezing, or rhonchi appreciated.  GI:   Soft; NT/ND; BS; No guarding; No rebound tenderness.  EXT:   Strength UE 5/5; Strength LE 5/5; No edema.   SKIN:   Intact.    LAB RESULTS:                        8.7    15.02 )-----------( 251      ( 22 Jun 2020 05:39 )             28.8     06-22    138  |  100  |  3<L>  ----------------------------<  89  4.0   |  29  |  <0.5<L>    Ca    7.9<L>      22 Jun 2020 05:39  Mg     1.9     06-22    TPro  5.3<L>  /  Alb  1.8<L>  /  TBili  3.1<H>  /  DBili  x   /  AST  109<H>  /  ALT  22  /  AlkPhos  248<H>  06-22    PT/INR - ( 22 Jun 2020 05:39 )   PT: 17.60 sec;   INR: 1.53 ratio         PTT - ( 22 Jun 2020 05:39 )  PTT:36.0 sec      MICROBIOLOGY:    Culture - Fungal, Body Fluid (collected 19 Jun 2020 16:30)  Source: .Body Fluid Peritoneal Fluid  Preliminary Report (22 Jun 2020 11:03):    Testing in progress    Culture - Body Fluid with Gram Stain (collected 19 Jun 2020 16:30)  Source: .Body Fluid Peritoneal Fluid  Gram Stain (20 Jun 2020 03:47):    No polymorphonuclear cells seen    No organisms seen    by cytocentrifuge  Preliminary Report (20 Jun 2020 19:11):    No growth      RADIOLOGY:    ALLERGIES:  No Known Allergies      ===========================================================

## 2020-06-22 NOTE — PROGRESS NOTE ADULT - ASSESSMENT
40yo F c PMHx alcohol abuse, alcoholic liver disease, chronic pancreatitis, depression, anxiety here with severe sepsis poa 2/2 c diff colitis + alcohol withdrawal    continue po ativan at 0.5mg but change to q8hr for now with 1mg PRN for breakthrough withdrawal symptoms, ciwa monitoring per floor nursing protocol  thiamine/ folate/ vitamin C for suspected vitamin deficiencies   c/w spironolactone / lasix in 5:2 ratio, monitor i/o; increasing to spironolactone 100mgqd and lasix 40mg qd, follow pressures and edema  discriminant factor does not warrant steroid use  analgesia for abdominal pain with hold parameters for lethargy  oral vanco for c diff  contact precautions  repeat paracentesis not consistent with SBP, trend fever curve and wbc on current regimen  appreciated ID f/u, changed to iv ceftriaxone, continue po vanco for now, monitor consistency of BM's, fever curve and wbc  pancreatic imaging with pseudocysts but no necrosis or gas formation   high risk but at least at this moment does not appear to be in DT's    #Progress Note Handoff    Pending (specify):  brittanieerly deescalating standing benzo, uptitrating diuretic, changing antibiotics coverage; will consider discharge in 48hr if patient continues to progressively improve    Family discussion: plan of care discussed with patient    Disposition: ACUTE

## 2020-06-22 NOTE — PROGRESS NOTE ADULT - SUBJECTIVE AND OBJECTIVE BOX
Gastroenterology progress note:     Patient is a 39y old  Female who presents with a chief complaint of Abdominal Pain, Leg swelling. (22 Jun 2020 11:51)       Admitted on: 06-15-20    We are following the patient for acute on chronic pancreatitis     Interval History: patient's abdominal pain is still persistent. She is tolerating regular diet. No vomiting and having regular BM    Patient's medical problems are improving   Prior records reviewed (Y/N): Y  History obtained from someone other than patient (Y/N): N      PAST MEDICAL & SURGICAL HISTORY:  ETOH abuse  Postpartum depression  Substance abuse  Anxiety  Depression  No significant past surgical history      MEDICATIONS  (STANDING):  ascorbic acid 500 milliGRAM(s) Oral daily  chlorhexidine 4% Liquid 1 Application(s) Topical <User Schedule>  enoxaparin Injectable 40 milliGRAM(s) SubCutaneous daily  famotidine    Tablet 20 milliGRAM(s) Oral two times a day  folic acid 1 milliGRAM(s) Oral daily  furosemide    Tablet 20 milliGRAM(s) Oral daily  gabapentin 100 milliGRAM(s) Oral three times a day  lactobacillus acidophilus 1 Tablet(s) Oral two times a day with meals  levothyroxine 50 MICROGram(s) Oral daily  LORazepam     Tablet 0.5 milliGRAM(s) Oral every 6 hours  meropenem  IVPB 1000 milliGRAM(s) IV Intermittent every 8 hours  meropenem  IVPB      multivitamin 1 Tablet(s) Oral daily  nicotine - 21 mG/24Hr(s) Patch 1 patch Transdermal daily  spironolactone 50 milliGRAM(s) Oral daily  thiamine 100 milliGRAM(s) Oral daily  vancomycin    Solution 125 milliGRAM(s) Oral every 6 hours    MEDICATIONS  (PRN):  LORazepam     Tablet 1 milliGRAM(s) Oral every 4 hours PRN Agitation  morphine   Solution 15 milliGRAM(s) Oral every 4 hours PRN Severe Pain (7 - 10)      Allergies  No Known Allergies      Review of Systems:   Cardiovascular:  No Chest Pain, No Palpitations  Respiratory:  No Cough, No Dyspnea  Gastrointestinal:  As described in HPI    Physical Examination:  T(C): 36.5 (06-22-20 @ 05:32), Max: 36.8 (06-21-20 @ 13:52)  HR: 101 (06-22-20 @ 12:19) (97 - 108)  BP: 106/56 (06-22-20 @ 12:19) (106/56 - 122/61)  RR: 18 (06-22-20 @ 12:19) (18 - 18)  SpO2: --      06-21-20 @ 07:01  -  06-22-20 @ 07:00  --------------------------------------------------------  IN: 120 mL / OUT: 0 mL / NET: 120 mL      Constitutional: No acute distress.  Respiratory:  No signs of respiratory distress. Lung sounds are clear bilaterally.  Cardiovascular:  S1 S2, Regular rate and rhythm.  Abdominal: Abdomen is soft, symmetric, and diffuse tenderness with significant distension  Skin: Jaundice.        Data: (reviewed by attending)                        8.7    15.02 )-----------( 251      ( 22 Jun 2020 05:39 )             28.8     Hgb trend:  8.7  06-22-20 @ 05:39  8.0  06-21-20 @ 04:30  8.6  06-20-20 @ 16:57        06-22    138  |  100  |  3<L>  ----------------------------<  89  4.0   |  29  |  <0.5<L>    Ca    7.9<L>      22 Jun 2020 05:39  Mg     1.9     06-22    TPro  5.3<L>  /  Alb  1.8<L>  /  TBili  3.1<H>  /  DBili  x   /  AST  109<H>  /  ALT  22  /  AlkPhos  248<H>  06-22    Liver panel trend:  TBili 3.1   /      /   ALT 22   /   AlkP 248   /   Tptn 5.3   /   Alb 1.8    /   DBili --      06-22  TBili 3.1   /      /   ALT 22   /   AlkP 239   /   Tptn 5.0   /   Alb 1.7    /   DBili --      06-21  TBili 3.2   /      /   ALT 21   /   AlkP 236   /   Tptn 4.7   /   Alb 1.6    /   DBili --      06-20  TBili 3.3   /      /   ALT 22   /   AlkP 267   /   Tptn 5.2   /   Alb 1.9    /   DBili 2.4      06-19  TBili 3.0   /      /   ALT 20   /   AlkP 243   /   Tptn 4.7   /   Alb 1.8    /   DBili 2.0      06-19  TBili 2.9   /      /   ALT 22   /   AlkP 253   /   Tptn 4.9   /   Alb 1.7    /   DBili 2.2      06-18  TBili 3.3   /   AST 98   /   ALT 21   /   AlkP 234   /   Tptn 4.7   /   Alb 1.6    /   DBili 2.3      06-17  TBili 3.5   /   AST 86   /   ALT 22   /   AlkP 252   /   Tptn 5.2   /   Alb 1.9    /   DBili 2.7      06-16  TBili 3.6   /   AST 96   /   ALT 24   /   AlkP 279   /   Tptn 5.7   /   Alb 2.0    /   DBili --      06-15  TBili 3.0   /   AST 91   /   ALT 23   /   AlkP 252   /   Tptn 5.3   /   Alb 2.0    /   DBili --      06-15  TBili 3.1   /   AST 97   /   ALT 24   /   AlkP 268   /   Tptn 5.2   /   Alb 2.0    /   DBili 2.3      06-15  TBili 5.1   /   AST 90   /   ALT 23   /   AlkP 273   /   Tptn 5.9   /   Alb 2.2    /   DBili 3.5      06-13      PT/INR - ( 22 Jun 2020 05:39 )   PT: 17.60 sec;   INR: 1.53 ratio         PTT - ( 22 Jun 2020 05:39 )  PTT:36.0 sec    Culture - Fungal, Body Fluid (collected 19 Jun 2020 16:30)  Source: .Body Fluid Peritoneal Fluid  Preliminary Report (22 Jun 2020 11:03):    Testing in progress    Culture - Body Fluid with Gram Stain (collected 19 Jun 2020 16:30)  Source: .Body Fluid Peritoneal Fluid  Gram Stain (20 Jun 2020 03:47):    No polymorphonuclear cells seen    No organisms seen    by cytocentrifuge  Preliminary Report (20 Jun 2020 19:11):    No growth         Radiology: (reviewed by attending)

## 2020-06-22 NOTE — PROGRESS NOTE ADULT - ASSESSMENT
39 year old female with a history of EtOH abuse, Alcoholic liver disease, pancreatitis, depression, anxiety, and Recent C diff infection presented to the ED with chief c/o abdominal pain, distension and leg swelling found to have sepsis 2/2 persistent colitis    After previous recent hospitalization patient did not complete course of antibiotics at home. Started on PO vanc for C. diff and empirically on IV Rocephin for possible SBP (found to have ascites on presentation) but this was discontinued as paracentesis yielded 149 PMNs. Was admitted to ICU initially and downgraded to 3B 6/17. Never intubated or on presser. Patient was started on feeds and tolerated well. Diagnostic paracentesis performed and appears to be cirrhotic in nature.    # Sepsis in setting of C diff colitis, improving  - Tachycardic, elevated lactate on admission with appropriate resuscitation. Still with leukocytosis but diarrhea has resolved.   - Dignostic para 6/16 which yielded 149 PMNs, SBP ruled out. Source believed to be inadequately treated C. diff colitis as patient was non-compliant with antibiotic regimen.  - BCx NGTD  - Meropenem added 6/21 due to overnight fever, c/w PO Vanc 125 mL q6h  - Now tolerating PO diet, s/p 1.5 L IVF (6/19) , trending lactate 2.8-> 3.0   - Critical Care: Pt tachy but stable, f/u with attending but likely not candidate for ICU at this time  - High risk of decompensation, monitor for hemodynamic instability    # Pancreatitis in setting of active alcohol abuse with ascites, likely cirrhosis present, pancreatitis resolved  - Drinks 4 cups of vodka daily in addition to recreational drug use (cocaine positive on UDS). Last drink 6/14  - CT Abd and Pelvis Contrast (6/15): colitis; no evidence of abscess. Pancreatic pseudocysts. Sscites. Hepatic steatosis  - Ascites amylase:serum amylase 0.4, consistent with pancreatitis although serum lipase WNL  - U/s abdomen: (-) for Budd Chiari  - Hypoalbuminemia present, likely result of poor nutrition mixed with synthesis issue. INR 1.54, platelets 270    - Tolerating diet now  -  C/w PO Ativan 0.5 mg q6hr, 1mg PRN for hallucination  - C/w folate, thiamine, MVI  - aldactone to 100, lasix 40mg daily. Do not hold if SBP >90   - Will need follow-up in Liver clinic after discharge in 2 weeks ([phone]). Will be discharged with recs for adequate protein intake      # Macrocytic anemia likely 2/2 alcohol abuse  - Continue on folate, thiamine  - Hgb 8.0 this AM    DVT ppx: Lovenox 40 mg qD  GI ppx: not indicated  Activity: As tolerated. Able to walk but unsteady  Diet: Regular  Code status: FULL CODE  Dispo: from home, likely will require substance abuse program on d/c  Follow-up: GI recs, CIWA monitoring    Handoff: High risk of decompensation, ICU for any hypotension 39 year old female with a history of EtOH abuse, Alcoholic liver disease, pancreatitis, depression, anxiety, and Recent C diff infection presented to the ED with chief c/o abdominal pain, distension and leg swelling found to have sepsis 2/2 persistent colitis    After previous recent hospitalization patient did not complete course of antibiotics at home. Started on PO vanc for C. diff and empirically on IV Rocephin for possible SBP (found to have ascites on presentation) but this was discontinued as paracentesis yielded 149 PMNs. Was admitted to ICU initially and downgraded to 3B 6/17. Never intubated or on presser. Patient was started on feeds and tolerated well. Diagnostic paracentesis performed and appears to be cirrhotic in nature.    # Sepsis in setting of C diff colitis, improving  - Tachycardic, elevated lactate on admission with appropriate resuscitation. Still with leukocytosis but diarrhea has resolved.   - Dignostic para 6/16 which yielded 149 PMNs, SBP ruled out. Source believed to be inadequately treated C. diff colitis as patient was non-compliant with antibiotic regimen.  - BCx NGTD  - Meropenem added 6/21 due to overnight fever, c/w PO Vanc 125 mL q6h  - Now tolerating PO diet, s/p 1.5 L IVF (6/19) , trending lactate 2.8-> 3.0   - Critical Care: Pt tachy but stable, f/u with attending but likely not candidate for ICU at this time  - High risk of decompensation, monitor for hemodynamic instability    # Pancreatitis in setting of active alcohol abuse with ascites, likely cirrhosis present, pancreatitis resolved  - Drinks 4 cups of vodka daily in addition to recreational drug use (cocaine positive on UDS). Last drink 6/14  - CT Abd and Pelvis Contrast (6/15): colitis; no evidence of abscess. Pancreatic pseudocysts. Sscites. Hepatic steatosis  - Ascites amylase:serum amylase 0.4, consistent with pancreatitis although serum lipase WNL  - U/s abdomen: (-) for Budd Chiari  - Hypoalbuminemia present, likely result of poor nutrition mixed with synthesis issue. INR 1.54, platelets 270    - Tolerating diet now  -  C/w PO Ativan 0.5 mg q6hr, 1mg PRN for hallucination  - C/w folate, thiamine, MVI  - Aldactone to 100mg , lasix 40mg daily. Do not hold if SBP >90   - Will need follow-up in Liver clinic after discharge in 2 weeks ([phone]). Will be discharged with recs for adequate protein intake      # Macrocytic anemia likely 2/2 alcohol abuse  - Continue on folate, thiamine  - Hgb 8.0 this AM    DVT ppx: Lovenox 40 mg qD  GI ppx: not indicated  Activity: As tolerated. Able to walk but unsteady  Diet: Regular  Code status: FULL CODE  Dispo: from home, likely will require substance abuse program on d/c  Follow-up: GI recs, CIWA monitoring    Handoff: High risk of decompensation, ICU for any hypotension

## 2020-06-22 NOTE — PROGRESS NOTE ADULT - SUBJECTIVE AND OBJECTIVE BOX
Patient is a 39y old  Female who presents with a chief complaint of Abdominal Pain, Leg swelling. (22 Jun 2020 12:47)  pt seen and evaluated  on po diet    GI f/u noted  ICU Vital Signs Last 24 Hrs  T(C): 36.5 (22 Jun 2020 05:32), Max: 36.5 (22 Jun 2020 05:32)  T(F): 97.7 (22 Jun 2020 05:32), Max: 97.7 (22 Jun 2020 05:32)  HR: 101 (22 Jun 2020 12:19) (97 - 108)  BP: 106/56 (22 Jun 2020 12:19) (106/56 - 114/56)  RR: 18 (22 Jun 2020 12:19) (18 - 18)    Drug Dosing Weight  Height (cm): 162.6 (15 Aaron 2020 09:45)  Weight (kg): 74.2 (15 Aaron 2020 09:45)  BMI (kg/m2): 28.1 (15 Aaron 2020 09:45)  BSA (m2): 1.8 (15 Aaron 2020 09:45)    I&O's Detail    21 Jun 2020 07:01  -  22 Jun 2020 07:00  --------------------------------------------------------  IN:    Oral Fluid: 120 mL  Total IN: 120 mL    OUT:  Total OUT: 0 mL    Total NET: 120 mL       PHYSICAL EXAM:  Constitutional:  alert and awake , talkative  Gastrointestinal:  distended, soft  Extremities +edema  MEDICATIONS  (STANDING):  ascorbic acid 500 milliGRAM(s) Oral daily  chlorhexidine 4% Liquid 1 Application(s) Topical <User Schedule>  enoxaparin Injectable 40 milliGRAM(s) SubCutaneous daily  famotidine    Tablet 20 milliGRAM(s) Oral two times a day  folic acid 1 milliGRAM(s) Oral daily  furosemide    Tablet 20 milliGRAM(s) Oral daily  gabapentin 100 milliGRAM(s) Oral three times a day  lactobacillus acidophilus 1 Tablet(s) Oral two times a day with meals  levothyroxine 50 MICROGram(s) Oral daily  LORazepam     Tablet 0.5 milliGRAM(s) Oral every 6 hours  meropenem  IVPB 1000 milliGRAM(s) IV Intermittent every 8 hours  meropenem  IVPB      multivitamin 1 Tablet(s) Oral daily  nicotine - 21 mG/24Hr(s) Patch 1 patch Transdermal daily  spironolactone 50 milliGRAM(s) Oral daily  thiamine 100 milliGRAM(s) Oral daily  vancomycin    Solution 125 milliGRAM(s) Oral every 6 hours      Diet, Soft:   Low Fat (LOWFAT) (06-20-20 @ 15:47)      LABS  06-22    138  |  100  |  3<L>  ----------------------------<  89  4.0   |  29  |  <0.5<L>    Ca    7.9<L>      22 Jun 2020 05:39  Mg     1.9     06-22    TPro  5.3<L>  /  Alb  1.8<L>  /  TBili  3.1<H>  /  DBili  x   /  AST  109<H>  /  ALT  22  /  AlkPhos  248<H>  06-22                          8.7    15.02 )-----------( 251      ( 22 Jun 2020 05:39 )             28.8      RADIOLOGY STUDIES  < from: CT Abdomen and Pelvis w/ IV Cont (06.19.20 @ 12:39) >  IMPRESSION:  1.  Since Aida 15, 2020, unchanged pancreatic head and neck fluid collections, largest measuring 2.4 x 1.9 cm.   2.  Increased soft tissue anasarca.  3.  Unchanged moderate volume abdominopelvic ascites.  4.  Additional findings are unchangedon this short-term follow-up examination.

## 2020-06-23 LAB
ALBUMIN SERPL ELPH-MCNC: 1.6 G/DL — LOW (ref 3.5–5.2)
ALP SERPL-CCNC: 229 U/L — HIGH (ref 30–115)
ALT FLD-CCNC: 23 U/L — SIGNIFICANT CHANGE UP (ref 0–41)
ANION GAP SERPL CALC-SCNC: 11 MMOL/L — SIGNIFICANT CHANGE UP (ref 7–14)
APTT BLD: 36.7 SEC — SIGNIFICANT CHANGE UP (ref 27–39.2)
AST SERPL-CCNC: 118 U/L — HIGH (ref 0–41)
BASOPHILS # BLD AUTO: 0.13 K/UL — SIGNIFICANT CHANGE UP (ref 0–0.2)
BASOPHILS NFR BLD AUTO: 0.8 % — SIGNIFICANT CHANGE UP (ref 0–1)
BILIRUB SERPL-MCNC: 2.7 MG/DL — HIGH (ref 0.2–1.2)
BUN SERPL-MCNC: 3 MG/DL — LOW (ref 10–20)
CALCIUM SERPL-MCNC: 7.4 MG/DL — LOW (ref 8.5–10.1)
CHLORIDE SERPL-SCNC: 101 MMOL/L — SIGNIFICANT CHANGE UP (ref 98–110)
CO2 SERPL-SCNC: 28 MMOL/L — SIGNIFICANT CHANGE UP (ref 17–32)
CREAT SERPL-MCNC: <0.5 MG/DL — LOW (ref 0.7–1.5)
CULTURE RESULTS: SIGNIFICANT CHANGE UP
EOSINOPHIL # BLD AUTO: 0.31 K/UL — SIGNIFICANT CHANGE UP (ref 0–0.7)
EOSINOPHIL NFR BLD AUTO: 2 % — SIGNIFICANT CHANGE UP (ref 0–8)
GGT SERPL-CCNC: 324 U/L — HIGH (ref 1–40)
GLUCOSE SERPL-MCNC: 123 MG/DL — HIGH (ref 70–99)
HCT VFR BLD CALC: 26.1 % — LOW (ref 37–47)
HGB BLD-MCNC: 8.2 G/DL — LOW (ref 12–16)
IMM GRANULOCYTES NFR BLD AUTO: 0.7 % — HIGH (ref 0.1–0.3)
INR BLD: 1.58 RATIO — HIGH (ref 0.65–1.3)
LYMPHOCYTES # BLD AUTO: 1.67 K/UL — SIGNIFICANT CHANGE UP (ref 1.2–3.4)
LYMPHOCYTES # BLD AUTO: 10.9 % — LOW (ref 20.5–51.1)
MAGNESIUM SERPL-MCNC: 1.8 MG/DL — SIGNIFICANT CHANGE UP (ref 1.8–2.4)
MCHC RBC-ENTMCNC: 31.4 G/DL — LOW (ref 32–37)
MCHC RBC-ENTMCNC: 32.8 PG — HIGH (ref 27–31)
MCV RBC AUTO: 104.4 FL — HIGH (ref 81–99)
MONOCYTES # BLD AUTO: 1.28 K/UL — HIGH (ref 0.1–0.6)
MONOCYTES NFR BLD AUTO: 8.4 % — SIGNIFICANT CHANGE UP (ref 1.7–9.3)
NEUTROPHILS # BLD AUTO: 11.8 K/UL — HIGH (ref 1.4–6.5)
NEUTROPHILS NFR BLD AUTO: 77.2 % — HIGH (ref 42.2–75.2)
NRBC # BLD: 0 /100 WBCS — SIGNIFICANT CHANGE UP (ref 0–0)
PLATELET # BLD AUTO: 261 K/UL — SIGNIFICANT CHANGE UP (ref 130–400)
POTASSIUM SERPL-MCNC: 3.5 MMOL/L — SIGNIFICANT CHANGE UP (ref 3.5–5)
POTASSIUM SERPL-SCNC: 3.5 MMOL/L — SIGNIFICANT CHANGE UP (ref 3.5–5)
PROT SERPL-MCNC: 4.9 G/DL — LOW (ref 6–8)
PROTHROM AB SERPL-ACNC: 18.2 SEC — HIGH (ref 9.95–12.87)
RBC # BLD: 2.5 M/UL — LOW (ref 4.2–5.4)
RBC # FLD: 17.2 % — HIGH (ref 11.5–14.5)
SODIUM SERPL-SCNC: 140 MMOL/L — SIGNIFICANT CHANGE UP (ref 135–146)
SPECIMEN SOURCE: SIGNIFICANT CHANGE UP
WBC # BLD: 15.3 K/UL — HIGH (ref 4.8–10.8)
WBC # FLD AUTO: 15.3 K/UL — HIGH (ref 4.8–10.8)
ZINC SERPL-MCNC: 39 UG/DL — LOW (ref 56–134)

## 2020-06-23 PROCEDURE — 99233 SBSQ HOSP IP/OBS HIGH 50: CPT

## 2020-06-23 RX ORDER — MORPHINE SULFATE 50 MG/1
15 CAPSULE, EXTENDED RELEASE ORAL ONCE
Refills: 0 | Status: DISCONTINUED | OUTPATIENT
Start: 2020-06-23 | End: 2020-06-23

## 2020-06-23 RX ORDER — LACTULOSE 10 G/15ML
20 SOLUTION ORAL EVERY 4 HOURS
Refills: 0 | Status: DISCONTINUED | OUTPATIENT
Start: 2020-06-23 | End: 2020-06-23

## 2020-06-23 RX ORDER — LACTULOSE 10 G/15ML
20 SOLUTION ORAL DAILY
Refills: 0 | Status: DISCONTINUED | OUTPATIENT
Start: 2020-06-23 | End: 2020-06-26

## 2020-06-23 RX ADMIN — Medication 125 MILLIGRAM(S): at 05:10

## 2020-06-23 RX ADMIN — LACTULOSE 20 GRAM(S): 10 SOLUTION ORAL at 16:01

## 2020-06-23 RX ADMIN — Medication 50 MICROGRAM(S): at 05:10

## 2020-06-23 RX ADMIN — LACTULOSE 20 GRAM(S): 10 SOLUTION ORAL at 13:36

## 2020-06-23 RX ADMIN — Medication 125 MILLIGRAM(S): at 17:23

## 2020-06-23 RX ADMIN — Medication 1 TABLET(S): at 11:05

## 2020-06-23 RX ADMIN — MORPHINE SULFATE 15 MILLIGRAM(S): 50 CAPSULE, EXTENDED RELEASE ORAL at 08:00

## 2020-06-23 RX ADMIN — GABAPENTIN 100 MILLIGRAM(S): 400 CAPSULE ORAL at 05:10

## 2020-06-23 RX ADMIN — Medication 1 PATCH: at 11:05

## 2020-06-23 RX ADMIN — GABAPENTIN 100 MILLIGRAM(S): 400 CAPSULE ORAL at 21:13

## 2020-06-23 RX ADMIN — Medication 0.25 MILLIGRAM(S): at 21:14

## 2020-06-23 RX ADMIN — FAMOTIDINE 20 MILLIGRAM(S): 10 INJECTION INTRAVENOUS at 05:10

## 2020-06-23 RX ADMIN — MORPHINE SULFATE 15 MILLIGRAM(S): 50 CAPSULE, EXTENDED RELEASE ORAL at 19:54

## 2020-06-23 RX ADMIN — Medication 1 MILLIGRAM(S): at 15:09

## 2020-06-23 RX ADMIN — LACTULOSE 20 GRAM(S): 10 SOLUTION ORAL at 11:05

## 2020-06-23 RX ADMIN — Medication 1 PATCH: at 11:06

## 2020-06-23 RX ADMIN — ENOXAPARIN SODIUM 40 MILLIGRAM(S): 100 INJECTION SUBCUTANEOUS at 11:06

## 2020-06-23 RX ADMIN — Medication 1 MILLIGRAM(S): at 11:05

## 2020-06-23 RX ADMIN — MORPHINE SULFATE 15 MILLIGRAM(S): 50 CAPSULE, EXTENDED RELEASE ORAL at 16:00

## 2020-06-23 RX ADMIN — Medication 40 MILLIGRAM(S): at 05:10

## 2020-06-23 RX ADMIN — CEFTRIAXONE 100 MILLIGRAM(S): 500 INJECTION, POWDER, FOR SOLUTION INTRAMUSCULAR; INTRAVENOUS at 17:23

## 2020-06-23 RX ADMIN — Medication 125 MILLIGRAM(S): at 11:05

## 2020-06-23 RX ADMIN — FAMOTIDINE 20 MILLIGRAM(S): 10 INJECTION INTRAVENOUS at 17:23

## 2020-06-23 RX ADMIN — Medication 0.5 MILLIGRAM(S): at 05:13

## 2020-06-23 RX ADMIN — MORPHINE SULFATE 15 MILLIGRAM(S): 50 CAPSULE, EXTENDED RELEASE ORAL at 14:12

## 2020-06-23 RX ADMIN — Medication 1 TABLET(S): at 17:23

## 2020-06-23 RX ADMIN — Medication 100 MILLIGRAM(S): at 11:05

## 2020-06-23 RX ADMIN — GABAPENTIN 100 MILLIGRAM(S): 400 CAPSULE ORAL at 13:36

## 2020-06-23 RX ADMIN — SPIRONOLACTONE 100 MILLIGRAM(S): 25 TABLET, FILM COATED ORAL at 05:10

## 2020-06-23 RX ADMIN — Medication 500 MILLIGRAM(S): at 11:05

## 2020-06-23 RX ADMIN — MORPHINE SULFATE 15 MILLIGRAM(S): 50 CAPSULE, EXTENDED RELEASE ORAL at 11:04

## 2020-06-23 RX ADMIN — Medication 0.5 MILLIGRAM(S): at 13:36

## 2020-06-23 RX ADMIN — Medication 1 MILLIGRAM(S): at 21:14

## 2020-06-23 NOTE — PROGRESS NOTE ADULT - SUBJECTIVE AND OBJECTIVE BOX
ADRIAN PRUETT  39y, Female    All available historical data reviewed    OVERNIGHT EVENTS:  low grade fevers  abdominal pain    ROS:  General: Denies rigors, night sweats  HEENT: Denies headache, rhinorrhea, sore throat, eye pain  CV: Denies CP, palpitations  PULM: Denies wheezing, hemoptysis  GI: Denies hematemesis, hematochezia, melena  : Denies discharge, hematuria  MSK: + myalgias  SKIN: Denies rash, lesions  NEURO: + weakness  PSYCH: Denies depression, anxiety    VITALS:  T(F): 97.8, Max: 100.6 (06-22-20 @ 21:00)  HR: 112  BP: 109/54  RR: 18Vital Signs Last 24 Hrs  T(C): 36.6 (23 Jun 2020 05:13), Max: 38.1 (22 Jun 2020 21:00)  T(F): 97.8 (23 Jun 2020 05:13), Max: 100.6 (22 Jun 2020 21:00)  HR: 112 (23 Jun 2020 05:13) (101 - 117)  BP: 109/54 (23 Jun 2020 05:13) (98/52 - 109/54)  BP(mean): --  RR: 18 (23 Jun 2020 05:13) (18 - 18)  SpO2: --    TESTS & MEASUREMENTS:                        8.2    15.30 )-----------( 261      ( 23 Jun 2020 05:34 )             26.1     06-23    140  |  101  |  3<L>  ----------------------------<  123<H>  3.5   |  28  |  <0.5<L>    Ca    7.4<L>      23 Jun 2020 05:34  Mg     1.8     06-23    TPro  4.9<L>  /  Alb  1.6<L>  /  TBili  2.7<H>  /  DBili  x   /  AST  118<H>  /  ALT  23  /  AlkPhos  229<H>  06-23    LIVER FUNCTIONS - ( 23 Jun 2020 05:34 )  Alb: 1.6 g/dL / Pro: 4.9 g/dL / ALK PHOS: 229 U/L / ALT: 23 U/L / AST: 118 U/L / GGT: 324 U/L         Culture - Fungal, Body Fluid (collected 06-19-20 @ 16:30)  Source: .Body Fluid Peritoneal Fluid  Preliminary Report (06-22-20 @ 11:03):    Testing in progress    Culture - Body Fluid with Gram Stain (collected 06-19-20 @ 16:30)  Source: .Body Fluid Peritoneal Fluid  Gram Stain (06-20-20 @ 03:47):    No polymorphonuclear cells seen    No organisms seen    by cytocentrifuge  Preliminary Report (06-20-20 @ 19:11):    No growth    Culture - Blood (collected 06-18-20 @ 16:00)  Source: .Blood Blood  Preliminary Report (06-19-20 @ 23:01):    No growth to date.    Culture - Fungal, Body Fluid (collected 06-16-20 @ 12:20)  Source: .Body Fluid Peritoneal Fluid  Preliminary Report (06-17-20 @ 08:59):    Testing in progress    Culture - Body Fluid with Gram Stain (collected 06-16-20 @ 12:20)  Source: .Body Fluid Peritoneal Fluid  Gram Stain (06-17-20 @ 04:14):    polymorphonuclear leukocytes seen    No organisms seen    by cytocentrifuge  Final Report (06-21-20 @ 17:48):    No growth at 5 days            RADIOLOGY & ADDITIONAL TESTS:  Personal review of radiological diagnostics performed  Echo and EKG results noted when applicable.     MEDICATIONS:  ascorbic acid 500 milliGRAM(s) Oral daily  ascorbic acid 500 milliGRAM(s) Oral daily  cefTRIAXone   IVPB 2000 milliGRAM(s) IV Intermittent every 24 hours  chlorhexidine 4% Liquid 1 Application(s) Topical <User Schedule>  enoxaparin Injectable 40 milliGRAM(s) SubCutaneous daily  famotidine    Tablet 20 milliGRAM(s) Oral two times a day  folic acid 1 milliGRAM(s) Oral daily  furosemide    Tablet 40 milliGRAM(s) Oral daily  gabapentin 100 milliGRAM(s) Oral three times a day  lactobacillus acidophilus 1 Tablet(s) Oral two times a day with meals  lactulose Syrup 20 Gram(s) Oral every 4 hours  levothyroxine 50 MICROGram(s) Oral daily  LORazepam     Tablet 0.5 milliGRAM(s) Oral every 8 hours  LORazepam     Tablet 1 milliGRAM(s) Oral every 4 hours PRN  morphine   Solution 15 milliGRAM(s) Oral every 4 hours PRN  multivitamin 1 Tablet(s) Oral daily  nicotine - 21 mG/24Hr(s) Patch 1 patch Transdermal daily  spironolactone 100 milliGRAM(s) Oral daily  thiamine 100 milliGRAM(s) Oral daily  vancomycin    Solution 125 milliGRAM(s) Oral every 6 hours      ANTIBIOTICS:  cefTRIAXone   IVPB 2000 milliGRAM(s) IV Intermittent every 24 hours  vancomycin    Solution 125 milliGRAM(s) Oral every 6 hours

## 2020-06-23 NOTE — PROGRESS NOTE ADULT - SUBJECTIVE AND OBJECTIVE BOX
ADRIAN PRUETT  39y  Female      Patient is a 39y old  Female who presents with a chief complaint of Abdominal Pain, Leg swelling. (23 Jun 2020 09:42)      INTERVAL HPI/OVERNIGHT EVENTS: I awakened patient from sleeping as on prior exams. she wakes up and complains of poorly controlled epigastric pain. nonradiation, no nausea/vomiting. eating. says bm's wnl      REVIEW OF SYSTEMS:  as above  All other review of systems negative    T(C): 36.6 (06-23-20 @ 05:13), Max: 38.1 (06-22-20 @ 21:00)  HR: 112 (06-23-20 @ 05:13) (112 - 117)  BP: 109/54 (06-23-20 @ 05:13) (98/52 - 109/54)  RR: 18 (06-23-20 @ 05:13) (18 - 18)  SpO2: 96% (06-23-20 @ 16:28) (96% - 98%)  Wt(kg): --Vital Signs Last 24 Hrs  T(C): 36.6 (23 Jun 2020 05:13), Max: 38.1 (22 Jun 2020 21:00)  T(F): 97.8 (23 Jun 2020 05:13), Max: 100.6 (22 Jun 2020 21:00)  HR: 112 (23 Jun 2020 05:13) (112 - 117)  BP: 109/54 (23 Jun 2020 05:13) (98/52 - 109/54)  BP(mean): --  RR: 18 (23 Jun 2020 05:13) (18 - 18)  SpO2: 96% (23 Jun 2020 16:28) (96% - 98%)        PHYSICAL EXAM:  GENERAL: appears ill for stated age  PSYCH: no agitation, baseline mentation  NERVOUS SYSTEM:  Alert & Oriented X3, no new focal deficits, less tremors and tongue fasciculations than priorciwa   PULMONARY: Clear to percussion bilaterally; No rales, rhonchi, wheezing, or rubs  CARDIOVASCULAR: Regular rate and rhythm; No murmurs, rubs, or gallops  GI: distended with some epigastric tenderness but no rebound or guarding  EXTREMITIES:  2+ Peripheral Pulses, No clubbing, cyanosis, diffuse anasarca    Consultant(s) Notes Reviewed:  [x ] YES  [ ] NO    Discussed with Consultants/Other Providers [ x] YES     LABS                          8.2    15.30 )-----------( 261      ( 23 Jun 2020 05:34 )             26.1     06-23    140  |  101  |  3<L>  ----------------------------<  123<H>  3.5   |  28  |  <0.5<L>    Ca    7.4<L>      23 Jun 2020 05:34  Mg     1.8     06-23    TPro  4.9<L>  /  Alb  1.6<L>  /  TBili  2.7<H>  /  DBili  x   /  AST  118<H>  /  ALT  23  /  AlkPhos  229<H>  06-23        PT/INR - ( 23 Jun 2020 05:34 )   PT: 18.20 sec;   INR: 1.58 ratio         PTT - ( 23 Jun 2020 05:34 )  PTT:36.7 sec  Lactate Trend  06-19 @ 05:55 Lactate:3.0   06-19 @ 00:52 Lactate:2.8         CAPILLARY BLOOD GLUCOSE            RADIOLOGY & ADDITIONAL TESTS:    Imaging Personally Reviewed:  [ ] YES  [ ] NO    HEALTH ISSUES - PROBLEM Dx:  Abdominal pain: Abdominal pain  Depression  Polysubstance dependence  Alcohol use disorder, severe, dependence  Cocaine use  Depression, major, in remission  Anxiety  Alcohol use disorder, moderate, dependence

## 2020-06-23 NOTE — PROGRESS NOTE ADULT - SUBJECTIVE AND OBJECTIVE BOX
DAILY PROGRESS NOTE  ===========================================================    Patient Information:  ADRIAN PRUETT  /  39y  /  Female  /  MRN#: [id number]    Hospital Day: 8d     |:::::::::::::::::::::::::::| SUBJECTIVE |:::::::::::::::::::::::::::|    OVERNIGHT EVENTS: 100.6F fever overnight. Given percocet and acetaminophen. No hallucinations overnight (CIWA 0-1)  TODAY: Patient was seen today at bedside. Patient continues to endorse leg pain and generalized abdominal pain. She also indicates pain at paracentesis sites.    |:::::::::::::::::::::::::::| OBJECTIVE |:::::::::::::::::::::::::::|    VITAL SIGNS: Last 24 Hours  T(C): 36.6 (23 Jun 2020 05:13), Max: 38.1 (22 Jun 2020 21:00)  T(F): 97.8 (23 Jun 2020 05:13), Max: 100.6 (22 Jun 2020 21:00)  HR: 112 (23 Jun 2020 05:13) (101 - 117)  BP: 109/54 (23 Jun 2020 05:13) (98/52 - 109/54)  BP(mean): --  RR: 18 (23 Jun 2020 05:13) (18 - 18)  SpO2: --    PHYSICAL EXAM:  GENERAL:   Ill appearing  HEENT:  Head NC/AT; Conjunctivae pink, Sclera anicteric; Oral mucosa moist.  CARDIO:   Regular rate; Regular rhythm; S1 & S2.  RESP:   No rales, wheezing, or rhonchi appreciated.  GI:   Soft; (+) distention, (+) tenderness; BS; No guarding; No rebound tenderness.  EXT:   Strength UE 5/5; Strength LE 5/5; 1+ bilateral LE pitting edema  SKIN:   Intact.    LAB RESULTS:                        8.2    15.30 )-----------( 261      ( 23 Jun 2020 05:34 )             26.1     06-23    140  |  101  |  3<L>  ----------------------------<  123<H>  3.5   |  28  |  <0.5<L>    Ca    7.4<L>      23 Jun 2020 05:34  Mg     1.8     06-23    TPro  4.9<L>  /  Alb  1.6<L>  /  TBili  2.7<H>  /  DBili  x   /  AST  118<H>  /  ALT  23  /  AlkPhos  229<H>  06-23    PT/INR - ( 23 Jun 2020 05:34 )   PT: 18.20 sec;   INR: 1.58 ratio      PTT - ( 23 Jun 2020 05:34 )  PTT:36.7 sec    MICROBIOLOGY:    RADIOLOGY:    ALLERGIES: No Known Allergies      ===========================================================

## 2020-06-23 NOTE — PROGRESS NOTE ADULT - SUBJECTIVE AND OBJECTIVE BOX
Pain Management Progress Note -     Pt is a 39 year old female with a history of ETOH abuse, Alcoholic liver disease, Chronic pancreatitis, depression, anxiety, Recent C diff infection. Presented to the ED with abd pain.  who comes to the ED with chief c/o Abdominal pain, distension and leg swelling. Patient had a BM today. Patient c/o abd pain as 11/10. States the pain     No Known Allergies      ABDOMINAL PAIN  ^ABDOMINAL PAIN  No pertinent family history in first degree relatives  Handoff  MEWS Score  ETOH abuse  Postpartum depression  Substance abuse  Anxiety  Depression  No pertinent past medical history  Abdominal pain  Abdominal pain  Depression  Polysubstance dependence  Alcohol use disorder, severe, dependence  Cocaine use  Depression, major, in remission  Anxiety  Alcohol use disorder, moderate, dependence  Paracentesis, with US guidance  No significant past surgical history  ABDOMINAL PAIN  1  ETOH abuse  Lactic acidosis  Hypokalemia  Pancreatic pseudocyst  Colitis      chlorhexidine 4% Liquid  folic acid  thiamine  vancomycin    Solution  lactobacillus acidophilus  enoxaparin Injectable  gabapentin  multivitamin  famotidine    Tablet  nicotine - 21 mG/24Hr(s) Patch  levothyroxine  morphine   Solution  ascorbic acid  LORazepam     Tablet  ascorbic acid  cefTRIAXone   IVPB  LORazepam     Tablet  furosemide    Tablet  spironolactone  lactulose Syrup  LORazepam     Tablet      ascorbic acid 500 milliGRAM(s) Oral daily  ascorbic acid 500 milliGRAM(s) Oral daily  cefTRIAXone   IVPB 2000 milliGRAM(s) IV Intermittent every 24 hours  chlorhexidine 4% Liquid 1 Application(s) Topical <User Schedule>  enoxaparin Injectable 40 milliGRAM(s) SubCutaneous daily  famotidine    Tablet 20 milliGRAM(s) Oral two times a day  folic acid 1 milliGRAM(s) Oral daily  furosemide    Tablet 40 milliGRAM(s) Oral daily  gabapentin 100 milliGRAM(s) Oral three times a day  lactobacillus acidophilus 1 Tablet(s) Oral two times a day with meals  lactulose Syrup 20 Gram(s) Oral daily  levothyroxine 50 MICROGram(s) Oral daily  LORazepam     Tablet 0.25 milliGRAM(s) Oral every 8 hours  LORazepam     Tablet 0.5 milliGRAM(s) Oral every 8 hours  LORazepam     Tablet 1 milliGRAM(s) Oral every 4 hours PRN  morphine   Solution 15 milliGRAM(s) Oral every 4 hours PRN  multivitamin 1 Tablet(s) Oral daily  nicotine - 21 mG/24Hr(s) Patch 1 patch Transdermal daily  spironolactone 100 milliGRAM(s) Oral daily  thiamine 100 milliGRAM(s) Oral daily  vancomycin    Solution 125 milliGRAM(s) Oral every 6 hours      06-23 @ [id number]:[id number] mL/min/1.73M2          Hemoglobin: 8.2 g/dL (06-23 @ 05:34)  Hemoglobin: 8.7 g/dL (06-22 @ 05:39)        T(C): 36.6 (06-23-20 @ 05:13), Max: 38.1 (06-22-20 @ 21:00)  HR: 112 (06-23-20 @ 05:13) (112 - 117)  BP: 109/54 (06-23-20 @ 05:13) (98/52 - 109/54)  RR: 18 (06-23-20 @ 05:13) (18 - 18)  SpO2: 96% (06-23-20 @ 16:28) (96% - 98%)  Wt(kg): --     REVIEW OF SYSTEMS    General:	NAD   Skin/Breast:	Neg  Ophthalmologic:	Neg  ENMT: Neg	  Respiratory and Thorax: Neg	  Cardiovascular:	Neg  Gastrointestinal:	distention  Genitourinary:	Neg  Musculoskeletal:	Neg  Neurological:	Neg  Psychiatric:	Neg  Hematology/Lymphatics:	Neg  Endocrine:	Neg        PHYSICAL EXAM:    GENERAL: NAD, well-groomed, well-developed  HEAD:  Atraumatic, Normocephalic  EYES: EOMI, PERRLA, conjunctiva and sclera clear  ENMT: No tonsillar erythema, exudates, or enlargement; Moist mucous membranes, Good dentition, No lesions  NECK: Supple, No JVD, Normal thyroid  NERVOUS SYSTEM:  Alert & Oriented X3, Good concentration; Motor Strength 5/5 B/L upper and lower extremities  CHEST/LUNG: Clear to percussion bilaterally; No rales, rhonchi, wheezing, or rubs  HEART: Regular rate and rhythm; No murmurs, rubs, or gallops  ABDOMEN: Soft, Nontender, Nondistended; Bowel sounds present  EXTREMITIES:  No clubbing, cyanosis, or edema  LYMPH: No lymphadenopathy noted  SKIN: No rashes or lesions

## 2020-06-23 NOTE — PROGRESS NOTE ADULT - ASSESSMENT
· Assessment		  39 year old female with a history of ETOH abuse, Alcoholic liver disease,  pancreatitis, depression, anxiety, Recent C diff infection who comes to the ED with chief c/o Abdominal pain, distension and leg swelling.     IMPRESSION;   #Fevers : no SBP. Possibly secondary to pancreatitis    CT Abdomen and Pelvis w/ IV 6/18   Since Aida 15, 2020, unchanged pancreatic head and neck fluid collections, largest measuring 2.4 x 1.9 cm.    Increased soft tissue anasarca.  Unchanged moderate volume abdominopelvic ascites.    S/p paracentesis 6/16 with no SBP. Ascitic fluid cultures NG . Repeat paracentesis 6/18 no evidence of infection  No evidence of cholecystitis/cholangitis  Paracentesis 6/19 : ascitic cx NG; WBC 33- no SBP    Pancolitis secondary to CD colitis which seems quiescent presently as pt has not had diarrhea.   BCx 6/18 NGTD    RECOMMENDATIONS;   Rocephin 2 gm iv q24h.  po Vancomycin 125 mg q6h   From ID standpoint could hold rocephin/po vanco  f/u with GI as outpt

## 2020-06-23 NOTE — PROGRESS NOTE ADULT - ATTENDING COMMENTS
Patient seen and examined independently of resident. My addendum supersedes resident notation. Case discussed with housestaff, nursing and patient
I personally interviewed and examined patient.  To consider Ct aspiration of pancreas if fever persists
I personally interviewed and examined the patient CT scan done today report reviewed. In view of increased WBc count and tachycardia will rec broad antibiotic coverage and CT aspiration of pancreas to detect any infection. Also recommend transfer to ICU
I personally interviewed and examined the patient Tolerating diet pain less. Diagnostic tap no SBP.
Seen and examined with the pulmonary fellow at the bed side.  Impression and plan as outlined above.

## 2020-06-23 NOTE — PROGRESS NOTE ADULT - ASSESSMENT
40yo F c PMHx alcohol abuse, alcoholic liver disease, chronic pancreatitis, depression, anxiety here with severe sepsis poa 2/2 c diff colitis + alcohol withdrawal     decrease ativan dosing further as withdrawals appear to be improving, prn ativan in place if needed  patient had a low grade fever overnight, for now continue with IV abx and po vanco  if continues to spike fever and leukocytosis persists then please REPEAT CT abd/pelvis with iv contrast to reevaluate peripancreatic collections and r/o formation of a necrotic pseudocyst  thiamine/ folate/ vitamin C for suspected vitamin deficiencies   c/w spironolactone / lasix in 5:2 ratio, monitor i/o;  spironolactone 100mgqd and lasix 40mg qd, follow pressures and edema  discriminant factor does not warrant steroid use  analgesia for abdominal pain with hold parameters for lethargy  oral vanco for c diff  contact precautions  repeat paracentesis not consistent with SBP,      #Progress Note Handoff    Pending (specify):  gingerly deescalating standing benzo, continue antibiotics coverage; monitor fever curve and wbc; might need a repeat CT scan    Family discussion: plan of care discussed with patient    Disposition: acute

## 2020-06-23 NOTE — PROGRESS NOTE ADULT - ASSESSMENT
39 year old female with a history of EtOH abuse, Alcoholic liver disease, pancreatitis, depression, anxiety, and Recent C diff infection presented to the ED with chief c/o abdominal pain, distension and leg swelling found to have sepsis 2/2 persistent C. diff colitis.    # Sepsis in setting of C diff colitis, improving  - Tachycardic, elevated lactate on admission with appropriate resuscitation. Still with leukocytosis but diarrhea has resolved.   - Dignostic para 6/16 which yielded 149 PMNs, SBP ruled out. Source believed to be inadequately treated C. diff colitis due to abx noncompliance  - BCx NGTD  - On ceftriaxone and PO vancomycin  - High risk of decompensation, monitor for hemodynamic instability    # Pancreatitis in setting of active alcohol abuse with ascites, likely cirrhosis present, pancreatitis resolved  - Drinks 4 cups of vodka daily, + cocaine Utox. Last drink 6/14  - CT Abd and Pelvis Contrast (6/15): colitis; no evidence of abscess. Pancreatic pseudocysts. Sscites. Hepatic steatosis  - Ascites amylase:serum amylase 0.4, consistent with pancreatitis although serum lipase WNL  - U/s abdomen: (-) for Budd Chiari  - Hypoalbuminemia present, likely result of poor nutrition mixed with synthesis issue    - Tolerating diet now  -  Withdrawal symptoms improving: PO Ativan 0.5 mg q8hr, 1mg PRN for hallucination  - C/w folate, thiamine, MVI  - Aldactone to 100mg , lasix 40mg daily. Do not hold if SBP >90   - Will need follow-up in Liver clinic after discharge in 2 weeks ([phone]). Will be discharged with recs for adequate protein intake      # Macrocytic anemia likely 2/2 alcohol abuse  - Continue on folate, thiamine  - Hgb 8.0 this AM    DVT ppx: Lovenox 40 mg qD  GI ppx: not indicated  Activity: As tolerated. Able to walk but unsteady  Diet: Regular  Code status: FULL CODE  Dispo: from home, likely will require substance abuse program on d/c  Follow-up: GI recs, CIWA monitoring    Handoff: High risk of decompensation, ICU for any hypotension

## 2020-06-24 LAB
ALBUMIN SERPL ELPH-MCNC: 1.9 G/DL — LOW (ref 3.5–5.2)
ALP SERPL-CCNC: 253 U/L — HIGH (ref 30–115)
ALT FLD-CCNC: 25 U/L — SIGNIFICANT CHANGE UP (ref 0–41)
ANION GAP SERPL CALC-SCNC: 10 MMOL/L — SIGNIFICANT CHANGE UP (ref 7–14)
APTT BLD: 38.6 SEC — SIGNIFICANT CHANGE UP (ref 27–39.2)
AST SERPL-CCNC: 120 U/L — HIGH (ref 0–41)
B PERT IGG+IGM PNL SER: CLEAR — SIGNIFICANT CHANGE UP
BASOPHILS # BLD AUTO: 0.14 K/UL — SIGNIFICANT CHANGE UP (ref 0–0.2)
BASOPHILS NFR BLD AUTO: 0.8 % — SIGNIFICANT CHANGE UP (ref 0–1)
BILIRUB SERPL-MCNC: 3.5 MG/DL — HIGH (ref 0.2–1.2)
BUN SERPL-MCNC: <3 MG/DL — LOW (ref 10–20)
CALCIUM SERPL-MCNC: 7.8 MG/DL — LOW (ref 8.5–10.1)
CHLORIDE SERPL-SCNC: 97 MMOL/L — LOW (ref 98–110)
CO2 SERPL-SCNC: 29 MMOL/L — SIGNIFICANT CHANGE UP (ref 17–32)
COLOR FLD: YELLOW — SIGNIFICANT CHANGE UP
CREAT SERPL-MCNC: <0.5 MG/DL — LOW (ref 0.7–1.5)
CULTURE RESULTS: SIGNIFICANT CHANGE UP
EOSINOPHIL # BLD AUTO: 0.15 K/UL — SIGNIFICANT CHANGE UP (ref 0–0.7)
EOSINOPHIL NFR BLD AUTO: 0.9 % — SIGNIFICANT CHANGE UP (ref 0–8)
FLUID INTAKE SUBSTANCE CLASS: SIGNIFICANT CHANGE UP
FLUID SEGMENTED GRANULOCYTES: SIGNIFICANT CHANGE UP %
GLUCOSE BLDC GLUCOMTR-MCNC: 101 MG/DL — HIGH (ref 70–99)
GLUCOSE BLDC GLUCOMTR-MCNC: 119 MG/DL — HIGH (ref 70–99)
GLUCOSE BLDC GLUCOMTR-MCNC: 99 MG/DL — SIGNIFICANT CHANGE UP (ref 70–99)
GLUCOSE SERPL-MCNC: 108 MG/DL — HIGH (ref 70–99)
HCT VFR BLD CALC: 28.8 % — LOW (ref 37–47)
HGB BLD-MCNC: 8.8 G/DL — LOW (ref 12–16)
IMM GRANULOCYTES NFR BLD AUTO: 0.6 % — HIGH (ref 0.1–0.3)
INR BLD: 1.69 RATIO — HIGH (ref 0.65–1.3)
LYMPHOCYTES # BLD AUTO: 1.55 K/UL — SIGNIFICANT CHANGE UP (ref 1.2–3.4)
LYMPHOCYTES # BLD AUTO: 8.9 % — LOW (ref 20.5–51.1)
LYMPHOCYTES # FLD: SIGNIFICANT CHANGE UP
MAGNESIUM SERPL-MCNC: 1.8 MG/DL — SIGNIFICANT CHANGE UP (ref 1.8–2.4)
MCHC RBC-ENTMCNC: 30.6 G/DL — LOW (ref 32–37)
MCHC RBC-ENTMCNC: 31.3 PG — HIGH (ref 27–31)
MCV RBC AUTO: 102.5 FL — HIGH (ref 81–99)
MESOTHL CELL # FLD: SIGNIFICANT CHANGE UP %
MONOCYTES # BLD AUTO: 1.4 K/UL — HIGH (ref 0.1–0.6)
MONOCYTES NFR BLD AUTO: 8 % — SIGNIFICANT CHANGE UP (ref 1.7–9.3)
MONOS+MACROS # FLD: SIGNIFICANT CHANGE UP %
NEUTROPHILS # BLD AUTO: 14.13 K/UL — HIGH (ref 1.4–6.5)
NEUTROPHILS NFR BLD AUTO: 80.8 % — HIGH (ref 42.2–75.2)
NRBC # BLD: 0 /100 WBCS — SIGNIFICANT CHANGE UP (ref 0–0)
PLATELET # BLD AUTO: 303 K/UL — SIGNIFICANT CHANGE UP (ref 130–400)
POTASSIUM SERPL-MCNC: 3.5 MMOL/L — SIGNIFICANT CHANGE UP (ref 3.5–5)
POTASSIUM SERPL-SCNC: 3.5 MMOL/L — SIGNIFICANT CHANGE UP (ref 3.5–5)
PROT SERPL-MCNC: 5.5 G/DL — LOW (ref 6–8)
PROTHROM AB SERPL-ACNC: 19.4 SEC — HIGH (ref 9.95–12.87)
RBC # BLD: 2.81 M/UL — LOW (ref 4.2–5.4)
RBC # FLD: 17 % — HIGH (ref 11.5–14.5)
RCV VOL RI: 0 /UL — SIGNIFICANT CHANGE UP (ref 0–0)
SODIUM SERPL-SCNC: 136 MMOL/L — SIGNIFICANT CHANGE UP (ref 135–146)
SPECIMEN SOURCE: SIGNIFICANT CHANGE UP
T4 AB SER-ACNC: 6.8 UG/DL — SIGNIFICANT CHANGE UP (ref 4.6–12)
TOTAL NUCLEATED CELL COUNT, BODY FLUID: 32 /UL — SIGNIFICANT CHANGE UP
TUBE TYPE: SIGNIFICANT CHANGE UP
VIT B1 SERPL-MCNC: 153.2 NMOL/L — SIGNIFICANT CHANGE UP (ref 66.5–200)
WBC # BLD: 17.47 K/UL — HIGH (ref 4.8–10.8)
WBC # FLD AUTO: 17.47 K/UL — HIGH (ref 4.8–10.8)

## 2020-06-24 PROCEDURE — 71045 X-RAY EXAM CHEST 1 VIEW: CPT | Mod: 26

## 2020-06-24 PROCEDURE — 99233 SBSQ HOSP IP/OBS HIGH 50: CPT

## 2020-06-24 RX ORDER — MORPHINE SULFATE 50 MG/1
10 CAPSULE, EXTENDED RELEASE ORAL EVERY 4 HOURS
Refills: 0 | Status: DISCONTINUED | OUTPATIENT
Start: 2020-06-24 | End: 2020-06-26

## 2020-06-24 RX ORDER — HALOPERIDOL DECANOATE 100 MG/ML
2 INJECTION INTRAMUSCULAR EVERY 8 HOURS
Refills: 0 | Status: DISCONTINUED | OUTPATIENT
Start: 2020-06-24 | End: 2020-06-25

## 2020-06-24 RX ADMIN — GABAPENTIN 100 MILLIGRAM(S): 400 CAPSULE ORAL at 05:19

## 2020-06-24 RX ADMIN — Medication 1 TABLET(S): at 09:20

## 2020-06-24 RX ADMIN — MORPHINE SULFATE 15 MILLIGRAM(S): 50 CAPSULE, EXTENDED RELEASE ORAL at 17:25

## 2020-06-24 RX ADMIN — Medication 50 MICROGRAM(S): at 05:19

## 2020-06-24 RX ADMIN — GABAPENTIN 100 MILLIGRAM(S): 400 CAPSULE ORAL at 13:28

## 2020-06-24 RX ADMIN — Medication 125 MILLIGRAM(S): at 00:14

## 2020-06-24 RX ADMIN — MORPHINE SULFATE 15 MILLIGRAM(S): 50 CAPSULE, EXTENDED RELEASE ORAL at 09:20

## 2020-06-24 RX ADMIN — Medication 0.25 MILLIGRAM(S): at 05:18

## 2020-06-24 RX ADMIN — Medication 1 TABLET(S): at 11:41

## 2020-06-24 RX ADMIN — LACTULOSE 20 GRAM(S): 10 SOLUTION ORAL at 11:41

## 2020-06-24 RX ADMIN — Medication 125 MILLIGRAM(S): at 17:27

## 2020-06-24 RX ADMIN — Medication 0.25 MILLIGRAM(S): at 13:28

## 2020-06-24 RX ADMIN — Medication 500 MILLIGRAM(S): at 11:42

## 2020-06-24 RX ADMIN — MORPHINE SULFATE 10 MILLIGRAM(S): 50 CAPSULE, EXTENDED RELEASE ORAL at 22:40

## 2020-06-24 RX ADMIN — FAMOTIDINE 20 MILLIGRAM(S): 10 INJECTION INTRAVENOUS at 05:20

## 2020-06-24 RX ADMIN — FAMOTIDINE 20 MILLIGRAM(S): 10 INJECTION INTRAVENOUS at 17:27

## 2020-06-24 RX ADMIN — Medication 1 PATCH: at 11:42

## 2020-06-24 RX ADMIN — Medication 40 MILLIGRAM(S): at 05:20

## 2020-06-24 RX ADMIN — MORPHINE SULFATE 15 MILLIGRAM(S): 50 CAPSULE, EXTENDED RELEASE ORAL at 00:14

## 2020-06-24 RX ADMIN — Medication 500 MILLIGRAM(S): at 11:41

## 2020-06-24 RX ADMIN — Medication 1 MILLIGRAM(S): at 20:16

## 2020-06-24 RX ADMIN — Medication 125 MILLIGRAM(S): at 05:20

## 2020-06-24 RX ADMIN — ENOXAPARIN SODIUM 40 MILLIGRAM(S): 100 INJECTION SUBCUTANEOUS at 11:41

## 2020-06-24 RX ADMIN — CEFTRIAXONE 100 MILLIGRAM(S): 500 INJECTION, POWDER, FOR SOLUTION INTRAMUSCULAR; INTRAVENOUS at 17:27

## 2020-06-24 RX ADMIN — Medication 1 PATCH: at 19:46

## 2020-06-24 RX ADMIN — Medication 125 MILLIGRAM(S): at 11:41

## 2020-06-24 RX ADMIN — MORPHINE SULFATE 15 MILLIGRAM(S): 50 CAPSULE, EXTENDED RELEASE ORAL at 13:27

## 2020-06-24 RX ADMIN — SPIRONOLACTONE 100 MILLIGRAM(S): 25 TABLET, FILM COATED ORAL at 05:20

## 2020-06-24 RX ADMIN — Medication 0.25 MILLIGRAM(S): at 21:22

## 2020-06-24 RX ADMIN — Medication 1 TABLET(S): at 17:27

## 2020-06-24 RX ADMIN — Medication 1 MILLIGRAM(S): at 11:41

## 2020-06-24 RX ADMIN — GABAPENTIN 100 MILLIGRAM(S): 400 CAPSULE ORAL at 21:22

## 2020-06-24 RX ADMIN — Medication 100 MILLIGRAM(S): at 11:41

## 2020-06-24 RX ADMIN — MORPHINE SULFATE 15 MILLIGRAM(S): 50 CAPSULE, EXTENDED RELEASE ORAL at 05:19

## 2020-06-24 NOTE — PROGRESS NOTE BEHAVIORAL HEALTH - NSBHCONSULTFOLLOWAFTERCARE_PSY_A_CORE FT
on discharge, please provide patient with information for [facility] clinic for treatment of comorbid psychiatric and substance use disorders

## 2020-06-24 NOTE — PROGRESS NOTE BEHAVIORAL HEALTH - NSBHCHARTREVIEWVS_PSY_A_CORE FT
Vital Signs Last 24 Hrs  T(C): 37.2 (24 Jun 2020 20:51), Max: 37.6 (24 Jun 2020 05:14)  T(F): 99 (24 Jun 2020 20:51), Max: 99.7 (24 Jun 2020 05:14)  HR: 138 (24 Jun 2020 20:51) (119 - 138)  BP: 118/60 (24 Jun 2020 20:51) (118/56 - 123/56)  BP(mean): --  RR: 18 (24 Jun 2020 20:51) (18 - 18)  SpO2: --

## 2020-06-24 NOTE — PROGRESS NOTE ADULT - ASSESSMENT
ASSESSMENT/PLAN  39 year old female with a history of EtOH abuse, Alcoholic liver disease, pancreatitis, depression, anxiety, and Recent C diff infection presented to the ED with chief c/o abdominal pain, distension and leg swelling found to have sepsis 2/2 persistent colitis  -Sepsis in setting of C diff colitis, improving  -Pancreatitis in setting of active alcohol abuse with ascites, likely cirrhosis present, pancreatitis resolved  -Macrocytic anemia likely 2/2 alcohol abuse  -hypokalemia/hypomagnesemia,   -elevated WBC  -anemia    PLAN  - monitor and document BMs  - consider adding vitamin C 500 mg daily po  --calorie count/ fluid volume restriction   check bmp/phos/mg and correct lytes ASSESSMENT/PLAN  39 year old female with a history of EtOH abuse, Alcoholic liver disease, pancreatitis, depression, anxiety, and Recent C diff infection presented to the ED with chief c/o abdominal pain, distension and leg swelling found to have sepsis 2/2 persistent colitis  -Sepsis in setting of C diff colitis, improving  -Pancreatitis in setting of active alcohol abuse with ascites, likely cirrhosis present, pancreatitis resolved  -Macrocytic anemia likely 2/2 alcohol abuse  -hypokalemia/hypomagnesemia,   -elevated WBC  -anemia    PLAN  - monitor and document BMs  - consider adding vitamin C 500 mg daily po  --calorie count to assess adequacy of intake, particularly of protein. If inadequate, as I suspect, add Beneprotein and yogurt (not prosource or meats)  - fluid volume restriction   check bmp/phos/mg and correct lytes

## 2020-06-24 NOTE — PROGRESS NOTE ADULT - ASSESSMENT
38 yo F with MHx alcohol abuse, alcoholic liver disease, chronic pancreatitis, depression, anxiety here with severe sepsis poa 2/2 c diff colitis + alcohol withdrawal.     # Sepsis resolved. ?source.  - pt is on Rocephin Iv and Vanco PO   - no SBP  - being treated for C.diff colitis that was diagnosed on prior admission    # Abdominal pain - likely due to distention and ascites given pt feels relief after paracentesis. Doubt acute pancreatitis, pt tolerates Po well, pain is not related to meals, Lipase 30. No signs of acute colitis.   - paracentesis today    # Alcohol withdrawal - pt completed Ativan protocol, cont Ativan PRN, no signs of withdrawal now. I discussed with pt OP detox, she said she is interested will refer  for more information.     # Alcohol related liver disease with ascites - alcoholic hepatitis   - CLD workup negative   - no role of steroids as per GI  - alcohol abstinence advised, consulted and encouraged  - continue Aldactone 100 mg QD and lasix 40 mg QD    # macrocytic anemia - suspected Thiamine, Folate deficiency - continue supplement     DVT ppx     Anticipated dc in 24-48 hrs if abdominal pain is better.

## 2020-06-24 NOTE — PROCEDURE NOTE - NSSITEPREP_SKIN_A_CORE
chlorhexidine
povidone iodine (if allergic to chlorhexidine)
Adherence to aseptic technique: hand hygiene prior to donning barriers (gown, gloves), don cap and mask, sterile drape over patient/chlorhexidine

## 2020-06-24 NOTE — PROGRESS NOTE BEHAVIORAL HEALTH - NSBHFUPINTERVALHXFT_PSY_A_CORE
Psychiatry reconsulted for altered mental status. On Chart review today pt had therapeutic paracentesis, 75mg of morphine, and 1.5mg of ativan. The pt was interviewed in bed, with the 1:1 sitter. Pt was lethargic and disoriented during interview, however she was calm, cooperative, and linear. Pt states she is in her friends house and the month is July and claims she is in the hospital for treatment of pancreatitis. Pt confirms she was wandering around today but denies undressing, saying she was trying to show something to the staff. She reports feeling calm and that she will not cause any disturbances tonight.    Staff report that today the pt undressed in her room, was bothering her roommate, was wandering the halls, and claimed to see a person in her room while she was alone. Pt placed on 1:1 sitter.

## 2020-06-24 NOTE — PROCEDURE NOTE - NSPOSTCAREGUIDE_GEN_A_CORE
Instructed patient/caregiver to follow-up with primary care physician/Instructed patient/caregiver regarding signs and symptoms of infection/Keep the cast/splint/dressing clean and dry/Verbal/written post procedure instructions were given to patient/caregiver
Verbal/written post procedure instructions were given to patient/caregiver
Keep the cast/splint/dressing clean and dry

## 2020-06-24 NOTE — PROGRESS NOTE ADULT - SUBJECTIVE AND OBJECTIVE BOX
Pain Management Progress Note -     Pt is a 39y old female with a history of ETOH abuse, Alcoholic liver disease,  pancreatitis, depression, anxiety, Recent C diff infection. Presented to the ED with Abd pain and distention.       No Known Allergies      ABDOMINAL PAIN  ^ABDOMINAL PAIN  No pertinent family history in first degree relatives  Handoff  MEWS Score  ETOH abuse  Postpartum depression  Substance abuse  Anxiety  Depression  No pertinent past medical history  Abdominal pain  Abdominal pain  Depression  Polysubstance dependence  Alcohol use disorder, severe, dependence  Cocaine use  Depression, major, in remission  Anxiety  Alcohol use disorder, moderate, dependence  Paracentesis, with US guidance  No significant past surgical history  ABDOMINAL PAIN  1  ETOH abuse  Lactic acidosis  Hypokalemia  Pancreatic pseudocyst  Colitis      chlorhexidine 4% Liquid  folic acid  thiamine  vancomycin    Solution  lactobacillus acidophilus  enoxaparin Injectable  gabapentin  multivitamin  famotidine    Tablet  nicotine - 21 mG/24Hr(s) Patch  levothyroxine  morphine   Solution  ascorbic acid  LORazepam     Tablet  ascorbic acid  cefTRIAXone   IVPB  furosemide    Tablet  spironolactone  lactulose Syrup  LORazepam     Tablet      ascorbic acid 500 milliGRAM(s) Oral daily  ascorbic acid 500 milliGRAM(s) Oral daily  cefTRIAXone   IVPB 2000 milliGRAM(s) IV Intermittent every 24 hours  chlorhexidine 4% Liquid 1 Application(s) Topical <User Schedule>  enoxaparin Injectable 40 milliGRAM(s) SubCutaneous daily  famotidine    Tablet 20 milliGRAM(s) Oral two times a day  folic acid 1 milliGRAM(s) Oral daily  furosemide    Tablet 40 milliGRAM(s) Oral daily  gabapentin 100 milliGRAM(s) Oral three times a day  lactobacillus acidophilus 1 Tablet(s) Oral two times a day with meals  lactulose Syrup 20 Gram(s) Oral daily  levothyroxine 50 MICROGram(s) Oral daily  LORazepam     Tablet 0.25 milliGRAM(s) Oral every 8 hours  LORazepam     Tablet 1 milliGRAM(s) Oral every 4 hours PRN  morphine   Solution 15 milliGRAM(s) Oral every 4 hours PRN  multivitamin 1 Tablet(s) Oral daily  nicotine - 21 mG/24Hr(s) Patch 1 patch Transdermal daily  spironolactone 100 milliGRAM(s) Oral daily  thiamine 100 milliGRAM(s) Oral daily  vancomycin    Solution 125 milliGRAM(s) Oral every 6 hours      06-24 @ [id number] mL/min/1.73M2          Hemoglobin: 8.8 g/dL (06-24 @ 08:01)  Hemoglobin: 8.2 g/dL (06-23 @ 05:34)        T(C): 36.8 (06-24-20 @ 13:24), Max: 37.6 (06-24-20 @ 05:14)  HR: 119 (06-24-20 @ 13:24) (116 - 121)  BP: 118/56 (06-24-20 @ 13:24) (118/56 - 123/56)  RR: 18 (06-24-20 @ 13:24) (18 - 18)  SpO2: --  Wt(kg): --     REVIEW OF SYSTEMS    General:	NAD   Skin/Breast:	Neg  Ophthalmologic:	Neg  ENMT: Neg	  Respiratory and Thorax: Neg	  Cardiovascular:	Neg  Gastrointestinal:	chronic pancreatitis  Genitourinary:	Neg  Musculoskeletal:	Neg  Neurological:	Neg  Psychiatric:	Neg  Hematology/Lymphatics:	Neg  Endocrine:	Neg        PHYSICAL EXAM:    GENERAL: NAD, well-groomed, well-developed  HEAD:  Atraumatic, Normocephalic  EYES: EOMI, PERRLA, conjunctiva and sclera clear  ENMT: No tonsillar erythema, exudates, or enlargement; Moist mucous membranes, Good dentition, No lesions  NERVOUS SYSTEM:  Alert & Oriented X3, Good concentration; Motor Strength 5/5 B/L upper and lower extremities DTRs 2+ intact and symmetric  CHEST/LUNG: Clear to percussion bilaterally; No rales, rhonchi, wheezing, or rubs  HEART: Regular rate and rhythm; No murmurs, rubs, or gallops  ABDOMEN: Soft, Nontender, Nondistended; Bowel sounds present  EXTREMITIES:  No clubbing, cyanosis, or edema  LYMPH: No lymphadenopathy noted  SKIN: No rashes or lesions

## 2020-06-24 NOTE — PROGRESS NOTE BEHAVIORAL HEALTH - NSBHCHARTREVIEWLAB_PSY_A_CORE FT
8.8    17.47 )-----------( 303      ( 24 Jun 2020 08:01 )             28.8     06-24    136  |  97<L>  |  <3<L>  ----------------------------<  108<H>  3.5   |  29  |  <0.5<L>    Ca    7.8<L>      24 Jun 2020 08:01  Mg     1.8     06-24    TPro  5.5<L>  /  Alb  1.9<L>  /  TBili  3.5<H>  /  DBili  x   /  AST  120<H>  /  ALT  25  /  AlkPhos  253<H>  06-24

## 2020-06-24 NOTE — PROGRESS NOTE ADULT - RS GEN PE MLT RESP DETAILS PC
diminished breath sounds, L/diminished breath sounds, R
diminished breath sounds, R/diminished breath sounds, L
diminished breath sounds, R/diminished breath sounds, L

## 2020-06-24 NOTE — PROGRESS NOTE ADULT - SUBJECTIVE AND OBJECTIVE BOX
DAILY PROGRESS NOTE  ===========================================================    Patient Information:  ADRIAN PRUETT  /  39y  /  Female  /  MRN#: [id number]    Hospital Day: 9d     |:::::::::::::::::::::::::::| SUBJECTIVE |:::::::::::::::::::::::::::|    OVERNIGHT EVENTS: None. RN had report of visual hallucinations overnight but no tremulousness or fasciculations  TODAY: Patient was seen today at bedside. Complained of continued abdominal and leg pain.    |:::::::::::::::::::::::::::| OBJECTIVE |:::::::::::::::::::::::::::|    VITAL SIGNS: Last 24 Hours  T(C): 36.8 (24 Jun 2020 13:24), Max: 37.6 (24 Jun 2020 05:14)  T(F): 98.2 (24 Jun 2020 13:24), Max: 99.7 (24 Jun 2020 05:14)  HR: 119 (24 Jun 2020 13:24) (116 - 121)  BP: 118/56 (24 Jun 2020 13:24) (118/56 - 123/56)  BP(mean): --  RR: 18 (24 Jun 2020 13:24) (18 - 18)  SpO2: 96% (23 Jun 2020 16:28) (96% - 96%)    PHYSICAL EXAM:  GENERAL:   Awake, alert; NAD.  HEENT:  Head NC/AT; Conjunctivae pink, Sclera anicteric; Oral mucosa moist.  CARDIO:   Regular rate; Regular rhythm; S1 & S2.  RESP:   No rales, wheezing, or rhonchi appreciated.  GI:   Soft; NT/ND; BS; No guarding; No rebound tenderness.  EXT:   Strength UE 5/5; Strength LE 5/5; No edema.   SKIN:   Intact.    LAB RESULTS:                        8.8    17.47 )-----------( 303      ( 24 Jun 2020 08:01 )             28.8     06-24    136  |  97<L>  |  <3<L>  ----------------------------<  108<H>  3.5   |  29  |  <0.5<L>    Ca    7.8<L>      24 Jun 2020 08:01  Mg     1.8     06-24    TPro  5.5<L>  /  Alb  1.9<L>  /  TBili  3.5<H>  /  DBili  x   /  AST  120<H>  /  ALT  25  /  AlkPhos  253<H>  06-24    PT/INR - ( 24 Jun 2020 08:01 )   PT: 19.40 sec;   INR: 1.69 ratio       PTT - ( 24 Jun 2020 08:01 )  PTT:38.6 sec    MICROBIOLOGY:    Culture - Blood (collected 23 Jun 2020 05:34)  Source: .Blood None  Preliminary Report (24 Jun 2020 14:01):    No growth to date.      RADIOLOGY:    ALLERGIES: No Known Allergies      ===========================================================

## 2020-06-24 NOTE — PROGRESS NOTE BEHAVIORAL HEALTH - RISK ASSESSMENT
Patient's risk of self-harm is mitigated by her lack of suicidal ideation, lack of previous suicidal behavior, access to care, hopefulness towards the future, and strong Confucianist beliefs. Thus, she does not warrant inpatient psychiatric hospitalization at this time.

## 2020-06-24 NOTE — PROGRESS NOTE ADULT - SUBJECTIVE AND OBJECTIVE BOX
ADRIAN PRUETT  39y, Female    All available historical data reviewed    OVERNIGHT EVENTS:  none    ROS:  General: Denies rigors, nightsweats  HEENT: Denies headache, rhinorrhea, sore throat, eye pain  CV: Denies CP, palpitations  PULM: Denies wheezing, hemoptysis  GI: Denies hematemesis, hematochezia, melena  : Denies discharge, hematuria  MSK: Denies arthralgias, myalgias  SKIN: Denies rash, lesions  NEURO: Denies paresthesias, weakness  PSYCH: Denies depression, anxiety    VITALS:  T(F): 99.7, Max: 99.7 (06-24-20 @ 05:14)  HR: 121  BP: 123/56  RR: 18Vital Signs Last 24 Hrs  T(C): 37.6 (24 Jun 2020 05:14), Max: 37.6 (24 Jun 2020 05:14)  T(F): 99.7 (24 Jun 2020 05:14), Max: 99.7 (24 Jun 2020 05:14)  HR: 121 (24 Jun 2020 05:14) (116 - 121)  BP: 123/56 (24 Jun 2020 05:14) (118/56 - 123/56)  BP(mean): --  RR: 18 (24 Jun 2020 05:14) (18 - 18)  SpO2: 96% (23 Jun 2020 16:28) (96% - 96%)    TESTS & MEASUREMENTS:                        8.8    17.47 )-----------( 303      ( 24 Jun 2020 08:01 )             28.8     06-24    136  |  97<L>  |  <3<L>  ----------------------------<  108<H>  3.5   |  29  |  <0.5<L>    Ca    7.8<L>      24 Jun 2020 08:01  Mg     1.8     06-24    TPro  5.5<L>  /  Alb  1.9<L>  /  TBili  3.5<H>  /  DBili  x   /  AST  120<H>  /  ALT  25  /  AlkPhos  253<H>  06-24    LIVER FUNCTIONS - ( 24 Jun 2020 08:01 )  Alb: 1.9 g/dL / Pro: 5.5 g/dL / ALK PHOS: 253 U/L / ALT: 25 U/L / AST: 120 U/L / GGT: x             Culture - Fungal, Body Fluid (collected 06-19-20 @ 16:30)  Source: .Body Fluid Peritoneal Fluid  Preliminary Report (06-22-20 @ 11:03):    Testing in progress    Culture - Body Fluid with Gram Stain (collected 06-19-20 @ 16:30)  Source: .Body Fluid Peritoneal Fluid  Gram Stain (06-20-20 @ 03:47):    No polymorphonuclear cells seen    No organisms seen    by cytocentrifuge  Preliminary Report (06-20-20 @ 19:11):    No growth    Culture - Blood (collected 06-18-20 @ 16:00)  Source: .Blood Blood  Final Report (06-23-20 @ 23:01):    No Growth Final            RADIOLOGY & ADDITIONAL TESTS:  Personal review of radiological diagnostics performed  Echo and EKG results noted when applicable.     MEDICATIONS:  ascorbic acid 500 milliGRAM(s) Oral daily  ascorbic acid 500 milliGRAM(s) Oral daily  cefTRIAXone   IVPB 2000 milliGRAM(s) IV Intermittent every 24 hours  chlorhexidine 4% Liquid 1 Application(s) Topical <User Schedule>  enoxaparin Injectable 40 milliGRAM(s) SubCutaneous daily  famotidine    Tablet 20 milliGRAM(s) Oral two times a day  folic acid 1 milliGRAM(s) Oral daily  furosemide    Tablet 40 milliGRAM(s) Oral daily  gabapentin 100 milliGRAM(s) Oral three times a day  lactobacillus acidophilus 1 Tablet(s) Oral two times a day with meals  lactulose Syrup 20 Gram(s) Oral daily  levothyroxine 50 MICROGram(s) Oral daily  LORazepam     Tablet 0.25 milliGRAM(s) Oral every 8 hours  LORazepam     Tablet 1 milliGRAM(s) Oral every 4 hours PRN  morphine   Solution 15 milliGRAM(s) Oral every 4 hours PRN  multivitamin 1 Tablet(s) Oral daily  nicotine - 21 mG/24Hr(s) Patch 1 patch Transdermal daily  spironolactone 100 milliGRAM(s) Oral daily  thiamine 100 milliGRAM(s) Oral daily  vancomycin    Solution 125 milliGRAM(s) Oral every 6 hours      ANTIBIOTICS:  cefTRIAXone   IVPB 2000 milliGRAM(s) IV Intermittent every 24 hours  vancomycin    Solution 125 milliGRAM(s) Oral every 6 hours

## 2020-06-24 NOTE — PROGRESS NOTE ADULT - ASSESSMENT
· Assessment		  39 year old female with a history of EtOH abuse, Alcoholic liver disease, pancreatitis, depression, anxiety, and Recent C diff infection presented to the ED with chief c/o abdominal pain, distension and leg swelling found to have sepsis 2/2 persistent C. diff colitis.    # Sepsis in setting of C diff colitis, improving  - Tachycardic, elevated lactate on admission with appropriate resuscitation. Still with leukocytosis but diarrhea has resolved.   - Dignostic para 6/16 which yielded 149 PMNs, SBP ruled out. Source believed to be inadequately treated C. diff colitis due to abx noncompliance  - BCx NGTD  - On ceftriaxone and PO vancomycin  - High risk of decompensation, monitor for hemodynamic instability    # Pancreatitis in setting of active alcohol abuse with ascites, likely cirrhosis present, pancreatitis resolved  - Drinks 4 cups of vodka daily, + cocaine Utox. Last drink 6/14  - CT Abd and Pelvis Contrast (6/15): colitis; no evidence of abscess. Pancreatic pseudocysts. Sscites. Hepatic steatosis  - Ascites amylase:serum amylase 0.4, consistent with pancreatitis although serum lipase WNL  - U/s abdomen: (-) for Budd Chiari  - Hypoalbuminemia present, likely result of poor nutrition mixed with synthesis issue    - Tolerating diet now  -  Withdrawal symptoms improving: PO Ativan 0.5 mg q8hr, 1mg PRN for hallucination  - C/w folate, thiamine, MVI  - Aldactone to 100mg , lasix 40mg daily. Do not hold if SBP >90   - Will need follow-up in Liver clinic after discharge in 2 weeks ([phone]). Will be discharged with recs for adequate protein intake      # Macrocytic anemia likely 2/2 alcohol abuse  - Continue on folate, thiamine  - Hgb 8.0 this AM    DVT ppx: Lovenox 40 mg qD  GI ppx: not indicated  Activity: As tolerated. Able to walk but unsteady  Diet: Regular  Code status: FULL CODE  Dispo: from home, likely will require substance abuse program on d/c  Follow-up: GI recs, CIWA monitoring    Handoff: High risk of decompensation, ICU for any hypotension · Assessment		  39 year old female with a history of EtOH abuse, Alcoholic liver disease, pancreatitis, depression, anxiety, and Recent C diff infection presented to the ED with chief c/o abdominal pain, distension and leg swelling found to have sepsis 2/2 persistent C. diff colitis.    # Sepsis in setting of C diff colitis, improving  - Tachycardic, elevated lactate on admission with appropriate resuscitation. Still with leukocytosis but diarrhea has resolved.   - Dignostic para 6/16 which yielded 149 PMNs, SBP ruled out. Source believed to be inadequately treated C. diff colitis due to abx noncompliance  - BCx NGTD  - On ceftriaxone and PO vancomycin  - Pain management recs: start gabapentin 200 tid, increase lorazepam 1mg q8, decrease morphine 10mg q4  - High risk of decompensation, monitor for hemodynamic instability  - Paracentesis today    # Pancreatitis in setting of active alcohol abuse with ascites, likely cirrhosis present, pancreatitis resolved  - Drinks 4 cups of vodka daily, + cocaine Utox. Last drink 6/14  - CT Abd and Pelvis Contrast (6/15): colitis; no evidence of abscess. Pancreatic pseudocysts. Sscites. Hepatic steatosis  - Ascites amylase:serum amylase 0.4, consistent with pancreatitis although serum lipase WNL  - U/s abdomen: (-) for Budd Chiari  - Hypoalbuminemia present, likely result of poor nutrition mixed with synthesis issue    - Tolerating diet now  -  Withdrawal symptoms improving: PO Ativan 0.5 mg q8hr, 1mg PRN for hallucination  - C/w folate, thiamine, MVI  - Aldactone to 100mg , lasix 40mg daily. Do not hold if SBP >90   - Will need follow-up in Liver clinic after discharge in 2 weeks ([phone]). Will be discharged with recs for adequate protein intake      # Macrocytic anemia likely 2/2 alcohol abuse  - Continue on folate, thiamine  - Hgb 8.0 this AM    DVT ppx: Lovenox 40 mg qD  GI ppx: not indicated  Activity: As tolerated. Able to walk but unsteady  Diet: Regular  Code status: FULL CODE  Dispo: from home, likely will require substance abuse program on d/c  Follow-up: GI recs, CIWA monitoring

## 2020-06-24 NOTE — PROGRESS NOTE BEHAVIORAL HEALTH - SUMMARY
ADRIAN PRUETT is a 39y old  Female, single with no children, living with a friend, unemployed on welfare, with a psychiatric history of depression and anxiety and polysubstance dependence , 1 [facility]PP admission 6 years ago for depression, who was admitted for sepsis. Psychiatry was consulted for psychiatric evaluation.    On evaluation, patient has recurrent polysubstance use consistent with alcohol and cocaine use disorders. She is not acutely suicidal, depressed, manic or psychotic. She does have some level of anxiety which is likely multifactorial and is likely being exacerbated by her alcohol withdrawal. She does not meet criteria for now would she be appropriate for inpatient psychiatric hospitalization at this time. However, patient is amenable to outpatient follow-up and possible rehab treatment as well upon discharge.     The pts continued altered mental status could be explained through continued withdrawal, infection, substance use/withdrawal (pt tested positive for cocaine) or hepatic encephalopathy, and warrants continued workup and treatment.    Recommendations:  - continue CIWA protocol for alcohol withdrawal symptoms   - continue folate, thiamine supplementation  - on discharge, please provide patient with information for [facility]Focused clinic for treatment of comorbid psychiatric and substance use disorders  - If pt is agitated and not redirectable we recommend PRN Haldol 2mg q8 PO, with IM if pt refuses PO. ADRIAN PRUETT is a 39y old  Female, single with no children, living with a friend, unemployed on welfare, with a psychiatric history of depression and anxiety and polysubstance dependence , 1 IPP admission 6 years ago for depression, who was admitted for sepsis. Psychiatry was consulted for psychiatric evaluation.    On evaluation, patient has recurrent polysubstance use consistent with alcohol and cocaine use disorders. She is not acutely suicidal, depressed, manic or psychotic. She does have some level of anxiety which is likely multifactorial and is likely being exacerbated by her alcohol withdrawal. She does not meet criteria nor would she be appropriate for inpatient psychiatric hospitalization at this time. However, patient is amenable to outpatient follow-up and possible rehab treatment as well upon discharge.     The pts continued altered mental status could be explained through continued withdrawal, infection, substance use/withdrawal (pt tested positive for cocaine) or hepatic encephalopathy, and warrants continued workup and treatment.    Recommendations:  - continue CIWA protocol for alcohol withdrawal symptoms   - continue folate, thiamine supplementation  - on discharge, please provide patient with information for [facility] clinic for treatment of comorbid psychiatric and substance use disorders  - If pt is agitated and not redirectable we recommend PRN Haldol 2mg q8 PO, with IM if pt refuses PO.

## 2020-06-24 NOTE — CHART NOTE - NSCHARTNOTEFT_GEN_A_CORE
Patient told to rest post paracentesis but patient refused to comply and insisted on going down for smoking with a friend outside. Patient counselled on the possible leak from the para site but patient shows understanding but still refuses to stay in bed.

## 2020-06-24 NOTE — PROGRESS NOTE BEHAVIORAL HEALTH - NSBHCHARTREVIEWINVESTIGATE_PSY_A_CORE FT
< from: 12 Lead ECG (06.19.20 @ 18:18) >    Ventricular Rate 136 BPM    Atrial Rate 136 BPM    P-R Interval 128 ms    QRS Duration 80 ms    Q-T Interval 366 ms    QTC Calculation(Bezet) 550 ms    P Axis 71 degrees    R Axis 89 degrees    T Axis 118 degrees    Diagnosis Line Sinus tachycardia  Nonspecific T wave abnormality  Abnormal ECG    < end of copied text >

## 2020-06-24 NOTE — PROCEDURE NOTE - NSPROCDETAILS_GEN_ALL_CORE
location identified, sterile technique used, catheter introduced, fluid drained/ultrasound utilization
ultrasound utilization/location identified, sterile technique used, catheter introduced, fluid drained/Seldinger technique

## 2020-06-24 NOTE — PROGRESS NOTE ADULT - GIT ABD PE PAL DETAILS PC
distended/tender
distended/bowel sounds hypoactive/tender
tender/distended
tender/distended/bowel sounds hypoactive
tender/distended
bowel sounds hypoactive/tender/distended

## 2020-06-24 NOTE — PROGRESS NOTE ADULT - SUBJECTIVE AND OBJECTIVE BOX
Patient is a 39y old  Female who presents with a chief complaint of Abdominal Pain, Leg swelling. (24 Jun 2020 09:57)  pt seen and evaluated  on p.o diet      ICU Vital Signs Last 24 Hrs  T(C): 36.8 (24 Jun 2020 13:24), Max: 37.6 (24 Jun 2020 05:14)  T(F): 98.2 (24 Jun 2020 13:24), Max: 99.7 (24 Jun 2020 05:14)  HR: 119 (24 Jun 2020 13:24) (116 - 121)  BP: 118/56 (24 Jun 2020 13:24) (118/56 - 123/56)  RR: 18 (24 Jun 2020 13:24) (18 - 18)  SpO2: 96% (23 Jun 2020 16:28) (96% - 96%)      Drug Dosing Weight  Height (cm): 162.6 (15 Aaron 2020 09:45)  Weight (kg): 74.2 (15 Aaron 2020 09:45)  BMI (kg/m2): 28.1 (15 Aaron 2020 09:45)  BSA (m2): 1.8 (15 Aaron 2020 09:45)    I&O's Detail    PHYSICAL EXAM:  Constitutional:  alert and awake , talkative  Gastrointestinal:  distended, soft  Extremities +edema  MEDICATIONS  (STANDING):  ascorbic acid 500 milliGRAM(s) Oral daily  ascorbic acid 500 milliGRAM(s) Oral daily  cefTRIAXone   IVPB 2000 milliGRAM(s) IV Intermittent every 24 hours  chlorhexidine 4% Liquid 1 Application(s) Topical <User Schedule>  enoxaparin Injectable 40 milliGRAM(s) SubCutaneous daily  famotidine    Tablet 20 milliGRAM(s) Oral two times a day  folic acid 1 milliGRAM(s) Oral daily  furosemide    Tablet 40 milliGRAM(s) Oral daily  gabapentin 100 milliGRAM(s) Oral three times a day  lactobacillus acidophilus 1 Tablet(s) Oral two times a day with meals  lactulose Syrup 20 Gram(s) Oral daily  levothyroxine 50 MICROGram(s) Oral daily  LORazepam     Tablet 0.25 milliGRAM(s) Oral every 8 hours  multivitamin 1 Tablet(s) Oral daily  nicotine - 21 mG/24Hr(s) Patch 1 patch Transdermal daily  spironolactone 100 milliGRAM(s) Oral daily  thiamine 100 milliGRAM(s) Oral daily  vancomycin    Solution 125 milliGRAM(s) Oral every 6 hours      Diet, Soft:   Low Fat (LOWFAT) (06-20-20 @ 15:47)      LABS  06-24    136  |  97<L>  |  <3<L>  ----------------------------<  108<H>  3.5   |  29  |  <0.5<L>    Ca    7.8<L>      24 Jun 2020 08:01  Mg     1.8     06-24    TPro  5.5<L>  /  Alb  1.9<L>  /  TBili  3.5<H>  /  DBili  x   /  AST  120<H>  /  ALT  25  /  AlkPhos  253<H>  06-24                          8.8    17.47 )-----------( 303      ( 24 Jun 2020 08:01 )             28.8     CAPILLARY BLOOD GLUCOSE  POCT Blood Glucose.: 101 mg/dL (24 Jun 2020 11:10)  POCT Blood Glucose.: 99 mg/dL (24 Jun 2020 07:45) Patient is a 39y old  Female who presents with a chief complaint of Abdominal Pain, Leg swelling. (24 Jun 2020 09:57)  pt seen and evaluated  receiving p.o diet - intake uncertain     ICU Vital Signs Last 24 Hrs  T(C): 36.8 (24 Jun 2020 13:24), Max: 37.6 (24 Jun 2020 05:14)  T(F): 98.2 (24 Jun 2020 13:24), Max: 99.7 (24 Jun 2020 05:14)  HR: 119 (24 Jun 2020 13:24) (116 - 121)  BP: 118/56 (24 Jun 2020 13:24) (118/56 - 123/56)  RR: 18 (24 Jun 2020 13:24) (18 - 18)  SpO2: 96% (23 Jun 2020 16:28) (96% - 96%)    Drug Dosing Weight  Height (cm): 162.6 (15 Aaron 2020 09:45)  Weight (kg): 74.2 (15 Aaron 2020 09:45)  BMI (kg/m2): 28.1 (15 Aaron 2020 09:45)  BSA (m2): 1.8 (15 Aaron 2020 09:45)    PHYSICAL EXAM:  Constitutional:  alert and awake , talkative  Gastrointestinal: distended, soft, ascites  Extremities +edema b/l LE to above knees    MEDICATIONS  (STANDING):  ascorbic acid 500 milliGRAM(s) Oral daily  ascorbic acid 500 milliGRAM(s) Oral daily  cefTRIAXone   IVPB 2000 milliGRAM(s) IV Intermittent every 24 hours  chlorhexidine 4% Liquid 1 Application(s) Topical <User Schedule>  enoxaparin Injectable 40 milliGRAM(s) SubCutaneous daily  famotidine    Tablet 20 milliGRAM(s) Oral two times a day  folic acid 1 milliGRAM(s) Oral daily  furosemide    Tablet 40 milliGRAM(s) Oral daily  gabapentin 100 milliGRAM(s) Oral three times a day  lactobacillus acidophilus 1 Tablet(s) Oral two times a day with meals  lactulose Syrup 20 Gram(s) Oral daily  levothyroxine 50 MICROGram(s) Oral daily  LORazepam     Tablet 0.25 milliGRAM(s) Oral every 8 hours  multivitamin 1 Tablet(s) Oral daily  nicotine - 21 mG/24Hr(s) Patch 1 patch Transdermal daily  spironolactone 100 milliGRAM(s) Oral daily  thiamine 100 milliGRAM(s) Oral daily  vancomycin    Solution 125 milliGRAM(s) Oral every 6 hours    Diet, Soft:   Low Fat (LOWFAT) (06-20-20 @ 15:47)    LABS  06-24    136  |  97<L>  |  <3<L>  ----------------------------<  108<H>  3.5   |  29  |  <0.5<L>    Ca    7.8<L>      24 Jun 2020 08:01  Mg     1.8     06-24    TPro  5.5<L>  /  Alb  1.9<L>  /  TBili  3.5<H>  /  DBili  x   /  AST  120<H>  /  ALT  25  /  AlkPhos  253<H>  06-24  last hos level 6/19                        8.8    17.47 )-----------( 303      ( 24 Jun 2020 08:01 )             28.8     CAPILLARY BLOOD GLUCOSE  POCT Blood Glucose.: 101 mg/dL (24 Jun 2020 11:10)  POCT Blood Glucose.: 99 mg/dL (24 Jun 2020 07:45)

## 2020-06-24 NOTE — PROGRESS NOTE ADULT - ASSESSMENT
· Assessment		  39 year old female with a history of ETOH abuse, Alcoholic liver disease,  pancreatitis, depression, anxiety, Recent C diff infection who comes to the ED with chief c/o Abdominal pain, distension and leg swelling.     IMPRESSION;   #Fevers : no SBP. Possibly secondary to pancreatitis    CT Abdomen and Pelvis w/ IV 6/18   Since Aida 15, 2020, unchanged pancreatic head and neck fluid collections, largest measuring 2.4 x 1.9 cm.    Increased soft tissue anasarca.  Unchanged moderate volume abdominopelvic ascites.    S/p paracentesis 6/16 with no SBP. Ascitic fluid cultures NG . Repeat paracentesis 6/18 no evidence of infection  No evidence of cholecystitis/cholangitis  Paracentesis 6/19 : ascitic cx NG; WBC 33- no SBP    Pancolitis secondary to CD colitis which seems quiescent presently as pt has not had diarrhea.   BCx 6/18 NGTD    RECOMMENDATIONS;   Rocephin 2 gm iv q24h.  po Vancomycin 125 mg q6h   From ID standpoint could hold rocephin/po vanco  f/u with GI as outpt  recall prn please

## 2020-06-24 NOTE — PROGRESS NOTE ADULT - ASSESSMENT
Pain Management Progress Note -       Pt is a 39y female with hx of ETOH abuse, Alcoholic liver disease,  pancreatitis, depression, anxiety, Recent C diff infection who comes to the ED with chief c/o Abdominal pain, distension and leg swelling. Patient getting paracentesis.         No Known Allergies        ETOH abuse  Postpartum depression  Substance abuse  Anxiety  Depression  No pertinent past medical history  Abdominal pain  Abdominal pain  Depression  Polysubstance dependence  Alcohol use disorder, severe, dependence  Cocaine use  Depression, major, in remission  Anxiety  Alcohol use disorder, moderate, dependence  Paracentesis, with US guidance  No significant past surgical history  ABDOMINAL PAIN  1  ETOH abuse  Lactic acidosis  Hypokalemia  Pancreatic pseudocyst  Colitis      chlorhexidine 4% Liquid  folic acid  thiamine  vancomycin    Solution  lactobacillus acidophilus  enoxaparin Injectable  gabapentin  multivitamin  famotidine    Tablet  nicotine - 21 mG/24Hr(s) Patch  levothyroxine  morphine   Solution  ascorbic acid  LORazepam     Tablet  ascorbic acid  cefTRIAXone   IVPB  furosemide    Tablet  spironolactone  lactulose Syrup  LORazepam     Tablet      ascorbic acid 500 milliGRAM(s) Oral daily  ascorbic acid 500 milliGRAM(s) Oral daily  cefTRIAXone   IVPB 2000 milliGRAM(s) IV Intermittent every 24 hours  chlorhexidine 4% Liquid 1 Application(s) Topical <User Schedule>  enoxaparin Injectable 40 milliGRAM(s) SubCutaneous daily  famotidine    Tablet 20 milliGRAM(s) Oral two times a day  folic acid 1 milliGRAM(s) Oral daily  furosemide    Tablet 40 milliGRAM(s) Oral daily  gabapentin 100 milliGRAM(s) Oral three times a day  lactobacillus acidophilus 1 Tablet(s) Oral two times a day with meals  lactulose Syrup 20 Gram(s) Oral daily  levothyroxine 50 MICROGram(s) Oral daily  LORazepam     Tablet 0.25 milliGRAM(s) Oral every 8 hours  LORazepam     Tablet 1 milliGRAM(s) Oral every 4 hours PRN  morphine   Solution 15 milliGRAM(s) Oral every 4 hours PRN  multivitamin 1 Tablet(s) Oral daily  nicotine - 21 mG/24Hr(s) Patch 1 patch Transdermal daily  spironolactone 100 milliGRAM(s) Oral daily  thiamine 100 milliGRAM(s) Oral daily  vancomycin    Solution 125 milliGRAM(s) Oral every 6 hours      06-24 @ [id number]:19631 mL/min/1.73M2          Hemoglobin: 8.8 g/dL (06-24 @ 08:01)  Hemoglobin: 8.2 g/dL (06-23 @ 05:34)        T(C): 36.8 (06-24-20 @ 13:24), Max: 37.6 (06-24-20 @ 05:14)  HR: 119 (06-24-20 @ 13:24) (116 - 121)  BP: 118/56 (06-24-20 @ 13:24) (118/56 - 123/56)  RR: 18 (06-24-20 @ 13:24) (18 - 18)  SpO2: --  Wt(kg): --     REVIEW OF SYSTEMS    General:	NAD   Skin/Breast:	Neg  Ophthalmologic:	Neg  ENMT: Neg	  Respiratory and Thorax: Neg	  Cardiovascular:	Neg  Gastrointestinal:	Neg  Genitourinary:	Neg  Musculoskeletal:	Neg  Neurological:	Neg  Psychiatric:	Neg  Hematology/Lymphatics:	Neg  Endocrine:	Neg        PHYSICAL EXAM:    GENERAL: NAD, well-groomed, well-developed  HEAD:  Atraumatic, Normocephalic  EYES: EOMI, PERRLA, conjunctiva and sclera clear  ENMT: No tonsillar erythema, exudates, or enlargement; Moist mucous membranes, Good dentition, No lesions  NECK: Supple, No JVD, Normal thyroid  NERVOUS SYSTEM:  Alert & Oriented X3, Good concentration; Motor Strength 5/5 B/L upper and lower extremities  CHEST/LUNG: Clear to percussion bilaterally; No rales, rhonchi, wheezing, or rubs  HEART: Regular rate and rhythm; No murmurs, rubs, or gallops  ABDOMEN: Soft, Nontender, Nondistended; Bowel sounds present  EXTREMITIES: No clubbing, cyanosis, or edema  LYMPH: No lymphadenopathy noted  SKIN: No rashes or lesions

## 2020-06-24 NOTE — PROGRESS NOTE ADULT - CARDIOVASCULAR
Regular rate & rhythm, normal S1, S2; no murmurs, gallops or rubs; no S3, S4
detailed exam

## 2020-06-24 NOTE — PROGRESS NOTE ADULT - GASTROINTESTINAL DETAILS
no guarding/no rigidity
no guarding/soft/no rebound tenderness/no rigidity
no rebound tenderness
no guarding/no rebound tenderness/no rigidity
no guarding/no rebound tenderness/no rigidity

## 2020-06-24 NOTE — PROGRESS NOTE ADULT - SUBJECTIVE AND OBJECTIVE BOX
ADRIAN PRUETT    Patient is a 39y old  Female who presents with a chief complaint of Abdominal Pain, Leg swelling. (24 Jun 2020 17:33)    INTERVAL HPI/OVERNIGHT EVENTS: pt is c/o abdominal pain, pain is diffuse, not related to meals, no worse with palpation, she says she feels relief after paracentesis. Pt states she does not feel that she is withdrawing. NO nausea, no vomiting, or headache.    ROS: All ROS negative except    PHYSICAL EXAM:  T(C): 36.8, Max: 37.6 (06-24-20 @ 05:14)  HR: 119 (116 - 121)  BP: 118/56 (118/56 - 123/56)  RR: 18 (18 - 18)  SpO2: --    GENERAL: NAD  PULMONARY/CHEST: No rales, rhonchi, wheezing  CARDIOVASC: Regular rate and rhythm; No murmurs  GI/ABDOMEN: abdomen distended, soft, nontender, bowel sounds present  EXTREMITIES: +++ pitting edema b/l. No calf tenderness b/l.  SKIN: No rashes or lesions  NERVOUS SYSTEM:  Alert & Oriented X3, no focal deficit. No tremor appreciated    Chart reviewed    LABS:                        8.8    17.47 )-----------( 303      ( 24 Jun 2020 08:01 )             28.8   .5    06-24    136  |  97<L>  |  <3<L>  ----------------------------<  108<H>  3.5   |  29  |  <0.5<L>    Ca    7.8<L>      24 Jun 2020 08:01  Mg     1.8     06-24    TPro  5.5<L>  /  Alb  1.9<L>  /  TBili  3.5<H>  /  DBili  x   /  AST  120<H>  /  ALT  25  /  AlkPhos  253<H>  06-24    PT/INR - ( 24 Jun 2020 08:01 )   PT: 19.40 sec;   INR: 1.69 ratio    PTT - ( 24 Jun 2020 08:01 )  PTT:38.6 sec    CAPILLARY BLOOD GLUCOSE  POCT Blood Glucose.: 101 mg/dL (24 Jun 2020 11:10)  POCT Blood Glucose.: 99 mg/dL (24 Jun 2020 07:45)      Culture - Blood (collected 23 Jun 2020 05:34)  Source: .Blood None  Preliminary Report (24 Jun 2020 14:01):    No growth to date.      RADIOLOGY & ADDITIONAL TESTS:  no new images    MEDICATIONS  (STANDING):  ascorbic acid 500 milliGRAM(s) Oral daily  ascorbic acid 500 milliGRAM(s) Oral daily  cefTRIAXone   IVPB 2000 milliGRAM(s) IV Intermittent every 24 hours  chlorhexidine 4% Liquid 1 Application(s) Topical <User Schedule>  enoxaparin Injectable 40 milliGRAM(s) SubCutaneous daily  famotidine    Tablet 20 milliGRAM(s) Oral two times a day  folic acid 1 milliGRAM(s) Oral daily  furosemide    Tablet 40 milliGRAM(s) Oral daily  gabapentin 100 milliGRAM(s) Oral three times a day  lactobacillus acidophilus 1 Tablet(s) Oral two times a day with meals  lactulose Syrup 20 Gram(s) Oral daily  levothyroxine 50 MICROGram(s) Oral daily  LORazepam     Tablet 0.25 milliGRAM(s) Oral every 8 hours  multivitamin 1 Tablet(s) Oral daily  nicotine - 21 mG/24Hr(s) Patch 1 patch Transdermal daily  spironolactone 100 milliGRAM(s) Oral daily  thiamine 100 milliGRAM(s) Oral daily  vancomycin    Solution 125 milliGRAM(s) Oral every 6 hours    MEDICATIONS  (PRN):  LORazepam     Tablet 1 milliGRAM(s) Oral every 4 hours PRN Agitation  morphine   Solution 15 milliGRAM(s) Oral every 4 hours PRN Severe Pain (7 - 10)

## 2020-06-25 ENCOUNTER — TRANSCRIPTION ENCOUNTER (OUTPATIENT)
Age: 40
End: 2020-06-25

## 2020-06-25 LAB
ALBUMIN SERPL ELPH-MCNC: 1.6 G/DL — LOW (ref 3.5–5.2)
ALP SERPL-CCNC: 210 U/L — HIGH (ref 30–115)
ALT FLD-CCNC: 21 U/L — SIGNIFICANT CHANGE UP (ref 0–41)
ANION GAP SERPL CALC-SCNC: 12 MMOL/L — SIGNIFICANT CHANGE UP (ref 7–14)
APTT BLD: 39.7 SEC — HIGH (ref 27–39.2)
AST SERPL-CCNC: 98 U/L — HIGH (ref 0–41)
BASOPHILS # BLD AUTO: 0.14 K/UL — SIGNIFICANT CHANGE UP (ref 0–0.2)
BASOPHILS NFR BLD AUTO: 0.9 % — SIGNIFICANT CHANGE UP (ref 0–1)
BILIRUB SERPL-MCNC: 3.1 MG/DL — HIGH (ref 0.2–1.2)
BUN SERPL-MCNC: <3 MG/DL — LOW (ref 10–20)
CALCIUM SERPL-MCNC: 7.3 MG/DL — LOW (ref 8.5–10.1)
CHLORIDE SERPL-SCNC: 99 MMOL/L — SIGNIFICANT CHANGE UP (ref 98–110)
CO2 SERPL-SCNC: 29 MMOL/L — SIGNIFICANT CHANGE UP (ref 17–32)
CREAT SERPL-MCNC: <0.5 MG/DL — LOW (ref 0.7–1.5)
EOSINOPHIL # BLD AUTO: 0.28 K/UL — SIGNIFICANT CHANGE UP (ref 0–0.7)
EOSINOPHIL NFR BLD AUTO: 1.7 % — SIGNIFICANT CHANGE UP (ref 0–8)
GLUCOSE BLDC GLUCOMTR-MCNC: 118 MG/DL — HIGH (ref 70–99)
GLUCOSE BLDC GLUCOMTR-MCNC: 124 MG/DL — HIGH (ref 70–99)
GLUCOSE BLDC GLUCOMTR-MCNC: 152 MG/DL — HIGH (ref 70–99)
GLUCOSE BLDC GLUCOMTR-MCNC: 98 MG/DL — SIGNIFICANT CHANGE UP (ref 70–99)
GLUCOSE FLD-MCNC: 120 MG/DL — SIGNIFICANT CHANGE UP
GLUCOSE SERPL-MCNC: 91 MG/DL — SIGNIFICANT CHANGE UP (ref 70–99)
GRAM STN FLD: SIGNIFICANT CHANGE UP
HCT VFR BLD CALC: 24.1 % — LOW (ref 37–47)
HGB BLD-MCNC: 7.6 G/DL — LOW (ref 12–16)
IMM GRANULOCYTES NFR BLD AUTO: 0.7 % — HIGH (ref 0.1–0.3)
INR BLD: 1.76 RATIO — HIGH (ref 0.65–1.3)
LYMPHOCYTES # BLD AUTO: 1.57 K/UL — SIGNIFICANT CHANGE UP (ref 1.2–3.4)
LYMPHOCYTES # BLD AUTO: 9.8 % — LOW (ref 20.5–51.1)
MAGNESIUM SERPL-MCNC: 1.7 MG/DL — LOW (ref 1.8–2.4)
MCHC RBC-ENTMCNC: 31.5 G/DL — LOW (ref 32–37)
MCHC RBC-ENTMCNC: 31.8 PG — HIGH (ref 27–31)
MCV RBC AUTO: 100.8 FL — HIGH (ref 81–99)
MONOCYTES # BLD AUTO: 1.35 K/UL — HIGH (ref 0.1–0.6)
MONOCYTES NFR BLD AUTO: 8.4 % — SIGNIFICANT CHANGE UP (ref 1.7–9.3)
NEUTROPHILS # BLD AUTO: 12.59 K/UL — HIGH (ref 1.4–6.5)
NEUTROPHILS NFR BLD AUTO: 78.5 % — HIGH (ref 42.2–75.2)
NRBC # BLD: 0 /100 WBCS — SIGNIFICANT CHANGE UP (ref 0–0)
PLATELET # BLD AUTO: 279 K/UL — SIGNIFICANT CHANGE UP (ref 130–400)
POTASSIUM SERPL-MCNC: 3.3 MMOL/L — LOW (ref 3.5–5)
POTASSIUM SERPL-SCNC: 3.3 MMOL/L — LOW (ref 3.5–5)
PROT FLD-MCNC: <1 G/DL — SIGNIFICANT CHANGE UP
PROT SERPL-MCNC: 4.6 G/DL — LOW (ref 6–8)
PROTHROM AB SERPL-ACNC: 20.2 SEC — HIGH (ref 9.95–12.87)
RBC # BLD: 2.39 M/UL — LOW (ref 4.2–5.4)
RBC # FLD: 16.8 % — HIGH (ref 11.5–14.5)
SODIUM SERPL-SCNC: 140 MMOL/L — SIGNIFICANT CHANGE UP (ref 135–146)
SPECIMEN SOURCE: SIGNIFICANT CHANGE UP
WBC # BLD: 16.05 K/UL — HIGH (ref 4.8–10.8)
WBC # FLD AUTO: 16.05 K/UL — HIGH (ref 4.8–10.8)

## 2020-06-25 PROCEDURE — 99233 SBSQ HOSP IP/OBS HIGH 50: CPT

## 2020-06-25 PROCEDURE — 93970 EXTREMITY STUDY: CPT | Mod: 26

## 2020-06-25 RX ORDER — LEVOTHYROXINE SODIUM 125 MCG
1 TABLET ORAL
Qty: 30 | Refills: 0
Start: 2020-06-25 | End: 2020-07-24

## 2020-06-25 RX ORDER — SPIRONOLACTONE 25 MG/1
1 TABLET, FILM COATED ORAL
Qty: 30 | Refills: 0
Start: 2020-06-25 | End: 2020-07-24

## 2020-06-25 RX ORDER — SPIRONOLACTONE 25 MG/1
4 TABLET, FILM COATED ORAL
Qty: 0 | Refills: 0 | DISCHARGE
Start: 2020-06-25

## 2020-06-25 RX ORDER — FUROSEMIDE 40 MG
1 TABLET ORAL
Qty: 30 | Refills: 0
Start: 2020-06-25 | End: 2020-07-24

## 2020-06-25 RX ADMIN — Medication 100 MILLIGRAM(S): at 11:14

## 2020-06-25 RX ADMIN — Medication 500 MILLIGRAM(S): at 11:14

## 2020-06-25 RX ADMIN — GABAPENTIN 100 MILLIGRAM(S): 400 CAPSULE ORAL at 05:24

## 2020-06-25 RX ADMIN — MORPHINE SULFATE 10 MILLIGRAM(S): 50 CAPSULE, EXTENDED RELEASE ORAL at 22:53

## 2020-06-25 RX ADMIN — Medication 125 MILLIGRAM(S): at 01:26

## 2020-06-25 RX ADMIN — CEFTRIAXONE 100 MILLIGRAM(S): 500 INJECTION, POWDER, FOR SOLUTION INTRAMUSCULAR; INTRAVENOUS at 17:09

## 2020-06-25 RX ADMIN — MORPHINE SULFATE 10 MILLIGRAM(S): 50 CAPSULE, EXTENDED RELEASE ORAL at 10:53

## 2020-06-25 RX ADMIN — Medication 1 PATCH: at 17:05

## 2020-06-25 RX ADMIN — FAMOTIDINE 20 MILLIGRAM(S): 10 INJECTION INTRAVENOUS at 17:09

## 2020-06-25 RX ADMIN — Medication 1 TABLET(S): at 11:14

## 2020-06-25 RX ADMIN — GABAPENTIN 100 MILLIGRAM(S): 400 CAPSULE ORAL at 21:23

## 2020-06-25 RX ADMIN — Medication 40 MILLIGRAM(S): at 05:24

## 2020-06-25 RX ADMIN — FAMOTIDINE 20 MILLIGRAM(S): 10 INJECTION INTRAVENOUS at 05:24

## 2020-06-25 RX ADMIN — SPIRONOLACTONE 100 MILLIGRAM(S): 25 TABLET, FILM COATED ORAL at 05:24

## 2020-06-25 RX ADMIN — Medication 125 MILLIGRAM(S): at 23:22

## 2020-06-25 RX ADMIN — Medication 1 PATCH: at 11:14

## 2020-06-25 RX ADMIN — Medication 125 MILLIGRAM(S): at 17:09

## 2020-06-25 RX ADMIN — MORPHINE SULFATE 10 MILLIGRAM(S): 50 CAPSULE, EXTENDED RELEASE ORAL at 14:59

## 2020-06-25 RX ADMIN — Medication 1 MILLIGRAM(S): at 11:14

## 2020-06-25 RX ADMIN — Medication 1 PATCH: at 10:51

## 2020-06-25 RX ADMIN — Medication 125 MILLIGRAM(S): at 05:24

## 2020-06-25 RX ADMIN — MORPHINE SULFATE 10 MILLIGRAM(S): 50 CAPSULE, EXTENDED RELEASE ORAL at 18:43

## 2020-06-25 RX ADMIN — Medication 1 MILLIGRAM(S): at 19:53

## 2020-06-25 RX ADMIN — CHLORHEXIDINE GLUCONATE 1 APPLICATION(S): 213 SOLUTION TOPICAL at 05:25

## 2020-06-25 RX ADMIN — Medication 1 TABLET(S): at 17:09

## 2020-06-25 RX ADMIN — ENOXAPARIN SODIUM 40 MILLIGRAM(S): 100 INJECTION SUBCUTANEOUS at 11:14

## 2020-06-25 RX ADMIN — Medication 50 MICROGRAM(S): at 05:24

## 2020-06-25 RX ADMIN — GABAPENTIN 100 MILLIGRAM(S): 400 CAPSULE ORAL at 13:01

## 2020-06-25 RX ADMIN — Medication 125 MILLIGRAM(S): at 11:13

## 2020-06-25 RX ADMIN — LACTULOSE 20 GRAM(S): 10 SOLUTION ORAL at 11:13

## 2020-06-25 NOTE — DISCHARGE NOTE PROVIDER - CARE PROVIDERS DIRECT ADDRESSES
,DirectAddress_Unknown ,DirectAddress_Unknown,[email or web address]

## 2020-06-25 NOTE — PROGRESS NOTE ADULT - ASSESSMENT
38 yo F with MHx alcohol abuse, alcoholic liver disease, chronic pancreatitis, depression, anxiety here with severe sepsis poa 2/2 c diff colitis + alcohol withdrawal.     # Sepsis resolved. Deemed secondary to C. Dif colitis. No SBP, BCx negative   - can dc Rocephin and Vanco PO (completed 10 days)    # Abdominal pain likely due to alcoholic hepatitis, hepatomegaly, possibly contributed by ascites, also possible drug seeking behaviour, doubt pancreatitis given Lipase is low and on physical exam no abdominal pain, no signs of acute colitis, no SBP, s/p multiple paracentesis last 6/24.   - pt was not recommended Steroids by GI on this admission since Meddrey score was 21, but my calculation of Meddrey score is > 34, since pt still has abdominla pain I believe pt might benefit from Steroids, but would defer this decision to GI, called them for f/u.   - CLD workup negative   - alcohol abstinence advised, consulted and encouraged  - continue Aldactone 100 mg QD and lasix 40 mg QD  - pt will need Op f/u with GI for EGD and further management in liver clinic.    # Alcohol withdrawal - pt completed Ativan protocol, no signs of withdrawal now, dc Ativan. I discussed with pt OP detox, she said she is interested will refer  for more information.     # Leucocytosis, transaminitis consistent with alcoholic hepatitis    # Macrocytic anemia - Thiamine, Folate deficiency, Vit B12 WNL, macrocytosis likely due to alcohol toxicity     # Leg pain - r/o DVT, will do US duplex    Prognosis is poor, pt is active drinker.     DVT ppx

## 2020-06-25 NOTE — PROGRESS NOTE ADULT - SUBJECTIVE AND OBJECTIVE BOX
Review of chart shows recommendations from yesterdays progress note have not be instituted. Discussed with Resident and recommendations will be followed. Patient is for DC today.

## 2020-06-25 NOTE — PROGRESS NOTE ADULT - ASSESSMENT
39 year old female with a history of EtOH abuse, Alcoholic liver disease, pancreatitis, depression, anxiety, and Recent C diff infection presented to the ED with chief c/o abdominal pain, distension and leg swelling found to have sepsis 2/2 persistent C. diff colitis.    # Sepsis in setting of C diff colitis, improving  - Tachycardic, elevated lactate on admission with appropriate resuscitation. Still with leukocytosis but diarrhea has resolved.   - Dignostic para 6/16 which yielded 149 PMNs, SBP ruled out. Source believed to be inadequately treated C. diff colitis due to abx noncompliance  - BCx NGTD  - On ceftriaxone and PO vancomycin  - Pain management recs: start gabapentin 200 tid, increase lorazepam 1mg q8, decrease morphine 10mg q4  - High risk of decompensation, monitor for hemodynamic instability  - Paracentesis 6/25 yielded 1.6L ascitic fluid  - VA duplex for work up of leg pain/swelling    # Pancreatitis in setting of active alcohol abuse with ascites, likely cirrhosis present, pancreatitis resolved  - Drinks 4 cups of vodka daily, + cocaine Utox. Last drink 6/14  - CT Abd and Pelvis Contrast (6/15): colitis; no evidence of abscess. Pancreatic pseudocysts. Sscites. Hepatic steatosis  - Ascites amylase:serum amylase 0.4, consistent with pancreatitis although serum lipase WNL  - U/s abdomen: (-) for Budd Chiari  - Hypoalbuminemia present, likely result of poor nutrition mixed with synthesis issue    - Tolerating diet now  -  Withdrawal symptoms improving: PO Ativan 0.5 mg q8hr, 1mg PRN for hallucination  - C/w folate, thiamine, MVI  - Aldactone to 100mg , lasix 40mg daily. Do not hold if SBP >90   - Will need follow-up in Liver clinic after discharge in 2 weeks ([phone]). Will be discharged with recs for adequate protein intake      # Macrocytic anemia likely 2/2 alcohol abuse  - Continue on folate, thiamine  - Hgb 8.0 this AM    DVT ppx: Lovenox 40 mg qD  GI ppx: not indicated  Activity: As tolerated. Able to walk but unsteady  Diet: Regular  Code status: FULL CODE  Dispo: from home, likely will require substance abuse program on d/c  Follow-up: GI recs, CIWA monitoring

## 2020-06-25 NOTE — DISCHARGE NOTE PROVIDER - HOSPITAL COURSE
HPI:    Pt is a 39 year old female with a history of ETOH abuse, Alcoholic liver disease,  pancreatitis, depression, anxiety, Recent C diff infection who comes to the ED with chief c/o Abdominal pain, distension and leg swelling.     Pt was recently admitted to the hospital from May end- June 12th when she was Dx with C diff colitis ( supposed to complete rx on 6/19), Recurrent pancreatitis with acute pancreatic collection/ pseudocyst. Pt left AMA on last admission?. Pt states that she hasnt picked up her medications from the pharmacy and hasnot been taking any medicines since discharge.     Pt is an active drinker- states her last alcohol drink was yesterday. States her last episode of diarrhea was yesterday.     Pt today comes to the ED with abdominal swelling and pain. Pt states she has persistent pain in the abdomen- generalized, non radiating a/w worsening abdominal distension since 2 days. Denies any nausea/ vomiting. Denies any fever, chills.     VS on arrival noted for , /71, sat 100 on room air. Admission labs significant for Leukocytosis 16K, Macrocytic Anemia, K 3.2,     A gap 17, BUN <3, Low albumin, Bili 3.1, elevated Alk phos, Lactate 6.7, elevated blood alcohol level.         CT abdomen showed - Persistent  and slightly increased colonic thickening from cecum through transverse colon, compatible with known colitis; no evidence of abscess. Unchanged generalized mesenteric edema, limiting assessment for peripancreatic inflammatory change. Unchanged multiple pancreatic fluid collections, measuring up to 2.4 cm, which may be represent pancreatic pseudocysts. Stable mild to moderate ascites.    Hepatic steatosis.        Pt received IV abx, 2L in the ED and is being admitted to ICU for persistent septictemia after fluid resuscitation. (15 Aaron 2020 06:12)        Patient was admitted for sepsis 2/2 c. diff colitis or an infected pancreatic pseudocyst. She was started on cetfriaxone and PO vancomycin. She experienced EtOH and cocaine withdrawals and was placed on CIWA protocol. She underwent two therapeutic paracentesis and drained a total of 2.6L ascitic fluid and SBP was ruled out. She was continued on antibiotics until she deffervesced. HPI:    Pt is a 39 year old female with a history of ETOH abuse, Alcoholic liver disease,  pancreatitis, depression, anxiety, Recent C diff infection who comes to the ED with chief c/o Abdominal pain, distension and leg swelling.     Pt was recently admitted to the hospital from May end- June 12th when she was Dx with C diff colitis ( supposed to complete rx on 6/19), Recurrent pancreatitis with acute pancreatic collection/ pseudocyst. Pt left AMA on last admission?. Pt states that she hasnt picked up her medications from the pharmacy and hasnot been taking any medicines since discharge.     Pt is an active drinker- states her last alcohol drink was yesterday. States her last episode of diarrhea was yesterday.     Pt today comes to the ED with abdominal swelling and pain. Pt states she has persistent pain in the abdomen- generalized, non radiating a/w worsening abdominal distension since 2 days. Denies any nausea/ vomiting. Denies any fever, chills.     VS on arrival noted for , /71, sat 100 on room air. Admission labs significant for Leukocytosis 16K, Macrocytic Anemia, K 3.2,     A gap 17, BUN <3, Low albumin, Bili 3.1, elevated Alk phos, Lactate 6.7, elevated blood alcohol level.         CT abdomen showed - Persistent  and slightly increased colonic thickening from cecum through transverse colon, compatible with known colitis; no evidence of abscess. Unchanged generalized mesenteric edema, limiting assessment for peripancreatic inflammatory change. Unchanged multiple pancreatic fluid collections, measuring up to 2.4 cm, which may be represent pancreatic pseudocysts. Stable mild to moderate ascites.    Hepatic steatosis.        Patient was admitted for sepsis 2/2 c. diff colitis or an infected pancreatic pseudocyst. Pt received IV abx, 2L in the ED and is being admitted to ICU for persistent septictemia after fluid resuscitation. After 2 days she was then transferred to the floors having never been on pressors nor intubated.        She was started on cetfriaxone and PO vancomycin. She experienced EtOH and cocaine withdrawals and was placed on CIWA protocol. She underwent 3 therapeutic paracentesis and drained a total of 2.8L ascitic fluid and SBP was ruled out. She was continued on antibiotics until she deffervesced.

## 2020-06-25 NOTE — PROGRESS NOTE ADULT - ASSESSMENT
39 year old female with a history of acute alcoholic pancreatitis, alcohol abuse,  presents with abdominal pain, leukocytosis, acute pancreatitis with developing pseudocysts, elevated LFTs and C diff    Recurrent pancreatitis with pseudocyst formation secondary to alcohol abuse  Persistent pain  but tolerating regular diet  fever resolved , tachycardia persistent  CA/ TGs are normal.  repeat CT scan shows no foci of gas on pseudocyst, no hemorrhage, same ascites, increase anasarca  ascitic  fluid 6/19 ( no SBP) , no organism , blood culture negative     Rec  - low fat solid diet  -optimize  hemodynamics  -F/U with ID , currently on Rocephin  - pain control      # First known episode of C diff :  Patient on 10 day course of vancomycin; day 10 is today  Diarrhea has resolved  probiotics bid x 2 months      # Alcoholic liver disease: Elevated LFTs are likely secondary to alcoholic hepatitis (>2:1 ratio between AST: ALT) with possible liver cirrhosis  MADDREY score: ~45 (increased from admission score of 21; mostly due to elevated PT)  Role of steroids questionable due to recent infection  MRCP: CBD N, Sludge+ (last admission)  HBsAg, HBsAb, IgM/IgG, Anti HEV, Transferrin Saturation, Ceruloplasmin level, [place], SMA, AMA are all negative  Diagnostic tap : No SBP, consistent with portal hypertension  US duplex does not show Budd Chiari    Rec:  Complete alcohol abstinence   Daily LFTS and INR  aldactone  50, lasix 20mg daily, can titrate upto 100:40 if BP tolerates.  Recommend PO Vitamin K for 3 days with follow-up of labwork  Also recommend checking albumin and protein levels in urine.     Can follow up in liver clinic after discharge.

## 2020-06-25 NOTE — DISCHARGE NOTE PROVIDER - NSDCCPCAREPLAN_GEN_ALL_CORE_FT
PRINCIPAL DISCHARGE DIAGNOSIS  Diagnosis: Sepsis  Assessment and Plan of Treatment: You were admitted to the hospital because you had a severe infection. This may have been caused by a c. diff infection that came back or due to an infection in your pancreas. We started you on antibiotics in the ICU and then you came to the medical floors where we continued to monitor you.  Contact your healthcare provider if:  •You have a fever.  •You have questions or concerns about your condition or care.  Seek care immediately or call 911 if:  •You have increased swelling in your legs, feet, or abdomen.  •You are short of breath or you cough up blood.  •You have a fast heart rate and your chest hurts.  •You feel so dizzy that you have trouble standing up.  •Your lips or fingernails are blue.        SECONDARY DISCHARGE DIAGNOSES  Diagnosis: Abdominal pain  Assessment and Plan of Treatment: Your abdominal pain is likely caused by the fluid that has built up in your stomach (ascities). We drained this fluid in the hospital to help relieve the pain, but you will need to follow up in the liver clinic as an outpatient.    Diagnosis: ETOH abuse  Assessment and Plan of Treatment: We treated your alcohol withdrawal during this admission.  Self-care:  •Avoid drinking alcohol: Set a goal for yourself to completely stop drinking. This will stop you from being dependent on it. This will also prevent you from developing alcohol withdrawal and other more serious health problems.   •Be patient and keep your hopes up for improvement. Be more patient and not too hard on yourself. Always be the first one to encourage yourself and find ways to boost your self-esteem. Work together with your family and friends and give each other support. This will help during hard times.  If you drink alcohol again, do not drive or operate machines. Ask someone who is sober to help you go home or bring you to the nearest hospital.   Having alcohol dependence or withdrawal is a life-changing condition for you and your family. Accepting that you have alcohol dependence or withdrawal may be hard. Talk to your caregiver, family, or friends about your feelings. Your caregiver can help you and your family better understand how to support you. You and your family may also want to join a support group. This is a group of people who may also have alcohol dependence or withdrawal. Contact the following for more information:  •Alcoholics Anonymous   Web Address: http://www.alcoholics-anonymous.org.  •National [place] on Drug and Alcohol Information   Phone: 4- 894 - 5374390   Web Address: www.health.org    Diagnosis: Polysubstance dependence  Assessment and Plan of Treatment:     Diagnosis: Pancreatic pseudocyst  Assessment and Plan of Treatment:     Diagnosis: Colitis  Assessment and Plan of Treatment: PRINCIPAL DISCHARGE DIAGNOSIS  Diagnosis: Colitis  Assessment and Plan of Treatment: You were admitted to the hospital because you had a severe infection. This may have been caused by a c. diff infection that came back or due to an infection in your pancreas. We started you on antibiotics in the ICU and then you came to the medical floors where we continued to monitor you.  Contact your healthcare provider if:  •You have a fever.  •You have questions or concerns about your condition or care.  Seek care immediately or call 911 if:  •You have increased swelling in your legs, feet, or abdomen.  •You are short of breath or you cough up blood.  •You have a fast heart rate and your chest hurts.  •You feel so dizzy that you have trouble standing up.  •Your lips or fingernails are blue.        SECONDARY DISCHARGE DIAGNOSES  Diagnosis: Abdominal pain  Assessment and Plan of Treatment: Your abdominal pain is likely caused by the fluid that has built up in your stomach (ascities). We drained this fluid in the hospital to help relieve the pain, but you will need to follow up in the liver clinic as an outpatient.    Diagnosis: ETOH abuse  Assessment and Plan of Treatment: We treated your alcohol withdrawal during this admission.  Self-care:  •Avoid drinking alcohol: Set a goal for yourself to completely stop drinking. This will stop you from being dependent on it. This will also prevent you from developing alcohol withdrawal and other more serious health problems.   •Be patient and keep your hopes up for improvement. Be more patient and not too hard on yourself. Always be the first one to encourage yourself and find ways to boost your self-esteem. Work together with your family and friends and give each other support. This will help during hard times.  If you drink alcohol again, do not drive or operate machines. Ask someone who is sober to help you go home or bring you to the nearest hospital.   Having alcohol dependence or withdrawal is a life-changing condition for you and your family. Accepting that you have alcohol dependence or withdrawal may be hard. Talk to your caregiver, family, or friends about your feelings. Your caregiver can help you and your family better understand how to support you. You and your family may also want to join a support group. This is a group of people who may also have alcohol dependence or withdrawal. Contact the following for more information:  •Alcoholics Anonymous   Web Address: http://www.alcoholics-anonymous.org.  •National [place] on Drug and Alcohol Information   Phone: 0- 333 - 5514990   Web Address: www.health.org    Diagnosis: Colitis  Assessment and Plan of Treatment:     Diagnosis: Polysubstance dependence  Assessment and Plan of Treatment:     Diagnosis: Pancreatic pseudocyst  Assessment and Plan of Treatment: PRINCIPAL DISCHARGE DIAGNOSIS  Diagnosis: Colitis  Assessment and Plan of Treatment: You were admitted to the hospital because you had a severe infection. This may have been caused by a c. diff infection that came back or due to an infection in your pancreas. We started you on antibiotics in the ICU and then you came to the medical floors where we continued to monitor you.  Contact your healthcare provider if:  •You have a fever.  •You have questions or concerns about your condition or care.  Seek care immediately or call 911 if:  •You have increased swelling in your legs, feet, or abdomen.  •You are short of breath or you cough up blood.  •You have a fast heart rate and your chest hurts.  •You feel so dizzy that you have trouble standing up.  •Your lips or fingernails are blue.  Please follow up with the following appointment:  Primary care provider  Liver clinic  Outpatient detox        SECONDARY DISCHARGE DIAGNOSES  Diagnosis: Hypothyroidism  Assessment and Plan of Treatment: We found you to have hypothyroidism during this admission. Please follow up with your primary care provider for bloodwork.    Diagnosis: Abdominal pain  Assessment and Plan of Treatment: Your abdominal pain is likely caused by the fluid that has built up in your stomach (ascities). We drained this fluid in the hospital to help relieve the pain.  Please follow up in the liver clinic as an outpatient.    Diagnosis: ETOH abuse  Assessment and Plan of Treatment: We treated your alcohol withdrawal during this admission.  Please follow up with the outpatient detox program.

## 2020-06-25 NOTE — CHART NOTE - NSCHARTNOTEFT_GEN_A_CORE
Patient discharged to home    PHYSICAL EXAM:  GENERAL:   Awake, alert; NAD.  HEENT:  Head NC/AT; Conjunctivae pink, Sclera anicteric; Oral mucosa moist.  CARDIO:   Regular rate; Regular rhythm; S1 & S2.  RESP:   No rales, wheezing, or rhonchi appreciated.  GI:   Soft; (+) distention, significant ascities ; BS; No guarding; No rebound tenderness.  EXT:   Strength UE 5/5; Strength LE 5/5; No edema.   SKIN:   Intact.

## 2020-06-25 NOTE — DISCHARGE NOTE PROVIDER - NSDCMRMEDTOKEN_GEN_ALL_CORE_FT
EpiPen 2-Piero 0.3 mg injectable kit: 0.3 milligram(s) intramuscularly once   famotidine 20 mg oral tablet: 1 tab(s) orally once a day  folic acid 1 mg oral tablet: 1 tab(s) orally once a day  furosemide 20 mg oral tablet: 1 tab(s) orally once a day  K-Tab 20 mEq oral tablet, extended release: 1 tab(s) orally once a day   Multiple Vitamins oral tablet: 1 tab(s) orally once a day  nicotine 21 mg/24 hr transdermal film, extended release: 1 patch transdermal once a day   spironolactone 25 mg oral tablet: 4 tab(s) orally once a day  thiamine 100 mg oral tablet: 1 tab(s) orally once a day  vancomycin 125 mg oral capsule: 1 cap(s) orally every 6 hours EpiPen 2-Piero 0.3 mg injectable kit: 0.3 milligram(s) intramuscularly once   famotidine 20 mg oral tablet: 1 tab(s) orally once a day  folic acid 1 mg oral tablet: 1 tab(s) orally once a day  Multiple Vitamins oral tablet: 1 tab(s) orally once a day  nicotine 21 mg/24 hr transdermal film, extended release: 1 patch transdermal once a day   thiamine 100 mg oral tablet: 1 tab(s) orally once a day EpiPen 2-Piero 0.3 mg injectable kit: 0.3 milligram(s) intramuscularly once   famotidine 20 mg oral tablet: 1 tab(s) orally once a day  folic acid 1 mg oral tablet: 1 tab(s) orally once a day  gabapentin 100 mg oral capsule: 1 cap(s) orally 3 times a day  Multiple Vitamins oral tablet: 1 tab(s) orally once a day  nicotine 21 mg/24 hr transdermal film, extended release: 1 patch transdermal once a day   phytonadione 5 mg oral tablet: 1 tab(s) orally once a day   thiamine 100 mg oral tablet: 1 tab(s) orally once a day

## 2020-06-25 NOTE — PROGRESS NOTE ADULT - SUBJECTIVE AND OBJECTIVE BOX
Gastroenterology progress note:     Patient is a 39y old  Female who presents with a chief complaint of Abdominal Pain, Leg swelling. (22 Jun 2020 11:51)       Admitted on: 06-15-20    We are following the patient for acute on chronic pancreatitis + chronic alcoholic hepatitis      Interval History: patient's abdominal pain is still persistent. She is tolerating regular diet. No vomiting and having regular BM. No further diarrhea    PAST MEDICAL & SURGICAL HISTORY  PAST MEDICAL & SURGICAL HISTORY:  ETOH abuse  Postpartum depression  Substance abuse  Anxiety  Depression  No significant past surgical history    MEDICATIONS:  STANDING MEDICATIONS  ascorbic acid 500 milliGRAM(s) Oral daily  ascorbic acid 500 milliGRAM(s) Oral daily  cefTRIAXone   IVPB 2000 milliGRAM(s) IV Intermittent every 24 hours  chlorhexidine 4% Liquid 1 Application(s) Topical <User Schedule>  enoxaparin Injectable 40 milliGRAM(s) SubCutaneous daily  famotidine    Tablet 20 milliGRAM(s) Oral two times a day  folic acid 1 milliGRAM(s) Oral daily  furosemide    Tablet 40 milliGRAM(s) Oral daily  gabapentin 100 milliGRAM(s) Oral three times a day  lactobacillus acidophilus 1 Tablet(s) Oral two times a day with meals  lactulose Syrup 20 Gram(s) Oral daily  levothyroxine 50 MICROGram(s) Oral daily  multivitamin 1 Tablet(s) Oral daily  nicotine - 21 mG/24Hr(s) Patch 1 patch Transdermal daily  spironolactone 100 milliGRAM(s) Oral daily  thiamine 100 milliGRAM(s) Oral daily  vancomycin    Solution 125 milliGRAM(s) Oral every 6 hours    PRN MEDICATIONS  LORazepam     Tablet 1 milliGRAM(s) Oral every 4 hours PRN  morphine   Solution 10 milliGRAM(s) Oral every 4 hours PRN    VITALS:   T(F): 97.7  HR: 113  BP: 100/52  RR: 18  SpO2: 96%    LABS:                        7.6    16.05 )-----------( 279      ( 25 Jun 2020 05:44 )             24.1     06-25    140  |  99  |  <3<L>  ----------------------------<  91  3.3<L>   |  29  |  <0.5<L>    Ca    7.3<L>      25 Jun 2020 05:44  Mg     1.7     06-25    TPro  4.6<L>  /  Alb  1.6<L>  /  TBili  3.1<H>  /  DBili  x   /  AST  98<H>  /  ALT  21  /  AlkPhos  210<H>  06-25    PT/INR - ( 25 Jun 2020 05:44 )   PT: 20.20 sec;   INR: 1.76 ratio         PTT - ( 25 Jun 2020 05:44 )  PTT:39.7 sec          Culture - Body Fluid with Gram Stain (collected 24 Jun 2020 17:00)  Source: Ascites Fl Ascites Fluid  Gram Stain (25 Jun 2020 04:49):    polymorphonuclear leukocytes seen    No organisms seen    by cytocentrifuge    Culture - Blood (collected 23 Jun 2020 05:34)  Source: .Blood None  Preliminary Report (24 Jun 2020 14:01):    No growth to date.    PHYSICAL EXAM:  GENERAL: NAD, speaks in full sentences, no signs of respiratory distress  HEAD: Atraumatic  NECK: Supple  CHEST/LUNG: Clear to auscultation bilaterally; No wheeze or crackles  HEART: S1, S2; RRR; No murmurs, rubs, or gallops  ABDOMEN: Abdomen distended, painful to palpation.   EXTREMITIES:  2+ Peripheral Pulses, No clubbing, cyanosis. Prominent bilateral LE edema  PSYCH: AAOx3  NEUROLOGY: non-focal  SKIN: No rashes or lesions

## 2020-06-25 NOTE — PROGRESS NOTE ADULT - NSREFPHYEXREFTO_GEN_ALL_CORE
ED Physical Exam
Inpatient Physical Exam
ED Physical Exam
Inpatient Physical Exam
Inpatient Physical Exam

## 2020-06-25 NOTE — CHART NOTE - NSCHARTNOTEFT_GEN_A_CORE
Discussed case with Resident Ignacai [phone], no current psychiatric issues to be addressed, patient to be discharged today. Patient can follow up at [facility] [phone]. No psychiatric contraindication to d/c, recall PRN.

## 2020-06-25 NOTE — DISCHARGE NOTE PROVIDER - CARE PROVIDER_API CALL
Kenji Lux   MEDICINE  [address] [address]  Phone: [phone]  Fax: [phone]  Follow Up Time: 1 week Kenji Lux  [facility]  [address] [facility] [address]  [place] [address]  Phone: [phone]  Fax: [phone]  Follow Up Time: 1 week    Nando Kennedy  GASTROENTEROLOGY  [facility] [place] [address]  Phone: [phone]  Fax: [phone]  Follow Up Time:

## 2020-06-25 NOTE — PROGRESS NOTE ADULT - SUBJECTIVE AND OBJECTIVE BOX
DAILY PROGRESS NOTE  ===========================================================    Patient Information:  ADRIAN PRUETT  /  39y  /  Female  /  MRN#: [id number]    Hospital Day: 10d     |:::::::::::::::::::::::::::| SUBJECTIVE |:::::::::::::::::::::::::::|    OVERNIGHT EVENTS: Patient was complaining of hallucinations and was agitated. RN reports patient attempted to get into the wrong bed.  TODAY: Patient was seen today at bedside. Patient endorses pain on back of calves. Review of systems is otherwise negative.    |:::::::::::::::::::::::::::| OBJECTIVE |:::::::::::::::::::::::::::|    VITAL SIGNS: Last 24 Hours  T(C): 36.5 (25 Jun 2020 12:12), Max: 37.2 (24 Jun 2020 20:51)  T(F): 97.7 (25 Jun 2020 12:12), Max: 99 (24 Jun 2020 20:51)  HR: 113 (25 Jun 2020 05:30) (113 - 138)  BP: 100/52 (25 Jun 2020 12:12) (100/52 - 118/60)  BP(mean): --  RR: 18 (25 Jun 2020 12:12) (18 - 18)  SpO2: 96% (25 Jun 2020 08:24) (96% - 96%)    PHYSICAL EXAM:  GENERAL:   Awake, alert; NAD.  HEENT:  Head NC/AT; Conjunctivae pink, Sclera anicteric; Oral mucosa moist.  CARDIO:   Regular rate; Regular rhythm; S1 & S2.  RESP:   No rales, wheezing, or rhonchi appreciated.  GI:   Soft; NT/ND; BS; No guarding; No rebound tenderness.  EXT:   Strength UE 5/5; Strength LE 5/5; + lower extremity pitting edema.   SKIN:   Intact.    LAB RESULTS:                        7.6    16.05 )-----------( 279      ( 25 Jun 2020 05:44 )             24.1     06-25    140  |  99  |  <3<L>  ----------------------------<  91  3.3<L>   |  29  |  <0.5<L>    Ca    7.3<L>      25 Jun 2020 05:44  Mg     1.7     06-25    TPro  4.6<L>  /  Alb  1.6<L>  /  TBili  3.1<H>  /  DBili  x   /  AST  98<H>  /  ALT  21  /  AlkPhos  210<H>  06-25    PT/INR - ( 25 Jun 2020 05:44 )   PT: 20.20 sec;   INR: 1.76 ratio         PTT - ( 25 Jun 2020 05:44 )  PTT:39.7 sec            MICROBIOLOGY:    Culture - Body Fluid with Gram Stain (collected 24 Jun 2020 17:00)  Source: Ascites Fl Ascites Fluid  Gram Stain (25 Jun 2020 04:49):    polymorphonuclear leukocytes seen    No organisms seen    by cytocentrifuge    Culture - Blood (collected 23 Jun 2020 05:34)  Source: .Blood None  Preliminary Report (24 Jun 2020 14:01):    No growth to date.      RADIOLOGY:    ALLERGIES: No Known Allergies      ===========================================================

## 2020-06-25 NOTE — PROGRESS NOTE ADULT - SUBJECTIVE AND OBJECTIVE BOX
ADRIAN PRUETT    Patient is a 39y old  Female who presents with a chief complaint of Abdominal Pain, Leg swelling. (25 Jun 2020 08:51)    INTERVAL HPI/OVERNIGHT EVENTS: Last night events noted, as per staff there is a high suspicion that pt went outside with her friend and might drinking alcohol or using recreational drugs (no evidence though) and was noted to be lethargic and disoriented after that, required evaluation of psych. This morning pt denies use of recreational drugs or alcohol last night, she c/o the same pain in the abdomen, no nausea, no vomiting, during my interview pt is always tearful and c/o a lot of pain, but I observed her outside of room and pt looked comfortable. Pt tolerates PO diet, ambulates freely.     ROS: All ROS negative except as documented above    PHYSICAL EXAM:  T(C): 37.2, Max: 37.2 (06-24-20 @ 20:51)  HR: 113 (113 - 138)  BP: 109/52 (109/52 - 118/60)  RR: 18 (18 - 18)  SpO2: 96% (96% - 96%)    GENERAL: NAD, tearful  PULMONARY/CHEST: No rales, rhonchi, wheezing  CARDIOVASC: Regular rate and rhythm; No murmurs  GI/ABDOMEN: Soft, distended, not tender, +bowel sounds present  EXTREMITIES: +++ pitting edema up to thighs, + tenderness on posterior legs b/l  NERVOUS SYSTEM:  Alert & Oriented X3, no focal deficit     Consultant(s) Notes Reviewed by me.     LABS:                        7.6    16.05 )-----------( 279      ( 25 Jun 2020 05:44 )             24.1     06-25    140  |  99  |  <3<L>  ----------------------------<  91  3.3<L>   |  29  |  <0.5<L>    Ca    7.3<L>      25 Jun 2020 05:44  Mg     1.7     06-25    TPro  4.6<L>  /  Alb  1.6<L>  /  TBili  3.1<H>  /  DBili  x   /  AST  98<H>  /  ALT  21  /  AlkPhos  210<H>  06-25    PT/INR - ( 25 Jun 2020 05:44 )   PT: 20.20 sec;   INR: 1.76 ratio    PTT - ( 25 Jun 2020 05:44 )  PTT:39.7 sec    CAPILLARY BLOOD GLUCOSE  POCT Blood Glucose.: 124 mg/dL (25 Jun 2020 11:11)  POCT Blood Glucose.: 98 mg/dL (25 Jun 2020 07:59)  POCT Blood Glucose.: 119 mg/dL (24 Jun 2020 21:27)      Culture - Body Fluid with Gram Stain (collected 24 Jun 2020 17:00)  Source: Ascites Fl Ascites Fluid  Gram Stain (25 Jun 2020 04:49):    polymorphonuclear leukocytes seen    No organisms seen    by cytocentrifuge    Culture - Blood (collected 23 Jun 2020 05:34)  Source: .Blood None  Preliminary Report (24 Jun 2020 14:01):    No growth to date.      RADIOLOGY & ADDITIONAL TESTS:  no new images      MEDICATIONS  (STANDING):  ascorbic acid 500 milliGRAM(s) Oral daily  ascorbic acid 500 milliGRAM(s) Oral daily  cefTRIAXone   IVPB 2000 milliGRAM(s) IV Intermittent every 24 hours  chlorhexidine 4% Liquid 1 Application(s) Topical <User Schedule>  enoxaparin Injectable 40 milliGRAM(s) SubCutaneous daily  famotidine    Tablet 20 milliGRAM(s) Oral two times a day  folic acid 1 milliGRAM(s) Oral daily  furosemide    Tablet 40 milliGRAM(s) Oral daily  gabapentin 100 milliGRAM(s) Oral three times a day  lactobacillus acidophilus 1 Tablet(s) Oral two times a day with meals  lactulose Syrup 20 Gram(s) Oral daily  levothyroxine 50 MICROGram(s) Oral daily  multivitamin 1 Tablet(s) Oral daily  nicotine - 21 mG/24Hr(s) Patch 1 patch Transdermal daily  spironolactone 100 milliGRAM(s) Oral daily  thiamine 100 milliGRAM(s) Oral daily  vancomycin    Solution 125 milliGRAM(s) Oral every 6 hours    MEDICATIONS  (PRN):  LORazepam     Tablet 1 milliGRAM(s) Oral every 4 hours PRN Agitation  morphine   Solution 10 milliGRAM(s) Oral every 4 hours PRN Severe Pain (7 - 10)

## 2020-06-25 NOTE — DISCHARGE NOTE PROVIDER - PROVIDER TOKENS
PROVIDER:[TOKEN:[[id number]],FOLLOWUP:[1 week]] PROVIDER:[TOKEN:[[id number]],FOLLOWUP:[1 week]],PROVIDER:[TOKEN:[[id number]]]

## 2020-06-26 ENCOUNTER — TRANSCRIPTION ENCOUNTER (OUTPATIENT)
Age: 40
End: 2020-06-26

## 2020-06-26 VITALS
DIASTOLIC BLOOD PRESSURE: 58 MMHG | TEMPERATURE: 99 F | SYSTOLIC BLOOD PRESSURE: 102 MMHG | RESPIRATION RATE: 18 BRPM | HEART RATE: 108 BPM

## 2020-06-26 LAB
ALBUMIN SERPL ELPH-MCNC: 1.7 G/DL — LOW (ref 3.5–5.2)
ALP SERPL-CCNC: 220 U/L — HIGH (ref 30–115)
ALT FLD-CCNC: 23 U/L — SIGNIFICANT CHANGE UP (ref 0–41)
AMPHET UR-MCNC: NEGATIVE — SIGNIFICANT CHANGE UP
ANION GAP SERPL CALC-SCNC: 12 MMOL/L — SIGNIFICANT CHANGE UP (ref 7–14)
APTT BLD: 39.3 SEC — HIGH (ref 27–39.2)
AST SERPL-CCNC: 104 U/L — HIGH (ref 0–41)
BARBITURATES UR SCN-MCNC: NEGATIVE — SIGNIFICANT CHANGE UP
BASOPHILS # BLD AUTO: 0.12 K/UL — SIGNIFICANT CHANGE UP (ref 0–0.2)
BASOPHILS NFR BLD AUTO: 0.8 % — SIGNIFICANT CHANGE UP (ref 0–1)
BENZODIAZ UR-MCNC: NEGATIVE — SIGNIFICANT CHANGE UP
BILIRUB SERPL-MCNC: 3.1 MG/DL — HIGH (ref 0.2–1.2)
BUN SERPL-MCNC: <3 MG/DL — LOW (ref 10–20)
CALCIUM SERPL-MCNC: 7.6 MG/DL — LOW (ref 8.5–10.1)
CHLORIDE SERPL-SCNC: 98 MMOL/L — SIGNIFICANT CHANGE UP (ref 98–110)
CO2 SERPL-SCNC: 29 MMOL/L — SIGNIFICANT CHANGE UP (ref 17–32)
COCAINE METAB.OTHER UR-MCNC: NEGATIVE — SIGNIFICANT CHANGE UP
CREAT SERPL-MCNC: <0.5 MG/DL — LOW (ref 0.7–1.5)
EOSINOPHIL # BLD AUTO: 0.29 K/UL — SIGNIFICANT CHANGE UP (ref 0–0.7)
EOSINOPHIL NFR BLD AUTO: 1.8 % — SIGNIFICANT CHANGE UP (ref 0–8)
ETHANOL SERPL-MCNC: <10 MG/DL — SIGNIFICANT CHANGE UP
GLUCOSE SERPL-MCNC: 86 MG/DL — SIGNIFICANT CHANGE UP (ref 70–99)
HCT VFR BLD CALC: 26.5 % — LOW (ref 37–47)
HGB BLD-MCNC: 8.5 G/DL — LOW (ref 12–16)
IMM GRANULOCYTES NFR BLD AUTO: 0.6 % — HIGH (ref 0.1–0.3)
INR BLD: 1.69 RATIO — HIGH (ref 0.65–1.3)
LYMPHOCYTES # BLD AUTO: 1.45 K/UL — SIGNIFICANT CHANGE UP (ref 1.2–3.4)
LYMPHOCYTES # BLD AUTO: 9.1 % — LOW (ref 20.5–51.1)
MAGNESIUM SERPL-MCNC: 1.8 MG/DL — SIGNIFICANT CHANGE UP (ref 1.8–2.4)
MCHC RBC-ENTMCNC: 32.1 G/DL — SIGNIFICANT CHANGE UP (ref 32–37)
MCHC RBC-ENTMCNC: 33.1 PG — HIGH (ref 27–31)
MCV RBC AUTO: 103.1 FL — HIGH (ref 81–99)
METHADONE UR-MCNC: NEGATIVE — SIGNIFICANT CHANGE UP
MONOCYTES # BLD AUTO: 1.38 K/UL — HIGH (ref 0.1–0.6)
MONOCYTES NFR BLD AUTO: 8.6 % — SIGNIFICANT CHANGE UP (ref 1.7–9.3)
NEUTROPHILS # BLD AUTO: 12.64 K/UL — HIGH (ref 1.4–6.5)
NEUTROPHILS NFR BLD AUTO: 79.1 % — HIGH (ref 42.2–75.2)
NRBC # BLD: 0 /100 WBCS — SIGNIFICANT CHANGE UP (ref 0–0)
OPIATES UR-MCNC: POSITIVE
PCP SPEC-MCNC: SIGNIFICANT CHANGE UP
PLATELET # BLD AUTO: 257 K/UL — SIGNIFICANT CHANGE UP (ref 130–400)
POTASSIUM SERPL-MCNC: 3.5 MMOL/L — SIGNIFICANT CHANGE UP (ref 3.5–5)
POTASSIUM SERPL-SCNC: 3.5 MMOL/L — SIGNIFICANT CHANGE UP (ref 3.5–5)
PROPOXYPHENE QUALITATIVE URINE RESULT: NEGATIVE — SIGNIFICANT CHANGE UP
PROT SERPL-MCNC: 5 G/DL — LOW (ref 6–8)
PROTHROM AB SERPL-ACNC: 19.4 SEC — HIGH (ref 9.95–12.87)
RBC # BLD: 2.57 M/UL — LOW (ref 4.2–5.4)
RBC # FLD: 16.6 % — HIGH (ref 11.5–14.5)
SODIUM SERPL-SCNC: 139 MMOL/L — SIGNIFICANT CHANGE UP (ref 135–146)
WBC # BLD: 15.97 K/UL — HIGH (ref 4.8–10.8)
WBC # FLD AUTO: 15.97 K/UL — HIGH (ref 4.8–10.8)

## 2020-06-26 PROCEDURE — 99239 HOSP IP/OBS DSCHRG MGMT >30: CPT

## 2020-06-26 RX ORDER — FUROSEMIDE 40 MG
1 TABLET ORAL
Qty: 30 | Refills: 0
Start: 2020-06-26 | End: 2020-07-25

## 2020-06-26 RX ORDER — GABAPENTIN 400 MG/1
1 CAPSULE ORAL
Qty: 90 | Refills: 0
Start: 2020-06-26 | End: 2020-07-25

## 2020-06-26 RX ORDER — SPIRONOLACTONE 25 MG/1
1 TABLET, FILM COATED ORAL
Qty: 30 | Refills: 0
Start: 2020-06-26 | End: 2020-07-25

## 2020-06-26 RX ORDER — PHYTONADIONE (VIT K1) 5 MG
1 TABLET ORAL
Qty: 2 | Refills: 0
Start: 2020-06-26 | End: 2020-06-27

## 2020-06-26 RX ORDER — THIAMINE MONONITRATE (VIT B1) 100 MG
1 TABLET ORAL
Qty: 30 | Refills: 0
Start: 2020-06-26 | End: 2020-07-25

## 2020-06-26 RX ORDER — PHYTONADIONE (VIT K1) 5 MG
5 TABLET ORAL DAILY
Refills: 0 | Status: DISCONTINUED | OUTPATIENT
Start: 2020-06-26 | End: 2020-06-26

## 2020-06-26 RX ADMIN — Medication 500 MILLIGRAM(S): at 11:34

## 2020-06-26 RX ADMIN — Medication 100 MILLIGRAM(S): at 11:33

## 2020-06-26 RX ADMIN — MORPHINE SULFATE 10 MILLIGRAM(S): 50 CAPSULE, EXTENDED RELEASE ORAL at 05:58

## 2020-06-26 RX ADMIN — Medication 1 TABLET(S): at 11:33

## 2020-06-26 RX ADMIN — Medication 125 MILLIGRAM(S): at 11:35

## 2020-06-26 RX ADMIN — GABAPENTIN 100 MILLIGRAM(S): 400 CAPSULE ORAL at 05:57

## 2020-06-26 RX ADMIN — ENOXAPARIN SODIUM 40 MILLIGRAM(S): 100 INJECTION SUBCUTANEOUS at 11:35

## 2020-06-26 RX ADMIN — Medication 1 TABLET(S): at 09:29

## 2020-06-26 RX ADMIN — LACTULOSE 20 GRAM(S): 10 SOLUTION ORAL at 11:35

## 2020-06-26 RX ADMIN — Medication 125 MILLIGRAM(S): at 05:57

## 2020-06-26 RX ADMIN — FAMOTIDINE 20 MILLIGRAM(S): 10 INJECTION INTRAVENOUS at 05:57

## 2020-06-26 RX ADMIN — Medication 40 MILLIGRAM(S): at 05:57

## 2020-06-26 RX ADMIN — Medication 50 MICROGRAM(S): at 05:57

## 2020-06-26 RX ADMIN — Medication 1 MILLIGRAM(S): at 00:04

## 2020-06-26 RX ADMIN — Medication 1 PATCH: at 08:47

## 2020-06-26 RX ADMIN — Medication 1 PATCH: at 11:34

## 2020-06-26 RX ADMIN — Medication 5 MILLIGRAM(S): at 11:34

## 2020-06-26 RX ADMIN — GABAPENTIN 100 MILLIGRAM(S): 400 CAPSULE ORAL at 14:05

## 2020-06-26 RX ADMIN — SPIRONOLACTONE 100 MILLIGRAM(S): 25 TABLET, FILM COATED ORAL at 05:57

## 2020-06-26 RX ADMIN — MORPHINE SULFATE 10 MILLIGRAM(S): 50 CAPSULE, EXTENDED RELEASE ORAL at 14:05

## 2020-06-26 RX ADMIN — MORPHINE SULFATE 10 MILLIGRAM(S): 50 CAPSULE, EXTENDED RELEASE ORAL at 09:28

## 2020-06-26 RX ADMIN — Medication 1 MILLIGRAM(S): at 11:33

## 2020-06-26 NOTE — PROGRESS NOTE ADULT - NSHPATTENDINGPLANDISCUSS_GEN_ALL_CORE
above
ICU team
above
residents, nursing, , patient
House staff

## 2020-06-26 NOTE — DISCHARGE NOTE NURSING/CASE MANAGEMENT/SOCIAL WORK - PATIENT PORTAL LINK FT
You can access the FollowMyHealth Patient Portal offered by [facility] by registering at the following website: [email or web address]. By joining [employer]’s FollowMyHealth portal, you will also be able to view your health information using other applications (apps) compatible with our system.

## 2020-06-26 NOTE — PROGRESS NOTE ADULT - ASSESSMENT
39 year old female with a history of EtOH abuse, Alcoholic liver disease, pancreatitis, depression, anxiety, and Recent C diff infection presented to the ED with chief c/o abdominal pain, distension and leg swelling found to have sepsis 2/2 persistent colitis  -Sepsis in setting of C diff colitis, improving  -Pancreatitis in setting of active alcohol abuse with ascites, likely cirrhosis present, pancreatitis resolved  -Macrocytic anemia likely 2/2 alcohol abuse  -hypokalemia/hypomagnesemia,   -elevated WBC  -anemia    PLAN  - monitor and document BMs  - consider adding vitamin C 500 mg daily po  - add zinc 220 mg zn so4 daily x 14 days  - vitamin D 2000 IU daily  - can d/c thiamine and folate - given for possible EtOH detox, treatment course 7 days is long completed  --calorie count to assess adequacy of intake, particularly of protein. If inadequate, as I suspect, add Beneprotein and yogurt (not prosource or meats)  - fluid volume restriction   - add FloraStor 250 mg twice a day x 14 days after C Diff

## 2020-06-26 NOTE — PROGRESS NOTE ADULT - SUBJECTIVE AND OBJECTIVE BOX
ADRIAN PRUETT    Patient is a 39y old  Female who presents with a chief complaint of Abdominal Pain, Leg swelling. (26 Jun 2020 15:15)    INTERVAL HPI/OVERNIGHT EVENTS: No events overnight. Pt still complaints of abdominal pain, even though she looks comfortable and eats well, asking extra portions.   No other complaints.     ROS: All ROS negative except as documented above    PHYSICAL EXAM:  T(C): 37.3, Max: 37.3 (06-25-20 @ 19:59)  HR: 108 (104 - 110)  BP: 102/58 (96/59 - 102/58)  RR: 18 (18 - 20)  SpO2: --    GENERAL: NAD  PULMONARY/CHEST: No rales, rhonchi, wheezing  CARDIOVASC: Regular rate and rhythm; No murmurs  GI/ABDOMEN: Soft, distended, not tender, +bowel sounds present  EXTREMITIES: +++ pitting edema up to thighs, + tenderness on posterior legs b/l  NERVOUS SYSTEM:  Alert & Oriented X3, no focal deficit     Consultant(s) Notes Reviewed by me.     LABS:                        8.5    15.97 )-----------( 257      ( 26 Jun 2020 05:58 )             26.5     06-26    139  |  98  |  <3<L>  ----------------------------<  86  3.5   |  29  |  <0.5<L>    Ca    7.6<L>      26 Jun 2020 05:58  Mg     1.8     06-26    TPro  5.0<L>  /  Alb  1.7<L>  /  TBili  3.1<H>  /  DBili  x   /  AST  104<H>  /  ALT  23  /  AlkPhos  220<H>  06-26    PT/INR - ( 26 Jun 2020 05:58 )   PT: 19.40 sec;   INR: 1.69 ratio    PTT - ( 26 Jun 2020 05:58 )  PTT:39.3 sec    CAPILLARY BLOOD GLUCOSE  POCT Blood Glucose.: 118 mg/dL (25 Jun 2020 21:09)  POCT Blood Glucose.: 152 mg/dL (25 Jun 2020 16:30)      Culture - Body Fluid with Gram Stain (collected 24 Jun 2020 17:00)  Source: Ascites Fl Ascites Fluid  Gram Stain (25 Jun 2020 04:49):    polymorphonuclear leukocytes seen    No organisms seen    by cytocentrifuge  Preliminary Report (25 Jun 2020 19:32):    No growth      RADIOLOGY & ADDITIONAL TESTS:  no new images      MEDICATIONS  (STANDING):  ascorbic acid 500 milliGRAM(s) Oral daily  ascorbic acid 500 milliGRAM(s) Oral daily  cefTRIAXone   IVPB 2000 milliGRAM(s) IV Intermittent every 24 hours  chlorhexidine 4% Liquid 1 Application(s) Topical <User Schedule>  enoxaparin Injectable 40 milliGRAM(s) SubCutaneous daily  famotidine    Tablet 20 milliGRAM(s) Oral two times a day  folic acid 1 milliGRAM(s) Oral daily  furosemide    Tablet 40 milliGRAM(s) Oral daily  gabapentin 100 milliGRAM(s) Oral three times a day  lactobacillus acidophilus 1 Tablet(s) Oral two times a day with meals  lactulose Syrup 20 Gram(s) Oral daily  levothyroxine 50 MICROGram(s) Oral daily  multivitamin 1 Tablet(s) Oral daily  nicotine - 21 mG/24Hr(s) Patch 1 patch Transdermal daily  phytonadione   Solution 5 milliGRAM(s) Oral daily  spironolactone 100 milliGRAM(s) Oral daily  thiamine 100 milliGRAM(s) Oral daily  vancomycin    Solution 125 milliGRAM(s) Oral every 6 hours    MEDICATIONS  (PRN):  LORazepam     Tablet 1 milliGRAM(s) Oral every 4 hours PRN Agitation  morphine   Solution 10 milliGRAM(s) Oral every 4 hours PRN Severe Pain (7 - 10)

## 2020-06-26 NOTE — PROGRESS NOTE ADULT - PROVIDER SPECIALTY LIST ADULT
Critical Care
Gastroenterology
Hospitalist
Infectious Disease
Internal Medicine
Nutrition Support
Pain Medicine
Hospitalist

## 2020-06-26 NOTE — PROGRESS NOTE ADULT - SUBJECTIVE AND OBJECTIVE BOX
SUBJECTIVE:    Gastroenterology progress note:     Patient is a 39y old Female who presents with a chief complaint of Abdominal Pain, Leg swelling. (22 Jun 2020 11:51)    Admitted on: 06-15-20    We are following the patient for acute on chronic pancreatitis + chronic alcoholic hepatitis      Interval History: Patient seen and examined at bedside - sleeping comfortably but awakens easily. Continues to complain of abdominal pain- unchanged from yesterday. She is tolerating regular diet. No vomiting and having regular BM. No diarrhea    PAST MEDICAL & SURGICAL HISTORY  PAST MEDICAL & SURGICAL HISTORY:  ETOH abuse  Postpartum depression  Substance abuse  Anxiety  Depression  No significant past surgical history    ALLERGIES:  No Known Allergies    MEDICATIONS:  STANDING MEDICATIONS  ascorbic acid 500 milliGRAM(s) Oral daily  ascorbic acid 500 milliGRAM(s) Oral daily  cefTRIAXone   IVPB 2000 milliGRAM(s) IV Intermittent every 24 hours  chlorhexidine 4% Liquid 1 Application(s) Topical <User Schedule>  enoxaparin Injectable 40 milliGRAM(s) SubCutaneous daily  famotidine    Tablet 20 milliGRAM(s) Oral two times a day  folic acid 1 milliGRAM(s) Oral daily  furosemide    Tablet 40 milliGRAM(s) Oral daily  gabapentin 100 milliGRAM(s) Oral three times a day  lactobacillus acidophilus 1 Tablet(s) Oral two times a day with meals  lactulose Syrup 20 Gram(s) Oral daily  levothyroxine 50 MICROGram(s) Oral daily  multivitamin 1 Tablet(s) Oral daily  nicotine - 21 mG/24Hr(s) Patch 1 patch Transdermal daily  phytonadione   Solution 5 milliGRAM(s) Oral daily  spironolactone 100 milliGRAM(s) Oral daily  thiamine 100 milliGRAM(s) Oral daily  vancomycin    Solution 125 milliGRAM(s) Oral every 6 hours    PRN MEDICATIONS  LORazepam     Tablet 1 milliGRAM(s) Oral every 4 hours PRN  morphine   Solution 10 milliGRAM(s) Oral every 4 hours PRN    VITALS:   T(F): 96.6  HR: 104  BP: 96/59  RR: 18  SpO2: --    LABS:                        8.5    15.97 )-----------( 257      ( 26 Jun 2020 05:58 )             26.5     06-26    139  |  98  |  <3<L>  ----------------------------<  86  3.5   |  29  |  <0.5<L>    Ca    7.6<L>      26 Jun 2020 05:58  Mg     1.8     06-26    TPro  5.0<L>  /  Alb  1.7<L>  /  TBili  3.1<H>  /  DBili  x   /  AST  104<H>  /  ALT  23  /  AlkPhos  220<H>  06-26    PT/INR - ( 26 Jun 2020 05:58 )   PT: 19.40 sec;   INR: 1.69 ratio         PTT - ( 26 Jun 2020 05:58 )  PTT:39.3 sec    Culture - Body Fluid with Gram Stain (collected 24 Jun 2020 17:00)  Source: Ascites Fl Ascites Fluid  Gram Stain (25 Jun 2020 04:49):    polymorphonuclear leukocytes seen    No organisms seen    by cytocentrifuge  Preliminary Report (25 Jun 2020 19:32):    No growth    PHYSICAL EXAM:  GENERAL: NAD, speaks in full sentences, no signs of respiratory distress  HEAD: Atraumatic  NECK: Supple  CHEST/LUNG: Clear to auscultation bilaterally; No wheeze or crackles  HEART: S1, S2; RRR; No murmurs, rubs, or gallops  ABDOMEN: Abdomen distended, painful to palpation.   EXTREMITIES:  2+ Peripheral Pulses, No clubbing, cyanosis. Prominent bilateral LE edema  PSYCH: AAOx3  NEUROLOGY: non-focal  SKIN: No rashes or lesions

## 2020-06-26 NOTE — PROGRESS NOTE ADULT - ASSESSMENT
38 yo F with MHx alcohol abuse, alcoholic liver disease, chronic pancreatitis, depression, anxiety here with severe sepsis poa 2/2 c diff colitis + alcohol withdrawal.     # Sepsis resolved. Deemed secondary to C. Dif colitis. No SBP, BCx negative   - can dc Rocephin and Vanco PO (completed 10 days)    # Abdominal pain likely due to alcoholic hepatitis, hepatomegaly, possibly contributed by ascites, also possible drug seeking behaviour, doubt pancreatitis given Lipase is low and on physical exam no abdominal pain, no signs of acute colitis, no SBP, s/p multiple paracentesis last 6/24, Cx negative.   - pt was not recommended Steroids by GI on this admission since Meddrey score was 21, but my calculation of Meddrey score is > 34, as per GI no steroids in light of recent infection.    - CLD workup negative   - alcohol abstinence advised, consulted and encouraged  - continue Aldactone 100 mg QD and lasix 40 mg QD  - pt will need Op f/u with GI for EGD and further management in liver clinic.  - pt will be taking mena K for 2 more days.     # Alcohol withdrawal - pt completed Ativan protocol, no signs of withdrawal now, dc Ativan. I discussed with pt OP detox, she said she is interested will refer  for more information.     # Leucocytosis, transaminitis consistent with alcoholic hepatitis    # Macrocytic anemia - Thiamine, Folate deficiency, Vit B12 WNL, macrocytosis likely due to alcohol toxicity     # Hypothyroidism - started on Levothyroxine 50 mcg, she will repeat TSH in the beginning of August     # Leg pain - no DVT    Prognosis is poor, pt is active drinker.     Pt is clinically stable for discharge home today.

## 2020-06-26 NOTE — PROGRESS NOTE ADULT - SUBJECTIVE AND OBJECTIVE BOX
39 year old female with a history of EtOH abuse, Alcoholic liver disease, pancreatitis, depression, anxiety, and Recent C diff infection presented to the ED with chief c/o abdominal pain, distension and leg swelling found to have sepsis r/t c diff colitis.  pt awake, receiving po diet but intake uncertain.  cont to c/o abd pain, no V, no longer loose BMs.  abd soft, distended  ++LE edema unchanged  Vital Signs Last 24 Hrs  T(C): 37.3 (26 Jun 2020 13:18), Max: 37.3 (25 Jun 2020 19:59)  T(F): 99.2 (26 Jun 2020 13:18), Max: 99.2 (25 Jun 2020 19:59)  HR: 108 (26 Jun 2020 13:18) (104 - 110)  BP: 102/58 (26 Jun 2020 13:18) (96/59 - 102/58)  BP(mean): --  RR: 18 (26 Jun 2020 13:18) (18 - 20)    MEDICATIONS  (STANDING):  ascorbic acid 500 milliGRAM(s) Oral daily  cefTRIAXone   IVPB 2000 milliGRAM(s) IV Intermittent every 24 hours  chlorhexidine 4% Liquid 1 Application(s) Topical <User Schedule>  enoxaparin Injectable 40 milliGRAM(s) SubCutaneous daily  famotidine    Tablet 20 milliGRAM(s) Oral two times a day  folic acid 1 milliGRAM(s) Oral daily  furosemide    Tablet 40 milliGRAM(s) Oral daily  gabapentin 100 milliGRAM(s) Oral three times a day  lactobacillus acidophilus 1 Tablet(s) Oral two times a day with meals  lactulose Syrup 20 Gram(s) Oral daily  levothyroxine 50 MICROGram(s) Oral daily  multivitamin 1 Tablet(s) Oral daily  nicotine - 21 mG/24Hr(s) Patch 1 patch Transdermal daily  phytonadione   Solution 5 milliGRAM(s) Oral daily  spironolactone 100 milliGRAM(s) Oral daily  thiamine 100 milliGRAM(s) Oral daily  vancomycin    Solution 125 milliGRAM(s) Oral every 6 hours                        8.5    15.97 )-----------( 257      ( 26 Jun 2020 05:58 )               26.5   06-26    139  |  98  |  <3<L>  ----------------------------<  86  3.5   |  29  |  <0.5<L>    Ca    7.6<L>      26 Jun 2020 05:58  Mg     1.8     06-26    TPro  5.0<L>  /  Alb  1.7<L>  /  TBili  3.1<H>  /  DBili  x   /  AST  104<H>  /  ALT  23  /  AlkPhos  220<H>  06-26    last phos 6/19 = 2.7  Zinc Level, Plasma (06.18.20 @ 17:26)    Zinc Level, Plasma: 39:  Vitamin D, 25-Hydroxy (06.18.20 @ 17:26)    Vitamin D, 25-Hydroxy: 30: 30 - 80 ng/mL                                        [facility] Levels

## 2020-06-26 NOTE — CHART NOTE - NSCHARTNOTEFT_GEN_A_CORE
<<<RESIDENT DISCHARGE NOTE>>>     ADRIAN PRUETT  MRN-[id number]    VITAL SIGNS:  T(F): 99.2 (06-26-20 @ 13:18), Max: 99.2 (06-25-20 @ 19:59)  HR: 108 (06-26-20 @ 13:18)  BP: 102/58 (06-26-20 @ 13:18)  SpO2: --      PHYSICAL EXAMINATION:  General: Well appearing, no acute distress  Head & Neck: Normocephalic, atraumatic  Pulmonary: Lungs clear to auscultation bilaterally, no wheezing ronchi, rales  Cardiovascular: Regular rate, normal rhythm, S1&S2 auscultated  Gastrointestinal/Abdomen & Pelvis: Soft, nontender, (+) distention, (+) bowel sounds  Neurologic/Motor: CN II-XII grossly intact, AAOx4, LE pitting edema bilaterally    TEST RESULTS:                        8.5    15.97 )-----------( 257      ( 26 Jun 2020 05:58 )             26.5       06-26    139  |  98  |  <3<L>  ----------------------------<  86  3.5   |  29  |  <0.5<L>    Ca    7.6<L>      26 Jun 2020 05:58  Mg     1.8     06-26    TPro  5.0<L>  /  Alb  1.7<L>  /  TBili  3.1<H>  /  DBili  x   /  AST  104<H>  /  ALT  23  /  AlkPhos  220<H>  06-26    DISCHARGE MEDICATION RECONCILIATION  reviewed with Attending Donn    DISPOSITION:   [X] Home,    [  ] Home with Visiting Nursing Services,   [    ]  SNF/ NH,    [   ] Acute Rehab (4A),   [   ] Other (Specify:_________)

## 2020-06-26 NOTE — PROGRESS NOTE ADULT - REASON FOR ADMISSION
Abdominal Pain, Leg swelling.

## 2020-06-28 LAB
CULTURE RESULTS: SIGNIFICANT CHANGE UP
SPECIMEN SOURCE: SIGNIFICANT CHANGE UP

## 2020-06-29 LAB
CULTURE RESULTS: SIGNIFICANT CHANGE UP
SPECIMEN SOURCE: SIGNIFICANT CHANGE UP

## 2020-06-30 DIAGNOSIS — E87.6 HYPOKALEMIA: ICD-10-CM

## 2020-06-30 DIAGNOSIS — R65.20 SEVERE SEPSIS WITHOUT SEPTIC SHOCK: ICD-10-CM

## 2020-06-30 DIAGNOSIS — A41.9 SEPSIS, UNSPECIFIED ORGANISM: ICD-10-CM

## 2020-06-30 DIAGNOSIS — R10.9 UNSPECIFIED ABDOMINAL PAIN: ICD-10-CM

## 2020-06-30 DIAGNOSIS — E51.9 THIAMINE DEFICIENCY, UNSPECIFIED: ICD-10-CM

## 2020-06-30 DIAGNOSIS — F10.231 ALCOHOL DEPENDENCE WITH WITHDRAWAL DELIRIUM: ICD-10-CM

## 2020-06-30 DIAGNOSIS — K70.11 ALCOHOLIC HEPATITIS WITH ASCITES: ICD-10-CM

## 2020-06-30 DIAGNOSIS — Z91.19 PATIENT'S NONCOMPLIANCE WITH OTHER MEDICAL TREATMENT AND REGIMEN: ICD-10-CM

## 2020-06-30 DIAGNOSIS — D64.9 ANEMIA, UNSPECIFIED: ICD-10-CM

## 2020-06-30 DIAGNOSIS — A04.72 ENTEROCOLITIS DUE TO CLOSTRIDIUM DIFFICILE, NOT SPECIFIED AS RECURRENT: ICD-10-CM

## 2020-06-30 DIAGNOSIS — E53.8 DEFICIENCY OF OTHER SPECIFIED B GROUP VITAMINS: ICD-10-CM

## 2020-06-30 DIAGNOSIS — E43 UNSPECIFIED SEVERE PROTEIN-CALORIE MALNUTRITION: ICD-10-CM

## 2020-06-30 DIAGNOSIS — K70.31 ALCOHOLIC CIRRHOSIS OF LIVER WITH ASCITES: ICD-10-CM

## 2020-06-30 DIAGNOSIS — E87.2 ACIDOSIS: ICD-10-CM

## 2020-06-30 DIAGNOSIS — F32.9 MAJOR DEPRESSIVE DISORDER, SINGLE EPISODE, UNSPECIFIED: ICD-10-CM

## 2020-06-30 DIAGNOSIS — K86.0 ALCOHOL-INDUCED CHRONIC PANCREATITIS: ICD-10-CM

## 2020-06-30 DIAGNOSIS — E83.42 HYPOMAGNESEMIA: ICD-10-CM

## 2020-06-30 DIAGNOSIS — F41.9 ANXIETY DISORDER, UNSPECIFIED: ICD-10-CM

## 2020-06-30 DIAGNOSIS — K86.3 PSEUDOCYST OF PANCREAS: ICD-10-CM

## 2020-06-30 DIAGNOSIS — F14.10 COCAINE ABUSE, UNCOMPLICATED: ICD-10-CM

## 2020-07-02 LAB
6-ACETYLMORPHINE, UR RESULT: NEGATIVE NG/ML — SIGNIFICANT CHANGE UP
6MAM UR CFM-MCNC: NEGATIVE NG/ML — SIGNIFICANT CHANGE UP
CODEINE UR CFM-MCNC: NEGATIVE NG/ML — SIGNIFICANT CHANGE UP
CODEINE, UR RESULT: NEGATIVE NG/ML — SIGNIFICANT CHANGE UP
HYDROCODONE UR QL CFM: NEGATIVE NG/ML — SIGNIFICANT CHANGE UP
HYDROCODONE, UR RESULT: NEGATIVE NG/ML — SIGNIFICANT CHANGE UP
HYDROMORPHONE UR QL CFM: NEGATIVE NG/ML — SIGNIFICANT CHANGE UP
HYDROMORPHONE, UR RESULT: NEGATIVE NG/ML — SIGNIFICANT CHANGE UP
MORPHINE UR QL CFM: >5000 NG/ML — SIGNIFICANT CHANGE UP
MORPHINE, UR RESULT: >5000 NG/ML — SIGNIFICANT CHANGE UP
NOROXYCODONE (OPIATES), UR RESULT: NEGATIVE NG/ML — SIGNIFICANT CHANGE UP
NOROXYCODONE UR CFM-MCNC: NEGATIVE NG/ML — SIGNIFICANT CHANGE UP
OPIATES IN-HOUSE INTERPRETATION: POSITIVE
OPIATES UR QL CFM: POSITIVE
OXYCODONE (OPIATES), UR RESULT: NEGATIVE NG/ML — SIGNIFICANT CHANGE UP
OXYCODONE UR-MCNC: NEGATIVE NG/ML — SIGNIFICANT CHANGE UP
OXYMORPHONE (OPIATES), UR RESULT: NEGATIVE NG/ML — SIGNIFICANT CHANGE UP
OXYMORPHONE UR CFM-MCNC: NEGATIVE NG/ML — SIGNIFICANT CHANGE UP

## 2020-07-15 LAB
CULTURE RESULTS: SIGNIFICANT CHANGE UP
SPECIMEN SOURCE: SIGNIFICANT CHANGE UP

## 2020-07-18 LAB
CULTURE RESULTS: SIGNIFICANT CHANGE UP
SPECIMEN SOURCE: SIGNIFICANT CHANGE UP

## 2020-07-29 ENCOUNTER — EMERGENCY (EMERGENCY)
Facility: HOSPITAL | Age: 40
LOS: 1 days | End: 2020-07-29
Attending: EMERGENCY MEDICINE | Admitting: EMERGENCY MEDICINE
Payer: MEDICAID

## 2020-07-29 VITALS
DIASTOLIC BLOOD PRESSURE: 65 MMHG | TEMPERATURE: 98 F | WEIGHT: 119.93 LBS | RESPIRATION RATE: 20 BRPM | SYSTOLIC BLOOD PRESSURE: 118 MMHG | OXYGEN SATURATION: 100 % | HEART RATE: 102 BPM

## 2020-07-29 PROCEDURE — 99284 EMERGENCY DEPT VISIT MOD MDM: CPT

## 2020-07-29 RX ORDER — TETANUS TOXOID, REDUCED DIPHTHERIA TOXOID AND ACELLULAR PERTUSSIS VACCINE, ADSORBED 5; 2.5; 8; 8; 2.5 [IU]/.5ML; [IU]/.5ML; UG/.5ML; UG/.5ML; UG/.5ML
0.5 SUSPENSION INTRAMUSCULAR ONCE
Refills: 0 | Status: COMPLETED | OUTPATIENT
Start: 2020-07-29 | End: 2020-07-29

## 2020-07-29 RX ORDER — ACETAMINOPHEN 500 MG
650 TABLET ORAL ONCE
Refills: 0 | Status: COMPLETED | OUTPATIENT
Start: 2020-07-29 | End: 2020-07-29

## 2020-07-29 NOTE — ED ADULT TRIAGE NOTE - CHIEF COMPLAINT QUOTE
Right top of hand wound, towel was covered in blood, pt states was bleeding for 2 days. Towel removed, bleeding has stopped

## 2020-07-29 NOTE — ED ADULT NURSE NOTE - OBJECTIVE STATEMENT
Patient reports she scrapped her hand on a tree and sustained a laceration to the right hand which has been bleeding for 2 days.

## 2020-07-29 NOTE — ED PROVIDER NOTE - OBJECTIVE STATEMENT
38 y/o female with hx Anxiety, Depression, Substance abuse presents to the ED c/o "I was cutting the lawn yesterday and then I noticed my right hand bleeding." no numbness/ tingling unknown last tetanus

## 2020-07-31 ENCOUNTER — EMERGENCY (EMERGENCY)
Facility: HOSPITAL | Age: 40
LOS: 0 days | Discharge: HOME | End: 2020-07-31
Attending: EMERGENCY MEDICINE | Admitting: EMERGENCY MEDICINE
Payer: MEDICAID

## 2020-07-31 VITALS
DIASTOLIC BLOOD PRESSURE: 58 MMHG | HEIGHT: 63 IN | SYSTOLIC BLOOD PRESSURE: 120 MMHG | HEART RATE: 129 BPM | WEIGHT: 89.95 LBS | OXYGEN SATURATION: 98 % | RESPIRATION RATE: 20 BRPM | TEMPERATURE: 100 F

## 2020-07-31 VITALS
DIASTOLIC BLOOD PRESSURE: 55 MMHG | OXYGEN SATURATION: 100 % | TEMPERATURE: 100 F | RESPIRATION RATE: 18 BRPM | HEART RATE: 122 BPM | SYSTOLIC BLOOD PRESSURE: 105 MMHG

## 2020-07-31 DIAGNOSIS — S11.91XA LACERATION WITHOUT FOREIGN BODY OF UNSPECIFIED PART OF NECK, INITIAL ENCOUNTER: ICD-10-CM

## 2020-07-31 DIAGNOSIS — Z48.00 ENCOUNTER FOR CHANGE OR REMOVAL OF NONSURGICAL WOUND DRESSING: ICD-10-CM
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
## 2020-07-31 DIAGNOSIS — Z23 ENCOUNTER FOR IMMUNIZATION: ICD-10-CM
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
## 2020-07-31 DIAGNOSIS — F17.200 NICOTINE DEPENDENCE, UNSPECIFIED, UNCOMPLICATED: ICD-10-CM

## 2020-07-31 PROCEDURE — 99283 EMERGENCY DEPT VISIT LOW MDM: CPT

## 2020-07-31 RX ORDER — TETANUS TOXOID, REDUCED DIPHTHERIA TOXOID AND ACELLULAR PERTUSSIS VACCINE, ADSORBED 5; 2.5; 8; 8; 2.5 [IU]/.5ML; [IU]/.5ML; UG/.5ML; UG/.5ML; UG/.5ML
0.5 SUSPENSION INTRAMUSCULAR ONCE
Refills: 0 | Status: COMPLETED | OUTPATIENT
Start: 2020-07-31 | End: 2020-07-31

## 2020-07-31 RX ADMIN — TETANUS TOXOID, REDUCED DIPHTHERIA TOXOID AND ACELLULAR PERTUSSIS VACCINE, ADSORBED 0.5 MILLILITER(S): 5; 2.5; 8; 8; 2.5 SUSPENSION INTRAMUSCULAR at 19:22

## 2020-07-31 NOTE — ED PROVIDER NOTE - PHYSICAL EXAMINATION
CONSTITUTIONAL: Well-developed; well-nourished; in no acute distress.   SKIN: <0.5 cm punctate laceration on R side of neck with bright red blood oozing  HEAD: Normocephalic; atraumatic.  EYES: PERRL, EOMI, normal sclera and conjunctiva   ENT: No nasal discharge; airway clear.  NECK: Supple; non tender.  CARD: S1, S2 normal; no murmurs, gallops, or rubs. Regular rate and rhythm.   RESP: No wheezes, rales or rhonchi.  ABD: soft ntnd  EXT: Normal ROM.  No clubbing, cyanosis or edema.   LYMPH: No acute cervical adenopathy.  NEURO: Alert, oriented, grossly unremarkable  PSYCH: Cooperative, appropriate.

## 2020-07-31 NOTE — ED PROVIDER NOTE - NS ED ROS FT
Constitutional:  see HPI  Head:  no headache, dizziness, loss of consciousness  Eyes:  no visual changes; no eye pain, redness, or discharge  ENMT:  R neck laceration  Cardiac: no chest pain, tachycardia or palpitations  Respiratory: no cough, wheezing, shortness of breath, chest tightness, or trouble breathing  GI: no nausea, vomiting, diarrhea or abdominal pain  :  no dysuria, frequency, or burning with urination; no change in urine output  MS: no myalgias, muscle weakness, joint pain,or  injury; no joint swelling  Neuro: no weakness; no numbness or tingling; no seizure  Skin:  laceration on R neck

## 2020-07-31 NOTE — ED PROVIDER NOTE - ATTENDING CONTRIBUTION TO CARE
38yo F with PMHx pancreatitis, substance abuse, anxiety/depression, presents for eval of bleeding from neck wound. Pt states she felt a "pimple" on her right neck, scratched at it, and now it won't stop bleeding. Denies other injuries. Denies SI/HI, self harm. Denies SOB or lightheadedness. Tdap status unknown.     Vital signs reviewed  GENERAL: Patient nontoxic appearing, NAD  RESPIRATORY: Normal respiratory effort. CTA B/L. No wheezing, rales, rhonchi  CARDIOVASCULAR: Regular rate and rhythm  MUSCULOSKELETAL/EXTREMITIES: Brisk cap refill. Equal radial pulses.   SKIN:  Small <0.5mm wound on right lateral neck oozing blood, not pulsatile. Multiple dark red/purple lesions on face with appearance of hemangiomas.   NEURO: AAOx3. No gross FND.

## 2020-07-31 NOTE — ED PROVIDER NOTE - OBJECTIVE STATEMENT
40 yo female with hx of pancreatitis, substance abuse, depression, anxiety presenting after neck wound. Pt reports scratching a pimple on her R neck with her fingernails a few hours PTA and noticed blood coming out, unable to control with pressure. denies SI/HI, drug/alcohol use, fever, cp, sob. 40 yo female with hx of pancreatitis, substance abuse, depression, anxiety presenting after neck wound. Pt reports scratching a pimple on her R neck with her fingernails a few hours PTA and noticed blood coming out, unable to control with pressure. denies SI/HI, recreational drug use, fever, cp, sob.

## 2020-07-31 NOTE — ED ADULT NURSE NOTE - NSIMPLEMENTINTERV_GEN_ALL_ED
Implemented All Fall Risk Interventions:  [place] to call system. Call bell, personal items and telephone within reach. Instruct patient to call for assistance. Room bathroom lighting operational. Non-slip footwear when patient is off stretcher. Physically safe environment: no spills, clutter or unnecessary equipment. Stretcher in lowest position, wheels locked, appropriate side rails in place. Provide visual cue, wrist band, yellow gown, etc. Monitor gait and stability. Monitor for mental status changes and reorient to person, place, and time. Review medications for side effects contributing to fall risk. Reinforce activity limits and safety measures with patient and family.

## 2020-07-31 NOTE — ED PROVIDER NOTE - CLINICAL SUMMARY MEDICAL DECISION MAKING FREE TEXT BOX
Presents for right neck wound after scratching "pimple." Bleeding controlled with injection of lidocaine + epi and quikclot dressing. No systemic symptoms. Observed 1 hour to ensure no further bleeding. Stable at time of discharge.

## 2020-07-31 NOTE — ED PROVIDER NOTE - CARE PROVIDER_API CALL
Kenji Lux   MEDICINE  [address] [address]  Phone: [phone]  Fax: [phone]  Follow Up Time: Routine

## 2020-07-31 NOTE — ED PROVIDER NOTE - PATIENT PORTAL LINK FT
You can access the FollowMyHealth Patient Portal offered by [facility] by registering at the following website: [email or web address]. By joining [facility]’s FollowMyHealth portal, you will also be able to view your health information using other applications (apps) compatible with our system.

## 2020-07-31 NOTE — ED PROVIDER NOTE - PROGRESS NOTE DETAILS
bleeding controlled with lidocaine w/ epi and quick clot. no sutures required. will watch for 30 mins more and plan to d/c.
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
## 2020-08-02 DIAGNOSIS — S61.431A PUNCTURE WOUND WITHOUT FOREIGN BODY OF RIGHT HAND, INITIAL ENCOUNTER: ICD-10-CM
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
## 2020-08-02 DIAGNOSIS — F17.200 NICOTINE DEPENDENCE, UNSPECIFIED, UNCOMPLICATED: ICD-10-CM

## 2020-08-02 DIAGNOSIS — Z79.899 OTHER LONG TERM (CURRENT) DRUG THERAPY: ICD-10-CM
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
## 2020-08-02 DIAGNOSIS — F41.8 OTHER SPECIFIED ANXIETY DISORDERS: ICD-10-CM
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
## 2020-09-14 NOTE — ED ADULT NURSE NOTE - NS_BH TRG QUESTION1_ED_ALL_ED
Date Performed: 09/14/2020    The patient is a 41-year-old female who underwent a laparoscopic hysterectomy with BSO because of dysfunctional uterine bleeding and fibroids this morning with Dr. Mccullough.  I have been asked by Dr. Mccullough to manage her postoperatively in regard to her hypothyroidism, diabetes and hypercholesterolemia.  She has had problems with dysfunctional uterine bleeding and has known fibroids and did discuss proceeding with laparoscopic hysterectomy with BSO with Dr. Mccullough today within the last month or so.  She has had no problems presurgically except for the dysfunctional uterine bleeding and has no complaints today.    REVIEW OF SYSTEMS:  She complains of pain where the Oscar is and in the urethra where it is kind of pulling a little bit.  Apart from that, says she is doing fine.  Specifically, on review of systems, she denies lightheadedness or dizziness, no headaches, no recent colds, flus, fevers or chills.  No chest pain or shortness of breath, no palpitations, no coughing.  Bowels have been fine, urinating fine.  Rest of review of systems is negative.    PAST MEDICAL HISTORY:  Remarkable for hypothyroidism.  She had postpartum thyroiditis, rectal ulcer in the past, fibroids, hemorrhoids, arthritis, diabetes and hypercholesterolemia.  She was officially diagnosed with diabetes about a year and a half ago.  Last A1c in 06/2020 was 7.9.  She also has hypercholesterolemia.    PAST SURGICAL HISTORY:  [place] teeth extracted in 2007.  IUD placed 2018.  Hemorrhoidectomy in 2014.  Anal sphincterotomy 2006.    ALLERGIES:  None.    MEDICATIONS ON ADMISSION:  Metformin 1000 mg b.i.d.  Synthroid 125 mcg 1 p.o. daily.  Zocor 20 mg nightly.    She takes Vitamin D.  Omega-3 Fish Oil.  Flaxseed on a p.r.n. basis over-the-counter.    SOCIAL HISTORY:  No tobacco.  Drinks alcohol, like 1 drink every 2 weeks.  She is , has 2 children.  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    FAMILY  HISTORY:  Remarkable for mom with hypothyroidism.  Dad with diabetes, hypertension, coronary artery disease.  Paternal grandmother with diabetes and thyroid issues.  Paternal grandfather with hypertension and diabetes.    PHYSICAL EXAMINATION:  GENERAL:  She is in no acute distress, oriented x3.  She is resting comfortably in bed, although complains that the Oscar is hurting her.  VITAL SIGNS:  Temperature 98.3, pulse 81, respiratory rate 16, /69, sat 99% on room air.  HEENT:  PERRLA, EOMI.  Sclerae icteric.  Pharynx clear.  NECK:  Supple without masses or thyromegaly.  No cervical lymphadenopathy, no supraclavicular nodes.  CHEST:  Clear, breathing easy.  No rales or wheezes.  HEART:  Regular rate and rhythm without murmur.  ABDOMEN:  Benign, soft, nontender, without mass or hepatosplenomegaly.  EXTERNAL GENITALIA: Looks normal.  Oscar is pulling a little bit. Otherwise, nothing abnormal there.  EXTREMITIES:  No edema.    LABORATORY DATA:  09/10/2020 chemistry panel looked unremarkable except for glucose elevated at 182.  Creatinine, LFTs were normal.  H and H 12.9 and 38 with a white count of 7700.  COVID test preoperatively was negative.  Urinalysis, no sign of infection and chest x-ray preop was unremarkable as well.    ASSESSMENT AND PLAN:  Diabetes.  A1c 7.9.  At this point, will restart her Metformin tomorrow morning at 1000 mg b.i.d. In the meantime, will provide sliding scale Insulin for her with Humalog and that is written for.  She is currently on a clear liquid diet and will place her on consistent carbohydrates, so a diabetic clear liquid diet to start with and see how she does with that.  I will follow along with you.  We did restart her Zocor for her hypercholesterolemia as well as her Synthroid as well for her hypothyroidism.  Again, I will follow along with her diabetes until discharge.      Dictated By: Karrie Kebede MD  Signing Provider: MD VEDA Cagle/rolando  ([id number])  DD: 09/14/2020 15:59:11 TD: 09/14/2020 16:10:21    Copy Sent To:      No

## 2020-09-17 NOTE — ED PROVIDER NOTE - CARDIOVASCULAR NEGATIVE STATEMENT, MLM
no chest pain and no edema. Ftsg Text: The defect edges were debeveled with a #15 scalpel blade.  Given the location of the defect, shape of the defect and the proximity to free margins a full thickness skin graft was deemed most appropriate.  Using a sterile surgical marker, the primary defect shape was transferred to the donor site. The area thus outlined was incised deep to adipose tissue with a #15 scalpel blade.  The harvested graft was then trimmed of adipose tissue until only dermis and epidermis was left.  The skin margins of the secondary defect were undermined to an appropriate distance in all directions utilizing iris scissors.  The secondary defect was closed with interrupted buried subcutaneous sutures.  The skin edges were then re-apposed with running  sutures.  The skin graft was then placed in the primary defect and oriented appropriately.

## 2020-09-25 NOTE — ED ADULT NURSE NOTE - SUICIDE SCREENING QUESTION 2
Last seen March 2020. You ordered Lipids for next visit. Last CBC and CMP done 2017. Visit scheduled for Oct 8. Want any other labs besides Lipid?   No

## 2020-11-16 NOTE — ED BEHAVIORAL HEALTH ASSESSMENT NOTE - NS ED [facility] ED COURSE FOUR POINT RESTRAINTS IN ED YN
WRAP UP GROUP NOTE    Patient's Goal:  Providing feedback as to their own progress in the care-plan provided. Patients have an opportunity to explore self-reflective skills and share any additional cares and concerns not yet addressed. Pt effectively participated.     Energy Level:  normal  Appetite:  normal  Concentration:  normal  Hallucinations:  none  Depression:  improved  Anxiety:  improved  How I worked today:  Worked hard and achieved my goals  What helps me sleep:  Discussions with other patients  Any questions/complaints/comments:  None    Dragan Anthony RN No

## 2020-12-02 NOTE — ED PROCEDURE NOTE - CPROC ED INFORMED CONSENT1
Benefits, risks, and possible complications of procedure explained to patient/caregiver who verbalized understanding and gave verbal consent. Doxepin Counseling:  Patient advised that the medication is sedating and not to drive a car after taking this medication. Patient informed of potential adverse effects including but not limited to dry mouth, urinary retention, and blurry vision.  The patient verbalized understanding of the proper use and possible adverse effects of doxepin.  All of the patient's questions and concerns were addressed.

## 2021-01-15 NOTE — ED PROVIDER NOTE - FAMILY HISTORY
Received call from Sydnie diaz's prior auth completed. Updated her that pt not medically ready for discharge. She will continue to follow. No pertinent family history in first degree relatives

## 2021-03-07 NOTE — ED PROVIDER NOTE - PROGRESS NOTE DETAILS
new socks given to patient and advised not to wear her boot without socks
I was present during the key portion of the procedure.

## 2021-05-29 NOTE — ED ADULT NURSE NOTE - PSH
I have sent hydrocodone-aceteaminophen to the pharmacy for pain. This is a narcotic pain medicine and should be taken with food. No alcohol or driving while using this medication.    3 advil (ibuprofen) every 6 hours as needed for pain.    After surgery, your surgeon will be responsible for pain management.    Thank you for coming in today!    If you receive a survey from [employer] about your experience today, it would be very helpful if you could fill it out to let us know what went well and what we can improve!    General Information:    Today you had your appointment with Petros Winchester NP    My hours are:    Monday : Out of clinic  Tuesday : 8:00AM - 5:00 PM  Wednesday: 8:00AM - 5:00 PM  Thursday: 8:00AM - 5:00 PM  Friday: 8:00AM - 5:00 PM    I am not in the office Mondays. Therefore non-urgent calls and medical messages received on Monday will be addressed when I am back in the office on Tuesday. Urgent matters will be reviewed and addressed by one of my partners in the office as needed.    If lab work was done today as part of your evaluation you will generally be contacted via [employer], mail, or phone with the results within 1-5 days. If there is an alarming result we will contact you by phone. Lab results come back at varying times, I generally wait until all lab results are available before making comments on the results.     If you need refills please contact your pharmacy. They will send a refill request to me to review. Please allow 3-5 business days for us to process all refill requests.     My Clinical Assistant is Melvi. Please call us at [phone] or send a medical message with any questions or concerns.     
No significant past surgical history

## 2021-06-11 NOTE — PROGRESS NOTE ADULT - EXTREMITIES
detailed exam
11-Jun-2021 09:00

## 2021-07-15 NOTE — DISCHARGE NOTE PROVIDER - NSDCQMAMI_CARD_ALL_CORE
Physical Therapy  Visit Type: treatment  Precautions:  Medical precautions: PMHx: RA, sarcoidosis, pHTN on triple therapy, chronic hypoxic respiratory failure on 6-8 L O2, CKD, HTN, GERD, Hypothyroidism, DM.     Pt presented to the ED s/p fall with R tibial fracture.     7/13 s/p RHC with RIJ [place] line placement and R tibial plateau ORIF.   Lines:     Complex Lines: RIJ [place] Line       Lines in chart and on patient reviewed, cautions maintained throughout session.  Lower Extremity:    Right:  weight bearing: non weight bearing.  Lines:       Lines in chart and on patient reviewed, cautions maintained throughout session.    SUBJECTIVE  Patient agreed to participate in therapy this date.  \"I don't want to do anything to hurt it.\"   Patient / Family Goal: maximize function    Pain     Location: R calf; R knee.     At onset of session (out of 10): 8  RN informed on pain level     OBJECTIVE       Affect/Behavior: anxious and crying  Functional Communication/Cognition       Form of communication:  Verbal  Additional Details: Pt very emotional throughout session regarding current limitations and pain levels.     Bed Mobility:      Training completed:      Mobility deferred as pt is currently receiving blood transfusion.       Interventions     Supine    Lower Extremity: Right: heel slides, quad sets and TKE over bolster, AAROM and PROM, Supine LE reps: 8-12. 2 sets  Training provided: HEP training and positioning    Skilled input: Verbal instruction/cues, facilitation and as detailed above  Verbal Consent: Writer verbally educated and received verbal consent for hand placement, positioning of patient, and techniques to be performed today from patient for therapist position for techniques as described above and how they are pertinent to the patient's plan of care.       ASSESSMENT    Impairments: strength, activity tolerance, balance deficits, endurance, pain and body habitus  Functional Limitations: all functional  mobility  Pt currently receiving blood transfusion, therefore therapy session focused on PROM/AAROM for R LE. Pt still limited by significant anxiety, fear of movement, and R LE pain. Pt's brother present and was educated on positioning and assistance to provide for R LE PROM. Pt still pending PRAFO boot for R LE. Recommend frequent repositioning of R LE- discussed with RN.     Discharge Recommendations   Recommendation for Discharge: PT IL: Patient is appropriate for daily Physical Therapy        PT/OT Mobility Equipment for Discharge: tbd      PT Identified Barriers to Discharge: pain, anxiety.     Skilled therapy is required to address these limitations in attempt to maximize the patient's independence.  Progress: slow progress, medical status limitations    Pain at end of session: RN informed on pain level, location: R knee- did not quantify.     End of Session:   Location: in bed  Safety measures: alarm system in place/re-engaged, bed rails x4, call light within reach, equipment intact and lines intact  Handoff to: nurse    PLAN    Suggestions for next session as indicated: PT Frequency: 5 days/week  Frequency Comments: 07/15, MICU, LYNN, IPOC2. NWBing R LE.        Interventions: balance, body mechanics, compensatory technique education, energy conservation, ROM, strengthening, safety education, HEP train/position, bed mobility, endurance training and functional transfer training  Agreement to plan and goals: patient agrees with goals and treatment plan        GOALS  Long Term Goals: (to be met by time of discharge from hospital)  Sit to supine: Patient will complete sit to supine moderate assist.  Supine to sit: Patient will complete supine to sit moderate assist.  Sit (edge of bed): Patient will sit at edge of bed for 15 minutes , supervision.   Mobility home program: family to demonstrate and state appropriate assist with mobility.   Exercise home program: patient and family to demonstrate and state  appropriate assist with exercises as instructed.   Home program (assist): patient able to complete home program with family/friend/caregiver assist.     Documented in the chart in the following areas: Assessment.      Therapy procedure time and total treatment time can be found documented on the Time Entry flowsheet   No

## 2021-08-05 NOTE — BEHAVIORAL HEALTH ASSESSMENT NOTE - DOMICILE TYPE
[No Acute Distress] : no acute distress [Well Nourished] : well nourished [Well Developed] : well developed [Well-Appearing] : well-appearing [EOMI] : extraocular movements intact Other [Normal Outer Ear/Nose] : the outer ears and nose were normal in appearance [No JVD] : no jugular venous distention [No Respiratory Distress] : no respiratory distress  [No Accessory Muscle Use] : no accessory muscle use [Clear to Auscultation] : lungs were clear to auscultation bilaterally [Normal Rate] : normal rate  [Regular Rhythm] : with a regular rhythm [Normal S1, S2] : normal S1 and S2 [No Carotid Bruits] : no carotid bruits [No Edema] : there was no peripheral edema [Soft] : abdomen soft [Non Tender] : non-tender [Non-distended] : non-distended [No HSM] : no HSM [Normal Bowel Sounds] : normal bowel sounds [No CVA Tenderness] : no CVA  tenderness [Grossly Normal Strength/Tone] : grossly normal strength/tone [No Rash] : no rash [Coordination Grossly Intact] : coordination grossly intact [No Focal Deficits] : no focal deficits [Normal Gait] : normal gait [Normal Affect] : the affect was normal [Normal Mood] : the mood was normal [Normal Insight/Judgement] : insight and judgment were intact

## 2021-09-15 NOTE — PROGRESS NOTE ADULT - PROBLEM SELECTOR PLAN 1
Psych f/u as outpt Isotretinoin Counseling: Patient should get monthly blood tests, not donate blood, not drive at night if vision affected, not share medication, and not undergo elective surgery for 6 months after tx completed. Side effects reviewed, pt to contact office should one occur.

## 2021-09-30 NOTE — ED BEHAVIORAL HEALTH ASSESSMENT NOTE - NS ED BHA MED ROS CONSTITUTIONAL SYMPTOMS
Call primary care provider for follow up after discharge/Activities as tolerated/Low salt diet/Monitor weight daily/Report signs and symptoms to primary care provider No complaints

## 2021-11-16 NOTE — DISCHARGE NOTE PROVIDER - PROVIDER RX CONTACT NUMBER
Yu is calling with an update for Dr. Adrian on the patient.    Yu stated that she completes a yearly visit with the patient.    Yu stated that she completed a Quantiflo (circulation test). The results were:    Left 0.9  Right 0.51    Yu also completed a mini-cognitive test. The patient remembered 1 of 3 words given.    Please contact Yu with any questions.   [phone]

## 2022-01-03 NOTE — ED PROVIDER NOTE - SECONDARY DIAGNOSIS.
Need for tetanus booster Render In Strict Bullet Format?: No Detail Level: Zone Initiate Treatment: Betamethasone lotion QHS x 2 weeks, then BIW for maintenance \\nKetoconazole shampoo BIW \\nClindamycin solution BID

## 2022-05-11 NOTE — ED PROVIDER NOTE - MEDICAL DECISION MAKING DETAILS
Fasting lab orders have been placed.    Patient has been notified   39 female here for vague symptoms. Asking for food. Medical screening exam done. Continue outpatient management.

## 2022-06-13 NOTE — ED ADULT NURSE NOTE - PRIMARY CARE PROVIDER
none Show Applicator Variable?: Yes Consent: The patient's consent was obtained including but not limited to risks of crusting, scabbing, blistering, scarring, darker or lighter pigmentary change, recurrence, incomplete removal and infection. Post-Care Instructions: I reviewed with the patient in detail post-care instructions. Patient is to wear sunprotection, and avoid picking at any of the treated lesions. Pt may apply Vaseline to crusted or scabbing areas. Render Note In Bullet Format When Appropriate: No Detail Level: Detailed Spray Paint Text: The liquid nitrogen was applied to the skin utilizing a spray paint frosting technique. Medical Necessity Information: It is in your best interest to select a reason for this procedure from the list below. All of these items fulfill various CMS LCD requirements except the new and changing color options. Pared With?: curette Topical Anesthesia Type: 4% lidocaine Medical Necessity Clause: This procedure was medically necessary because the lesions that were treated were:

## 2022-08-16 NOTE — ED ADULT NURSE NOTE - NS ED NURSE DISCH DISPOSITION
PT DAILY TREATMENT NOTE     Patient Name: Willa Buerger  Date:2022  : 1955  [x]  Patient  Verified  Payor: Lizeth Craig / Plan: Anup Alannahter / Product Type: Managed Care Medicare /    In time:945  Out time:1037  Total Treatment Time (min): 52  Visit #: 3 of 10    Medicare/BCBS Only   Total Timed Codes (min):  42 1:1 Treatment Time:  42       Treatment Area: Pain in left elbow [M25.522]  Medial epicondylitis, left elbow [M77.02]  Cervicalgia [M54.2]    SUBJECTIVE  Pain Level (0-10 scale): 0  Any medication changes, allergies to medications, adverse drug reactions, diagnosis change, or new procedure performed?: [x] No    [] Yes (see summary sheet for update)  Subjective functional status/changes:   [] No changes reported  \"No pain in my neck today. My back is still hurting. \"    OBJECTIVE    Modality rationale: decrease pain and increase tissue extensibility to improve the patients ability to improve mobility and positional tolerance   Min Type Additional Details    [] Estim:  []Unatt       []IFC  []Premod                        []Other:  []w/ice   []w/heat  Position:  Location:    [] Estim: []Att    []TENS instruct  []NMES                    []Other:  []w/US   []w/ice   []w/heat  Position:  Location:   10 [x]  Traction: [x] Cervical       []Lumbar                       [] Prone          [x]Supine                       [x]Intermittent   []Continuous Lbs: 12:6  [] before manual  [x] after manual    []  Ultrasound: []Continuous   [] Pulsed                           []1MHz   []3MHz W/cm2:  Location:    []  Iontophoresis with dexamethasone         Location: [] Take home patch   [] In clinic    []  Ice     []  heat  []  Ice massage  []  Laser   []  Anodyne Position:  Location:    []  Laser with stim  []  Other:  Position:  Location:    []  Vasopneumatic Device    []  Right     []  Left  Pre-treatment girth:  Post-treatment girth:  Measured at (location):  Pressure:       [] lo [] med [] hi   Temperature: [] lo [] med [] hi   [x] Skin assessment post-treatment:  [x]intact []redness- no adverse reaction    []redness - adverse reaction:     10 min Therapeutic Exercise:  [x] See flow sheet :   Rationale: increase ROM and increase strength to improve the patients ability to perform ADLs    24 min Neuromuscular Re-education:  [x]  See flow sheet :  scap and cervical re-ed activities    Rationale: increase ROM, increase strength, improve coordination, improve balance, and increase proprioception  to improve the patients ability to improve mobility and upright posture     8 min Manual Therapy:  SOR, STM to c/s paraspinals and B UT, gentle manual cervical traction    The manual therapy interventions were performed at a separate and distinct time from the therapeutic activities interventions. Rationale: decrease pain, increase ROM, increase tissue extensibility, decrease trigger points, and increase postural awareness to improve mobility and positional tolerance       With   [x] TE   [] TA   [x] neuro   [] other: Patient Education: [x] Review HEP    [] Progressed/Changed HEP based on:   [x] positioning   [x] body mechanics   [] transfers   [] heat/ice application    [] other:      Other Objective/Functional Measures:      Pain Level (0-10 scale) post treatment: 0    ASSESSMENT/Changes in Function: Pt is making progress with her functional reaching and activity tolerance. She reports a reduction in radicular sx since starting mechanical traction. She was able to progress strengthening exercises today.      Patient will continue to benefit from skilled PT services to modify and progress therapeutic interventions, address functional mobility deficits, address ROM deficits, address strength deficits, analyze and address soft tissue restrictions, analyze and cue movement patterns, analyze and modify body mechanics/ergonomics, assess and modify postural abnormalities, address imbalance/dizziness, and instruct in home and community integration to attain remaining goals. [x]  See Plan of Care  []  See progress note/recertification  []  See Discharge Summary         Progress towards goals / Updated goals:  1. Pt will demonstrate left shoulder AROM flexion to 150degrees  in order to improve ability to reach overhead  Recert: 65 degrees with pain  Measure NV  2. Pt will score at least predicted value on FOTO in order to facilitate return to PLOF. Recert: 60 with predicted value of 66  Assess at 30 day flako  3. Pt will demonstrate left elbow flexion AROM to 145degrees in order to improve ability to fish  Recert: 698 degrees  Measure NV  4. Pt will demonstrate left  strength minimum 25lbs in order to improve ability to perform heavy ADLs.   Recert: 11 lbs  Able to use 25# gripper in clinic    PLAN  []  Upgrade activities as tolerated     [x]  Continue plan of care  []  Update interventions per flow sheet       []  Discharge due to:_  []  Other:_      Derek Pritchard, PTA, CSCS 8/16/2022  10:41 AM    Future Appointments   Date Time Provider Elisa Pham   8/18/2022  9:45 AM Deuce Leonardo, PT [facility] (saw a physician/midlevel provider and clinician was able to provide reasons for staying for treatment & form is signed)

## 2022-09-07 NOTE — ED ADULT TRIAGE NOTE - NSWEIGHTCALCTOOLDRUG_GEN_A_CORE
Detail Level: Detailed
Sunscreen Recommendations: spf 30 broad spectrum, reapply q 90 min
Detail Level: Zone
Detail Level: Generalized
 used

## 2022-09-15 NOTE — BEHAVIORAL HEALTH ASSESSMENT NOTE - EMPLOYMENT
Subjective:   History was provided by the mom and dad   Brigido Oliva is a 13 y.o. female who is here for this well-child visit.    Current Issues:    Current concerns include: Parents feel she may be having migraines.  She has had some headache since 2020, but very infrequent.  But now since going through puberty (started periods in the last year), she is having migraines more frequently.  Already saw the eye doctor and a neurologist per mom (I can't see this in Epic).  She takes ibuprofen for the headaches and this works well; needs note to take ibuprofen at school when needed.  No waking up with NELSON, and NELSON don't wake from sleep.  No neuro changes.    Does patient snore? NO     Review of Nutrition:  Current diet: +fruits/veggies, meats, dairy  Balanced diet? Yes; rec MVI with vit D    Social Screening:  Parental coping and self-care: doing well  Opportunities for peer interaction? Yes  Concerns regarding behavior with peers? No  School performance: doing well; no concerns  Secondhand smoke exposure? no  No flowsheet data found.  Screening Questions:  Patient has a dental home: yes  Risk factors for anemia: no      Risk factors for tuberculosis: no  Risk factors for hearing loss: no  Risk factors for dyslipidemia: no    Growth parameters: Noted and are appropriate for age.  Past Medical History:   Diagnosis Date    Allergy     AR (allergic rhinitis) 6/28/2013    Otitis media     x2 in 5/12, 9/12, 10/12- won't resolve    Seasonal allergies      Past Surgical History:   Procedure Laterality Date    MYRINGOTOMY W/ TUBES      TYMPANOSTOMY TUBE PLACEMENT  11/5/12    Dr. Dominguez     No family history on file.  Social History     Socioeconomic History    Marital status: Single   Tobacco Use    Smoking status: Never    Smokeless tobacco: Never    Tobacco comments:     exposed to smoke   Substance and Sexual Activity    Alcohol use: No    Drug use: No   Social History Narrative    Mom and dad . 1/2 custody.  Dad  smokes outside only.  +Dog.  In school and in 4th grade at [employer].  HM: Hb 12.0 6/12; lead drawn 6/12     Patient Active Problem List   Diagnosis    AR (allergic rhinitis)    Hypopigmented skin lesion    Sleep disturbance    Migraine without status migrainosus, not intractable       Reviewed Past Medical History, Social History, and Family History-- updated   Review of Systems- see patient questionnaire answers below     Objective:   APPEARANCE: Well nourished, well developed, in no acute distress. well appearing  SKIN: Normal skin turgor, no obvious lesions.  HEAD: Normocephalic, atraumatic.  EYES: conjunctivae clear, no discharge. +Red reflexes bilat  EARS: TMs intact. Light reflex normal. No retraction or perforation.   NOSE: Mucosa pink. Airway clear.  MOUTH & THROAT: No tonsillar enlargement. No pharyngeal erythema or exudate. No stridor.  CHEST: Lungs clear to auscultation.  No wheezes or rales.  No distress.  CARDIOVASCULAR: Regular rate and rhythm.  No murmur.  Pulses equal  GI: Abdomen not distended. Soft. No tenderness or masses. No hepatosplenomegaly  GENITALIA/Dudley Stage: deferred since having periods  MSK: no scoliosis, nl gait, normal ROM of joints  Neuro: nonfocal exam  Lymph: no cervical, axillary, or inguinal lymph node enlargement        Assessment:     1. Encounter for well child check without abnormal findings    2. Need for vaccination    3. Other migraine without status migrainosus, not intractable         Plan:     1. Vision: sees eye doctor  Hearing: passed  Hb: nl 2018  Lipids: needs next year; unable to order at visit today due to Epic being down    Anticipatory guidance discussed.  Diet, oral hygiene, safety, seatbelt/booster seat, school performance, read to/with child, limit TV.  Gave handout on well-child issues at this age.    Weight management:  The patient was counseled regarding nutrition and physical activity.    Immunizations today: per orders.  I counseled parent on vaccine  components.  Recommend flu shot yearly.    Likely migraines-- start OTC magnesium oxide 200 mg nightly as a preventative.  F/u in 3 months if not improving with her neurologist.  For acute headaches, Ibuprofen every 6 hours as needed.  If worsening, waking up with headaches, or headaches that wake from sleep, then return right away.       Gave note for school for Ibuprofen.    I highly recommend catching up on Gardasil series to prevent HPV related cancers (2nd dose needed in 6 months, started series today).  Flu shot is recommended yearly to prevent severe/ deadly flu.    I do recommend getting the Covid vaccines for children ages 6 months and up.  Can call to schedule this ([phone]) or can schedule through [facility].       Unemployed

## 2022-10-05 NOTE — ED ADULT NURSE NOTE - NS PRO AD NO ADVANCE DIRECTIVE
[FreeTextEntry1] : 51y F PMHx dermatomyositis, MAULIK, HTN, Type 2 DM (A1c 6.4 10/5/22), HLD, hypothyroid p/w f/u. No

## 2022-10-28 NOTE — ED ADULT NURSE NOTE - PRIMARY CARE PROVIDER
Physical Therapy  Facility/Department: [address]  Daily Treatment Note  NAME: Jorje Mcneal  : 1944  MRN: [id number]    Date of Service: 10/28/2022    Discharge Recommendations:    Jorje Mcneal scored a 18/24 on the AM-PAC short mobility form. Current research shows that an AM-PAC score of 18 or greater is typically associated with a discharge to the patient's home setting. Based on the patient's AM-PAC score and their current functional mobility deficits, it is recommended that the patient have 2-3 sessions per week of Physical Therapy at d/c to increase the patient's independence. At this time, this patient demonstrates the endurance and safety to discharge home with HHPT and a follow up treatment frequency of 2-3x/wk. Please see assessment section for further patient specific details. If patient discharges prior to next session this note will serve as a discharge summary. Please see below for the latest assessment towards goals. PT Equipment Recommendations  Equipment Needed: No  Other: owns RW    Patient Diagnosis(es): The encounter diagnosis was Altered mental status, unspecified altered mental status type. Assessment   Assessment: Pt demonstrates improved endurance with increased distance ambulated and ability to complete flight of stairs. SBA for amb with RW and CGA for steps (pt has 10 to enter home) with safety concerns for pt discharging home alone. Upon discharge pt would benefit from initial 24/7 supervision/assist and HHPT. Recommend use of RW at discharge. Activity Tolerance: Patient limited by fatigue;Treatment limited secondary to decreased cognition;Patient limited by endurance  Equipment Needed: No  Other: owns RW     Plan    Physcial Therapy Plan  General Plan:  (2-5)  Current Treatment Recommendations: Functional mobility training;Transfer training;Gait training; Endurance training     Restrictions  Position Activity Restriction  Other position/activity restrictions: up with assistance     Subjective    Subjective  Subjective: Pt supine in bed upon approach and agreeable to PT  Pain: Reports chronic back pain (not rated)- RN aware  Orientation  Overall Orientation Status: Impaired  Orientation Level: Oriented to person;Oriented to place;Oriented to situation;Disoriented to time  Cognition  Overall Cognitive Status: Exceptions  Arousal/Alertness: Delayed responses to stimuli; Appropriate responses to stimuli  Following Commands: Follows one step commands with repetition  Attention Span: Attends with cues to redirect; Difficulty attending to directions  Memory: Decreased recall of recent events;Decreased short term memory  Safety Judgement: Decreased awareness of need for assistance;Decreased awareness of need for safety  Insights: Decreased awareness of deficits  Initiation: Requires cues for some  Cognition Comment: Pt tends to deflect answers to questions. Pt reports feeling \" asleep\" .  Pt needing increased time / cues for processing - limited carryover noted     Objective   Vitals     Bed Mobility Training  Bed Mobility Training: Yes  Supine to Sit: Modified independent (HOB elevated, use of HR)  Sit to Supine: Independent (HOB flat without use of HR)  Transfer Training  Transfer Training: Yes  Overall Level of Assistance: Stand-by assistance (at EOB with RW)  Sit to Stand: Stand-by assistance  Stand to Sit: Stand-by assistance  Gait Training  Gait Training: Yes  Gait  Overall Level of Assistance: Stand-by assistance (decreased step height/length and larry with fatigue; no LOB/postural sway noted)  Interventions: Verbal cues (VC for increased tep height/length with fatigue)  Speed/Larry: Slow  Distance (ft):  (200' x 2)  Rail Use: Both (B on ascent/ UL on descent)  Stairs - Level of Assistance: Contact-guard assistance (forwards ascent/ backwards descent (reports this is how she does it at home, refuses to attempt forwards as this is how she fell down stairs leading to fx))  Number of Stairs Trained: 12           Safety Devices  Type of Devices: Nurse notified;Call light within reach; Left in bed;Bed alarm in place       Goals  Short Term Goals  Time Frame for Short Term Goals: discharge  Short Term Goal 1: sit to/from supine supervision. Ongoing  Short Term Goal 2: sit to/from stand supervision. Ongoing  Short Term Goal 3: ambulate 100 ft with LRAD supervision. Ongoing  Patient Goals   Patient Goals : did not state    Education  Patient Education  Education Given To: Patient  Education Provided: Plan of Care; Fall Prevention Strategies (need to call for assist to get up)  Education Provided Comments: discharge recommendations  Education Method: Verbal  Barriers to Learning: Cognition  Education Outcome: Continued education needed    Therapy Time   Individual Concurrent Group Co-treatment   Time In 1440         Time Out 1510         Minutes 30         Timed Code Treatment Minutes: [facility] 232, [address] [facility] PMD

## 2022-11-02 NOTE — ED PROVIDER NOTE - UNABLE TO OBTAIN
Wound Care: Petrolatum Urgent need for Intervention Pt agitated/intoxicated not responding to questions appropriately.

## 2022-11-03 NOTE — ED ADULT NURSE NOTE - EXPLANATION OF PATIENT'S REASON FOR LEAVING
Left Ankle Open Reduction Internal Fixation
"does not want to waste time here and there are sicker patients"

## 2022-11-28 NOTE — ED PROVIDER NOTE - NS_EDPROVIDERDISPOUSERTYPE_ED_A_ED
- Called and discussed results with patient, order placed with oncology. Discussed letting us know if needing preop or other evaluation and we can help with that. He declines smoking cessation at this time- discussed several options for patient to think about as well.    FYI to Dr. Leon for when back.   Attending Attestation (For Attendings USE Only)...

## 2023-01-13 NOTE — ED ADULT TRIAGE NOTE - NSWEIGHTCALCTOOLDRUG_GEN_A_CORE
PRINCIPAL DISCHARGE DIAGNOSIS  Diagnosis: Arthritis of right hip  Assessment and Plan of Treatment: Diet: Continue regular diet upon discharge.   Activity: No heavy lifting > 25 lbs for 4 weeks. Avoid straining or excessive activity x 6 weeks.   -Continue to use your walker when ambulating until your postoperative follow up appointment.   Posterior Hip Precautions:  -While sitting DO NOT bend hip above 90 degrees and DO NOT cross legs.  -DO NOT bend body forward to  objects  -DO NOT rotate leg when standing. Keep leg straight.   Dressings: Keep dressing clean, dry, and intact. Your doctor will remove your bandage at your post-operative follow up appointment.   Other Care:   -You may shower when you get home but DO NOT soak dressing and/or incision. The water may run over your dressing/incision but DO NOT let the water directly hit your dressing/incision (take a shower with your wound away from the direct stream of water). NO hot tubes, NO bath rubs, NO swimming pools.   -Elevate your operative leg 2 feet above heart level for 2 hours in the morning, 2 hours in the afternoon, and 2 hours in the evening.   -Apply ice for 20min every time you elevate.   -Sit for 90 min/day: 45mins x2 or 30min x3  -DO NOT sit for more than the 90min/day. Walk or lay down when not elevating your leg.   -DO NOT place the elevation pillow behind your knees. Only place it under your calf and heel.   -DO NOT bend more than 45 degrees at the waist          used

## 2023-02-24 NOTE — PATIENT PROFILE ADULT. - NSTOBACCOQUITATTEMPT_GEN_A_CORE_SD
Griseofulvin Counseling:  I discussed with the patient the risks of griseofulvin including but not limited to photosensitivity, cytopenia, liver damage, nausea/vomiting and severe allergy.  The patient understands that this medication is best absorbed when taken with a fatty meal (e.g., ice cream or french fries). none
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
## 2023-05-04 NOTE — ED PROVIDER NOTE - CPE EDP RESP NORM
Chief complaint:   Chief Complaint   Patient presents with   • Follow-up     3 mnth follow up       Vitals:  Visit Vitals  /68 (BP Location: LUE - Left upper extremity, Patient Position: Sitting, Cuff Size: Regular)   Pulse 84   Ht 5' 11.8\" (1.824 m)   Wt 112.9 kg (248 lb 14.4 oz)   BMI 33.95 kg/m²       HISTORY OF PRESENT ILLNESS     Charly Peralta is a 46 year old male with PMHx significant for hypertrophic cardiomyopathy, HTN, HLD, GERD, vitamin D deficiency, former smoker (quit 2022, 1/2 ppd hx).     Today patient reports doing well from a cardiac standpoint. No cardiac med changes since last visit. Not very active. No limitations with daily activities. Occasional dyspnea with exertion. Denies any chest pain, palpitations, dizziness, lightheadedness, syncope, or edema. Compliant with cardiac medications. No further complaints at this time.     Echo 03/31/2023  Apical hypertrophic cardiomyopathy Overall Left ventricular ejection fraction 65 % with no regional wall motion  abnormalities decreased cavity size.  LV Global longitudinal strain -10.0 % with typical and atypical pattern for apical hypertrophic cardiomyopathy.  Normal RV size and systolic function. RVSP could not be calculated due to incomplete tricuspid regurgitation velocity profile.  Left atrial volume index of 22.7 ml/m².  Aortic valve: Incompletely visualized, probably tricuspid, but a bicuspid valve cannot be ruled out. No stenosis, no AI.  No prior study available for comparison.    Other significant problems:  Patient Active Problem List    Diagnosis Date Noted   • Severe obesity (BMI 35.0-39.9) with comorbidity (CMD) 01/10/2022     Priority: Medium     1/10/22 BMI 35.22.  Comorbid:  HTN, HLD     • Vitamin D deficiency 07/01/2022     Priority: Low   • Primary hypertension 12/08/2021     Priority: Low   • Hyperlipidemia 11/08/2020     Priority: Low     The 10-year ASCVD risk score (Teodorodiego ARSHAD Jr., et al., 2013) is: 7.3%    Values used to calculate  the score:      Age: 45 years      Sex: Male      Is Non- : Yes      Diabetic: No      Tobacco smoker: No      Systolic Blood Pressure: 132 mmHg      Is BP treated: Yes      HDL Cholesterol: 47 mg/dL      Total Cholesterol: 278 mg/dL       • Chronic neck pain 11/29/2017     Priority: Low   • Gastroesophageal reflux disease without esophagitis 05/03/2016     Priority: Low   • Tobacco dependence 05/03/2016     Priority: Low   • Lumbar disc herniation 01/06/2016     Priority: Low       PAST MEDICAL, FAMILY AND SOCIAL HISTORY     Medications:  Current Outpatient Medications   Medication Sig Dispense Refill   • meloxicam (MOBIC) 15 MG tablet Take 1 tablet by mouth once daily 30 tablet 0   • atorvastatin (LIPITOR) 20 MG tablet Take 1 tablet by mouth daily. 90 tablet 3   • lisinopril-hydroCHLOROthiazide (ZESTORETIC) 20-12.5 MG per tablet Take 1 tablet by mouth daily. 90 tablet 1   • fluticasone (FLONASE) 50 MCG/ACT nasal spray Spray 2 sprays in each nostril daily. 16 g 11   • omeprazole (PrilOSEC) 20 MG capsule Take 1 capsule by mouth once daily 90 capsule 0   • ketoconazole (NIZORAL) 2 % shampoo Lather up and leave in place for 10 minutes, up  To 3 x a week 120 mL 0   • Cholecalciferol (Vitamin D3) 1.25 mg (50,000 units) tablet Take 1 tablet by mouth 1 day a week. 12 tablet 0   • Blood Pressure Monitoring (Blood Pressure Kit) Device 1 kit daily. 1 each 0   • ALPRAZolam (XANAX) 0.5 MG tablet Take 1-2 tablets by mouth 1 time as needed for Anxiety. Prior to flight 8 tablet 0   • baclofen (LIORESAL) 20 MG tablet 0.5 to 1 tab as needed for muscle spasm. Max 3 per day. 90 tablet 1   • acetaminophen (TYLENOL) 500 MG tablet Take 500 mg by mouth every 6 hours as needed for Pain.       No current facility-administered medications for this visit.       Allergies:  ALLERGIES:   Allergen Reactions   • Penicillins HIVES and PRURITUS   • Tumeric - Food Allergy PRURITUS       Past Medical  History/Surgeries:  Past  Medical History:   Diagnosis Date   • Chronic pain    • Dermatitis    • Essential (primary) hypertension    • GERD (gastroesophageal reflux disease)        Past Surgical History:   Procedure Laterality Date   • Cervical spine surgery  2014    Jaime Santacruz   • Hand tendon surgery Left 3/2011   • Shoulder surgery Right     labrum tear repair    • Shoulder surgery Right 2010    repair scar tissue       Family History:  Family History   Problem Relation Age of Onset   • Diabetes Mother        Social History:  Social History     Tobacco Use   • Smoking status: Former     Current packs/day: 0.00     Average packs/day: 0.5 packs/day for 29.0 years (14.5 pk-yrs)     Types: Cigarettes     Start date: 10/2/1991     Quit date: 10/2/2020     Years since quittin.5   • Smokeless tobacco: Never   Vaping Use   • Vaping status: never used   Substance Use Topics   • Alcohol use: Yes     Alcohol/week: 0.0 standard drinks of alcohol     Comment: occasional       REVIEW OF SYSTEMS     Review of Systems   Constitutional: Negative.    Respiratory: Negative.    Cardiovascular: Negative.    Gastrointestinal: Negative.    Genitourinary: Negative.    Musculoskeletal: Negative.    Skin: Negative.    Neurological: Negative.    Hematological: Negative.      PHYSICAL EXAM     Physical Exam  Constitutional:       Appearance: Normal appearance.   HENT:      Head: Normocephalic and atraumatic.   Cardiovascular:      Rate and Rhythm: Normal rate and regular rhythm.      Heart sounds: Normal heart sounds.   Pulmonary:      Effort: Pulmonary effort is normal.      Breath sounds: Normal breath sounds.   Skin:     General: Skin is warm and dry.   Neurological:      Mental Status: He is alert.   Psychiatric:         Mood and Affect: Mood normal.         Behavior: Behavior normal.       ASSESSMENT/PLAN     Apical hypertrophic cardiomyopathy: EF 65%, no WMAs, LV GLS -10.0 % with typical and atypical pattern for apical hypertrophic  cardiomyopathy per echo 03/2023. Will have patient complete cMRI and f/u with Dr. Mendenhall for further management.     Hypertension: Controlled in clinic, 114/68. On lisinopril-hydrochlorothiazide 20-12.5 mg daily.     Hyperlipidemia: , Chol 288,  in 01/2023. On atorvastatin 20 mg daily (started in 02/2023). Will update FLP.     Return in about 6 months (around 11/4/2023).    On 5/4/2023, IGuera scribed the services personally performed by Cristobal Whitaker MD    The documentation recorded by the scribe accurately and completely reflects the service(s) I personally performed and the decisions made by me.              normal...

## 2023-07-22 NOTE — ED ADULT NURSE NOTE - NSELOPED_ED_ALL_ED
complains of pain/discomfort
Patient and/or family announced that they are leaving. They were advised to stay, advised to return if worse./Physician notified

## 2023-07-25 NOTE — ED ADULT TRIAGE NOTE - BP NONINVASIVE SYSTOLIC (MM HG)
142 Cheiloplasty (Less Than 50%) Text: A decision was made to reconstruct the defect with a  cheiloplasty.  The defect was undermined extensively.  Additional orbicularis oris muscle was excised with a 15 blade scalpel.  The defect was converted into a full thickness wedge, of less than 50% of the vertical height of the lip, to facilite a better cosmetic result.  Small vessels were then tied off with 5-0 monocyrl. The orbicularis oris, superficial fascia, adipose and dermis were then reapproximated.  After the deeper layers were approximated the epidermis was reapproximated with particular care given to realign the vermilion border.

## 2023-08-07 NOTE — ED ADULT NURSE NOTE - NSSISCREENINGQ5_ED_A_ED
pounds for 2 more weeks. At 2 weeks on 8/21/2023 patient can progress his weightbearing status to 50% for 2 additional weeks. On 9/4/23 patient can begin weight bearing as tolerated. He can also wean off his rolling walker on that date, 9/4/23. We provided the patient with notes and instructions detailing this for his physical therapy. We also prescribed the patient massage therapy for muscle stiffness and to assist with hip pain and increasing range of motion. We provided the patient with a work letter stating that he must be off work for 3 months until we revaluate him at next visit on 11/6/23 due to inability to bear weight on his left lower extremity. Gabapentin prescription was refilled. Family medicine office number and information was provided to patient for office at [facility] if patient wishes to change primary care providers. Patient will follow up with us in 3 months on 11/6/23 to revaluate his range of motion and for repeat x-rays. Patient understands the current plan and is in agreement.            Orders Placed This Encounter   Procedures    XR PELVIS (MIN 3 VIEWS)     Standing Status:   Future     Number of Occurrences:   1     Standing Expiration Date:   9/7/2023     Scheduling Instructions:      Ap,inlet/outlet,judets     Order Specific Question:   Reason for exam:     Answer:   monitor     Phil Godyo, DO  7:14 PM 8/7/2023
No

## 2023-08-08 NOTE — H&P ADULT - NSTOBACCOMEDNCS_GEN_A_CORE_ALL
Render In Strict Bullet Format?: No
Continue Regimen: Ketoconazole, clobetasol
Detail Level: Zone
Offered and patient declined

## 2023-09-12 NOTE — ED ADULT TRIAGE NOTE - CHIEF COMPLAINT QUOTE
dizziness described as room spinning patient reports sore throat and body aches . patient drinks every day last drink 1 hr pta Intermediate Repair And Flap Additional Text (Will Appearing After The Standard Complex Repair Text): The intermediate repair was not sufficient to completely close the primary defect. The remaining additional defect was repaired with the flap mentioned below.

## 2023-09-12 NOTE — ED ADULT NURSE NOTE - NS ED NOTE ABUSE RESPONSE YN
Yes Pinch Graft Text: The defect edges were debeveled with a #15 scalpel blade. Given the location of the defect, shape of the defect and the proximity to free margins a pinch graft was deemed most appropriate. Using a sterile surgical marker, the primary defect shape was transferred to the donor site. The area thus outlined was incised deep to adipose tissue with a #15 scalpel blade.  The harvested graft was then trimmed of adipose tissue until only dermis and epidermis was left. The skin margins of the secondary defect were undermined to an appropriate distance in all directions utilizing iris scissors.  The secondary defect was closed with interrupted buried subcutaneous sutures.  The skin edges were then re-apposed with running  sutures.  The skin graft was then placed in the primary defect and oriented appropriately.

## 2023-10-02 NOTE — BEHAVIORAL HEALTH ASSESSMENT NOTE - INSIGHT (REGARDING PSYCHIATRIC ILLNESS)
October 2, 2023      [facility]  [address] [address], [address]  [place] [address]  Phone: [phone]  Fax: [phone]          Patient: Mansoor Gray   YOB: 2016  Date of Visit: 10/02/2023    To Whom It May Concern:    Alcon Gray  was at [facility] on 10/02/2023. The patient may return to work/school on 10/03/2023 with no restrictions. If you have any questions or concerns, or if I can be of further assistance, please do not hesitate to contact me.    Sincerely,    Demetrice Jasso MA      [place]

## 2023-11-02 NOTE — ED ADULT NURSE NOTE - OBJECTIVE STATEMENT
patient complaints of ETOH.  Patient reports chest pain, n/v blood, abdominal pain and diarrhea.  No signs of bleeding noted. No SOB noted. Patient denies any drug use. 55 year old male with Lymphoma "felt 2 enlarged lymph nodes couple of weeks ago"      excision of melanoma of scalp 8/18 & s/p modified radical neck mass dissection/ LN dissection 10/19/18 , present in PST reports having chronic problems with deviated nasal septum, scheduled for Septoplasty, bilateral turbinectomy on 11/02/18. .      55 year old male with H/O: Lymphoma - S/P Chemotherapy, Hypothyroidism, HTN presents for pre op evaluation stated that he "felt 2 enlarged lymph nodes couple of weeks ago". Schedule for bilateral cervical lymph node biopsies

## 2023-11-21 NOTE — ED ADULT TRIAGE NOTE - ACCOMPANIED BY
ISSUE: tac below goal 8-10    PLAN/LPN TASK:     Please call pt with dose adjustment    Your recent  Tacrolimus IR drug level was 6.8.    Please confirm this was an accurate 12-hour trough and verify your current Tacrolimus IR dose of 6.5 mg BID.    If both are accurate, please increase your  Tacrolimus IR dose to 7 mg  BID and repeat labs this week.     Please adjust med list accordingly  
[employer] message sent to patient regarding:   tac below goal 8-10     PLAN/LPN TASK:      Please call pt with dose adjustment     Your recent  Tacrolimus IR drug level was 6.8.    Please confirm this was an accurate 12-hour trough and verify your current Tacrolimus IR dose of 6.5 mg BID.    If both are accurate, please increase your  Tacrolimus IR dose to 7 mg  BID and repeat labs this week.      Please adjust med l  
Patient confirms this was an accurate 12-hour trough and verified current Tacrolimus IR dose of 6.5 mg BID.    Patient confirms increase Tacrolimus IR dose to 7 mg  BID and repeat labs this week.   
Self

## 2024-01-18 NOTE — ED ADULT TRIAGE NOTE - BMI (KG/M2)
22.1
F: None   E: Replete as necessary K>4 Mg>2  N: Regular diet   DVT Prophylaxis: Lovenox 40mg daily   GI prophylaxis: None   CODE STATUS: FULL
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
## 2024-01-31 NOTE — ED ADULT TRIAGE NOTE - AS O2 DELIVERY
Emergency Department Treatment Report    Patient: Salvador Dumont Age: 78 y.o. Sex: male    YOB: 1945 Admit Date: 1/31/2024 PCP: Billy Davies MD   MRN: [id number]  CSN: [id number]     Room: [address] [place] [address] Time Dictated: 10:18 PM        Chief Complaint   Chief Complaint   Patient presents with    Chest Pain       History of Present Illness   Salvador Dumont is a 78 y.o. male with past medical history of hypertension and hyperlipidemia who presents to the ED via EMS with the chief complaint of chest pain which began just prior to arrival.  Patient states that he was sitting on the couch when his chest pain began.  It was located in his left chest and radiates down his bilateral arms but resolved with nitro given by EMS.  Patient notes that he has had similar pain in the past but not this severe.  Patient additionally reports mild shortness of breath and some nausea.  Patient took 2 aspirin at home prior to leaving with EMS. Denies any vomiting.  No abdominal pain.  No fever or chills.  He notes left leg swelling with some calf pain.  Denies any recent surgeries, recent long travels, hormone use, or history of DVT or PE.    Review of Systems   Review of Systems   Constitutional:  Negative for chills and fever.   HENT:  Negative for congestion, rhinorrhea and sore throat.    Eyes:  Negative for discharge.   Respiratory:  Positive for shortness of breath. Negative for cough.    Cardiovascular:  Positive for chest pain and leg swelling.   Gastrointestinal:  Positive for nausea. Negative for abdominal pain and vomiting.   Genitourinary:  Negative for dysuria and hematuria.   Musculoskeletal:  Positive for myalgias.   Skin:  Negative for rash.   Neurological:  Negative for dizziness, syncope, light-headedness and headaches.          Past Medical/Surgical History     Past Medical History:   Diagnosis Date    BPH (benign prostatic hyperplasia)     Depression 01/31/2024    Diabetes (HCC)     Hypertension     Kidney 
room air

## 2024-02-15 NOTE — OCCUPATIONAL THERAPY INITIAL EVALUATION ADULT - PHYSICAL ASSIST/NONPHYSICAL ASSIST:TOILET, OT EVAL
Pt called and left voicemail requesting a new pt appointment. Attempted to call pt back to schedule. No answer. LVM 2/15/24 at 1046   1 person assist

## 2024-02-28 NOTE — ED ADULT NURSE NOTE - NS ED NOTE  TALK SOMEONE YN
[facility] ENT:    [place] Location  [address]  [place] [address]    Phone: [phone]   No

## 2024-04-09 NOTE — ED PROVIDER NOTE - BIRTH SEX
Prep Survey      Flowsheet Row Responses   [facility] facility patient discharged from? [facility]   Is LACE score < 7 ? No   Eligibility Readm Mgmt   Discharge diagnosis Ventricular tachyardia   Does the patient have one of the following disease processes/diagnoses(primary or secondary)? Other   Does the patient have Home health ordered? No   Is there a DME ordered? No   Prep survey completed? Yes            VALENTINE GARZA - Registered Nurse           Female

## 2024-04-13 NOTE — ED PROVIDER NOTE - TOBACCO USE
[facility] EMERGENCY DEPARTMENT    CHIEF COMPLAINT  Seizures (Pt had seizure at home lasting about 1 minute. Pt has a short hx of seizures in the past. Pt has hx of cancer and abd surgery a month ago.)       HISTORY OF PRESENT ILLNESS  Viv Ochoa is a 75 y.o. female with PMH pancreas head/uncinate adenocarinoma s/p neoadjuvant therapy and Whipple who presents to the ED with concern for seizure. Presents from home by EMS. Seizure witnessed by ex-. Lasted about 1 min. EMS reports pt post ictal with improving mental status. Pt does not recall event. Pt reportedly with remote history of seizure. Pt complains of nausea and mild HA but is otherwise without complaint. Denies fever, chest pain, SOB. Follows with Dr. Ennis.     MRI Brain 1/17/2022:   IMPRESSION:     1. Symmetric enlargement of extra-axial muscles, most pronounced involving the inferior rectus and superior rectus muscles, also with involvement of the medial rectus and superior oblique muscles  progressed since 12/23/2019 and increased moderate   symmetric bilateral exophthalmos compatible with Graves ophthalmopathy. No evidence for obvious impingement of the optic nerve sheath complex at the orbital apex.  2. Progression of empty sella and enlarged bilateral trigeminal cisterns may represent sequela of benign intracranial hypertension. There is associated relatively narrowed distal transverse dural venous sinuses and and stenosis partially related to   arachnoid granulations.  3. 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 Mild chronic small vessel ischemia    I have reviewed the following from the nursing documentation:    Past Medical History:   Diagnosis Date    GORDO (acute kidney injury) (HCC) 12/25/2019    Cancer (HCC)     right-breast    Depression     Grave's disease     Hyperlipidemia     Hypertension     Hyperthyroidism     ablation of thyroid 15  seizure however in the setting of multiple previous events I think the patient might benefit from being started on an AED.  I discussed the patient's case with Dr. Luque/neurology who recommends Keppra 500 mg twice daily.  Patient does not wish to be admitted to the hospital which I think is reasonable.  She was evaluated by the neurology NAIN Byrd-NP at bedside.  She recommended acquisition of an MRI which was obtained and showed no evidence of metastatic disease.  Patient was noted to have hypomagnesemia and hypokalemia, repleted orally.  Chronic anemia, at or around the patient's baseline.  Discharge home with instructions to follow with PCP in several days.  Follow-up with neurology outpatient.  Usual strict return precautions.  Usual seizure restrictions communicated    - History obtained from: pt, EMS   - Records reviewed: remote MRI as per HPI    - Consults:   IP CONSULT TO NEUROLOGY     I, Bear Lynch MD, am the primary clinician of record.     During the patient's ED course, the patient was given:  Medications   magnesium oxide (MAG-OX) tablet 400 mg (400 mg Oral Given 4/13/24 1228)   sodium chloride 0.9 % bolus 1,000 mL (0 mLs IntraVENous Stopped 4/13/24 1251)   potassium chloride (KLOR-CON M) extended release tablet 40 mEq (40 mEq Oral Given 4/13/24 1228)   levETIRAcetam (KEPPRA) tablet 500 mg (500 mg Oral Given 4/13/24 1319)   dextrose 50 % IV solution (25 g IntraVENous Given 4/13/24 1323)   gadoteridol (PROHANCE) injection 15 mL (14 mLs IntraVENous Given 4/13/24 1433)        Patient was given prescriptions for the following medications. I counseled the patient as to how to take these medications.  New Prescriptions    LEVETIRACETAM (KEPPRA) 500 MG TABLET    Take 1 tablet by mouth 2 times daily       Patient instructed to follow up with:   Joel Whatley MD  [id number]  [facility] [id number] [phone]    In 2 days      Nena Luque MD  [address]  Current some day smoker

## 2024-04-17 NOTE — CONSULT NOTE ADULT - REASON FOR ADMISSION
Follow Up:    MRSA abscess    Interval History/ROS:  VSS ON. Patient was seen and examined at bedside. +Wound care team following. No fever. Abscess site nontender.    Allergies  penicillin (Rash)  adhesives (Rash)  vancomycin (Rash)        ANTIMICROBIALS:  doxycycline monohydrate Capsule 100 every 12 hours      OTHER MEDS:  MEDICATIONS  (STANDING):  furosemide    Tablet 20 <User Schedule>  ipratropium    for Nebulization 500 every 6 hours  pantoprazole    Tablet 40 before breakfast  predniSONE   Tablet 10 daily  rivaroxaban 20 with dinner  sildenafil (REVATIO) 20 every 8 hours  spironolactone 50 daily      Vital Signs Last 24 Hrs  T(C): 36.3 (17 Apr 2024 15:52), Max: 36.3 (17 Apr 2024 08:55)  T(F): 97.4 (17 Apr 2024 15:52), Max: 97.4 (17 Apr 2024 15:52)  HR: 97 (17 Apr 2024 15:52) (92 - 110)  BP: 119/75 (17 Apr 2024 15:52) (97/66 - 119/75)  BP(mean): --  RR: 18 (17 Apr 2024 15:52) (17 - 18)  SpO2: 97% (17 Apr 2024 15:52) (95% - 97%)    Parameters below as of 17 Apr 2024 15:52  Patient On (Oxygen Delivery Method): nasal cannula  O2 Flow (L/min): 2      PHYSICAL EXAM:  Constitutional: non-toxic, no distress  HEAD/EYES: anicteric, no conjunctival injection  Cardiovascular:   normal S1, S2, no murmur, RRR  Respiratory:  clear BS bilaterally, no wheezes, no rales  Neurologic: awake and alert, normal strength  Skin:  LLQ abdomen w/ I&D, packed, no erythema, no tenderness   Heme/Onc: no lymphadenopathy   Psychiatric:  awake, alert, appropriate mood                                12.2   10.18 )-----------( 346      ( 16 Apr 2024 08:25 )             40.6       04-16    138  |  106  |  8   ----------------------------<  98  3.6   |  21<L>  |  0.93    Ca    8.6      16 Apr 2024 08:25  Phos  2.9     04-16  Mg     1.8     04-16    TPro  7.1  /  Alb  3.7  /  TBili  0.6  /  DBili  x   /  AST  9<L>  /  ALT  <5<L>  /  AlkPhos  59  04-16      Urinalysis Basic - ( 16 Apr 2024 08:25 )    Color: x / Appearance: x / SG: x / pH: x  Gluc: 98 mg/dL / Ketone: x  / Bili: x / Urobili: x   Blood: x / Protein: x / Nitrite: x   Leuk Esterase: x / RBC: x / WBC x   Sq Epi: x / Non Sq Epi: x / Bacteria: x        MICROBIOLOGY:  v  .Blood Blood-Peripheral  04-16-24   No growth at 24 hours  --  --      .Blood Blood-Peripheral  04-16-24   No growth at 24 hours  --  --      .Abscess left abdominal wall  04-14-24   Moderate Methicillin Resistant Staphylococcus aureus  --  Methicillin resistant Staphylococcus aureus      .Blood Blood-Peripheral  04-14-24   No growth at 48 Hours  --  --      .Blood Blood-Peripheral  04-14-24   Growth in anaerobic bottle: Staphylococcus epidermidis Isolation of  Coagulase negative Staphylococcus from single blood culture sets may  represent  contamination. Contact the Microbiology Department at [phone] if  susceptibility testing is  clinically indicated.  Direct identification is available within approximately 3-5  hours either by Blood Panel Multiplexed PCR or Direct  MALDI-TOF. Details: [email or web address]  --  Blood Culture PCR      Clean Catch Clean Catch (Midstream)  04-14-24   <10,000 CFU/mL Normal Urogenital Laura  --  --                RADIOLOGY:  Imaging below independently reviewed.  < from: CT Abdomen and Pelvis w/ IV Cont (04.14.24 @ 16:46) >  IMPRESSION:  1.  A new,3.0 cm left anterior abdominal wall subcutaneous collection   noted, compatible with an abscess.  2.  Near complete resolution not complete resolution of a previously   noted right anterior abdominal wall collection/abscess.    < end of copied text >  
severe abdominal pain

## 2024-06-24 NOTE — ED PROVIDER NOTE - CROS ED ENMT ALL NEG

## 2024-08-13 NOTE — ED PROVIDER NOTE - ENMT, MLM
Sent myc [facility] 8/13    Patient is requesting refill  for topamax - needs a future appointment before refill will be completed. Please call the patient to schedule the appointment. Once appointment is scheduled instruct the patient to call the pharmacy back to initiate the refill again.    Airway patent, Nasal mucosa clear. Mouth with normal mucosa. Throat has no vesicles, no oropharyngeal exudates and uvula is midline.

## 2024-11-06 NOTE — ED PROVIDER NOTE - DISPOSITION TYPE
Please inform the patient that her x-rays are negative for fractures or dislocation. Thank you.  ELOPED

## 2025-04-11 NOTE — DISCHARGE NOTE PROVIDER - PROVIDER TOKENS
Orthopaedic Clinic Note    NAME:  Ruslan Rogers   : 1960  MRN: [id number]      2025     CHIEF COMPLAINT: Status post revision left Reverse TSA, 2025    HISTORY OF PRESENT ILLNESS:   Ruslan returns today for follow up of the left shoulder.  His pain is controlled.  There have been no complications postop.  He has been going quite slowly with his recovery given his history of instability.  He is performing therapy and slowly regaining strength.    Past Medical History:        Diagnosis Date    Allergic rhinitis     Asthma     \"aspirin sensitive\"    COPD (chronic obstructive pulmonary disease) ([facility])     Depression     GERD (gastroesophageal reflux disease)     Hypertension     Movement disorder     Osteoarthritis     Pneumonia     recent; tx with antibiotics [facility] [place]    Shoulder dislocation     chronic, left    Staph infection     hx of after left tk    Type 2 diabetes mellitus without complication        Past Surgical History:        Procedure Laterality Date    ABSCESS DRAINAGE      after left total (\"staph infection\")    ANTERIOR CRUCIATE LIGAMENT REPAIR      CARPAL TUNNEL RELEASE Bilateral     COLONOSCOPY      JOINT REPLACEMENT Left     ltk    KNEE SURGERY Right     x2; scopes    KNEE SURGERY Left 2022    LEFT KNEE WOUND REVISION performed by Vamsi Herbert MD at [facility] OR    SHOULDER ARTHROSCOPY Right 2019    REVISION OF RIGHT ROTATOR CUFF REPAIR, DEBRIDEMENT, BICEPS TENODESIS performed by Brandan Dior MD at [facility] OR    SHOULDER SURGERY Bilateral     right x 4; left shoulder x1    SHOULDER SURGERY Right 2020    RIGHT REVERSE TOTAL SHOULDER ARTHROPLASTY performed by Brandan Dior MD at [facility] OR    SHOULDER SURGERY Right 3/7/2024    POLY REVISION RIGHT REVERSE TOTAL SHOULDER ARTHROPLASTY performed by Brandan Dior MD at [facility] OR    SHOULDER SURGERY Left 2024    LEFT REVERSE TOTAL SHOULDER ARTHROPLASTY performed by Brandan Dior MD at  FREE:[LAST:[marcelino],FIRST:[ifeoma],PHONE:[(   )    -],FAX:[(   )    -]],FREE:[LAST:[henna],FIRST:[harvey],PHONE:[(   )    -],FAX:[(   )    -],ADDRESS:[[place] Michel)      Address: [address] [place] [address]          Phone: [phone]]]

## 2025-04-20 NOTE — ED ADULT NURSE NOTE - FALLEN IN THE PAST
I was called on this patient with Hb 6.7. No active bleed. I ordered 2 units of prbcs. Day team to follow.   no

## 2025-07-03 NOTE — PHYSICAL THERAPY INITIAL EVALUATION ADULT - GAIT DEVIATIONS NOTED, PT EVAL
forward flexed trunk/decreased stride length/decreased weight-shifting ability
Normal vision: sees adequately in most situations; can see medication labels, newsprint